# Patient Record
Sex: FEMALE | Race: OTHER | Employment: UNEMPLOYED | URBAN - NONMETROPOLITAN AREA
[De-identification: names, ages, dates, MRNs, and addresses within clinical notes are randomized per-mention and may not be internally consistent; named-entity substitution may affect disease eponyms.]

---

## 2017-07-19 LAB
ALBUMIN SERPL-MCNC: 3.7 G/DL
ALP BLD-CCNC: 50 U/L
ALT SERPL-CCNC: 18 U/L
ANION GAP SERPL CALCULATED.3IONS-SCNC: NORMAL MMOL/L
AST SERPL-CCNC: 17 U/L
BASOPHILS ABSOLUTE: 0 /ΜL
BASOPHILS RELATIVE PERCENT: 0.7 %
BILIRUB SERPL-MCNC: 0.3 MG/DL (ref 0.1–1.4)
BUN BLDV-MCNC: 11 MG/DL
CALCIUM SERPL-MCNC: 8.9 MG/DL
CHLORIDE BLD-SCNC: 109 MMOL/L
CO2: 23 MMOL/L
CREAT SERPL-MCNC: 0.6 MG/DL
EOSINOPHILS ABSOLUTE: 0.1 /ΜL
EOSINOPHILS RELATIVE PERCENT: 3 %
GFR CALCULATED: 110
GLUCOSE BLD-MCNC: 112 MG/DL
HCT VFR BLD CALC: 30.3 % (ref 36–46)
HEMOGLOBIN: 10.2 G/DL (ref 12–16)
LYMPHOCYTES ABSOLUTE: 0.9 /ΜL
LYMPHOCYTES RELATIVE PERCENT: 31.2 %
MCH RBC QN AUTO: 33 PG
MCHC RBC AUTO-ENTMCNC: 33.8 G/DL
MCV RBC AUTO: 97.6 FL
MONOCYTES ABSOLUTE: 0.2 /ΜL
MONOCYTES RELATIVE PERCENT: 8.4 %
NEUTROPHILS ABSOLUTE: ABNORMAL /ΜL
NEUTROPHILS RELATIVE PERCENT: 56.7 %
PLATELET # BLD: 291 K/ΜL
PMV BLD AUTO: 8.7 FL
POTASSIUM SERPL-SCNC: 4.5 MMOL/L
RBC # BLD: 3.1 10^6/ΜL
SODIUM BLD-SCNC: 143 MMOL/L
TOTAL PROTEIN: 6.1
WBC # BLD: 2.7 10^3/ML

## 2017-07-26 LAB
ALBUMIN SERPL-MCNC: 4.1 G/DL
ALP BLD-CCNC: 55 U/L
ALT SERPL-CCNC: 18 U/L
ANION GAP SERPL CALCULATED.3IONS-SCNC: NORMAL MMOL/L
AST SERPL-CCNC: 17 U/L
BASOPHILS ABSOLUTE: 0 /ΜL
BASOPHILS RELATIVE PERCENT: 0.3 %
BILIRUB SERPL-MCNC: 0.4 MG/DL (ref 0.1–1.4)
BUN BLDV-MCNC: 11 MG/DL
CALCIUM SERPL-MCNC: 9.8 MG/DL
CHLORIDE BLD-SCNC: 106 MMOL/L
CO2: 25 MMOL/L
CREAT SERPL-MCNC: 0.7 MG/DL
EOSINOPHILS ABSOLUTE: 0.1 /ΜL
EOSINOPHILS RELATIVE PERCENT: 2.2 %
GFR CALCULATED: 92
GLUCOSE BLD-MCNC: 111 MG/DL
HCT VFR BLD CALC: 33.5 % (ref 36–46)
HEMOGLOBIN: 11.3 G/DL (ref 12–16)
LYMPHOCYTES ABSOLUTE: 0.8 /ΜL
LYMPHOCYTES RELATIVE PERCENT: 30.9 %
MCH RBC QN AUTO: 32.7 PG
MCHC RBC AUTO-ENTMCNC: 33.6 G/DL
MCV RBC AUTO: 97.1 FL
MONOCYTES ABSOLUTE: 0.2 /ΜL
MONOCYTES RELATIVE PERCENT: 9.4 %
NEUTROPHILS ABSOLUTE: ABNORMAL /ΜL
NEUTROPHILS RELATIVE PERCENT: 57.2 %
PLATELET # BLD: 293 K/ΜL
PMV BLD AUTO: 9.5 FL
POTASSIUM SERPL-SCNC: 3.9 MMOL/L
RBC # BLD: 3.45 10^6/ΜL
SODIUM BLD-SCNC: 141 MMOL/L
TOTAL PROTEIN: 6.5
WBC # BLD: 2.6 10^3/ML

## 2017-08-02 LAB
ALBUMIN SERPL-MCNC: 4 G/DL
ALP BLD-CCNC: 59 U/L
ALT SERPL-CCNC: 17 U/L
ANION GAP SERPL CALCULATED.3IONS-SCNC: NORMAL MMOL/L
AST SERPL-CCNC: 17 U/L
BASOPHILS ABSOLUTE: 0 /ΜL
BASOPHILS RELATIVE PERCENT: 0.7 %
BILIRUB SERPL-MCNC: 0.4 MG/DL (ref 0.1–1.4)
BUN BLDV-MCNC: 9 MG/DL
CALCIUM SERPL-MCNC: 9.2 MG/DL
CHLORIDE BLD-SCNC: 108 MMOL/L
CO2: 27 MMOL/L
CREAT SERPL-MCNC: 0.7 MG/DL
EOSINOPHILS ABSOLUTE: 0.1 /ΜL
EOSINOPHILS RELATIVE PERCENT: 2.3 %
GFR CALCULATED: 92
GLUCOSE BLD-MCNC: 88 MG/DL
HCT VFR BLD CALC: 30.8 % (ref 36–46)
HEMOGLOBIN: 10.4 G/DL (ref 12–16)
LYMPHOCYTES ABSOLUTE: 1.1 /ΜL
LYMPHOCYTES RELATIVE PERCENT: 31 %
MCH RBC QN AUTO: 32.9 PG
MCHC RBC AUTO-ENTMCNC: 33.9 G/DL
MCV RBC AUTO: 97.2 FL
MONOCYTES ABSOLUTE: 0.4 /ΜL
MONOCYTES RELATIVE PERCENT: 10.4 %
NEUTROPHILS ABSOLUTE: ABNORMAL /ΜL
NEUTROPHILS RELATIVE PERCENT: 55.6 %
PLATELET # BLD: 308 K/ΜL
PMV BLD AUTO: 9.1 FL
POTASSIUM SERPL-SCNC: 4.1 MMOL/L
RBC # BLD: 3.17 10^6/ΜL
SODIUM BLD-SCNC: 143 MMOL/L
TOTAL PROTEIN: 6.1
WBC # BLD: 3.5 10^3/ML

## 2017-08-09 LAB
ALBUMIN SERPL-MCNC: 4.1 G/DL
ALP BLD-CCNC: 59 U/L
ALT SERPL-CCNC: 20 U/L
ANION GAP SERPL CALCULATED.3IONS-SCNC: NORMAL MMOL/L
AST SERPL-CCNC: 19 U/L
BASOPHILS ABSOLUTE: 0 /ΜL
BASOPHILS RELATIVE PERCENT: 0.6 %
BILIRUB SERPL-MCNC: 0.6 MG/DL (ref 0.1–1.4)
BUN BLDV-MCNC: 9 MG/DL
CALCIUM SERPL-MCNC: 9.5 MG/DL
CHLORIDE BLD-SCNC: 105 MMOL/L
CO2: 26 MMOL/L
CREAT SERPL-MCNC: 0.8 MG/DL
EOSINOPHILS ABSOLUTE: 0.1 /ΜL
EOSINOPHILS RELATIVE PERCENT: 1.9 %
GFR CALCULATED: 79
GLUCOSE BLD-MCNC: 135 MG/DL
HCT VFR BLD CALC: 32.6 % (ref 36–46)
HEMOGLOBIN: 10.9 G/DL (ref 12–16)
LYMPHOCYTES ABSOLUTE: 1 /ΜL
LYMPHOCYTES RELATIVE PERCENT: 32.2 %
MCH RBC QN AUTO: 32.8 PG
MCHC RBC AUTO-ENTMCNC: 33.4 G/DL
MCV RBC AUTO: 98.1 FL
MONOCYTES ABSOLUTE: 0.3 /ΜL
MONOCYTES RELATIVE PERCENT: 8.3 %
NEUTROPHILS ABSOLUTE: ABNORMAL /ΜL
NEUTROPHILS RELATIVE PERCENT: 57 %
PLATELET # BLD: 298 K/ΜL
PMV BLD AUTO: 8.7 FL
POTASSIUM SERPL-SCNC: 4.2 MMOL/L
RBC # BLD: 3.33 10^6/ΜL
SODIUM BLD-SCNC: 144 MMOL/L
TOTAL PROTEIN: 6.5
WBC # BLD: 3 10^3/ML

## 2017-08-25 LAB
BASOPHILS ABSOLUTE: 0 /ΜL
BASOPHILS RELATIVE PERCENT: 0.5 %
EOSINOPHILS ABSOLUTE: 0.1 /ΜL
EOSINOPHILS RELATIVE PERCENT: 2.2 %
HCT VFR BLD CALC: 33.4 % (ref 36–46)
HEMOGLOBIN: 11.1 G/DL (ref 12–16)
LYMPHOCYTES ABSOLUTE: 1 /ΜL
LYMPHOCYTES RELATIVE PERCENT: 27.5 %
MCH RBC QN AUTO: 32.6 PG
MCHC RBC AUTO-ENTMCNC: 33.2 G/DL
MCV RBC AUTO: 98.3 FL
MONOCYTES ABSOLUTE: 0.4 /ΜL
MONOCYTES RELATIVE PERCENT: 12.1 %
NEUTROPHILS ABSOLUTE: ABNORMAL /ΜL
NEUTROPHILS RELATIVE PERCENT: 57.7 %
PLATELET # BLD: 228 K/ΜL
PMV BLD AUTO: 10 FL
RBC # BLD: 3.39 10^6/ΜL
WBC # BLD: 3.6 10^3/ML

## 2018-11-08 ENCOUNTER — TELEPHONE (OUTPATIENT)
Dept: FAMILY MEDICINE CLINIC | Age: 58
End: 2018-11-08

## 2018-11-12 ENCOUNTER — NURSE ONLY (OUTPATIENT)
Dept: LAB | Age: 58
End: 2018-11-12

## 2018-11-12 ENCOUNTER — OFFICE VISIT (OUTPATIENT)
Dept: FAMILY MEDICINE CLINIC | Age: 58
End: 2018-11-12
Payer: MEDICAID

## 2018-11-12 VITALS
HEART RATE: 80 BPM | HEIGHT: 64 IN | SYSTOLIC BLOOD PRESSURE: 88 MMHG | DIASTOLIC BLOOD PRESSURE: 64 MMHG | TEMPERATURE: 98.1 F | BODY MASS INDEX: 32.27 KG/M2 | RESPIRATION RATE: 14 BRPM | WEIGHT: 189 LBS

## 2018-11-12 DIAGNOSIS — I50.22 CHRONIC SYSTOLIC (CONGESTIVE) HEART FAILURE (HCC): Primary | ICD-10-CM

## 2018-11-12 DIAGNOSIS — I10 ESSENTIAL HYPERTENSION: ICD-10-CM

## 2018-11-12 DIAGNOSIS — I50.22 CHRONIC SYSTOLIC (CONGESTIVE) HEART FAILURE (HCC): ICD-10-CM

## 2018-11-12 DIAGNOSIS — I95.9 HYPOTENSION, UNSPECIFIED HYPOTENSION TYPE: ICD-10-CM

## 2018-11-12 DIAGNOSIS — E03.9 HYPOTHYROIDISM, UNSPECIFIED TYPE: ICD-10-CM

## 2018-11-12 LAB
ALBUMIN SERPL-MCNC: 4.6 G/DL (ref 3.5–5.1)
ALP BLD-CCNC: 66 U/L (ref 38–126)
ALT SERPL-CCNC: 16 U/L (ref 11–66)
ANION GAP SERPL CALCULATED.3IONS-SCNC: 18 MEQ/L (ref 8–16)
AST SERPL-CCNC: 15 U/L (ref 5–40)
BASOPHILS # BLD: 0.7 %
BASOPHILS ABSOLUTE: 0 THOU/MM3 (ref 0–0.1)
BILIRUB SERPL-MCNC: 0.2 MG/DL (ref 0.3–1.2)
BUN BLDV-MCNC: 28 MG/DL (ref 7–22)
CALCIUM SERPL-MCNC: 10.1 MG/DL (ref 8.5–10.5)
CHLORIDE BLD-SCNC: 94 MEQ/L (ref 98–111)
CO2: 30 MEQ/L (ref 23–33)
CREAT SERPL-MCNC: 1.1 MG/DL (ref 0.4–1.2)
EOSINOPHIL # BLD: 4.8 %
EOSINOPHILS ABSOLUTE: 0.3 THOU/MM3 (ref 0–0.4)
ERYTHROCYTE [DISTWIDTH] IN BLOOD BY AUTOMATED COUNT: 15.9 % (ref 11.5–14.5)
ERYTHROCYTE [DISTWIDTH] IN BLOOD BY AUTOMATED COUNT: 55.7 FL (ref 35–45)
GFR SERPL CREATININE-BSD FRML MDRD: 51 ML/MIN/1.73M2
GLUCOSE BLD-MCNC: 107 MG/DL (ref 70–108)
HCT VFR BLD CALC: 42.8 % (ref 37–47)
HEMOGLOBIN: 14.1 GM/DL (ref 12–16)
IMMATURE GRANS (ABS): 0.05 THOU/MM3 (ref 0–0.07)
IMMATURE GRANULOCYTES: 0.9 %
LYMPHOCYTES # BLD: 18.1 %
LYMPHOCYTES ABSOLUTE: 1 THOU/MM3 (ref 1–4.8)
MCH RBC QN AUTO: 31.4 PG (ref 26–33)
MCHC RBC AUTO-ENTMCNC: 32.9 GM/DL (ref 32.2–35.5)
MCV RBC AUTO: 95.3 FL (ref 81–99)
MONOCYTES # BLD: 6.2 %
MONOCYTES ABSOLUTE: 0.3 THOU/MM3 (ref 0.4–1.3)
NUCLEATED RED BLOOD CELLS: 0 /100 WBC
PLATELET # BLD: 209 THOU/MM3 (ref 130–400)
PMV BLD AUTO: 12.9 FL (ref 9.4–12.4)
POTASSIUM SERPL-SCNC: 3.3 MEQ/L (ref 3.5–5.2)
RBC # BLD: 4.49 MILL/MM3 (ref 4.2–5.4)
SEG NEUTROPHILS: 69.3 %
SEGMENTED NEUTROPHILS ABSOLUTE COUNT: 3.9 THOU/MM3 (ref 1.8–7.7)
SODIUM BLD-SCNC: 142 MEQ/L (ref 135–145)
T4 FREE: 1.21 NG/DL (ref 0.93–1.76)
TOTAL PROTEIN: 7.7 G/DL (ref 6.1–8)
TSH SERPL DL<=0.05 MIU/L-ACNC: 8.01 UIU/ML (ref 0.4–4.2)
WBC # BLD: 5.6 THOU/MM3 (ref 4.8–10.8)

## 2018-11-12 PROCEDURE — 99204 OFFICE O/P NEW MOD 45 MIN: CPT | Performed by: NURSE PRACTITIONER

## 2018-11-12 PROCEDURE — 93000 ELECTROCARDIOGRAM COMPLETE: CPT | Performed by: NURSE PRACTITIONER

## 2018-11-12 RX ORDER — OMEPRAZOLE 20 MG/1
20 CAPSULE, DELAYED RELEASE ORAL DAILY PRN
COMMUNITY
End: 2018-12-17

## 2018-11-12 RX ORDER — CARVEDILOL 3.12 MG/1
3.12 TABLET ORAL 2 TIMES DAILY
COMMUNITY
End: 2018-12-03 | Stop reason: SDUPTHER

## 2018-11-12 RX ORDER — LEVOTHYROXINE SODIUM 0.1 MG/1
100 TABLET ORAL DAILY
COMMUNITY
End: 2018-12-06 | Stop reason: SDUPTHER

## 2018-11-12 RX ORDER — POTASSIUM CHLORIDE 1500 MG/1
40 TABLET, FILM COATED, EXTENDED RELEASE ORAL 2 TIMES DAILY
COMMUNITY
End: 2018-11-12 | Stop reason: SDUPTHER

## 2018-11-12 RX ORDER — SPIRONOLACTONE 25 MG/1
25 TABLET ORAL DAILY
COMMUNITY
End: 2018-12-03 | Stop reason: SDUPTHER

## 2018-11-12 RX ORDER — FUROSEMIDE 40 MG/1
40 TABLET ORAL DAILY
COMMUNITY
End: 2018-12-03 | Stop reason: SDUPTHER

## 2018-11-12 RX ORDER — METOLAZONE 2.5 MG/1
2.5 TABLET ORAL
Status: ON HOLD | COMMUNITY
End: 2018-11-30

## 2018-11-12 RX ORDER — POTASSIUM CHLORIDE 1500 MG/1
40 TABLET, FILM COATED, EXTENDED RELEASE ORAL 2 TIMES DAILY
Qty: 60 TABLET | Refills: 1 | Status: SHIPPED | OUTPATIENT
Start: 2018-11-12 | End: 2019-01-14 | Stop reason: SDUPTHER

## 2018-11-12 RX ORDER — LISINOPRIL 5 MG/1
5 TABLET ORAL DAILY
COMMUNITY
End: 2018-11-12 | Stop reason: ALTCHOICE

## 2018-11-12 ASSESSMENT — PATIENT HEALTH QUESTIONNAIRE - PHQ9
SUM OF ALL RESPONSES TO PHQ QUESTIONS 1-9: 0
1. LITTLE INTEREST OR PLEASURE IN DOING THINGS: 0
SUM OF ALL RESPONSES TO PHQ QUESTIONS 1-9: 0
SUM OF ALL RESPONSES TO PHQ9 QUESTIONS 1 & 2: 0
2. FEELING DOWN, DEPRESSED OR HOPELESS: 0

## 2018-11-12 NOTE — PROGRESS NOTES
benefit, and side effects of prescribed medications. Barriers to medication compliance addressed. All patient questions answered. Pt voiced understanding.        Electronically signed by ANGELIA Pierce CNP on 11/12/2018 at 4:17 PM

## 2018-11-13 ENCOUNTER — TELEPHONE (OUTPATIENT)
Dept: FAMILY MEDICINE CLINIC | Age: 58
End: 2018-11-13

## 2018-11-15 ENCOUNTER — NURSE ONLY (OUTPATIENT)
Dept: FAMILY MEDICINE CLINIC | Age: 58
End: 2018-11-15

## 2018-11-15 ENCOUNTER — TELEPHONE (OUTPATIENT)
Dept: FAMILY MEDICINE CLINIC | Age: 58
End: 2018-11-15

## 2018-11-15 VITALS — DIASTOLIC BLOOD PRESSURE: 64 MMHG | SYSTOLIC BLOOD PRESSURE: 87 MMHG

## 2018-11-15 DIAGNOSIS — I10 ESSENTIAL HYPERTENSION: Primary | ICD-10-CM

## 2018-11-15 DIAGNOSIS — I50.22 CHRONIC SYSTOLIC (CONGESTIVE) HEART FAILURE (HCC): ICD-10-CM

## 2018-11-19 ENCOUNTER — NURSE ONLY (OUTPATIENT)
Dept: LAB | Age: 58
End: 2018-11-19

## 2018-11-19 ENCOUNTER — TELEPHONE (OUTPATIENT)
Dept: FAMILY MEDICINE CLINIC | Age: 58
End: 2018-11-19

## 2018-11-19 DIAGNOSIS — I10 ESSENTIAL HYPERTENSION: ICD-10-CM

## 2018-11-19 DIAGNOSIS — I50.22 CHRONIC SYSTOLIC (CONGESTIVE) HEART FAILURE (HCC): ICD-10-CM

## 2018-11-19 LAB
ANION GAP SERPL CALCULATED.3IONS-SCNC: 12 MEQ/L (ref 8–16)
BUN BLDV-MCNC: 17 MG/DL (ref 7–22)
CALCIUM SERPL-MCNC: 9.2 MG/DL (ref 8.5–10.5)
CHLORIDE BLD-SCNC: 102 MEQ/L (ref 98–111)
CO2: 29 MEQ/L (ref 23–33)
CREAT SERPL-MCNC: 0.7 MG/DL (ref 0.4–1.2)
GFR SERPL CREATININE-BSD FRML MDRD: 86 ML/MIN/1.73M2
GLUCOSE BLD-MCNC: 94 MG/DL (ref 70–108)
POTASSIUM SERPL-SCNC: 4.2 MEQ/L (ref 3.5–5.2)
SODIUM BLD-SCNC: 143 MEQ/L (ref 135–145)

## 2018-11-28 ENCOUNTER — OFFICE VISIT (OUTPATIENT)
Dept: CARDIOLOGY CLINIC | Age: 58
End: 2018-11-28
Payer: MEDICAID

## 2018-11-28 ENCOUNTER — HOSPITAL ENCOUNTER (OUTPATIENT)
Age: 58
Discharge: HOME | End: 2018-11-28
Payer: MEDICAID

## 2018-11-28 VITALS
DIASTOLIC BLOOD PRESSURE: 70 MMHG | HEART RATE: 88 BPM | SYSTOLIC BLOOD PRESSURE: 110 MMHG | WEIGHT: 195 LBS | BODY MASS INDEX: 35.88 KG/M2 | HEIGHT: 62 IN

## 2018-11-28 DIAGNOSIS — I42.9 CARDIOMYOPATHY, UNSPECIFIED TYPE (HCC): ICD-10-CM

## 2018-11-28 DIAGNOSIS — I42.9 CARDIOMYOPATHY, UNSPECIFIED TYPE (HCC): Primary | ICD-10-CM

## 2018-11-28 LAB
ANION GAP SERPL CALCULATED.3IONS-SCNC: 12 MEQ/L (ref 8–16)
BUN BLDV-MCNC: 17 MG/DL (ref 7–22)
CALCIUM SERPL-MCNC: 9.4 MG/DL (ref 8.5–10.5)
CHLORIDE BLD-SCNC: 105 MEQ/L (ref 98–111)
CO2: 25 MEQ/L (ref 23–33)
CREAT SERPL-MCNC: 0.8 MG/DL (ref 0.4–1.2)
GFR SERPL CREATININE-BSD FRML MDRD: 74 ML/MIN/1.73M2
GLUCOSE BLD-MCNC: 86 MG/DL (ref 70–108)
POTASSIUM SERPL-SCNC: 3.9 MEQ/L (ref 3.5–5.2)
SODIUM BLD-SCNC: 142 MEQ/L (ref 135–145)

## 2018-11-28 PROCEDURE — 99204 OFFICE O/P NEW MOD 45 MIN: CPT | Performed by: INTERNAL MEDICINE

## 2018-11-28 PROCEDURE — 80048 BASIC METABOLIC PNL TOTAL CA: CPT

## 2018-11-28 PROCEDURE — 36415 COLL VENOUS BLD VENIPUNCTURE: CPT

## 2018-11-28 RX ORDER — LOSARTAN POTASSIUM 25 MG/1
25 TABLET ORAL DAILY
Qty: 90 TABLET | Refills: 1 | Status: ON HOLD | OUTPATIENT
Start: 2018-11-28 | End: 2018-11-30 | Stop reason: HOSPADM

## 2018-11-28 NOTE — PROGRESS NOTES
[address] [facility] CARDIOLOGY  [address]  [address] [address]  Dept: [phone]  Dept Fax: [phone]  Loc: [phone]    Visit Date: 11/28/2018    Ms. HASSAN is a 63 y.o. female  who presented for:  Chief Complaint   Patient presents with    New Patient    Chest Pain     pt states the chest pain is stabbing    Shortness of Breath    Dizziness    Palpitations    Swelling       HPI:   HPI   64 yo F with cardioymopathy, EF 20-25%, moderate MR.  ECG with SR.  Cr 0.7  She just finished radiation for her left breast.  Finished chemotherapy 8/9/17. She is taking all of her medications and is wearing a LifeVest.  No firings. She has intermittent chest discomfort but no exertional issues. No chest pain, angina, BORDEN, orthopnea, PND, sob at rest, palpitations, LE edema, or syncope. She has had a cath in the past and was told it was within normal limits. She is tearful of her diagnosis. Current Outpatient Prescriptions:     levothyroxine (SYNTHROID) 100 MCG tablet, Take 100 mcg by mouth daily, Disp: , Rfl:     carvedilol (COREG) 3.125 MG tablet, Take 3.125 mg by mouth 2 times daily, Disp: , Rfl:     furosemide (LASIX) 40 MG tablet, Take 40 mg by mouth daily , Disp: , Rfl:     metolazone (ZAROXOLYN) 2.5 MG tablet, Take 2.5 mg by mouth every 3 days, Disp: , Rfl:     spironolactone (ALDACTONE) 25 MG tablet, Take 25 mg by mouth daily, Disp: , Rfl:     omeprazole (PRILOSEC) 20 MG delayed release capsule, Take 20 mg by mouth daily as needed, Disp: , Rfl:     potassium chloride (KLOR-CON M) 20 MEQ TBCR extended release tablet, Take 2 tablets by mouth 2 times daily, Disp: 60 tablet, Rfl: 1    Past Medical History  Eliel eLvy  has a past medical history of Cancer ([facility] [address] Congenital heart disease; Hypothyroidism; Kidney stones; and Liver disease. Social History  Eliel Levy  reports that she has been smoking. She started smoking about 37 years ago.  She has

## 2018-11-29 ENCOUNTER — APPOINTMENT (OUTPATIENT)
Dept: GENERAL RADIOLOGY | Age: 58
End: 2018-11-29
Payer: MEDICAID

## 2018-11-29 ENCOUNTER — HOSPITAL ENCOUNTER (OUTPATIENT)
Age: 58
Setting detail: OBSERVATION
Discharge: HOME | End: 2018-11-30
Attending: FAMILY MEDICINE | Admitting: INTERNAL MEDICINE
Payer: MEDICAID

## 2018-11-29 ENCOUNTER — APPOINTMENT (OUTPATIENT)
Dept: CT IMAGING | Age: 58
End: 2018-11-29
Payer: MEDICAID

## 2018-11-29 DIAGNOSIS — R07.9 CHEST PAIN WITH HIGH RISK FOR CARDIAC ETIOLOGY: Primary | ICD-10-CM

## 2018-11-29 LAB
ALBUMIN SERPL-MCNC: 4.1 G/DL (ref 3.5–5.1)
ALP BLD-CCNC: 63 U/L (ref 38–126)
ALT SERPL-CCNC: 12 U/L (ref 11–66)
ANION GAP SERPL CALCULATED.3IONS-SCNC: 16 MEQ/L (ref 8–16)
AST SERPL-CCNC: 14 U/L (ref 5–40)
BACTERIA: ABNORMAL /HPF
BASOPHILS # BLD: 0.7 %
BASOPHILS ABSOLUTE: 0 THOU/MM3 (ref 0–0.1)
BILIRUB SERPL-MCNC: 0.3 MG/DL (ref 0.3–1.2)
BILIRUBIN DIRECT: < 0.2 MG/DL (ref 0–0.3)
BILIRUBIN URINE: NEGATIVE
BLOOD, URINE: NEGATIVE
BUN BLDV-MCNC: 18 MG/DL (ref 7–22)
CALCIUM SERPL-MCNC: 9.2 MG/DL (ref 8.5–10.5)
CASTS 2: ABNORMAL /LPF
CASTS UA: ABNORMAL /LPF
CHARACTER, URINE: CLEAR
CHLORIDE BLD-SCNC: 104 MEQ/L (ref 98–111)
CO2: 20 MEQ/L (ref 23–33)
COLOR: YELLOW
CREAT SERPL-MCNC: 0.8 MG/DL (ref 0.4–1.2)
CRYSTALS, UA: ABNORMAL
EKG ATRIAL RATE: 88 BPM
EKG P AXIS: 45 DEGREES
EKG P-R INTERVAL: 172 MS
EKG Q-T INTERVAL: 400 MS
EKG QRS DURATION: 82 MS
EKG QTC CALCULATION (BAZETT): 484 MS
EKG R AXIS: -16 DEGREES
EKG T AXIS: 55 DEGREES
EKG VENTRICULAR RATE: 88 BPM
EOSINOPHIL # BLD: 3.5 %
EOSINOPHILS ABSOLUTE: 0.2 THOU/MM3 (ref 0–0.4)
EPITHELIAL CELLS, UA: ABNORMAL /HPF
ERYTHROCYTE [DISTWIDTH] IN BLOOD BY AUTOMATED COUNT: 16.5 % (ref 11.5–14.5)
ERYTHROCYTE [DISTWIDTH] IN BLOOD BY AUTOMATED COUNT: 56.1 FL (ref 35–45)
GFR SERPL CREATININE-BSD FRML MDRD: 74 ML/MIN/1.73M2
GLUCOSE BLD-MCNC: 92 MG/DL (ref 70–108)
GLUCOSE URINE: NEGATIVE MG/DL
HCT VFR BLD CALC: 35.5 % (ref 37–47)
HEMOGLOBIN: 12.1 GM/DL (ref 12–16)
IMMATURE GRANS (ABS): 0.03 THOU/MM3 (ref 0–0.07)
IMMATURE GRANULOCYTES: 0.5 %
KETONES, URINE: NEGATIVE
LEUKOCYTE ESTERASE, URINE: ABNORMAL
LIPASE: 36.1 U/L (ref 5.6–51.3)
LYMPHOCYTES # BLD: 24.9 %
LYMPHOCYTES ABSOLUTE: 1.4 THOU/MM3 (ref 1–4.8)
MAGNESIUM: 2.2 MG/DL (ref 1.6–2.4)
MCH RBC QN AUTO: 32 PG (ref 26–33)
MCHC RBC AUTO-ENTMCNC: 34.1 GM/DL (ref 32.2–35.5)
MCV RBC AUTO: 93.9 FL (ref 81–99)
MISCELLANEOUS 2: ABNORMAL
MONOCYTES # BLD: 7.6 %
MONOCYTES ABSOLUTE: 0.4 THOU/MM3 (ref 0.4–1.3)
NITRITE, URINE: NEGATIVE
NUCLEATED RED BLOOD CELLS: 0 /100 WBC
OSMOLALITY CALCULATION: 280.9 MOSMOL/KG (ref 275–300)
PH UA: 5.5
PLATELET # BLD: 210 THOU/MM3 (ref 130–400)
PMV BLD AUTO: 12 FL (ref 9.4–12.4)
POTASSIUM SERPL-SCNC: 3.7 MEQ/L (ref 3.5–5.2)
PRO-BNP: 1324 PG/ML (ref 0–900)
PROTEIN UA: NEGATIVE
RBC # BLD: 3.78 MILL/MM3 (ref 4.2–5.4)
RBC URINE: ABNORMAL /HPF
RENAL EPITHELIAL, UA: ABNORMAL
SEG NEUTROPHILS: 62.8 %
SEGMENTED NEUTROPHILS ABSOLUTE COUNT: 3.6 THOU/MM3 (ref 1.8–7.7)
SODIUM BLD-SCNC: 140 MEQ/L (ref 135–145)
SPECIFIC GRAVITY, URINE: 1.02 (ref 1–1.03)
TOTAL PROTEIN: 6.6 G/DL (ref 6.1–8)
TROPONIN T: < 0.01 NG/ML
TSH SERPL DL<=0.05 MIU/L-ACNC: 4.03 UIU/ML (ref 0.4–4.2)
UROBILINOGEN, URINE: 0.2 EU/DL
WBC # BLD: 5.7 THOU/MM3 (ref 4.8–10.8)
WBC UA: ABNORMAL /HPF
YEAST: ABNORMAL

## 2018-11-29 PROCEDURE — 71275 CT ANGIOGRAPHY CHEST: CPT

## 2018-11-29 PROCEDURE — 36415 COLL VENOUS BLD VENIPUNCTURE: CPT

## 2018-11-29 PROCEDURE — 93005 ELECTROCARDIOGRAM TRACING: CPT | Performed by: NURSE PRACTITIONER

## 2018-11-29 PROCEDURE — 84443 ASSAY THYROID STIM HORMONE: CPT

## 2018-11-29 PROCEDURE — 84484 ASSAY OF TROPONIN QUANT: CPT

## 2018-11-29 PROCEDURE — 83690 ASSAY OF LIPASE: CPT

## 2018-11-29 PROCEDURE — 71045 X-RAY EXAM CHEST 1 VIEW: CPT

## 2018-11-29 PROCEDURE — 83735 ASSAY OF MAGNESIUM: CPT

## 2018-11-29 PROCEDURE — 85025 COMPLETE CBC W/AUTO DIFF WBC: CPT

## 2018-11-29 PROCEDURE — 99285 EMERGENCY DEPT VISIT HI MDM: CPT

## 2018-11-29 PROCEDURE — 6360000004 HC RX CONTRAST MEDICATION: Performed by: FAMILY MEDICINE

## 2018-11-29 PROCEDURE — 83880 ASSAY OF NATRIURETIC PEPTIDE: CPT

## 2018-11-29 PROCEDURE — 6370000000 HC RX 637 (ALT 250 FOR IP): Performed by: NURSE PRACTITIONER

## 2018-11-29 PROCEDURE — 87086 URINE CULTURE/COLONY COUNT: CPT

## 2018-11-29 PROCEDURE — 81001 URINALYSIS AUTO W/SCOPE: CPT

## 2018-11-29 PROCEDURE — 80053 COMPREHEN METABOLIC PANEL: CPT

## 2018-11-29 PROCEDURE — 82248 BILIRUBIN DIRECT: CPT

## 2018-11-29 RX ORDER — ASPIRIN 81 MG/1
324 TABLET, CHEWABLE ORAL ONCE
Status: COMPLETED | OUTPATIENT
Start: 2018-11-29 | End: 2018-11-29

## 2018-11-29 RX ADMIN — IOPAMIDOL 80 ML: 755 INJECTION, SOLUTION INTRAVENOUS at 21:56

## 2018-11-29 RX ADMIN — ASPIRIN 81 MG 324 MG: 81 TABLET ORAL at 19:49

## 2018-11-29 ASSESSMENT — PAIN SCALES - GENERAL: PAINLEVEL_OUTOF10: 4

## 2018-11-29 ASSESSMENT — ENCOUNTER SYMPTOMS
CHEST TIGHTNESS: 1
ABDOMINAL PAIN: 1
NAUSEA: 0
SHORTNESS OF BREATH: 1
VOMITING: 0
DIARRHEA: 0
CONSTIPATION: 0

## 2018-11-29 ASSESSMENT — PAIN DESCRIPTION - DESCRIPTORS: DESCRIPTORS: STABBING

## 2018-11-29 ASSESSMENT — PAIN DESCRIPTION - ORIENTATION: ORIENTATION: MID

## 2018-11-29 ASSESSMENT — HEART SCORE: ECG: 0

## 2018-11-29 ASSESSMENT — PAIN DESCRIPTION - LOCATION: LOCATION: CHEST

## 2018-11-29 ASSESSMENT — PAIN DESCRIPTION - FREQUENCY: FREQUENCY: CONTINUOUS

## 2018-11-29 ASSESSMENT — PAIN DESCRIPTION - PAIN TYPE: TYPE: ACUTE PAIN

## 2018-11-30 ENCOUNTER — TELEPHONE (OUTPATIENT)
Dept: FAMILY MEDICINE CLINIC | Age: 58
End: 2018-11-30

## 2018-11-30 VITALS
BODY MASS INDEX: 35.53 KG/M2 | SYSTOLIC BLOOD PRESSURE: 93 MMHG | DIASTOLIC BLOOD PRESSURE: 56 MMHG | HEART RATE: 76 BPM | RESPIRATION RATE: 16 BRPM | TEMPERATURE: 98.6 F | HEIGHT: 62 IN | OXYGEN SATURATION: 95 % | WEIGHT: 193.1 LBS

## 2018-11-30 LAB
TROPONIN T: < 0.01 NG/ML

## 2018-11-30 PROCEDURE — 90686 IIV4 VACC NO PRSV 0.5 ML IM: CPT | Performed by: INTERNAL MEDICINE

## 2018-11-30 PROCEDURE — 99235 HOSP IP/OBS SAME DATE MOD 70: CPT | Performed by: INTERNAL MEDICINE

## 2018-11-30 PROCEDURE — G0378 HOSPITAL OBSERVATION PER HR: HCPCS

## 2018-11-30 PROCEDURE — 6360000002 HC RX W HCPCS: Performed by: INTERNAL MEDICINE

## 2018-11-30 PROCEDURE — G0008 ADMIN INFLUENZA VIRUS VAC: HCPCS | Performed by: INTERNAL MEDICINE

## 2018-11-30 PROCEDURE — 99220 PR INITIAL OBSERVATION CARE/DAY 70 MINUTES: CPT | Performed by: INTERNAL MEDICINE

## 2018-11-30 PROCEDURE — 84484 ASSAY OF TROPONIN QUANT: CPT

## 2018-11-30 PROCEDURE — 93010 ELECTROCARDIOGRAM REPORT: CPT | Performed by: INTERNAL MEDICINE

## 2018-11-30 PROCEDURE — 36415 COLL VENOUS BLD VENIPUNCTURE: CPT

## 2018-11-30 RX ORDER — POTASSIUM CHLORIDE 20 MEQ/1
40 TABLET, EXTENDED RELEASE ORAL 2 TIMES DAILY
Status: DISCONTINUED | OUTPATIENT
Start: 2018-11-30 | End: 2018-11-30 | Stop reason: HOSPADM

## 2018-11-30 RX ORDER — SPIRONOLACTONE 25 MG/1
25 TABLET ORAL DAILY
Status: DISCONTINUED | OUTPATIENT
Start: 2018-11-30 | End: 2018-11-30 | Stop reason: HOSPADM

## 2018-11-30 RX ORDER — LEVOTHYROXINE SODIUM 0.1 MG/1
100 TABLET ORAL DAILY
Status: DISCONTINUED | OUTPATIENT
Start: 2018-11-30 | End: 2018-11-30 | Stop reason: HOSPADM

## 2018-11-30 RX ORDER — FUROSEMIDE 40 MG/1
40 TABLET ORAL DAILY
Status: DISCONTINUED | OUTPATIENT
Start: 2018-11-30 | End: 2018-11-30 | Stop reason: HOSPADM

## 2018-11-30 RX ORDER — METOLAZONE 2.5 MG/1
2.5 TABLET ORAL
Status: DISCONTINUED | OUTPATIENT
Start: 2018-12-01 | End: 2018-11-30 | Stop reason: HOSPADM

## 2018-11-30 RX ORDER — PANTOPRAZOLE SODIUM 40 MG/1
40 TABLET, DELAYED RELEASE ORAL
Status: DISCONTINUED | OUTPATIENT
Start: 2018-11-30 | End: 2018-11-30 | Stop reason: HOSPADM

## 2018-11-30 RX ADMIN — INFLUENZA A VIRUS A/MICHIGAN/45/2015 X-275 (H1N1) ANTIGEN (FORMALDEHYDE INACTIVATED), INFLUENZA A VIRUS A/SINGAPORE/INFIMH-16-0019/2016 IVR-186 (H3N2) ANTIGEN (FORMALDEHYDE INACTIVATED), INFLUENZA B VIRUS B/PHUKET/3073/2013 ANTIGEN (FORMALDEHYDE INACTIVATED), AND INFLUENZA B VIRUS B/MARYLAND/15/2016 BX-69A ANTIGEN (FORMALDEHYDE INACTIVATED) 0.5 ML: 15; 15; 15; 15 INJECTION, SUSPENSION INTRAMUSCULAR at 17:49

## 2018-11-30 ASSESSMENT — PAIN SCALES - GENERAL
PAINLEVEL_OUTOF10: 0

## 2018-11-30 NOTE — H&P
Historical Provider, MD   potassium chloride (KLOR-CON M) 20 MEQ TBCR extended release tablet Take 2 tablets by mouth 2 times daily 11/12/18   ANGELIA Hughes CNP   lisinopril (PRINIVIL;ZESTRIL) 5 MG tablet Take 5 mg by mouth daily  11/12/18  Historical Provider, MD       Allergies:  Patient has no known allergies. Social History:      TOBACCO:   reports that she has been smoking. She started smoking about 37 years ago. She has a 9.25 pack-year smoking history. She has never used smokeless tobacco.  ETOH:   reports that she does not drink alcohol. Family History:      Reviewed in detail and negative for DM, CAD, Cancer, CVA. Positive as follows:    History reviewed. No pertinent family history. Diet:       REVIEW OF SYSTEMS:   Pertinent positives as noted in the HPI. All other systems reviewed and negative. PHYSICAL EXAM:    /74   Pulse 78   Temp 97.9 °F (36.6 °C) (Oral)   Resp 14   Ht 5' 3\" (1.6 m)   Wt 195 lb (88.5 kg)   SpO2 98%   BMI 34.54 kg/m²     General appearance:  No apparent distress, appears stated age and cooperative. HEENT:  Normal cephalic, atraumatic without obvious deformity. Pupils equal, round, and reactive to light. Extra ocular muscles intact. Conjunctivae/corneas clear. Neck: Supple, with full range of motion. No jugular venous distention. Trachea midline. Respiratory:  Normal respiratory effort. Clear to auscultation, bilaterally without Rales/Wheezes/Rhonchi. Cardiovascular:  Regular rate and rhythm with normal S1/S2 without murmurs, rubs or gallops. Abdomen: Soft, non-tender, non-distended with normal bowel sounds. Musculoskeletal:  No clubbing, cyanosis or edema bilaterally. Full range of motion without deformity. Skin: Skin color, texture, turgor normal.  No rashes or lesions. Neurologic:  Neurovascularly intact without any focal sensory/motor deficits.  Cranial nerves: II-XII intact, grossly non-focal.  Psychiatric:  Alert and oriented,

## 2018-11-30 NOTE — CONSULTS
thyroid nodules, no cervical lymphadenopathy  Pulmonary/Chest: clear to auscultation bilaterally- no wheezes, rales or rhonchi, normal air movement, no respiratory distress  Cardiovascular: normal rate, regular rhythm, normal S1 and S2, no murmurs, rubs, clicks, or gallops, distal pulses intact, no carotid bruits, Negative JVD  Radial Pulses: intact 2+  Abdomen: soft, non-tender, non-distended, normal bowel sounds, no masses or organomegaly  Extremities: no cyanosis, clubbing . Edema  Musculoskeletal: normal range of motion, no joint swelling, deformity or tenderness    LABS:  Recent Labs      11/29/18   1944  11/30/18   0312  11/30/18   0836   TROPONINT  < 0.010  < 0.010  < 0.010     CBC: Lab Results   Component Value Date    WBC 5.7 11/29/2018    RBC 3.78 11/29/2018    HGB 12.1 11/29/2018    HCT 35.5 11/29/2018    MCV 93.9 11/29/2018    MCH 32.0 11/29/2018    MCHC 34.1 11/29/2018     11/29/2018    MPV 12.0 11/29/2018     BMP:  Lab Results   Component Value Date     11/29/2018    K 3.7 11/29/2018     11/29/2018    CO2 20 11/29/2018    BUN 18 11/29/2018    LABALBU 4.1 11/29/2018    CREATININE 0.8 11/29/2018    CALCIUM 9.2 11/29/2018    LABGLOM 74 11/29/2018    GLUCOSE 92 11/29/2018     Hepatic Function Panel:  Lab Results   Component Value Date    ALKPHOS 63 11/29/2018    ALT 12 11/29/2018    AST 14 11/29/2018    PROT 6.6 11/29/2018    BILITOT 0.3 11/29/2018    BILIDIR <0.2 11/29/2018    LABALBU 4.1 11/29/2018     Magnesium:    Lab Results   Component Value Date    MG 2.2 11/29/2018     Warfarin PT/INR:  No components found for: PTPATWAR, PTINRWAR  HgBA1c:  No results found for: LABA1C  FLP:  No results found for: TRIG, HDL, LDLCALC, LDLDIRECT, LABVLDL  TSH:    Lab Results   Component Value Date    TSH 4.030 11/29/2018     BNP: No results found for: BNP      ASSESSMENT/PLAN:  Weight down/unchanged. No evidence for myocardial infarction.   NO MI RESTRICTION required  Allow free activity and follow up as per plan outlined by Dr. Kirstin Bateman MD FACSHARI,FASREI,FSRD,FSCAI,FASNC,LULY,FACP.  9:26 AM  11/30/2018

## 2018-11-30 NOTE — ED PROVIDER NOTES
[address] [facility]  Pt Name: Marianela Berman  MRN: [id number]  [place] 1960  Date of evaluation: 11/29/2018  Provider: ANGELIA Babb CNP    CHIEF COMPLAINT       Chief Complaint   Patient presents with    Chest Pain       Nurses Notes reviewed and I agree except as noted in the HPI. HISTORY OF PRESENT ILLNESS    Marianela Berman is a 63 y.o. female who presents to the emergency department from home with chest pain, shortness of breath. Sudden onset while cooking with her daughters. Sees DR. Ruby Ivory, last appt yesterday. She states that she has an EF of 20% and likely will need a heart transplant 2/2 cardiomyopathy from chemo and radiation. Has a life vest on. States that one of her BP meds were changed 2/2 lack of insurance. Triage notes and Nursing notes were reviewed by myself. Any discrepancies are addressed above. REVIEW OF SYSTEMS     Review of Systems   Constitutional: Negative for fever. Respiratory: Positive for chest tightness and shortness of breath. Cardiovascular: Positive for chest pain and leg swelling. Gastrointestinal: Positive for abdominal pain. Negative for constipation, diarrhea, nausea and vomiting. All pertinent systems were reviewed and are negative unless indicated in the HPI. PAST MEDICAL HISTORY    has a past medical history of Cancer ([facility] [address] Congenital heart disease; Hypothyroidism; Kidney stones; and Liver disease. SURGICAL HISTORY      has a past surgical history that includes Mastectomy, bilateral; Tonsillectomy; Percutaneous Transluminal Coronary Angio; and Appendectomy.     CURRENT MEDICATIONS       Discharge Medication List as of 11/30/2018  5:41 PM      CONTINUE these medications which have NOT CHANGED    Details   omeprazole (PRILOSEC) 20 MG delayed release capsule Take 20 mg by mouth daily as neededHistorical Med      potassium chloride (KLOR-CON M) 20 MEQ TBCR extended release tablet Take 2 tablets by mouth 2 times noted. She is not diaphoretic. No erythema. No pallor. Psychiatric: She has a normal mood and affect. Her behavior is normal.   Nursing note and vitals reviewed. DIFFERENTIAL DIAGNOSIS:   Including but not limited to chf exacerbation, acs, pna    DIAGNOSTIC RESULTS     EKG: AllEKG's are interpreted by the Emergency Department Physician who either signs or Co-signs this chart in the absence of a cardiologist.  Collection Time Result Time Ventricular Rate Atrial Rate P-R Interval QRS Duration Q-T Interval   11/29/18 18:59:20 11/29/18 18:59:20 88 88 172 82 400       Collection Time Result Time QTc Calculation (Bazett) P Axis R Axis T Axis   11/29/18 18:59:20 11/29/18 18:59:20 484 45 -16 55       Edited Result - FINAL                Narrative:    Normal sinus rhythm  Possible Left atrial enlargement  Nonspecific T wave abnormality  Prolonged QT interval or tu fusion, consider myocardial disease, electrolyte imbalance, or drug effects  Abnormal ECG  No previous ECGs available  Confirmed by JESSIE AGUERO ([id number]) on 11/30/2018 12:12:30 PM                      RADIOLOGY: non-plain film images(s) such as CT, Ultrasound and MRI are read by the radiologist.  Plain radiographic images are visualized and preliminarily interpreted by the emergencyphysician unless otherwise stated below. CTA CHEST W WO CONTRAST   Final Result      No acute pulmonary embolism. No acute pneumonia. 6 x 5 mm right lower lobe nodule. For lung nodules <6 mm in size, if the patient is low risk for malignancy, no routine follow-up is necessary. If high risk for malignancy, optional chest CT may be obtained at 12 months. Right lower lobe scarring versus atelectasis. Cardiomegaly. Precarinal adenopathy. **This report has been created using voice recognition software. It may contain minor errors which are inherent in voice recognition technology. **      Final report electronically signed by Dr. Cali Curiel on 11/29/2018 11:06 PM XR CHEST PORTABLE   Final Result   No acute findings               **This report has been created using voice recognition software. It may contain minor errors which are inherent in voice recognition technology. **      Final report electronically signed by Dr. Tiffany Piña on 11/29/2018 8:25 PM            LABS:   Labs Reviewed   URINE CULTURE, REFLEXED - Abnormal; Notable for the following:        Result Value    Urine Culture Reflex   (*)     Value: Mixed growth. The mixture of organisms present represents  both organisms that may cause urinary tract infections and  organisms that are not a common cause of urinary tract  infections and are possibly skin [place] or distal urethral  [place].       Organism Mixed Growth (*)     All other components within normal limits    Narrative:     Source: urine, clean catch       Site:           Current Antibiotics: none   BASIC METABOLIC PANEL - Abnormal; Notable for the following:     CO2 20 (*)     All other components within normal limits   CBC WITH AUTO DIFFERENTIAL - Abnormal; Notable for the following:     RBC 3.78 (*)     Hematocrit 35.5 (*)     RDW-CV 16.5 (*)     RDW-SD 56.1 (*)     All other components within normal limits   URINE WITH REFLEXED MICRO - Abnormal; Notable for the following:     Leukocyte Esterase, Urine TRACE (*)     All other components within normal limits   GLOMERULAR FILTRATION RATE, ESTIMATED - Abnormal; Notable for the following:     Est, Glom Filt Rate 74 (*)     All other components within normal limits   BRAIN NATRIURETIC PEPTIDE - Abnormal; Notable for the following:     Pro-BNP 1324.0 (*)     All other components within normal limits   HEPATIC FUNCTION PANEL   LIPASE   MAGNESIUM   TROPONIN   TSH WITH REFLEX   ANION GAP   OSMOLALITY   TROPONIN   TROPONIN   TROPONIN         EMERGENCYDEPARTMENT COURSE AND MEDICAL DECISION MAKING:   Vitals:    Vitals:    11/30/18 0410 11/30/18 0815 11/30/18 1200 11/30/18 1622   BP: (!) 91/58 96/63 (!) 77/44 (!)

## 2018-11-30 NOTE — ED TRIAGE NOTES
Pt was cooking and had sudden intermittent mid chest pains, SOB, and dizzy. Pt has defib vest on, states it has never shocked her.

## 2018-11-30 NOTE — ED NOTES
Pt insists on walking to bathroom, was very tired. W/c back to room.      Wilfredo Stinson RN  11/29/18 2002

## 2018-11-30 NOTE — ED NOTES
[place] and juice provided, pt denies further needs at this time. No pain at this time.       Cole Souza RN  11/29/18 [id number]

## 2018-12-02 LAB
ORGANISM: ABNORMAL
URINE CULTURE REFLEX: ABNORMAL

## 2018-12-03 ENCOUNTER — OFFICE VISIT (OUTPATIENT)
Dept: CARDIOLOGY CLINIC | Age: 58
End: 2018-12-03
Payer: MEDICAID

## 2018-12-03 VITALS
BODY MASS INDEX: 35.33 KG/M2 | DIASTOLIC BLOOD PRESSURE: 74 MMHG | OXYGEN SATURATION: 96 % | WEIGHT: 192 LBS | SYSTOLIC BLOOD PRESSURE: 120 MMHG | HEART RATE: 80 BPM | HEIGHT: 62 IN

## 2018-12-03 DIAGNOSIS — I10 ESSENTIAL HYPERTENSION: ICD-10-CM

## 2018-12-03 DIAGNOSIS — I50.22 CHF (CONGESTIVE HEART FAILURE), NYHA CLASS III, CHRONIC, SYSTOLIC (HCC): Primary | ICD-10-CM

## 2018-12-03 DIAGNOSIS — I50.22 CHRONIC SYSTOLIC (CONGESTIVE) HEART FAILURE (HCC): ICD-10-CM

## 2018-12-03 PROCEDURE — 99213 OFFICE O/P EST LOW 20 MIN: CPT | Performed by: NURSE PRACTITIONER

## 2018-12-03 RX ORDER — METOLAZONE 2.5 MG/1
2.5 TABLET ORAL DAILY PRN
Status: ON HOLD | COMMUNITY
End: 2019-02-11 | Stop reason: ALTCHOICE

## 2018-12-03 RX ORDER — FUROSEMIDE 40 MG/1
40 TABLET ORAL DAILY
Qty: 30 TABLET | Refills: 3 | Status: ON HOLD | OUTPATIENT
Start: 2018-12-03 | End: 2019-02-27

## 2018-12-03 RX ORDER — CARVEDILOL 3.12 MG/1
3.12 TABLET ORAL 2 TIMES DAILY
Qty: 60 TABLET | Refills: 3 | Status: SHIPPED | OUTPATIENT
Start: 2018-12-03 | End: 2019-05-10 | Stop reason: SDUPTHER

## 2018-12-03 RX ORDER — LOSARTAN POTASSIUM 25 MG/1
12.5 TABLET ORAL DAILY
Qty: 30 TABLET | Refills: 5 | Status: SHIPPED | OUTPATIENT
Start: 2018-12-03 | End: 2019-01-14 | Stop reason: ALTCHOICE

## 2018-12-03 RX ORDER — SPIRONOLACTONE 25 MG/1
25 TABLET ORAL DAILY
Qty: 30 TABLET | Refills: 5 | Status: SHIPPED | OUTPATIENT
Start: 2018-12-03 | End: 2019-02-18 | Stop reason: DRUGHIGH

## 2018-12-03 ASSESSMENT — ENCOUNTER SYMPTOMS
WHEEZING: 0
NAUSEA: 0
COUGH: 0
APNEA: 0
CHEST TIGHTNESS: 0
COLOR CHANGE: 0
ABDOMINAL DISTENTION: 0
ABDOMINAL PAIN: 0
SHORTNESS OF BREATH: 0

## 2018-12-03 NOTE — PROGRESS NOTES
APPENDECTOMY      MASTECTOMY, BILATERAL      PTCA      TONSILLECTOMY       No family history on file. Social History   Substance Use Topics    Smoking status: Current Every Day Smoker     Packs/day: 0.25     Years: 37.00     Start date: 11/12/1981    Smokeless tobacco: Never Used    Alcohol use No     Current Outpatient Prescriptions   Medication Sig Dispense Refill    losartan (COZAAR) 25 MG tablet Take 0.5 tablets by mouth daily 30 tablet 5    spironolactone (ALDACTONE) 25 MG tablet Take 1 tablet by mouth daily 30 tablet 5    metolazone (ZAROXOLYN) 2.5 MG tablet Take 2.5 mg by mouth daily as needed      levothyroxine (SYNTHROID) 100 MCG tablet Take 100 mcg by mouth daily      carvedilol (COREG) 3.125 MG tablet Take 3.125 mg by mouth 2 times daily      furosemide (LASIX) 40 MG tablet Take 40 mg by mouth daily       potassium chloride (KLOR-CON M) 20 MEQ TBCR extended release tablet Take 2 tablets by mouth 2 times daily 60 tablet 1    omeprazole (PRILOSEC) 20 MG delayed release capsule Take 20 mg by mouth daily as needed       No current facility-administered medications for this visit. No Known Allergies    SUBJECTIVE:   Review of Systems   Constitutional: Positive for fatigue. Negative for activity change, appetite change and fever. HENT: Negative for congestion. Respiratory: Negative for apnea, cough, chest tightness, shortness of breath and wheezing. Cardiovascular: Positive for leg swelling. Negative for chest pain and palpitations. Gastrointestinal: Negative for abdominal distention, abdominal pain and nausea. Genitourinary: Negative for difficulty urinating and dysuria. Musculoskeletal: Negative for arthralgias and gait problem. Skin: Negative for color change. Neurological: Negative for dizziness, numbness and headaches. Psychiatric/Behavioral: Negative for agitation, confusion and sleep disturbance. The patient is not nervous/anxious.          Tearful at times

## 2018-12-04 NOTE — TELEPHONE ENCOUNTER
Jun[facility] Transitions Initial Follow Up Call    Outreach made within 2 business days of discharge: Yes    Patient: Onelia Spence Patient : 1960   MRN: [id number]  Reason for Admission: There are no discharge diagnoses documented for the most recent discharge. Discharge Date: 18       Spoke with: Katy Langford (dauhter-in-law) on HIPAA    Discharge department/facility: [facility]    TCM Interactive Patient Contact:  Was patient able to fill all prescriptions: Yes  Was patient instructed to bring all medications to the follow-up visit: Yes  Is patient taking all medications as directed in the discharge summary?  Yes  Does patient understand their discharge instructions: Yes  Does patient have questions or concerns that need addressed prior to 7-14 day follow up office visit: no    Scheduled appointment with PCP within 7-14 days    Follow Up  Future Appointments  Date Time Provider Braden Rothman   2018 8:40 AM Corey Cooley APRN - [id number] [address] - [place]   12/10/2018 2:40 PM ANGELIA Fuentes - [address] [address] - [place]   2018 9:30 AM ANGELIA Romeo CNP Heart P - [place]   2019 1:30 PM [address] [place], [place] [address]ANGELIA - CNP [facility]   2019 3:00 PM MD SHEREEN Bashir Heart [facility] - [address] [facility] ([address] [place] [address]

## 2018-12-06 ENCOUNTER — OFFICE VISIT (OUTPATIENT)
Dept: FAMILY MEDICINE CLINIC | Age: 58
End: 2018-12-06
Payer: MEDICAID

## 2018-12-06 VITALS
RESPIRATION RATE: 16 BRPM | DIASTOLIC BLOOD PRESSURE: 72 MMHG | TEMPERATURE: 98.5 F | HEIGHT: 62 IN | WEIGHT: 196 LBS | BODY MASS INDEX: 36.07 KG/M2 | SYSTOLIC BLOOD PRESSURE: 108 MMHG | HEART RATE: 72 BPM

## 2018-12-06 DIAGNOSIS — E03.9 HYPOTHYROIDISM, UNSPECIFIED TYPE: ICD-10-CM

## 2018-12-06 DIAGNOSIS — Z90.13 S/P MASTECTOMY, BILATERAL: ICD-10-CM

## 2018-12-06 DIAGNOSIS — Z85.3 HISTORY OF BREAST CANCER IN FEMALE: ICD-10-CM

## 2018-12-06 DIAGNOSIS — I50.22 CHRONIC SYSTOLIC (CONGESTIVE) HEART FAILURE (HCC): ICD-10-CM

## 2018-12-06 PROCEDURE — 1111F DSCHRG MED/CURRENT MED MERGE: CPT | Performed by: NURSE PRACTITIONER

## 2018-12-06 PROCEDURE — 99496 TRANSJ CARE MGMT HIGH F2F 7D: CPT | Performed by: NURSE PRACTITIONER

## 2018-12-06 RX ORDER — LEVOTHYROXINE SODIUM 0.1 MG/1
100 TABLET ORAL DAILY
Qty: 30 TABLET | Refills: 11 | Status: SHIPPED | OUTPATIENT
Start: 2018-12-06 | End: 2019-10-07 | Stop reason: SDUPTHER

## 2018-12-06 NOTE — PROGRESS NOTES
without deformity, nasal mucosa and turbinates normal without polyps, oropharynx normal, dentition is normal for age, no lip or gum lesions noted  Neck: supple and non-tender without mass, no thyromegaly or thyroid nodules, no cervical lymphadenopathy  Pulmonary/Chest: clear to auscultation bilaterally- no wheezes, rales or rhonchi, normal air movement, no respiratory distress or retractions  Cardiovascular: normal rate, regular rhythm, normal S1 and S2, no murmurs, rubs, clicks, or gallops, distal pulses intact  Abdomen: soft, non-tender, non-distended, no rebound or guarding, no masses or hernias noted, no hepatospleenomegaly  Extremities: no cyanosis, clubbing or edema of the lower extremities  Psych:  Normal affect without evidence of depression or anxiety, insight and judgement are appropriate, memory appears intact  Skin: warm and dry, no rash or erythema      Diagnostic test results reviewed: inpatient labs and CT-chest (CTA)    Patient risk of morbidity and mortality: high      ASSESSMENT & PLAN  Radha Minor was seen today for follow-up from hospital.    Diagnoses and all orders for this visit:    Pulmonary nodule less than 6 cm determined by computed tomography of lung  -     CT CHEST W CONTRAST; Future    History of breast cancer in female  -     [address] [facility] Josselin Montiel MD    S/P mastectomy, bilateral  -     [facility]elin Montiel MD    Chronic systolic (congestive) heart failure [facility])    Hypothyroidism, unspecified type  -     levothyroxine (SYNTHROID) 100 MCG tablet;  Take 1 tablet by mouth daily    Other orders  -     Cancel: [employer] [facility] Therese Cowan MD      - con't Losartan 12.5 mg daily, Coreg, Spironolactone, Lasix and Metolazone  - call CHF clinic if gaining weight > 1-2 pounds in 1 day, or symptoms of shortness of breath, fluid retention  - con't Levothyroxine  - f/u with Oncology regarding

## 2018-12-17 ENCOUNTER — HOSPITAL ENCOUNTER (OUTPATIENT)
Dept: INFUSION THERAPY | Age: 58
Discharge: HOME | End: 2018-12-17
Payer: MEDICAID

## 2018-12-17 ENCOUNTER — OFFICE VISIT (OUTPATIENT)
Dept: ONCOLOGY | Age: 58
End: 2018-12-17
Payer: MEDICAID

## 2018-12-17 VITALS
BODY MASS INDEX: 35.63 KG/M2 | OXYGEN SATURATION: 95 % | RESPIRATION RATE: 16 BRPM | TEMPERATURE: 97.6 F | HEART RATE: 94 BPM | HEIGHT: 62 IN | DIASTOLIC BLOOD PRESSURE: 76 MMHG | WEIGHT: 193.6 LBS | SYSTOLIC BLOOD PRESSURE: 113 MMHG

## 2018-12-17 DIAGNOSIS — I42.2 HYPERTROPHIC NONOBSTRUCTIVE CARDIOMYOPATHY (HCC): ICD-10-CM

## 2018-12-17 DIAGNOSIS — Z90.13 H/O BILATERAL MASTECTOMY: ICD-10-CM

## 2018-12-17 DIAGNOSIS — Z92.21 HISTORY OF CHEMOTHERAPY: ICD-10-CM

## 2018-12-17 DIAGNOSIS — Z92.3 HISTORY OF RADIATION THERAPY: ICD-10-CM

## 2018-12-17 DIAGNOSIS — Z85.3 HISTORY OF RIGHT BREAST CANCER: Primary | ICD-10-CM

## 2018-12-17 PROCEDURE — 99211 OFF/OP EST MAY X REQ PHY/QHP: CPT

## 2018-12-17 PROCEDURE — 99204 OFFICE O/P NEW MOD 45 MIN: CPT | Performed by: INTERNAL MEDICINE

## 2018-12-27 ENCOUNTER — OFFICE VISIT (OUTPATIENT)
Dept: CARDIOLOGY CLINIC | Age: 58
End: 2018-12-27
Payer: MEDICAID

## 2018-12-27 ENCOUNTER — TELEPHONE (OUTPATIENT)
Dept: CARDIOLOGY CLINIC | Age: 58
End: 2018-12-27

## 2018-12-27 VITALS
BODY MASS INDEX: 35.51 KG/M2 | DIASTOLIC BLOOD PRESSURE: 67 MMHG | HEIGHT: 62 IN | WEIGHT: 193 LBS | HEART RATE: 90 BPM | SYSTOLIC BLOOD PRESSURE: 90 MMHG

## 2018-12-27 DIAGNOSIS — E78.1 HYPERTRIGLYCERIDEMIA: ICD-10-CM

## 2018-12-27 DIAGNOSIS — I10 ESSENTIAL HYPERTENSION: ICD-10-CM

## 2018-12-27 DIAGNOSIS — I50.22 CHRONIC SYSTOLIC (CONGESTIVE) HEART FAILURE (HCC): ICD-10-CM

## 2018-12-27 DIAGNOSIS — I42.0 CARDIOMYOPATHY, DILATED (HCC): Primary | ICD-10-CM

## 2018-12-27 PROCEDURE — 99213 OFFICE O/P EST LOW 20 MIN: CPT | Performed by: NURSE PRACTITIONER

## 2019-01-02 ENCOUNTER — TELEPHONE (OUTPATIENT)
Dept: CARDIOLOGY CLINIC | Age: 59
End: 2019-01-02

## 2019-01-02 DIAGNOSIS — I50.22 CHF (CONGESTIVE HEART FAILURE), NYHA CLASS II, CHRONIC, SYSTOLIC (HCC): Primary | ICD-10-CM

## 2019-01-08 ENCOUNTER — HOSPITAL ENCOUNTER (OUTPATIENT)
Dept: NON INVASIVE DIAGNOSTICS | Age: 59
Discharge: HOME | End: 2019-01-08
Payer: MEDICAID

## 2019-01-08 DIAGNOSIS — I50.22 CHF (CONGESTIVE HEART FAILURE), NYHA CLASS II, CHRONIC, SYSTOLIC (HCC): ICD-10-CM

## 2019-01-08 PROCEDURE — 93307 TTE W/O DOPPLER COMPLETE: CPT

## 2019-01-09 ENCOUNTER — TELEPHONE (OUTPATIENT)
Dept: CARDIOLOGY CLINIC | Age: 59
End: 2019-01-09

## 2019-01-09 DIAGNOSIS — I50.22 CHF (CONGESTIVE HEART FAILURE), NYHA CLASS II, CHRONIC, SYSTOLIC (HCC): Primary | ICD-10-CM

## 2019-01-11 ENCOUNTER — OFFICE VISIT (OUTPATIENT)
Dept: ONCOLOGY | Age: 59
End: 2019-01-11
Payer: MEDICAID

## 2019-01-11 ENCOUNTER — HOSPITAL ENCOUNTER (OUTPATIENT)
Dept: INFUSION THERAPY | Age: 59
Discharge: HOME | End: 2019-01-11
Payer: MEDICAID

## 2019-01-11 VITALS
SYSTOLIC BLOOD PRESSURE: 121 MMHG | HEART RATE: 80 BPM | TEMPERATURE: 98.2 F | RESPIRATION RATE: 16 BRPM | WEIGHT: 196.4 LBS | OXYGEN SATURATION: 96 % | DIASTOLIC BLOOD PRESSURE: 79 MMHG | HEIGHT: 62 IN | BODY MASS INDEX: 36.14 KG/M2

## 2019-01-11 DIAGNOSIS — Z90.13 H/O BILATERAL MASTECTOMY: ICD-10-CM

## 2019-01-11 DIAGNOSIS — Z17.0 MALIGNANT NEOPLASM OF LEFT BREAST IN FEMALE, ESTROGEN RECEPTOR POSITIVE, UNSPECIFIED SITE OF BREAST (HCC): ICD-10-CM

## 2019-01-11 DIAGNOSIS — I50.22 CHRONIC SYSTOLIC (CONGESTIVE) HEART FAILURE (HCC): Primary | ICD-10-CM

## 2019-01-11 DIAGNOSIS — Z92.21 HISTORY OF CHEMOTHERAPY: ICD-10-CM

## 2019-01-11 DIAGNOSIS — Z79.811 USE OF LETROZOLE (FEMARA): ICD-10-CM

## 2019-01-11 DIAGNOSIS — Z92.3 HISTORY OF RADIATION THERAPY: ICD-10-CM

## 2019-01-11 DIAGNOSIS — C50.912 MALIGNANT NEOPLASM OF LEFT BREAST IN FEMALE, ESTROGEN RECEPTOR POSITIVE, UNSPECIFIED SITE OF BREAST (HCC): ICD-10-CM

## 2019-01-11 PROCEDURE — 99211 OFF/OP EST MAY X REQ PHY/QHP: CPT

## 2019-01-11 PROCEDURE — 99215 OFFICE O/P EST HI 40 MIN: CPT | Performed by: INTERNAL MEDICINE

## 2019-01-11 RX ORDER — LETROZOLE 2.5 MG/1
2.5 TABLET, FILM COATED ORAL DAILY
Qty: 30 TABLET | Refills: 3 | Status: SHIPPED | OUTPATIENT
Start: 2019-01-11 | End: 2019-04-11 | Stop reason: SDUPTHER

## 2019-01-11 RX ORDER — LISINOPRIL 5 MG/1
5 TABLET ORAL DAILY
COMMUNITY
End: 2019-01-14 | Stop reason: ALTCHOICE

## 2019-01-13 ASSESSMENT — ENCOUNTER SYMPTOMS
VOMITING: 0
TROUBLE SWALLOWING: 0
SHORTNESS OF BREATH: 1
ABDOMINAL PAIN: 0
SHORTNESS OF BREATH: 0
BACK PAIN: 0
SORE THROAT: 0
CHEST TIGHTNESS: 1
BLOOD IN STOOL: 0
RECTAL PAIN: 0
NAUSEA: 0
ABDOMINAL DISTENTION: 0
EYE DISCHARGE: 0
RECTAL PAIN: 0
WHEEZING: 0
WHEEZING: 0
COUGH: 0
COUGH: 0
DIARRHEA: 0
CONSTIPATION: 0
FACIAL SWELLING: 0
EYE DISCHARGE: 0
BLOOD IN STOOL: 0
CONSTIPATION: 1
DIARRHEA: 0
COLOR CHANGE: 0
SORE THROAT: 0
COLOR CHANGE: 0
BACK PAIN: 0
VOMITING: 0
FACIAL SWELLING: 0
ABDOMINAL DISTENTION: 0
ABDOMINAL PAIN: 0
TROUBLE SWALLOWING: 0
CHEST TIGHTNESS: 0
NAUSEA: 0

## 2019-01-14 ENCOUNTER — OFFICE VISIT (OUTPATIENT)
Dept: CARDIOLOGY CLINIC | Age: 59
End: 2019-01-14
Payer: MEDICAID

## 2019-01-14 VITALS
DIASTOLIC BLOOD PRESSURE: 58 MMHG | BODY MASS INDEX: 35.7 KG/M2 | OXYGEN SATURATION: 94 % | SYSTOLIC BLOOD PRESSURE: 110 MMHG | HEIGHT: 62 IN | HEART RATE: 92 BPM | WEIGHT: 194 LBS

## 2019-01-14 DIAGNOSIS — R06.83 SNORING: ICD-10-CM

## 2019-01-14 DIAGNOSIS — I50.22 CHF (CONGESTIVE HEART FAILURE), NYHA CLASS II, CHRONIC, SYSTOLIC (HCC): Primary | ICD-10-CM

## 2019-01-14 PROCEDURE — 99213 OFFICE O/P EST LOW 20 MIN: CPT | Performed by: NURSE PRACTITIONER

## 2019-01-14 RX ORDER — POTASSIUM CHLORIDE 1500 MG/1
20 TABLET, FILM COATED, EXTENDED RELEASE ORAL DAILY
Qty: 60 TABLET | Refills: 1
Start: 2019-01-14 | End: 2019-02-27 | Stop reason: SDUPTHER

## 2019-01-14 ASSESSMENT — ENCOUNTER SYMPTOMS
SHORTNESS OF BREATH: 1
APNEA: 0
COLOR CHANGE: 0
NAUSEA: 0
ABDOMINAL PAIN: 0
CHEST TIGHTNESS: 0
ABDOMINAL DISTENTION: 0
COUGH: 0
WHEEZING: 0

## 2019-01-16 ENCOUNTER — HOSPITAL ENCOUNTER (OUTPATIENT)
Dept: PET IMAGING | Age: 59
Discharge: HOME | End: 2019-01-16
Payer: MEDICAID

## 2019-01-16 DIAGNOSIS — I50.22 CHRONIC SYSTOLIC (CONGESTIVE) HEART FAILURE (HCC): ICD-10-CM

## 2019-01-16 DIAGNOSIS — Z17.0 MALIGNANT NEOPLASM OF LEFT BREAST IN FEMALE, ESTROGEN RECEPTOR POSITIVE, UNSPECIFIED SITE OF BREAST (HCC): ICD-10-CM

## 2019-01-16 DIAGNOSIS — C50.912 MALIGNANT NEOPLASM OF LEFT BREAST IN FEMALE, ESTROGEN RECEPTOR POSITIVE, UNSPECIFIED SITE OF BREAST (HCC): ICD-10-CM

## 2019-01-16 PROCEDURE — 78815 PET IMAGE W/CT SKULL-THIGH: CPT

## 2019-01-16 PROCEDURE — 3430000000 HC RX DIAGNOSTIC RADIOPHARMACEUTICAL: Performed by: INTERNAL MEDICINE

## 2019-01-16 PROCEDURE — A9552 F18 FDG: HCPCS | Performed by: INTERNAL MEDICINE

## 2019-01-16 RX ORDER — FLUDEOXYGLUCOSE F 18 200 MCI/ML
10 INJECTION, SOLUTION INTRAVENOUS
Status: COMPLETED | OUTPATIENT
Start: 2019-01-16 | End: 2019-01-16

## 2019-01-16 RX ADMIN — FLUDEOXYGLUCOSE F 18 11.43 MILLICURIE: 200 INJECTION, SOLUTION INTRAVENOUS at 10:37

## 2019-01-17 ENCOUNTER — TELEPHONE (OUTPATIENT)
Dept: CARDIOLOGY CLINIC | Age: 59
End: 2019-01-17

## 2019-01-29 ENCOUNTER — OFFICE VISIT (OUTPATIENT)
Dept: CARDIOLOGY CLINIC | Age: 59
End: 2019-01-29
Payer: MEDICAID

## 2019-01-29 ENCOUNTER — HOSPITAL ENCOUNTER (OUTPATIENT)
Age: 59
Discharge: HOME | End: 2019-01-29
Payer: MEDICAID

## 2019-01-29 VITALS
HEART RATE: 84 BPM | DIASTOLIC BLOOD PRESSURE: 64 MMHG | SYSTOLIC BLOOD PRESSURE: 100 MMHG | BODY MASS INDEX: 35.7 KG/M2 | HEIGHT: 62 IN | WEIGHT: 194 LBS

## 2019-01-29 DIAGNOSIS — I42.9 CARDIOMYOPATHY, UNSPECIFIED TYPE (HCC): ICD-10-CM

## 2019-01-29 DIAGNOSIS — I50.22 CHF (CONGESTIVE HEART FAILURE), NYHA CLASS II, CHRONIC, SYSTOLIC (HCC): ICD-10-CM

## 2019-01-29 LAB
ANION GAP SERPL CALCULATED.3IONS-SCNC: 15 MEQ/L (ref 8–16)
BUN BLDV-MCNC: 24 MG/DL (ref 7–22)
CALCIUM SERPL-MCNC: 9 MG/DL (ref 8.5–10.5)
CHLORIDE BLD-SCNC: 101 MEQ/L (ref 98–111)
CO2: 27 MEQ/L (ref 23–33)
CREAT SERPL-MCNC: 0.8 MG/DL (ref 0.4–1.2)
GFR SERPL CREATININE-BSD FRML MDRD: 74 ML/MIN/1.73M2
GLUCOSE BLD-MCNC: 122 MG/DL (ref 70–108)
POTASSIUM SERPL-SCNC: 4.2 MEQ/L (ref 3.5–5.2)
SODIUM BLD-SCNC: 143 MEQ/L (ref 135–145)

## 2019-01-29 PROCEDURE — 80048 BASIC METABOLIC PNL TOTAL CA: CPT

## 2019-01-29 PROCEDURE — 36415 COLL VENOUS BLD VENIPUNCTURE: CPT

## 2019-01-29 PROCEDURE — 99205 OFFICE O/P NEW HI 60 MIN: CPT | Performed by: INTERNAL MEDICINE

## 2019-01-29 PROCEDURE — 93000 ELECTROCARDIOGRAM COMPLETE: CPT | Performed by: INTERNAL MEDICINE

## 2019-01-29 ASSESSMENT — ENCOUNTER SYMPTOMS
SORE THROAT: 0
DIARRHEA: 0
LEFT EYE: 0
ABDOMINAL PAIN: 0
SHORTNESS OF BREATH: 0
RIGHT EYE: 0
BLURRED VISION: 0
CONSTIPATION: 0
WHEEZING: 0
DOUBLE VISION: 0
COUGH: 0
NAUSEA: 0
VOMITING: 0
BACK PAIN: 0

## 2019-01-30 DIAGNOSIS — I42.9 CARDIOMYOPATHY, UNSPECIFIED TYPE (HCC): Primary | ICD-10-CM

## 2019-01-31 ENCOUNTER — HOSPITAL ENCOUNTER (OUTPATIENT)
Dept: CARDIAC REHAB | Age: 59
Setting detail: THERAPIES SERIES
Discharge: HOME | End: 2019-01-31
Payer: MEDICAID

## 2019-01-31 PROCEDURE — 93798 PHYS/QHP OP CAR RHAB W/ECG: CPT

## 2019-01-31 PROCEDURE — G0423 INTENS CARDIAC REHAB NO EXER: HCPCS

## 2019-01-31 PROCEDURE — G0422 INTENS CARDIAC REHAB W/EXERC: HCPCS

## 2019-02-01 ENCOUNTER — HOSPITAL ENCOUNTER (OUTPATIENT)
Dept: CARDIAC REHAB | Age: 59
Setting detail: THERAPIES SERIES
End: 2019-02-01
Payer: MEDICAID

## 2019-02-05 ENCOUNTER — HOSPITAL ENCOUNTER (OUTPATIENT)
Dept: CARDIAC REHAB | Age: 59
Setting detail: THERAPIES SERIES
End: 2019-02-05
Payer: MEDICAID

## 2019-02-07 ENCOUNTER — HOSPITAL ENCOUNTER (OUTPATIENT)
Age: 59
Discharge: HOME | End: 2019-02-07
Payer: MEDICAID

## 2019-02-07 ENCOUNTER — HOSPITAL ENCOUNTER (OUTPATIENT)
Dept: CARDIAC REHAB | Age: 59
Setting detail: THERAPIES SERIES
End: 2019-02-07
Payer: MEDICAID

## 2019-02-07 ENCOUNTER — APPOINTMENT (OUTPATIENT)
Dept: CARDIAC REHAB | Age: 59
End: 2019-02-07
Payer: MEDICAID

## 2019-02-07 DIAGNOSIS — I42.9 CARDIOMYOPATHY, UNSPECIFIED TYPE (HCC): ICD-10-CM

## 2019-02-07 LAB
ANION GAP SERPL CALCULATED.3IONS-SCNC: 15 MEQ/L (ref 8–16)
BASOPHILS # BLD: 0.5 %
BASOPHILS ABSOLUTE: 0 THOU/MM3 (ref 0–0.1)
BUN BLDV-MCNC: 18 MG/DL (ref 7–22)
CALCIUM SERPL-MCNC: 9.6 MG/DL (ref 8.5–10.5)
CHLORIDE BLD-SCNC: 101 MEQ/L (ref 98–111)
CO2: 26 MEQ/L (ref 23–33)
CREAT SERPL-MCNC: 0.7 MG/DL (ref 0.4–1.2)
EOSINOPHIL # BLD: 3.9 %
EOSINOPHILS ABSOLUTE: 0.2 THOU/MM3 (ref 0–0.4)
ERYTHROCYTE [DISTWIDTH] IN BLOOD BY AUTOMATED COUNT: 12.3 % (ref 11.5–14.5)
ERYTHROCYTE [DISTWIDTH] IN BLOOD BY AUTOMATED COUNT: 44.6 FL (ref 35–45)
GFR SERPL CREATININE-BSD FRML MDRD: 86 ML/MIN/1.73M2
GLUCOSE BLD-MCNC: 78 MG/DL (ref 70–108)
HCT VFR BLD CALC: 41 % (ref 37–47)
HEMOGLOBIN: 13.3 GM/DL (ref 12–16)
IMMATURE GRANS (ABS): 0.02 THOU/MM3 (ref 0–0.07)
IMMATURE GRANULOCYTES: 0.5 %
LYMPHOCYTES # BLD: 27.3 %
LYMPHOCYTES ABSOLUTE: 1.1 THOU/MM3 (ref 1–4.8)
MCH RBC QN AUTO: 32.2 PG (ref 26–33)
MCHC RBC AUTO-ENTMCNC: 32.4 GM/DL (ref 32.2–35.5)
MCV RBC AUTO: 99.3 FL (ref 81–99)
MONOCYTES # BLD: 8.5 %
MONOCYTES ABSOLUTE: 0.3 THOU/MM3 (ref 0.4–1.3)
NUCLEATED RED BLOOD CELLS: 0 /100 WBC
PLATELET # BLD: 193 THOU/MM3 (ref 130–400)
PMV BLD AUTO: 12.2 FL (ref 9.4–12.4)
POTASSIUM SERPL-SCNC: 4.2 MEQ/L (ref 3.5–5.2)
RBC # BLD: 4.13 MILL/MM3 (ref 4.2–5.4)
SEG NEUTROPHILS: 59.3 %
SEGMENTED NEUTROPHILS ABSOLUTE COUNT: 2.4 THOU/MM3 (ref 1.8–7.7)
SODIUM BLD-SCNC: 142 MEQ/L (ref 135–145)
WBC # BLD: 4.1 THOU/MM3 (ref 4.8–10.8)

## 2019-02-07 PROCEDURE — 85025 COMPLETE CBC W/AUTO DIFF WBC: CPT

## 2019-02-07 PROCEDURE — 36415 COLL VENOUS BLD VENIPUNCTURE: CPT

## 2019-02-07 PROCEDURE — 80048 BASIC METABOLIC PNL TOTAL CA: CPT

## 2019-02-08 ENCOUNTER — HOSPITAL ENCOUNTER (OUTPATIENT)
Dept: CARDIAC REHAB | Age: 59
Setting detail: THERAPIES SERIES
End: 2019-02-08
Payer: MEDICAID

## 2019-02-11 ENCOUNTER — HOSPITAL ENCOUNTER (OUTPATIENT)
Dept: INPATIENT UNIT | Age: 59
Discharge: HOME | End: 2019-02-12
Attending: INTERNAL MEDICINE | Admitting: INTERNAL MEDICINE
Payer: MEDICAID

## 2019-02-11 ENCOUNTER — APPOINTMENT (OUTPATIENT)
Dept: GENERAL RADIOLOGY | Age: 59
End: 2019-02-11
Attending: INTERNAL MEDICINE
Payer: MEDICAID

## 2019-02-11 ENCOUNTER — ANESTHESIA (OUTPATIENT)
Dept: CARDIAC CATH/INVASIVE PROCEDURES | Age: 59
End: 2019-02-11
Payer: MEDICAID

## 2019-02-11 ENCOUNTER — APPOINTMENT (OUTPATIENT)
Dept: CARDIAC CATH/INVASIVE PROCEDURES | Age: 59
End: 2019-02-11
Payer: MEDICAID

## 2019-02-11 ENCOUNTER — ANESTHESIA EVENT (OUTPATIENT)
Dept: CARDIAC CATH/INVASIVE PROCEDURES | Age: 59
End: 2019-02-11
Payer: MEDICAID

## 2019-02-11 VITALS — DIASTOLIC BLOOD PRESSURE: 81 MMHG | OXYGEN SATURATION: 100 % | SYSTOLIC BLOOD PRESSURE: 121 MMHG

## 2019-02-11 DIAGNOSIS — I42.9 CARDIOMYOPATHY, UNSPECIFIED TYPE (HCC): ICD-10-CM

## 2019-02-11 LAB
ABO CHECK: NORMAL
APTT: 33.4 SECONDS (ref 22–38)
BASOPHILS # BLD: 0.5 %
BASOPHILS ABSOLUTE: 0 THOU/MM3 (ref 0–0.1)
EOSINOPHIL # BLD: 3.7 %
EOSINOPHILS ABSOLUTE: 0.1 THOU/MM3 (ref 0–0.4)
ERYTHROCYTE [DISTWIDTH] IN BLOOD BY AUTOMATED COUNT: 12.2 % (ref 11.5–14.5)
ERYTHROCYTE [DISTWIDTH] IN BLOOD BY AUTOMATED COUNT: 44.5 FL (ref 35–45)
GEL INDIRECT COOMBS: NORMAL
HCT VFR BLD CALC: 40.6 % (ref 37–47)
HEMOGLOBIN: 13.5 GM/DL (ref 12–16)
IMMATURE GRANS (ABS): 0.01 THOU/MM3 (ref 0–0.07)
IMMATURE GRANULOCYTES: 0.3 %
INR BLD: 0.97 (ref 0.85–1.13)
LYMPHOCYTES # BLD: 31 %
LYMPHOCYTES ABSOLUTE: 1.1 THOU/MM3 (ref 1–4.8)
MCH RBC QN AUTO: 32.7 PG (ref 26–33)
MCHC RBC AUTO-ENTMCNC: 33.3 GM/DL (ref 32.2–35.5)
MCV RBC AUTO: 98.3 FL (ref 81–99)
MONOCYTES # BLD: 6.1 %
MONOCYTES ABSOLUTE: 0.2 THOU/MM3 (ref 0.4–1.3)
NUCLEATED RED BLOOD CELLS: 0 /100 WBC
PLATELET # BLD: 200 THOU/MM3 (ref 130–400)
PMV BLD AUTO: 11.5 FL (ref 9.4–12.4)
RBC # BLD: 4.13 MILL/MM3 (ref 4.2–5.4)
RH FACTOR: NORMAL
SEG NEUTROPHILS: 58.4 %
SEGMENTED NEUTROPHILS ABSOLUTE COUNT: 2.2 THOU/MM3 (ref 1.8–7.7)
WBC # BLD: 3.7 THOU/MM3 (ref 4.8–10.8)

## 2019-02-11 PROCEDURE — 6360000002 HC RX W HCPCS: Performed by: INTERNAL MEDICINE

## 2019-02-11 PROCEDURE — 7100000000 HC PACU RECOVERY - FIRST 15 MIN

## 2019-02-11 PROCEDURE — 93641 EP EVL 1/2CHMB PAC CVDFB TST: CPT | Performed by: INTERNAL MEDICINE

## 2019-02-11 PROCEDURE — 3700000000 HC ANESTHESIA ATTENDED CARE

## 2019-02-11 PROCEDURE — 36415 COLL VENOUS BLD VENIPUNCTURE: CPT

## 2019-02-11 PROCEDURE — 33249 INSJ/RPLCMT DEFIB W/LEAD(S): CPT

## 2019-02-11 PROCEDURE — 2709999900 HC NON-CHARGEABLE SUPPLY

## 2019-02-11 PROCEDURE — 86901 BLOOD TYPING SEROLOGIC RH(D): CPT

## 2019-02-11 PROCEDURE — 85610 PROTHROMBIN TIME: CPT

## 2019-02-11 PROCEDURE — 6360000002 HC RX W HCPCS: Performed by: NURSE PRACTITIONER

## 2019-02-11 PROCEDURE — 86885 COOMBS TEST INDIRECT QUAL: CPT

## 2019-02-11 PROCEDURE — 7100000001 HC PACU RECOVERY - ADDTL 15 MIN

## 2019-02-11 PROCEDURE — 3700000001 HC ADD 15 MINUTES (ANESTHESIA)

## 2019-02-11 PROCEDURE — C1722 AICD, SINGLE CHAMBER: HCPCS

## 2019-02-11 PROCEDURE — 2580000003 HC RX 258: Performed by: INTERNAL MEDICINE

## 2019-02-11 PROCEDURE — 2500000003 HC RX 250 WO HCPCS: Performed by: NURSE ANESTHETIST, CERTIFIED REGISTERED

## 2019-02-11 PROCEDURE — 6360000002 HC RX W HCPCS: Performed by: NURSE ANESTHETIST, CERTIFIED REGISTERED

## 2019-02-11 PROCEDURE — 6360000002 HC RX W HCPCS

## 2019-02-11 PROCEDURE — 2500000003 HC RX 250 WO HCPCS: Performed by: INTERNAL MEDICINE

## 2019-02-11 PROCEDURE — 6360000004 HC RX CONTRAST MEDICATION: Performed by: INTERNAL MEDICINE

## 2019-02-11 PROCEDURE — 85025 COMPLETE CBC W/AUTO DIFF WBC: CPT

## 2019-02-11 PROCEDURE — 33249 INSJ/RPLCMT DEFIB W/LEAD(S): CPT | Performed by: INTERNAL MEDICINE

## 2019-02-11 PROCEDURE — 2580000003 HC RX 258: Performed by: NURSE PRACTITIONER

## 2019-02-11 PROCEDURE — 6370000000 HC RX 637 (ALT 250 FOR IP): Performed by: INTERNAL MEDICINE

## 2019-02-11 PROCEDURE — C1895 LEAD, AICD, ENDO DUAL COIL: HCPCS

## 2019-02-11 PROCEDURE — 71045 X-RAY EXAM CHEST 1 VIEW: CPT

## 2019-02-11 PROCEDURE — 85730 THROMBOPLASTIN TIME PARTIAL: CPT

## 2019-02-11 PROCEDURE — 86900 BLOOD TYPING SEROLOGIC ABO: CPT

## 2019-02-11 PROCEDURE — C1894 INTRO/SHEATH, NON-LASER: HCPCS

## 2019-02-11 PROCEDURE — 6360000002 HC RX W HCPCS: Performed by: ANESTHESIOLOGY

## 2019-02-11 RX ORDER — FENTANYL CITRATE 50 UG/ML
INJECTION, SOLUTION INTRAMUSCULAR; INTRAVENOUS
Status: COMPLETED
Start: 2019-02-11 | End: 2019-02-11

## 2019-02-11 RX ORDER — SODIUM CHLORIDE 9 MG/ML
INJECTION, SOLUTION INTRAVENOUS CONTINUOUS
Status: DISCONTINUED | OUTPATIENT
Start: 2019-02-11 | End: 2019-02-11

## 2019-02-11 RX ORDER — HYDROCODONE BITARTRATE AND ACETAMINOPHEN 5; 325 MG/1; MG/1
2 TABLET ORAL EVERY 4 HOURS PRN
Status: DISCONTINUED | OUTPATIENT
Start: 2019-02-11 | End: 2019-02-12 | Stop reason: HOSPADM

## 2019-02-11 RX ORDER — LETROZOLE 2.5 MG/1
2.5 TABLET, FILM COATED ORAL DAILY
Status: DISCONTINUED | OUTPATIENT
Start: 2019-02-12 | End: 2019-02-12 | Stop reason: HOSPADM

## 2019-02-11 RX ORDER — FENTANYL CITRATE 50 UG/ML
INJECTION, SOLUTION INTRAMUSCULAR; INTRAVENOUS PRN
Status: DISCONTINUED | OUTPATIENT
Start: 2019-02-11 | End: 2019-02-11 | Stop reason: SDUPTHER

## 2019-02-11 RX ORDER — ACETAMINOPHEN 325 MG/1
650 TABLET ORAL EVERY 4 HOURS PRN
Status: DISCONTINUED | OUTPATIENT
Start: 2019-02-11 | End: 2019-02-12 | Stop reason: HOSPADM

## 2019-02-11 RX ORDER — SODIUM CHLORIDE 0.9 % (FLUSH) 0.9 %
10 SYRINGE (ML) INJECTION PRN
Status: DISCONTINUED | OUTPATIENT
Start: 2019-02-11 | End: 2019-02-11

## 2019-02-11 RX ORDER — POTASSIUM CHLORIDE 750 MG/1
20 TABLET, FILM COATED, EXTENDED RELEASE ORAL DAILY
Status: DISCONTINUED | OUTPATIENT
Start: 2019-02-12 | End: 2019-02-12 | Stop reason: HOSPADM

## 2019-02-11 RX ORDER — PROPOFOL 10 MG/ML
INJECTION, EMULSION INTRAVENOUS PRN
Status: DISCONTINUED | OUTPATIENT
Start: 2019-02-11 | End: 2019-02-11 | Stop reason: SDUPTHER

## 2019-02-11 RX ORDER — FUROSEMIDE 40 MG/1
40 TABLET ORAL DAILY
Status: DISCONTINUED | OUTPATIENT
Start: 2019-02-12 | End: 2019-02-12 | Stop reason: HOSPADM

## 2019-02-11 RX ORDER — CARVEDILOL 3.12 MG/1
3.12 TABLET ORAL 2 TIMES DAILY
Status: DISCONTINUED | OUTPATIENT
Start: 2019-02-11 | End: 2019-02-12 | Stop reason: HOSPADM

## 2019-02-11 RX ORDER — HYDROCODONE BITARTRATE AND ACETAMINOPHEN 5; 325 MG/1; MG/1
1 TABLET ORAL EVERY 4 HOURS PRN
Status: DISCONTINUED | OUTPATIENT
Start: 2019-02-11 | End: 2019-02-12 | Stop reason: HOSPADM

## 2019-02-11 RX ORDER — ONDANSETRON 2 MG/ML
INJECTION INTRAMUSCULAR; INTRAVENOUS PRN
Status: DISCONTINUED | OUTPATIENT
Start: 2019-02-11 | End: 2019-02-11 | Stop reason: SDUPTHER

## 2019-02-11 RX ORDER — MORPHINE SULFATE 2 MG/ML
2 INJECTION, SOLUTION INTRAMUSCULAR; INTRAVENOUS EVERY 5 MIN PRN
Status: DISCONTINUED | OUTPATIENT
Start: 2019-02-11 | End: 2019-02-12 | Stop reason: HOSPADM

## 2019-02-11 RX ORDER — MIDAZOLAM HYDROCHLORIDE 1 MG/ML
INJECTION INTRAMUSCULAR; INTRAVENOUS PRN
Status: DISCONTINUED | OUTPATIENT
Start: 2019-02-11 | End: 2019-02-11 | Stop reason: SDUPTHER

## 2019-02-11 RX ORDER — EPHEDRINE SULFATE 50 MG/ML
INJECTION INTRAVENOUS PRN
Status: DISCONTINUED | OUTPATIENT
Start: 2019-02-11 | End: 2019-02-11 | Stop reason: SDUPTHER

## 2019-02-11 RX ORDER — DEXAMETHASONE SODIUM PHOSPHATE 4 MG/ML
INJECTION, SOLUTION INTRA-ARTICULAR; INTRALESIONAL; INTRAMUSCULAR; INTRAVENOUS; SOFT TISSUE PRN
Status: DISCONTINUED | OUTPATIENT
Start: 2019-02-11 | End: 2019-02-11 | Stop reason: SDUPTHER

## 2019-02-11 RX ORDER — LIDOCAINE HYDROCHLORIDE 20 MG/ML
INJECTION, SOLUTION EPIDURAL; INFILTRATION; INTRACAUDAL; PERINEURAL PRN
Status: DISCONTINUED | OUTPATIENT
Start: 2019-02-11 | End: 2019-02-11 | Stop reason: SDUPTHER

## 2019-02-11 RX ORDER — ONDANSETRON 2 MG/ML
4 INJECTION INTRAMUSCULAR; INTRAVENOUS EVERY 6 HOURS PRN
Status: DISCONTINUED | OUTPATIENT
Start: 2019-02-11 | End: 2019-02-12 | Stop reason: HOSPADM

## 2019-02-11 RX ORDER — LABETALOL HYDROCHLORIDE 5 MG/ML
10 INJECTION, SOLUTION INTRAVENOUS EVERY 10 MIN PRN
Status: DISCONTINUED | OUTPATIENT
Start: 2019-02-11 | End: 2019-02-12 | Stop reason: HOSPADM

## 2019-02-11 RX ORDER — LEVOTHYROXINE SODIUM 0.1 MG/1
100 TABLET ORAL DAILY
Status: DISCONTINUED | OUTPATIENT
Start: 2019-02-12 | End: 2019-02-12 | Stop reason: HOSPADM

## 2019-02-11 RX ORDER — SPIRONOLACTONE 25 MG/1
25 TABLET ORAL DAILY
Status: DISCONTINUED | OUTPATIENT
Start: 2019-02-12 | End: 2019-02-12 | Stop reason: HOSPADM

## 2019-02-11 RX ORDER — FENTANYL CITRATE 50 UG/ML
50 INJECTION, SOLUTION INTRAMUSCULAR; INTRAVENOUS EVERY 5 MIN PRN
Status: DISCONTINUED | OUTPATIENT
Start: 2019-02-11 | End: 2019-02-12 | Stop reason: HOSPADM

## 2019-02-11 RX ADMIN — ONDANSETRON 4 MG: 2 INJECTION INTRAMUSCULAR; INTRAVENOUS at 13:34

## 2019-02-11 RX ADMIN — MIDAZOLAM HYDROCHLORIDE 2 MG: 1 INJECTION, SOLUTION INTRAMUSCULAR; INTRAVENOUS at 13:10

## 2019-02-11 RX ADMIN — DEXTROSE MONOHYDRATE 2 G: 50 INJECTION, SOLUTION INTRAVENOUS at 20:48

## 2019-02-11 RX ADMIN — EPHEDRINE SULFATE 10 MG: 50 INJECTION, SOLUTION INTRAVENOUS at 13:31

## 2019-02-11 RX ADMIN — FENTANYL CITRATE 50 MCG: 50 INJECTION, SOLUTION INTRAMUSCULAR; INTRAVENOUS at 14:47

## 2019-02-11 RX ADMIN — HYDROCODONE BITARTRATE AND ACETAMINOPHEN 2 TABLET: 5; 325 TABLET ORAL at 16:15

## 2019-02-11 RX ADMIN — PHENYLEPHRINE HYDROCHLORIDE 200 MCG: 10 INJECTION INTRAVENOUS at 14:15

## 2019-02-11 RX ADMIN — IOPAMIDOL 10 ML: 755 INJECTION, SOLUTION INTRAVENOUS at 14:03

## 2019-02-11 RX ADMIN — PHENYLEPHRINE HYDROCHLORIDE 200 MCG: 10 INJECTION INTRAVENOUS at 13:29

## 2019-02-11 RX ADMIN — LIDOCAINE HYDROCHLORIDE 100 MG: 20 INJECTION, SOLUTION EPIDURAL; INFILTRATION; INTRACAUDAL; PERINEURAL at 13:15

## 2019-02-11 RX ADMIN — CARVEDILOL 3.12 MG: 3.12 TABLET, FILM COATED ORAL at 19:51

## 2019-02-11 RX ADMIN — PHENYLEPHRINE HYDROCHLORIDE 200 MCG: 10 INJECTION INTRAVENOUS at 13:26

## 2019-02-11 RX ADMIN — FENTANYL CITRATE 100 MCG: 50 INJECTION INTRAMUSCULAR; INTRAVENOUS at 13:15

## 2019-02-11 RX ADMIN — Medication 2 G: at 13:07

## 2019-02-11 RX ADMIN — EPHEDRINE SULFATE 10 MG: 50 INJECTION, SOLUTION INTRAVENOUS at 13:36

## 2019-02-11 RX ADMIN — PHENYLEPHRINE HYDROCHLORIDE 200 MCG: 10 INJECTION INTRAVENOUS at 14:03

## 2019-02-11 RX ADMIN — PHENYLEPHRINE HYDROCHLORIDE 200 MCG: 10 INJECTION INTRAVENOUS at 13:18

## 2019-02-11 RX ADMIN — SACUBITRIL AND VALSARTAN 1 TABLET: 24; 26 TABLET, FILM COATED ORAL at 19:52

## 2019-02-11 RX ADMIN — FENTANYL CITRATE 50 MCG: 50 INJECTION, SOLUTION INTRAMUSCULAR; INTRAVENOUS at 14:58

## 2019-02-11 RX ADMIN — SODIUM CHLORIDE: 9 INJECTION, SOLUTION INTRAVENOUS at 12:03

## 2019-02-11 RX ADMIN — SODIUM CHLORIDE 50000 UNITS: 900 IRRIGANT IRRIGATION at 13:06

## 2019-02-11 RX ADMIN — PHENYLEPHRINE HYDROCHLORIDE 200 MCG: 10 INJECTION INTRAVENOUS at 13:53

## 2019-02-11 RX ADMIN — DEXAMETHASONE SODIUM PHOSPHATE 8 MG: 4 INJECTION, SOLUTION INTRAMUSCULAR; INTRAVENOUS at 13:34

## 2019-02-11 RX ADMIN — EPHEDRINE SULFATE 10 MG: 50 INJECTION, SOLUTION INTRAVENOUS at 13:47

## 2019-02-11 RX ADMIN — PHENYLEPHRINE HYDROCHLORIDE 200 MCG: 10 INJECTION INTRAVENOUS at 13:24

## 2019-02-11 RX ADMIN — PROPOFOL 100 MG: 10 INJECTION, EMULSION INTRAVENOUS at 13:15

## 2019-02-11 RX ADMIN — EPHEDRINE SULFATE 10 MG: 50 INJECTION, SOLUTION INTRAVENOUS at 13:33

## 2019-02-11 RX ADMIN — Medication 2 G: at 13:20

## 2019-02-11 RX ADMIN — FENTANYL CITRATE 50 MCG: 50 INJECTION, SOLUTION INTRAMUSCULAR; INTRAVENOUS at 15:08

## 2019-02-11 RX ADMIN — EPHEDRINE SULFATE 10 MG: 50 INJECTION, SOLUTION INTRAVENOUS at 13:49

## 2019-02-11 ASSESSMENT — PULMONARY FUNCTION TESTS
PIF_VALUE: 3
PIF_VALUE: 2
PIF_VALUE: 1
PIF_VALUE: 3
PIF_VALUE: 3
PIF_VALUE: 1
PIF_VALUE: 3
PIF_VALUE: 2
PIF_VALUE: 3
PIF_VALUE: 0
PIF_VALUE: 3
PIF_VALUE: 10
PIF_VALUE: 2
PIF_VALUE: 2
PIF_VALUE: 1
PIF_VALUE: 3
PIF_VALUE: 1
PIF_VALUE: 3
PIF_VALUE: 11
PIF_VALUE: 3
PIF_VALUE: 2
PIF_VALUE: 3
PIF_VALUE: 2
PIF_VALUE: 20
PIF_VALUE: 2
PIF_VALUE: 2
PIF_VALUE: 3
PIF_VALUE: 3
PIF_VALUE: 14
PIF_VALUE: 3
PIF_VALUE: 2
PIF_VALUE: 3
PIF_VALUE: 2
PIF_VALUE: 3
PIF_VALUE: 3
PIF_VALUE: 2
PIF_VALUE: 16
PIF_VALUE: 3
PIF_VALUE: 16
PIF_VALUE: 2
PIF_VALUE: 3
PIF_VALUE: 12
PIF_VALUE: 3
PIF_VALUE: 0
PIF_VALUE: 3
PIF_VALUE: 2
PIF_VALUE: 19
PIF_VALUE: 3
PIF_VALUE: 3
PIF_VALUE: 2
PIF_VALUE: 3
PIF_VALUE: 3
PIF_VALUE: 13
PIF_VALUE: 3
PIF_VALUE: 2
PIF_VALUE: 1
PIF_VALUE: 3

## 2019-02-11 ASSESSMENT — PAIN SCALES - GENERAL
PAINLEVEL_OUTOF10: 7
PAINLEVEL_OUTOF10: 0
PAINLEVEL_OUTOF10: 4
PAINLEVEL_OUTOF10: 5
PAINLEVEL_OUTOF10: 8
PAINLEVEL_OUTOF10: 7
PAINLEVEL_OUTOF10: 7

## 2019-02-11 ASSESSMENT — PAIN DESCRIPTION - DESCRIPTORS
DESCRIPTORS: SHARP
DESCRIPTORS: SHARP
DESCRIPTORS: ACHING;DISCOMFORT

## 2019-02-11 ASSESSMENT — PAIN - FUNCTIONAL ASSESSMENT
PAIN_FUNCTIONAL_ASSESSMENT: ACTIVITIES ARE NOT PREVENTED
PAIN_FUNCTIONAL_ASSESSMENT: ACTIVITIES ARE NOT PREVENTED

## 2019-02-11 ASSESSMENT — PAIN DESCRIPTION - ONSET
ONSET: ON-GOING
ONSET: ON-GOING

## 2019-02-11 ASSESSMENT — PAIN DESCRIPTION - ORIENTATION
ORIENTATION: LEFT

## 2019-02-11 ASSESSMENT — PAIN DESCRIPTION - LOCATION
LOCATION: SHOULDER

## 2019-02-11 ASSESSMENT — PAIN DESCRIPTION - PAIN TYPE
TYPE: ACUTE PAIN

## 2019-02-11 ASSESSMENT — PAIN DESCRIPTION - PROGRESSION
CLINICAL_PROGRESSION: NOT CHANGED
CLINICAL_PROGRESSION: NOT CHANGED

## 2019-02-11 ASSESSMENT — PAIN DESCRIPTION - FREQUENCY
FREQUENCY: CONTINUOUS
FREQUENCY: CONTINUOUS

## 2019-02-12 ENCOUNTER — APPOINTMENT (OUTPATIENT)
Dept: GENERAL RADIOLOGY | Age: 59
End: 2019-02-12
Attending: INTERNAL MEDICINE
Payer: MEDICAID

## 2019-02-12 ENCOUNTER — HOSPITAL ENCOUNTER (OUTPATIENT)
Dept: CARDIAC REHAB | Age: 59
Setting detail: THERAPIES SERIES
End: 2019-02-12
Payer: MEDICAID

## 2019-02-12 VITALS
HEIGHT: 62 IN | RESPIRATION RATE: 20 BRPM | TEMPERATURE: 98 F | WEIGHT: 193 LBS | DIASTOLIC BLOOD PRESSURE: 63 MMHG | OXYGEN SATURATION: 94 % | HEART RATE: 70 BPM | SYSTOLIC BLOOD PRESSURE: 103 MMHG | BODY MASS INDEX: 35.51 KG/M2

## 2019-02-12 PROCEDURE — 71046 X-RAY EXAM CHEST 2 VIEWS: CPT

## 2019-02-12 PROCEDURE — 6370000000 HC RX 637 (ALT 250 FOR IP): Performed by: INTERNAL MEDICINE

## 2019-02-12 RX ADMIN — POTASSIUM CHLORIDE 20 MEQ: 750 TABLET, FILM COATED, EXTENDED RELEASE ORAL at 09:15

## 2019-02-12 RX ADMIN — LEVOTHYROXINE SODIUM 100 MCG: 100 TABLET ORAL at 09:16

## 2019-02-12 RX ADMIN — LETROZOLE 2.5 MG: 2.5 TABLET ORAL at 09:17

## 2019-02-12 RX ADMIN — SACUBITRIL AND VALSARTAN 1 TABLET: 24; 26 TABLET, FILM COATED ORAL at 09:15

## 2019-02-12 RX ADMIN — HYDROCODONE BITARTRATE AND ACETAMINOPHEN 1 TABLET: 5; 325 TABLET ORAL at 04:04

## 2019-02-12 RX ADMIN — SPIRONOLACTONE 25 MG: 25 TABLET ORAL at 09:18

## 2019-02-12 ASSESSMENT — PAIN DESCRIPTION - PAIN TYPE: TYPE: ACUTE PAIN

## 2019-02-12 ASSESSMENT — PAIN SCALES - GENERAL
PAINLEVEL_OUTOF10: 6
PAINLEVEL_OUTOF10: 0
PAINLEVEL_OUTOF10: 0

## 2019-02-12 ASSESSMENT — PAIN DESCRIPTION - PROGRESSION: CLINICAL_PROGRESSION: NOT CHANGED

## 2019-02-12 ASSESSMENT — PAIN DESCRIPTION - ORIENTATION: ORIENTATION: LEFT

## 2019-02-12 ASSESSMENT — PAIN DESCRIPTION - DESCRIPTORS: DESCRIPTORS: SHARP

## 2019-02-12 ASSESSMENT — PAIN DESCRIPTION - LOCATION: LOCATION: SHOULDER

## 2019-02-12 ASSESSMENT — PAIN DESCRIPTION - ONSET: ONSET: ON-GOING

## 2019-02-12 ASSESSMENT — PAIN - FUNCTIONAL ASSESSMENT: PAIN_FUNCTIONAL_ASSESSMENT: ACTIVITIES ARE NOT PREVENTED

## 2019-02-12 ASSESSMENT — PAIN DESCRIPTION - FREQUENCY: FREQUENCY: CONTINUOUS

## 2019-02-13 ENCOUNTER — TELEPHONE (OUTPATIENT)
Dept: FAMILY MEDICINE CLINIC | Age: 59
End: 2019-02-13

## 2019-02-14 ENCOUNTER — APPOINTMENT (OUTPATIENT)
Dept: CARDIAC REHAB | Age: 59
End: 2019-02-14
Payer: MEDICAID

## 2019-02-15 ENCOUNTER — TELEPHONE (OUTPATIENT)
Dept: CARDIOLOGY CLINIC | Age: 59
End: 2019-02-15

## 2019-02-15 ENCOUNTER — APPOINTMENT (OUTPATIENT)
Dept: CARDIAC REHAB | Age: 59
End: 2019-02-15
Payer: MEDICAID

## 2019-02-18 ENCOUNTER — OFFICE VISIT (OUTPATIENT)
Dept: CARDIOLOGY CLINIC | Age: 59
End: 2019-02-18
Payer: MEDICAID

## 2019-02-18 ENCOUNTER — NURSE ONLY (OUTPATIENT)
Dept: CARDIOLOGY CLINIC | Age: 59
End: 2019-02-18
Payer: MEDICAID

## 2019-02-18 VITALS
HEART RATE: 74 BPM | DIASTOLIC BLOOD PRESSURE: 83 MMHG | WEIGHT: 197.2 LBS | BODY MASS INDEX: 36.29 KG/M2 | SYSTOLIC BLOOD PRESSURE: 118 MMHG | HEIGHT: 62 IN

## 2019-02-18 DIAGNOSIS — I42.8 NONISCHEMIC CARDIOMYOPATHY (HCC): Primary | ICD-10-CM

## 2019-02-18 DIAGNOSIS — Z95.810 ICD (IMPLANTABLE CARDIOVERTER-DEFIBRILLATOR) IN PLACE: ICD-10-CM

## 2019-02-18 DIAGNOSIS — I10 ESSENTIAL HYPERTENSION: ICD-10-CM

## 2019-02-18 PROCEDURE — 99213 OFFICE O/P EST LOW 20 MIN: CPT | Performed by: PHYSICIAN ASSISTANT

## 2019-02-18 PROCEDURE — 93282 PRGRMG EVAL IMPLANTABLE DFB: CPT | Performed by: INTERNAL MEDICINE

## 2019-02-18 RX ORDER — SPIRONOLACTONE 25 MG/1
12.5 TABLET ORAL DAILY
Qty: 30 TABLET | Refills: 5 | Status: SHIPPED | OUTPATIENT
Start: 2019-02-18 | End: 2019-04-24 | Stop reason: DRUGHIGH

## 2019-02-19 ENCOUNTER — APPOINTMENT (OUTPATIENT)
Dept: CARDIAC REHAB | Age: 59
End: 2019-02-19
Payer: MEDICAID

## 2019-02-19 ENCOUNTER — INITIAL CONSULT (OUTPATIENT)
Dept: PULMONOLOGY | Age: 59
End: 2019-02-19
Payer: MEDICAID

## 2019-02-19 ENCOUNTER — TELEPHONE (OUTPATIENT)
Dept: FAMILY MEDICINE CLINIC | Age: 59
End: 2019-02-19

## 2019-02-19 VITALS
HEIGHT: 62 IN | SYSTOLIC BLOOD PRESSURE: 108 MMHG | DIASTOLIC BLOOD PRESSURE: 66 MMHG | WEIGHT: 198 LBS | OXYGEN SATURATION: 98 % | BODY MASS INDEX: 36.44 KG/M2 | HEART RATE: 99 BPM

## 2019-02-19 DIAGNOSIS — R06.83 SNORING: ICD-10-CM

## 2019-02-19 DIAGNOSIS — E66.9 OBESITY (BMI 30-39.9): ICD-10-CM

## 2019-02-19 DIAGNOSIS — I42.8 NON-ISCHEMIC CARDIOMYOPATHY (HCC): ICD-10-CM

## 2019-02-19 DIAGNOSIS — I50.22 CHF (CONGESTIVE HEART FAILURE), NYHA CLASS II, CHRONIC, SYSTOLIC (HCC): ICD-10-CM

## 2019-02-19 DIAGNOSIS — I10 ESSENTIAL HYPERTENSION: ICD-10-CM

## 2019-02-19 DIAGNOSIS — G47.10 HYPERSOMNIA: Primary | ICD-10-CM

## 2019-02-19 DIAGNOSIS — G47.00 DIFFICULTY FALLING ASLEEP AT NIGHT UNTIL EARLY MORNING HOURS: ICD-10-CM

## 2019-02-19 PROCEDURE — 99203 OFFICE O/P NEW LOW 30 MIN: CPT | Performed by: INTERNAL MEDICINE

## 2019-02-19 RX ORDER — ACETAMINOPHEN 500 MG
500 TABLET ORAL EVERY 6 HOURS PRN
COMMUNITY

## 2019-02-21 ENCOUNTER — HOSPITAL ENCOUNTER (OUTPATIENT)
Dept: CARDIAC REHAB | Age: 59
Setting detail: THERAPIES SERIES
Discharge: HOME | End: 2019-02-21
Payer: MEDICAID

## 2019-02-21 ENCOUNTER — APPOINTMENT (OUTPATIENT)
Dept: CARDIAC REHAB | Age: 59
End: 2019-02-21
Payer: MEDICAID

## 2019-02-21 PROCEDURE — G0423 INTENS CARDIAC REHAB NO EXER: HCPCS

## 2019-02-21 PROCEDURE — G0422 INTENS CARDIAC REHAB W/EXERC: HCPCS

## 2019-02-22 ENCOUNTER — APPOINTMENT (OUTPATIENT)
Dept: CARDIAC REHAB | Age: 59
End: 2019-02-22
Payer: MEDICAID

## 2019-02-26 ENCOUNTER — APPOINTMENT (OUTPATIENT)
Dept: CARDIAC REHAB | Age: 59
End: 2019-02-26
Payer: MEDICAID

## 2019-02-26 ENCOUNTER — HOSPITAL ENCOUNTER (OUTPATIENT)
Dept: CARDIAC REHAB | Age: 59
Setting detail: THERAPIES SERIES
Discharge: HOME | End: 2019-02-26
Payer: MEDICAID

## 2019-02-26 ENCOUNTER — HOSPITAL ENCOUNTER (OUTPATIENT)
Age: 59
Setting detail: OBSERVATION
Discharge: HOME | End: 2019-02-27
Attending: EMERGENCY MEDICINE | Admitting: NURSE PRACTITIONER
Payer: MEDICAID

## 2019-02-26 ENCOUNTER — APPOINTMENT (OUTPATIENT)
Dept: CT IMAGING | Age: 59
End: 2019-02-26
Payer: MEDICAID

## 2019-02-26 DIAGNOSIS — R55 SYNCOPE AND COLLAPSE: Primary | ICD-10-CM

## 2019-02-26 DIAGNOSIS — I42.9 CARDIOMYOPATHY SECONDARY TO NON-DRUG EXTERNAL AGENT (HCC): ICD-10-CM

## 2019-02-26 DIAGNOSIS — I50.22 CHRONIC SYSTOLIC (CONGESTIVE) HEART FAILURE (HCC): ICD-10-CM

## 2019-02-26 DIAGNOSIS — Z95.810 ICD (IMPLANTABLE CARDIOVERTER-DEFIBRILLATOR) IN PLACE: ICD-10-CM

## 2019-02-26 LAB
ALBUMIN SERPL-MCNC: 4.6 G/DL (ref 3.5–5.1)
ALP BLD-CCNC: 79 U/L (ref 38–126)
ALT SERPL-CCNC: 11 U/L (ref 11–66)
AMPHETAMINE+METHAMPHETAMINE URINE SCREEN: NEGATIVE
ANION GAP SERPL CALCULATED.3IONS-SCNC: 13 MEQ/L (ref 8–16)
APTT: 32.9 SECONDS (ref 22–38)
AST SERPL-CCNC: 14 U/L (ref 5–40)
BARBITURATE QUANTITATIVE URINE: NEGATIVE
BASOPHILS # BLD: 0.2 %
BASOPHILS ABSOLUTE: 0 THOU/MM3 (ref 0–0.1)
BENZODIAZEPINE QUANTITATIVE URINE: NEGATIVE
BILIRUB SERPL-MCNC: 0.4 MG/DL (ref 0.3–1.2)
BILIRUBIN DIRECT: < 0.2 MG/DL (ref 0–0.3)
BILIRUBIN URINE: NEGATIVE
BLOOD, URINE: NEGATIVE
BUN BLDV-MCNC: 22 MG/DL (ref 7–22)
CALCIUM SERPL-MCNC: 9.3 MG/DL (ref 8.5–10.5)
CANNABINOID QUANTITATIVE URINE: NEGATIVE
CHARACTER, URINE: CLEAR
CHLORIDE BLD-SCNC: 103 MEQ/L (ref 98–111)
CO2: 25 MEQ/L (ref 23–33)
COCAINE METABOLITE QUANTITATIVE URINE: NEGATIVE
COLOR: YELLOW
CREAT SERPL-MCNC: 0.7 MG/DL (ref 0.4–1.2)
EKG ATRIAL RATE: 80 BPM
EKG P AXIS: 36 DEGREES
EKG P-R INTERVAL: 176 MS
EKG Q-T INTERVAL: 412 MS
EKG QRS DURATION: 94 MS
EKG QTC CALCULATION (BAZETT): 475 MS
EKG R AXIS: -20 DEGREES
EKG T AXIS: -9 DEGREES
EKG VENTRICULAR RATE: 80 BPM
EOSINOPHIL # BLD: 3.6 %
EOSINOPHILS ABSOLUTE: 0.2 THOU/MM3 (ref 0–0.4)
ERYTHROCYTE [DISTWIDTH] IN BLOOD BY AUTOMATED COUNT: 12.2 % (ref 11.5–14.5)
ERYTHROCYTE [DISTWIDTH] IN BLOOD BY AUTOMATED COUNT: 44 FL (ref 35–45)
ETHYL ALCOHOL, SERUM: < 0.01 %
FLU A ANTIGEN: NEGATIVE
FLU B ANTIGEN: NEGATIVE
GFR SERPL CREATININE-BSD FRML MDRD: 86 ML/MIN/1.73M2
GLUCOSE BLD-MCNC: 110 MG/DL (ref 70–108)
GLUCOSE BLD-MCNC: 97 MG/DL (ref 70–108)
GLUCOSE URINE: NEGATIVE MG/DL
HCT VFR BLD CALC: 40 % (ref 37–47)
HEMOGLOBIN: 13.4 GM/DL (ref 12–16)
IMMATURE GRANS (ABS): 0.02 THOU/MM3 (ref 0–0.07)
IMMATURE GRANULOCYTES: 0.5 %
INR BLD: 0.98 (ref 0.85–1.13)
KETONES, URINE: NEGATIVE
LEUKOCYTE ESTERASE, URINE: ABNORMAL
LIPASE: 31.8 U/L (ref 5.6–51.3)
LYMPHOCYTES # BLD: 26.4 %
LYMPHOCYTES ABSOLUTE: 1.1 THOU/MM3 (ref 1–4.8)
MAGNESIUM: 2.1 MG/DL (ref 1.6–2.4)
MCH RBC QN AUTO: 32.6 PG (ref 26–33)
MCHC RBC AUTO-ENTMCNC: 33.5 GM/DL (ref 32.2–35.5)
MCV RBC AUTO: 97.3 FL (ref 81–99)
MONOCYTES # BLD: 6.5 %
MONOCYTES ABSOLUTE: 0.3 THOU/MM3 (ref 0.4–1.3)
NITRITE, URINE: NEGATIVE
NUCLEATED RED BLOOD CELLS: 0 /100 WBC
OPIATES, URINE: NEGATIVE
OSMOLALITY CALCULATION: 284.5 MOSMOL/KG (ref 275–300)
OXYCODONE: NEGATIVE
PH UA: 5
PHENCYCLIDINE QUANTITATIVE URINE: NEGATIVE
PLATELET # BLD: 191 THOU/MM3 (ref 130–400)
PMV BLD AUTO: 11.5 FL (ref 9.4–12.4)
POTASSIUM SERPL-SCNC: 4 MEQ/L (ref 3.5–5.2)
PRO-BNP: 708.2 PG/ML (ref 0–900)
PROTEIN UA: NEGATIVE
RBC # BLD: 4.11 MILL/MM3 (ref 4.2–5.4)
SEG NEUTROPHILS: 62.8 %
SEGMENTED NEUTROPHILS ABSOLUTE COUNT: 2.6 THOU/MM3 (ref 1.8–7.7)
SODIUM BLD-SCNC: 141 MEQ/L (ref 135–145)
SPECIFIC GRAVITY, URINE: > 1.03 (ref 1–1.03)
TOTAL PROTEIN: 7.2 G/DL (ref 6.1–8)
TROPONIN T: < 0.01 NG/ML
TSH SERPL DL<=0.05 MIU/L-ACNC: 0.55 UIU/ML (ref 0.4–4.2)
UROBILINOGEN, URINE: 0.2 EU/DL
WBC # BLD: 4.2 THOU/MM3 (ref 4.8–10.8)

## 2019-02-26 PROCEDURE — G0378 HOSPITAL OBSERVATION PER HR: HCPCS

## 2019-02-26 PROCEDURE — 70498 CT ANGIOGRAPHY NECK: CPT

## 2019-02-26 PROCEDURE — 6370000000 HC RX 637 (ALT 250 FOR IP): Performed by: NURSE PRACTITIONER

## 2019-02-26 PROCEDURE — 6360000004 HC RX CONTRAST MEDICATION: Performed by: EMERGENCY MEDICINE

## 2019-02-26 PROCEDURE — 2709999900 HC NON-CHARGEABLE SUPPLY

## 2019-02-26 PROCEDURE — 84443 ASSAY THYROID STIM HORMONE: CPT

## 2019-02-26 PROCEDURE — 99285 EMERGENCY DEPT VISIT HI MDM: CPT

## 2019-02-26 PROCEDURE — 96360 HYDRATION IV INFUSION INIT: CPT

## 2019-02-26 PROCEDURE — 36415 COLL VENOUS BLD VENIPUNCTURE: CPT

## 2019-02-26 PROCEDURE — 85610 PROTHROMBIN TIME: CPT

## 2019-02-26 PROCEDURE — 80307 DRUG TEST PRSMV CHEM ANLYZR: CPT

## 2019-02-26 PROCEDURE — 82948 REAGENT STRIP/BLOOD GLUCOSE: CPT

## 2019-02-26 PROCEDURE — 96372 THER/PROPH/DIAG INJ SC/IM: CPT

## 2019-02-26 PROCEDURE — 85025 COMPLETE CBC W/AUTO DIFF WBC: CPT

## 2019-02-26 PROCEDURE — 70496 CT ANGIOGRAPHY HEAD: CPT

## 2019-02-26 PROCEDURE — 6360000002 HC RX W HCPCS: Performed by: NURSE PRACTITIONER

## 2019-02-26 PROCEDURE — 87804 INFLUENZA ASSAY W/OPTIC: CPT

## 2019-02-26 PROCEDURE — 80076 HEPATIC FUNCTION PANEL: CPT

## 2019-02-26 PROCEDURE — 70450 CT HEAD/BRAIN W/O DYE: CPT

## 2019-02-26 PROCEDURE — 83690 ASSAY OF LIPASE: CPT

## 2019-02-26 PROCEDURE — 83735 ASSAY OF MAGNESIUM: CPT

## 2019-02-26 PROCEDURE — 2580000003 HC RX 258: Performed by: NURSE PRACTITIONER

## 2019-02-26 PROCEDURE — 84484 ASSAY OF TROPONIN QUANT: CPT

## 2019-02-26 PROCEDURE — 80048 BASIC METABOLIC PNL TOTAL CA: CPT

## 2019-02-26 PROCEDURE — 85730 THROMBOPLASTIN TIME PARTIAL: CPT

## 2019-02-26 PROCEDURE — 99222 1ST HOSP IP/OBS MODERATE 55: CPT | Performed by: NURSE PRACTITIONER

## 2019-02-26 PROCEDURE — 93005 ELECTROCARDIOGRAM TRACING: CPT | Performed by: EMERGENCY MEDICINE

## 2019-02-26 PROCEDURE — 81003 URINALYSIS AUTO W/O SCOPE: CPT

## 2019-02-26 PROCEDURE — G0422 INTENS CARDIAC REHAB W/EXERC: HCPCS

## 2019-02-26 PROCEDURE — G0480 DRUG TEST DEF 1-7 CLASSES: HCPCS

## 2019-02-26 PROCEDURE — 93010 ELECTROCARDIOGRAM REPORT: CPT | Performed by: NUCLEAR MEDICINE

## 2019-02-26 PROCEDURE — 83880 ASSAY OF NATRIURETIC PEPTIDE: CPT

## 2019-02-26 PROCEDURE — 2580000003 HC RX 258: Performed by: EMERGENCY MEDICINE

## 2019-02-26 PROCEDURE — 99449 NTRPROF PH1/NTRNET/EHR 31/>: CPT | Performed by: PSYCHIATRY & NEUROLOGY

## 2019-02-26 RX ORDER — FUROSEMIDE 40 MG/1
40 TABLET ORAL DAILY
Status: DISCONTINUED | OUTPATIENT
Start: 2019-02-26 | End: 2019-02-27 | Stop reason: HOSPADM

## 2019-02-26 RX ORDER — ACETAMINOPHEN 325 MG/1
650 TABLET ORAL EVERY 4 HOURS PRN
Status: DISCONTINUED | OUTPATIENT
Start: 2019-02-26 | End: 2019-02-27 | Stop reason: HOSPADM

## 2019-02-26 RX ORDER — CARVEDILOL 3.12 MG/1
3.12 TABLET ORAL 2 TIMES DAILY
Status: DISCONTINUED | OUTPATIENT
Start: 2019-02-26 | End: 2019-02-27 | Stop reason: HOSPADM

## 2019-02-26 RX ORDER — SPIRONOLACTONE 25 MG/1
12.5 TABLET ORAL DAILY
Status: DISCONTINUED | OUTPATIENT
Start: 2019-02-26 | End: 2019-02-27 | Stop reason: HOSPADM

## 2019-02-26 RX ORDER — SODIUM CHLORIDE 0.9 % (FLUSH) 0.9 %
10 SYRINGE (ML) INJECTION PRN
Status: DISCONTINUED | OUTPATIENT
Start: 2019-02-26 | End: 2019-02-27 | Stop reason: HOSPADM

## 2019-02-26 RX ORDER — ONDANSETRON 2 MG/ML
4 INJECTION INTRAMUSCULAR; INTRAVENOUS EVERY 6 HOURS PRN
Status: DISCONTINUED | OUTPATIENT
Start: 2019-02-26 | End: 2019-02-27 | Stop reason: HOSPADM

## 2019-02-26 RX ORDER — LEVOTHYROXINE SODIUM 0.1 MG/1
100 TABLET ORAL DAILY
Status: DISCONTINUED | OUTPATIENT
Start: 2019-02-26 | End: 2019-02-27 | Stop reason: HOSPADM

## 2019-02-26 RX ORDER — LETROZOLE 2.5 MG/1
2.5 TABLET, FILM COATED ORAL DAILY
Status: DISCONTINUED | OUTPATIENT
Start: 2019-02-26 | End: 2019-02-27 | Stop reason: HOSPADM

## 2019-02-26 RX ORDER — 0.9 % SODIUM CHLORIDE 0.9 %
1000 INTRAVENOUS SOLUTION INTRAVENOUS ONCE
Status: COMPLETED | OUTPATIENT
Start: 2019-02-26 | End: 2019-02-26

## 2019-02-26 RX ORDER — SODIUM CHLORIDE 0.9 % (FLUSH) 0.9 %
10 SYRINGE (ML) INJECTION EVERY 12 HOURS SCHEDULED
Status: DISCONTINUED | OUTPATIENT
Start: 2019-02-26 | End: 2019-02-27 | Stop reason: HOSPADM

## 2019-02-26 RX ADMIN — FUROSEMIDE 40 MG: 40 TABLET ORAL at 18:06

## 2019-02-26 RX ADMIN — Medication 10 ML: at 22:05

## 2019-02-26 RX ADMIN — IOPAMIDOL 80 ML: 755 INJECTION, SOLUTION INTRAVENOUS at 12:11

## 2019-02-26 RX ADMIN — SODIUM CHLORIDE 1000 ML: 9 INJECTION, SOLUTION INTRAVENOUS at 10:58

## 2019-02-26 RX ADMIN — SPIRONOLACTONE 12.5 MG: 25 TABLET ORAL at 18:06

## 2019-02-26 RX ADMIN — ENOXAPARIN SODIUM 40 MG: 40 INJECTION SUBCUTANEOUS at 18:08

## 2019-02-26 ASSESSMENT — PAIN SCALES - GENERAL
PAINLEVEL_OUTOF10: 3
PAINLEVEL_OUTOF10: 4
PAINLEVEL_OUTOF10: 3

## 2019-02-26 ASSESSMENT — ENCOUNTER SYMPTOMS
CHEST TIGHTNESS: 0
WHEEZING: 0
SORE THROAT: 0
SHORTNESS OF BREATH: 0
EYE REDNESS: 0
ABDOMINAL DISTENTION: 0
BACK PAIN: 0
CHOKING: 0
EYE ITCHING: 0
VOMITING: 0
ABDOMINAL PAIN: 0
DIARRHEA: 0
BLOOD IN STOOL: 0
VOICE CHANGE: 0
CONSTIPATION: 0
EYE PAIN: 0
COUGH: 0
NAUSEA: 0
RHINORRHEA: 0
PHOTOPHOBIA: 0
TROUBLE SWALLOWING: 0
SINUS PRESSURE: 0
EYE DISCHARGE: 0

## 2019-02-26 ASSESSMENT — PAIN DESCRIPTION - ORIENTATION
ORIENTATION: LEFT
ORIENTATION: ANTERIOR

## 2019-02-26 ASSESSMENT — PAIN DESCRIPTION - DESCRIPTORS: DESCRIPTORS: HEADACHE

## 2019-02-26 ASSESSMENT — PAIN DESCRIPTION - LOCATION
LOCATION: HEAD
LOCATION: NECK

## 2019-02-26 ASSESSMENT — PAIN DESCRIPTION - PAIN TYPE
TYPE: ACUTE PAIN
TYPE: ACUTE PAIN

## 2019-02-26 ASSESSMENT — PAIN DESCRIPTION - FREQUENCY: FREQUENCY: CONTINUOUS

## 2019-02-26 ASSESSMENT — PAIN DESCRIPTION - ONSET: ONSET: ON-GOING

## 2019-02-27 ENCOUNTER — TELEPHONE (OUTPATIENT)
Dept: FAMILY MEDICINE CLINIC | Age: 59
End: 2019-02-27

## 2019-02-27 VITALS
BODY MASS INDEX: 36.44 KG/M2 | SYSTOLIC BLOOD PRESSURE: 97 MMHG | WEIGHT: 198 LBS | DIASTOLIC BLOOD PRESSURE: 60 MMHG | RESPIRATION RATE: 18 BRPM | TEMPERATURE: 97.9 F | HEART RATE: 84 BPM | OXYGEN SATURATION: 94 % | HEIGHT: 62 IN

## 2019-02-27 DIAGNOSIS — I10 ESSENTIAL HYPERTENSION: ICD-10-CM

## 2019-02-27 DIAGNOSIS — I50.22 CHRONIC SYSTOLIC (CONGESTIVE) HEART FAILURE (HCC): ICD-10-CM

## 2019-02-27 LAB
ANION GAP SERPL CALCULATED.3IONS-SCNC: 15 MEQ/L (ref 8–16)
BASOPHILS # BLD: 0.7 %
BASOPHILS ABSOLUTE: 0 THOU/MM3 (ref 0–0.1)
BUN BLDV-MCNC: 19 MG/DL (ref 7–22)
CALCIUM SERPL-MCNC: 8.8 MG/DL (ref 8.5–10.5)
CHLORIDE BLD-SCNC: 101 MEQ/L (ref 98–111)
CO2: 22 MEQ/L (ref 23–33)
CREAT SERPL-MCNC: 0.6 MG/DL (ref 0.4–1.2)
EKG ATRIAL RATE: 73 BPM
EKG P AXIS: 36 DEGREES
EKG P-R INTERVAL: 180 MS
EKG Q-T INTERVAL: 444 MS
EKG QRS DURATION: 94 MS
EKG QTC CALCULATION (BAZETT): 489 MS
EKG R AXIS: -19 DEGREES
EKG T AXIS: -6 DEGREES
EKG VENTRICULAR RATE: 73 BPM
EOSINOPHIL # BLD: 4.4 %
EOSINOPHILS ABSOLUTE: 0.2 THOU/MM3 (ref 0–0.4)
ERYTHROCYTE [DISTWIDTH] IN BLOOD BY AUTOMATED COUNT: 12.4 % (ref 11.5–14.5)
ERYTHROCYTE [DISTWIDTH] IN BLOOD BY AUTOMATED COUNT: 44.9 FL (ref 35–45)
GFR SERPL CREATININE-BSD FRML MDRD: > 90 ML/MIN/1.73M2
GLUCOSE BLD-MCNC: 100 MG/DL (ref 70–108)
HCT VFR BLD CALC: 37.3 % (ref 37–47)
HEMOGLOBIN: 12.3 GM/DL (ref 12–16)
IMMATURE GRANS (ABS): 0.01 THOU/MM3 (ref 0–0.07)
IMMATURE GRANULOCYTES: 0.2 %
LYMPHOCYTES # BLD: 32.8 %
LYMPHOCYTES ABSOLUTE: 1.3 THOU/MM3 (ref 1–4.8)
MCH RBC QN AUTO: 32.7 PG (ref 26–33)
MCHC RBC AUTO-ENTMCNC: 33 GM/DL (ref 32.2–35.5)
MCV RBC AUTO: 99.2 FL (ref 81–99)
MONOCYTES # BLD: 7.8 %
MONOCYTES ABSOLUTE: 0.3 THOU/MM3 (ref 0.4–1.3)
NUCLEATED RED BLOOD CELLS: 0 /100 WBC
PLATELET # BLD: 169 THOU/MM3 (ref 130–400)
PMV BLD AUTO: 11.6 FL (ref 9.4–12.4)
POTASSIUM REFLEX MAGNESIUM: 3.7 MEQ/L (ref 3.5–5.2)
RBC # BLD: 3.76 MILL/MM3 (ref 4.2–5.4)
SEG NEUTROPHILS: 54.1 %
SEGMENTED NEUTROPHILS ABSOLUTE COUNT: 2.2 THOU/MM3 (ref 1.8–7.7)
SODIUM BLD-SCNC: 138 MEQ/L (ref 135–145)
TROPONIN T: < 0.01 NG/ML
WBC # BLD: 4.1 THOU/MM3 (ref 4.8–10.8)

## 2019-02-27 PROCEDURE — G0378 HOSPITAL OBSERVATION PER HR: HCPCS

## 2019-02-27 PROCEDURE — 2580000003 HC RX 258: Performed by: NURSE PRACTITIONER

## 2019-02-27 PROCEDURE — 99239 HOSP IP/OBS DSCHRG MGMT >30: CPT | Performed by: NURSE PRACTITIONER

## 2019-02-27 PROCEDURE — 99215 OFFICE O/P EST HI 40 MIN: CPT | Performed by: INTERNAL MEDICINE

## 2019-02-27 PROCEDURE — 93005 ELECTROCARDIOGRAM TRACING: CPT | Performed by: NURSE PRACTITIONER

## 2019-02-27 PROCEDURE — 80048 BASIC METABOLIC PNL TOTAL CA: CPT

## 2019-02-27 PROCEDURE — 84484 ASSAY OF TROPONIN QUANT: CPT

## 2019-02-27 PROCEDURE — 93010 ELECTROCARDIOGRAM REPORT: CPT | Performed by: NUCLEAR MEDICINE

## 2019-02-27 PROCEDURE — 36415 COLL VENOUS BLD VENIPUNCTURE: CPT

## 2019-02-27 PROCEDURE — 85025 COMPLETE CBC W/AUTO DIFF WBC: CPT

## 2019-02-27 RX ORDER — POTASSIUM CHLORIDE 1500 MG/1
TABLET, FILM COATED, EXTENDED RELEASE ORAL
Qty: 60 TABLET | Refills: 1 | Status: SHIPPED | OUTPATIENT
Start: 2019-02-27 | End: 2019-03-19 | Stop reason: DRUGHIGH

## 2019-02-27 RX ORDER — FUROSEMIDE 40 MG/1
20 TABLET ORAL 2 TIMES DAILY
Qty: 30 TABLET | Refills: 3 | Status: SHIPPED | OUTPATIENT
Start: 2019-02-27 | End: 2019-03-13

## 2019-02-27 RX ADMIN — FUROSEMIDE 40 MG: 40 TABLET ORAL at 08:00

## 2019-02-27 RX ADMIN — LETROZOLE 2.5 MG: 2.5 TABLET, FILM COATED ORAL at 08:00

## 2019-02-27 RX ADMIN — LEVOTHYROXINE SODIUM 100 MCG: 0.1 TABLET ORAL at 07:59

## 2019-02-27 RX ADMIN — SPIRONOLACTONE 12.5 MG: 25 TABLET ORAL at 07:58

## 2019-02-27 RX ADMIN — Medication 10 ML: at 07:58

## 2019-02-27 ASSESSMENT — PAIN SCALES - GENERAL
PAINLEVEL_OUTOF10: 0

## 2019-02-28 ENCOUNTER — APPOINTMENT (OUTPATIENT)
Dept: CARDIAC REHAB | Age: 59
End: 2019-02-28
Payer: MEDICAID

## 2019-02-28 ENCOUNTER — HOSPITAL ENCOUNTER (OUTPATIENT)
Dept: CARDIAC REHAB | Age: 59
Setting detail: THERAPIES SERIES
End: 2019-02-28
Payer: MEDICAID

## 2019-03-01 ENCOUNTER — APPOINTMENT (OUTPATIENT)
Dept: CARDIAC REHAB | Age: 59
End: 2019-03-01
Payer: MEDICAID

## 2019-03-01 ENCOUNTER — HOSPITAL ENCOUNTER (OUTPATIENT)
Dept: CARDIAC REHAB | Age: 59
Setting detail: THERAPIES SERIES
Discharge: HOME | End: 2019-03-01
Payer: MEDICAID

## 2019-03-01 PROCEDURE — G0422 INTENS CARDIAC REHAB W/EXERC: HCPCS

## 2019-03-01 PROCEDURE — G0423 INTENS CARDIAC REHAB NO EXER: HCPCS

## 2019-03-05 ENCOUNTER — HOSPITAL ENCOUNTER (OUTPATIENT)
Dept: CARDIAC REHAB | Age: 59
Setting detail: THERAPIES SERIES
Discharge: HOME | End: 2019-03-05
Payer: MEDICAID

## 2019-03-05 ENCOUNTER — APPOINTMENT (OUTPATIENT)
Dept: CARDIAC REHAB | Age: 59
End: 2019-03-05
Payer: MEDICAID

## 2019-03-05 PROCEDURE — G0422 INTENS CARDIAC REHAB W/EXERC: HCPCS

## 2019-03-05 PROCEDURE — G0423 INTENS CARDIAC REHAB NO EXER: HCPCS

## 2019-03-07 ENCOUNTER — HOSPITAL ENCOUNTER (OUTPATIENT)
Dept: CARDIAC REHAB | Age: 59
Setting detail: THERAPIES SERIES
Discharge: HOME | End: 2019-03-07
Payer: MEDICAID

## 2019-03-07 ENCOUNTER — APPOINTMENT (OUTPATIENT)
Dept: CARDIAC REHAB | Age: 59
End: 2019-03-07
Payer: MEDICAID

## 2019-03-07 PROCEDURE — G0423 INTENS CARDIAC REHAB NO EXER: HCPCS

## 2019-03-07 PROCEDURE — G0422 INTENS CARDIAC REHAB W/EXERC: HCPCS

## 2019-03-08 ENCOUNTER — APPOINTMENT (OUTPATIENT)
Dept: CARDIAC REHAB | Age: 59
End: 2019-03-08
Payer: MEDICAID

## 2019-03-08 ENCOUNTER — HOSPITAL ENCOUNTER (OUTPATIENT)
Dept: CARDIAC REHAB | Age: 59
Setting detail: THERAPIES SERIES
Discharge: HOME | End: 2019-03-08
Payer: MEDICAID

## 2019-03-08 PROCEDURE — G0422 INTENS CARDIAC REHAB W/EXERC: HCPCS

## 2019-03-08 PROCEDURE — G0423 INTENS CARDIAC REHAB NO EXER: HCPCS

## 2019-03-11 ENCOUNTER — OFFICE VISIT (OUTPATIENT)
Dept: FAMILY MEDICINE CLINIC | Age: 59
End: 2019-03-11
Payer: MEDICAID

## 2019-03-11 VITALS
HEART RATE: 72 BPM | SYSTOLIC BLOOD PRESSURE: 106 MMHG | WEIGHT: 202.2 LBS | HEIGHT: 62 IN | BODY MASS INDEX: 37.21 KG/M2 | RESPIRATION RATE: 16 BRPM | TEMPERATURE: 98.3 F | DIASTOLIC BLOOD PRESSURE: 70 MMHG

## 2019-03-11 DIAGNOSIS — I50.22 CHRONIC SYSTOLIC CHF (CONGESTIVE HEART FAILURE) (HCC): Primary | ICD-10-CM

## 2019-03-11 DIAGNOSIS — I42.9 CARDIOMYOPATHY, UNSPECIFIED TYPE (HCC): ICD-10-CM

## 2019-03-11 PROCEDURE — 1111F DSCHRG MED/CURRENT MED MERGE: CPT | Performed by: NURSE PRACTITIONER

## 2019-03-11 PROCEDURE — 99215 OFFICE O/P EST HI 40 MIN: CPT | Performed by: NURSE PRACTITIONER

## 2019-03-11 RX ORDER — FUROSEMIDE 20 MG/1
20 TABLET ORAL DAILY
Qty: 3 TABLET | Refills: 0 | Status: SHIPPED | OUTPATIENT
Start: 2019-03-11 | End: 2019-03-13 | Stop reason: SDUPTHER

## 2019-03-11 ASSESSMENT — PATIENT HEALTH QUESTIONNAIRE - PHQ9
SUM OF ALL RESPONSES TO PHQ QUESTIONS 1-9: 0
1. LITTLE INTEREST OR PLEASURE IN DOING THINGS: 0
SUM OF ALL RESPONSES TO PHQ9 QUESTIONS 1 & 2: 0
SUM OF ALL RESPONSES TO PHQ QUESTIONS 1-9: 0
2. FEELING DOWN, DEPRESSED OR HOPELESS: 0

## 2019-03-12 ENCOUNTER — HOSPITAL ENCOUNTER (OUTPATIENT)
Dept: CARDIAC REHAB | Age: 59
Setting detail: THERAPIES SERIES
Discharge: HOME | End: 2019-03-12
Payer: MEDICAID

## 2019-03-12 ENCOUNTER — APPOINTMENT (OUTPATIENT)
Dept: CARDIAC REHAB | Age: 59
End: 2019-03-12
Payer: MEDICAID

## 2019-03-13 DIAGNOSIS — I50.22 CHRONIC SYSTOLIC CHF (CONGESTIVE HEART FAILURE) (HCC): ICD-10-CM

## 2019-03-13 DIAGNOSIS — I50.22 CHRONIC SYSTOLIC (CONGESTIVE) HEART FAILURE (HCC): ICD-10-CM

## 2019-03-13 DIAGNOSIS — I42.9 CARDIOMYOPATHY, UNSPECIFIED TYPE (HCC): ICD-10-CM

## 2019-03-13 RX ORDER — FUROSEMIDE 20 MG/1
20 TABLET ORAL DAILY
Qty: 2 TABLET | Refills: 0 | Status: SHIPPED | OUTPATIENT
Start: 2019-03-13 | End: 2019-03-19 | Stop reason: ALTCHOICE

## 2019-03-13 RX ORDER — FUROSEMIDE 20 MG/1
20 TABLET ORAL 2 TIMES DAILY
Qty: 180 TABLET | Refills: 1 | Status: SHIPPED | OUTPATIENT
Start: 2019-03-13 | End: 2019-03-19

## 2019-03-14 ENCOUNTER — HOSPITAL ENCOUNTER (OUTPATIENT)
Dept: CARDIAC REHAB | Age: 59
Setting detail: THERAPIES SERIES
End: 2019-03-14
Payer: MEDICAID

## 2019-03-14 ENCOUNTER — APPOINTMENT (OUTPATIENT)
Dept: CARDIAC REHAB | Age: 59
End: 2019-03-14
Payer: MEDICAID

## 2019-03-15 ENCOUNTER — APPOINTMENT (OUTPATIENT)
Dept: CARDIAC REHAB | Age: 59
End: 2019-03-15
Payer: MEDICAID

## 2019-03-18 ENCOUNTER — OFFICE VISIT (OUTPATIENT)
Dept: CARDIOLOGY CLINIC | Age: 59
End: 2019-03-18
Payer: MEDICAID

## 2019-03-18 VITALS
WEIGHT: 201.38 LBS | DIASTOLIC BLOOD PRESSURE: 64 MMHG | SYSTOLIC BLOOD PRESSURE: 92 MMHG | BODY MASS INDEX: 37.06 KG/M2 | HEART RATE: 86 BPM | HEIGHT: 62 IN

## 2019-03-18 DIAGNOSIS — I42.8 NONISCHEMIC CARDIOMYOPATHY (HCC): Primary | ICD-10-CM

## 2019-03-18 DIAGNOSIS — I10 ESSENTIAL HYPERTENSION: ICD-10-CM

## 2019-03-18 DIAGNOSIS — Z95.810 ICD (IMPLANTABLE CARDIOVERTER-DEFIBRILLATOR) IN PLACE: ICD-10-CM

## 2019-03-18 PROCEDURE — 99213 OFFICE O/P EST LOW 20 MIN: CPT | Performed by: PHYSICIAN ASSISTANT

## 2019-03-19 ENCOUNTER — APPOINTMENT (OUTPATIENT)
Dept: CARDIAC REHAB | Age: 59
End: 2019-03-19
Payer: MEDICAID

## 2019-03-19 ENCOUNTER — OFFICE VISIT (OUTPATIENT)
Dept: CARDIOLOGY CLINIC | Age: 59
End: 2019-03-19
Payer: MEDICAID

## 2019-03-19 VITALS
BODY MASS INDEX: 36.8 KG/M2 | SYSTOLIC BLOOD PRESSURE: 108 MMHG | HEART RATE: 77 BPM | DIASTOLIC BLOOD PRESSURE: 80 MMHG | HEIGHT: 62 IN | WEIGHT: 200 LBS | OXYGEN SATURATION: 97 %

## 2019-03-19 DIAGNOSIS — I42.9 CARDIOMYOPATHY, UNSPECIFIED TYPE (HCC): ICD-10-CM

## 2019-03-19 DIAGNOSIS — I50.22 CHF (CONGESTIVE HEART FAILURE), NYHA CLASS II, CHRONIC, SYSTOLIC (HCC): Primary | ICD-10-CM

## 2019-03-19 PROCEDURE — 99213 OFFICE O/P EST LOW 20 MIN: CPT | Performed by: NURSE PRACTITIONER

## 2019-03-19 RX ORDER — POTASSIUM CHLORIDE 750 MG/1
10 TABLET, EXTENDED RELEASE ORAL DAILY
Qty: 90 TABLET | Refills: 3 | Status: SHIPPED | OUTPATIENT
Start: 2019-03-19 | End: 2019-04-24

## 2019-03-19 RX ORDER — FUROSEMIDE 20 MG/1
20 TABLET ORAL DAILY
Qty: 180 TABLET | Refills: 1
Start: 2019-03-19 | End: 2019-03-26

## 2019-03-19 ASSESSMENT — ENCOUNTER SYMPTOMS
APNEA: 0
SHORTNESS OF BREATH: 0
CHEST TIGHTNESS: 0
COUGH: 0
ABDOMINAL DISTENTION: 0
NAUSEA: 0
COLOR CHANGE: 0
WHEEZING: 0
ABDOMINAL PAIN: 0

## 2019-03-21 ENCOUNTER — APPOINTMENT (OUTPATIENT)
Dept: CARDIAC REHAB | Age: 59
End: 2019-03-21
Payer: MEDICAID

## 2019-03-22 ENCOUNTER — APPOINTMENT (OUTPATIENT)
Dept: CARDIAC REHAB | Age: 59
End: 2019-03-22
Payer: MEDICAID

## 2019-03-26 ENCOUNTER — APPOINTMENT (OUTPATIENT)
Dept: CARDIAC REHAB | Age: 59
End: 2019-03-26
Payer: MEDICAID

## 2019-03-26 ENCOUNTER — TELEPHONE (OUTPATIENT)
Dept: CARDIOLOGY CLINIC | Age: 59
End: 2019-03-26

## 2019-03-26 DIAGNOSIS — I42.9 CARDIOMYOPATHY, UNSPECIFIED TYPE (HCC): ICD-10-CM

## 2019-03-26 RX ORDER — FUROSEMIDE 20 MG/1
20 TABLET ORAL 2 TIMES DAILY
Qty: 180 TABLET | Refills: 1
Start: 2019-03-26 | End: 2019-04-24 | Stop reason: DRUGHIGH

## 2019-03-26 NOTE — TELEPHONE ENCOUNTER
[id number] Aleja Ponce Please thank her for notifying us. Can you ask her if [facility] has given her an appt yet?

## 2019-03-28 ENCOUNTER — APPOINTMENT (OUTPATIENT)
Dept: CARDIAC REHAB | Age: 59
End: 2019-03-28
Payer: MEDICAID

## 2019-03-29 ENCOUNTER — APPOINTMENT (OUTPATIENT)
Dept: CARDIAC REHAB | Age: 59
End: 2019-03-29
Payer: MEDICAID

## 2019-04-11 ENCOUNTER — HOSPITAL ENCOUNTER (OUTPATIENT)
Dept: INFUSION THERAPY | Age: 59
Discharge: HOME | End: 2019-04-11
Payer: MEDICAID

## 2019-04-11 ENCOUNTER — OFFICE VISIT (OUTPATIENT)
Dept: ONCOLOGY | Age: 59
End: 2019-04-11
Payer: MEDICAID

## 2019-04-11 VITALS
TEMPERATURE: 98.6 F | HEART RATE: 74 BPM | BODY MASS INDEX: 38.42 KG/M2 | OXYGEN SATURATION: 96 % | DIASTOLIC BLOOD PRESSURE: 75 MMHG | WEIGHT: 208.8 LBS | HEIGHT: 62 IN | RESPIRATION RATE: 16 BRPM | SYSTOLIC BLOOD PRESSURE: 109 MMHG

## 2019-04-11 DIAGNOSIS — Z79.811 USE OF LETROZOLE (FEMARA): ICD-10-CM

## 2019-04-11 DIAGNOSIS — E04.1 THYROID NODULE: ICD-10-CM

## 2019-04-11 DIAGNOSIS — C50.912 MALIGNANT NEOPLASM OF LEFT BREAST IN FEMALE, ESTROGEN RECEPTOR POSITIVE, UNSPECIFIED SITE OF BREAST (HCC): Primary | ICD-10-CM

## 2019-04-11 DIAGNOSIS — Z17.0 MALIGNANT NEOPLASM OF LEFT BREAST IN FEMALE, ESTROGEN RECEPTOR POSITIVE, UNSPECIFIED SITE OF BREAST (HCC): Primary | ICD-10-CM

## 2019-04-11 PROCEDURE — 99211 OFF/OP EST MAY X REQ PHY/QHP: CPT

## 2019-04-11 PROCEDURE — 99214 OFFICE O/P EST MOD 30 MIN: CPT | Performed by: PHYSICIAN ASSISTANT

## 2019-04-11 RX ORDER — LETROZOLE 2.5 MG/1
2.5 TABLET, FILM COATED ORAL DAILY
Qty: 90 TABLET | Refills: 3 | Status: SHIPPED | OUTPATIENT
Start: 2019-04-11 | End: 2019-07-11 | Stop reason: SDUPTHER

## 2019-04-11 NOTE — PROGRESS NOTES
[facility]  [address] [address]  Dept: [phone]  Dept Fax: [phone]: [phone]      Visit Date:4/11/2019     Florie Hashimoto is a 63 y.o. female who presents today for:   Follow-up breast cancer      HPI:   Sharla Zambrano is a 63year old female followed by Dr. Anamika Anthony for breast cancer. Per Dr. Maulik King note on 1/11/19: history of left breast cancer diagnosed in December 2016 in [place]. She has relocated to [place] to be closer with her family. We were able to obtain her pathology report and progress notes from the medical oncologist. Final pathology report from left breast modified mastectomy and right simple mastectomy performed on December 29, 2016 at [facility] in [place], [place] showed a 2.2 cm infiltrating ductal carcinoma of grade 2, there was evidence of lymphovascular invasion. One out of 10 axillary lymph node was positive for metastatic breast cancer. The tumor cells were estrogen receptor positive in 100% of tumor cell staining, progesterone receptor positive in 30% of tumor cells staining. HER-2 non-amplified by FISH. Right breast mastectomy was negative for malignancy. The patient  received adjuvant chemotherapy,s 4 cycles of AC followed by weekly Taxol. Chemotherapy was followed by radiation treatment. In 2018 she was diagnosed with a dilated cardiomyopathy with EF of 20-25%. She was placed on [employer] about 1 month ago. She reports that the last time she saw the Oncologist in [facility] was in August of 2017 when she completed her chemotherapy. After she lost all of her money she wasn't able to follow up with physicians. She reports that she was supposed to be on a medication for 10 years after this, but wasn't able to afford it. After she moved to [place], [place] she establish care with a cardiologist Dr. Herbie Vee. In January 2019, the patient established care with Dr. Anamika Anthony.  She was placed on aromatase inhibitor tablet 5    sacubitril-valsartan (ENTRESTO) 24-26 MG per tablet Take 1 tablet by mouth 2 times daily 60 tablet 3    letrozole (FEMARA) 2.5 MG tablet Take 1 tablet by mouth daily 30 tablet 3    levothyroxine (SYNTHROID) 100 MCG tablet Take 1 tablet by mouth daily 30 tablet 11    carvedilol (COREG) 3.125 MG tablet Take 1 tablet by mouth 2 times daily 60 tablet 3     No current facility-administered medications for this visit. No Known Allergies   Health Maintenance   Topic Date Due    Hepatitis C screen  1960    Pneumococcal 0-64 years Vaccine (1 of 1 - PPSV23) 02/22/1967    HIV screen  02/22/1976    DTaP/Tdap/Td vaccine (1 - Tdap) 02/22/1980    Cervical cancer screen  02/22/1982    Lipid screen  02/22/2001    Shingles Vaccine (1 of 2) 02/22/2011    Colon cancer screen colonoscopy  02/22/2011    Potassium monitoring  02/27/2020    Creatinine monitoring  02/27/2020    Flu vaccine  Completed       Review of Systems:   Review of Systems   Pertinent review of systems noted in HPI, all other ROS negative. Objective:   Physical Exam   Vitals:    04/11/19 1018   BP: 109/75   Site: Right Upper Arm   Position: Sitting   Cuff Size: Large Adult   Pulse: 74   Resp: 16   Temp: 98.6 °F (37 °C)   TempSrc: Oral   SpO2: 96%   Weight: 208 lb 12.8 oz (94.7 kg)   Height: 5' 2.01\" (1.575 m)       General appearance: No apparent distress, well nourished well developed and cooperative. HEENT: Pupils equal, round, and reactive to light. Conjunctivae/corneas clear. Oral mucosa moist.   Neck: Supple, with full range of motion. Trachea midline. Dressing in place to left neck from catheterization on 4/10/19 in [place]. Respiratory:  Normal respiratory effort. Clear to auscultation, bilaterally without Rales/Wheezes/Rhonchi. Cardiovascular: Regular rate and rhythm with normal S1/S2   Chest: S/P bilateral mastectomy. No nodules palpated.    Abdomen: Soft, non-tender, non-distended with active bowel sounds. Musculoskeletal: No clubbing, cyanosis or edema bilaterally. Skin: Skin color, texture, turgor normal.  No rashes or lesions. Neurologic:  Neurovascularly intact without any focal sensory/motor deficits. Psychiatric: Alert and oriented        Imaging Studies and Labs:   CBC:   Lab Results   Component Value Date    WBC 4.1 (L) 2019    HGB 12.3 2019    HCT 37.3 2019    MCV 99.2 (H) 2019     2019     BMP:   Lab Results   Component Value Date     2019    K 3.7 2019     2019    CO2 22 2019    BUN 19 2019    CREATININE 0.6 2019    GLUCOSE 100 2019    CALCIUM 8.8 2019      LFT:   Lab Results   Component Value Date    ALT 11 2019    AST 14 2019    ALKPHOS 79 2019    BILITOT 0.4 2019      PET Scan 19  Narrative   PROCEDURE: PET CT SKULL BASE MID THIGH RESTAGE       CLINICAL INFORMATION: 60-year-old woman with left breast cancer, status post bilateral mastectomies, radiation therapy densities completed 2018) and chemotherapy (completed 2017); subsequent treatment strategy.       Radiopharmaceutical: 11.43 mCi F-18 FDG, intravenously.       TECHNIQUE: PET/CT imaging was performed following skull base to the midthigh levels using routine PET acquisition.       Injection site: Right antecubital fossa.       Time of FDG injection: 10:37 AM.       Serum glucose: 96 g/dL at 10:30 AM.       Tiny of imagin:56 AM       COMPARISON: CTA chest 2018       FINDINGS:        Neck: No FDG avid cervical lymphadenopathy. The thyroid gland is poorly visualized but likely enlarged. There are probable FDG avid hypoattenuating nodules bilaterally in the gland. The nodule on the right side is associated with a maximum SUV of 4.5    (image 62). A nodule in the left side is associated with a maximum SUV of 3.8 (image 62).     Chest: There is stable cardiac enlargement. Atherosclerotic calcifications are present in the thoracic aorta without evidence of aneurysm. There is no pleural or pericardial effusion. No FDG avid pulmonary nodules are identified. An indistinct 5 mm are    density in the right lower lobe abutting the major fissure is stable in size and does not demonstrate metabolic activity (image 94). This density is likely at a size below the resolution of PET imaging. There is no FDG avid mediastinal, hilar or axillary    lymphadenopathy. Surgical changes of prior bilateral mastectomy are noted. Diffuse activity in the esophagus is likely infectious, inflammatory or physiologic. Degenerative changes are present in the thoracic spine without evidence of hypermetabolic    osseous metastatic disease.       Abdomen/pelvis: Physiologic activity is seen in the liver, spleen, urinary collecting system and gastrointestinal tract. Minimal atherosclerotic calcifications are present in the abdominal aorta without evidence of aneurysm. There is at least one    calcified fibroid in a prominent uterus. Phleboliths are seen in the pelvis. There is no FDG avid mesenteric, retroperitoneal, pelvic or inguinal lymphadenopathy. A calcification in the subcutaneous tissues of the posterior right pelvis may be an    injection granuloma. Degenerative changes are present in the lumbar spine and pelvis without evidence of hypermetabolic osseous metastatic disease.           Impression       Poorly visualized FDG avid thyroid nodules of indeterminate etiology. Malignancy cannot be excluded and thyroid ultrasound is recommended for further evaluation.       Final report electronically signed by Dr. Fannie Santana on 1/16/2019 1:47 PM            Assessment and Plan:   1. History of Left Breast Cancer - S/P bilateral mastectomy December 2016. T2,N1,Mx Hormone Responsive Breast Cancer   She was treated with adjuvant chemotherapy: 4 cycles of AC, followed by weekly Taxol.  Afterwards she received radiation treatment. The plan was to place her on adjuvant hormonal treatment but the patient lost her insurance. In September/October 2018 she was diagnosed with a cardiomyopathy, most likely related to chemotherapy. The patient [place] to [place] with her family. She established care with Dr. Radha Doe December 2018. 2. Adjuvant Hormonal Therapy  The patient was started on Aromatase inhibitor with Femara in January 2019. Overall she tolerates AI well without significant side effects. She was instructed to continue. The patient has not had prior bone density scan.   -Will obtain DEXA scan prior to next appointment. 3. Thyroid Nodules  Noted on PET scan 1/16/19 as poorly visualized FDG avid thyroid nodules of indeterminate etiology. Thyroid ultrasound recommended.   -Will obtain thyroid ultrasound      Return to clinic in three months with Dr. Radha Doe. Labs on RTC: CBC, BMP, LFT      All patient questions answered. Pt voiced understanding. Patient agreed with treatment plan. Follow up as directed. Patient instructed to call for questions or concerns.      Electronically signed by   Lee Ann Arreola PA-C

## 2019-04-23 NOTE — TELEPHONE ENCOUNTER
Called for office note   They do not have any recent notes   She will have Macey the coordinator call office

## 2019-04-24 DIAGNOSIS — I50.22 CHF (CONGESTIVE HEART FAILURE), NYHA CLASS II, CHRONIC, SYSTOLIC (HCC): Primary | ICD-10-CM

## 2019-04-24 RX ORDER — DIGOXIN 125 MCG
125 TABLET ORAL DAILY
COMMUNITY
End: 2019-10-07 | Stop reason: SDUPTHER

## 2019-04-24 RX ORDER — FUROSEMIDE 20 MG/1
20 TABLET ORAL DAILY PRN
COMMUNITY
End: 2020-04-13

## 2019-04-24 RX ORDER — SPIRONOLACTONE 25 MG/1
25 TABLET ORAL DAILY
COMMUNITY
End: 2019-04-24 | Stop reason: SDUPTHER

## 2019-04-24 RX ORDER — SPIRONOLACTONE 25 MG/1
25 TABLET ORAL DAILY
Qty: 90 TABLET | Refills: 3 | Status: SHIPPED | OUTPATIENT
Start: 2019-04-24 | End: 2020-05-19

## 2019-04-24 NOTE — TELEPHONE ENCOUNTER
Med list updated per med changes at [address] order Adriana Machado get BMP 2 wks post increase in entresto dose  Per patient she increased it at her appointment 4/10 at [facility]  Patient will get labs done today or tomorrow

## 2019-04-25 ENCOUNTER — TELEPHONE (OUTPATIENT)
Dept: CARDIOLOGY CLINIC | Age: 59
End: 2019-04-25

## 2019-04-25 ENCOUNTER — HOSPITAL ENCOUNTER (OUTPATIENT)
Age: 59
Discharge: HOME | End: 2019-04-25
Payer: MEDICAID

## 2019-04-25 DIAGNOSIS — I50.22 CHF (CONGESTIVE HEART FAILURE), NYHA CLASS II, CHRONIC, SYSTOLIC (HCC): ICD-10-CM

## 2019-04-25 DIAGNOSIS — C50.912 MALIGNANT NEOPLASM OF LEFT BREAST IN FEMALE, ESTROGEN RECEPTOR POSITIVE, UNSPECIFIED SITE OF BREAST (HCC): ICD-10-CM

## 2019-04-25 DIAGNOSIS — Z17.0 MALIGNANT NEOPLASM OF LEFT BREAST IN FEMALE, ESTROGEN RECEPTOR POSITIVE, UNSPECIFIED SITE OF BREAST (HCC): ICD-10-CM

## 2019-04-25 LAB
ALBUMIN SERPL-MCNC: 4.2 G/DL (ref 3.5–5.1)
ALP BLD-CCNC: 77 U/L (ref 38–126)
ALT SERPL-CCNC: 14 U/L (ref 11–66)
ANION GAP SERPL CALCULATED.3IONS-SCNC: 10 MEQ/L (ref 8–16)
AST SERPL-CCNC: 14 U/L (ref 5–40)
BASOPHILS # BLD: 0.6 %
BASOPHILS ABSOLUTE: 0 THOU/MM3 (ref 0–0.1)
BILIRUB SERPL-MCNC: 0.3 MG/DL (ref 0.3–1.2)
BILIRUBIN DIRECT: < 0.2 MG/DL (ref 0–0.3)
BUN BLDV-MCNC: 17 MG/DL (ref 7–22)
CALCIUM SERPL-MCNC: 9.1 MG/DL (ref 8.5–10.5)
CHLORIDE BLD-SCNC: 107 MEQ/L (ref 98–111)
CO2: 24 MEQ/L (ref 23–33)
CREAT SERPL-MCNC: 0.6 MG/DL (ref 0.4–1.2)
EOSINOPHIL # BLD: 6.6 %
EOSINOPHILS ABSOLUTE: 0.2 THOU/MM3 (ref 0–0.4)
ERYTHROCYTE [DISTWIDTH] IN BLOOD BY AUTOMATED COUNT: 13.2 % (ref 11.5–14.5)
ERYTHROCYTE [DISTWIDTH] IN BLOOD BY AUTOMATED COUNT: 46.5 FL (ref 35–45)
GFR SERPL CREATININE-BSD FRML MDRD: > 90 ML/MIN/1.73M2
GLUCOSE BLD-MCNC: 105 MG/DL (ref 70–108)
HCT VFR BLD CALC: 38.4 % (ref 37–47)
HEMOGLOBIN: 12.6 GM/DL (ref 12–16)
IMMATURE GRANS (ABS): 0.03 THOU/MM3 (ref 0–0.07)
IMMATURE GRANULOCYTES: 0.9 %
LYMPHOCYTES # BLD: 28.8 %
LYMPHOCYTES ABSOLUTE: 0.9 THOU/MM3 (ref 1–4.8)
MCH RBC QN AUTO: 31.6 PG (ref 26–33)
MCHC RBC AUTO-ENTMCNC: 32.8 GM/DL (ref 32.2–35.5)
MCV RBC AUTO: 96.2 FL (ref 81–99)
MONOCYTES # BLD: 6.6 %
MONOCYTES ABSOLUTE: 0.2 THOU/MM3 (ref 0.4–1.3)
NUCLEATED RED BLOOD CELLS: 0 /100 WBC
PLATELET # BLD: 176 THOU/MM3 (ref 130–400)
PMV BLD AUTO: 11.6 FL (ref 9.4–12.4)
POTASSIUM SERPL-SCNC: 4.2 MEQ/L (ref 3.5–5.2)
RBC # BLD: 3.99 MILL/MM3 (ref 4.2–5.4)
SEG NEUTROPHILS: 56.5 %
SEGMENTED NEUTROPHILS ABSOLUTE COUNT: 1.8 THOU/MM3 (ref 1.8–7.7)
SODIUM BLD-SCNC: 141 MEQ/L (ref 135–145)
TOTAL PROTEIN: 7 G/DL (ref 6.1–8)
WBC # BLD: 3.2 THOU/MM3 (ref 4.8–10.8)

## 2019-04-25 PROCEDURE — 82248 BILIRUBIN DIRECT: CPT

## 2019-04-25 PROCEDURE — 36415 COLL VENOUS BLD VENIPUNCTURE: CPT

## 2019-04-25 PROCEDURE — 85025 COMPLETE CBC W/AUTO DIFF WBC: CPT

## 2019-04-25 PROCEDURE — 80053 COMPREHEN METABOLIC PANEL: CPT

## 2019-05-10 ENCOUNTER — OFFICE VISIT (OUTPATIENT)
Dept: FAMILY MEDICINE CLINIC | Age: 59
End: 2019-05-10
Payer: COMMERCIAL

## 2019-05-10 VITALS
DIASTOLIC BLOOD PRESSURE: 70 MMHG | BODY MASS INDEX: 36.54 KG/M2 | TEMPERATURE: 98 F | HEIGHT: 63 IN | RESPIRATION RATE: 14 BRPM | HEART RATE: 77 BPM | SYSTOLIC BLOOD PRESSURE: 98 MMHG | WEIGHT: 206.2 LBS

## 2019-05-10 DIAGNOSIS — I10 ESSENTIAL HYPERTENSION: ICD-10-CM

## 2019-05-10 DIAGNOSIS — I10 ESSENTIAL HYPERTENSION: Primary | ICD-10-CM

## 2019-05-10 DIAGNOSIS — M54.12 CERVICAL RADICULOPATHY: ICD-10-CM

## 2019-05-10 DIAGNOSIS — M79.601 RIGHT ARM PAIN: ICD-10-CM

## 2019-05-10 DIAGNOSIS — M54.2 NECK PAIN: ICD-10-CM

## 2019-05-10 DIAGNOSIS — H69.83 EUSTACHIAN TUBE DYSFUNCTION, BILATERAL: ICD-10-CM

## 2019-05-10 DIAGNOSIS — R42 DIZZINESS: ICD-10-CM

## 2019-05-10 DIAGNOSIS — I50.22 CHRONIC SYSTOLIC (CONGESTIVE) HEART FAILURE (HCC): ICD-10-CM

## 2019-05-10 PROCEDURE — G8427 DOCREV CUR MEDS BY ELIG CLIN: HCPCS | Performed by: NURSE PRACTITIONER

## 2019-05-10 PROCEDURE — 99214 OFFICE O/P EST MOD 30 MIN: CPT | Performed by: NURSE PRACTITIONER

## 2019-05-10 PROCEDURE — 1036F TOBACCO NON-USER: CPT | Performed by: NURSE PRACTITIONER

## 2019-05-10 PROCEDURE — 3017F COLORECTAL CA SCREEN DOC REV: CPT | Performed by: NURSE PRACTITIONER

## 2019-05-10 PROCEDURE — G8417 CALC BMI ABV UP PARAM F/U: HCPCS | Performed by: NURSE PRACTITIONER

## 2019-05-10 RX ORDER — MECLIZINE HYDROCHLORIDE 25 MG/1
25 TABLET ORAL 3 TIMES DAILY PRN
Qty: 30 TABLET | Refills: 0 | Status: SHIPPED | OUTPATIENT
Start: 2019-05-10 | End: 2019-05-20

## 2019-05-10 RX ORDER — CARVEDILOL 3.12 MG/1
TABLET ORAL
Qty: 180 TABLET | Refills: 3 | Status: SHIPPED | OUTPATIENT
Start: 2019-05-10 | End: 2020-07-13

## 2019-05-10 RX ORDER — PREDNISONE 20 MG/1
40 TABLET ORAL DAILY
Qty: 10 TABLET | Refills: 0 | Status: SHIPPED | OUTPATIENT
Start: 2019-05-10 | End: 2019-05-15

## 2019-05-10 RX ORDER — FLUTICASONE PROPIONATE 50 MCG
2 SPRAY, SUSPENSION (ML) NASAL DAILY
Qty: 1 BOTTLE | Refills: 1 | Status: SHIPPED | OUTPATIENT
Start: 2019-05-10 | End: 2019-07-11 | Stop reason: ALTCHOICE

## 2019-05-10 NOTE — PROGRESS NOTES
Chief Complaint   Patient presents with    Ear Fullness     started 3 days ago    Dizziness     started 3 days ago    Pain     neck right shoulder, right hand, numbness over 1 month       History obtained from chart review and the patient. SUBJECTIVE:  Earl Duffy is a 63 y.o. female that presents today for dizziness, right arm numbness and pain and ear fullness    Back Pain    HPI:  Symptoms have been present for 4 month(s). she describes the pain as sharp  in the cervical region. She also has some pain in her right shoulder and right hand    Inciting injury or history of trauma? No  Pain is relieved by - nothing  Pain is aggravated by - movement  Radiation of the pain? Yes - down the right arm  Paresthesias of the extremities? Yes - burning and numbness in her right hand  Saddle anesthesia? No  Bowel or bladder incontinence? No  Treatments tried - nothing      Ear Complaint    HPI:  Symptoms have been present for 4 week(s). Inciting incident or history of trauma? no  Decreased hearing? No  Ear tenderness? No  Ear drainage? No  Feeling of fullness? Yes  Radiation of the pain? No  Dizziness or pre-syncope? Yes - some dizziness  Affected by position or head movment? Yes  Sore throat, rhinorrhea or sinus congestion? No  Hx of Bruxism?  No  Treatment tried and response - none    Age/Gender Health Maintenance    Lipid -No results found for: CHOL  No results found for: TRIG  No results found for: HDL  No results found for: LDLCHOLESTEROL, LDLCALC  No results found for: LABVLDL, VLDL  No results found for: CHOLHDLRATIO    DM Screen - needs  Colon Cancer Screening - 48  Lung Cancer Screening (Age 54 to [de-identified] with 30 pack year hx, current smoker or quit within past 15 years) - n/a    Tetanus - needs  Influenza Vaccine - 10/18  Pneumonia Vaccine - 65  Zostavax - 50     Breast Cancer Screening - 50  Cervical Cancer Screening - needs  Osteoporosis Screening - 65  Chlamydia Screen - n/a    PSA testing discussion - n/a  AAA Screening - n/a    Falls screening - n/a    Current Outpatient Medications   Medication Sig Dispense Refill    predniSONE (DELTASONE) 20 MG tablet Take 2 tablets by mouth daily for 5 days 10 tablet 0    fluticasone (FLONASE) 50 MCG/ACT nasal spray 2 sprays by Each Nare route daily 1 Bottle 1    meclizine (ANTIVERT) 25 MG tablet Take 1 tablet by mouth 3 times daily as needed for Dizziness 30 tablet 0    digoxin (LANOXIN) 125 MCG tablet Take 125 mcg by mouth daily      furosemide (LASIX) 20 MG tablet Take 20 mg by mouth daily as needed      sacubitril-valsartan (ENTRESTO) 49-51 MG per tablet Take 1 tablet by mouth 2 times daily 180 tablet 3    spironolactone (ALDACTONE) 25 MG tablet Take 1 tablet by mouth daily 90 tablet 3    letrozole (FEMARA) 2.5 MG tablet Take 1 tablet by mouth daily 90 tablet 3    Prosthesis MISC by Does not apply route Bra - patient is s/p bilateral mastectomy 1 each 0    acetaminophen (TYLENOL) 500 MG tablet Take 500 mg by mouth every 6 hours as needed for Pain      levothyroxine (SYNTHROID) 100 MCG tablet Take 1 tablet by mouth daily 30 tablet 11    carvedilol (COREG) 3.125 MG tablet Take 1 tablet by mouth 2 times daily 60 tablet 3     No current facility-administered medications for this visit. Orders Placed This Encounter   Medications    predniSONE (DELTASONE) 20 MG tablet     Sig: Take 2 tablets by mouth daily for 5 days     Dispense:  10 tablet     Refill:  0    fluticasone (FLONASE) 50 MCG/ACT nasal spray     Si sprays by Each Nare route daily     Dispense:  1 Bottle     Refill:  1    meclizine (ANTIVERT) 25 MG tablet     Sig: Take 1 tablet by mouth 3 times daily as needed for Dizziness     Dispense:  30 tablet     Refill:  0         All medications reviewed and reconciled, including OTC and herbal medications. Updated list given to patient.        Patient Active Problem List   Diagnosis    Chronic systolic [facility]congestive) heart failure ([facility]    Essential hypertension    Hypertriglyceridemia    Pulmonary nodule less than 6 cm determined by computed tomography of lung    Cardiomyopathy [facility]    Cardiomyopathy secondary to non-drug external agent ([facility]    ICD (implantable cardioverter-defibrillator) in place    Snoring    Difficulty falling asleep at night until early morning hours    Obesity (BMI 30-39. 9)    Near syncope    Syncope and collapse    Dizziness       Past Medical History:   Diagnosis Date    Abnormal heart rhythm     Anxiety     Cancer ([facility]     breast  2016     CHF (congestive heart failure) (HCC)     Congenital heart disease     Enlarged lymph nodes     Hypertension     Hypothyroidism     ICD (implantable cardioverter-defibrillator) in place 2019    Dr. Louann Luther Kidney stones     Thyroid disease        Past Surgical History:   Procedure Laterality Date    APPENDECTOMY      MASTECTOMY, BILATERAL      PTCA      TONSILLECTOMY         No Known Allergies    Social History     Socioeconomic History    Marital status:      Spouse name: Not on file    Number of children: Not on file    Years of education: Not on file    Highest education level: Not on file   Occupational History    Not on file   Social Needs    Financial resource strain: Not on file    Food insecurity:     Worry: Not on file     Inability: Not on file    Transportation needs:     Medical: Not on file     Non-medical: Not on file   Tobacco Use    Smoking status: Former Smoker     Packs/day: 0.25     Years: 37.00     Pack years: 9.25     Types: Cigarettes     Start date: 1981     Last attempt to quit: 3/10/2019     Years since quittin.1    Smokeless tobacco: Never Used    Tobacco comment: 2 cigs dialy 19   Substance and Sexual Activity    Alcohol use: No    Drug use: No    Sexual activity: Yes   Lifestyle    Physical activity:     Days per week: Not on file     Minutes per session: Not on file    Stress: Not on file Relationships    Social connections:     Talks on phone: Not on file     Gets together: Not on file     Attends [employer]ical service: Not on file     Active member of club or organization: Not on file     Attends meetings of clubs or organizations: Not on file     Relationship status: Not on file    Intimate partner violence:     Fear of current or ex partner: Not on file     Emotionally abused: Not on file     Physically abused: Not on file     Forced sexual activity: Not on file   Other Topics Concern    Not on file   Social History Narrative    Not on file       Family History   Problem Relation Age of Onset    High Blood Pressure Mother     Dementia Mother     Cancer Father     Mental Retardation Brother          I have reviewed the patient's past medical history, past surgical history, allergies, medications, social and family history and I have made updates where appropriate.       Review of Systems  Positive responses are highlighted in bold    Constitutional:  Fever, Chills, Night Sweats, Fatigue, Unexpected changes in weight  Eyes:  Eye discharge, Eye pain, Eye redness, Visual disturbances   HENT:  Ear pain, Tinnitus, Nosebleeds, Trouble swallowing, Hearing loss, Sore throat  Cardiovascular:  Chest Pain, Palpitations, Orthopnea, Paroxysmal Nocturnal Dyspnea  Respiratory:  Cough, Wheezing, Shortness of breath, Chest tightness, Apnea  Gastrointestinal:  Nausea, Vomiting, Diarrhea, Constipation, Heartburn, Blood in stool  Genitourinary:  Difficulty or painful urination, Flank pain, Change in frequency, Urgency  Skin:  Color change, Rash, Itching, Wound  Psychiatric:  Hallucinations, Anxiety, Depression, Suicidal ideation  Hematological:  Enlarged glands, Easy bleeding, Easily bruising  Musculoskeletal:  Joint pain, Back pain, Gait problems, Joint swelling, Myalgias  Neurological:  Dizziness, Headaches, Presyncope, Numbness, Seizures, Tremors  Allergy:  Environmental allergies, Food allergies  Endocrine: Heat Intolerance, Cold Intolerance, Polydipsia, Polyphagia, Polyuria    Lab Results   Component Value Date     04/25/2019    K 4.2 04/25/2019     04/25/2019    CO2 24 04/25/2019    BUN 17 04/25/2019    CREATININE 0.6 04/25/2019    GLUCOSE 105 04/25/2019    CALCIUM 9.1 04/25/2019    PROT 7.0 04/25/2019    LABALBU 4.2 04/25/2019    BILITOT 0.3 04/25/2019    ALKPHOS 77 04/25/2019    AST 14 04/25/2019    ALT 14 04/25/2019    LABGLOM >90 04/25/2019     Lab Results   Component Value Date    TSH 0.553 02/26/2019     Lab Results   Component Value Date    WBC 3.2 (L) 04/25/2019    HGB 12.6 04/25/2019    HCT 38.4 04/25/2019    MCV 96.2 04/25/2019     04/25/2019       PHYSICAL EXAM:  Vitals:    05/10/19 1041   BP: 98/70   Pulse: 77   Resp: 14   Temp: 98 °F (36.7 °C)   TempSrc: Oral   Weight: 206 lb 3.2 oz (93.5 kg)   Height: 5' 3\" (1.6 m)     Body mass index is 36.53 kg/m². VS Reviewed  General Appearance: A&O x 3, No acute distress,well developed and well- nourished  Head: normocephalic and atraumatic  Eyes: pupils equal, round, and reactive to light, extraocular eye movements intact, conjunctivae and eye lids without erythema  ENT: external ear and ear canal clear bilaterally, TMs intact and regular, dull bilaterally, nose without deformity, nasal mucosa and turbinates normal without polyps, oropharynx normal, dentition is normal for age  Neck: supple and non-tender without mass, no thyromegaly or thyroid nodules, no cervical lymphadenopathy  Pulmonary/Chest: clear to auscultation bilaterally- no wheezes, rales or rhonchi, normal air movement, no respiratory distress or retractions  Cardiovascular: S1 and S2 auscultated w/ RRR. No murmurs, rubs, clicks, or gallops, distal pulses intact. Abdomen: soft, non-tender, non-distended, bowl sounds physiologic,  no rebound or guarding, no masses or hernias noted. Liver and spleen without enlargement.    Extremities: no cyanosis, clubbing or edema of the lower extremities  Musculoskeletal: No joint swelling or gross deformity   Right arm: negative Tinel's sign, + phalen's right arm  Neuro:  Alert, 5/5 strength globally and symmetrically, positive [place] hallpike to the right  Psych: Affect appropriate. Mood normal. Thought process is normal without evidence of depression or psychosis. Good insight and appropriate interaction. Cognition and memory appear to be intact. Skin: warm and dry, no rash or erythema  Lymph:  No cervical, auricular or supraclavicular lymph nodes palpated    ASSESSMENT & PLAN  Janak Acharya was seen today for ear fullness, dizziness and pain. Diagnoses and all orders for this visit:    Essential hypertension  -     Lipid, Fasting; Future    Cervical radiculopathy  -     [facility] - [address] [place] [address] Neurology (EM[facility]) - Harrietta Baumgarten, MD  -     XR CERVICAL SPINE (4-5 VIEWS); Future    Dizziness  -     meclizine (ANTIVERT) 25 MG tablet; Take 1 tablet by mouth 3 times daily as needed for Dizziness    Eustachian tube dysfunction, bilateral  -     predniSONE (DELTASONE) 20 MG tablet; Take 2 tablets by mouth daily for 5 days  -     fluticasone (FLONASE) 50 MCG/ACT nasal spray; 2 sprays by Each Nare route daily    Right arm pain  -     [facility] ([facility] - Harrietta Baumgarten, MD    Neck pain  -     [facility]  -     XR CERVICAL SPINE (4-5 VIEWS); Future      - obtain xray and EMG  - start physical therapy  - symptoms consistent with cervical radiculopathy, but may also have component of ulnar/radial neuropathy as well  - start prednisone, Flonase for ETD  - antivert for dizziness, benefits, risks and SE discussed    DISPOSITION    Return in about 1 month (around 6/7/2019) for neck pain, dizziness. Janak Acharya released without restrictions.       PATIENT COUNSELING    Counseling was provided today regarding the following topics: Healthy eating habits, Regular exercise, substance abuse and healthy sleep habits. Speedy Avila received counseling on the following healthy behaviors: medication adherence    Patient given educational materials on: See Attached    I have instructed Speedy Avila to complete a self tracking handout on Blood Pressures  and instructed them to bring it with them to her next appointment. Barriers to learning and self management: none    Discussed use, benefit, and side effects of prescribed medications. Barriers to medication compliance addressed. All patient questions answered. Pt voiced understanding.        Electronically signed by ANGELIA Chambers CNP on 5/10/2019 at 11:39 AM

## 2019-05-13 ENCOUNTER — OFFICE VISIT (OUTPATIENT)
Dept: CARDIOLOGY CLINIC | Age: 59
End: 2019-05-13
Payer: COMMERCIAL

## 2019-05-13 VITALS
DIASTOLIC BLOOD PRESSURE: 72 MMHG | BODY MASS INDEX: 38.05 KG/M2 | HEIGHT: 62 IN | SYSTOLIC BLOOD PRESSURE: 112 MMHG | HEART RATE: 76 BPM | WEIGHT: 206.8 LBS

## 2019-05-13 DIAGNOSIS — Z95.810 ICD (IMPLANTABLE CARDIOVERTER-DEFIBRILLATOR) IN PLACE: ICD-10-CM

## 2019-05-13 DIAGNOSIS — I42.8 NONISCHEMIC CARDIOMYOPATHY (HCC): ICD-10-CM

## 2019-05-13 DIAGNOSIS — I50.22 CHRONIC SYSTOLIC (CONGESTIVE) HEART FAILURE (HCC): Primary | ICD-10-CM

## 2019-05-13 PROCEDURE — 99213 OFFICE O/P EST LOW 20 MIN: CPT | Performed by: INTERNAL MEDICINE

## 2019-05-13 PROCEDURE — 3017F COLORECTAL CA SCREEN DOC REV: CPT | Performed by: INTERNAL MEDICINE

## 2019-05-13 PROCEDURE — G8417 CALC BMI ABV UP PARAM F/U: HCPCS | Performed by: INTERNAL MEDICINE

## 2019-05-13 PROCEDURE — G8427 DOCREV CUR MEDS BY ELIG CLIN: HCPCS | Performed by: INTERNAL MEDICINE

## 2019-05-13 PROCEDURE — 1036F TOBACCO NON-USER: CPT | Performed by: INTERNAL MEDICINE

## 2019-05-13 NOTE — PROGRESS NOTES
[address] [facility] CARDIOLOGY  [place] [address] [address] [address]  Dept: [phone]  Dept Fax: [phone]  Loc: [phone]    Visit Date: 5/13/2019    Ms. Marquis Portillo is a 63 y.o. female  who presented for:  Chief Complaint   Patient presents with    3 Month Follow-Up    Cardiomyopathy       HPI:   HPI   62 yo F hx if NICM, moderate MR, left breast cancer on Letrozole who prevents for follow-up. No shocks. Following with [facility] and is on a good CHF regimen and is tolerating it currently. Her breathing is improved, but she still has BORDEN. She is on prn furosemide. Watching her weights but has been gaining since on Prednisone. 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 She watches what she eats.           Current Outpatient Medications:     carvedilol (COREG) 3.125 MG tablet, TAKE 1 TABLET BY MOUTH TWICE DAILY, Disp: 180 tablet, Rfl: 3    predniSONE (DELTASONE) 20 MG tablet, Take 2 tablets by mouth daily for 5 days, Disp: 10 tablet, Rfl: 0    fluticasone (FLONASE) 50 MCG/ACT nasal spray, 2 sprays by Each Nare route daily, Disp: 1 Bottle, Rfl: 1    meclizine (ANTIVERT) 25 MG tablet, Take 1 tablet by mouth 3 times daily as needed for Dizziness, Disp: 30 tablet, Rfl: 0    digoxin (LANOXIN) 125 MCG tablet, Take 125 mcg by mouth daily, Disp: , Rfl:     furosemide (LASIX) 20 MG tablet, Take 20 mg by mouth daily as needed, Disp: , Rfl:     sacubitril-valsartan (ENTRESTO) 49-51 MG per tablet, Take 1 tablet by mouth 2 times daily, Disp: 180 tablet, Rfl: 3    spironolactone (ALDACTONE) 25 MG tablet, Take 1 tablet by mouth daily, Disp: 90 tablet, Rfl: 3    letrozole (FEMARA) 2.5 MG tablet, Take 1 tablet by mouth daily, Disp: 90 tablet, Rfl: 3    Prosthesis MISC, by Does not apply route Bra - patient is s/p bilateral mastectomy, Disp: 1 each, Rfl: 0    acetaminophen (TYLENOL) 500 MG tablet, Take 500 mg by mouth every 6 hours as needed for Pain, Disp: , Rfl:     levothyroxine (SYNTHROID) 100 MCG tablet, Take 1 tablet by mouth daily, Disp: 30 tablet, Rfl: 11    Past Medical History  Sierra Galarza  has a past medical history of Abnormal heart rhythm, Anxiety, Cancer ([facility] CHF (congestive heart failure) ([facility] Congenital heart disease, Enlarged lymph nodes, Hypertension, Hypothyroidism, ICD (implantable cardioverter-defibrillator) in place, Kidney stones, and Thyroid disease. Social History  Sierra Galarza  reports that she quit smoking about 2 months ago. Her smoking use included cigarettes. She started smoking about 37 years ago. She has a 9.25 pack-year smoking history. She has never used smokeless tobacco. She reports that she does not drink alcohol or use drugs. Family History  Sierra Galarza family history includes Cancer in her father; Dementia in her mother; High Blood Pressure in her mother; Mental Retardation in her brother. There is no family history of bicuspid aortic valve, aneurysms, heart transplant, pacemakers, defibrillators, or sudden cardiac death. Past Surgical History   Past Surgical History:   Procedure Laterality Date    APPENDECTOMY      MASTECTOMY, BILATERAL      PTCA      TONSILLECTOMY         Review of Systems   Constitutional: Negative for chills and fever  HENT: Negative for congestion, sinus pressure, sneezing and sore throat. Eyes: Negative for pain, discharge, redness and itching. Respiratory: Negative for apnea, cough  Gastrointestinal: Negative for blood in stool, constipation, diarrhea   Endocrine: Negative for cold intolerance, heat intolerance, polydipsia. Genitourinary: Negative for dysuria, enuresis, flank pain and hematuria. Musculoskeletal: Negative for arthralgias, joint swelling and neck pain. Neurological: Negative for numbness and headaches. Psychiatric/Behavioral: Negative for agitation, confusion, decreased concentration and dysphoric mood.      Objective:     /72   Pulse 76   Ht 5' 2\" (1.575 m) Wt 206 lb 12.8 oz (93.8 kg)   BMI 37.82 kg/m²     Wt Readings from Last 3 Encounters:   05/13/19 206 lb 12.8 oz (93.8 kg)   05/10/19 206 lb 3.2 oz (93.5 kg)   04/11/19 208 lb 12.8 oz (94.7 kg)     BP Readings from Last 3 Encounters:   05/13/19 112/72   05/10/19 98/70   04/11/19 109/75       Nursing note and vitals reviewed. Physical Exam   Constitutional: Oriented to person, place, and time. Appears well-developed and well-nourished. HENT:   Head: Normocephalic and atraumatic. Eyes: EOM are normal. Pupils are equal, round, and reactive to light. Neck: Normal range of motion. Neck supple. No JVD present. Cardiovascular: Normal rate, regular rhythm, normal heart sounds and intact distal pulses. No murmur heard. Pulmonary/Chest: Effort normal and breath sounds normal. No respiratory distress. No wheezes. No rales. Abdominal: Soft. Bowel sounds are normal. No distension. There is no tenderness. Musculoskeletal: Normal range of motion. No edema. Neurological: Alert and oriented to person, place, and time. No cranial nerve deficit. Coordination normal.   Skin: Skin is warm and dry. Psychiatric: Normal mood and affect.        No results found for: CKTOTAL, CKMB, CKMBINDEX    Lab Results   Component Value Date    WBC 3.2 04/25/2019    RBC 3.99 04/25/2019    HGB 12.6 04/25/2019    HCT 38.4 04/25/2019    MCV 96.2 04/25/2019    MCH 31.6 04/25/2019    MCHC 32.8 04/25/2019     04/25/2019    MPV 11.6 04/25/2019       Lab Results   Component Value Date     04/25/2019    K 4.2 04/25/2019    K 3.7 02/27/2019     04/25/2019    CO2 24 04/25/2019    BUN 17 04/25/2019    LABALBU 4.2 04/25/2019    CREATININE 0.6 04/25/2019    CALCIUM 9.1 04/25/2019    LABGLOM >90 04/25/2019    GLUCOSE 105 04/25/2019       Lab Results   Component Value Date    ALKPHOS 77 04/25/2019    ALT 14 04/25/2019    AST 14 04/25/2019    PROT 7.0 04/25/2019    BILITOT 0.3 04/25/2019    BILIDIR <0.2 04/25/2019    LABALBU 4.2 04/25/2019       Lab Results   Component Value Date    MG 2.1 02/26/2019       Lab Results   Component Value Date    INR 0.98 02/26/2019    INR 0.97 02/11/2019         No results found for: LABA1C    No results found for: TRIG, HDL, LDLCALC, LDLDIRECT, LABVLDL    Lab Results   Component Value Date    TSH 0.553 02/26/2019         Testing Reviewed:      I have individually reviewed the cardiac test below:    ECHO: No results found for this or any previous visit. Assessment/Plan   Cardiomyopathy, non-ischemic, NYHA II s/p ICD (2/2019)  Moderate MR  Follows with [facility]. Doing well on OMT. Euvolemic. No shocks. Continue current care. No side effects. Discussed diet/exercise/BP/weight loss/health lifestyle choices/lipids; the patient understands the goals and will try to comply.     Disposition:  6 months         Electronically signed by Bjorn White MD   5/13/2019 at 8:17 AM

## 2019-05-20 ENCOUNTER — TELEPHONE (OUTPATIENT)
Dept: CARDIOLOGY CLINIC | Age: 59
End: 2019-05-20

## 2019-05-20 ENCOUNTER — NURSE ONLY (OUTPATIENT)
Dept: CARDIOLOGY CLINIC | Age: 59
End: 2019-05-20
Payer: COMMERCIAL

## 2019-05-20 ENCOUNTER — HOSPITAL ENCOUNTER (OUTPATIENT)
Age: 59
Discharge: HOME | End: 2019-05-20
Payer: COMMERCIAL

## 2019-05-20 ENCOUNTER — HOSPITAL ENCOUNTER (OUTPATIENT)
Dept: GENERAL RADIOLOGY | Age: 59
Discharge: HOME | End: 2019-05-20
Payer: COMMERCIAL

## 2019-05-20 ENCOUNTER — TELEPHONE (OUTPATIENT)
Dept: FAMILY MEDICINE CLINIC | Age: 59
End: 2019-05-20

## 2019-05-20 DIAGNOSIS — M54.12 CERVICAL RADICULOPATHY: ICD-10-CM

## 2019-05-20 DIAGNOSIS — Z95.810 ICD (IMPLANTABLE CARDIOVERTER-DEFIBRILLATOR) IN PLACE: Primary | ICD-10-CM

## 2019-05-20 DIAGNOSIS — M54.2 NECK PAIN: ICD-10-CM

## 2019-05-20 DIAGNOSIS — I10 ESSENTIAL HYPERTENSION: ICD-10-CM

## 2019-05-20 DIAGNOSIS — E78.2 MIXED HYPERLIPIDEMIA: Primary | ICD-10-CM

## 2019-05-20 LAB
CHOLESTEROL, FASTING: 226 MG/DL (ref 100–199)
HDLC SERPL-MCNC: 64 MG/DL
LDL CHOLESTEROL CALCULATED: 120 MG/DL
TRIGLYCERIDE, FASTING: 210 MG/DL (ref 0–199)

## 2019-05-20 PROCEDURE — 93282 PRGRMG EVAL IMPLANTABLE DFB: CPT | Performed by: INTERNAL MEDICINE

## 2019-05-20 PROCEDURE — 80061 LIPID PANEL: CPT

## 2019-05-20 PROCEDURE — 72050 X-RAY EXAM NECK SPINE 4/5VWS: CPT

## 2019-05-20 PROCEDURE — 36415 COLL VENOUS BLD VENIPUNCTURE: CPT

## 2019-05-20 NOTE — TELEPHONE ENCOUNTER
----- Message from ANGELIA Howard CNP sent at 5/20/2019 10:47 AM EDT -----  Let Nigel Menon know her neck xray shows she does have degenerative disc disease (degenerative arthritic type changes) in the cervical spine. This is probably why her neck is hurting her. She is supposed to also get an EMG (nerve study test) we'll see what this shows and go from there.

## 2019-05-21 RX ORDER — ATORVASTATIN CALCIUM 20 MG/1
20 TABLET, FILM COATED ORAL NIGHTLY
Qty: 90 TABLET | Refills: 0 | Status: SHIPPED | OUTPATIENT
Start: 2019-05-21 | End: 2019-10-07

## 2019-05-21 NOTE — TELEPHONE ENCOUNTER
Rx sent for 20 mg Lipitor. Needs to repeat LFT and lipid panel in 2-3 months, also if she starts having new body aches, muscle aches, joint aches (new onset, typically systemic) she needs to let me know.

## 2019-05-28 ENCOUNTER — OFFICE VISIT (OUTPATIENT)
Dept: CARDIOLOGY CLINIC | Age: 59
End: 2019-05-28
Payer: COMMERCIAL

## 2019-05-28 VITALS
BODY MASS INDEX: 38.42 KG/M2 | OXYGEN SATURATION: 96 % | DIASTOLIC BLOOD PRESSURE: 76 MMHG | HEART RATE: 95 BPM | SYSTOLIC BLOOD PRESSURE: 110 MMHG | WEIGHT: 208.8 LBS | HEIGHT: 62 IN

## 2019-05-28 DIAGNOSIS — Z79.899 MEDICATION MANAGEMENT: ICD-10-CM

## 2019-05-28 DIAGNOSIS — I50.22 CHF (CONGESTIVE HEART FAILURE), NYHA CLASS II, CHRONIC, SYSTOLIC (HCC): Primary | ICD-10-CM

## 2019-05-28 PROCEDURE — 3017F COLORECTAL CA SCREEN DOC REV: CPT | Performed by: NURSE PRACTITIONER

## 2019-05-28 PROCEDURE — G8427 DOCREV CUR MEDS BY ELIG CLIN: HCPCS | Performed by: NURSE PRACTITIONER

## 2019-05-28 PROCEDURE — 99213 OFFICE O/P EST LOW 20 MIN: CPT | Performed by: NURSE PRACTITIONER

## 2019-05-28 PROCEDURE — G8417 CALC BMI ABV UP PARAM F/U: HCPCS | Performed by: NURSE PRACTITIONER

## 2019-05-28 PROCEDURE — 1036F TOBACCO NON-USER: CPT | Performed by: NURSE PRACTITIONER

## 2019-05-28 RX ORDER — PHENOL 1.4 %
10 AEROSOL, SPRAY (ML) MUCOUS MEMBRANE NIGHTLY
COMMUNITY
End: 2020-01-30

## 2019-05-28 ASSESSMENT — ENCOUNTER SYMPTOMS
ABDOMINAL PAIN: 0
APNEA: 0
ABDOMINAL DISTENTION: 0
COUGH: 0
WHEEZING: 0
NAUSEA: 0
COLOR CHANGE: 0
SHORTNESS OF BREATH: 0
CHEST TIGHTNESS: 0

## 2019-05-28 NOTE — PROGRESS NOTES
[facility] Heart Failure Clinic       Visit Date: 5/28/2019  Cardiologist:  Dr. Abby Dias  Primary Care Physician: Dr. Samantha Medina, APRN - CNP    Farida Kohler is a 63 y.o. female who presents today for:  Chief Complaint   Patient presents with    Congestive Heart Failure       HPI:   Farida Kohler is a 63 y.o. female who presents to the office for a follow up visit in the heart failure clinic. Relocated from [place] and [place]. Hx breast cancer chemo and radiation (competed Aug 2018), mastectomy, HTN, hypothyroidism, ICD 2/11/19, NICM EF 25-30% on Entresto. She is also following with [facility]. First appt was last month. She cleans houses, she does complain of fatigue. She denies SOB, no orthopnea. Her  is scheduled to have another hip surgery next month. She had been having some trouble sleeping so she is taking Melatonin 10 mg. She can perform ADLs without SOB or fatigue. She tries to monitor what she eats. She enjoys making crafts.     Patient has:  Last hospital admission related to Heart Failure:  none  Chest Pain: no  Worsening SOB/orthopnea/PND: no  Edema: no  Any extra diuretic use: no - takes Lasix prn has only taken once in the last 3 months  Weight gain: no  Compliant checking home weight: yes  Fatigue: yes unchanged  Abdominal bloating: no  Appetite: good  Difficulty sleeping: REINALDO? - still awaiting appt  Cough: no  Compliant checking blood pressure: no  Any refills on CHF medications needed at this time: no    Past Medical History:   Diagnosis Date    Abnormal heart rhythm     Anxiety     Cancer [facility])     breast  2016     CHF (congestive heart failure) (HCC)     Congenital heart disease     Enlarged lymph nodes     Hypertension     Hypothyroidism     ICD (implantable cardioverter-defibrillator) in place 02/11/2019    Dr. Norma Cunningham Kidney stones     Thyroid disease      Past Surgical History:   Procedure Laterality Date    APPENDECTOMY      MASTECTOMY, BILATERAL      PTCA      TONSILLECTOMY       Family History   Problem Relation Age of Onset    High Blood Pressure Mother     Dementia Mother     Cancer Father     Mental Retardation Brother      Social History     Tobacco Use    Smoking status: Former Smoker     Packs/day: 0.25     Years: 37.00     Pack years: 9.25     Types: Cigarettes     Start date: 1981     Last attempt to quit: 3/10/2019     Years since quittin.2    Smokeless tobacco: Never Used    Tobacco comment: 2 cigs dialy 19   Substance Use Topics    Alcohol use: No     Current Outpatient Medications   Medication Sig Dispense Refill    Melatonin 10 MG TABS Take 10 mg by mouth nightly      atorvastatin (LIPITOR) 20 MG tablet Take 1 tablet by mouth nightly 90 tablet 0    carvedilol (COREG) 3.125 MG tablet TAKE 1 TABLET BY MOUTH TWICE DAILY 180 tablet 3    fluticasone (FLONASE) 50 MCG/ACT nasal spray 2 sprays by Each Nare route daily 1 Bottle 1    digoxin (LANOXIN) 125 MCG tablet Take 125 mcg by mouth daily      furosemide (LASIX) 20 MG tablet Take 20 mg by mouth daily as needed      sacubitril-valsartan (ENTRESTO) 49-51 MG per tablet Take 1 tablet by mouth 2 times daily 180 tablet 3    spironolactone (ALDACTONE) 25 MG tablet Take 1 tablet by mouth daily 90 tablet 3    letrozole (FEMARA) 2.5 MG tablet Take 1 tablet by mouth daily 90 tablet 3    Prosthesis MISC by Does not apply route Bra - patient is s/p bilateral mastectomy 1 each 0    acetaminophen (TYLENOL) 500 MG tablet Take 500 mg by mouth every 6 hours as needed for Pain      levothyroxine (SYNTHROID) 100 MCG tablet Take 1 tablet by mouth daily 30 tablet 11     No current facility-administered medications for this visit. No Known Allergies    SUBJECTIVE:   Review of Systems   Constitutional: Positive for fatigue. Negative for activity change, appetite change and fever. HENT: Negative for congestion.     Respiratory: Negative for apnea, cough, chest tightness, shortness of breath and mildly reduced systolic   function and wall thickness. Zadie Epley is normal sized with normal respirophasic variation.      Signature      ----------------------------------------------------------------   Electronically signed by Denis Evans MD (Interpreting  Milton Ko) on 01/09/2019 at 07:05 AM    Results reviewed:  BNP:   Lab Results   Component Value Date    PROBNP 708.2 02/26/2019     CBC:   Lab Results   Component Value Date    WBC 3.2 04/25/2019    RBC 3.99 04/25/2019    HGB 12.6 04/25/2019    HCT 38.4 04/25/2019     04/25/2019     CMP:    Lab Results   Component Value Date     04/25/2019    K 4.2 04/25/2019    K 3.7 02/27/2019     04/25/2019    CO2 24 04/25/2019    BUN 17 04/25/2019    CREATININE 0.6 04/25/2019    LABGLOM >90 04/25/2019    GLUCOSE 105 04/25/2019    CALCIUM 9.1 04/25/2019     Hepatic Function Panel:    Lab Results   Component Value Date    ALKPHOS 77 04/25/2019    ALT 14 04/25/2019    AST 14 04/25/2019    PROT 7.0 04/25/2019    BILITOT 0.3 04/25/2019    BILIDIR <0.2 04/25/2019    LABALBU 4.2 04/25/2019     Magnesium:    Lab Results   Component Value Date    MG 2.1 02/26/2019     PT/INR:    Lab Results   Component Value Date    INR 0.98 02/26/2019     Lipids:    Lab Results   Component Value Date    HDL 64 05/20/2019    LDLCALC 120 05/20/2019       ASSESSMENT AND PLAN:   The patient's condition/symptoms are Stable: No clinical evidence of fluid overload today. Continue current medical regimen without changes at present time.      Diagnosis Orders   1. CHF (congestive heart failure), NYHA class II, chronic, systolic (HCC)  Basic Metabolic Panel   2. Medication management  Digoxin Level       Plan:  · Check BMP and Digoxin level. Was supposed to have after initiating but she has not yet. Continue Entresto 49/51 mg bid, Coreg 3.125 mg bid, Lanoxin 125 mcg daily, Aldactone 25 mg daily, Lasix 20 mg daily for weight gain of 3 pounds with increased swelling or SOB.  HF Zones

## 2019-05-28 NOTE — PATIENT INSTRUCTIONS
Continue:  · Continue current medications  · Daily weights and record  · Fluid restriction of 2 Liters per day  · Limit sodium in diet to around 8978-9201 mg/day  · Monitor BP  · Activity as tolerated     Call the Heart Failure Clinic for any of the following symptoms: [phone]  Weight gain of 3 pounds in 1 day or 5 pounds in 1 week  Increased shortness of breath  Shortness of breath while laying down  Cough  Chest pain  Swelling in feet, ankles or legs  Tenderness or bloating in the abdomen  Fatigue   Decreased appetite or nausea   Confusion

## 2019-05-30 ENCOUNTER — HOSPITAL ENCOUNTER (OUTPATIENT)
Age: 59
Discharge: HOME | End: 2019-05-30
Payer: COMMERCIAL

## 2019-05-30 DIAGNOSIS — Z79.899 MEDICATION MANAGEMENT: ICD-10-CM

## 2019-05-30 DIAGNOSIS — I50.22 CHF (CONGESTIVE HEART FAILURE), NYHA CLASS II, CHRONIC, SYSTOLIC (HCC): ICD-10-CM

## 2019-05-30 LAB
ANION GAP SERPL CALCULATED.3IONS-SCNC: 17 MEQ/L (ref 8–16)
BUN BLDV-MCNC: 13 MG/DL (ref 7–22)
CALCIUM SERPL-MCNC: 9.2 MG/DL (ref 8.5–10.5)
CHLORIDE BLD-SCNC: 102 MEQ/L (ref 98–111)
CO2: 20 MEQ/L (ref 23–33)
CREAT SERPL-MCNC: 0.7 MG/DL (ref 0.4–1.2)
DIGOXIN LEVEL: < 0.3 NG/ML (ref 0.5–2)
GFR SERPL CREATININE-BSD FRML MDRD: 86 ML/MIN/1.73M2
GLUCOSE BLD-MCNC: 170 MG/DL (ref 70–108)
POTASSIUM SERPL-SCNC: 3.7 MEQ/L (ref 3.5–5.2)
SODIUM BLD-SCNC: 139 MEQ/L (ref 135–145)

## 2019-05-30 PROCEDURE — 36415 COLL VENOUS BLD VENIPUNCTURE: CPT

## 2019-05-30 PROCEDURE — 80162 ASSAY OF DIGOXIN TOTAL: CPT

## 2019-05-30 PROCEDURE — 80048 BASIC METABOLIC PNL TOTAL CA: CPT

## 2019-06-03 ENCOUNTER — APPOINTMENT (OUTPATIENT)
Dept: PHYSICAL THERAPY | Age: 59
End: 2019-06-03
Payer: COMMERCIAL

## 2019-06-04 ENCOUNTER — TELEPHONE (OUTPATIENT)
Dept: FAMILY MEDICINE CLINIC | Age: 59
End: 2019-06-04

## 2019-06-04 NOTE — TELEPHONE ENCOUNTER
Pt scheduled for CT chest w contrast at [facility] on 6/18/19 at 1:20, pt to arrive at 12:50 at [facility]. Pt to be NPO 4 hrs prior. Left detailed msg on mach with appt details.   (ok per signed HIPAA)

## 2019-06-12 ENCOUNTER — HOSPITAL ENCOUNTER (OUTPATIENT)
Dept: PHYSICAL THERAPY | Age: 59
Setting detail: THERAPIES SERIES
Discharge: HOME | End: 2019-06-12
Payer: COMMERCIAL

## 2019-06-12 ENCOUNTER — OFFICE VISIT (OUTPATIENT)
Dept: FAMILY MEDICINE CLINIC | Age: 59
End: 2019-06-12
Payer: COMMERCIAL

## 2019-06-12 VITALS
SYSTOLIC BLOOD PRESSURE: 100 MMHG | HEART RATE: 76 BPM | BODY MASS INDEX: 36.87 KG/M2 | DIASTOLIC BLOOD PRESSURE: 78 MMHG | RESPIRATION RATE: 16 BRPM | HEIGHT: 63 IN | WEIGHT: 208.1 LBS | TEMPERATURE: 98.6 F

## 2019-06-12 DIAGNOSIS — M54.12 CERVICAL RADICULOPATHY: ICD-10-CM

## 2019-06-12 DIAGNOSIS — R63.5 WEIGHT GAIN: ICD-10-CM

## 2019-06-12 DIAGNOSIS — E78.2 MIXED HYPERLIPIDEMIA: Primary | ICD-10-CM

## 2019-06-12 DIAGNOSIS — M54.2 NECK PAIN: ICD-10-CM

## 2019-06-12 PROCEDURE — 99214 OFFICE O/P EST MOD 30 MIN: CPT | Performed by: NURSE PRACTITIONER

## 2019-06-12 PROCEDURE — 97140 MANUAL THERAPY 1/> REGIONS: CPT

## 2019-06-12 PROCEDURE — 3017F COLORECTAL CA SCREEN DOC REV: CPT | Performed by: NURSE PRACTITIONER

## 2019-06-12 PROCEDURE — G8427 DOCREV CUR MEDS BY ELIG CLIN: HCPCS | Performed by: NURSE PRACTITIONER

## 2019-06-12 PROCEDURE — 97161 PT EVAL LOW COMPLEX 20 MIN: CPT

## 2019-06-12 PROCEDURE — 1036F TOBACCO NON-USER: CPT | Performed by: NURSE PRACTITIONER

## 2019-06-12 PROCEDURE — G8417 CALC BMI ABV UP PARAM F/U: HCPCS | Performed by: NURSE PRACTITIONER

## 2019-06-12 ASSESSMENT — PAIN SCALES - GENERAL: PAINLEVEL_OUTOF10: 5

## 2019-06-12 ASSESSMENT — PAIN DESCRIPTION - LOCATION: LOCATION: NECK;ARM;HAND

## 2019-06-12 ASSESSMENT — PAIN DESCRIPTION - ORIENTATION: ORIENTATION: RIGHT;LEFT

## 2019-06-12 NOTE — PROGRESS NOTES
Visit Information    Have you changed or started any medications since your last visit including any over-the-counter medicines, vitamins, or herbal medicines? no   Are you having any side effects from any of your medications? -  no  Have you stopped taking any of your medications? Is so, why? -  no    Have you seen any other physician or provider since your last visit? Yes - Records Obtained [place] [facility]  Have you had any other diagnostic tests since your last visit? No  Have you been seen in the emergency room and/or had an admission to a hospital since we last saw you? No  Have you had your routine dental cleaning in the past 6 months? no    Have you activated your [employer] account? If not, what are your barriers?  No     Patient Care Team:  ANGELIA Collins CNP as PCP - General (Family Medicine)  ANGELIA Collins CNP as PCP - [facility] Provider    Medical History Review  Deferred to PCP    Health Maintenance   Topic Date Due    Hepatitis C screen  1960    Pneumococcal 0-64 years Vaccine (1 of 1 - PPSV23) 02/22/1967    HIV screen  02/22/1976    DTaP/Tdap/Td vaccine (1 - Tdap) 02/22/1980    Cervical cancer screen  02/22/1982    Diabetes screen  02/22/2001    Shingles Vaccine (1 of 2) 02/22/2011    Colon cancer screen colonoscopy  02/22/2011    Potassium monitoring  05/30/2020    Creatinine monitoring  05/30/2020    Lipid screen  05/20/2024    Flu vaccine  Completed

## 2019-06-12 NOTE — PROGRESS NOTES
Chief Complaint   Patient presents with    Follow-up     neck pain, started therapy today, dizziness improved       History obtained from chart review and the patient. SUBJECTIVE:  Florie Hashimoto is a 63 y.o. female that presents today for dizziness and neck pain    Back Pain    HPI:  Symptoms have been present for 4 month(s). she describes the pain as sharp  in the cervical region. She also has some pain in her right shoulder and right hand    Inciting injury or history of trauma? No  Pain is relieved by - nothing  Pain is aggravated by - movement  Radiation of the pain? Yes - down the right arm  Paresthesias of the extremities? Yes - burning and numbness in her right hand  Saddle anesthesia? No  Bowel or bladder incontinence? No  Treatments tried - nothing    Nigel Menon has started physical therapy today. She does have an appt to get an EMG done with Dr. Nakul Mayo on 7/18. Dyslipidemia    Current Medication regimen - Lipitor 20 mg  Tolerating medications well? Yes - denies any SE  Personal History of CAD? No   Hx of CAD in first degree relatives? Yes - father  Current smoker? No  History of HTN? Yes  History of DM? No  Goal LDL - < 70    Shortness of breath or chest pain? No  Neurologic changes? No  Extremity edema? No    Lab Results   Component Value Date    LDLCALC 120 05/20/2019     BP Readings from Last 3 Encounters:   06/12/19 100/78   05/28/19 110/76   05/13/19 112/72     Wt Readings from Last 3 Encounters:   06/12/19 208 lb 1.6 oz (94.4 kg)   05/28/19 208 lb 12.8 oz (94.7 kg)   05/13/19 206 lb 12.8 oz (93.8 kg)     Complains of weight gain. She feels like it is due to hormones.     Age/Gender Health Maintenance    Lipid -No results found for: CHOL  No results found for: TRIG  Lab Results   Component Value Date    HDL 64 05/20/2019     Lab Results   Component Value Date    LDLCALC 120 05/20/2019     No results found for: LABVLDL, VLDL  No results found for: CHOLHDLRATIO    DM Screen - determined by computed tomography of lung    [facility]    Cardiomyopathy secondary to non-drug external agent ([facility]    ICD (implantable cardioverter-defibrillator) in place    Snoring    Difficulty falling asleep at night until early morning hours    Obesity (BMI 30-39. 9)    Near syncope    Syncope and collapse    Dizziness       Past Medical History:   Diagnosis Date    Abnormal heart rhythm     Anxiety     Cancer ([facility]     breast  2016     CHF (congestive heart failure) (HCC)     Congenital heart disease     Enlarged lymph nodes     Hypertension     Hypothyroidism     ICD (implantable cardioverter-defibrillator) in place 2019    Dr. Opal Garcia Kidney stones     Thyroid disease        Past Surgical History:   Procedure Laterality Date    APPENDECTOMY      MASTECTOMY, BILATERAL      PTCA      TONSILLECTOMY         No Known Allergies    Social History     Socioeconomic History    Marital status:      Spouse name: Not on file    Number of children: Not on file    Years of education: Not on file    Highest education level: Not on file   Occupational History    Not on file   Social Needs    Financial resource strain: Not on file    Food insecurity:     Worry: Not on file     Inability: Not on file    Transportation needs:     Medical: Not on file     Non-medical: Not on file   Tobacco Use    Smoking status: Former Smoker     Packs/day: 0.25     Years: 37.00     Pack years: 9.25     Types: Cigarettes     Start date: 1981     Last attempt to quit: 3/10/2019     Years since quittin.2    Smokeless tobacco: Never Used    Tobacco comment: 2 cigs dialy 19   Substance and Sexual Activity    Alcohol use: No    Drug use: No    Sexual activity: Yes   Lifestyle    Physical activity:     Days per week: Not on file     Minutes per session: Not on file    Stress: Not on file   Relationships    Social connections:     Talks on phone: Not on file     Gets together: Not on file     Attends Taoist service: Not on file     Active member of club or organization: Not on file     Attends meetings of clubs or organizations: Not on file     Relationship status: Not on file    Intimate partner violence:     Fear of current or ex partner: Not on file     Emotionally abused: Not on file     Physically abused: Not on file     Forced sexual activity: Not on file   Other Topics Concern    Not on file   Social History Narrative    Not on file       Family History   Problem Relation Age of Onset    High Blood Pressure Mother     Dementia Mother     Cancer Father     Mental Retardation Brother          I have reviewed the patient's past medical history, past surgical history, allergies, medications, social and family history and I have made updates where appropriate.       Review of Systems  Positive responses are highlighted in bold    Constitutional:  Fever, Chills, Night Sweats, Fatigue, Unexpected changes in weight  Eyes:  Eye discharge, Eye pain, Eye redness, Visual disturbances   HENT:  Ear pain, Tinnitus, Nosebleeds, Trouble swallowing, Hearing loss, Sore throat  Cardiovascular:  Chest Pain, Palpitations, Orthopnea, Paroxysmal Nocturnal Dyspnea  Respiratory:  Cough, Wheezing, Shortness of breath, Chest tightness, Apnea  Gastrointestinal:  Nausea, Vomiting, Diarrhea, Constipation, Heartburn, Blood in stool  Genitourinary:  Difficulty or painful urination, Flank pain, Change in frequency, Urgency  Skin:  Color change, Rash, Itching, Wound  Psychiatric:  Hallucinations, Anxiety, Depression, Suicidal ideation  Hematological:  Enlarged glands, Easy bleeding, Easily bruising  Musculoskeletal:  Joint pain, Back pain, Gait problems, Joint swelling, Myalgias  Neurological:  Dizziness, Headaches, Presyncope, Numbness, Seizures, Tremors  Allergy:  Environmental allergies, Food allergies  Endocrine:  Heat Intolerance, Cold Intolerance, Polydipsia, Polyphagia, Polyuria    Lab distal pulses intact. Abdomen: soft, non-tender, non-distended, bowl sounds physiologic,  no rebound or guarding, no masses or hernias noted. Liver and spleen without enlargement. Extremities: no cyanosis, clubbing or edema of the lower extremities  Musculoskeletal: No joint swelling or gross deformity   Neuro:  Alert, 5/5 strength globally and symmetrically  Psych: Affect appropriate. Mood normal. Thought process is normal without evidence of depression or psychosis. Good insight and appropriate interaction. Cognition and memory appear to be intact. Skin: warm and dry, no rash or erythema  Lymph:  No cervical, auricular or supraclavicular lymph nodes palpated    ASSESSMENT & PLAN  Concepción Ryan was seen today for follow-up. Diagnoses and all orders for this visit:    Mixed hyperlipidemia    Cervical radiculopathy    Neck pain    Weight gain  -     Hemoglobin A1C; Future      - con't Lipitor 20 mg  - recheck fasting lipid and LFT in 2 months  - obtain A1C  - con't PT for neck and proceed with EMG    DISPOSITION    Return in about 2 months (around 8/12/2019) for neck pain. Concepción Ryan released without restrictions. PATIENT COUNSELING    Counseling was provided today regarding the following topics: Healthy eating habits, Regular exercise, substance abuse and healthy sleep habits. Concepción Ryan received counseling on the following healthy behaviors: medication adherence    Patient given educational materials on: See Attached    I have instructed Concepción Ryan to complete a self tracking handout on Blood Pressures  and instructed them to bring it with them to her next appointment. Barriers to learning and self management: none    Discussed use, benefit, and side effects of prescribed medications. Barriers to medication compliance addressed. All patient questions answered. Pt voiced understanding.        Electronically signed by ANGELIA Rodriguez CNP on 6/12/2019 at 11:45 AM

## 2019-06-12 NOTE — PROGRESS NOTES
** PLEASE SIGN, DATE AND TIME CERTIFICATION BELOW AND RETURN TO [facility] OUTPATIENT REHABILITATION (FAX #: [phone]). ATTEST/CO-SIGN IF ACCESSING VIA [employer]. THANK YOU.**    I certify that I have examined the patient below and determined that Physical Medicine and Rehabilitation service is necessary and that I approve the established plan of care for up to 90 days or as specifically noted. Attestation, signature or co-signature of physician indicates approval of certification requirements.    ________________________ ____________ __________  Physician Signature   Date   Time        Re     Time In: 0805  Time Out: 0900  Minutes: 55  Timed Code Treatment Minutes: 12 Minutes           Eval. Cervical range: Flex 25 deg, Ext 25 deg, SB L 26 deg, SB R 25 deg, Rot L 55 deg and Rot R 56 deg    Date: 2019  Patient Name: Misty Ba,  Gender:  female        CSN: [id number]   : 1960  (63 y.o.)        Referring Practitioner: Carrie aPrker CNP      Diagnosis: Cervical radiculopatthy  Treatment Diagnosis: cervicalgia with bilat radiculopathy, cephalgia, upper body weakness  Additional Pertinent Hx: No restrictions from doctor. Hx: Has a defibrillator due to CHF. Fractured right shoulder in 2 places several years ago, Vertigo, Obesity, Arthritis, CHF, Memory issues. Breast cancer in 2016 with double mastectomy and chemo/radiation. General:  PT Visit Information  Onset Date: 19  PT Insurance Information: [employer] - PT allowed 30 visits per calendar year. Aquatics and modalities except Ionto and HP/CP covered,  Total # of Visits Approved: 30  Total # of Visits to Date: 1  Plan of Care/Certification Expiration Date: 19  Progress Note Counter: 1/10         See Medical History Questionnaire for information related to allergies and medications.     Subjective:  Chart Reviewed: Yes  Patient wakes up in the morning. Cervical: Flex 25 deg, Ext 25 deg, SB L 26 deg, SB R 25 deg, Rot L 55 deg and Rot R 56 deg    ROM RUE: Right arm WFL throughout except IR to waist of pants  ROM LUE: Left arm WFL throughout except IR to waist of pants              Other: Moderate scapular stabilizer strength    Comment: Left arm 5/5 but shoulder 4-/5 with pain in neck    Comment: Right arm 5/5 but shoulder 4-/5 with pain in neck                       Overall Sensation Status: Impaired  Additional Comments: Numbness and tingling into hands, mainly in the AM             Exercises  Exercise 1: Manual therapy: manual traction 3x60 seconds - pt rep felt good. occipital release x3-4 minutes - patient tighter to left side. Myofoscaila work bilateral sides neck. Exercise 2: NO modalities due to defibrillator and recent cancer. Activity Tolerance:  Activity Tolerance: Patient Tolerated treatment well    Assessment: Body structures, Functions, Activity limitations: Decreased functional mobility , Decreased ADL status, Increased Pain, Decreased ROM, Decreased strength  Assessment: Patient with several areas mentioned above that can be addressed by therapy over 8 weeks. Patient with increased tightness in neck that could be causing pulling in her neck along with the DDD. She also has a past right arm injury and possible tightness from chemo and radiation that could be affecting her neck. Prognosis: Good  Discharge Recommendations: Continue to assess pending progress    Patient Education:  Patient Education: POC. See how feels after manual therapy today. Plan:  Times per week: 2x/week  Plan weeks: 8 weeks  Specific instructions for Next Treatment: NO modalities. Manual therapy, PROM. stretching for neck and shoulder. upper body strengthening. Posture education and sleeping position education.   Current Treatment Recommendations: Strengthening, ROM, Functional Mobility Training, Manual Therapy - Soft Tissue Mobilization, Pain Management, Home Exercise Program  Plan Comment: Initiated POC    History: Personal factors or comorbidities that impact plan of care - High Complexity: 3 or more personal factors or comorbidities. See history section above for details. Examination: Body structures and functions, activity limitations, participation restrictions; using standardized tests and measures - High Complexity: 4 or more body structures and functional, activity limitations and/or participation restrictions. See restrictions and objective section above for details. Clinical Presentation: Moderate - Evolving with Changing Characteristics: Patient with neck pain that keeps increasing and s not having left arm symptoms in addition to right arm symptoms. Decision Making: Low Complexity due to Patient should respond to therapy for manual therapy and posture education with strengthening. .  Decision making was based on patient assessment and decision making process in determining plan of care and establishing reasonable expectations for measurable functional outcomes. Evaluation Complexity: Based on the findings of patient history, examination, clinical presentation, and decision making during this evaluation, the evaluation of Florie Hashimoto  is of low complexity. Goals:  Patient goals : To have less pain in neck and down arm.     Short term goals  Time Frame for Short term goals: 4 weeks  Short term goal 1: Patient to report 25-50% decrease in neck pain and tightness for ease with sleeping  Short term goal 2: Patient to report able to consistently decrease right arm symptoms for ease with cleaning house  Short term goal 3: Patient to demonstrate 5-10 degree increase in cervical range for ease with looking to cross the street  Short term goal 4: Patient to start strength program without increase in pain for ease wtih caring for her     Long term goals  Time Frame for Long term goals : 8

## 2019-06-18 ENCOUNTER — HOSPITAL ENCOUNTER (OUTPATIENT)
Dept: CT IMAGING | Age: 59
Discharge: HOME | End: 2019-06-18
Payer: COMMERCIAL

## 2019-06-18 ENCOUNTER — TELEPHONE (OUTPATIENT)
Dept: FAMILY MEDICINE CLINIC | Age: 59
End: 2019-06-18

## 2019-06-18 PROCEDURE — 6360000004 HC RX CONTRAST MEDICATION: Performed by: NURSE PRACTITIONER

## 2019-06-18 PROCEDURE — 71260 CT THORAX DX C+: CPT

## 2019-06-18 RX ADMIN — IOPAMIDOL 85 ML: 755 INJECTION, SOLUTION INTRAVENOUS at 09:57

## 2019-06-18 NOTE — TELEPHONE ENCOUNTER
----- Message from Sindy Sin, APRN - CNP sent at 6/18/2019  1:04 PM EDT -----  Let Corrine Franco know her CT of her chest is stable, no major changes in the lung nodule. Repeat CT should be done in 2 years. Will order closer to the time.

## 2019-06-19 ENCOUNTER — HOSPITAL ENCOUNTER (OUTPATIENT)
Dept: PHYSICAL THERAPY | Age: 59
Setting detail: THERAPIES SERIES
Discharge: HOME | End: 2019-06-19
Payer: COMMERCIAL

## 2019-06-19 PROCEDURE — 97110 THERAPEUTIC EXERCISES: CPT

## 2019-06-19 NOTE — PROGRESS NOTES
[facility] [place]     Time In: 7270  Time Out: 1427  Minutes: 32  Timed Code Treatment Minutes: 32 Minutes                Date: 2019  Patient Name: Robert Fox,  Gender:  female        CSN: [id number]   : 1960  (63 y.o.)       Referring Practitioner: Guerda Piper CNP      Diagnosis: Cervical radiculopatthy  Treatment Diagnosis: cervicalgia with bilat radiculopathy, cephalgia, upper body weakness   Additional Pertinent Hx: No restrictions from doctor. Hx: Has a defibrillator due to CHF. Fractured right shoulder in 2 places several years ago, Vertigo, Obesity, Arthritis, CHF, Memory issues. Breast cancer in 2016 with double mastectomy and chemo/radiation. General:  PT Visit Information  Onset Date: 19  PT Insurance Information: [employer] - PT allowed 30 visits per calendar year. Aquatics and modalities except Ionto and HP/CP covered,  Total # of Visits Approved: 30  Total # of Visits to Date: 2  Plan of Care/Certification Expiration Date: 19  Progress Note Counter: 2/10               Subjective:  Chart Reviewed: Yes  Patient assessed for rehabilitation services?: Yes  Family / Caregiver Present: No  Comments: Follow up with CNP today. Subjective: Pt late to session. : I slept all morning due to my heart condition. I woke up with the pain this morning. I have no pain now. The pain goes away when I sit down or walk.'      Pain:  Patient Currently in Pain: No     Denies radic symptoms    Objective         Exercises  Exercise 1: Manual therapy: manual traction1 1-2 trials x 1 min each  - pt rep felt good. occipital release x3-4 minutes at end. \"I can feel it more on the R.\" o pain after   Exercise 2: NO modalities due to defibrillator and recent cancer. Exercise 3: AROM x 5 B cerv rot and SB  \" I feel dizzy when  I do that\"- pt referring to SB.  Symptoms abolished quickly after stopping activity. Exercise 4: UT stretches B x 3 hold 10 sec., post capsule stretches x 3 hold 10 sec. B, upper back stretch x 3 hold 10 sec. Exercise 5: Posture exs x 10 sh shrugs, retro rolls, scap retractions   Exercise 6: Cerv isometrics x 5  4 dir hold 3-5 sec pain free ROM          Activity Tolerance:  Activity Tolerance: Patient Tolerated treatment well  Activity Tolerance: 0 pain after session. Discussed posture awareness, cerv neutral, sleeping recommendations    Assessment: Body structures, Functions, Activity limitations: Decreased functional mobility , Decreased ADL status, Increased Pain, Decreased ROM, Decreased strength  Assessment: Updated HEP and discussed posture and cerv neutral. Pt slightly tearful 1 x during session- seemed emotional, but not due to therapy. Re directed back to exs. Pt denied pain after session. Monitor response to today's session. Pt late to today's session. Prognosis: Good  REQUIRES PT FOLLOW UP: Yes  Discharge Recommendations: Continue to assess pending progress    Patient Education:  Patient Education: HEP, unload with medium size pillow, posture exs, cerv isometrics, upper back stretch, post capsule stretch, UT stretch, AROM cerv rot/SB                      Plan:  Times per week: 2x/week  Plan weeks: 8 weeks  Specific instructions for Next Treatment: NO modalities. Manual therapy, PROM. stretching for neck and shoulder. upper body strengthening. Posture education and sleeping position education. Current Treatment Recommendations: Strengthening, ROM, Functional Mobility Training, Manual Therapy - Soft Tissue Mobilization, Pain Management, Home Exercise Program  Plan Comment: Initiated POC    Goals:  Patient goals : To have less pain in neck and down arm.     Short term goals  Time Frame for Short term goals: 4 weeks  Short term goal 1: Patient to report 25-50% decrease in neck pain and tightness for ease with sleeping  Short term goal 2: Patient to report able to consistently decrease right arm symptoms for ease with cleaning house  Short term goal 3: Patient to demonstrate 5-10 degree increase in cervical range for ease with looking to cross the street  Short term goal 4: Patient to start strength program without increase in pain for ease wtih caring for her     Long term goals  Time Frame for Long term goals : 8 weeks  Long term goal 1: Patient to be independent with home program to be able to sleep and wake up without pain or symptoms    Le Mcdermott  [id number]

## 2019-06-21 ENCOUNTER — HOSPITAL ENCOUNTER (OUTPATIENT)
Dept: PHYSICAL THERAPY | Age: 59
Setting detail: THERAPIES SERIES
Discharge: HOME | End: 2019-06-21
Payer: COMMERCIAL

## 2019-06-21 PROCEDURE — 97110 THERAPEUTIC EXERCISES: CPT

## 2019-06-21 NOTE — PROGRESS NOTES
[facility]     Time In: 6529  Time Out: 5666  Minutes: 30  Timed Code Treatment Minutes: 30 Minutes                Date: 2019  Patient Name: Jose Rafael Lezama,  Gender:  female        CSN: [id number]   : 1960  (63 y.o.)       Referring Practitioner: Jared Ornelas CNP      Diagnosis: Cervical radiculopatthy  Treatment Diagnosis: cervicalgia with bilat radiculopathy, cephalgia, upper body weakness   Additional Pertinent Hx: No restrictions from doctor. Hx: Has a defibrillator due to CHF. Fractured right shoulder in 2 places several years ago, Vertigo, Obesity, Arthritis, CHF, Memory issues. Breast cancer in 2016 with double mastectomy and chemo/radiation. General:  PT Visit Information  Onset Date: 19  PT Insurance Information: [facility] - PT allowed 30 visits per calendar year. Aquatics and modalities except Ionto and HP/CP covered,  Total # of Visits Approved: 30  Total # of Visits to Date: 3  Plan of Care/Certification Expiration Date: 19  Progress Note Counter: 3/10               Subjective:  Chart Reviewed: Yes  Patient assessed for rehabilitation services?: Yes  Response To Previous Treatment: Patient with no complaints from previous session. Family / Caregiver Present: No  Comments: Follow up with CNP today. Subjective: Pt reports \"I'm awake today and no pain. Doing HEP. I forgot ot use heat. \"     Pain:  Patient Currently in Pain: No         Objective           Exercises  Exercise 2: NO modalities due to defibrillator and recent cancer. Exercise 4: UT stretches B x 3 hold 10 sec., post capsule stretches x 3 hold 10 sec.  B, upper back stretch x 3 hold 10 sec., added SCM stretch x 3 hold 10 sec   Exercise 5: Posture exs x 10 sh shrugs, retro rolls, scap retractions   Exercise 6: Cerv isometrics x 5  4 dir hold 3-5 sec pain free ROM   Exercise 7: hydro chest L-3x10 pillow at head no pain   Exercise 8: Cerv stabs ball at head x 10 3 way sh 0 wt  \"tired \" after performing, but no pain   Exercise 9: hydro stick x 10 2 ways  No nack pain   Exercise 10: peach band x 10 rows/sh ext  no pain  cerv neutral emphasized         Activity Tolerance:  Activity Tolerance: Patient Tolerated treatment well  Activity Tolerance: 0 pain after session. Progress gentle psoture and strengtheing exs. SCM stretch added   Pt fatigued, sighing occ. 1 seated rest break requested. Limited session due to fatigue. Assessment: Body structures, Functions, Activity limitations: Decreased functional mobility , Decreased ADL status, Increased Pain, Decreased ROM, Decreased strength  Assessment: SCM stretch added. Troy aPrker before /after session. Progressed gentle strengtheing /posture exs. Emphasized cerv neutral and tolerated well. Monitor response to session. Prognosis: Good  REQUIRES PT FOLLOW UP: Yes  Discharge Recommendations: Continue to assess pending progress    Patient Education:  Patient Education: HEP, unload with medium size pillow and heat  posture exs, SCM stretch                      Plan:  Times per week: 2x/week  Plan weeks: 8 weeks  Specific instructions for Next Treatment: NO modalities. Manual therapy, PROM. stretching for neck and shoulder. upper body strengthening. Posture education and sleeping position education. Current Treatment Recommendations: Strengthening, ROM, Functional Mobility Training, Manual Therapy - Soft Tissue Mobilization, Pain Management, Home Exercise Program  Plan Comment: Initiated POC    Goals:  Patient goals : To have less pain in neck and down arm.     Short term goals  Time Frame for Short term goals: 4 weeks  Short term goal 1: Patient to report 25-50% decrease in neck pain and tightness for ease with sleeping  Short term goal 2: Patient to report able to consistently decrease right arm symptoms for ease with cleaning house  Short term goal 3: Patient to demonstrate 5-10 degree increase in cervical range for ease with looking to cross the street  Short term goal 4: Patient to start strength program without increase in pain for ease wtih caring for her     Long term goals  Time Frame for Long term goals : 8 weeks  Long term goal 1: Patient to be independent with home program to be able to sleep and wake up without pain or symptoms    Vicente Jordan  [id number]

## 2019-06-24 ENCOUNTER — HOSPITAL ENCOUNTER (OUTPATIENT)
Dept: PHYSICAL THERAPY | Age: 59
Setting detail: THERAPIES SERIES
Discharge: HOME | End: 2019-06-24
Payer: COMMERCIAL

## 2019-06-24 PROCEDURE — 97110 THERAPEUTIC EXERCISES: CPT

## 2019-06-24 ASSESSMENT — PAIN DESCRIPTION - LOCATION: LOCATION: NECK

## 2019-06-24 ASSESSMENT — PAIN SCALES - GENERAL: PAINLEVEL_OUTOF10: 4

## 2019-06-24 NOTE — PROGRESS NOTES
stretch x 3 hold 10 sec   Exercise 5: Posture exs x 10 sh shrugs, retro rolls, scap retractions   Exercise 6: Cerv isometrics x 5  4 dir hold 3-5 sec pain free ROM   Exercise 7: hydro chest L-3x10 pillow at head no pain   Exercise 8: Cerv stabs ball at head x 10 3 way sh 0 wt  \"tired \" after 7/10 fatigue \"My shoulders are sore. \"  Exercise 9: hydro stick x 10 2 ways  No nack pain   Exercise 10: peach band x 10 rows/sh ext  no pain  cerv neutral emphasized         Activity Tolerance:  Activity Tolerance: Patient Tolerated treatment well, Patient limited by pain  Activity Tolerance: 5/10 B shoulder pain after session. Emphasized pain free ROM and to pace self with activites at home. Decreased posture awareness noted today. Assessment: Body structures, Functions, Activity limitations: Decreased functional mobility , Decreased ADL status, Increased Pain, Decreased ROM, Decreased strength  Assessment: Pt very talkative begining of session. Politely listened to pt's home issues. Emphasized cerv neutral and pain free ROM with all activities Discussed unloading and to take something OTC for \"joint pain. \" Pt takes \" ALeve, but hasn;t today. \" Pt seems to have emotional issues fom homelife. Pleasant. Prognosis: Good  REQUIRES PT FOLLOW UP: Yes  Discharge Recommendations: Continue to assess pending progress    Patient Education:  Patient Education: HEP, unload with medium size pillow and heat  posture exs,                      Plan:  Times per week: 2x/week  Plan weeks: 8 weeks  Specific instructions for Next Treatment: NO modalities. Manual therapy, PROM. stretching for neck and shoulder. upper body strengthening. Posture education and sleeping position education. Current Treatment Recommendations: Strengthening, ROM, Functional Mobility Training, Manual Therapy - Soft Tissue Mobilization, Pain Management, Home Exercise Program  Plan Comment: Initiated POC    Goals:  Patient goals :  To have less pain in neck and down arm.    Short term goals  Time Frame for Short term goals: 4 weeks  Short term goal 1: Patient to report 25-50% decrease in neck pain and tightness for ease with sleeping  Short term goal 2: Patient to report able to consistently decrease right arm symptoms for ease with cleaning house  Short term goal 3: Patient to demonstrate 5-10 degree increase in cervical range for ease with looking to cross the street  Short term goal 4: Patient to start strength program without increase in pain for ease wtih caring for her     Long term goals  Time Frame for Long term goals : 8 weeks  Long term goal 1: Patient to be independent with home program to be able to sleep and wake up without pain or symptoms    Coleridge Bernheim  [id number]

## 2019-06-26 ENCOUNTER — HOSPITAL ENCOUNTER (OUTPATIENT)
Dept: PHYSICAL THERAPY | Age: 59
Setting detail: THERAPIES SERIES
Discharge: HOME | End: 2019-06-26
Payer: COMMERCIAL

## 2019-06-26 NOTE — PROGRESS NOTES
[facility]  PHYSICAL THERAPY MISSED TREATMENT NOTE  OUTPATIENT REHABILITATION    Date: 2019  Patient Name: Jacki Forrest        MRN: [id number]   : 1960  (63 y.o.)  Gender: female           PT Visit Information  No Show: 1    REASON FOR MISSED TREATMENT:  No Show/No Call. Patient was called with next scheduled appointment. Left message on vm. Informed of attendance policy.      Jorje Edwards [id number]

## 2019-07-01 ENCOUNTER — APPOINTMENT (OUTPATIENT)
Dept: PHYSICAL THERAPY | Age: 59
End: 2019-07-01
Payer: COMMERCIAL

## 2019-07-02 ENCOUNTER — PROCEDURE VISIT (OUTPATIENT)
Dept: CARDIOLOGY CLINIC | Age: 59
End: 2019-07-02
Payer: COMMERCIAL

## 2019-07-02 DIAGNOSIS — I50.22 CHF (CONGESTIVE HEART FAILURE), NYHA CLASS II, CHRONIC, SYSTOLIC (HCC): Primary | ICD-10-CM

## 2019-07-02 PROCEDURE — 93297 REM INTERROG DEV EVAL ICPMS: CPT | Performed by: INTERNAL MEDICINE

## 2019-07-02 PROCEDURE — 93299 PR REM INTERROG ICPMS/SCRMS <30 D TECH REVIEW: CPT | Performed by: INTERNAL MEDICINE

## 2019-07-03 ENCOUNTER — HOSPITAL ENCOUNTER (OUTPATIENT)
Dept: PHYSICAL THERAPY | Age: 59
Setting detail: THERAPIES SERIES
Discharge: HOME | End: 2019-07-03
Payer: COMMERCIAL

## 2019-07-03 PROCEDURE — 97110 THERAPEUTIC EXERCISES: CPT

## 2019-07-03 NOTE — PROGRESS NOTES
[facility] [place]     Time In: 6621  Time Out: 1330  Minutes: 27  Timed Code Treatment Minutes: 27 Minutes                Date: 7/3/2019  Patient Name: Beryle Kyle,  Gender:  female        CSN: [id number]   : 1960  (63 y.o.)       Referring Practitioner: Lissa Schilling CNP      Diagnosis: Cervical radiculopatthy  Treatment Diagnosis: cervicalgia with bilat radiculopathy, cephalgia, upper body weakness   Additional Pertinent Hx: No restrictions from doctor. Hx: Has a defibrillator due to CHF. Fractured right shoulder in 2 places several years ago, Vertigo, Obesity, Arthritis, CHF, Memory issues. Breast cancer in 2016 with double mastectomy and chemo/radiation. General:  PT Visit Information  Onset Date: 19  PT Insurance Information: [employer] - PT allowed 30 visits per calendar year. Aquatics and modalities except Ionto and HP/CP covered,  Total # of Visits Approved: 30  Total # of Visits to Date: 5  Plan of Care/Certification Expiration Date: 19  Progress Note Counter: 5/10               Subjective:  Chart Reviewed: Yes  Patient assessed for rehabilitation services?: Yes  Response To Previous Treatment: Patient with no complaints from previous session. Family / Caregiver Present: No  Comments: Follow up with CNP today. Subjective: Pt denies pain. \"I had to walk a lot to take a bath. \"      Pain:0             Objective             Exercises  Exercise 2: NO modalities due to defibrillator and recent cancer. Exercise 4: UT stretches B x 3 hold 10 sec., post capsule stretches x 3 hold 10 sec.  B, upper back stretch x 3 hold 10 sec., SCM stretch x 3 hold 10 sec multiple cues for technique  poor motor skills noted   Exercise 5: Posture exs x 15 sh shrugs, retro rolls, scap retractions  ball at head  Exercise 7: hydro chest L-3x x15 pillow at head no pain   Exercise 8: Cerv stabs ball at head x 15 3 way sh 0 wt  pt complained of sh pain,' but also had instructed to perform pain free before starting exs. Exercise 9: hydro stick x 15 4 ways  No nack pain   Exercise 10: peach band x 15 rows/sh ext , horiz sh abd x10 oeach  tactile cues for postrue/technique  no pain  cerv neutral emphasized  Exercise 11: UBE retro x 2 min 120 rpm  Exercise 12: counter push ups x10 cerv neutral \"hurts a little here. \" Pt pointing to L UT         Activity Tolerance:  Activity Tolerance: Patient Tolerated treatment well, Patient limited by pain  Activity Tolerance: 0 pain after and before session. Pt making faces and sighing frequently, but denies pain. Monitor response to today's session. ? emotional issues vs physical-pt talking about  \"cleaning all of the yessy and liked my walk to get away from the house. \"   Pt reports fatigue at about 30 min or less of therapy. Sighing. Assessment: Body structures, Functions, Activity limitations: Decreased functional mobility , Decreased ADL status, Increased Pain, Decreased ROM, Decreased strength  Assessment: 0 pain after sesion. Pt sighing and making faces freq. Pt demonstartes poor motor skills and freq tactile cues for technique. Cont HEP-emphasized cerv neutral. Monitor response to session. Prognosis: Good  REQUIRES PT FOLLOW UP: Yes  Discharge Recommendations: Continue to assess pending progress    Patient Education:  Patient Education: HEP, unload with medium size pillow and heat  posture exs, cerv neutral   Counter push ups                     Plan:  Times per week: 2x/week  Plan weeks: 8 weeks  Specific instructions for Next Treatment: NO modalities. Manual therapy, PROM. stretching for neck and shoulder. upper body strengthening. Posture education and sleeping position education.   Current Treatment Recommendations: Strengthening, ROM, Functional Mobility Training, Manual Therapy - Soft Tissue Mobilization, Pain Management, Home Exercise Program  Plan Comment: Initiated POC    Goals:  Patient goals : To have less pain in neck and down arm.     Short term goals  Time Frame for Short term goals: 4 weeks  Short term goal 1: Patient to report 25-50% decrease in neck pain and tightness for ease with sleeping  Short term goal 2: Patient to report able to consistently decrease right arm symptoms for ease with cleaning house  Short term goal 3: Patient to demonstrate 5-10 degree increase in cervical range for ease with looking to cross the street  Short term goal 4: Patient to start strength program without increase in pain for ease wtih caring for her     Long term goals  Time Frame for Long term goals : 8 weeks  Long term goal 1: Patient to be independent with home program to be able to sleep and wake up without pain or symptoms    Devan Alu  [id number]

## 2019-07-05 ENCOUNTER — HOSPITAL ENCOUNTER (OUTPATIENT)
Dept: WOMENS IMAGING | Age: 59
Discharge: HOME | End: 2019-07-05
Payer: COMMERCIAL

## 2019-07-05 ENCOUNTER — HOSPITAL ENCOUNTER (OUTPATIENT)
Dept: ULTRASOUND IMAGING | Age: 59
Discharge: HOME | End: 2019-07-05
Payer: COMMERCIAL

## 2019-07-05 DIAGNOSIS — E04.1 THYROID NODULE: ICD-10-CM

## 2019-07-05 DIAGNOSIS — C50.912 MALIGNANT NEOPLASM OF LEFT BREAST IN FEMALE, ESTROGEN RECEPTOR POSITIVE, UNSPECIFIED SITE OF BREAST (HCC): ICD-10-CM

## 2019-07-05 DIAGNOSIS — Z17.0 MALIGNANT NEOPLASM OF LEFT BREAST IN FEMALE, ESTROGEN RECEPTOR POSITIVE, UNSPECIFIED SITE OF BREAST (HCC): ICD-10-CM

## 2019-07-05 DIAGNOSIS — Z79.811 USE OF LETROZOLE (FEMARA): ICD-10-CM

## 2019-07-05 PROCEDURE — 77080 DXA BONE DENSITY AXIAL: CPT

## 2019-07-05 PROCEDURE — 76536 US EXAM OF HEAD AND NECK: CPT

## 2019-07-08 ENCOUNTER — HOSPITAL ENCOUNTER (OUTPATIENT)
Dept: PHYSICAL THERAPY | Age: 59
Setting detail: THERAPIES SERIES
Discharge: HOME | End: 2019-07-08
Payer: COMMERCIAL

## 2019-07-08 PROCEDURE — 97110 THERAPEUTIC EXERCISES: CPT

## 2019-07-11 ENCOUNTER — OFFICE VISIT (OUTPATIENT)
Dept: ONCOLOGY | Age: 59
End: 2019-07-11
Payer: COMMERCIAL

## 2019-07-11 ENCOUNTER — HOSPITAL ENCOUNTER (OUTPATIENT)
Dept: INFUSION THERAPY | Age: 59
Discharge: HOME | End: 2019-07-11
Payer: COMMERCIAL

## 2019-07-11 VITALS
HEIGHT: 63 IN | BODY MASS INDEX: 36.39 KG/M2 | OXYGEN SATURATION: 96 % | HEART RATE: 77 BPM | TEMPERATURE: 97.9 F | RESPIRATION RATE: 16 BRPM | DIASTOLIC BLOOD PRESSURE: 61 MMHG | SYSTOLIC BLOOD PRESSURE: 106 MMHG | WEIGHT: 205.4 LBS

## 2019-07-11 DIAGNOSIS — Z79.811 USE OF LETROZOLE (FEMARA): ICD-10-CM

## 2019-07-11 DIAGNOSIS — Z90.13 H/O BILATERAL MASTECTOMY: Primary | ICD-10-CM

## 2019-07-11 DIAGNOSIS — Z08 ENCOUNTER FOR FOLLOW-UP SURVEILLANCE OF BREAST CANCER: ICD-10-CM

## 2019-07-11 DIAGNOSIS — Z85.3 ENCOUNTER FOR FOLLOW-UP SURVEILLANCE OF BREAST CANCER: ICD-10-CM

## 2019-07-11 DIAGNOSIS — E04.1 THYROID NODULE: ICD-10-CM

## 2019-07-11 DIAGNOSIS — C50.912 MALIGNANT NEOPLASM OF LEFT BREAST IN FEMALE, ESTROGEN RECEPTOR POSITIVE, UNSPECIFIED SITE OF BREAST (HCC): ICD-10-CM

## 2019-07-11 DIAGNOSIS — I50.22 CHRONIC SYSTOLIC (CONGESTIVE) HEART FAILURE (HCC): ICD-10-CM

## 2019-07-11 DIAGNOSIS — Z17.0 MALIGNANT NEOPLASM OF LEFT BREAST IN FEMALE, ESTROGEN RECEPTOR POSITIVE, UNSPECIFIED SITE OF BREAST (HCC): ICD-10-CM

## 2019-07-11 LAB
ALBUMIN SERPL-MCNC: 4.4 G/DL (ref 3.5–5.1)
ALP BLD-CCNC: 89 U/L (ref 38–126)
ALT SERPL-CCNC: 26 U/L (ref 11–66)
AST SERPL-CCNC: 22 U/L (ref 5–40)
BASINOPHIL, AUTOMATED: 0 % (ref 0–3)
BILIRUB SERPL-MCNC: 0.4 MG/DL (ref 0.3–1.2)
BILIRUBIN DIRECT: < 0.2 MG/DL (ref 0–0.3)
BUN, WHOLE BLOOD: 9 MG/DL (ref 8–26)
CHLORIDE, WHOLE BLOOD: 104 MEQ/L (ref 98–109)
CREATININE, WHOLE BLOOD: 0.7 MG/DL (ref 0.5–1.2)
EOSINOPHILS RELATIVE PERCENT: 4 % (ref 0–4)
GFR, ESTIMATED: > 90 ML/MIN/1.73M2
GLUCOSE, WHOLE BLOOD: 104 MG/DL (ref 70–108)
HCT VFR BLD CALC: 37.6 % (ref 37–47)
HEMOGLOBIN: 13 GM/DL (ref 12–16)
IONIZED CALCIUM, WHOLE BLOOD: 1.19 MMOL/L (ref 1.12–1.32)
LYMPHOCYTES # BLD: 26 % (ref 15–47)
MCH RBC QN AUTO: 32.2 PG (ref 27–31)
MCHC RBC AUTO-ENTMCNC: 34.7 GM/DL (ref 33–37)
MCV RBC AUTO: 93 FL (ref 81–99)
MONOCYTES: 8 % (ref 0–12)
PDW BLD-RTO: 11 % (ref 11.5–14.5)
PLATELET # BLD: 199 THOU/MM3 (ref 130–400)
PMV BLD AUTO: 9.1 FL (ref 7.4–10.4)
POTASSIUM, WHOLE BLOOD: 4.2 MEQ/L (ref 3.5–4.9)
RBC # BLD: 4.05 MILL/MM3 (ref 4.2–5.4)
SEG NEUTROPHILS: 62 % (ref 43–75)
SODIUM, WHOLE BLOOD: 141 MEQ/L (ref 138–146)
TOTAL CO2, WHOLE BLOOD: 26 MEQ/L (ref 23–33)
TOTAL PROTEIN: 7.2 G/DL (ref 6.1–8)
WBC # BLD: 5 THOU/MM3 (ref 4.8–10.8)

## 2019-07-11 PROCEDURE — G8417 CALC BMI ABV UP PARAM F/U: HCPCS | Performed by: INTERNAL MEDICINE

## 2019-07-11 PROCEDURE — 85025 COMPLETE CBC W/AUTO DIFF WBC: CPT

## 2019-07-11 PROCEDURE — 1036F TOBACCO NON-USER: CPT | Performed by: INTERNAL MEDICINE

## 2019-07-11 PROCEDURE — 36415 COLL VENOUS BLD VENIPUNCTURE: CPT

## 2019-07-11 PROCEDURE — 3017F COLORECTAL CA SCREEN DOC REV: CPT | Performed by: INTERNAL MEDICINE

## 2019-07-11 PROCEDURE — G8427 DOCREV CUR MEDS BY ELIG CLIN: HCPCS | Performed by: INTERNAL MEDICINE

## 2019-07-11 PROCEDURE — 80047 BASIC METABLC PNL IONIZED CA: CPT

## 2019-07-11 PROCEDURE — 99214 OFFICE O/P EST MOD 30 MIN: CPT | Performed by: INTERNAL MEDICINE

## 2019-07-11 PROCEDURE — 80076 HEPATIC FUNCTION PANEL: CPT

## 2019-07-11 PROCEDURE — 99211 OFF/OP EST MAY X REQ PHY/QHP: CPT

## 2019-07-11 RX ORDER — LETROZOLE 2.5 MG/1
2.5 TABLET, FILM COATED ORAL DAILY
Qty: 90 TABLET | Refills: 3 | Status: SHIPPED | OUTPATIENT
Start: 2019-07-11 | End: 2020-07-21 | Stop reason: SDUPTHER

## 2019-07-11 NOTE — PROGRESS NOTES
palpated. Abdomen: Soft, non-tender, non-distended with active bowel sounds. Musculoskeletal: No clubbing, cyanosis or edema bilaterally. Skin: Skin color, texture, turgor normal.  No rashes or lesions. Neurologic:  Neurovascularly intact without any focal sensory/motor deficits. Psychiatric: Alert and oriented        Imaging Studies and Labs:   CBC:   Lab Results   Component Value Date    WBC 5.0 2019    HGB 13.0 2019    HCT 37.6 2019    MCV 93 2019     2019     BMP:   Lab Results   Component Value Date     2019    K 3.7 2019    K 3.7 2019     2019    CO2 20 2019    BUN 13 2019    CREATININE 0.7 2019    GLUCOSE 170 2019    CALCIUM 9.2 2019      LFT:   Lab Results   Component Value Date    ALT 14 2019    AST 14 2019    ALKPHOS 77 2019    BILITOT 0.3 2019      PET Scan 19  Narrative   PROCEDURE: PET CT SKULL BASE MID THIGH RESTAGE       CLINICAL INFORMATION: 50-year-old woman with left breast cancer, status post bilateral mastectomies, radiation therapy densities completed 2018) and chemotherapy (completed 2017); subsequent treatment strategy.       Radiopharmaceutical: 11.43 mCi F-18 FDG, intravenously.       TECHNIQUE: PET/CT imaging was performed following skull base to the midthigh levels using routine PET acquisition.       Injection site: Right antecubital fossa.       Time of FDG injection: 10:37 AM.       Serum glucose: 96 g/dL at 10:30 AM.       Tiny of imagin:56 AM       COMPARISON: CTA chest 2018       FINDINGS:        Neck: No FDG avid cervical lymphadenopathy. The thyroid gland is poorly visualized but likely enlarged. There are probable FDG avid hypoattenuating nodules bilaterally in the gland. The nodule on the right side is associated with a maximum SUV of 4.5    (image 62).  A nodule in the left side is associated with a maximum SUV of 3.8 (image 62).     Chest: There is stable cardiac enlargement. Atherosclerotic calcifications are present in the thoracic aorta without evidence of aneurysm. There is no pleural or pericardial effusion. No FDG avid pulmonary nodules are identified. An indistinct 5 mm are    density in the right lower lobe abutting the major fissure is stable in size and does not demonstrate metabolic activity (image 94). This density is likely at a size below the resolution of PET imaging. There is no FDG avid mediastinal, hilar or axillary    lymphadenopathy. Surgical changes of prior bilateral mastectomy are noted. Diffuse activity in the esophagus is likely infectious, inflammatory or physiologic. Degenerative changes are present in the thoracic spine without evidence of hypermetabolic    osseous metastatic disease.       Abdomen/pelvis: Physiologic activity is seen in the liver, spleen, urinary collecting system and gastrointestinal tract. Minimal atherosclerotic calcifications are present in the abdominal aorta without evidence of aneurysm. There is at least one    calcified fibroid in a prominent uterus. Phleboliths are seen in the pelvis. There is no FDG avid mesenteric, retroperitoneal, pelvic or inguinal lymphadenopathy. A calcification in the subcutaneous tissues of the posterior right pelvis may be an    injection granuloma. Degenerative changes are present in the lumbar spine and pelvis without evidence of hypermetabolic osseous metastatic disease.           Impression       Poorly visualized FDG avid thyroid nodules of indeterminate etiology.  Malignancy cannot be excluded and thyroid ultrasound is recommended for further evaluation.       Final report electronically signed by Dr. Leslie Bonner on 1/16/2019 1:47 PM     DEXA in Jly 2019 showed:  OSTEOPENIA   Patient is at medium risk for fracture.  Patient consult    w/primary care provider is recommended     DEXA study on July 5, 2019 showed:  OSTEOPENIA   Patient

## 2019-07-12 ENCOUNTER — HOSPITAL ENCOUNTER (OUTPATIENT)
Dept: PHYSICAL THERAPY | Age: 59
Setting detail: THERAPIES SERIES
Discharge: HOME | End: 2019-07-12
Payer: COMMERCIAL

## 2019-07-12 PROCEDURE — 97110 THERAPEUTIC EXERCISES: CPT

## 2019-07-12 NOTE — DISCHARGE SUMMARY
tricep push downs and horiz sh abd each    Exercise 11: UBE retro x 2 min 120 rpm  Exercise 12: counter push ups x12 cerv neutral , denied pain   Exercise 13: Discussed HEP and educated on what to continue with. Patient reports has all needs for HEP. Activity Tolerance:  Activity Tolerance: Patient Tolerated treatment well    Assessment:  Assessment: Patient has made great progress with therapy. She is meeting almost all of her goals and has made positive progress towards all of them. She has less pain, less symptoms and increased strength and mobility. She is able to be independent with HEP and no more therapy needed. Patient Education:  Patient Education: Importance of continuing with HEP. Plan:  Plan Comment: Discharge to HEP    Goals:  Patient goals : To have less pain in neck and down arm. Short term goals  Time Frame for Short term goals: 4 weeks  Short term goal 1: Patient to report 25-50% decrease in neck pain and tightness for ease with sleeping - MET Patient reports is 90% better  Short term goal 2: Patient to report able to consistently decrease right arm symptoms for ease with cleaning house - MET Patient reports still gets symptoms into arm every once in awhile but is better. Short term goal 3: Patient to demonstrate 5-10 degree increase in cervical range for ease with looking to cross the street - NOT MET Patient has met goals for all ranges except no change with rotation bilat.   Short term goal 4: Patient to start strength program without increase in pain for ease wtih caring for her  - MET    Long term goals  Time Frame for Long term goals : 8 weeks  Long term goal 1: Patient to be independent with home program to be able to sleep and wake up without pain or symptoms - MET    Ni Gomez, [address] [place] [address] Drive

## 2019-07-18 ENCOUNTER — PROCEDURE VISIT (OUTPATIENT)
Dept: NEUROLOGY | Age: 59
End: 2019-07-18
Payer: COMMERCIAL

## 2019-07-18 DIAGNOSIS — R20.0 NUMBNESS OF RIGHT HAND: ICD-10-CM

## 2019-07-18 DIAGNOSIS — G56.01 RIGHT CARPAL TUNNEL SYNDROME: Primary | ICD-10-CM

## 2019-07-18 DIAGNOSIS — M79.601 RIGHT ARM PAIN: ICD-10-CM

## 2019-07-18 PROCEDURE — 95909 NRV CNDJ TST 5-6 STUDIES: CPT | Performed by: PSYCHIATRY & NEUROLOGY

## 2019-07-18 PROCEDURE — 95886 MUSC TEST DONE W/N TEST COMP: CPT | Performed by: PSYCHIATRY & NEUROLOGY

## 2019-07-29 ENCOUNTER — TELEPHONE (OUTPATIENT)
Dept: FAMILY MEDICINE CLINIC | Age: 59
End: 2019-07-29

## 2019-07-29 NOTE — TELEPHONE ENCOUNTER
So the numbness/tingling in her arm is from carpal tunnel per the EMG. I can print out specific home therapy exercises she can do to help with it if she wishes.

## 2019-08-01 ENCOUNTER — TELEPHONE (OUTPATIENT)
Dept: CARDIOLOGY CLINIC | Age: 59
End: 2019-08-01

## 2019-08-06 ENCOUNTER — PROCEDURE VISIT (OUTPATIENT)
Dept: CARDIOLOGY CLINIC | Age: 59
End: 2019-08-06

## 2019-08-06 DIAGNOSIS — Z95.810 ICD (IMPLANTABLE CARDIOVERTER-DEFIBRILLATOR) IN PLACE: ICD-10-CM

## 2019-08-06 DIAGNOSIS — I50.22 CHRONIC SYSTOLIC (CONGESTIVE) HEART FAILURE (HCC): Primary | ICD-10-CM

## 2019-08-27 ENCOUNTER — TELEPHONE (OUTPATIENT)
Dept: CARDIOLOGY CLINIC | Age: 59
End: 2019-08-27

## 2019-09-10 ENCOUNTER — PROCEDURE VISIT (OUTPATIENT)
Dept: CARDIOLOGY CLINIC | Age: 59
End: 2019-09-10
Payer: COMMERCIAL

## 2019-09-10 DIAGNOSIS — Z95.810 ICD (IMPLANTABLE CARDIOVERTER-DEFIBRILLATOR) IN PLACE: Primary | ICD-10-CM

## 2019-09-10 PROCEDURE — 93295 DEV INTERROG REMOTE 1/2/MLT: CPT | Performed by: INTERNAL MEDICINE

## 2019-09-10 PROCEDURE — 93296 REM INTERROG EVL PM/IDS: CPT | Performed by: INTERNAL MEDICINE

## 2019-10-04 ENCOUNTER — NURSE ONLY (OUTPATIENT)
Dept: LAB | Age: 59
End: 2019-10-04

## 2019-10-04 DIAGNOSIS — E78.2 MIXED HYPERLIPIDEMIA: ICD-10-CM

## 2019-10-04 DIAGNOSIS — R63.5 WEIGHT GAIN: ICD-10-CM

## 2019-10-04 LAB
ALBUMIN SERPL-MCNC: 4.1 G/DL (ref 3.5–5.1)
ALP BLD-CCNC: 81 U/L (ref 38–126)
ALT SERPL-CCNC: 13 U/L (ref 11–66)
AST SERPL-CCNC: 16 U/L (ref 5–40)
AVERAGE GLUCOSE: 117 MG/DL (ref 70–126)
BILIRUB SERPL-MCNC: 0.3 MG/DL (ref 0.3–1.2)
BILIRUBIN DIRECT: < 0.2 MG/DL (ref 0–0.3)
CHOLESTEROL, FASTING: 224 MG/DL (ref 100–199)
HBA1C MFR BLD: 5.9 % (ref 4.4–6.4)
HDLC SERPL-MCNC: 46 MG/DL
LDL CHOLESTEROL CALCULATED: 152 MG/DL
TOTAL PROTEIN: 6.7 G/DL (ref 6.1–8)
TRIGLYCERIDE, FASTING: 132 MG/DL (ref 0–199)

## 2019-10-07 ENCOUNTER — OFFICE VISIT (OUTPATIENT)
Dept: CARDIOLOGY CLINIC | Age: 59
End: 2019-10-07
Payer: COMMERCIAL

## 2019-10-07 ENCOUNTER — HOSPITAL ENCOUNTER (OUTPATIENT)
Age: 59
Discharge: HOME | End: 2019-10-07
Payer: COMMERCIAL

## 2019-10-07 ENCOUNTER — OFFICE VISIT (OUTPATIENT)
Dept: FAMILY MEDICINE CLINIC | Age: 59
End: 2019-10-07
Payer: COMMERCIAL

## 2019-10-07 ENCOUNTER — NURSE ONLY (OUTPATIENT)
Dept: CARDIOLOGY CLINIC | Age: 59
End: 2019-10-07

## 2019-10-07 VITALS
HEART RATE: 77 BPM | OXYGEN SATURATION: 94 % | HEIGHT: 63 IN | DIASTOLIC BLOOD PRESSURE: 60 MMHG | BODY MASS INDEX: 35.61 KG/M2 | WEIGHT: 201 LBS | SYSTOLIC BLOOD PRESSURE: 102 MMHG

## 2019-10-07 VITALS
DIASTOLIC BLOOD PRESSURE: 72 MMHG | BODY MASS INDEX: 35.68 KG/M2 | HEIGHT: 63 IN | RESPIRATION RATE: 16 BRPM | TEMPERATURE: 97.6 F | SYSTOLIC BLOOD PRESSURE: 110 MMHG | HEART RATE: 69 BPM | WEIGHT: 201.4 LBS

## 2019-10-07 DIAGNOSIS — Z95.810 ICD (IMPLANTABLE CARDIOVERTER-DEFIBRILLATOR) IN PLACE: Primary | ICD-10-CM

## 2019-10-07 DIAGNOSIS — Z95.810 ICD (IMPLANTABLE CARDIOVERTER-DEFIBRILLATOR) IN PLACE: ICD-10-CM

## 2019-10-07 DIAGNOSIS — I42.8 NONISCHEMIC CARDIOMYOPATHY (HCC): ICD-10-CM

## 2019-10-07 DIAGNOSIS — Z23 NEEDS FLU SHOT: ICD-10-CM

## 2019-10-07 DIAGNOSIS — E78.2 MIXED HYPERLIPIDEMIA: ICD-10-CM

## 2019-10-07 DIAGNOSIS — Z80.0 FAMILY HISTORY OF COLON CANCER IN FATHER: ICD-10-CM

## 2019-10-07 DIAGNOSIS — I50.22 CHF (CONGESTIVE HEART FAILURE), NYHA CLASS II, CHRONIC, SYSTOLIC (HCC): Primary | ICD-10-CM

## 2019-10-07 DIAGNOSIS — R73.03 PREDIABETES: Primary | ICD-10-CM

## 2019-10-07 DIAGNOSIS — Z23 NEED FOR PNEUMOCOCCAL VACCINATION: ICD-10-CM

## 2019-10-07 DIAGNOSIS — E03.9 HYPOTHYROIDISM, UNSPECIFIED TYPE: ICD-10-CM

## 2019-10-07 DIAGNOSIS — I50.22 CHF (CONGESTIVE HEART FAILURE), NYHA CLASS II, CHRONIC, SYSTOLIC (HCC): ICD-10-CM

## 2019-10-07 DIAGNOSIS — Z12.9 SCREENING FOR CANCER: ICD-10-CM

## 2019-10-07 LAB
ANION GAP SERPL CALCULATED.3IONS-SCNC: 15 MEQ/L (ref 8–16)
BUN BLDV-MCNC: 13 MG/DL (ref 7–22)
CALCIUM SERPL-MCNC: 9.8 MG/DL (ref 8.5–10.5)
CHLORIDE BLD-SCNC: 104 MEQ/L (ref 98–111)
CO2: 25 MEQ/L (ref 23–33)
CREAT SERPL-MCNC: 0.7 MG/DL (ref 0.4–1.2)
GFR SERPL CREATININE-BSD FRML MDRD: 86 ML/MIN/1.73M2
GLUCOSE BLD-MCNC: 115 MG/DL (ref 70–108)
POTASSIUM SERPL-SCNC: 4.4 MEQ/L (ref 3.5–5.2)
SODIUM BLD-SCNC: 144 MEQ/L (ref 135–145)

## 2019-10-07 PROCEDURE — 99213 OFFICE O/P EST LOW 20 MIN: CPT | Performed by: NURSE PRACTITIONER

## 2019-10-07 PROCEDURE — G8482 FLU IMMUNIZE ORDER/ADMIN: HCPCS | Performed by: NURSE PRACTITIONER

## 2019-10-07 PROCEDURE — 80048 BASIC METABOLIC PNL TOTAL CA: CPT

## 2019-10-07 PROCEDURE — 36415 COLL VENOUS BLD VENIPUNCTURE: CPT

## 2019-10-07 PROCEDURE — 90471 IMMUNIZATION ADMIN: CPT | Performed by: NURSE PRACTITIONER

## 2019-10-07 PROCEDURE — G8427 DOCREV CUR MEDS BY ELIG CLIN: HCPCS | Performed by: NURSE PRACTITIONER

## 2019-10-07 PROCEDURE — G8428 CUR MEDS NOT DOCUMENT: HCPCS | Performed by: NURSE PRACTITIONER

## 2019-10-07 PROCEDURE — 3017F COLORECTAL CA SCREEN DOC REV: CPT | Performed by: NURSE PRACTITIONER

## 2019-10-07 PROCEDURE — 90472 IMMUNIZATION ADMIN EACH ADD: CPT | Performed by: NURSE PRACTITIONER

## 2019-10-07 PROCEDURE — 1036F TOBACCO NON-USER: CPT | Performed by: NURSE PRACTITIONER

## 2019-10-07 PROCEDURE — G8417 CALC BMI ABV UP PARAM F/U: HCPCS | Performed by: NURSE PRACTITIONER

## 2019-10-07 PROCEDURE — 90732 PPSV23 VACC 2 YRS+ SUBQ/IM: CPT | Performed by: NURSE PRACTITIONER

## 2019-10-07 PROCEDURE — 90686 IIV4 VACC NO PRSV 0.5 ML IM: CPT | Performed by: NURSE PRACTITIONER

## 2019-10-07 PROCEDURE — 99214 OFFICE O/P EST MOD 30 MIN: CPT | Performed by: NURSE PRACTITIONER

## 2019-10-07 RX ORDER — DIGOXIN 125 MCG
125 TABLET ORAL DAILY
Qty: 90 TABLET | Refills: 3 | Status: SHIPPED | OUTPATIENT
Start: 2019-10-07 | End: 2020-01-30 | Stop reason: SDUPTHER

## 2019-10-07 RX ORDER — ATORVASTATIN CALCIUM 40 MG/1
40 TABLET, FILM COATED ORAL NIGHTLY
Qty: 90 TABLET | Refills: 3 | Status: SHIPPED | OUTPATIENT
Start: 2019-10-07 | End: 2020-12-11

## 2019-10-07 RX ORDER — LEVOTHYROXINE SODIUM 0.1 MG/1
100 TABLET ORAL DAILY
Qty: 90 TABLET | Refills: 3 | Status: SHIPPED | OUTPATIENT
Start: 2019-10-07 | End: 2020-10-26

## 2019-10-07 ASSESSMENT — ENCOUNTER SYMPTOMS
COLOR CHANGE: 0
ABDOMINAL PAIN: 0
WHEEZING: 0
CHEST TIGHTNESS: 0
APNEA: 0
NAUSEA: 0
ABDOMINAL DISTENTION: 0
COUGH: 0
SHORTNESS OF BREATH: 0

## 2019-10-09 ENCOUNTER — OFFICE VISIT (OUTPATIENT)
Dept: FAMILY MEDICINE CLINIC | Age: 59
End: 2019-10-09
Payer: COMMERCIAL

## 2019-10-09 ENCOUNTER — TELEPHONE (OUTPATIENT)
Dept: CARDIOLOGY CLINIC | Age: 59
End: 2019-10-09

## 2019-10-09 VITALS
SYSTOLIC BLOOD PRESSURE: 102 MMHG | HEIGHT: 65 IN | RESPIRATION RATE: 15 BRPM | HEART RATE: 76 BPM | TEMPERATURE: 98.1 F | DIASTOLIC BLOOD PRESSURE: 70 MMHG | BODY MASS INDEX: 33.82 KG/M2 | WEIGHT: 203 LBS

## 2019-10-09 DIAGNOSIS — Z17.0 MALIGNANT NEOPLASM OF LEFT BREAST IN FEMALE, ESTROGEN RECEPTOR POSITIVE, UNSPECIFIED SITE OF BREAST (HCC): Primary | ICD-10-CM

## 2019-10-09 DIAGNOSIS — N95.1 MENOPAUSAL VAGINAL DRYNESS: ICD-10-CM

## 2019-10-09 DIAGNOSIS — N89.8 VAGINAL DISCHARGE: ICD-10-CM

## 2019-10-09 DIAGNOSIS — N89.8 VAGINAL ODOR: ICD-10-CM

## 2019-10-09 DIAGNOSIS — C50.912 MALIGNANT NEOPLASM OF LEFT BREAST IN FEMALE, ESTROGEN RECEPTOR POSITIVE, UNSPECIFIED SITE OF BREAST (HCC): Primary | ICD-10-CM

## 2019-10-09 DIAGNOSIS — Z12.4 PAPANICOLAOU SMEAR: ICD-10-CM

## 2019-10-09 DIAGNOSIS — Z01.419 WELL FEMALE EXAM WITH ROUTINE GYNECOLOGICAL EXAM: Primary | ICD-10-CM

## 2019-10-09 PROCEDURE — G8482 FLU IMMUNIZE ORDER/ADMIN: HCPCS | Performed by: NURSE PRACTITIONER

## 2019-10-09 PROCEDURE — 99396 PREV VISIT EST AGE 40-64: CPT | Performed by: NURSE PRACTITIONER

## 2019-10-09 RX ORDER — METRONIDAZOLE 500 MG/1
500 TABLET ORAL EVERY 12 HOURS
Qty: 14 TABLET | Refills: 0 | Status: SHIPPED | OUTPATIENT
Start: 2019-10-09 | End: 2019-10-16

## 2019-10-10 ENCOUNTER — OFFICE VISIT (OUTPATIENT)
Dept: ONCOLOGY | Age: 59
End: 2019-10-10
Payer: COMMERCIAL

## 2019-10-10 ENCOUNTER — HOSPITAL ENCOUNTER (OUTPATIENT)
Dept: INFUSION THERAPY | Age: 59
Discharge: HOME | End: 2019-10-10
Payer: COMMERCIAL

## 2019-10-10 VITALS
DIASTOLIC BLOOD PRESSURE: 60 MMHG | HEIGHT: 65 IN | SYSTOLIC BLOOD PRESSURE: 103 MMHG | WEIGHT: 200.2 LBS | RESPIRATION RATE: 18 BRPM | BODY MASS INDEX: 33.36 KG/M2 | OXYGEN SATURATION: 97 % | HEART RATE: 77 BPM | TEMPERATURE: 98.4 F

## 2019-10-10 DIAGNOSIS — C50.912 MALIGNANT NEOPLASM OF LEFT BREAST IN FEMALE, ESTROGEN RECEPTOR POSITIVE, UNSPECIFIED SITE OF BREAST (HCC): ICD-10-CM

## 2019-10-10 DIAGNOSIS — Z90.13 H/O BILATERAL MASTECTOMY: ICD-10-CM

## 2019-10-10 DIAGNOSIS — Z08 ENCOUNTER FOR FOLLOW-UP SURVEILLANCE OF BREAST CANCER: ICD-10-CM

## 2019-10-10 DIAGNOSIS — Z17.0 MALIGNANT NEOPLASM OF LEFT BREAST IN FEMALE, ESTROGEN RECEPTOR POSITIVE, UNSPECIFIED SITE OF BREAST (HCC): ICD-10-CM

## 2019-10-10 DIAGNOSIS — C50.912 MALIGNANT NEOPLASM OF LEFT BREAST IN FEMALE, ESTROGEN RECEPTOR POSITIVE, UNSPECIFIED SITE OF BREAST (HCC): Primary | ICD-10-CM

## 2019-10-10 DIAGNOSIS — Z17.0 MALIGNANT NEOPLASM OF LEFT BREAST IN FEMALE, ESTROGEN RECEPTOR POSITIVE, UNSPECIFIED SITE OF BREAST (HCC): Primary | ICD-10-CM

## 2019-10-10 DIAGNOSIS — Z01.419 WELL FEMALE EXAM WITH ROUTINE GYNECOLOGICAL EXAM: ICD-10-CM

## 2019-10-10 DIAGNOSIS — Z12.4 PAPANICOLAOU SMEAR: ICD-10-CM

## 2019-10-10 DIAGNOSIS — Z85.3 ENCOUNTER FOR FOLLOW-UP SURVEILLANCE OF BREAST CANCER: ICD-10-CM

## 2019-10-10 DIAGNOSIS — Z79.811 USE OF LETROZOLE (FEMARA): ICD-10-CM

## 2019-10-10 LAB
ALBUMIN SERPL-MCNC: 4.2 G/DL (ref 3.5–5.1)
ALP BLD-CCNC: 84 U/L (ref 38–126)
ALT SERPL-CCNC: 12 U/L (ref 11–66)
AST SERPL-CCNC: 14 U/L (ref 5–40)
BASOPHILS # BLD: 0.6 %
BASOPHILS ABSOLUTE: 0 THOU/MM3 (ref 0–0.1)
BILIRUB SERPL-MCNC: 0.4 MG/DL (ref 0.3–1.2)
BILIRUBIN DIRECT: < 0.2 MG/DL (ref 0–0.3)
BUN, WHOLE BLOOD: 14 MG/DL (ref 8–26)
CHLORIDE, WHOLE BLOOD: 105 MEQ/L (ref 98–109)
CREATININE, WHOLE BLOOD: 0.6 MG/DL (ref 0.5–1.2)
EOSINOPHIL # BLD: 4.5 %
EOSINOPHILS ABSOLUTE: 0.2 THOU/MM3 (ref 0–0.4)
GFR, ESTIMATED: > 90 ML/MIN/1.73M2
GLUCOSE, WHOLE BLOOD: 124 MG/DL (ref 70–108)
HCT VFR BLD CALC: 39.2 % (ref 37–47)
HEMOGLOBIN: 13.4 GM/DL (ref 12–16)
IMMATURE GRANS (ABS): 0.05 THOU/MM3 (ref 0–0.07)
IMMATURE GRANULOCYTES: 1 %
IONIZED CALCIUM, WHOLE BLOOD: 1.17 MMOL/L (ref 1.12–1.32)
LYMPHOCYTES # BLD: 22.2 %
LYMPHOCYTES ABSOLUTE: 1 THOU/MM3 (ref 1–4.8)
MCH RBC QN AUTO: 31.5 PG (ref 27–31)
MCHC RBC AUTO-ENTMCNC: 34.1 GM/DL (ref 33–37)
MCV RBC AUTO: 92 FL (ref 81–99)
MONOCYTES # BLD: 7 %
MONOCYTES ABSOLUTE: 0.3 THOU/MM3 (ref 0.4–1.3)
NUCLEATED RED BLOOD CELLS: 0 /100 WBC
PDW BLD-RTO: 11.7 % (ref 11.5–14.5)
PLATELET # BLD: 173 THOU/MM3 (ref 130–400)
PMV BLD AUTO: 9.9 FL (ref 7.4–10.4)
POTASSIUM, WHOLE BLOOD: 4 MEQ/L (ref 3.5–4.9)
RBC # BLD: 4.24 MILL/MM3 (ref 4.2–5.4)
SEG NEUTROPHILS: 64.7 %
SEGMENTED NEUTROPHILS ABSOLUTE COUNT: 3 THOU/MM3 (ref 1.8–7.7)
SODIUM, WHOLE BLOOD: 138 MEQ/L (ref 138–146)
TOTAL CO2, WHOLE BLOOD: 24 MEQ/L (ref 23–33)
TOTAL PROTEIN: 7.3 G/DL (ref 6.1–8)
WBC # BLD: 4.7 THOU/MM3 (ref 4.8–10.8)

## 2019-10-10 PROCEDURE — 85025 COMPLETE CBC W/AUTO DIFF WBC: CPT

## 2019-10-10 PROCEDURE — G8427 DOCREV CUR MEDS BY ELIG CLIN: HCPCS | Performed by: INTERNAL MEDICINE

## 2019-10-10 PROCEDURE — 80047 BASIC METABLC PNL IONIZED CA: CPT

## 2019-10-10 PROCEDURE — G8482 FLU IMMUNIZE ORDER/ADMIN: HCPCS | Performed by: INTERNAL MEDICINE

## 2019-10-10 PROCEDURE — 80076 HEPATIC FUNCTION PANEL: CPT

## 2019-10-10 PROCEDURE — 36415 COLL VENOUS BLD VENIPUNCTURE: CPT

## 2019-10-10 PROCEDURE — 1036F TOBACCO NON-USER: CPT | Performed by: INTERNAL MEDICINE

## 2019-10-10 PROCEDURE — G8417 CALC BMI ABV UP PARAM F/U: HCPCS | Performed by: INTERNAL MEDICINE

## 2019-10-10 PROCEDURE — 3017F COLORECTAL CA SCREEN DOC REV: CPT | Performed by: INTERNAL MEDICINE

## 2019-10-10 PROCEDURE — 99211 OFF/OP EST MAY X REQ PHY/QHP: CPT

## 2019-10-10 PROCEDURE — 99214 OFFICE O/P EST MOD 30 MIN: CPT | Performed by: INTERNAL MEDICINE

## 2019-10-10 RX ORDER — VENLAFAXINE HYDROCHLORIDE 37.5 MG/1
37.5 CAPSULE, EXTENDED RELEASE ORAL DAILY
Qty: 30 CAPSULE | Refills: 3 | Status: SHIPPED | OUTPATIENT
Start: 2019-10-10 | End: 2020-04-07 | Stop reason: SDUPTHER

## 2019-10-11 ENCOUNTER — TELEPHONE (OUTPATIENT)
Dept: FAMILY MEDICINE CLINIC | Age: 59
End: 2019-10-11

## 2019-10-12 LAB
GENITAL CULTURE, ROUTINE: NORMAL
GRAM STAIN RESULT: NORMAL

## 2019-10-14 ENCOUNTER — TELEPHONE (OUTPATIENT)
Dept: FAMILY MEDICINE CLINIC | Age: 59
End: 2019-10-14

## 2019-10-14 ENCOUNTER — TELEPHONE (OUTPATIENT)
Dept: CARDIOLOGY CLINIC | Age: 59
End: 2019-10-14

## 2019-10-14 DIAGNOSIS — I50.22 CHF (CONGESTIVE HEART FAILURE), NYHA CLASS II, CHRONIC, SYSTOLIC (HCC): Primary | ICD-10-CM

## 2019-10-14 LAB — CYTOLOGY THIN PREP PAP: NORMAL

## 2019-10-15 ENCOUNTER — PROCEDURE VISIT (OUTPATIENT)
Dept: CARDIOLOGY CLINIC | Age: 59
End: 2019-10-15
Payer: COMMERCIAL

## 2019-10-15 DIAGNOSIS — I50.22 CHRONIC SYSTOLIC (CONGESTIVE) HEART FAILURE (HCC): Primary | ICD-10-CM

## 2019-10-15 DIAGNOSIS — Z95.810 ICD (IMPLANTABLE CARDIOVERTER-DEFIBRILLATOR) IN PLACE: ICD-10-CM

## 2019-10-15 PROCEDURE — 93297 REM INTERROG DEV EVAL ICPMS: CPT | Performed by: INTERNAL MEDICINE

## 2019-10-15 PROCEDURE — 93299 PR REM INTERROG ICPMS/SCRMS <30 D TECH REVIEW: CPT | Performed by: INTERNAL MEDICINE

## 2019-10-16 ENCOUNTER — TELEPHONE (OUTPATIENT)
Dept: CARDIOLOGY CLINIC | Age: 59
End: 2019-10-16

## 2019-10-21 ENCOUNTER — TELEPHONE (OUTPATIENT)
Dept: FAMILY MEDICINE CLINIC | Age: 59
End: 2019-10-21

## 2019-10-22 ENCOUNTER — OFFICE VISIT (OUTPATIENT)
Dept: FAMILY MEDICINE CLINIC | Age: 59
End: 2019-10-22
Payer: COMMERCIAL

## 2019-10-22 VITALS
SYSTOLIC BLOOD PRESSURE: 104 MMHG | DIASTOLIC BLOOD PRESSURE: 62 MMHG | HEIGHT: 63 IN | BODY MASS INDEX: 35.44 KG/M2 | RESPIRATION RATE: 10 BRPM | WEIGHT: 200 LBS | TEMPERATURE: 98.1 F | HEART RATE: 88 BPM

## 2019-10-22 DIAGNOSIS — M65.4 DE QUERVAIN'S DISEASE (TENOSYNOVITIS): Primary | ICD-10-CM

## 2019-10-22 DIAGNOSIS — B35.1 ONYCHOMYCOSIS: ICD-10-CM

## 2019-10-22 DIAGNOSIS — G56.01 CARPAL TUNNEL SYNDROME, RIGHT: ICD-10-CM

## 2019-10-22 PROCEDURE — 99213 OFFICE O/P EST LOW 20 MIN: CPT | Performed by: NURSE PRACTITIONER

## 2019-10-22 PROCEDURE — G8417 CALC BMI ABV UP PARAM F/U: HCPCS | Performed by: NURSE PRACTITIONER

## 2019-10-22 PROCEDURE — G8427 DOCREV CUR MEDS BY ELIG CLIN: HCPCS | Performed by: NURSE PRACTITIONER

## 2019-10-22 PROCEDURE — 3017F COLORECTAL CA SCREEN DOC REV: CPT | Performed by: NURSE PRACTITIONER

## 2019-10-22 PROCEDURE — 4004F PT TOBACCO SCREEN RCVD TLK: CPT | Performed by: NURSE PRACTITIONER

## 2019-10-22 PROCEDURE — G8482 FLU IMMUNIZE ORDER/ADMIN: HCPCS | Performed by: NURSE PRACTITIONER

## 2019-10-22 RX ORDER — TERBINAFINE HYDROCHLORIDE 250 MG/1
250 TABLET ORAL DAILY
Qty: 42 TABLET | Refills: 0 | Status: SHIPPED | OUTPATIENT
Start: 2019-10-22 | End: 2019-12-03

## 2019-11-04 ENCOUNTER — NURSE ONLY (OUTPATIENT)
Dept: LAB | Age: 59
End: 2019-11-04

## 2019-11-05 ENCOUNTER — TELEPHONE (OUTPATIENT)
Dept: CARDIOLOGY CLINIC | Age: 59
End: 2019-11-05

## 2019-11-06 ENCOUNTER — TELEPHONE (OUTPATIENT)
Dept: CARDIOLOGY CLINIC | Age: 59
End: 2019-11-06

## 2019-11-06 ENCOUNTER — HOSPITAL ENCOUNTER (OUTPATIENT)
Dept: NON INVASIVE DIAGNOSTICS | Age: 59
Discharge: HOME | End: 2019-11-06
Payer: COMMERCIAL

## 2019-11-06 DIAGNOSIS — I50.22 CHF (CONGESTIVE HEART FAILURE), NYHA CLASS II, CHRONIC, SYSTOLIC (HCC): ICD-10-CM

## 2019-11-06 LAB
LV EF: 38 %
LVEF MODALITY: NORMAL

## 2019-11-06 PROCEDURE — 93306 TTE W/DOPPLER COMPLETE: CPT

## 2019-11-07 ENCOUNTER — TELEPHONE (OUTPATIENT)
Dept: CARDIOLOGY CLINIC | Age: 59
End: 2019-11-07

## 2019-11-11 ENCOUNTER — TELEPHONE (OUTPATIENT)
Dept: FAMILY MEDICINE CLINIC | Age: 59
End: 2019-11-11

## 2019-11-11 ENCOUNTER — TELEPHONE (OUTPATIENT)
Dept: CARDIOLOGY CLINIC | Age: 59
End: 2019-11-11

## 2019-11-11 DIAGNOSIS — M65.4 DE QUERVAIN'S DISEASE (TENOSYNOVITIS): Primary | ICD-10-CM

## 2019-11-11 DIAGNOSIS — G56.01 CARPAL TUNNEL SYNDROME, RIGHT: ICD-10-CM

## 2019-11-15 ENCOUNTER — NURSE ONLY (OUTPATIENT)
Dept: LAB | Age: 59
End: 2019-11-15

## 2019-11-16 ENCOUNTER — HOSPITAL ENCOUNTER (EMERGENCY)
Age: 59
Discharge: HOME | End: 2019-11-16
Payer: COMMERCIAL

## 2019-11-16 VITALS
OXYGEN SATURATION: 96 % | TEMPERATURE: 98.8 F | HEART RATE: 72 BPM | WEIGHT: 190 LBS | DIASTOLIC BLOOD PRESSURE: 73 MMHG | HEIGHT: 62 IN | RESPIRATION RATE: 16 BRPM | BODY MASS INDEX: 34.96 KG/M2 | SYSTOLIC BLOOD PRESSURE: 118 MMHG

## 2019-11-16 DIAGNOSIS — R11.2 NAUSEA AND VOMITING, INTRACTABILITY OF VOMITING NOT SPECIFIED, UNSPECIFIED VOMITING TYPE: Primary | ICD-10-CM

## 2019-11-16 PROCEDURE — 99212 OFFICE O/P EST SF 10 MIN: CPT

## 2019-11-16 PROCEDURE — 99213 OFFICE O/P EST LOW 20 MIN: CPT | Performed by: NURSE PRACTITIONER

## 2019-11-16 RX ORDER — ONDANSETRON 4 MG/1
4 TABLET, ORALLY DISINTEGRATING ORAL EVERY 8 HOURS PRN
Qty: 12 TABLET | Refills: 0 | Status: SHIPPED | OUTPATIENT
Start: 2019-11-16 | End: 2019-11-20

## 2019-11-18 ASSESSMENT — ENCOUNTER SYMPTOMS
ABDOMINAL PAIN: 0
CONSTIPATION: 0
BLOOD IN STOOL: 0
VOMITING: 1
NAUSEA: 1
DIARRHEA: 0

## 2019-11-19 ENCOUNTER — PROCEDURE VISIT (OUTPATIENT)
Dept: CARDIOLOGY CLINIC | Age: 59
End: 2019-11-19
Payer: COMMERCIAL

## 2019-11-19 DIAGNOSIS — I50.22 CHRONIC SYSTOLIC (CONGESTIVE) HEART FAILURE (HCC): Primary | ICD-10-CM

## 2019-11-19 PROCEDURE — 93299 PR REM INTERROG ICPMS/SCRMS <30 D TECH REVIEW: CPT | Performed by: INTERNAL MEDICINE

## 2019-11-19 PROCEDURE — 93297 REM INTERROG DEV EVAL ICPMS: CPT | Performed by: INTERNAL MEDICINE

## 2019-11-20 ENCOUNTER — OFFICE VISIT (OUTPATIENT)
Dept: CARDIOLOGY CLINIC | Age: 59
End: 2019-11-20
Payer: COMMERCIAL

## 2019-11-20 VITALS
HEART RATE: 95 BPM | WEIGHT: 196 LBS | HEIGHT: 62 IN | BODY MASS INDEX: 36.07 KG/M2 | SYSTOLIC BLOOD PRESSURE: 101 MMHG | DIASTOLIC BLOOD PRESSURE: 64 MMHG

## 2019-11-20 DIAGNOSIS — I10 ESSENTIAL HYPERTENSION: ICD-10-CM

## 2019-11-20 DIAGNOSIS — I50.22 CHRONIC SYSTOLIC (CONGESTIVE) HEART FAILURE (HCC): ICD-10-CM

## 2019-11-20 DIAGNOSIS — I42.8 NONISCHEMIC CARDIOMYOPATHY (HCC): Primary | ICD-10-CM

## 2019-11-20 PROCEDURE — G8482 FLU IMMUNIZE ORDER/ADMIN: HCPCS | Performed by: PHYSICIAN ASSISTANT

## 2019-11-20 PROCEDURE — 4004F PT TOBACCO SCREEN RCVD TLK: CPT | Performed by: PHYSICIAN ASSISTANT

## 2019-11-20 PROCEDURE — 3017F COLORECTAL CA SCREEN DOC REV: CPT | Performed by: PHYSICIAN ASSISTANT

## 2019-11-20 PROCEDURE — G8427 DOCREV CUR MEDS BY ELIG CLIN: HCPCS | Performed by: PHYSICIAN ASSISTANT

## 2019-11-20 PROCEDURE — G8417 CALC BMI ABV UP PARAM F/U: HCPCS | Performed by: PHYSICIAN ASSISTANT

## 2019-11-20 PROCEDURE — 99213 OFFICE O/P EST LOW 20 MIN: CPT | Performed by: PHYSICIAN ASSISTANT

## 2019-11-20 RX ORDER — SENNOSIDES 8.6 MG/1
TABLET ORAL
Refills: 0 | COMMUNITY
Start: 2019-11-08 | End: 2019-12-04 | Stop reason: ALTCHOICE

## 2019-11-25 ENCOUNTER — HOSPITAL ENCOUNTER (OUTPATIENT)
Age: 59
Discharge: HOME | End: 2019-11-25
Payer: COMMERCIAL

## 2019-11-25 LAB
ANION GAP SERPL CALCULATED.3IONS-SCNC: 13 MEQ/L (ref 8–16)
BASOPHILS # BLD: 0.6 %
BASOPHILS ABSOLUTE: 0 THOU/MM3 (ref 0–0.1)
BUN BLDV-MCNC: 14 MG/DL (ref 7–22)
CALCIUM SERPL-MCNC: 9.4 MG/DL (ref 8.5–10.5)
CHLORIDE BLD-SCNC: 102 MEQ/L (ref 98–111)
CO2: 25 MEQ/L (ref 23–33)
CREAT SERPL-MCNC: 0.7 MG/DL (ref 0.4–1.2)
EKG ATRIAL RATE: 64 BPM
EKG P AXIS: 39 DEGREES
EKG P-R INTERVAL: 174 MS
EKG Q-T INTERVAL: 398 MS
EKG QRS DURATION: 90 MS
EKG QTC CALCULATION (BAZETT): 410 MS
EKG R AXIS: -14 DEGREES
EKG T AXIS: 88 DEGREES
EKG VENTRICULAR RATE: 64 BPM
EOSINOPHIL # BLD: 3.9 %
EOSINOPHILS ABSOLUTE: 0.2 THOU/MM3 (ref 0–0.4)
ERYTHROCYTE [DISTWIDTH] IN BLOOD BY AUTOMATED COUNT: 13.7 % (ref 11.5–14.5)
ERYTHROCYTE [DISTWIDTH] IN BLOOD BY AUTOMATED COUNT: 48.6 FL (ref 35–45)
GFR SERPL CREATININE-BSD FRML MDRD: 86 ML/MIN/1.73M2
GLUCOSE BLD-MCNC: 106 MG/DL (ref 70–108)
HCT VFR BLD CALC: 40.3 % (ref 37–47)
HEMOGLOBIN: 13.1 GM/DL (ref 12–16)
IMMATURE GRANS (ABS): 0.06 THOU/MM3 (ref 0–0.07)
IMMATURE GRANULOCYTES: 1.1 %
LYMPHOCYTES # BLD: 24.1 %
LYMPHOCYTES ABSOLUTE: 1.3 THOU/MM3 (ref 1–4.8)
MCH RBC QN AUTO: 31.3 PG (ref 26–33)
MCHC RBC AUTO-ENTMCNC: 32.5 GM/DL (ref 32.2–35.5)
MCV RBC AUTO: 96.4 FL (ref 81–99)
MONOCYTES # BLD: 7.4 %
MONOCYTES ABSOLUTE: 0.4 THOU/MM3 (ref 0.4–1.3)
NUCLEATED RED BLOOD CELLS: 0 /100 WBC
PLATELET # BLD: 192 THOU/MM3 (ref 130–400)
PMV BLD AUTO: 12.2 FL (ref 9.4–12.4)
POTASSIUM SERPL-SCNC: 4.2 MEQ/L (ref 3.5–5.2)
RBC # BLD: 4.18 MILL/MM3 (ref 4.2–5.4)
SEG NEUTROPHILS: 62.9 %
SEGMENTED NEUTROPHILS ABSOLUTE COUNT: 3.4 THOU/MM3 (ref 1.8–7.7)
SODIUM BLD-SCNC: 140 MEQ/L (ref 135–145)
WBC # BLD: 5.4 THOU/MM3 (ref 4.8–10.8)

## 2019-11-25 PROCEDURE — 36415 COLL VENOUS BLD VENIPUNCTURE: CPT

## 2019-11-25 PROCEDURE — 93005 ELECTROCARDIOGRAM TRACING: CPT | Performed by: ORTHOPAEDIC SURGERY

## 2019-11-25 PROCEDURE — 80048 BASIC METABOLIC PNL TOTAL CA: CPT

## 2019-11-25 PROCEDURE — 85025 COMPLETE CBC W/AUTO DIFF WBC: CPT

## 2019-12-24 ENCOUNTER — PROCEDURE VISIT (OUTPATIENT)
Dept: CARDIOLOGY CLINIC | Age: 59
End: 2019-12-24
Payer: COMMERCIAL

## 2019-12-24 DIAGNOSIS — Z95.810 ICD (IMPLANTABLE CARDIOVERTER-DEFIBRILLATOR) IN PLACE: Primary | ICD-10-CM

## 2019-12-24 PROCEDURE — 93295 DEV INTERROG REMOTE 1/2/MLT: CPT | Performed by: INTERNAL MEDICINE

## 2019-12-24 PROCEDURE — 93296 REM INTERROG EVL PM/IDS: CPT | Performed by: INTERNAL MEDICINE

## 2020-01-28 ENCOUNTER — PROCEDURE VISIT (OUTPATIENT)
Dept: CARDIOLOGY CLINIC | Age: 60
End: 2020-01-28
Payer: COMMERCIAL

## 2020-01-28 PROCEDURE — 93297 REM INTERROG DEV EVAL ICPMS: CPT | Performed by: INTERNAL MEDICINE

## 2020-01-30 ENCOUNTER — OFFICE VISIT (OUTPATIENT)
Dept: CARDIOLOGY CLINIC | Age: 60
End: 2020-01-30
Payer: COMMERCIAL

## 2020-01-30 VITALS
WEIGHT: 195 LBS | BODY MASS INDEX: 35.88 KG/M2 | HEART RATE: 83 BPM | HEIGHT: 62 IN | DIASTOLIC BLOOD PRESSURE: 60 MMHG | OXYGEN SATURATION: 94 % | SYSTOLIC BLOOD PRESSURE: 110 MMHG

## 2020-01-30 PROCEDURE — G8417 CALC BMI ABV UP PARAM F/U: HCPCS | Performed by: NURSE PRACTITIONER

## 2020-01-30 PROCEDURE — 99213 OFFICE O/P EST LOW 20 MIN: CPT | Performed by: NURSE PRACTITIONER

## 2020-01-30 PROCEDURE — 3017F COLORECTAL CA SCREEN DOC REV: CPT | Performed by: NURSE PRACTITIONER

## 2020-01-30 PROCEDURE — G8482 FLU IMMUNIZE ORDER/ADMIN: HCPCS | Performed by: NURSE PRACTITIONER

## 2020-01-30 PROCEDURE — 4004F PT TOBACCO SCREEN RCVD TLK: CPT | Performed by: NURSE PRACTITIONER

## 2020-01-30 PROCEDURE — G8427 DOCREV CUR MEDS BY ELIG CLIN: HCPCS | Performed by: NURSE PRACTITIONER

## 2020-01-30 RX ORDER — DIGOXIN 125 MCG
125 TABLET ORAL DAILY
Qty: 90 TABLET | Refills: 3 | Status: CANCELLED | OUTPATIENT
Start: 2020-01-30

## 2020-01-30 RX ORDER — DIGOXIN 125 MCG
125 TABLET ORAL DAILY
Qty: 90 TABLET | Refills: 3 | Status: ON HOLD | OUTPATIENT
Start: 2020-01-30 | End: 2020-08-18 | Stop reason: HOSPADM

## 2020-01-30 ASSESSMENT — ENCOUNTER SYMPTOMS
COLOR CHANGE: 0
APNEA: 0
ABDOMINAL PAIN: 0
CHEST TIGHTNESS: 0
ABDOMINAL DISTENTION: 0
WHEEZING: 0
SHORTNESS OF BREATH: 0
NAUSEA: 0
COUGH: 0

## 2020-01-30 NOTE — PROGRESS NOTES
 acetaminophen (TYLENOL) 500 MG tablet Take 500 mg by mouth every 6 hours as needed for Pain       No current facility-administered medications for this visit. No Known Allergies    SUBJECTIVE:   Review of Systems   Constitutional: Negative for activity change, appetite change, fatigue and fever. HENT: Negative for congestion. Respiratory: Negative for apnea, cough, chest tightness, shortness of breath and wheezing. Cardiovascular: Negative for chest pain, palpitations and leg swelling. Gastrointestinal: Negative for abdominal distention, abdominal pain and nausea. Genitourinary: Negative for difficulty urinating and dysuria. Musculoskeletal: Negative for arthralgias and gait problem. Skin: Negative for color change. Neurological: Negative for dizziness, numbness and headaches. Psychiatric/Behavioral: Negative for agitation, confusion and sleep disturbance. The patient is not nervous/anxious. OBJECTIVE:   Today's Vitals:  /60   Pulse 83   Ht 5' 2\" (1.575 m)   Wt 195 lb (88.5 kg)   SpO2 94%   BMI 35.67 kg/m²     Physical Exam  Vitals signs reviewed. Constitutional:       Appearance: She is well-developed. HENT:      Head: Normocephalic and atraumatic. Eyes:      Pupils: Pupils are equal, round, and reactive to light. Neck:      Musculoskeletal: Normal range of motion. Vascular: No JVD. Cardiovascular:      Rate and Rhythm: Normal rate and regular rhythm. Heart sounds: Normal heart sounds. No murmur. Comments: +ICD  Pulmonary:      Effort: Pulmonary effort is normal. No respiratory distress. Breath sounds: No rales. Abdominal:      General: There is no distension. Palpations: Abdomen is soft. Tenderness: There is no abdominal tenderness. Musculoskeletal:         General: No tenderness. Right lower leg: No edema. Left lower leg: No edema. Skin:     General: Skin is warm and dry.       Capillary Refill: Capillary refill takes less than 2 seconds. Neurological:      Mental Status: She is alert and oriented to person, place, and time. Psychiatric:         Mood and Affect: Mood normal.         Behavior: Behavior normal.         Wt Readings from Last 3 Encounters:   01/30/20 195 lb (88.5 kg)   12/04/19 196 lb (88.9 kg)   11/20/19 196 lb (88.9 kg)     BP Readings from Last 3 Encounters:   01/30/20 110/60   12/04/19 103/66   11/20/19 101/64     Pulse Readings from Last 3 Encounters:   01/30/20 83   12/04/19 70   11/20/19 95     Body mass index is 35.67 kg/m². ECHO:   11/6/19   Summary   Ejection fraction was estimated at 35-40%. Device lead/catheter seen in the right heart. There was trace aortic regurgitation. There was mild-moderate mitral regurgitation. There was mild tricuspid regurgitation. Assuming RAP = 5 mmHg, the estimated RVSP = 26 mmHg. Ascending aorta = 3.2 cm. IVC size is within normal limits with normal respiratory phasic changes. Signature      ----------------------------------------------------------------   Electronically signed by Nicolasa Fulton MD (Interpreting   physician) on 11/08/2019 at 03:44 PM   ----------------------------------------------------------------      Findings      Mitral Valve   The mitral valve structure was normal with normal leaflet separation. DOPPLER: The transmitral velocity was within the normal range with no   evidence for mitral stenosis. There was mild-moderate mitral   regurgitation. Aortic Valve   The aortic valve was trileaflet with normal thickness and cuspal   separation. DOPPLER: Transaortic velocity was within the normal range with   no evidence of aortic stenosis. There was trace aortic regurgitation. Tricuspid Valve   The tricuspid valve structure was normal with normal leaflet separation. DOPPLER: There was no evidence of tricuspid stenosis. There was mild   tricuspid regurgitation. Assuming RAP = 5 mmHg, the estimated RVSP = 26   mmHg. Pulmonic Valve   The pulmonic valve leaflets were not well seen. DOPPLER: The transpulmonic   velocity was within the normal range with trace regurgitation. Left Atrium   Left atrial size was moderate-severely dilated. Left Ventricle   Normal left ventricular size and severely reduced systolic function. There was severe global hypokinesis. Wall thickness was within normal limits. Ejection fraction was estimated at 35-40%. E/A flow reversal noted. Suggestive of diastolic dysfunction. Right Atrium   Right atrial size was severely dilated. Right Ventricle   The right ventricular size was normal with normal systolic function and   wall thickness. Device lead/catheter seen in the right heart. Pericardial Effusion   The pericardium was normal in appearance with no evidence of a pericardial   effusion. Pleural Effusion   No evidence of pleural effusion. Aorta / Great Vessels   -Ascending aorta = 3.2 cm. -IVC size is within normal limits with normal respiratory phasic changes.        Results reviewed:  BNP:   Lab Results   Component Value Date    PROBNP 708.2 02/26/2019     CBC:   Lab Results   Component Value Date    WBC 5.4 11/25/2019    RBC 4.18 11/25/2019    HGB 13.1 11/25/2019    HCT 40.3 11/25/2019     11/25/2019     CMP:    Lab Results   Component Value Date     11/25/2019    K 4.2 11/25/2019    K 3.7 02/27/2019     11/25/2019    CO2 25 11/25/2019    BUN 14 11/25/2019    CREATININE 0.7 11/25/2019    LABGLOM 86 11/25/2019    GLUCOSE 106 11/25/2019    CALCIUM 9.4 11/25/2019     Hepatic Function Panel:    Lab Results   Component Value Date    ALKPHOS 84 10/10/2019    ALT 12 10/10/2019    AST 14 10/10/2019    PROT 7.3 10/10/2019    BILITOT 0.4 10/10/2019    BILIDIR <0.2 10/10/2019    LABALBU 4.2 10/10/2019     Magnesium:    Lab Results   Component Value Date    MG 2.1 02/26/2019     PT/INR:    Lab Results   Component Value Date    INR 0.98 02/26/2019 Electronicallysigned by ANGELIA Asher CNP on 1/30/2020 at 9:40 AM

## 2020-01-30 NOTE — PATIENT INSTRUCTIONS
You may receive a survey regarding the care you received during your visit. Your input is valuable to us. We encourage you to complete and return your survey. We hope you will choose us in the future for your healthcare needs.     Continue:  · Take medications as prescribed   · Daily weights and record  · Fluid restriction of 2 Liters per day  · Limit sodium in diet to around 7617-2011 mg/day  · Monitor BP  · Activity as tolerated     Call the [facility] for any of the following symptoms: [phone]   Weight gain of 3 pounds in 1 day or 5 pounds in 1 week   Increased shortness of breath   Shortness of breath while laying down   Cough   Chest pain   Swelling in feet, ankles or legs   Tenderness or bloating in the abdomen   Fatigue    Decreased appetite or nausea    Confusion     PLEASE bring all medications in original bottles with you to your next appointment

## 2020-02-04 ENCOUNTER — OFFICE VISIT (OUTPATIENT)
Dept: FAMILY MEDICINE CLINIC | Age: 60
End: 2020-02-04
Payer: COMMERCIAL

## 2020-02-04 VITALS
WEIGHT: 190 LBS | OXYGEN SATURATION: 99 % | BODY MASS INDEX: 31.65 KG/M2 | RESPIRATION RATE: 14 BRPM | SYSTOLIC BLOOD PRESSURE: 105 MMHG | HEIGHT: 65 IN | TEMPERATURE: 97.8 F | HEART RATE: 80 BPM | DIASTOLIC BLOOD PRESSURE: 63 MMHG

## 2020-02-04 PROCEDURE — 4004F PT TOBACCO SCREEN RCVD TLK: CPT | Performed by: NURSE PRACTITIONER

## 2020-02-04 PROCEDURE — 99214 OFFICE O/P EST MOD 30 MIN: CPT | Performed by: NURSE PRACTITIONER

## 2020-02-04 PROCEDURE — G8482 FLU IMMUNIZE ORDER/ADMIN: HCPCS | Performed by: NURSE PRACTITIONER

## 2020-02-04 PROCEDURE — G8417 CALC BMI ABV UP PARAM F/U: HCPCS | Performed by: NURSE PRACTITIONER

## 2020-02-04 PROCEDURE — G8427 DOCREV CUR MEDS BY ELIG CLIN: HCPCS | Performed by: NURSE PRACTITIONER

## 2020-02-04 PROCEDURE — 3017F COLORECTAL CA SCREEN DOC REV: CPT | Performed by: NURSE PRACTITIONER

## 2020-02-04 RX ORDER — TERBINAFINE HYDROCHLORIDE 250 MG/1
250 TABLET ORAL DAILY
Qty: 42 TABLET | Refills: 0 | Status: SHIPPED | OUTPATIENT
Start: 2020-02-04 | End: 2020-03-17

## 2020-02-04 ASSESSMENT — PATIENT HEALTH QUESTIONNAIRE - PHQ9
2. FEELING DOWN, DEPRESSED OR HOPELESS: 0
SUM OF ALL RESPONSES TO PHQ9 QUESTIONS 1 & 2: 0
SUM OF ALL RESPONSES TO PHQ QUESTIONS 1-9: 0
1. LITTLE INTEREST OR PLEASURE IN DOING THINGS: 0
SUM OF ALL RESPONSES TO PHQ QUESTIONS 1-9: 0

## 2020-02-04 NOTE — PROGRESS NOTES
Colon Cancer Screening - referral Mandy Koo 10/7/19  Lung Cancer Screening (Age 54 to [de-identified] with 30 pack year hx, current smoker or quit within past 15 years) - n/a    Tetanus - needs  Influenza Vaccine - 10/18  Pneumonia Vaccine - 66  Zostavax - 51     Breast Cancer Screening - 51  Cervical Cancer Screening - needs  Osteoporosis Screening - 66  Chlamydia Screen - n/a    PSA testing discussion - n/a  AAA Screening - n/a    Falls screening - n/a    Current Outpatient Medications   Medication Sig Dispense Refill    terbinafine (LAMISIL) 250 MG tablet Take 1 tablet by mouth daily 42 tablet 0    ciclopirox (PENLAC) 8 % solution Apply topically nightly. 1 Bottle 3    digoxin (LANOXIN) 125 MCG tablet Take 1 tablet by mouth daily 90 tablet 3    omeprazole (PRILOSEC) 20 MG delayed release capsule Take 1 capsule by mouth every morning (before breakfast) 30 capsule 6    venlafaxine (EFFEXOR XR) 37.5 MG extended release capsule Take 1 capsule by mouth daily 30 capsule 3    atorvastatin (LIPITOR) 40 MG tablet Take 1 tablet by mouth nightly 90 tablet 3    levothyroxine (SYNTHROID) 100 MCG tablet Take 1 tablet by mouth daily 90 tablet 3    letrozole (FEMARA) 2.5 MG tablet Take 1 tablet by mouth daily 90 tablet 3    carvedilol (COREG) 3.125 MG tablet TAKE 1 TABLET BY MOUTH TWICE DAILY 180 tablet 3    furosemide (LASIX) 20 MG tablet Take 20 mg by mouth daily as needed      sacubitril-valsartan (ENTRESTO) 49-51 MG per tablet Take 1 tablet by mouth 2 times daily 180 tablet 3    spironolactone (ALDACTONE) 25 MG tablet Take 1 tablet by mouth daily 90 tablet 3    Prosthesis MISC by Does not apply route Bra - patient is s/p bilateral mastectomy 1 each 0    acetaminophen (TYLENOL) 500 MG tablet Take 500 mg by mouth every 6 hours as needed for Pain       No current facility-administered medications for this visit.       Orders Placed This Encounter   Medications    terbinafine (LAMISIL) 250 MG tablet     Sig: Take 1 tablet by Nocturnal Dyspnea  Respiratory:  Cough, Wheezing, Shortness of breath, Chest tightness, Apnea  Gastrointestinal:  Nausea, Vomiting, Diarrhea, Constipation, Heartburn, Blood in stool  Genitourinary:  Difficulty or painful urination, Flank pain, Change in frequency, Urgency  Skin:  Color change, Rash, Itching, Wound  Psychiatric:  Hallucinations, Anxiety, Depression, Suicidal ideation  Hematological:  Enlarged glands, Easy bleeding, Easily bruising  Musculoskeletal:  Joint pain, Back pain, Gait problems, Joint swelling, Myalgias  Neurological:  Dizziness, Headaches, Presyncope, Numbness, Seizures, Tremors  Allergy:  Environmental allergies, Food allergies  Endocrine:  Heat Intolerance, Cold Intolerance, Polydipsia, Polyphagia, Polyuria    Lab Results   Component Value Date     11/25/2019    K 4.2 11/25/2019     11/25/2019    CO2 25 11/25/2019    BUN 14 11/25/2019    CREATININE 0.7 11/25/2019    GLUCOSE 106 11/25/2019    CALCIUM 9.4 11/25/2019    PROT 7.3 10/10/2019    LABALBU 4.2 10/10/2019    BILITOT 0.4 10/10/2019    ALKPHOS 84 10/10/2019    AST 14 10/10/2019    ALT 12 10/10/2019    LABGLOM 86 (A) 11/25/2019     Lab Results   Component Value Date    TSH 0.553 02/26/2019     Lab Results   Component Value Date    WBC 5.4 11/25/2019    HGB 13.1 11/25/2019    HCT 40.3 11/25/2019    MCV 96.4 11/25/2019     11/25/2019       PHYSICAL EXAM:  Vitals:    02/04/20 1050   BP: 105/63   Pulse: 80   Resp: 14   Temp: 97.8 °F (36.6 °C)   TempSrc: Oral   SpO2: 99%   Weight: 190 lb (86.2 kg)   Height: 5' 4.5\" (1.638 m)     Body mass index is 32.11 kg/m².          VS Reviewed  General Appearance: A&O x 3, No acute distress,well developed and well- nourished  Head: normocephalic and atraumatic  Eyes: pupils equal, round, and reactive to light, extraocular eye movements intact, conjunctivae and eye lids without erythema  Neck: supple and non-tender without mass, no thyromegaly or thyroid nodules, no cervical lymphadenopathy  Pulmonary/Chest: clear to auscultation bilaterally- no wheezes, rales or rhonchi, normal air movement, no respiratory distress or retractions  Cardiovascular: S1 and S2 auscultated w/ RRR. No murmurs, rubs, clicks, or gallops, distal pulses intact. Abdomen: soft, non-tender, non-distended, bowl sounds physiologic,  no rebound or guarding, no masses or hernias noted. Liver and spleen without enlargement. Extremities: no cyanosis, clubbing or edema of the lower extremities  Musculoskeletal: No joint swelling or gross deformity   Neuro:  Alert, 5/5 strength globally and symmetrically  Psych: Affect appropriate. Mood normal. Thought process is normal without evidence of depression or psychosis. Good insight and appropriate interaction. Cognition and memory appear to be intact. Skin: warm and dry, no rash or erythema, right fifth fingernail mildly yellowed and brittle  Lymph:  No cervical, auricular or supraclavicular lymph nodes palpated    ASSESSMENT & PLAN  Dawson Horta was seen today for labs only and other. Diagnoses and all orders for this visit:    Onychomycosis  -     terbinafine (LAMISIL) 250 MG tablet; Take 1 tablet by mouth daily  -     ciclopirox (PENLAC) 8 % solution; Apply topically nightly. Weight loss  -     Comprehensive Metabolic Panel; Future  -     CBC With Auto Differential; Future  -     TSH With Reflex Ft4; Future  -     HIV-1 and HIV-2 Antibodies; Future  -     Hepatitis C Antibody; Future  -     Sedimentation Rate; Future  -     C-Reactive Protein; Future    Essential hypertension    Hypertriglyceridemia  -     Lipid, Fasting; Future    Prediabetes  -     Hemoglobin A1C; Future    Malignant neoplasm of left breast in female, estrogen receptor positive, unspecified site of breast ([address]    Morbidly obese ([address]    - resume Lamisil, add Ciclopirox  - check labs  - had only about 5-6 pound weight loss in 2 months which is not overtly concerning.  Will check labs, recheck weight

## 2020-02-05 ENCOUNTER — TELEPHONE (OUTPATIENT)
Dept: FAMILY MEDICINE CLINIC | Age: 60
End: 2020-02-05

## 2020-02-05 ENCOUNTER — NURSE ONLY (OUTPATIENT)
Dept: LAB | Age: 60
End: 2020-02-05

## 2020-02-05 LAB
ALBUMIN SERPL-MCNC: 4.5 G/DL (ref 3.5–5.1)
ALP BLD-CCNC: 78 U/L (ref 38–126)
ALT SERPL-CCNC: 11 U/L (ref 11–66)
ANION GAP SERPL CALCULATED.3IONS-SCNC: 16 MEQ/L (ref 8–16)
AST SERPL-CCNC: 13 U/L (ref 5–40)
AVERAGE GLUCOSE: 117 MG/DL (ref 70–126)
BASOPHILS # BLD: 0.5 %
BASOPHILS ABSOLUTE: 0 THOU/MM3 (ref 0–0.1)
BILIRUB SERPL-MCNC: 0.5 MG/DL (ref 0.3–1.2)
BUN BLDV-MCNC: 15 MG/DL (ref 7–22)
C-REACTIVE PROTEIN: 0.96 MG/DL (ref 0–1)
CALCIUM SERPL-MCNC: 9.6 MG/DL (ref 8.5–10.5)
CHLORIDE BLD-SCNC: 104 MEQ/L (ref 98–111)
CHOLESTEROL, FASTING: 199 MG/DL (ref 100–199)
CO2: 23 MEQ/L (ref 23–33)
CREAT SERPL-MCNC: 0.6 MG/DL (ref 0.4–1.2)
EOSINOPHIL # BLD: 2.9 %
EOSINOPHILS ABSOLUTE: 0.2 THOU/MM3 (ref 0–0.4)
ERYTHROCYTE [DISTWIDTH] IN BLOOD BY AUTOMATED COUNT: 14.3 % (ref 11.5–14.5)
ERYTHROCYTE [DISTWIDTH] IN BLOOD BY AUTOMATED COUNT: 50.4 FL (ref 35–45)
GFR SERPL CREATININE-BSD FRML MDRD: > 90 ML/MIN/1.73M2
GLUCOSE BLD-MCNC: 115 MG/DL (ref 70–108)
HBA1C MFR BLD: 5.9 % (ref 4.4–6.4)
HCT VFR BLD CALC: 41.5 % (ref 37–47)
HDLC SERPL-MCNC: 57 MG/DL
HEMOGLOBIN: 13.5 GM/DL (ref 12–16)
HEPATITIS C ANTIBODY: NEGATIVE
IMMATURE GRANS (ABS): 0.03 THOU/MM3 (ref 0–0.07)
IMMATURE GRANULOCYTES: 0.5 %
LDL CHOLESTEROL CALCULATED: 114 MG/DL
LYMPHOCYTES # BLD: 25.4 %
LYMPHOCYTES ABSOLUTE: 1.4 THOU/MM3 (ref 1–4.8)
MCH RBC QN AUTO: 31.4 PG (ref 26–33)
MCHC RBC AUTO-ENTMCNC: 32.5 GM/DL (ref 32.2–35.5)
MCV RBC AUTO: 96.5 FL (ref 81–99)
MONOCYTES # BLD: 4.5 %
MONOCYTES ABSOLUTE: 0.3 THOU/MM3 (ref 0.4–1.3)
NUCLEATED RED BLOOD CELLS: 0 /100 WBC
PLATELET # BLD: 188 THOU/MM3 (ref 130–400)
PMV BLD AUTO: 12.5 FL (ref 9.4–12.4)
POTASSIUM SERPL-SCNC: 3.9 MEQ/L (ref 3.5–5.2)
RBC # BLD: 4.3 MILL/MM3 (ref 4.2–5.4)
SEDIMENTATION RATE, ERYTHROCYTE: 26 MM/HR (ref 0–20)
SEG NEUTROPHILS: 66.2 %
SEGMENTED NEUTROPHILS ABSOLUTE COUNT: 3.7 THOU/MM3 (ref 1.8–7.7)
SODIUM BLD-SCNC: 143 MEQ/L (ref 135–145)
T4 FREE: 0.99 NG/DL (ref 0.93–1.76)
TOTAL PROTEIN: 7.2 G/DL (ref 6.1–8)
TRIGLYCERIDE, FASTING: 142 MG/DL (ref 0–199)
TSH SERPL DL<=0.05 MIU/L-ACNC: 22.4 UIU/ML (ref 0.4–4.2)
WBC # BLD: 5.6 THOU/MM3 (ref 4.8–10.8)

## 2020-02-05 NOTE — TELEPHONE ENCOUNTER
----- Message from ANGELIA Fuentes CNP sent at 2/5/2020 12:37 PM EST -----  Let Sandra Radha know her A1C (DM test) is stable at 5.9. cholesterol is also stable and within appropriate. Kidney/liver function and CBC are also within normal ranges. Her thyroid lab test is actually underactive. Is she still taking the Synthroid 100 mcg? Has she missed any doses recently? I know she was recently in [facility] and had some difficulty getting her medications.  Please advise

## 2020-02-06 LAB — HIV-2 AB: NEGATIVE

## 2020-02-07 ENCOUNTER — TELEPHONE (OUTPATIENT)
Dept: FAMILY MEDICINE CLINIC | Age: 60
End: 2020-02-07

## 2020-02-28 ENCOUNTER — APPOINTMENT (OUTPATIENT)
Dept: GENERAL RADIOLOGY | Age: 60
End: 2020-02-28
Payer: COMMERCIAL

## 2020-02-28 ENCOUNTER — OFFICE VISIT (OUTPATIENT)
Dept: FAMILY MEDICINE CLINIC | Age: 60
End: 2020-02-28
Payer: COMMERCIAL

## 2020-02-28 ENCOUNTER — HOSPITAL ENCOUNTER (EMERGENCY)
Age: 60
Discharge: HOME | End: 2020-02-28
Attending: FAMILY MEDICINE
Payer: COMMERCIAL

## 2020-02-28 VITALS
TEMPERATURE: 97.8 F | HEIGHT: 63 IN | BODY MASS INDEX: 34.05 KG/M2 | WEIGHT: 192.2 LBS | HEART RATE: 71 BPM | RESPIRATION RATE: 16 BRPM | DIASTOLIC BLOOD PRESSURE: 74 MMHG | SYSTOLIC BLOOD PRESSURE: 118 MMHG

## 2020-02-28 VITALS
BODY MASS INDEX: 34.96 KG/M2 | OXYGEN SATURATION: 96 % | HEART RATE: 52 BPM | WEIGHT: 190 LBS | TEMPERATURE: 97.4 F | RESPIRATION RATE: 20 BRPM | HEIGHT: 62 IN | SYSTOLIC BLOOD PRESSURE: 101 MMHG | DIASTOLIC BLOOD PRESSURE: 69 MMHG

## 2020-02-28 LAB
ALBUMIN SERPL-MCNC: 4.5 G/DL (ref 3.5–5.1)
ALP BLD-CCNC: 93 U/L (ref 38–126)
ALT SERPL-CCNC: 13 U/L (ref 11–66)
ANION GAP SERPL CALCULATED.3IONS-SCNC: 15 MEQ/L (ref 8–16)
AST SERPL-CCNC: 15 U/L (ref 5–40)
BASOPHILS # BLD: 0.6 %
BASOPHILS ABSOLUTE: 0 THOU/MM3 (ref 0–0.1)
BILIRUB SERPL-MCNC: 0.3 MG/DL (ref 0.3–1.2)
BILIRUBIN URINE: NEGATIVE
BLOOD, URINE: NEGATIVE
BUN BLDV-MCNC: 15 MG/DL (ref 7–22)
CALCIUM SERPL-MCNC: 9.7 MG/DL (ref 8.5–10.5)
CHARACTER, URINE: CLEAR
CHLORIDE BLD-SCNC: 101 MEQ/L (ref 98–111)
CO2: 23 MEQ/L (ref 23–33)
COLOR: YELLOW
CREAT SERPL-MCNC: 0.8 MG/DL (ref 0.4–1.2)
EKG ATRIAL RATE: 68 BPM
EKG P AXIS: 41 DEGREES
EKG P-R INTERVAL: 182 MS
EKG Q-T INTERVAL: 406 MS
EKG QRS DURATION: 92 MS
EKG QTC CALCULATION (BAZETT): 431 MS
EKG R AXIS: -18 DEGREES
EKG T AXIS: 4 DEGREES
EKG VENTRICULAR RATE: 68 BPM
EOSINOPHIL # BLD: 3.3 %
EOSINOPHILS ABSOLUTE: 0.2 THOU/MM3 (ref 0–0.4)
ERYTHROCYTE [DISTWIDTH] IN BLOOD BY AUTOMATED COUNT: 13.8 % (ref 11.5–14.5)
ERYTHROCYTE [DISTWIDTH] IN BLOOD BY AUTOMATED COUNT: 49.2 FL (ref 35–45)
GFR SERPL CREATININE-BSD FRML MDRD: 73 ML/MIN/1.73M2
GLUCOSE BLD-MCNC: 123 MG/DL (ref 70–108)
GLUCOSE URINE: NEGATIVE MG/DL
HCT VFR BLD CALC: 43 % (ref 37–47)
HEMOGLOBIN: 13.9 GM/DL (ref 12–16)
IMMATURE GRANS (ABS): 0.03 THOU/MM3 (ref 0–0.07)
IMMATURE GRANULOCYTES: 0.6 %
KETONES, URINE: NEGATIVE
LEUKOCYTE ESTERASE, URINE: NEGATIVE
LYMPHOCYTES # BLD: 24.1 %
LYMPHOCYTES ABSOLUTE: 1.2 THOU/MM3 (ref 1–4.8)
MCH RBC QN AUTO: 31.2 PG (ref 26–33)
MCHC RBC AUTO-ENTMCNC: 32.3 GM/DL (ref 32.2–35.5)
MCV RBC AUTO: 96.6 FL (ref 81–99)
MONOCYTES # BLD: 6.5 %
MONOCYTES ABSOLUTE: 0.3 THOU/MM3 (ref 0.4–1.3)
NITRITE, URINE: NEGATIVE
NUCLEATED RED BLOOD CELLS: 0 /100 WBC
OSMOLALITY CALCULATION: 279.7 MOSMOL/KG (ref 275–300)
PH UA: 6.5 (ref 5–9)
PLATELET # BLD: 224 THOU/MM3 (ref 130–400)
PMV BLD AUTO: 11.6 FL (ref 9.4–12.4)
POTASSIUM REFLEX MAGNESIUM: 4 MEQ/L (ref 3.5–5.2)
PRO-BNP: 74.4 PG/ML (ref 0–900)
PROTEIN UA: NEGATIVE
RBC # BLD: 4.45 MILL/MM3 (ref 4.2–5.4)
SEG NEUTROPHILS: 64.9 %
SEGMENTED NEUTROPHILS ABSOLUTE COUNT: 3.2 THOU/MM3 (ref 1.8–7.7)
SODIUM BLD-SCNC: 139 MEQ/L (ref 135–145)
SPECIFIC GRAVITY, URINE: 1.01 (ref 1–1.03)
TOTAL PROTEIN: 7.5 G/DL (ref 6.1–8)
TROPONIN T: < 0.01 NG/ML
UROBILINOGEN, URINE: 0.2 EU/DL (ref 0–1)
WBC # BLD: 4.9 THOU/MM3 (ref 4.8–10.8)

## 2020-02-28 PROCEDURE — 36415 COLL VENOUS BLD VENIPUNCTURE: CPT

## 2020-02-28 PROCEDURE — 99284 EMERGENCY DEPT VISIT MOD MDM: CPT

## 2020-02-28 PROCEDURE — 80053 COMPREHEN METABOLIC PANEL: CPT

## 2020-02-28 PROCEDURE — 93005 ELECTROCARDIOGRAM TRACING: CPT | Performed by: FAMILY MEDICINE

## 2020-02-28 PROCEDURE — 2580000003 HC RX 258: Performed by: FAMILY MEDICINE

## 2020-02-28 PROCEDURE — 93000 ELECTROCARDIOGRAM COMPLETE: CPT | Performed by: NURSE PRACTITIONER

## 2020-02-28 PROCEDURE — 3017F COLORECTAL CA SCREEN DOC REV: CPT | Performed by: NURSE PRACTITIONER

## 2020-02-28 PROCEDURE — 96360 HYDRATION IV INFUSION INIT: CPT

## 2020-02-28 PROCEDURE — 2709999900 HC NON-CHARGEABLE SUPPLY

## 2020-02-28 PROCEDURE — 83880 ASSAY OF NATRIURETIC PEPTIDE: CPT

## 2020-02-28 PROCEDURE — 93010 ELECTROCARDIOGRAM REPORT: CPT | Performed by: INTERNAL MEDICINE

## 2020-02-28 PROCEDURE — 85025 COMPLETE CBC W/AUTO DIFF WBC: CPT

## 2020-02-28 PROCEDURE — G8482 FLU IMMUNIZE ORDER/ADMIN: HCPCS | Performed by: NURSE PRACTITIONER

## 2020-02-28 PROCEDURE — 81003 URINALYSIS AUTO W/O SCOPE: CPT

## 2020-02-28 PROCEDURE — G8417 CALC BMI ABV UP PARAM F/U: HCPCS | Performed by: NURSE PRACTITIONER

## 2020-02-28 PROCEDURE — 84484 ASSAY OF TROPONIN QUANT: CPT

## 2020-02-28 PROCEDURE — 99214 OFFICE O/P EST MOD 30 MIN: CPT | Performed by: NURSE PRACTITIONER

## 2020-02-28 PROCEDURE — G8428 CUR MEDS NOT DOCUMENT: HCPCS | Performed by: NURSE PRACTITIONER

## 2020-02-28 PROCEDURE — 71045 X-RAY EXAM CHEST 1 VIEW: CPT

## 2020-02-28 PROCEDURE — 4004F PT TOBACCO SCREEN RCVD TLK: CPT | Performed by: NURSE PRACTITIONER

## 2020-02-28 RX ORDER — 0.9 % SODIUM CHLORIDE 0.9 %
500 INTRAVENOUS SOLUTION INTRAVENOUS ONCE
Status: COMPLETED | OUTPATIENT
Start: 2020-02-28 | End: 2020-02-28

## 2020-02-28 RX ADMIN — SODIUM CHLORIDE 500 ML: 9 INJECTION, SOLUTION INTRAVENOUS at 12:29

## 2020-02-28 NOTE — PROGRESS NOTES
suspicious skin lesions noted. Dix Berry Brunner:   negative    ASSESSMENT & Yoseph Amina was seen today for dizziness. Diagnoses and all orders for this visit:    Dizziness  -     EKG 12 Lead    Shortness of breath  -     EKG 12 Lead    Chest pain, unspecified type      - EKG obtained and compared with previous  - defer patient to the ER for further workup due to extensive cardiac Hx and also cardiac symptoms  - patient verbalizes agreement and understanding  - patient transferred by private vehicle  - report called to [address] at [facility]    Return if symptoms worsen or fail to improve.

## 2020-02-28 NOTE — ED NOTES
Patient presents to ED for dizziness, SOB, and fatigue x3 days. Reports yesterday she was experiencing chest pain but does not have chest pain today. Denies any pain at this time. Shows no signs of distress. Skin warm and dry. Respirations easy and unlabored.       Aric Will RN  02/28/20 1253

## 2020-02-28 NOTE — ED PROVIDER NOTES
omeprazole (PRILOSEC) 20 MG delayed release capsule Take 1 capsule by mouth every morning (before breakfast) 30 capsule 6    venlafaxine (EFFEXOR XR) 37.5 MG extended release capsule Take 1 capsule by mouth daily 30 capsule 3    atorvastatin (LIPITOR) 40 MG tablet Take 1 tablet by mouth nightly 90 tablet 3    levothyroxine (SYNTHROID) 100 MCG tablet Take 1 tablet by mouth daily 90 tablet 3    letrozole (FEMARA) 2.5 MG tablet Take 1 tablet by mouth daily 90 tablet 3    carvedilol (COREG) 3.125 MG tablet TAKE 1 TABLET BY MOUTH TWICE DAILY 180 tablet 3    furosemide (LASIX) 20 MG tablet Take 20 mg by mouth daily as needed      sacubitril-valsartan (ENTRESTO) 49-51 MG per tablet Take 1 tablet by mouth 2 times daily 180 tablet 3    spironolactone (ALDACTONE) 25 MG tablet Take 1 tablet by mouth daily 90 tablet 3    Prosthesis MISC by Does not apply route Bra - patient is s/p bilateral mastectomy 1 each 0    acetaminophen (TYLENOL) 500 MG tablet Take 500 mg by mouth every 6 hours as needed for Pain         ALLERGIES    No Known Allergies    FAMILY OR SOCIAL HISTORY    Family History   Problem Relation Age of Onset    High Blood Pressure Mother     Dementia Mother     Cancer Father     Colon Cancer Father     Mental Retardation Brother     Colon Polyps Brother     Cancer Maternal Grandfather     Esophageal Cancer Neg Hx      Social History     Socioeconomic History    Marital status:      Spouse name: Not on file    Number of children: Not on file    Years of education: Not on file    Highest education level: Not on file   Occupational History    Not on file   Social Needs    Financial resource strain: Not on file    Food insecurity:     Worry: Not on file     Inability: Not on file    Transportation needs:     Medical: Not on file     Non-medical: Not on file   Tobacco Use    Smoking status: Light Tobacco Smoker     Packs/day: 0.25     Years: 37.00     Pack years: 9.25     Types: Cigarettes     Start date: 1981     Last attempt to quit: 3/10/2019     Years since quittin.9    Smokeless tobacco: Never Used   Substance and Sexual Activity    Alcohol use: No    Drug use: No    Sexual activity: Yes   Lifestyle    Physical activity:     Days per week: Not on file     Minutes per session: Not on file    Stress: Not on file   Relationships    Social connections:     Talks on phone: Not on file     Gets together: Not on file     Attends [employer] service: Not on file     Active member of club or organization: Not on file     Attends meetings of clubs or organizations: Not on file     Relationship status: Not on file    Intimate partner violence:     Fear of current or ex partner: Not on file     Emotionally abused: Not on file     Physically abused: Not on file     Forced sexual activity: Not on file   Other Topics Concern    Not on file   Social History Narrative    Not on file       PHYSICAL EXAM    VITAL SIGNS: /69   Pulse 52   Temp 97.4 °F (36.3 °C) (Oral)   Resp 20   Ht 5' 2\" (1.575 m)   Wt 190 lb (86.2 kg)   SpO2 96%   BMI 34.75 kg/m²   Constitutional:  Well developed, well nourished, no acute distress  Eyes:  Pupils equally round and reactive to light, sclera nonicteric  HENT:  atraumatic, moist mucous membranes  NECK: Normal range of motion, no JVD  Respiratory:  No respiratory distress, normal breath sounds, no wheezing   Cardiovascular:  normal rate, normal rhythm, no murmurs   GI:  Soft, nondistended, normal bowel sounds, nontender  Musculoskeletal:  No edema, no acute deformities   Integument:  Skin is warm and dry, no obvious rash    Vascular: Radial and DP pulses 2+ equal bilaterally  Neurologic: awake, alert, oriented x3, handgrip is 5/5 bilaterally, normal finger to nose test bilaterally  Psychiatric:  normal affect, does not appear anxious    Labs Reviewed   CBC WITH AUTO DIFFERENTIAL - Abnormal; Notable for the following components:       Result

## 2020-02-28 NOTE — ED NOTES
Upon first contact pt is resting on cot. Pt denies any needs at this time.       Sheba Chaudhari RN  02/28/20 [id number]

## 2020-03-02 ENCOUNTER — TELEPHONE (OUTPATIENT)
Dept: FAMILY MEDICINE CLINIC | Age: 60
End: 2020-03-02

## 2020-03-02 NOTE — TELEPHONE ENCOUNTER
.Transition of Care visit scheduled.   3/3/2020  Patient is being discharged to home  Date of discharge 2/28  Discharge from facility [address] [place] [address]  Reason for admission dizziness, dehydrated

## 2020-03-02 NOTE — TELEPHONE ENCOUNTER
Junior 45 Transitions Initial Follow Up Call    Outreach made within 2 business days of discharge: Yes    Patient: Joselin Bobo Patient : 1960   MRN: [id number]  Reason for Admission: There are no discharge diagnoses documented for the most recent discharge. Discharge Date: 20       Spoke with: Dawson Horta    Discharge department/facility: [facility]    TCM Interactive Patient Contact:  Was patient able to fill all prescriptions: Yes  Was patient instructed to bring all medications to the follow-up visit: Yes  Is patient taking all medications as directed in the discharge summary?  Yes  Does patient understand their discharge instructions: Yes  Does patient have questions or concerns that need addressed prior to 7-14 day follow up office visit: no    Scheduled appointment with PCP within 7-14 days    Follow Up  Future Appointments   Date Time Provider Braden Rothman   3/3/2020 11:40 AM Roya Good APRN - [id number] [address] [place]   3/12/2020 10:40 AM Linda Mark MD Oncology [facility] [place]   2020  8:00 AM Roya Good APRN - [id number] [address] - [place]   2020  1:30 PM Jonnathan Valentine MD [address] [facility] [place]   2020  1:00 PM SCHEDULE, SRPS PACER NURSE SRPX PACER [facility] [place]   2020  8:00 AM ANGELIA Ruano - CNP SRPX CHF [facility] [place] [facility], CMA ([address] [place] [address])

## 2020-03-03 ENCOUNTER — NURSE ONLY (OUTPATIENT)
Dept: LAB | Age: 60
End: 2020-03-03

## 2020-03-03 ENCOUNTER — OFFICE VISIT (OUTPATIENT)
Dept: FAMILY MEDICINE CLINIC | Age: 60
End: 2020-03-03
Payer: COMMERCIAL

## 2020-03-03 ENCOUNTER — PROCEDURE VISIT (OUTPATIENT)
Dept: CARDIOLOGY CLINIC | Age: 60
End: 2020-03-03
Payer: COMMERCIAL

## 2020-03-03 VITALS
HEIGHT: 65 IN | BODY MASS INDEX: 31.99 KG/M2 | DIASTOLIC BLOOD PRESSURE: 60 MMHG | RESPIRATION RATE: 10 BRPM | WEIGHT: 192 LBS | SYSTOLIC BLOOD PRESSURE: 92 MMHG | HEART RATE: 82 BPM | TEMPERATURE: 98 F

## 2020-03-03 LAB — TSH SERPL DL<=0.05 MIU/L-ACNC: 3.21 UIU/ML (ref 0.4–4.2)

## 2020-03-03 PROCEDURE — 4004F PT TOBACCO SCREEN RCVD TLK: CPT | Performed by: NURSE PRACTITIONER

## 2020-03-03 PROCEDURE — 99213 OFFICE O/P EST LOW 20 MIN: CPT | Performed by: NURSE PRACTITIONER

## 2020-03-03 PROCEDURE — 3017F COLORECTAL CA SCREEN DOC REV: CPT | Performed by: NURSE PRACTITIONER

## 2020-03-03 PROCEDURE — G8428 CUR MEDS NOT DOCUMENT: HCPCS | Performed by: NURSE PRACTITIONER

## 2020-03-03 PROCEDURE — G8482 FLU IMMUNIZE ORDER/ADMIN: HCPCS | Performed by: NURSE PRACTITIONER

## 2020-03-03 PROCEDURE — 93297 REM INTERROG DEV EVAL ICPMS: CPT | Performed by: INTERNAL MEDICINE

## 2020-03-03 PROCEDURE — 1111F DSCHRG MED/CURRENT MED MERGE: CPT | Performed by: NURSE PRACTITIONER

## 2020-03-03 PROCEDURE — G8417 CALC BMI ABV UP PARAM F/U: HCPCS | Performed by: NURSE PRACTITIONER

## 2020-03-04 ENCOUNTER — TELEPHONE (OUTPATIENT)
Dept: FAMILY MEDICINE CLINIC | Age: 60
End: 2020-03-04

## 2020-03-12 ENCOUNTER — HOSPITAL ENCOUNTER (OUTPATIENT)
Dept: INFUSION THERAPY | Age: 60
Discharge: HOME | End: 2020-03-12
Payer: COMMERCIAL

## 2020-03-12 ENCOUNTER — OFFICE VISIT (OUTPATIENT)
Dept: ONCOLOGY | Age: 60
End: 2020-03-12
Payer: COMMERCIAL

## 2020-03-12 VITALS
OXYGEN SATURATION: 97 % | HEART RATE: 64 BPM | DIASTOLIC BLOOD PRESSURE: 70 MMHG | SYSTOLIC BLOOD PRESSURE: 109 MMHG | WEIGHT: 190.6 LBS | TEMPERATURE: 97.9 F | RESPIRATION RATE: 16 BRPM | BODY MASS INDEX: 31.75 KG/M2 | HEIGHT: 65 IN

## 2020-03-12 PROCEDURE — G8417 CALC BMI ABV UP PARAM F/U: HCPCS | Performed by: INTERNAL MEDICINE

## 2020-03-12 PROCEDURE — 4004F PT TOBACCO SCREEN RCVD TLK: CPT | Performed by: INTERNAL MEDICINE

## 2020-03-12 PROCEDURE — 99211 OFF/OP EST MAY X REQ PHY/QHP: CPT

## 2020-03-12 PROCEDURE — 3017F COLORECTAL CA SCREEN DOC REV: CPT | Performed by: INTERNAL MEDICINE

## 2020-03-12 PROCEDURE — G8427 DOCREV CUR MEDS BY ELIG CLIN: HCPCS | Performed by: INTERNAL MEDICINE

## 2020-03-12 PROCEDURE — G8482 FLU IMMUNIZE ORDER/ADMIN: HCPCS | Performed by: INTERNAL MEDICINE

## 2020-03-12 PROCEDURE — 99214 OFFICE O/P EST MOD 30 MIN: CPT | Performed by: INTERNAL MEDICINE

## 2020-03-12 NOTE — PROGRESS NOTES
[facility], [address] [facility] [address]  [address] [address]  Dept: [phone]  Dept Fax: [phone]: [phone]      Visit Date:3/12/2020     Josue Thompson is a 62 y.o. female who presents today for:   Follow-up breast cancer      HPI:   Thaddeus Rhoades is a 63year old female with history of left breast cancer diagnosed in December 2016 in [place]. She has relocated to [place] to be closer with her family. We were able to obtain her pathology report and progress notes from the medical oncologist. Final pathology report from left breast modified mastectomy and right simple mastectomy performed on December 29, 2016 at [facility] in [place], [place] showed a 2.2 cm infiltrating ductal carcinoma of grade 2, there was evidence of lymphovascular invasion. One out of 10 axillary lymph node was positive for metastatic breast cancer. The tumor cells were estrogen receptor positive in 100% of tumor cell staining, progesterone receptor positive in 30% of tumor cells staining. HER-2 non-amplified by FISH. Right breast mastectomy was negative for malignancy. The patient  received adjuvant chemotherapy,s 4 cycles of AC followed by weekly Taxol. Chemotherapy was followed by radiation treatment. In 2018 she was diagnosed with a dilated cardiomyopathy with EF of 20-25%. She underwent ICD insertion on 2/11/19. She reports that the last time she saw the Oncologist in [facility] was in August of 2017 when she completed her chemotherapy. After she lost all of her money she wasn't able to follow up with physicians. She reports that she was supposed to be on a medication for 10 years after this, but wasn't able to afford it. After she moved to [place], [place] she established care with a cardiologist Dr. Blanca Jean. In January 2019, the patient established care with [address] [place] [address] at [facility]. She was placed on aromatase inhibitor with Femara. Interval History 3/13/2020:    The patient is here for follow-up evaluation. Overall, the patient tolerates adjuvant hormonal therapy with Femara well. She denies hot flashes, mood swings or night sweats. Denies fever, chills or recurrent infections. Her new complaitn  is sternal bone pain. Denies  shortness of breath, abdominal pain, nausea, vomiting, bowel or urinary changes. Her main complaint is insomnia and anxiety. Denies any complaints related to her chest wall    HPI   Past Medical History:   Diagnosis Date    Abnormal heart rhythm     Anxiety     Cancer [facility])     breast  2016     CHF (congestive heart failure) (HCC)     Congenital heart disease     DJD (degenerative joint disease)     Enlarged lymph nodes     Hypertension     Hypothyroidism     ICD (implantable cardioverter-defibrillator) in place 2019    Dr. Marcus Mancia Kidney stones     Prediabetes 10/7/2019    Thyroid disease       Past Surgical History:   Procedure Laterality Date    APPENDECTOMY      COLONOSCOPY      MASTECTOMY, BILATERAL      PTCA      TONSILLECTOMY        Family History   Problem Relation Age of Onset    High Blood Pressure Mother     Dementia Mother     Cancer Father     Colon Cancer Father     Mental Retardation Brother     Colon Polyps Brother     Cancer Maternal Grandfather     Esophageal Cancer Neg Hx       Social History     Tobacco Use    Smoking status: Light Tobacco Smoker     Packs/day: 0.25     Years: 37.00     Pack years: 9.25     Types: Cigarettes     Start date: 1981     Last attempt to quit: 3/10/2019     Years since quittin.0    Smokeless tobacco: Never Used   Substance Use Topics    Alcohol use: No      Current Outpatient Medications   Medication Sig Dispense Refill    terbinafine (LAMISIL) 250 MG tablet Take 1 tablet by mouth daily 42 tablet 0    ciclopirox (PENLAC) 8 % solution Apply topically nightly.  1 Bottle 3    digoxin (LANOXIN) 125 MCG tablet Take 1 tablet by mouth daily 90 tablet 3    lymphadenopathy. A calcification in the subcutaneous tissues of the posterior right pelvis may be an    injection granuloma. Degenerative changes are present in the lumbar spine and pelvis without evidence of hypermetabolic osseous metastatic disease.           Impression       Poorly visualized FDG avid thyroid nodules of indeterminate etiology. Malignancy cannot be excluded and thyroid ultrasound is recommended for further evaluation.       Final report electronically signed by Dr. Violetta Mccarty on 2019 1:47 PM         DEXA study on 2019 showed:  OSTEOPENIA   Patient is at medium risk for fracture          Assessment and Plan:   1. History of Left Breast Cancer - S/P bilateral mastectomy 2016. T2,N1,Mx Hormone Responsive Breast Cancer   She was treated with adjuvant chemotherapy: 4 cycles of AC, followed by weekly Taxol. Afterwards she received radiation treatment. The plan was to place her on adjuvant hormonal treatment but the patient lost her insurance. In 2018 she was diagnosed with a cardiomyopathy, most likely related to chemotherapy. The patient moved to [place] to be with her family. She established care with our clinic in 2018. 2. Adjuvant Hormonal Therapy  The patient was started on Aromatase inhibitor with Femara in 2019. Overall she tolerates AI well without significant side effects. She was instructed to continue. The patient had DEXA scan which showed osteopenia. No evidence of disease recurrence on today's physical examination the patient was instructed to continue Femara. Due to new complaint of sternal bone pain, she will have bone scan. For anxiety and insomnia she was placed on Effexor. 3. Thyroid Nodules  Noted on PET scan 19 as poorly visualized FDG avid thyroid nodules of indeterminate etiology.  Thyroid ultrasound recommended.   -Will obtain thyroid ultrasound       Electronically signed by   Delman Apgar, MD

## 2020-03-30 ENCOUNTER — HOSPITAL ENCOUNTER (OUTPATIENT)
Dept: NUCLEAR MEDICINE | Age: 60
Discharge: HOME | End: 2020-03-30
Payer: COMMERCIAL

## 2020-03-30 PROCEDURE — 3430000000 HC RX DIAGNOSTIC RADIOPHARMACEUTICAL: Performed by: INTERNAL MEDICINE

## 2020-03-30 PROCEDURE — 78306 BONE IMAGING WHOLE BODY: CPT

## 2020-03-30 PROCEDURE — A9503 TC99M MEDRONATE: HCPCS | Performed by: INTERNAL MEDICINE

## 2020-03-30 RX ORDER — TC 99M MEDRONATE 20 MG/10ML
24.8 INJECTION, POWDER, LYOPHILIZED, FOR SOLUTION INTRAVENOUS
Status: COMPLETED | OUTPATIENT
Start: 2020-03-30 | End: 2020-03-30

## 2020-03-30 RX ADMIN — TC 99M MEDRONATE 24.8 MILLICURIE: 20 INJECTION, POWDER, LYOPHILIZED, FOR SOLUTION INTRAVENOUS at 08:20

## 2020-04-07 ENCOUNTER — TELEMEDICINE (OUTPATIENT)
Dept: FAMILY MEDICINE CLINIC | Age: 60
End: 2020-04-07
Payer: COMMERCIAL

## 2020-04-07 ENCOUNTER — PROCEDURE VISIT (OUTPATIENT)
Dept: CARDIOLOGY CLINIC | Age: 60
End: 2020-04-07
Payer: COMMERCIAL

## 2020-04-07 ENCOUNTER — TELEPHONE (OUTPATIENT)
Dept: FAMILY MEDICINE CLINIC | Age: 60
End: 2020-04-07

## 2020-04-07 VITALS — RESPIRATION RATE: 16 BRPM

## 2020-04-07 PROCEDURE — 93296 REM INTERROG EVL PM/IDS: CPT | Performed by: INTERNAL MEDICINE

## 2020-04-07 PROCEDURE — 99214 OFFICE O/P EST MOD 30 MIN: CPT | Performed by: FAMILY MEDICINE

## 2020-04-07 PROCEDURE — 93295 DEV INTERROG REMOTE 1/2/MLT: CPT | Performed by: INTERNAL MEDICINE

## 2020-04-07 PROCEDURE — G8427 DOCREV CUR MEDS BY ELIG CLIN: HCPCS | Performed by: FAMILY MEDICINE

## 2020-04-07 PROCEDURE — 3017F COLORECTAL CA SCREEN DOC REV: CPT | Performed by: FAMILY MEDICINE

## 2020-04-07 RX ORDER — VENLAFAXINE HYDROCHLORIDE 37.5 MG/1
37.5 CAPSULE, EXTENDED RELEASE ORAL DAILY
Qty: 90 CAPSULE | Refills: 3 | Status: SHIPPED | OUTPATIENT
Start: 2020-04-07 | End: 2020-07-16

## 2020-04-07 NOTE — PROGRESS NOTES
DR AGUERO PT  MED[employer] SINGLE ICD REMOTE TRANSMISSION   BATTERY 10.7 YRS REMAINING  RV IMPEDENCE 475  RV 42  SVC 54    VENT THRESHOLD 0.750 @ 0.4  VENT AMPLITUDE PROGRAMMED AT 2 @ 0.4  RV WAVES 14.1  VVI 40  V PACED <0.1%  OPTIVOL WNL

## 2020-04-07 NOTE — PROGRESS NOTES
03/03/2020       -Depression/anxiety:  On effexor  Working well  Needs RF  Denies SI/HI  Moods good, anxiety better.  Sleeping well      -HLD:    HPI:  Labs reviewed with pt    Taking meds as prescribed ?: yes  Tolerating well ?: yes  Side Effects ?: denies  Muscle Pain?: denies  Working on TLCS ?: yes      -PreDM:  Working on life style changes  a1c 5.9      -Thyroid nodule:  Noted on US July 2019  Rads recommended 6 month f/u US  Needs ordered/shceduled  No throat pain      -Cardiomyopathy:  Chemo induced  See's cardio  Denies CP, SOB, orthopnea or PND      -GERD:    HPI:    Taking meds as prescribed ?: yes  Tolerating well ?: yes  Side Effects ?: denies  Dysphagia ?: denies  Odynophagia ?: denies  Melena ?: denies  Working on TLCS ?: yes        Current Outpatient Medications   Medication Sig Dispense Refill    venlafaxine (EFFEXOR XR) 37.5 MG extended release capsule Take 1 capsule by mouth daily 90 capsule 3    digoxin (LANOXIN) 125 MCG tablet Take 1 tablet by mouth daily 90 tablet 3    omeprazole (PRILOSEC) 20 MG delayed release capsule Take 1 capsule by mouth every morning (before breakfast) 30 capsule 6    atorvastatin (LIPITOR) 40 MG tablet Take 1 tablet by mouth nightly 90 tablet 3    levothyroxine (SYNTHROID) 100 MCG tablet Take 1 tablet by mouth daily 90 tablet 3    letrozole (FEMARA) 2.5 MG tablet Take 1 tablet by mouth daily 90 tablet 3    carvedilol (COREG) 3.125 MG tablet TAKE 1 TABLET BY MOUTH TWICE DAILY 180 tablet 3    furosemide (LASIX) 20 MG tablet Take 20 mg by mouth daily as needed      sacubitril-valsartan (ENTRESTO) 49-51 MG per tablet Take 1 tablet by mouth 2 times daily 180 tablet 3    spironolactone (ALDACTONE) 25 MG tablet Take 1 tablet by mouth daily 90 tablet 3    Prosthesis MISC by Does not apply route Bra - patient is s/p bilateral mastectomy 1 each 0    acetaminophen (TYLENOL) 500 MG tablet Take 500 mg by mouth every 6 hours as needed for Pain       No current Years: 37.00     Pack years: 9.25     Types: Cigarettes     Start date: 1981     Last attempt to quit: 3/10/2019     Years since quittin.0    Smokeless tobacco: Never Used   Substance Use Topics    Alcohol use: No         Family History   Problem Relation Age of Onset    High Blood Pressure Mother     Dementia Mother     Cancer Father     Colon Cancer Father     Mental Retardation Brother     Colon Polyps Brother     Cancer Maternal Grandfather     Esophageal Cancer Neg Hx          I have reviewed the patient's past medical history, past surgical history, allergies, medications, social and family history and I have made updates where appropriate.       Review of Systems  Positive responses are highlighted in bold    Constitutional:  Fever, Chills, Night Sweats, Fatigue, Unexpected changes in weight  HENT:  Ear pain, Tinnitus, Nosebleeds, Trouble swallowing, Hearing loss, Sore throat  Cardiovascular:  Chest Pain, Palpitations, Orthopnea, Paroxysmal Nocturnal Dyspnea  Respiratory:  Cough, Wheezing, Shortness of breath, Chest tightness, Apnea  Gastrointestinal:  Nausea, Vomiting, Diarrhea, Constipation, Heartburn, Blood in stool  Genitourinary:  Difficulty or painful urination, Flank pain, Change in frequency, Urgency  Skin:  Color change, Rash, Itching, Wound  Musculoskeletal:  Joint pain, Back pain, Gait problems, Joint swelling, Myalgias  Neurological:  Dizziness, Headaches, Presyncope, Numbness, Seizures, Tremors  Endocrine:  Heat Intolerance, Cold Intolerance, Polydipsia, Polyphagia, Polyuria      PHYSICAL EXAM:  Not all vitals able to be obtained, video visit  Vitals:    20 0930   Resp: 16     Pain Score:   0 - No pain    General Appearance: A&O x 3, No acute distress,well developed and well- nourished  Head: normocephalic and atraumatic  Eyes: pupils equal, round, and reactive to light, extraocular eye movements intact, conjunctivae and eye lids without erythema  ENT: External ears w/o

## 2020-04-07 NOTE — TELEPHONE ENCOUNTER
I ordered thyroid US today for pt  Can be scheduled routinely once pandemic over  Sejal told me to place telephone message to the cue could be worked  Let me know if questions, thanks!

## 2020-04-13 RX ORDER — FUROSEMIDE 20 MG/1
20 TABLET ORAL DAILY PRN
Qty: 90 TABLET | Refills: 0 | Status: SHIPPED | OUTPATIENT
Start: 2020-04-13 | End: 2021-06-01

## 2020-05-19 ENCOUNTER — NURSE ONLY (OUTPATIENT)
Dept: LAB | Age: 60
End: 2020-05-19

## 2020-05-19 ENCOUNTER — TELEPHONE (OUTPATIENT)
Dept: CARDIOLOGY CLINIC | Age: 60
End: 2020-05-19

## 2020-05-19 LAB
ANION GAP SERPL CALCULATED.3IONS-SCNC: 12 MEQ/L (ref 8–16)
BUN BLDV-MCNC: 13 MG/DL (ref 7–22)
CALCIUM SERPL-MCNC: 9.4 MG/DL (ref 8.5–10.5)
CHLORIDE BLD-SCNC: 102 MEQ/L (ref 98–111)
CO2: 25 MEQ/L (ref 23–33)
CREAT SERPL-MCNC: 0.6 MG/DL (ref 0.4–1.2)
GFR SERPL CREATININE-BSD FRML MDRD: > 90 ML/MIN/1.73M2
GLUCOSE BLD-MCNC: 139 MG/DL (ref 70–108)
POTASSIUM SERPL-SCNC: 3.9 MEQ/L (ref 3.5–5.2)
SODIUM BLD-SCNC: 139 MEQ/L (ref 135–145)

## 2020-05-19 RX ORDER — SPIRONOLACTONE 25 MG/1
TABLET ORAL
Qty: 90 TABLET | Refills: 0 | Status: ON HOLD | OUTPATIENT
Start: 2020-05-19 | End: 2020-10-15

## 2020-05-21 ENCOUNTER — OFFICE VISIT (OUTPATIENT)
Dept: CARDIOLOGY CLINIC | Age: 60
End: 2020-05-21
Payer: COMMERCIAL

## 2020-05-21 VITALS
BODY MASS INDEX: 36.25 KG/M2 | SYSTOLIC BLOOD PRESSURE: 120 MMHG | DIASTOLIC BLOOD PRESSURE: 72 MMHG | WEIGHT: 197 LBS | HEART RATE: 78 BPM | HEIGHT: 62 IN

## 2020-05-21 PROCEDURE — 99213 OFFICE O/P EST LOW 20 MIN: CPT | Performed by: INTERNAL MEDICINE

## 2020-05-21 PROCEDURE — 1036F TOBACCO NON-USER: CPT | Performed by: INTERNAL MEDICINE

## 2020-05-21 PROCEDURE — 3017F COLORECTAL CA SCREEN DOC REV: CPT | Performed by: INTERNAL MEDICINE

## 2020-05-21 PROCEDURE — G8427 DOCREV CUR MEDS BY ELIG CLIN: HCPCS | Performed by: INTERNAL MEDICINE

## 2020-05-21 PROCEDURE — G8417 CALC BMI ABV UP PARAM F/U: HCPCS | Performed by: INTERNAL MEDICINE

## 2020-05-21 NOTE — PROGRESS NOTES
[address] [place] [address],[place] [address] [address]  [place] [address]  Dept: [phone]  Dept Fax: [phone]  Loc: [phone]    Visit Date: 5/21/2020    Ms. Raymund Lesches is a 62 y.o. female  who presented for:  Chief Complaint   Patient presents with    6 Month Follow-Up       HPI:   HPI   64 yo F hx if NICM, moderate MR, left breast cancer on Letrozole who prevents for follow-up. She was going to [facility] but not any further. BP and HR well controlled. She is tolerating all OMT. ICD without shocks. No chest pain, angina, BORDEN, orthopnea, PND, sob at rest, palpitations, LE edema, or syncope.           Current Outpatient Medications:     spironolactone (ALDACTONE) 25 MG tablet, Take 1 tablet by mouth once daily, Disp: 90 tablet, Rfl: 0    furosemide (LASIX) 20 MG tablet, Take 1 tablet by mouth daily as needed (leg swelling, weight gain), Disp: 90 tablet, Rfl: 0    venlafaxine (EFFEXOR XR) 37.5 MG extended release capsule, Take 1 capsule by mouth daily, Disp: 90 capsule, Rfl: 3    digoxin (LANOXIN) 125 MCG tablet, Take 1 tablet by mouth daily, Disp: 90 tablet, Rfl: 3    omeprazole (PRILOSEC) 20 MG delayed release capsule, Take 1 capsule by mouth every morning (before breakfast), Disp: 30 capsule, Rfl: 6    atorvastatin (LIPITOR) 40 MG tablet, Take 1 tablet by mouth nightly, Disp: 90 tablet, Rfl: 3    levothyroxine (SYNTHROID) 100 MCG tablet, Take 1 tablet by mouth daily, Disp: 90 tablet, Rfl: 3    letrozole (FEMARA) 2.5 MG tablet, Take 1 tablet by mouth daily, Disp: 90 tablet, Rfl: 3    carvedilol (COREG) 3.125 MG tablet, TAKE 1 TABLET BY MOUTH TWICE DAILY, Disp: 180 tablet, Rfl: 3    sacubitril-valsartan (ENTRESTO) 49-51 MG per tablet, Take 1 tablet by mouth 2 times daily, Disp: 180 tablet, Rfl: 3    Prosthesis MISC, by Does not apply route Bra - patient is s/p bilateral mastectomy, Disp: 1 each, Rfl: 0    acetaminophen (TYLENOL) 500 MG tablet, Take 500 mg cm/s   E/E' septal: 9.83   E/E' lateral: 9.63   MR Velocity: 502 cm/s   MR VTI: 190 cm   PISA Radius: 0.6 cm      PISA area: 2.26 cm^2     [id number]  [id number]        Assessment/Plan   Cardiomyopathy, non-ischemic, NYHA I-II s/p ICD (2/2019) - EF 35-40%, RVSP 26 mmHg, 11/2019  Moderate MR  Breast cancer on Letrozol  Euvolemic, tolerating meds, device is functioning per last interrogation, BP and HR well controlled. BMP wnl. CHF clinic following. Able to ADLs. Discussed diet/exercise/BP/weight loss/health lifestyle choices/lipids; the patient understands the goals and will try to comply.     Disposition:  1 year         Electronically signed by Farhad Conn MD   5/21/2020 at 1:38 PM EDT

## 2020-06-10 ENCOUNTER — NURSE ONLY (OUTPATIENT)
Dept: CARDIOLOGY CLINIC | Age: 60
End: 2020-06-10
Payer: COMMERCIAL

## 2020-06-10 PROCEDURE — 93282 PRGRMG EVAL IMPLANTABLE DFB: CPT | Performed by: INTERNAL MEDICINE

## 2020-06-10 NOTE — PROGRESS NOTES
Medtronic single icd  Battery 10.6yrs  V paced <0.1%  r wave >20mV  Ventricle threshold 1.00V @ 0.4ms  Ventricle impedance 418 ohms   Shock 42 ohms   svc 54 ohms   1 episode of NS VT  3 beats  optivol wnl

## 2020-06-17 ENCOUNTER — OFFICE VISIT (OUTPATIENT)
Dept: CARDIOLOGY CLINIC | Age: 60
End: 2020-06-17
Payer: COMMERCIAL

## 2020-06-17 VITALS
SYSTOLIC BLOOD PRESSURE: 110 MMHG | DIASTOLIC BLOOD PRESSURE: 70 MMHG | HEART RATE: 72 BPM | HEIGHT: 62 IN | WEIGHT: 196 LBS | OXYGEN SATURATION: 96 % | BODY MASS INDEX: 36.07 KG/M2

## 2020-06-17 PROCEDURE — G8427 DOCREV CUR MEDS BY ELIG CLIN: HCPCS | Performed by: NURSE PRACTITIONER

## 2020-06-17 PROCEDURE — 3017F COLORECTAL CA SCREEN DOC REV: CPT | Performed by: NURSE PRACTITIONER

## 2020-06-17 PROCEDURE — 99213 OFFICE O/P EST LOW 20 MIN: CPT | Performed by: NURSE PRACTITIONER

## 2020-06-17 PROCEDURE — G8417 CALC BMI ABV UP PARAM F/U: HCPCS | Performed by: NURSE PRACTITIONER

## 2020-06-17 PROCEDURE — 1036F TOBACCO NON-USER: CPT | Performed by: NURSE PRACTITIONER

## 2020-06-17 ASSESSMENT — ENCOUNTER SYMPTOMS
ABDOMINAL DISTENTION: 0
COLOR CHANGE: 0
ABDOMINAL PAIN: 0
SHORTNESS OF BREATH: 0
WHEEZING: 0
CHEST TIGHTNESS: 0
COUGH: 0
NAUSEA: 0
APNEA: 0

## 2020-06-17 NOTE — PROGRESS NOTES
197 lb (89.4 kg)   05/20/20 195 lb (88.5 kg)     BP Readings from Last 3 Encounters:   06/17/20 110/70   05/21/20 120/72   05/20/20 118/79     Pulse Readings from Last 3 Encounters:   06/17/20 72   05/21/20 78   05/20/20 84     Body mass index is 35.85 kg/m². ECHO:   11/6/19   Summary   Ejection fraction was estimated at 35-40%. Device lead/catheter seen in the right heart. There was trace aortic regurgitation. There was mild-moderate mitral regurgitation. There was mild tricuspid regurgitation. Assuming RAP = 5 mmHg, the estimated RVSP = 26 mmHg. Ascending aorta = 3.2 cm. IVC size is within normal limits with normal respiratory phasic changes. Signature      ----------------------------------------------------------------   Electronically signed by Westley Willoughby MD (Interpreting   physician) on 11/08/2019 at 03:44 PM   ----------------------------------------------------------------      Findings      Mitral Valve   The mitral valve structure was normal with normal leaflet separation. DOPPLER: The transmitral velocity was within the normal range with no   evidence for mitral stenosis. There was mild-moderate mitral   regurgitation. Aortic Valve   The aortic valve was trileaflet with normal thickness and cuspal   separation. DOPPLER: Transaortic velocity was within the normal range with   no evidence of aortic stenosis. There was trace aortic regurgitation. Tricuspid Valve   The tricuspid valve structure was normal with normal leaflet separation. DOPPLER: There was no evidence of tricuspid stenosis. There was mild   tricuspid regurgitation. Assuming RAP = 5 mmHg, the estimated RVSP = 26   mmHg. Pulmonic Valve   The pulmonic valve leaflets were not well seen. DOPPLER: The transpulmonic   velocity was within the normal range with trace regurgitation. Left Atrium   Left atrial size was moderate-severely dilated.       Left Ventricle   Normal left ventricular size and cardiomyopathy ([address]     3. ICD (implantable cardioverter-defibrillator) in place         Plan:  · GDMT   · ACE/ARB/ARNi: Entresto 49/51 mg bid  · Beta Blocker: Coreg 3.125 mg bid  · Aldosterone antagonist: Aldactone 25 mg daily  · Diuretic/Potassium: Lasix 20 mg daily prn  · Hydralazine/Nitrate: no  · Other: Digoxin, Lipitor   · No signs of fluid overload. BMP 5/19/20 reviewed and WNL. Continue current meds. BMP in Sept/Oct.   · HF Zones reviewed. · Daily weights  · Fluid restriction of 2 Liters per day (64 oz)   · Limit sodium in diet to around 6433-9822 mg/day  · Monitor BP  · Activity as tolerated     Patient was instructed to call the [facility] for changes in the following symptoms:    Weight gain of 3 pounds in 1 day or 5 pounds in 1 week   Increased shortness of breath   Shortness of breath while laying down   Cough   Chest pain   Swelling in feet, ankles or legs   Tenderness or bloating in the abdomen   Fatigue    Decreased appetite or feeling \"full\"   Nausea    Confusion      Return in about 6 months (around 12/17/2020). or sooner if needed     Patient given educational materials - see patient instructions. We discussed the importance of weighing oneself and recording daily. We also discussed the importance of a low sodium diet, higher sodium foods to avoid and appropriate low sodium food choices. Discussed use, benefit, and side effects of prescribed medications. All patient questions answered. Patient verbalizes understanding of plan of care using teach back method, and is agreeable to the treatment plan.        Electronicallysigned by ANGELIA Kimball CNP on 6/17/2020 at 8:05 AM

## 2020-06-19 ENCOUNTER — HOSPITAL ENCOUNTER (OUTPATIENT)
Age: 60
Discharge: HOME | End: 2020-06-19
Payer: COMMERCIAL

## 2020-06-19 LAB
ANION GAP SERPL CALCULATED.3IONS-SCNC: 8 MEQ/L (ref 8–16)
BASOPHILS # BLD: 0.4 %
BASOPHILS ABSOLUTE: 0 THOU/MM3 (ref 0–0.1)
BUN BLDV-MCNC: 13 MG/DL (ref 7–22)
CALCIUM SERPL-MCNC: 9.3 MG/DL (ref 8.5–10.5)
CHLORIDE BLD-SCNC: 107 MEQ/L (ref 98–111)
CO2: 27 MEQ/L (ref 23–33)
CREAT SERPL-MCNC: 0.6 MG/DL (ref 0.4–1.2)
EKG ATRIAL RATE: 63 BPM
EKG P AXIS: 37 DEGREES
EKG P-R INTERVAL: 180 MS
EKG Q-T INTERVAL: 428 MS
EKG QRS DURATION: 92 MS
EKG QTC CALCULATION (BAZETT): 437 MS
EKG R AXIS: -15 DEGREES
EKG T AXIS: 22 DEGREES
EKG VENTRICULAR RATE: 63 BPM
EOSINOPHIL # BLD: 4 %
EOSINOPHILS ABSOLUTE: 0.2 THOU/MM3 (ref 0–0.4)
ERYTHROCYTE [DISTWIDTH] IN BLOOD BY AUTOMATED COUNT: 13.7 % (ref 11.5–14.5)
ERYTHROCYTE [DISTWIDTH] IN BLOOD BY AUTOMATED COUNT: 48.5 FL (ref 35–45)
GFR SERPL CREATININE-BSD FRML MDRD: > 90 ML/MIN/1.73M2
GLUCOSE BLD-MCNC: 113 MG/DL (ref 70–108)
HCT VFR BLD CALC: 41.2 % (ref 37–47)
HEMOGLOBIN: 13.1 GM/DL (ref 12–16)
IMMATURE GRANS (ABS): 0.03 THOU/MM3 (ref 0–0.07)
IMMATURE GRANULOCYTES: 0.6 %
LYMPHOCYTES # BLD: 27.9 %
LYMPHOCYTES ABSOLUTE: 1.4 THOU/MM3 (ref 1–4.8)
MCH RBC QN AUTO: 30.6 PG (ref 26–33)
MCHC RBC AUTO-ENTMCNC: 31.8 GM/DL (ref 32.2–35.5)
MCV RBC AUTO: 96.3 FL (ref 81–99)
MONOCYTES # BLD: 7.5 %
MONOCYTES ABSOLUTE: 0.4 THOU/MM3 (ref 0.4–1.3)
NUCLEATED RED BLOOD CELLS: 0 /100 WBC
PLATELET # BLD: 233 THOU/MM3 (ref 130–400)
PMV BLD AUTO: 12.1 FL (ref 9.4–12.4)
POTASSIUM SERPL-SCNC: 4.6 MEQ/L (ref 3.5–5.2)
RBC # BLD: 4.28 MILL/MM3 (ref 4.2–5.4)
SEG NEUTROPHILS: 59.6 %
SEGMENTED NEUTROPHILS ABSOLUTE COUNT: 3 THOU/MM3 (ref 1.8–7.7)
SODIUM BLD-SCNC: 142 MEQ/L (ref 135–145)
WBC # BLD: 5.1 THOU/MM3 (ref 4.8–10.8)

## 2020-06-19 PROCEDURE — U0002 COVID-19 LAB TEST NON-CDC: HCPCS

## 2020-06-19 PROCEDURE — 36415 COLL VENOUS BLD VENIPUNCTURE: CPT

## 2020-06-19 PROCEDURE — 80048 BASIC METABOLIC PNL TOTAL CA: CPT

## 2020-06-19 PROCEDURE — 93005 ELECTROCARDIOGRAM TRACING: CPT | Performed by: ORTHOPAEDIC SURGERY

## 2020-06-19 PROCEDURE — 85025 COMPLETE CBC W/AUTO DIFF WBC: CPT

## 2020-06-21 LAB
PERFORMING LAB: NORMAL
REPORT: NORMAL
SARS-COV-2: NOT DETECTED

## 2020-06-22 ENCOUNTER — TELEPHONE (OUTPATIENT)
Dept: ONCOLOGY | Age: 60
End: 2020-06-22

## 2020-06-25 ENCOUNTER — ANESTHESIA EVENT (OUTPATIENT)
Dept: OPERATING ROOM | Age: 60
End: 2020-06-25
Payer: COMMERCIAL

## 2020-06-25 ENCOUNTER — HOSPITAL ENCOUNTER (OUTPATIENT)
Age: 60
Setting detail: OUTPATIENT SURGERY
Discharge: HOME | End: 2020-06-25
Attending: ORTHOPAEDIC SURGERY | Admitting: ORTHOPAEDIC SURGERY
Payer: COMMERCIAL

## 2020-06-25 ENCOUNTER — ANESTHESIA (OUTPATIENT)
Dept: OPERATING ROOM | Age: 60
End: 2020-06-25
Payer: COMMERCIAL

## 2020-06-25 VITALS
HEIGHT: 62 IN | WEIGHT: 199 LBS | HEART RATE: 69 BPM | RESPIRATION RATE: 16 BRPM | OXYGEN SATURATION: 96 % | TEMPERATURE: 97.2 F | SYSTOLIC BLOOD PRESSURE: 115 MMHG | BODY MASS INDEX: 36.62 KG/M2 | DIASTOLIC BLOOD PRESSURE: 62 MMHG

## 2020-06-25 VITALS — OXYGEN SATURATION: 94 % | DIASTOLIC BLOOD PRESSURE: 45 MMHG | SYSTOLIC BLOOD PRESSURE: 83 MMHG

## 2020-06-25 PROCEDURE — 3700000001 HC ADD 15 MINUTES (ANESTHESIA): Performed by: ORTHOPAEDIC SURGERY

## 2020-06-25 PROCEDURE — 2500000003 HC RX 250 WO HCPCS: Performed by: ORTHOPAEDIC SURGERY

## 2020-06-25 PROCEDURE — 7100000011 HC PHASE II RECOVERY - ADDTL 15 MIN: Performed by: ORTHOPAEDIC SURGERY

## 2020-06-25 PROCEDURE — 2709999900 HC NON-CHARGEABLE SUPPLY: Performed by: ORTHOPAEDIC SURGERY

## 2020-06-25 PROCEDURE — 6360000002 HC RX W HCPCS: Performed by: ANESTHESIOLOGY

## 2020-06-25 PROCEDURE — 2580000003 HC RX 258: Performed by: ORTHOPAEDIC SURGERY

## 2020-06-25 PROCEDURE — 3600000012 HC SURGERY LEVEL 2 ADDTL 15MIN: Performed by: ORTHOPAEDIC SURGERY

## 2020-06-25 PROCEDURE — 7100000010 HC PHASE II RECOVERY - FIRST 15 MIN: Performed by: ORTHOPAEDIC SURGERY

## 2020-06-25 PROCEDURE — 2580000003 HC RX 258: Performed by: ANESTHESIOLOGY

## 2020-06-25 PROCEDURE — 3600000002 HC SURGERY LEVEL 2 BASE: Performed by: ORTHOPAEDIC SURGERY

## 2020-06-25 PROCEDURE — 6370000000 HC RX 637 (ALT 250 FOR IP): Performed by: ANESTHESIOLOGY

## 2020-06-25 PROCEDURE — 3700000000 HC ANESTHESIA ATTENDED CARE: Performed by: ORTHOPAEDIC SURGERY

## 2020-06-25 RX ORDER — PROPOFOL 10 MG/ML
INJECTION, EMULSION INTRAVENOUS PRN
Status: DISCONTINUED | OUTPATIENT
Start: 2020-06-25 | End: 2020-06-25 | Stop reason: SDUPTHER

## 2020-06-25 RX ORDER — SODIUM CHLORIDE 9 MG/ML
INJECTION, SOLUTION INTRAVENOUS CONTINUOUS
Status: DISCONTINUED | OUTPATIENT
Start: 2020-06-25 | End: 2020-06-25 | Stop reason: HOSPADM

## 2020-06-25 RX ORDER — HYDROCODONE BITARTRATE AND ACETAMINOPHEN 5; 325 MG/1; MG/1
1 TABLET ORAL EVERY 4 HOURS PRN
Status: DISCONTINUED | OUTPATIENT
Start: 2020-06-25 | End: 2020-06-25 | Stop reason: HOSPADM

## 2020-06-25 RX ORDER — ONDANSETRON 2 MG/ML
4 INJECTION INTRAMUSCULAR; INTRAVENOUS EVERY 6 HOURS PRN
Status: DISCONTINUED | OUTPATIENT
Start: 2020-06-25 | End: 2020-06-25 | Stop reason: HOSPADM

## 2020-06-25 RX ORDER — PHENYLEPHRINE HYDROCHLORIDE 10 MG/ML
INJECTION INTRAVENOUS PRN
Status: DISCONTINUED | OUTPATIENT
Start: 2020-06-25 | End: 2020-06-25 | Stop reason: SDUPTHER

## 2020-06-25 RX ORDER — HYDROCODONE BITARTRATE AND ACETAMINOPHEN 5; 325 MG/1; MG/1
1-2 TABLET ORAL
Qty: 20 TABLET | Refills: 0 | Status: SHIPPED | OUTPATIENT
Start: 2020-06-25 | End: 2020-07-02

## 2020-06-25 RX ORDER — SODIUM CHLORIDE 9 MG/ML
INJECTION, SOLUTION INTRAVENOUS CONTINUOUS PRN
Status: DISCONTINUED | OUTPATIENT
Start: 2020-06-25 | End: 2020-06-25 | Stop reason: SDUPTHER

## 2020-06-25 RX ORDER — FENTANYL CITRATE 50 UG/ML
INJECTION, SOLUTION INTRAMUSCULAR; INTRAVENOUS PRN
Status: DISCONTINUED | OUTPATIENT
Start: 2020-06-25 | End: 2020-06-25 | Stop reason: SDUPTHER

## 2020-06-25 RX ORDER — SCOLOPAMINE TRANSDERMAL SYSTEM 1 MG/1
PATCH, EXTENDED RELEASE TRANSDERMAL PRN
Status: DISCONTINUED | OUTPATIENT
Start: 2020-06-25 | End: 2020-06-25 | Stop reason: SDUPTHER

## 2020-06-25 RX ORDER — ONDANSETRON 2 MG/ML
INJECTION INTRAMUSCULAR; INTRAVENOUS PRN
Status: DISCONTINUED | OUTPATIENT
Start: 2020-06-25 | End: 2020-06-25 | Stop reason: SDUPTHER

## 2020-06-25 RX ADMIN — SODIUM CHLORIDE: 9 INJECTION, SOLUTION INTRAVENOUS at 11:28

## 2020-06-25 RX ADMIN — PHENYLEPHRINE HYDROCHLORIDE 50 MCG: 10 INJECTION INTRAVENOUS at 11:42

## 2020-06-25 RX ADMIN — PROPOFOL 50 MG: 10 INJECTION, EMULSION INTRAVENOUS at 11:46

## 2020-06-25 RX ADMIN — SCOPALAMINE 1 PATCH: 1 PATCH, EXTENDED RELEASE TRANSDERMAL at 11:41

## 2020-06-25 RX ADMIN — FENTANYL CITRATE 25 MCG: 50 INJECTION, SOLUTION INTRAMUSCULAR; INTRAVENOUS at 11:40

## 2020-06-25 RX ADMIN — PROPOFOL 15 MG: 10 INJECTION, EMULSION INTRAVENOUS at 11:50

## 2020-06-25 RX ADMIN — FENTANYL CITRATE 25 MCG: 50 INJECTION, SOLUTION INTRAMUSCULAR; INTRAVENOUS at 11:34

## 2020-06-25 RX ADMIN — ONDANSETRON HYDROCHLORIDE 4 MG: 4 INJECTION, SOLUTION INTRAMUSCULAR; INTRAVENOUS at 11:51

## 2020-06-25 RX ADMIN — PROPOFOL 50 MG: 10 INJECTION, EMULSION INTRAVENOUS at 11:36

## 2020-06-25 RX ADMIN — SODIUM CHLORIDE: 9 INJECTION, SOLUTION INTRAVENOUS at 09:08

## 2020-06-25 ASSESSMENT — PULMONARY FUNCTION TESTS
PIF_VALUE: 1
PIF_VALUE: 0
PIF_VALUE: 1
PIF_VALUE: 0
PIF_VALUE: 1
PIF_VALUE: 0

## 2020-06-25 ASSESSMENT — PAIN DESCRIPTION - DESCRIPTORS: DESCRIPTORS: ACHING

## 2020-06-25 ASSESSMENT — PAIN SCALES - GENERAL
PAINLEVEL_OUTOF10: 3
PAINLEVEL_OUTOF10: 5

## 2020-06-25 ASSESSMENT — PAIN - FUNCTIONAL ASSESSMENT: PAIN_FUNCTIONAL_ASSESSMENT: 0-10

## 2020-06-25 NOTE — BRIEF OP NOTE
Brief Postoperative Note      Patient: Wayne Cummins  YOB: 1960  MRN: [id number]    Date of Procedure: 6/25/2020    Pre-Op Diagnosis: [place], TRIGGER FINGER RIGHT RING    Post-Op Diagnosis: Same       Procedure(s):  DEQUERVAINS RELEASE AND RIGHT RING FINGER TRIGGER RELEASE    Surgeon(s):  Ok Navarro DO    Assistant:  Physician Assistant: ALFRED Vuong    Anesthesia: Monitor Anesthesia Care    Estimated Blood Loss (mL): Minimal    Complications: None    Specimens:   * No specimens in log *    Implants:  * No implants in log *      Drains: * No LDAs found *    Findings: as expected    Electronically signed by ALFRED Vuong on 6/25/2020 at 12:11 PM

## 2020-06-25 NOTE — PROGRESS NOTES

## 2020-06-25 NOTE — ANESTHESIA PRE PROCEDURE
Department of Anesthesiology  Preprocedure Note       Name:  Nila Geller   Age:  62 y.o.  :  1960                                          MRN:  [id number]         Date:  2020      Surgeon: Cherie Sauceda):  Ban Pickard DO    Procedure: Procedure(s):  DEQUERVAINS RELEASE AND RIGHT RING FINGER TRIGGER RELEASE    Medications prior to admission:   Prior to Admission medications    Medication Sig Start Date End Date Taking?  Authorizing Provider   ENTRESTO 49-51 MG per tablet Take 1 tablet by mouth twice daily 20  Yes ANGELIA Ruano CNP   spironolactone (ALDACTONE) 25 MG tablet Take 1 tablet by mouth once daily 20  Yes ANGELIA Ruano CNP   furosemide (LASIX) 20 MG tablet Take 1 tablet by mouth daily as needed (leg swelling, weight gain) 20  Yes ANGELIA Quiros CNP   venlafaxine (EFFEXOR XR) 37.5 MG extended release capsule Take 1 capsule by mouth daily 20  Yes Christen Allen DO   digoxin (LANOXIN) 125 MCG tablet Take 1 tablet by mouth daily 20  Yes ANGELIA Ruano CNP   omeprazole (PRILOSEC) 20 MG delayed release capsule Take 1 capsule by mouth every morning (before breakfast) 19  Yes ANGELIA Tolbert CNP   atorvastatin (LIPITOR) 40 MG tablet Take 1 tablet by mouth nightly 10/7/19  Yes ANGELIA Quiros CNP   levothyroxine (SYNTHROID) 100 MCG tablet Take 1 tablet by mouth daily 10/7/19  Yes ANGELIA Quiros CNP   letrozole [facility]) 2.5 MG tablet Take 1 tablet by mouth daily 19  Yes Barbara Eastman MD   carvedilol (COREG) 3.125 MG tablet TAKE 1 TABLET BY MOUTH TWICE DAILY 5/10/19  Yes Savanah Gates PA-C   acetaminophen (TYLENOL) 500 MG tablet Take 500 mg by mouth every 6 hours as needed for Pain   Yes Historical Provider, MD       Current medications:    Current Facility-Administered Medications   Medication Dose Route Frequency Provider Last Rate Last Dose    0.9 % sodium chloride infusion   Intravenous 02/05/2020       COVID-19 Screening (If Applicable):   Lab Results   Component Value Date    COVID19 NOT DETECTED 06/19/2020         Anesthesia Evaluation  Patient summary reviewed  Airway: Mallampati: III  TM distance: >3 FB   Neck ROM: full  Mouth opening: > = 3 FB Dental:    (+) partials      Pulmonary:                              Cardiovascular:    (+) hypertension:, pacemaker: AICD and pacemaker, CHF:,                ROS comment: CARDIOMYOPATHY     Neuro/Psych:               GI/Hepatic/Renal:             Endo/Other:    (+) hypothyroidism::., .                 Abdominal:           Vascular:                                        Anesthesia Plan      MAC     ASA 3       Induction: intravenous. Anesthetic plan and risks discussed with patient. Plan discussed with CRNA. Renae Franco.  DO Nikki   6/25/2020

## 2020-06-25 NOTE — H&P
Update History & Physical    The patient's History and Physical of June 19, 2020 was reviewed with the patient and I examined the patient. There was no change. The surgical site was confirmed by the patient and me. Plan: The risks, benefits, expected outcome, and alternative to the recommended procedure have been discussed with the patient. Patient understands and wants to proceed with the procedure.      Electronically signed by Jena Cook DO on 6/25/2020 at 9:53 AM

## 2020-06-26 NOTE — OP NOTE
[address] [id number]                                OPERATIVE REPORT    PATIENT NAME: Francisco Whipple                     :        1960  MED REC NO:   [id number]                           ROOM:  ACCOUNT NO:   [de-identified]                           ADMIT DATE: 2020  PROVIDER:     Gildardo Graf D.O.    DATE OF PROCEDURE:  2020    PREOPERATIVE DIAGNOSES:  Right wrist de Quervain's tenosynovitis and  ring finger trigger finger. POSTOPERATIVE DIAGNOSES:  Right wrist de Quervain's tenosynovitis and  ring finger trigger finger. OPERATIONS PERFORMED:  Right first dorsal compartment release and ring  finger A1 pulley release. SURGEON:  Gildardo Graf DO    ASSISTANT:  Andrez Jacinto PA-C    TYPE OF ANESTHESIA:  MAC, local.    BLOOD LOSS:  Minimal.    TOURNIQUET TIME:  9 minutes. INDICATION FOR OPERATION:  The patient is a 78-year-old female who has  failed conservative treatment for a ring finger trigger finger as well  as de Quervain's tenosynovitis. I recommended surgical release. The  risks, benefits, and alternatives were reviewed. The patient elected to  proceed. OPERATIVE SUMMARY:  The patient was met in the preoperative holding area  and the correct extremity was identified and marked. Informed consent  was obtained. The patient was escorted to the operative suite. She was  transferred to the operating table, left in supine position with the  operative extremity on a hand table. A well-padded tourniquet was  placed in the upper arm. Sedation was administered. I then injected a  local field block in the area of the de Quervain's release as well as  the ring finger A1 pulley. The patient was then prepped and draped in a  standard surgical fashion. A timeout was taken and the correct patient,  procedure, and operative site were agreed upon by all who were present.    The arm was

## 2020-06-30 ENCOUNTER — PROCEDURE VISIT (OUTPATIENT)
Dept: CARDIOLOGY CLINIC | Age: 60
End: 2020-06-30
Payer: COMMERCIAL

## 2020-06-30 PROCEDURE — G2066 INTER DEVC REMOTE 30D: HCPCS | Performed by: INTERNAL MEDICINE

## 2020-06-30 PROCEDURE — 93297 REM INTERROG DEV EVAL ICPMS: CPT | Performed by: INTERNAL MEDICINE

## 2020-06-30 NOTE — PROGRESS NOTES
Single mdt icd  Battery 10.6yrs  V paced <0.1%  r wave 15. 6mV  Ventricle threshold 0.875V @ 0.40ms  Ventricle impedance 513 ohms  shokc 45 ohms  svc 58 ohms  optivol wnl

## 2020-07-06 ENCOUNTER — OFFICE VISIT (OUTPATIENT)
Dept: FAMILY MEDICINE CLINIC | Age: 60
End: 2020-07-06
Payer: COMMERCIAL

## 2020-07-06 VITALS
SYSTOLIC BLOOD PRESSURE: 118 MMHG | WEIGHT: 202 LBS | HEIGHT: 63 IN | OXYGEN SATURATION: 95 % | DIASTOLIC BLOOD PRESSURE: 80 MMHG | RESPIRATION RATE: 10 BRPM | HEART RATE: 82 BPM | BODY MASS INDEX: 35.79 KG/M2 | TEMPERATURE: 98.1 F

## 2020-07-06 PROCEDURE — 3017F COLORECTAL CA SCREEN DOC REV: CPT | Performed by: NURSE PRACTITIONER

## 2020-07-06 PROCEDURE — G8427 DOCREV CUR MEDS BY ELIG CLIN: HCPCS | Performed by: NURSE PRACTITIONER

## 2020-07-06 PROCEDURE — 69209 REMOVE IMPACTED EAR WAX UNI: CPT | Performed by: NURSE PRACTITIONER

## 2020-07-06 PROCEDURE — 99213 OFFICE O/P EST LOW 20 MIN: CPT | Performed by: NURSE PRACTITIONER

## 2020-07-06 PROCEDURE — 4004F PT TOBACCO SCREEN RCVD TLK: CPT | Performed by: NURSE PRACTITIONER

## 2020-07-06 PROCEDURE — G8417 CALC BMI ABV UP PARAM F/U: HCPCS | Performed by: NURSE PRACTITIONER

## 2020-07-06 NOTE — PROGRESS NOTES
SUBJECTIVE:  Hasmukh Raygoza is a 62 y.o. y/o female that presents with Ear Problem (right ear feels like balloon in ear x 5 -6 days symptoms better but still present)  . HPI:  Symptoms have been present for 1 week(s). Inciting incident or history of trauma? no  Decreased hearing? Yes  Ear tenderness? No  Ear drainage? No  Feeling of fullness? Yes  Radiation of the pain? No  Dizziness or pre-syncope? No  Affected by position or head movment? No  Sore throat, rhinorrhea or sinus congestion? No  Hx of Bruxism? No  Treatment tried and response - has not tried anything yet      OBJECTIVE:  /80   Pulse 82   Temp 98.1 °F (36.7 °C) (Oral)   Resp 10   Ht 5' 3.39\" (1.61 m)   Wt 202 lb (91.6 kg)   SpO2 95%   BMI 35.35 kg/m²   General appearance: alert, well appearing, and in no distress. HEENT:  right TM could not see and cerumen impaction ntoed, left TM normal landmarks and mobility, right canal normal and left canal normal, Nasal mucosa wnl, oropharynx normal without any lesions  After irrigation impaction resolved, right TM clear, no  Pain iwht movement of pinna or tragus. Neck:  Supple without masses, no cervical adenopathy  CVS exam: normal rate, regular rhythm, normal S1, S2, no murmurs, rubs, clicks or gallops. Chest: clear to auscultation, no wheezes, rales or rhonchi, symmetric air entry. Skin exam - normal coloration and turgor, no rashes, no suspicious skin lesions noted. Psych:  No evidence of anxiety or depression      ASSESSMENT & PLAN  Hernán Dela Cruz was seen today for ear problem. Diagnoses and all orders for this visit:    Conductive hearing loss of right ear with unrestricted hearing of left ear  Impaction resolved, hearing returned    Impacted cerumen of right ear  -     NM REMOVAL IMPACTED CERUMEN IRRIGATION/LVG UNILAT  Use debrox gtts 3 days of every month  Pt in agreement with plan. No follow-ups on file.       Hernán Dela Cruz received counseling on the following healthy behaviors: nutrition  Reviewed prior labs and health maintenance  Continue current medications, diet and exercise. Discussed use, benefit, and side effects of prescribed medications. Barriers to medication compliance addressed. Patient given educational materials - see patient instructions  Was a self-tracking handout given in paper form or via [email or web address]? No:     Requested Prescriptions      No prescriptions requested or ordered in this encounter       All patient questions answered. Patient voiced understanding. Quality Measures    Body mass index is 35.35 kg/m². Elevated. Weight control planned discussed Healthy diet and regular exercise. BP: 118/80 Blood pressure is normal. Treatment plan consists of Increased Physical Activity and No treatment change needed.     Lab Results   Component Value Date    [email or web address] 114 02/05/2020    (goal LDL reduction with dx if diabetes is 50% LDL reduction)      PHQ Scores 2/4/2020 3/11/2019 11/12/2018   PHQ2 Score 0 0 0   PHQ9 Score 0 0 0     Interpretation of Total Score Depression Severity: 1-4 = Minimal depression, 5-9 = Mild depression, 10-14 = Moderate depression, 15-19 = Moderately severe depression, 20-27 = Severe depression

## 2020-07-08 ENCOUNTER — HOSPITAL ENCOUNTER (INPATIENT)
Age: 60
LOS: 1 days | Discharge: HOME | DRG: 058 | End: 2020-07-10
Attending: FAMILY MEDICINE | Admitting: FAMILY MEDICINE
Payer: COMMERCIAL

## 2020-07-08 PROCEDURE — 99284 EMERGENCY DEPT VISIT MOD MDM: CPT

## 2020-07-09 ENCOUNTER — APPOINTMENT (OUTPATIENT)
Dept: GENERAL RADIOLOGY | Age: 60
DRG: 058 | End: 2020-07-09
Payer: COMMERCIAL

## 2020-07-09 ENCOUNTER — APPOINTMENT (OUTPATIENT)
Dept: CT IMAGING | Age: 60
DRG: 058 | End: 2020-07-09
Payer: COMMERCIAL

## 2020-07-09 LAB
ALBUMIN SERPL-MCNC: 3.6 G/DL (ref 3.5–5.1)
ALP BLD-CCNC: 66 U/L (ref 38–126)
ALT SERPL-CCNC: 11 U/L (ref 11–66)
ANION GAP SERPL CALCULATED.3IONS-SCNC: 13 MEQ/L (ref 8–16)
ANION GAP SERPL CALCULATED.3IONS-SCNC: 13 MEQ/L (ref 8–16)
APTT: 31.4 SECONDS (ref 22–38)
AST SERPL-CCNC: 12 U/L (ref 5–40)
AVERAGE GLUCOSE: 120 MG/DL (ref 70–126)
BASOPHILS # BLD: 0.4 %
BASOPHILS ABSOLUTE: 0 THOU/MM3 (ref 0–0.1)
BILIRUB SERPL-MCNC: < 0.2 MG/DL (ref 0.3–1.2)
BILIRUBIN DIRECT: < 0.2 MG/DL (ref 0–0.3)
BILIRUBIN URINE: NEGATIVE
BLOOD, URINE: NEGATIVE
BUN BLDV-MCNC: 14 MG/DL (ref 7–22)
BUN BLDV-MCNC: 15 MG/DL (ref 7–22)
CALCIUM SERPL-MCNC: 8.6 MG/DL (ref 8.5–10.5)
CALCIUM SERPL-MCNC: 9.2 MG/DL (ref 8.5–10.5)
CHARACTER, URINE: CLEAR
CHLORIDE BLD-SCNC: 101 MEQ/L (ref 98–111)
CHLORIDE BLD-SCNC: 104 MEQ/L (ref 98–111)
CHOLESTEROL, TOTAL: 154 MG/DL (ref 100–199)
CO2: 23 MEQ/L (ref 23–33)
CO2: 23 MEQ/L (ref 23–33)
COLOR: YELLOW
CREAT SERPL-MCNC: 0.7 MG/DL (ref 0.4–1.2)
CREAT SERPL-MCNC: 0.7 MG/DL (ref 0.4–1.2)
DIGOXIN LEVEL: 0.4 NG/ML (ref 0.5–2)
EKG ATRIAL RATE: 62 BPM
EKG P AXIS: 44 DEGREES
EKG P-R INTERVAL: 190 MS
EKG Q-T INTERVAL: 452 MS
EKG QRS DURATION: 94 MS
EKG QTC CALCULATION (BAZETT): 458 MS
EKG R AXIS: -6 DEGREES
EKG T AXIS: 20 DEGREES
EKG VENTRICULAR RATE: 62 BPM
EOSINOPHIL # BLD: 3.5 %
EOSINOPHILS ABSOLUTE: 0.2 THOU/MM3 (ref 0–0.4)
ERYTHROCYTE [DISTWIDTH] IN BLOOD BY AUTOMATED COUNT: 13.6 % (ref 11.5–14.5)
ERYTHROCYTE [DISTWIDTH] IN BLOOD BY AUTOMATED COUNT: 47.7 FL (ref 35–45)
GFR SERPL CREATININE-BSD FRML MDRD: 86 ML/MIN/1.73M2
GFR SERPL CREATININE-BSD FRML MDRD: 86 ML/MIN/1.73M2
GLUCOSE BLD-MCNC: 119 MG/DL (ref 70–108)
GLUCOSE BLD-MCNC: 125 MG/DL (ref 70–108)
GLUCOSE BLD-MCNC: 131 MG/DL (ref 70–108)
GLUCOSE URINE: NEGATIVE MG/DL
HBA1C MFR BLD: 6 % (ref 4.4–6.4)
HCT VFR BLD CALC: 35 % (ref 37–47)
HDLC SERPL-MCNC: 49 MG/DL
HEMOGLOBIN: 11.4 GM/DL (ref 12–16)
HOMOCYSTEINE: 11 UMOL/L
IMMATURE GRANS (ABS): 0.05 THOU/MM3 (ref 0–0.07)
IMMATURE GRANULOCYTES: 0.9 %
INR BLD: 1.02 (ref 0.85–1.13)
KETONES, URINE: NEGATIVE
LDL CHOLESTEROL CALCULATED: 84 MG/DL
LEUKOCYTE ESTERASE, URINE: NEGATIVE
LIPASE: 26.9 U/L (ref 5.6–51.3)
LYMPHOCYTES # BLD: 31.8 %
LYMPHOCYTES ABSOLUTE: 1.7 THOU/MM3 (ref 1–4.8)
MAGNESIUM: 1.9 MG/DL (ref 1.6–2.4)
MCH RBC QN AUTO: 31 PG (ref 26–33)
MCHC RBC AUTO-ENTMCNC: 32.6 GM/DL (ref 32.2–35.5)
MCV RBC AUTO: 95.1 FL (ref 81–99)
MONOCYTES # BLD: 7.8 %
MONOCYTES ABSOLUTE: 0.4 THOU/MM3 (ref 0.4–1.3)
NITRITE, URINE: NEGATIVE
NUCLEATED RED BLOOD CELLS: 0 /100 WBC
OSMOLALITY CALCULATION: 275.8 MOSMOL/KG (ref 275–300)
PH UA: 5.5 (ref 5–9)
PLATELET # BLD: 191 THOU/MM3 (ref 130–400)
PMV BLD AUTO: 11.2 FL (ref 9.4–12.4)
POTASSIUM REFLEX MAGNESIUM: 3.8 MEQ/L (ref 3.5–5.2)
POTASSIUM SERPL-SCNC: 3.5 MEQ/L (ref 3.5–5.2)
PROTEIN UA: NEGATIVE
RBC # BLD: 3.68 MILL/MM3 (ref 4.2–5.4)
SEG NEUTROPHILS: 55.6 %
SEGMENTED NEUTROPHILS ABSOLUTE COUNT: 3 THOU/MM3 (ref 1.8–7.7)
SODIUM BLD-SCNC: 137 MEQ/L (ref 135–145)
SODIUM BLD-SCNC: 140 MEQ/L (ref 135–145)
SPECIFIC GRAVITY, URINE: > 1.03 (ref 1–1.03)
TOTAL PROTEIN: 5.7 G/DL (ref 6.1–8)
TRIGL SERPL-MCNC: 106 MG/DL (ref 0–199)
TROPONIN T: < 0.01 NG/ML
TSH SERPL DL<=0.05 MIU/L-ACNC: 2.89 UIU/ML (ref 0.4–4.2)
UROBILINOGEN, URINE: 0.2 EU/DL (ref 0–1)
WBC # BLD: 5.4 THOU/MM3 (ref 4.8–10.8)

## 2020-07-09 PROCEDURE — 92610 EVALUATE SWALLOWING FUNCTION: CPT

## 2020-07-09 PROCEDURE — 6370000000 HC RX 637 (ALT 250 FOR IP): Performed by: FAMILY MEDICINE

## 2020-07-09 PROCEDURE — G0378 HOSPITAL OBSERVATION PER HR: HCPCS

## 2020-07-09 PROCEDURE — 82948 REAGENT STRIP/BLOOD GLUCOSE: CPT

## 2020-07-09 PROCEDURE — 71045 X-RAY EXAM CHEST 1 VIEW: CPT

## 2020-07-09 PROCEDURE — 84443 ASSAY THYROID STIM HORMONE: CPT

## 2020-07-09 PROCEDURE — 84484 ASSAY OF TROPONIN QUANT: CPT

## 2020-07-09 PROCEDURE — 2580000003 HC RX 258: Performed by: FAMILY MEDICINE

## 2020-07-09 PROCEDURE — 80162 ASSAY OF DIGOXIN TOTAL: CPT

## 2020-07-09 PROCEDURE — 36415 COLL VENOUS BLD VENIPUNCTURE: CPT

## 2020-07-09 PROCEDURE — 96372 THER/PROPH/DIAG INJ SC/IM: CPT

## 2020-07-09 PROCEDURE — 85610 PROTHROMBIN TIME: CPT

## 2020-07-09 PROCEDURE — 70450 CT HEAD/BRAIN W/O DYE: CPT

## 2020-07-09 PROCEDURE — 83690 ASSAY OF LIPASE: CPT

## 2020-07-09 PROCEDURE — 80053 COMPREHEN METABOLIC PANEL: CPT

## 2020-07-09 PROCEDURE — 6370000000 HC RX 637 (ALT 250 FOR IP): Performed by: NURSE PRACTITIONER

## 2020-07-09 PROCEDURE — G0425 INPT/ED TELECONSULT30: HCPCS | Performed by: PSYCHIATRY & NEUROLOGY

## 2020-07-09 PROCEDURE — 85025 COMPLETE CBC W/AUTO DIFF WBC: CPT

## 2020-07-09 PROCEDURE — 99253 IP/OBS CNSLTJ NEW/EST LOW 45: CPT | Performed by: PSYCHIATRY & NEUROLOGY

## 2020-07-09 PROCEDURE — 2060000000 HC ICU INTERMEDIATE R&B

## 2020-07-09 PROCEDURE — 83735 ASSAY OF MAGNESIUM: CPT

## 2020-07-09 PROCEDURE — 83090 ASSAY OF HOMOCYSTEINE: CPT

## 2020-07-09 PROCEDURE — 83036 HEMOGLOBIN GLYCOSYLATED A1C: CPT

## 2020-07-09 PROCEDURE — 93005 ELECTROCARDIOGRAM TRACING: CPT | Performed by: NURSE PRACTITIONER

## 2020-07-09 PROCEDURE — 85730 THROMBOPLASTIN TIME PARTIAL: CPT

## 2020-07-09 PROCEDURE — 70498 CT ANGIOGRAPHY NECK: CPT

## 2020-07-09 PROCEDURE — 94760 N-INVAS EAR/PLS OXIMETRY 1: CPT

## 2020-07-09 PROCEDURE — 81003 URINALYSIS AUTO W/O SCOPE: CPT

## 2020-07-09 PROCEDURE — 6370000000 HC RX 637 (ALT 250 FOR IP): Performed by: PHYSICIAN ASSISTANT

## 2020-07-09 PROCEDURE — 6360000004 HC RX CONTRAST MEDICATION: Performed by: NURSE PRACTITIONER

## 2020-07-09 PROCEDURE — 80061 LIPID PANEL: CPT

## 2020-07-09 PROCEDURE — 70496 CT ANGIOGRAPHY HEAD: CPT

## 2020-07-09 PROCEDURE — 99223 1ST HOSP IP/OBS HIGH 75: CPT | Performed by: FAMILY MEDICINE

## 2020-07-09 PROCEDURE — 6360000002 HC RX W HCPCS: Performed by: FAMILY MEDICINE

## 2020-07-09 PROCEDURE — 82248 BILIRUBIN DIRECT: CPT

## 2020-07-09 RX ORDER — ASPIRIN 81 MG/1
81 TABLET, CHEWABLE ORAL DAILY
Status: DISCONTINUED | OUTPATIENT
Start: 2020-07-10 | End: 2020-07-10 | Stop reason: HOSPADM

## 2020-07-09 RX ORDER — LEVOTHYROXINE SODIUM 0.1 MG/1
100 TABLET ORAL DAILY
Status: DISCONTINUED | OUTPATIENT
Start: 2020-07-09 | End: 2020-07-10 | Stop reason: HOSPADM

## 2020-07-09 RX ORDER — PROMETHAZINE HYDROCHLORIDE 25 MG/1
12.5 TABLET ORAL EVERY 6 HOURS PRN
Status: DISCONTINUED | OUTPATIENT
Start: 2020-07-09 | End: 2020-07-10 | Stop reason: HOSPADM

## 2020-07-09 RX ORDER — PREDNISONE 20 MG/1
60 TABLET ORAL DAILY
Status: DISCONTINUED | OUTPATIENT
Start: 2020-07-09 | End: 2020-07-09

## 2020-07-09 RX ORDER — SODIUM CHLORIDE 0.9 % (FLUSH) 0.9 %
10 SYRINGE (ML) INJECTION EVERY 12 HOURS SCHEDULED
Status: DISCONTINUED | OUTPATIENT
Start: 2020-07-09 | End: 2020-07-10 | Stop reason: HOSPADM

## 2020-07-09 RX ORDER — VENLAFAXINE HYDROCHLORIDE 37.5 MG/1
37.5 CAPSULE, EXTENDED RELEASE ORAL DAILY
Status: DISCONTINUED | OUTPATIENT
Start: 2020-07-09 | End: 2020-07-10 | Stop reason: HOSPADM

## 2020-07-09 RX ORDER — ONDANSETRON 2 MG/ML
4 INJECTION INTRAMUSCULAR; INTRAVENOUS EVERY 6 HOURS PRN
Status: DISCONTINUED | OUTPATIENT
Start: 2020-07-09 | End: 2020-07-10 | Stop reason: HOSPADM

## 2020-07-09 RX ORDER — ATORVASTATIN CALCIUM 40 MG/1
40 TABLET, FILM COATED ORAL NIGHTLY
Status: DISCONTINUED | OUTPATIENT
Start: 2020-07-09 | End: 2020-07-10 | Stop reason: HOSPADM

## 2020-07-09 RX ORDER — ACETAMINOPHEN 650 MG/1
650 SUPPOSITORY RECTAL EVERY 6 HOURS PRN
Status: DISCONTINUED | OUTPATIENT
Start: 2020-07-09 | End: 2020-07-10 | Stop reason: HOSPADM

## 2020-07-09 RX ORDER — CLOPIDOGREL 300 MG/1
300 TABLET, FILM COATED ORAL ONCE
Status: COMPLETED | OUTPATIENT
Start: 2020-07-09 | End: 2020-07-09

## 2020-07-09 RX ORDER — DIGOXIN 125 MCG
125 TABLET ORAL DAILY
Status: DISCONTINUED | OUTPATIENT
Start: 2020-07-09 | End: 2020-07-10 | Stop reason: HOSPADM

## 2020-07-09 RX ORDER — PREDNISONE 20 MG/1
60 TABLET ORAL ONCE
Status: COMPLETED | OUTPATIENT
Start: 2020-07-09 | End: 2020-07-09

## 2020-07-09 RX ORDER — SODIUM CHLORIDE 0.9 % (FLUSH) 0.9 %
10 SYRINGE (ML) INJECTION PRN
Status: DISCONTINUED | OUTPATIENT
Start: 2020-07-09 | End: 2020-07-10 | Stop reason: HOSPADM

## 2020-07-09 RX ORDER — ASPIRIN 81 MG/1
324 TABLET, CHEWABLE ORAL ONCE
Status: COMPLETED | OUTPATIENT
Start: 2020-07-09 | End: 2020-07-09

## 2020-07-09 RX ORDER — ACETAMINOPHEN 325 MG/1
650 TABLET ORAL EVERY 6 HOURS PRN
Status: DISCONTINUED | OUTPATIENT
Start: 2020-07-09 | End: 2020-07-10 | Stop reason: HOSPADM

## 2020-07-09 RX ADMIN — CLOPIDOGREL BISULFATE 300 MG: 300 TABLET, FILM COATED ORAL at 02:08

## 2020-07-09 RX ADMIN — PREDNISONE 60 MG: 20 TABLET ORAL at 02:08

## 2020-07-09 RX ADMIN — DIGOXIN 125 MCG: 125 TABLET ORAL at 08:39

## 2020-07-09 RX ADMIN — VENLAFAXINE HYDROCHLORIDE 37.5 MG: 37.5 CAPSULE, EXTENDED RELEASE ORAL at 08:39

## 2020-07-09 RX ADMIN — LEVOTHYROXINE SODIUM 100 MCG: 100 TABLET ORAL at 06:01

## 2020-07-09 RX ADMIN — ASPIRIN 81 MG 324 MG: 81 TABLET ORAL at 02:09

## 2020-07-09 RX ADMIN — Medication 5 DROP: at 23:52

## 2020-07-09 RX ADMIN — Medication 10 ML: at 08:40

## 2020-07-09 RX ADMIN — ATORVASTATIN CALCIUM 40 MG: 40 TABLET, FILM COATED ORAL at 20:32

## 2020-07-09 RX ADMIN — IOPAMIDOL 80 ML: 755 INJECTION, SOLUTION INTRAVENOUS at 00:51

## 2020-07-09 RX ADMIN — ENOXAPARIN SODIUM 40 MG: 40 INJECTION SUBCUTANEOUS at 08:40

## 2020-07-09 RX ADMIN — Medication 10 ML: at 20:32

## 2020-07-09 RX ADMIN — Medication 1 DROP: at 08:39

## 2020-07-09 ASSESSMENT — PAIN SCALES - GENERAL
PAINLEVEL_OUTOF10: 0
PAINLEVEL_OUTOF10: 0

## 2020-07-09 ASSESSMENT — ENCOUNTER SYMPTOMS
NAUSEA: 0
CHEST TIGHTNESS: 0
FACIAL SWELLING: 0
SHORTNESS OF BREATH: 0
BACK PAIN: 0
COUGH: 0
RHINORRHEA: 0
VOMITING: 0
ABDOMINAL PAIN: 0

## 2020-07-09 NOTE — PROGRESS NOTES
Kidney stones     PONV (postoperative nausea and vomiting)     Prediabetes 10/7/2019    Thyroid disease        SUBJECTIVE:  *[facility] approved BSE although does report, \"since admission all symptoms have resolved. \"   Patient seen at bedside; alert and very pleasant. No family present. Patient did confirm, \"I feel fine now. No swelling, tingling or pain. \"     OBJECTIVE:    Pain:  No pain reported. Current Diet: General diet with thin liquids entered within epic documentation system     Respiratory Status:  Independent    Behavioral Observation:  Alert and Oriented    Oral Mechanism Evaluation:      Facial / Labial WFL    Lingual WFL    Dentition WFL    Velum WFL    Vocal Quality WFL    Sensation WFL    Cough Not Tested      Patient Evaluated Using: Thin liquids via cup and straw, puree, hard solids     Oral Phase:  WFL    Pharyngeal Phase: WFL:  Pharyngeal phase appears WFL but cannot rule out pharyngeal phase deficits from a bedside swallowing evaluation alone. Signs and Symptoms of Laryngeal Penetration/Aspiration: No signs/symptoms of aspiration evident in this evaluation, but cannot rule out silent aspiration. Impresssions: Patient presents with swallowing function that is essentially [facility]. All lingual/labial/pharyngeal structures present to be intact and functioning approprietly. Patient completed PO trials of thin, puree, hard solids without s/s of aspiration. Patient demonstrated with timely, effective mastication, bolus formation, timely swallow trigger, adequate laryngeal elevation, effective oral clearance, no s/s of aspiration with all consistencies, vocal quality dry and clear. Patient confirming, \"all symptoms have resolved at this time and I do not have any hx of swallowing difficulties. \"  [facility] reports, \"plans for patient to complete MRI later this date. \" ST recommending regular diet with thin liquids at this time + ongoing speech therapy service within inpatient setting to complete skilled dietary analysis 1-2x while monitoring MRI results. Will modify POC as clinically appropriate pending MRI results. RECOMMENDATIONS/ASSESSMENT:   Modified Barium Swallow:  MBS is not indicated at this time. Will recommend as appropriate  Diet Recommendations:  Regular diet with thin liquids   Strategies:  Full Upright Position, Small Bite/Sip and Pulmonary Monitoring   Rehabilitation Potential: good    EDUCATION:  Learner: Patient  Education:  Reviewed results and recommendations of this evaluation, Reviewed diet and strategies, Reviewed signs, symptoms and risks of aspiration, Reviewed ST goals and Plan of Care, Reviewed recommendations for follow-up, Education Related to Potential Risks and Complications Due to Impairment/Illness/Injury and Education Related to Prevention of Recurrence of Impairment/Illness/Injury  Evaluation of Education: Verbalizes understanding, Demonstrates with assistance and Family not present    PLAN:  Skilled SLP intervention on acute care 3-5 x per week or until goals met and/or pt plateaus in function. Specific interventions for next session may include: dietary analysis 1-2x, complete furthur evaluates as clinically appropriate pending MRI results. PATIENT GOAL:    Did not state. Will further assess during treatment.     SHORT TERM GOALS:  Short-term Goals  Timeframe for Short-term Goals: 1 week  Goal 1: Patient will consume regular diet with thin liquids without s/s of aspiration in order to safely maintain adequate hydration and nutrition  Goal 2: Complete furthur evaulations pending MRI results only as clinically appropriate     LONG TERM GOALS:  No LTGs due to short [address] [facility] [address] [address]

## 2020-07-09 NOTE — ED NOTES
Pt ambulates to restroom and back without difficulty.        Chloe, [address] [facility]  07/09/20 3455

## 2020-07-09 NOTE — PROGRESS NOTES
Patient admitted to [facility] Room 09 in a wheelchair and from ED  Complaint upon arrival to the room none  IV site free of s/s of infection or infiltration. Vital signs obtained. Assessment and data collection initiated. Oriented to room. Policies and procedures for 4a explained All questions answered with no further questions at this time. Fall prevention and safety brochure discussed with patient. 2 person skin check completed with this RN and Bradford Check RN. Patient declines PCP notification  Patient declines family notification. Name & number of designated person to update during visitor restriction times:     Patient/family has been educated on their personal risk factors. They have been given hand outs on the following medications: Lipitor, Aspirin  Treatment for stroke includes: Risk factor modifications  Following the medication regime prescribed by physician. Educated on [employer]  All patient/family questions were answered and teach back method was utilized.

## 2020-07-09 NOTE — ED NOTES
Code Stroke advised at this time by Kelsea Resendez NP.        [facility]  07/09/20 [id number]

## 2020-07-09 NOTE — ED NOTES
Pt to the ED with complaints of left sided facial swelling. Pt ambulates steadily to room 34. Pt states that swelling started around 1-2 hours ago. Pt denies any history of this. Pt states that she has not started any new medications and is not allergic to anything. Pt denies any pain. Pt denies any swelling anywhere else. Pt denies any difficulty breathing or swallowing. VS obtained.       Dot Polk RN  07/08/20 [id number]

## 2020-07-09 NOTE — VIRTUAL HEALTH
Consults  Patient Location:  [facility] [place] Stroke and Vascular Neurology Consult for  [facility] Stroke Alert through [address] @ 00:55  7/9/2020 1:40 AM  Pt Name: Castro Bowers  MRN: [id number]  YOB: 1960  Date of evaluation: 7/9/2020  Primary Care Physician: ANGELIA Vora CNP  Reason for Evaluation: Stroke Evaluation with Discussion with Ed or primary team with Telemedicine and stroke evaluation with Review of imaging and labs    Castro Bowers is a 62 y.o. female with history of CHF, hypertension and conductive hearing loss who presents with left facial weakness. The patient states that her symptoms began at approximately 2200 this evening. She said that the left side of her face felt \"tingly\" and then she developed speech difficulty because the left side of her face had become weak. She subsequently went to the ED for evaluation. There a code stroke was called. CT head was performed which showed no acute intracranial process and CTA head and neck was performed which showed no LVO or significant stenosis. Of note, the patient states that she has a long history of hearing loss, tinnitus and dizziness. Allergies  has No Known Allergies. Medications  Prior to Admission medications    Medication Sig Start Date End Date Taking?  Authorizing Provider   carbamide peroxide (DEBROX) 6.5 % otic solution Place 5 drops in ear(s) 2 times daily 7/6/20 8/5/20  ANGELIA Briceno CNP   polyethylene glycol [facility]) 17 GM/SCOOP powder Take 17 g by mouth daily 6/26/20 7/26/20  ANGELIA Cardona CNP   ENTRESTO 49-51 MG per tablet Take 1 tablet by mouth twice daily 6/8/20   ANGELIA Ruano CNP   spironolactone (ALDACTONE) 25 MG tablet Take 1 tablet by mouth once daily 5/19/20   ANGELIA Ruano CNP   furosemide (LASIX) 20 MG tablet Take 1 tablet by mouth daily as needed (leg swelling, weight gain) 4/13/20   [facility], APRN - CNP   venlafaxine (EFFEXOR XR) 37.5 MG extended release capsule Take 1 capsule by mouth daily 4/7/20   Kacy Nicole DO   digoxin (LANOXIN) 125 MCG tablet Take 1 tablet by mouth daily 1/30/20   ANGELIA Ruano - CNP   omeprazole (PRILOSEC) 20 MG delayed release capsule Take 1 capsule by mouth every morning (before breakfast) 12/4/19   ANGELIA Gallardo CNP   atorvastatin (LIPITOR) 40 MG tablet Take 1 tablet by mouth nightly 10/7/19   [facility] - [facility]   levothyroxine (SYNTHROID) 100 MCG tablet Take 1 tablet by mouth daily 10/7/19   [facility]   letrozole [facility]) 2.5 MG tablet Take 1 tablet by mouth daily 7/11/19   Rachel Deluna MD   carvedilol (COREG) 3.125 MG tablet TAKE 1 TABLET BY MOUTH TWICE DAILY 5/10/19   Waqar Benavides PA-C   acetaminophen (TYLENOL) 500 MG tablet Take 500 mg by mouth every 6 hours as needed for Pain    Historical Provider, MD    Scheduled Meds:  Continuous Infusions:  PRN Meds:.  Past Medical History   has a past medical history of Abnormal heart rhythm, Anxiety, Cancer ([facility] [address] CHF (congestive heart failure) ([facility] [address] Congenital heart disease, DJD (degenerative joint disease), Enlarged lymph nodes, Hypertension, Hypothyroidism, ICD (implantable cardioverter-defibrillator) in place, Kidney stones, PONV (postoperative nausea and vomiting), Prediabetes, and Thyroid disease.   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 Ischemic injury is less likely, however, patient does have risk factors for stroke including CHF, hypertension and diabetes. 5. Will admit for MRI brain without contrast.  Load with aspirin 325 and Plavix 300. 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 Due to low grade, do not recommend Valacyclovir. If MRI is negative for acute ischemic event, patient will continue Prednisone for 7 days and follow up in neurology clinic. 6. Patient may have Meniere Disease with her history of tinnitus, hearing loss and vertigo. She may benefit from evaluation by ENT with subsequent vestibular rehabilitation. Discussed with ED Staff    At least 30 min of Telemedicine and time in conversation directly with ED staff and physician for the patient who is in imminent and life threatening deterioration without further treatment and evaluation. This Virtual Visit was conducted with patient's (and/or legal guardian's) consent, to provide telestroke consultation and necessary medical care. Time spent examining patient, reviewing the images personally, reviewing the chart, perform high complexity decision making and speaking with the nursing staff regarding recommendations        Yunior Boogie MD   Stroke, Neurocritical Care And/or [address] [place] [address] Stroke [address]   Electronically signed 7/9/2020 at 1:40 AM      Provider Location ([facility]):   [place]/[address]    This virtual visit was conducted via interactive/real-time audio/video.

## 2020-07-09 NOTE — PLAN OF CARE
Problem: Falls - Risk of:  Goal: Will remain free from falls  Description: Will remain free from falls  7/9/2020 1304 by Yanely Hernández RN  Outcome: Ongoing  Note: Patient remains free from falls this shift. Call light and bedside table within reach. Bed in lowest position with alarm on. Rounding hourly. Problem: Discharge Planning:  Goal: Discharged to appropriate level of care  Description: Discharged to appropriate level of care  7/9/2020 1304 by Yanely Hernández RN  Outcome: Ongoing  Note: Patient from home and possible discharge today pending MRI. Problem: Skin Integrity:  Goal: Will show no infection signs and symptoms  Description: Will show no infection signs and symptoms  7/9/2020 1304 by Yanely Hernández RN  Outcome: Ongoing  Note: No new skin issues noted this shift. Turns and repositions self. Problem: Pain:  Description: Pain management should include both nonpharmacologic and pharmacologic interventions. Goal: Pain level will decrease  Description: Pain level will decrease  7/9/2020 1304 by Yanely Hernández RN  Outcome: Ongoing  Note: Pain Assessment: 0-10  Pain Level: 0   Patient's Stated Pain Goal: No pain   Is pain goal met at this time? Yes     Non-Pharmaceutical Pain Intervention(s): Repositioned, Rest       Problem: HEMODYNAMIC STATUS  Goal: Patient has stable vital signs and fluid balance  7/9/2020 1304 by Yanely Hernández RN  Outcome: Ongoing  Note: VSS. Problem: ACTIVITY INTOLERANCE/IMPAIRED MOBILITY  Goal: Mobility/activity is maintained at optimum level for patient  7/9/2020 1304 by Yanely Hernández RN  Outcome: Ongoing  Note: Up with standby assist.      Problem: COMMUNICATION IMPAIRMENT  Goal: Ability to express needs and understand communication  7/9/2020 1304 by Yanely Hernández RN  Outcome: Ongoing  Note: Alert and oriented x4 this shift. Care plan reviewed with patient.  Patient verbalizes understanding of the plan of care and contribute to goal setting.

## 2020-07-09 NOTE — ED PROVIDER NOTES
[facility]  Pt Name: Gabrielle Calixto  MRN: [id number]  [place] 1960  Date of evaluation: 7/8/2020  Provider: ANGELIA Pérez CNP    CHIEF COMPLAINT       Chief Complaint   Patient presents with    Facial Swelling       Nurses Notes reviewed and I agree except as noted in the HPI. HISTORY OF PRESENT ILLNESS    Gabrielle Calixto is a 62 y.o. female whopresents to the emergency department from home. The Patient claimed that she had not felt well all day, around 10pm 7/8 she felt what she described as a \"crawling\" and \"strange\" sensation on the left side of her face from her ear to her lips, she also claimed numbness on her tongue on the affected side . The sensation was sudden in onset and she tried to place ice on the area, which had no affect. Nothing appears to make the sensation worsen or improve. Onset: 2-2.5 hours  Location: L-face  Duration: 2-2.5 hours  Aggravating factors: none  Radiation: none  Timing: concern over unusual feeling  Severity: mild    Experienced previously: none    HPI was provided by the patient. Triage notes and Nursing notes were reviewed by myself. Any discrepancies are addressed above. REVIEW OF SYSTEMS     Review of Systems   Constitutional: Positive for fatigue. Negative for activity change, appetite change, chills, diaphoresis and fever. HENT: Positive for tinnitus (chronic but different than what she is feeling now). Negative for congestion, ear discharge, ear pain, facial swelling, hearing loss, postnasal drip and rhinorrhea. Respiratory: Negative for cough, chest tightness and shortness of breath. Cardiovascular: Negative for chest pain and palpitations. Gastrointestinal: Negative for abdominal pain, nausea and vomiting. Genitourinary: Negative for difficulty urinating, dysuria, enuresis, flank pain and hematuria. Musculoskeletal: Negative for arthralgias, back pain, gait problem and joint swelling.    Neurological: Positive for dizziness and facial asymmetry. Negative for speech difficulty, weakness, light-headedness, numbness and headaches. Psychiatric/Behavioral: Negative for agitation, behavioral problems and confusion. All pertinent systems were reviewed and are negative unless indicated in the HPI. PAST MEDICAL HISTORY    has a past medical history of Abnormal heart rhythm, Anxiety, Cancer (HCC), CHF (congestive heart failure) ([facility] [address] Congenital heart disease, DJD (degenerative joint disease), Enlarged lymph nodes, Hypertension, Hypothyroidism, ICD (implantable cardioverter-defibrillator) in place, Kidney stones, PONV (postoperative nausea and vomiting), Prediabetes, and Thyroid disease. SURGICAL HISTORY      has a past surgical history that includes Mastectomy, bilateral; Tonsillectomy; Percutaneous Transluminal Coronary Angio; Appendectomy; Colonoscopy; Carpal tunnel release (2019); and Finger trigger release (Right, 6/25/2020).     CURRENT MEDICATIONS       Current Discharge Medication List      CONTINUE these medications which have NOT CHANGED    Details   polyethylene glycol (GLYCOLAX) 17 GM/SCOOP powder Take 17 g by mouth daily  Qty: 1530 g, Refills: 1    Associated Diagnoses: Chronic idiopathic constipation      ENTRESTO 49-51 MG per tablet Take 1 tablet by mouth twice daily  Qty: 60 tablet, Refills: 5      venlafaxine (EFFEXOR XR) 37.5 MG extended release capsule Take 1 capsule by mouth daily  Qty: 90 capsule, Refills: 3    Associated Diagnoses: Depression with anxiety      digoxin (LANOXIN) 125 MCG tablet Take 1 tablet by mouth daily  Qty: 90 tablet, Refills: 3      omeprazole (PRILOSEC) 20 MG delayed release capsule Take 1 capsule by mouth every morning (before breakfast)  Qty: 30 capsule, Refills: 6    Associated Diagnoses: Gastroesophageal reflux disease, esophagitis presence not specified; Daily nausea      atorvastatin (LIPITOR) 40 MG tablet Take 1 tablet by mouth nightly  Qty: 90 tablet, Refills: 3    Associated Diagnoses: Mixed hyperlipidemia      levothyroxine (SYNTHROID) 100 MCG tablet Take 1 tablet by mouth daily  Qty: 90 tablet, Refills: 3    Associated Diagnoses: Hypothyroidism, unspecified type      letrozole (FEMARA) 2.5 MG tablet Take 1 tablet by mouth daily  Qty: 90 tablet, Refills: 3    Associated Diagnoses: Malignant neoplasm of left breast in female, estrogen receptor positive, unspecified site of breast ([facility] Use of letrozole (Femara)      carvedilol (COREG) 3.125 MG tablet TAKE 1 TABLET BY MOUTH TWICE DAILY  Qty: 180 tablet, Refills: 3    Comments: Please consider 90 day supplies to promote better adherence  Associated Diagnoses: Chronic systolic (congestive) heart failure ([facility] [address] Essential hypertension      carbamide peroxide (DEBROX) 6.5 % otic solution Place 5 drops in ear(s) 2 times daily  Qty: 1 Bottle, Refills: 1    Associated Diagnoses: Impacted cerumen of right ear      spironolactone (ALDACTONE) 25 MG tablet Take 1 tablet by mouth once daily  Qty: 90 tablet, Refills: 0      furosemide (LASIX) 20 MG tablet Take 1 tablet by mouth daily as needed (leg swelling, weight gain)  Qty: 90 tablet, Refills: 0      acetaminophen (TYLENOL) 500 MG tablet Take 500 mg by mouth every 6 hours as needed for Pain             ALLERGIES     has No Known Allergies. FAMILY HISTORY     She indicated that her mother is alive. She indicated that her father is . She indicated that both of her brothers are alive. She indicated that the status of her maternal grandfather is unknown. She indicated that the status of her neg hx is unknown.   family history includes Cancer in her father and maternal grandfather; Esther Dross in her father; Colon Polyps in her brother; Dementia in her mother; High Blood Pressure in her mother; Mental Retardation in her brother. SOCIAL HISTORY      reports that she has been smoking cigarettes. She started smoking about 38 years ago.  She has a 9.25 pack-year smoking history. She has never used smokeless tobacco. She reports that she does not drink alcohol or use drugs. PHYSICAL EXAM     INITIAL VITALS:  height is 5' (1.524 m) and weight is 200 lb (90.7 kg). Her oral temperature is 97.9 °F (36.6 °C). Her blood pressure is 128/82 and her pulse is 61. Her respiration is 18 and oxygen saturation is 95%. Physical Exam  Vitals signs and nursing note reviewed. Constitutional:       General: She is not in acute distress. Appearance: Normal appearance. She is well-developed. She is not diaphoretic. HENT:      Head: Normocephalic and atraumatic. Nose: Nose normal.      Mouth/Throat:      Mouth: Mucous membranes are moist.      Pharynx: Oropharynx is clear. Eyes:      General:         Right eye: No discharge. Left eye: No discharge. Extraocular Movements: Extraocular movements intact. Conjunctiva/sclera: Conjunctivae normal.      Pupils: Pupils are equal, round, and reactive to light. Neck:      Musculoskeletal: Normal range of motion. Trachea: No tracheal deviation. Cardiovascular:      Rate and Rhythm: Normal rate and regular rhythm. Heart sounds: Normal heart sounds. No murmur. No gallop. Comments: Normal capillary refill  Pulmonary:      Effort: Pulmonary effort is normal. No respiratory distress. Breath sounds: Normal breath sounds. No stridor. Abdominal:      General: Bowel sounds are normal.      Palpations: Abdomen is soft. Musculoskeletal: Normal range of motion. General: No tenderness or deformity. Skin:     General: Skin is warm and dry. Capillary Refill: Capillary refill takes less than 2 seconds. Coloration: Skin is not pale. Findings: No erythema or rash. Neurological:      Mental Status: She is alert and oriented to person, place, and time. Cranial Nerves: Cranial nerve deficit present. Sensory: No sensory deficit. Motor: No weakness.       Coordination: Coordination normal.      Comments: Left sided facial droop with forehead sparing    Psychiatric:         Mood and Affect: Mood normal.         Behavior: Behavior normal.           DIFFERENTIAL DIAGNOSIS:   Including but not limited to   1. Acute cerebral infarct  2. Bell's palsy   3. HZV    DIAGNOSTIC RESULTS     EKG: AllEKG's are interpreted by the Emergency Department Physician who either signs or Co-signs this chart in the absence of a cardiologist.     EKG 12 Lead (Preliminary result)    Component (Lab Inquiry)   Collection Time Result Time Ventricular Rate Atrial Rate P-R Interval QRS Duration Q-T Interval QTc Calculation (Bazett) P Axis R Axis T Axis   07/09/20 01:43:51 07/09/20 01:44:07 62 62 190 94 452 458 44 -6 20   Previous Results   06/19/20 09:07:48 06/19/20 09:07:48 63 63 180 92 428 437 37 -15 22   02/28/20 10:54:14 02/28/20 10:54:14 68 68 182 92 406 431 41 -18 4   11/25/19 09:53:32 11/25/19 09:53:32 64 64 174 90 398 410 39 -14 88   02/27/19 06:43:28 02/27/19 07:53:02 73 73 180 94 444 489 36 -19 -6   02/26/19 10:52:39 02/26/19 10:52:39 80 80 176 94 412 475 36 -20 -9         Preliminary result                Narrative:    Normal sinus rhythm  Normal ECG  When compared with ECG of 19-JUN-2020 09:07,  Nonspecific T wave abnormality has improved in Lateral leads                  RADIOLOGY: non-plain film images(s) such as CT, Ultrasound and MRI are read by the radiologist.  Plain radiographic images are visualized and preliminarily interpreted by the emergencyphysician unless otherwise stated below. XR CHEST PORTABLE   Final Result      No acute cardiopulmonary disease. Mild cardiomegaly. **This report has been created using voice recognition software. It may contain minor errors which are inherent in voice recognition technology. **      Final report electronically signed by Dr. Yanni Fulton on 7/9/2020 1:32 AM      CT HEAD WO CONTRAST   Final Result      No acute ischemic infarct, hemorrhage, or mass effect. **This report has been created using voice recognition software. It may contain minor errors which are inherent in voice recognition technology. **      Final report electronically signed by Dr. Mari Ba on 7/9/2020 1:14 AM      CTA HEAD W WO CONTRAST         CTA NECK W WO CONTRAST         MRI BRAIN WO CONTRAST    (Results Pending)         LABS:   Labs Reviewed   BASIC METABOLIC PANEL - Abnormal; Notable for the following components:       Result Value    Glucose 119 (*)     All other components within normal limits   CBC WITH AUTO DIFFERENTIAL - Abnormal; Notable for the following components:    RBC 3.68 (*)     Hemoglobin 11.4 (*)     Hematocrit 35.0 (*)     RDW-SD 47.7 (*)     All other components within normal limits   HEPATIC FUNCTION PANEL - Abnormal; Notable for the following components:     Total Bilirubin <0.2 (*)     Total Protein 5.7 (*)     All other components within normal limits   URINE RT REFLEX TO CULTURE - Abnormal; Notable for the following components:    Specific Gravity, Urine > 1.030 (*)     All other components within normal limits   GLOMERULAR FILTRATION RATE, ESTIMATED - Abnormal; Notable for the following components:    Est, Glom Filt Rate 86 (*)     All other components within normal limits   DIGOXIN LEVEL - Abnormal; Notable for the following components:    Digoxin Lvl 0.4 (*)     All other components within normal limits   BASIC METABOLIC PANEL W/ REFLEX TO MG FOR LOW K - Abnormal; Notable for the following components:    Glucose 125 (*)     All other components within normal limits   GLOMERULAR FILTRATION RATE, ESTIMATED - Abnormal; Notable for the following components:    Est, Glom Filt Rate 86 (*)     All other components within normal limits   POCT GLUCOSE - Abnormal; Notable for the following components:    POC Glucose 131 (*)     All other components within normal limits   APTT   LIPASE   MAGNESIUM   PROTIME-INR   TROPONIN   TSH WITH REFLEX   ANION GAP OSMOLALITY   LIPID PANEL   HEMOGLOBIN A1C   ANION GAP   HOMOCYSTEINE, SERUM         EMERGENCYDEPARTMENT COURSE AND MEDICAL DECISION MAKING:   Vitals:    Vitals:    07/09/20 0130 07/09/20 0153 07/09/20 0240 07/09/20 0310   BP: 125/83 125/83 128/82    Pulse: 65 68 61    Resp: 18 18 18    Temp:   97.9 °F (36.6 °C)    TempSrc:   Oral    SpO2: 96% 97% 95%    Weight:   200 lb (90.7 kg)    Height:    5' (1.524 m)         Pertinent Labs & Imaging studies reviewed. (See chart for details)    ED Course as of Jul 09 0735   Thu Jul 09, 2020   0139 Dr. Delores Reyes Neurointensivist.  Head CT/CTA is okay. Likely is Bell's Palsy but recommends treating for both Bell's and ischemia. Prednisone 60 mg PO daily x 7 days.  and plavix 300 now. Plavix 75 and ASA daily. MRI of brain without contrast tomorrow. [KJ]      ED Course User Index  [KJ] Sheri Reveles, APRN - CNP         Controlled Substances Monitoring:     No flowsheet data found. NIH Stroke Scale  Interval: Baseline  Level of Consciousness (1a. ): Alert  LOC Questions (1b. ): Answers both correctly  LOC Commands (1c. ): Performs both tasks correctly  Best Gaze (2. ): Normal  Visual (3. ): No visual loss  Facial Palsy (4. ): partial facial palsy  Motor Arm, Left (5a. ): No drift  Motor Arm, Right (5b. ): No drift  Motor Leg, Left (6a. ): No drift  Motor Leg, Right (6b. ): No drift  Limb Ataxia (7. ): Absent  Sensory (8. ): Normal  Best Language (9. ): No aphasia  Dysarthria (10. ): Normal  Extinction and Inattention (11): No abnormality  Total: 1      The patient was seen and evaluated in atimely manner for left sided facial numbness. Appropriate labs and imaging are ordered and reviewed. On exam the patient has a left sided facial droop. It is mild. Vision is intact. It appears to be forehead sparing. DR. Delores Reyes, neurointerventionalist, is consulted. He indicated that CT and CTA are negative.   He recommends treating this as both a Bell's Palsy and an ischemic episode until we have a negative MRI. He recommends 60 mg prednisone x 7 days. 325 ASA, 300 Plavix. Then daily. I reviewed the findings with the patient. She is agreeable to admission for an MRI and neurological consult. Patient is admitted in stable condition. Strict return precautions and follow up instructions were discussed with the patient with which the patient agrees     Physical assessment findings, diagnostic testing(s) if applicable, and vital signs reviewed with patient/patient representative. Questions answered. Medications asdirected, including OTC medications for supportive care. Education provided on medications. Differential diagnosis(s) discussed with patient/patient representative. Home care/self care instructions reviewed withpatient/patient representative. Patient is to follow-up with family care provider in 2-3 days if no improvement. Patient is to go to the emergency department if symptoms worsen. Patient/patient representative isaware of care plan, questions answered, verbalizes understanding and is in agreement.      ED Medications administered this visit:   Medications   atorvastatin (LIPITOR) tablet 40 mg (has no administration in time range)   digoxin (LANOXIN) tablet 125 mcg (has no administration in time range)   levothyroxine (SYNTHROID) tablet 100 mcg (100 mcg Oral Given 7/9/20 0601)   venlafaxine (EFFEXOR XR) extended release capsule 37.5 mg (has no administration in time range)   sodium chloride flush 0.9 % injection 10 mL (has no administration in time range)   sodium chloride flush 0.9 % injection 10 mL (has no administration in time range)   acetaminophen (TYLENOL) tablet 650 mg (has no administration in time range)     Or   acetaminophen (TYLENOL) suppository 650 mg (has no administration in time range)   promethazine (PHENERGAN) tablet 12.5 mg (has no administration in time range)     Or   ondansetron (ZOFRAN) injection 4 mg (has no administration in time range)   enoxaparin (LOVENOX) injection 40 mg (has no administration in time range)   glycerin-hypromellose- 0.2-0.2-1 % opthalmic solution 1 drop (has no administration in time range)   aspirin chewable tablet 81 mg (has no administration in time range)   predniSONE (DELTASONE) tablet 60 mg (has no administration in time range)   iopamidol (ISOVUE-370) 76 % injection 80 mL (80 mLs Intravenous Given 7/9/20 0051)   predniSONE (DELTASONE) tablet 60 mg (60 mg Oral Given 7/9/20 0208)   aspirin chewable tablet 324 mg (324 mg Oral Given 7/9/20 0209)   clopidogrel (PLAVIX) tablet 300 mg (300 mg Oral Given 7/9/20 0208)           I have given the patient strict written and verbal instructions about care at home,follow-up, and signs and symptoms of worsening of condition and they did verbalize understanding. Patient was seen independently by myself. The Patient's final impression and disposition and plan was determined by myself. CRITICAL CARE:   There was a high probability of clinically significant/life threatening deterioration in this patient's condition which required my urgent intervention. Total critical care time was 20 minutes. This excludes any time for separately reportable procedures. CONSULTS:  None    PROCEDURES:  None    FINAL IMPRESSION      1.  Facial droop          DISPOSITION/PLAN   DISPOSITION Admitted 07/09/2020 02:21:36 AM        PATIENT REFERRED TO:  ANGELIA Brewer CNP  [address] [place] [address]  [phone]            DISCHARGE MEDICATIONS:  Current Discharge Medication List          (Please note that portions of this note were completed with a voice recognition program.  Efforts were made to edit thedictations but occasionally words are mis-transcribed.)    Glenys Goldberg, APRN - CNP Glenys Goldberg, APRN - CNP  07/09/20 [id number]

## 2020-07-09 NOTE — ED NOTES
Pt teleconferencing via StrokeBot at this time.        Chloe, [address] [facility]  07/09/20 2634

## 2020-07-09 NOTE — CONSULTS
injection 10 mL, 2 times per day  sodium chloride flush 0.9 % injection 10 mL, PRN  acetaminophen (TYLENOL) tablet 650 mg, Q6H PRN    Or  acetaminophen (TYLENOL) suppository 650 mg, Q6H PRN  promethazine (PHENERGAN) tablet 12.5 mg, Q6H PRN    Or  ondansetron (ZOFRAN) injection 4 mg, Q6H PRN  enoxaparin (LOVENOX) injection 40 mg, Daily  glycerin-hypromellose- 0.2-0.2-1 % opthalmic solution 1 drop, TID  [START ON 7/10/2020] aspirin chewable tablet 81 mg, Daily  predniSONE (DELTASONE) tablet 60 mg, Daily         Social History:  Social History     Tobacco Use   Smoking Status Current Some Day Smoker    Packs/day: 0.25    Years: 37.00    Pack years: 9.25    Types: Cigarettes    Start date: 11/12/1981   Smokeless Tobacco Never Used     Social History     Substance and Sexual Activity   Alcohol Use No     Social History     Substance and Sexual Activity   Drug Use No         Family History:       Problem Relation Age of Onset    High Blood Pressure Mother     Dementia Mother     Cancer Father     Colon Cancer Father     Mental Retardation Brother     Colon Polyps Brother     Cancer Maternal Grandfather     Esophageal Cancer Neg Hx        Review of Systems:  All systems reviewed and are all negative except what is mentioned in history of present illness. Physical Exam:  /65   Pulse 71   Temp 97 °F (36.1 °C) (Oral)   Resp 20   Ht 5' (1.524 m)   Wt 200 lb (90.7 kg)   SpO2 93%   BMI 39.06 kg/m²  I Body mass index is 39.06 kg/m².  I   Wt Readings from Last 1 Encounters:   07/09/20 200 lb (90.7 kg)           General appearance - alert, well appearing, and in no distress, oriented to person, place, and time and weight  Mental status -Level of Alertness: awake  Orientation: person, place, time  Memory: normal  Fund of Knowledge: normal  Attention/Concentration: normal  Language: normal. Mood is normal  Neck - supple, no neck adenopathy, carotids upstroke normal bilaterally, no carotid bruits. There is no LAP in the neck. There is no thyroid enlargement. Neurological -   Cranial [id number]:   Cranial nerve II: Normal. There is full visual fields  Cranial nerve III: Pupils: equal, round, reactive to light   Cranial nerves III, IV, VI: Extraocular Movements: intact   Cranial nerve V: Facial sensation: intact   Cranial nerve VII:Facial strength: intact   Cranial nerve VIII: Hearing: intact  Cranial nerve IX: Palate Elevation intact bilaterally  Cranial nerve XI: Shoulder shrug intact bilaterally  Cranial nerve XII: Tongue midline   neck supple without rigidity  Motor exam is 5/5 in the upper and lower extremities. Normal muscle tone. There is no muscle atrophy. There is no muscle fasciculation  Sensory is intact   Coordination: finger to nose intact  Gait and station not tested  Abnormal movement none. Long tracts are intact   Skin - no rashes or lesions, warm and dry to touch  Superficial temporal artery pulses are normal.   Musculoskeletal: Has no hand arthritis, no limitation of ROM in any of the four extremities, . no joint tenderness, deformity or swelling. There is no leg edema. The Heart was regular in rate and rhythm. No heart murmur  Chest- Clear  Abdomen: soft, intact bowel sounds. Labs:    CBC:   Recent Labs     07/09/20  0104   WBC 5.4   HGB 11.4*      MCV 95.1   MCH 31.0   MCHC 32.6     CMP:  Recent Labs     07/09/20  0104 07/09/20  0357    140   K 3.5 3.8    104   CO2 23 23   BUN 15 14   CREATININE 0.7 0.7   LABGLOM 86* 86*   GLUCOSE 119* 125*   CALCIUM 8.6 9.2     Liver:   Recent Labs     07/09/20 0104   AST 12   ALT 11   ALKPHOS 66   PROT 5.7*   LABALBU 3.6   BILITOT <0.2*     MRI BRAIN:  No results found for this or any previous visit. No results found for this or any previous visit.   Results for orders placed during the hospital encounter of 07/08/20   CTA HEAD W WO CONTRAST    Narrative PROCEDURE: CTA HEAD W WO CONTRAST, CTA NECK Sharda Chavez CONTRAST    CLINICAL INFORMATION: numbness. COMPARISON: Brain and neck CTA dated 2/26/2019    WET READ:    CTA head: There is a moderate stenosis of the proximal V4 segment of the right vertebral artery. There is no additional hemodynamically significant stenosis or occlusion of the intracranial arteries. There is no aneurysm or vascular malformation. CTA neck: There is no hemodynamically significant stenosis or occlusion of the carotid and vertebral arteries. There are 2 stable indeterminate right parotid masses, largest measuring 1 x 0.6 cm, possibly representing intraparotid lymph nodes. This examination will be formally read by one of the neuroradiologists later today. Please see that report. **This report has been created using voice recognition software. It may contain minor errors which are inherent in voice recognition technology. **    FINAL REPORT:    PROCEDURE: CTA HEAD W WO CONTRAST, CTA NECK W WO CONTRAST    CLINICAL INFORMATION: numbness. Facial droop and left-sided facial swelling. COMPARISON: CT head from the same date and CTA dated 2/26/2019. TECHNIQUE: Helical CT from the aortic arch through the head in arterial phase during fast bolus administration of 80 mL Isovue-370 injected in the right AC. A noncontrast localizer was obtained. 3-D reconstructions were created on a dedicated 3-D   workstation. These include multiplanar MPR images, multiplanar MIP images and centerline reconstructions. All CT scans at this facility use dose modulation, iterative reconstruction, and/or weight-based dosing when appropriate to reduce radiation dose to as low as reasonably achievable. FINDINGS:   CTA head:  Intracranial segments of internal carotid arteries are patent without flow-limiting stenosis or aneurysmal dilatation. The bilateral middle cerebral and anterior cerebral arteries are patent without focal abnormality identified.  The basilar artery is   patent without focal stenosis or visualized aneurysm. The bilateral posterior cerebral arteries are patent without focal abnormality identified. Superior cerebellar arteries appear patent. Dural venous sinuses are patent without focal filling defect. No   focal areas of abnormal parenchymal enhancement are identified. CTA NECK:  There is a bovine type arch. The brachiocephalic and left subclavian arteries are patent without focal stenosis. The common carotid arteries are patent without focal stenosis. The carotid bifurcations are widely patent without hemodynamically significant   stenosis by NASCET criteria. Cervical segments of internal carotid arteries are patent without focal stenosis. The left vertebral artery is dominant. The vertebral arteries are patent throughout their course without focal stenosis. Vocal cords are closely apposed limiting evaluation of this area. Given this caveat, mucosal surfaces of the aerodigestive tract are symmetric without focal nodular thickening or visualized mass. There are a couple of enhancing nodules in the right   parotid gland with a more anterior superior lesion measuring 6 mm and a more inferior lateral lesion measuring 10 mm in greatest dimension. These are stable dating back to 2/26/2019. Stable 4 mm enhancing nodule is present at the left parotid gland. Parotid glands are otherwise unremarkable. Submandibular glands are unremarkable. The thyroid gland is atrophied. No definite cervical lymphadenopathy is identified. Visualized portions of the lungs are clear. There are stable degenerative changes of the   cervical spine. Impression    1. No flow-limiting stenosis or aneurysm in the intracranial circulation. 2. Focal area of severe stenosis at the V4 segment of the nondominant right vertebral artery. Otherwise no focal abnormality identified in the anterior or posterior circulation of the neck.   3. Stable enhancing nodules at the bilateral parotid glands compared to and centerline reconstructions. All CT scans at this facility use dose modulation, iterative reconstruction, and/or weight-based dosing when appropriate to reduce radiation dose to as low as reasonably achievable. FINDINGS:   CTA head:  Intracranial segments of internal carotid arteries are patent without flow-limiting stenosis or aneurysmal dilatation. The bilateral middle cerebral and anterior cerebral arteries are patent without focal abnormality identified. The basilar artery is   patent without focal stenosis or visualized aneurysm. The bilateral posterior cerebral arteries are patent without focal abnormality identified. Superior cerebellar arteries appear patent. Dural venous sinuses are patent without focal filling defect. No   focal areas of abnormal parenchymal enhancement are identified. CTA NECK:  There is a bovine type arch. The brachiocephalic and left subclavian arteries are patent without focal stenosis. The common carotid arteries are patent without focal stenosis. The carotid bifurcations are widely patent without hemodynamically significant   stenosis by NASCET criteria. Cervical segments of internal carotid arteries are patent without focal stenosis. The left vertebral artery is dominant. The vertebral arteries are patent throughout their course without focal stenosis. Vocal cords are closely apposed limiting evaluation of this area. Given this caveat, mucosal surfaces of the aerodigestive tract are symmetric without focal nodular thickening or visualized mass. There are a couple of enhancing nodules in the right   parotid gland with a more anterior superior lesion measuring 6 mm and a more inferior lateral lesion measuring 10 mm in greatest dimension. These are stable dating back to 2/26/2019. Stable 4 mm enhancing nodule is present at the left parotid gland. Parotid glands are otherwise unremarkable. Submandibular glands are unremarkable. The thyroid gland is atrophied.  No definite cervical lymphadenopathy is identified. Visualized portions of the lungs are clear. There are stable degenerative changes of the   cervical spine. Impression    1. No flow-limiting stenosis or aneurysm in the intracranial circulation. 2. Focal area of severe stenosis at the V4 segment of the nondominant right vertebral artery. Otherwise no focal abnormality identified in the anterior or posterior circulation of the neck. 3. Stable enhancing nodules at the bilateral parotid glands compared to prior CT in February 2019. Differential considerations include intraparotid lymph nodes or Warthin's tumors. **This report has been created using voice recognition software. It may contain minor errors which are inherent in voice recognition technology. **    Final report electronically signed by Dr. Balwinder Chapa MD on 7/9/2020 8:42 AM     No results found for this or any previous visit. No results found for this or any previous visit. No results found for this or any previous visit. Results for orders placed during the hospital encounter of 07/08/20   CT HEAD WO CONTRAST    Narrative PROCEDURE: CT HEAD WO CONTRAST    CLINICAL INFORMATION: numbness. COMPARISON: Noncontrast brain CT dated 2/26/2019    TECHNIQUE: Noncontrast 5 mm axial images were obtained through the brain. Sagittal and coronal reconstructions were obtained and reviewed. All CT scans at this facility use dose modulation, iterative reconstruction, and/or weight-based dosing when appropriate to reduce radiation dose to as low as reasonably achievable. FINDINGS:    Brain: There is no acute ischemic infarct, hemorrhage, midline shift, mass, or mass effect. There is no focal abnormality of brain parenchymal attenuation. Ventricles/basal cisterns: The ventricles and cisterns are of appropriate size and configuration for the patient's age. No evidence of obstructive hydrocephalus.   Skull base/calvarium/osseous structures: Unremarkable  Soft tissues: Unremarkable  Intraorbital contents: Unremarkable  Sinuses: Unremarkable; the imaged sinuses are clear without evidence of mucosal thickening or fluid levels. Mastoids: Unremarkable; the mastoid air cells are clear. Impression No acute ischemic infarct, hemorrhage, or mass effect. **This report has been created using voice recognition software. It may contain minor errors which are inherent in voice recognition technology. **    Final report electronically signed by Dr. Ernie Landin on 7/9/2020 1:14 AM         We reviewed the patient records and available information in the EHR       Impression:    62year old woman with hx of breast CA, s/p mastectomy since 2016, now with transient left face twitching and paralysis. Recommendations:    - will d/c prednisone as her symptoms completely resolved  - MRI brain w/wo contrast to r/o mets and stroke  - con't aspirin and plavix for now until MRI rule out a stroke    I spend a total of 60 minutes with greater than 60% of time spent face to face, counseling, coordinating care, examining patient, reviewing images and labs personally, and speaking with team.                                              1. Case was discussed with primary service. 2. All questions were answered. It was my pleasure to evaluate Hasmukh Raygoza today. Please call with questions.       Electronically signed by Samara Blandon MD on 7/9/2020 at 10:28 Aspen Cavanaugh MD  Attending Neurologist/Neurointensivist

## 2020-07-09 NOTE — ED NOTES
Pt resting in bed upon return from CT. Pt states no further needs at this time. Vital signs stable at this time.        Chloe, [address] [facility]  07/09/20 [id number]

## 2020-07-09 NOTE — ED NOTES
Pt resting in bed with call light in reach at this time. Pt states no further needs, vital signs stable, will continue to monitor.        [facility]  07/09/20 [id number]

## 2020-07-09 NOTE — H&P
History & Physical       Patient: Kayla Chandler  YOB: 1960    MRN: [id number]     Acct: [de-identified]    PCP: ANGELIA Dickey CNP    Date of Admission: 7/8/2020    Date of Service: Patient seen / examined on 07/09/20 and admitted to inpatient with expected LOS greater than two midnights due to medical therapy. ASSESSMENT / PLAN:    L-sided facial droop and paresthesias, resolved  Initial suspicion for Bell's palsy per ED provider and neuro-interventionalist assessment. TIA / CVA not excluded. CT head: NAP. CTA head/neck: moderate stenosis of the proximal V4 segment of the R vertebral artery / no additional hemodynamically significant stenosis. 60 mg prednisone and DAPT load administered prior to my exam.    Admit to stepdown/stroke unit. Routine neuro checks. MRI brain WO contrast (*if ICD compatible -- see below), lipid panel, A1C and homocysteine level pended for the AM. Continue ASA 81 mg daily for now. Permissive HTN overnight pending MRI results. Neurology consulted for additional recommendations re: duration of steroid therapy, ?addition of valtrex and need for repeat TTE (recently performed 11/2019). Patient's Medtronic card: Visia AF MRI VR Surescan Defibrillator implanted / this patient has a complete MR Conditional system implanted, consisting of a SureScan device and SureScan lead(s) -- [email or web address] / [email or web address] [phone]    Chronic HTN  Controlled. Permissive HTN per above. Monitor BP. Hx L breast cancer (T2,N1,Mx hormone-responsive)  S/p bilateral mastectomy 12/2016. Follows with Dr. Yumiko Heard. On femara / will hold pending CVA rule out. Chronic systolic CHF / chemotherapy-induced cardiomyopathy (EF 35-40% per ECHO 11/2019; s/p ICD placement 2/2019) / mild-to-moderate MR / trace AR  Follows with Dr. Virgilio Helm. BB, ARNI, aldactone and lasix held (per above) / resume as indicated. Digoxin resumed. Monitor I/O, daily weights.     Hypothyroidism  TSH normal on presentation. Synthroid resumed. Prediabetes  Historical / last A1C 5.9 in 2/2020. Mild hyperglycemia noted on arrival. Repeat A1C pending. Monitor BG with daily BMP for now. Anxiety, insomnia  Effexor resumed. DJD, osteopenia  Noted / no current outpatient therapies listed. Recommend close outpatient follow up / initiation of daily calcium/vitamin D supplementation. Chronic tobacco use  Cessation counseling. Offer NRT following CVA rule out (causes vasoconstriction). Mild normocytic anemia  Etiology unclear. No active bleeding assessed. Iron studies added to repeat CBC in the AM.    Stable, indeterminate R parotid masses x 2  Incidentally noted on CT imaging. Recommend additional outpatient follow up as indicated. R wrist de Quervain's tenosynovitis and ring finger trigger finger / s/p R first dorsal compartment release and ring  finger A1 yanick release  By Dr. Abdoulaye Sauceda 6/25/20. Patient has appointment scheduled today for suture removal -- requesting we touch base with her surgeon to determine if we can/should remove sutures during this hospitalization. Chief Complaint: facial tingling / droop    History of Present Illness:  63 y/o R-handed F PMHx HTN, L breast cancer (T2,N1,Mx hormone-responsive / diagnosed 2016 / on Celina Galas / known to Dr. Glo Cook), chronic systolic CHF / chemotherapy-induced cardiomyopathy (EF 35-40% per ECHO 11/2019; s/p ICD placement 2/2019) / mild-moderate MR / trace AR, hypothyroidism, prediabetes, anxiety/insomnia, DJD/osteopenia and chronic tobacco use -- presents to [facility] with a chief complaint of facial tingling / droop. History provided by the patient.     Patient reports sudden-onset lower L-sided facial weakness + tightness/spasms & tingling which started yesterday evening around 2200 / states symptoms were present from her jawbone to the ear and included the lips / denies facial swelling / denies forehead involvement or difficulty blinking/closing her L eye / denies eye tearing/watering. She applied ice to the affected area with temporary relief of symptoms / when they returned, she decided to proceed to the ED for evaluation. Denies similar symptoms to the R side of her face. Denies associated confusion, slurred speech, vision/hearing changes, ear/jaw/dental pain, L arm/leg weakness, paresthesias or rash. Also denies recent illness/trauma, fevers/chills, headache, chest pain, palpitations, cough, shortness of breath, n/v/d/c, abdominal pain, urinary symptoms or syncope. Denies history of TIA/CVA. She has never had symptoms like this in the past.    No additional questions / concerns identified at this time. ED course: afebrile, normotensive/hemodynamically appropriate on RA. Lab workup was grossly unremarkable, except for mild normocytic anemia (Hgb 11.4) and mild hyperglycemia (). Trop neg. TSH nml. Lytes / coags nml. UA: Sgrav >1.030, but otherwise negative. CT head: NAP. CTA head / neck: moderate stenosis of the proximal V4 segment of the R vertebral artery / no additional hemodynamically significant stenosis. ED provider discussed case with neuro-interventionalist (Dr. Zoya Nicholas) -- thought presentation represented a L-sided Bell's palsy, but still recommended admission for TIA/CVA workup given the patient's PMHx and comorbidities. Received 60 mg prednisone, DAPT load prior to transport to the floor. Please see A&P for additional details.     Past Medical History:    Diagnosis Date    Abnormal heart rhythm     Anxiety     Cancer [facility])     breast  2016     CHF (congestive heart failure) (HCC)     Congenital heart disease     DJD (degenerative joint disease)     Enlarged lymph nodes     Hypertension     Hypothyroidism     ICD (implantable cardioverter-defibrillator) in place 02/11/2019    Dr. Rashard Canales Kidney stones     Prediabetes 10/7/2019   Chronic tobacco use    Past Surgical History:    Procedure Laterality Date    APPENDECTOMY      CARPAL Colon Polyps Brother     Cancer Maternal Grandfather     Esophageal Cancer Neg Hx      REVIEW OF SYSTEMS:  A 14-point ROS was obtained and negative, with the exception of pertinent positives as stated in the HPI. PHYSICAL EXAM:  Vitals:    07/08/20 2353 07/09/20 0105 07/09/20 0130 07/09/20 0153   BP: 106/89 (!) 131/102 125/83 125/83   Pulse:  64 65 68   Resp:  18 18 18   Temp:       TempSrc:       SpO2:  97% 96% 97%   Weight:       RA    General appearance: Alert / well-appearing female. Pleasant. Cooperative. NAD. HEENT: Normocephalic / atraumatic. PERRL. EOMI. Conjunctivae appear normal. Blinks appropriately / bilaterally. Neck: Supple. Full ROM. No JVD. Respiratory: Unlabored on RA. CTAB. No wheezes / rales / rhonchi. Cardiovascular: Regular rate. Irregular rhythm. S1/S2. No obvious murmurs / rubs / gallops. ICD palpated to LEFT anterior chest wall. Abdomen: Soft / non-tender / non-distended. BS present. Musculoskeletal: No cyanosis or LE edema. Skin: Warm / dry. Normal turgor. No pallor / diaphoresis. Stiches noted to palmar surface of R hand (s/p recent ring finger A1 pulley release) -- healing appropriately. Neurologic: A/O x 3. Speech is normal. Answers questions appropriately. CN II-XII intact. Previously documented L-sided facial droop and findings consistent with Bell's Palsy are no longer present -- face is symmetric in appearance / no droop / blinks bilaterally and without difficulty / facial strength/sensation are normal bilaterally. Strength 5/5 to BUE/BLE. Sensation intact to BUE/BLE. No obvious focal neurologic deficits. Psychiatric: Thought content / judgment / insight appear appropriate. Capillary refill: Brisk bilaterally. Peripheral pulses: +2 bilaterally.     Labs:   Results for orders placed or performed during the hospital encounter of 49/79/22   Basic Metabolic Panel   Result Value Ref Range    Sodium 137 135 - 145 meq/L    Potassium 3.5 3.5 - 5.2 meq/L    Chloride 101 98 - 111 meq/L    CO2 23 23 - 33 meq/L    Glucose 119 (H) 70 - 108 mg/dL    BUN 15 7 - 22 mg/dL    CREATININE 0.7 0.4 - 1.2 mg/dL    Calcium 8.6 8.5 - 10.5 mg/dL   APTT   Result Value Ref Range    aPTT 31.4 22.0 - 38.0 seconds   CBC Auto Differential   Result Value Ref Range    WBC 5.4 4.8 - 10.8 thou/mm3    RBC 3.68 (L) 4.20 - 5.40 mill/mm3    Hemoglobin 11.4 (L) 12.0 - 16.0 gm/dl    Hematocrit 35.0 (L) 37.0 - 47.0 %    MCV 95.1 81.0 - 99.0 fL    MCH 31.0 26.0 - 33.0 pg    MCHC 32.6 32.2 - 35.5 gm/dl    RDW-CV 13.6 11.5 - 14.5 %    RDW-SD 47.7 (H) 35.0 - 45.0 fL    Platelets 951 901 - 591 thou/mm3    MPV 11.2 9.4 - 12.4 fL    Seg Neutrophils 55.6 %    Lymphocytes 31.8 %    Monocytes 7.8 %    Eosinophils 3.5 %    Basophils 0.4 %    Immature Granulocytes 0.9 %    Segs Absolute 3.0 1.8 - 7.7 thou/mm3    Lymphocytes Absolute 1.7 1.0 - 4.8 thou/mm3    Monocytes Absolute 0.4 0.4 - 1.3 thou/mm3    Eosinophils Absolute 0.2 0.0 - 0.4 thou/mm3    Basophils Absolute 0.0 0.0 - 0.1 thou/mm3    Immature Grans (Abs) 0.05 0.00 - 0.07 thou/mm3    nRBC 0 /100 wbc   Hepatic Function Panel   Result Value Ref Range    Alb 3.6 3.5 - 5.1 g/dL    Total Bilirubin <0.2 (L) 0.3 - 1.2 mg/dL    Bilirubin, Direct <0.2 0.0 - 0.3 mg/dL    Alkaline Phosphatase 66 38 - 126 U/L    AST 12 5 - 40 U/L    ALT 11 11 - 66 U/L    Total Protein 5.7 (L) 6.1 - 8.0 g/dL   Lipase   Result Value Ref Range    Lipase 26.9 5.6 - 51.3 U/L   Magnesium   Result Value Ref Range    Magnesium 1.9 1.6 - 2.4 mg/dL   Protime-INR   Result Value Ref Range    INR 1.02 0.85 - 1.13   Anion Gap   Result Value Ref Range    Anion Gap 13.0 8.0 - 16.0 meq/L   Glomerular Filtration Rate, Estimated   Result Value Ref Range    Est, Glom Filt Rate 86 (A) ml/min/1.73m2   Osmolality   Result Value Ref Range    Osmolality Calc 275.8 275.0 - 300 mOsmol/kg   POCT Glucose   Result Value Ref Range    POC Glucose 131 (H) 70 - 108 mg/dl   EKG 12 Lead   Result Value Ref Range    Ventricular Rate 62 BPM Atrial Rate 62 BPM    P-R Interval 190 ms    QRS Duration 94 ms    Q-T Interval 452 ms    QTc Calculation (Bazett) 458 ms    P Axis 44 degrees    R Axis -6 degrees    T Axis 20 degrees     Narrative & Impression   Normal sinus rhythm  Normal ECG  When compared with ECG of 19-JUN-2020 09:07,  Nonspecific T wave abnormality has improved in Lateral leads     Radiology:     Ct Head Wo Contrast  Result Date: 7/9/2020  PROCEDURE: CT HEAD WO CONTRAST CLINICAL INFORMATION: numbness. COMPARISON: Noncontrast brain CT dated 2/26/2019 TECHNIQUE: Noncontrast 5 mm axial images were obtained through the brain. Sagittal and coronal reconstructions were obtained and reviewed. All CT scans at this facility use dose modulation, iterative reconstruction, and/or weight-based dosing when appropriate to reduce radiation dose to as low as reasonably achievable. FINDINGS: Brain: There is no acute ischemic infarct, hemorrhage, midline shift, mass, or mass effect. There is no focal abnormality of brain parenchymal attenuation. Ventricles/basal cisterns: The ventricles and cisterns are of appropriate size and configuration for the patient's age. No evidence of obstructive hydrocephalus. Skull base/calvarium/osseous structures: Unremarkable Soft tissues: Unremarkable Intraorbital contents: Unremarkable Sinuses: Unremarkable; the imaged sinuses are clear without evidence of mucosal thickening or fluid levels. Mastoids: Unremarkable; the mastoid air cells are clear. No acute ischemic infarct, hemorrhage, or mass effect. **This report has been created using voice recognition software. It may contain minor errors which are inherent in voice recognition technology. ** Final report electronically signed by Dr. Pratik Coto on 7/9/2020 1:14 AM    Cta Head/Neck W Wo Contrast  Result Date: 7/9/2020  PROCEDURE: CTA HEAD W WO CONTRAST, CTA NECK W WO CONTRAST CLINICAL INFORMATION: numbness.  COMPARISON: Brain and neck CTA dated 2/26/2019 WET READ: CTA head: There is a moderate stenosis of the proximal V4 segment of the right vertebral artery. There is no additional hemodynamically significant stenosis or occlusion of the intracranial arteries. There is no aneurysm or vascular malformation. CTA neck: There is no hemodynamically significant stenosis or occlusion of the carotid and vertebral arteries. There are 2 stable indeterminate right parotid masses, largest measuring 1 x 0.6 cm, possibly representing intraparotid lymph nodes. This examination will be formally read by one of the neuroradiologists later today. Please see that report. **This report has been created using voice recognition software. It may contain minor errors which are inherent in voice recognition technology. **     Xr Chest Portable  Result Date: 7/9/2020  PROCEDURE: XR CHEST PORTABLE CLINICAL INFORMATION: facial droop. COMPARISON: Chest x-ray dated 2/28/2020 TECHNIQUE: AP Portable chest xray FINDINGS: Lines/tubes/devices: There is a stable LEFT subclavian single lead AICD device, lead tip in the region of the RIGHT ventricle. Lungs/pleura:  No pneumonia, pulmonary edema, or obvious mass. No pleural effusion. No pneumothorax. Heart: There is stable mild cardiomegaly. Mediastinum/lorna: No obvious mass or adenopathy. Skeleton: No significant bone or joint abnormality. No acute cardiopulmonary disease. Mild cardiomegaly. **This report has been created using voice recognition software. It may contain minor errors which are inherent in voice recognition technology. ** Final report electronically signed by Dr. Jessi Stevenson on 7/9/2020 1:32 AM    CODE STATUS: FULL    Thank you ANGELIA Briseno CNP for the opportunity to be involved in this patient's care. Electronically signed by Daniela Nava MD on 7/9/2020.

## 2020-07-09 NOTE — FLOWSHEET NOTE
Advanced Directives Consult: Pt wanted to look at it more before making up her mind. Prayer was appreciated. 07/09/20 1049   Encounter Summary   Services provided to: Patient   Referral/Consult From: [facility] Family members   Continue Visiting   (7/9 P)   Complexity of Encounter Low   Length of Encounter 15 minutes   Routine   Type Initial   Assessment Approachable;Calm   Intervention [place];Prayer;Nurtured hope; Active listening;Empowerment;Sustaining presence/ Ministry of presence   Outcome Acceptance;Expressed gratitude;Encouraged; Hopeful;Receptive

## 2020-07-09 NOTE — ED NOTES
Pt resting with call light in reach and states no further needs at this time. Vital signs stable, will continue to monitor.       Chloe, [address] [facility]  07/09/20 0151

## 2020-07-09 NOTE — PLAN OF CARE
balance  Outcome: Ongoing  Note: /82   Pulse 61   Temp 97.9 °F (36.6 °C) (Oral)   Resp 18   Ht 5' (1.524 m)   Wt 200 lb (90.7 kg)   SpO2 95%   BMI 39.06 kg/m²     Patient's vital signs stable and being monitored every 4 hours. NIH Stroke Scale was performed and got a 0. Problem: ACTIVITY INTOLERANCE/IMPAIRED MOBILITY  Goal: Mobility/activity is maintained at optimum level for patient  Outcome: Ongoing  Note: Patient denies any pain. Patient states she has no numbness or tingling in her extremities. Patient is up with a standby assist and tolerating well. Problem: COMMUNICATION IMPAIRMENT  Goal: Ability to express needs and understand communication  Outcome: Ongoing  Note: Patient is able to express needs. No needs expressed at this time. Care plan reviewed with patient. Patient verbalizes understanding of the plan of care and contributed to goal setting.

## 2020-07-09 NOTE — ED NOTES
ED to inpatient nurses report    Chief Complaint   Patient presents with    Facial Swelling      Present to ED from home  LOC: alert and orientated to name, place, date  Vital signs   Vitals:    07/08/20 2353 07/09/20 0105 07/09/20 0130 07/09/20 0153   BP: 106/89 (!) 131/102 125/83 125/83   Pulse:  64 65 68   Resp:  18 18 18   Temp:       TempSrc:       SpO2:  97% 96% 97%   Weight:          Oxygen Baseline room air     Current needs required none Bipap/Cpap No  LDAs:    Mobility: Independent  Pending ED orders: none  Present condition: stable     Electronically signed by TU Corona on 7/9/2020 at 2:38 AM       TU Corona  07/09/20 [id number]

## 2020-07-10 ENCOUNTER — APPOINTMENT (OUTPATIENT)
Dept: MRI IMAGING | Age: 60
DRG: 058 | End: 2020-07-10
Payer: COMMERCIAL

## 2020-07-10 VITALS
SYSTOLIC BLOOD PRESSURE: 105 MMHG | RESPIRATION RATE: 16 BRPM | BODY MASS INDEX: 40.29 KG/M2 | HEIGHT: 60 IN | DIASTOLIC BLOOD PRESSURE: 64 MMHG | TEMPERATURE: 98 F | HEART RATE: 70 BPM | WEIGHT: 205.2 LBS | OXYGEN SATURATION: 94 %

## 2020-07-10 LAB
ANION GAP SERPL CALCULATED.3IONS-SCNC: 14 MEQ/L (ref 8–16)
BUN BLDV-MCNC: 12 MG/DL (ref 7–22)
CALCIUM SERPL-MCNC: 8.8 MG/DL (ref 8.5–10.5)
CHLORIDE BLD-SCNC: 105 MEQ/L (ref 98–111)
CO2: 24 MEQ/L (ref 23–33)
CREAT SERPL-MCNC: 0.6 MG/DL (ref 0.4–1.2)
ERYTHROCYTE [DISTWIDTH] IN BLOOD BY AUTOMATED COUNT: 13.4 % (ref 11.5–14.5)
ERYTHROCYTE [DISTWIDTH] IN BLOOD BY AUTOMATED COUNT: 46.8 FL (ref 35–45)
GFR SERPL CREATININE-BSD FRML MDRD: > 90 ML/MIN/1.73M2
GLUCOSE BLD-MCNC: 115 MG/DL (ref 70–108)
HCT VFR BLD CALC: 37.3 % (ref 37–47)
HEMOGLOBIN: 11.9 GM/DL (ref 12–16)
MCH RBC QN AUTO: 30.3 PG (ref 26–33)
MCHC RBC AUTO-ENTMCNC: 31.9 GM/DL (ref 32.2–35.5)
MCV RBC AUTO: 94.9 FL (ref 81–99)
PLATELET # BLD: 195 THOU/MM3 (ref 130–400)
PMV BLD AUTO: 11.2 FL (ref 9.4–12.4)
POTASSIUM REFLEX MAGNESIUM: 3.6 MEQ/L (ref 3.5–5.2)
RBC # BLD: 3.93 MILL/MM3 (ref 4.2–5.4)
SODIUM BLD-SCNC: 143 MEQ/L (ref 135–145)
WBC # BLD: 5.2 THOU/MM3 (ref 4.8–10.8)

## 2020-07-10 PROCEDURE — 94760 N-INVAS EAR/PLS OXIMETRY 1: CPT

## 2020-07-10 PROCEDURE — G0378 HOSPITAL OBSERVATION PER HR: HCPCS

## 2020-07-10 PROCEDURE — 70553 MRI BRAIN STEM W/O & W/DYE: CPT

## 2020-07-10 PROCEDURE — 6370000000 HC RX 637 (ALT 250 FOR IP): Performed by: FAMILY MEDICINE

## 2020-07-10 PROCEDURE — 99232 SBSQ HOSP IP/OBS MODERATE 35: CPT | Performed by: PSYCHIATRY & NEUROLOGY

## 2020-07-10 PROCEDURE — 36415 COLL VENOUS BLD VENIPUNCTURE: CPT

## 2020-07-10 PROCEDURE — 2580000003 HC RX 258: Performed by: FAMILY MEDICINE

## 2020-07-10 PROCEDURE — 6360000002 HC RX W HCPCS: Performed by: FAMILY MEDICINE

## 2020-07-10 PROCEDURE — 99238 HOSP IP/OBS DSCHRG MGMT 30/<: CPT | Performed by: INTERNAL MEDICINE

## 2020-07-10 PROCEDURE — 6360000004 HC RX CONTRAST MEDICATION: Performed by: INTERNAL MEDICINE

## 2020-07-10 PROCEDURE — 96372 THER/PROPH/DIAG INJ SC/IM: CPT

## 2020-07-10 PROCEDURE — 97162 PT EVAL MOD COMPLEX 30 MIN: CPT

## 2020-07-10 PROCEDURE — 85027 COMPLETE CBC AUTOMATED: CPT

## 2020-07-10 PROCEDURE — A9579 GAD-BASE MR CONTRAST NOS,1ML: HCPCS | Performed by: INTERNAL MEDICINE

## 2020-07-10 PROCEDURE — 80048 BASIC METABOLIC PNL TOTAL CA: CPT

## 2020-07-10 RX ORDER — ASPIRIN 81 MG/1
81 TABLET, CHEWABLE ORAL DAILY
Qty: 30 TABLET | Refills: 3 | Status: SHIPPED | OUTPATIENT
Start: 2020-07-11

## 2020-07-10 RX ADMIN — ACETAMINOPHEN 650 MG: 325 TABLET ORAL at 03:40

## 2020-07-10 RX ADMIN — VENLAFAXINE HYDROCHLORIDE 37.5 MG: 37.5 CAPSULE, EXTENDED RELEASE ORAL at 08:55

## 2020-07-10 RX ADMIN — GADOTERIDOL 20 ML: 279.3 INJECTION, SOLUTION INTRAVENOUS at 11:35

## 2020-07-10 RX ADMIN — LEVOTHYROXINE SODIUM 100 MCG: 100 TABLET ORAL at 06:09

## 2020-07-10 RX ADMIN — Medication 10 ML: at 08:55

## 2020-07-10 RX ADMIN — ENOXAPARIN SODIUM 40 MG: 40 INJECTION SUBCUTANEOUS at 08:55

## 2020-07-10 RX ADMIN — DIGOXIN 125 MCG: 125 TABLET ORAL at 08:54

## 2020-07-10 RX ADMIN — ASPIRIN 81 MG 81 MG: 81 TABLET ORAL at 08:55

## 2020-07-10 RX ADMIN — Medication 5 DROP: at 09:01

## 2020-07-10 ASSESSMENT — PAIN DESCRIPTION - DIRECTION: RADIATING_TOWARDS: NECK

## 2020-07-10 ASSESSMENT — PAIN SCALES - GENERAL
PAINLEVEL_OUTOF10: 0
PAINLEVEL_OUTOF10: 5
PAINLEVEL_OUTOF10: 5

## 2020-07-10 ASSESSMENT — PAIN DESCRIPTION - ONSET: ONSET: AWAKENED FROM SLEEP

## 2020-07-10 ASSESSMENT — PAIN DESCRIPTION - PAIN TYPE: TYPE: ACUTE PAIN

## 2020-07-10 ASSESSMENT — PAIN - FUNCTIONAL ASSESSMENT: PAIN_FUNCTIONAL_ASSESSMENT: ACTIVITIES ARE NOT PREVENTED

## 2020-07-10 ASSESSMENT — PAIN DESCRIPTION - LOCATION: LOCATION: HEAD

## 2020-07-10 ASSESSMENT — PAIN DESCRIPTION - PROGRESSION: CLINICAL_PROGRESSION: GRADUALLY WORSENING

## 2020-07-10 ASSESSMENT — PAIN DESCRIPTION - DESCRIPTORS: DESCRIPTORS: HEADACHE

## 2020-07-10 ASSESSMENT — PAIN DESCRIPTION - FREQUENCY: FREQUENCY: INTERMITTENT

## 2020-07-10 NOTE — CARE COORDINATION
7/10/20, 12:16 PM EDT    Patient goals/plan/ treatment preferences discussed by  and . Patient goals/plan/ treatment preferences reviewed with patient/ family. Patient/ family verbalize understanding of discharge plan and are in agreement with goal/plan/treatment preferences. Understanding was demonstrated using the teach back method. AVS provided by RN at time of discharge, which includes all necessary medical information pertaining to the patients current course of illness, treatment, post-discharge goals of care, and treatment preferences. Pt to be discharged to home. Denies needs or services.

## 2020-07-10 NOTE — PROGRESS NOTES
Discharge teaching and instructions for diagnosis/procedure of CVA work up completed with patient using teachback method. AVS reviewed. Printed prescriptions given to patient. Patient voiced understanding regarding prescriptions, follow up appointments, and care of self at home.  Discharged in a wheelchair to  home with support per family

## 2020-07-10 NOTE — DISCHARGE SUMMARY
Hospitalist Discharge Summary        Patient: Sudha Jacinto  YOB: 1960  MRN: [id number]   Acct: [de-identified]    Primary Care Physician: ANGELIA Campos - CNP    Admit date  7/8/2020    Discharge date:  7/10/2020 12:12 PM    Chief Complaint on presentation :-  Facial numbness    Discharge Assessment and Plan:-     - Left facial weakness/twitching   - MRI came negative. Etiology unclear. D/w neurology, can be discharged and follow up OP   - will d/c steroids    Essential Hypertension   - Continue home medications. History of L breast cancer s/p b/l mastectomy   - Following Dr. Tj Franco. NISCM EF of 35-40% s/p ICD   - Continue home meds    Prediabetes   - A1c 5.9. Rec weight loss and dietary modification. Consider metformin if conservative management fails    Anxiety/Insomnia   - On effexor      Initial H and P and Hospital course:-    63 y/o R-handed F PMHx HTN, L breast cancer (T2,N1,Mx hormone-responsive / diagnosed 2016 / on [facility] / known to Dr. Tj Franco), chronic systolic CHF / chemotherapy-induced cardiomyopathy (EF 35-40% per ECHO 11/2019; s/p ICD placement 2/2019) / mild-moderate MR / trace AR, hypothyroidism, prediabetes, anxiety/insomnia, DJD/osteopenia and chronic tobacco use -- presents to [facility] with a chief complaint of facial tingling / droop. History provided by the patient. Patient reports sudden-onset lower L-sided facial weakness + tightness/spasms & tingling which started yesterday evening around 2200 / states symptoms were present from her jawbone to the ear and included the lips / denies facial swelling / denies forehead involvement or difficulty blinking/closing her L eye / denies eye tearing/watering. She applied ice to the affected area with temporary relief of symptoms / when they returned, she decided to proceed to the ED for evaluation. Denies similar symptoms to the R side of her face.  Denies associated confusion, slurred speech, vision/hearing changes, ear/jaw/dental pain, L arm/leg weakness, paresthesias or rash. Also denies recent illness/trauma, fevers/chills, headache, chest pain, palpitations, cough, shortness of breath, n/v/d/c, abdominal pain, urinary symptoms or syncope. Denies history of TIA/CVA. She has never had symptoms like this in the past.   Patient was evaluated by neuro and rec MRI with and without contrast to rule out mets. She had MRI the following day that was negative. Patient didn't have any more symptoms. Discussed with neurology, patient was deemed medically stable for discharge. She was started on ASA per neuro rec. Follow up with neuro in 3 months    Physical Exam:-  Vitals:   Patient Vitals for the past 24 hrs:   BP Temp Temp src Pulse Resp SpO2 Weight   07/10/20 1207 105/64 98 °F (36.7 °C) Oral 70 16 94 % --   07/10/20 0845 111/67 98.3 °F (36.8 °C) Oral 72 16 95 % --   07/10/20 0642 -- -- -- -- 16 96 % --   07/10/20 0330 100/60 -- -- 62 16 94 % 205 lb 3.2 oz (93.1 kg)   07/09/20 2325 (!) 103/59 98.3 °F (36.8 °C) Oral 75 18 93 % --   07/09/20 2027 (!) 110/56 98.2 °F (36.8 °C) Oral 73 18 94 % --   07/09/20 1530 114/70 97.1 °F (36.2 °C) Oral 88 21 95 % --   07/09/20 1259 -- -- -- -- 20 93 % --     Weight:   Weight: 205 lb 3.2 oz (93.1 kg)   24 hour intake/output:     Intake/Output Summary (Last 24 hours) at 7/10/2020 1212  Last data filed at 7/10/2020 1210  Gross per 24 hour   Intake 701 ml   Output --   Net 701 ml       1. General appearance: No apparent distress, appears stated age and cooperative. 2. HEENT: Normal cephalic, atraumatic without obvious deformity. Pupils equal, round, and reactive to light. Extra ocular muscles intact. Conjunctivae/corneas clear. 3. Neck: Supple, with full range of motion. No jugular venous distention. Trachea midline. 4. Respiratory:  Normal respiratory effort. Clear to auscultation, bilaterally without Rales/Wheezes/Rhonchi.   5. Cardiovascular: Regular rate and rhythm with normal S1/S2 without murmurs, rubs or gallops. 6. Abdomen: Soft, non-tender, non-distended with normal bowel sounds. 7. Musculoskeletal:  No clubbing, cyanosis or edema bilaterally. 8. Skin: Skin color, texture, turgor normal.  No rashes or lesions. 9. Neurologic:  Neurovascularly intact without any focal sensory/motor deficits. Cranial nerves: II-XII intact, grossly non-focal.  10. Psychiatric: Alert and oriented, thought content appropriate, normal insight  11. Capillary Refill: Brisk,< 3 seconds   12.  Peripheral Pulses: +2 palpable, equal bilaterally       Discharge Medications:-      Medication List      CONTINUE taking these medications    atorvastatin 40 MG tablet  Commonly known as:  LIPITOR  Take 1 tablet by mouth nightly     carbamide peroxide 6.5 % otic solution  Commonly known as:  DEBROX  Place 5 drops in ear(s) 2 times daily     carvedilol 3.125 MG tablet  Commonly known as:  COREG  TAKE 1 TABLET BY MOUTH TWICE DAILY     digoxin 125 MCG tablet  Commonly known as:  LANOXIN  Take 1 tablet by mouth daily     Entresto 49-51 MG per tablet  Generic drug:  sacubitril-valsartan  Take 1 tablet by mouth twice daily     furosemide 20 MG tablet  Commonly known as:  LASIX  Take 1 tablet by mouth daily as needed (leg swelling, weight gain)     letrozole 2.5 MG tablet  Commonly known as:  Femara  Take 1 tablet by mouth daily     levothyroxine 100 MCG tablet  Commonly known as:  SYNTHROID  Take 1 tablet by mouth daily     omeprazole 20 MG delayed release capsule  Commonly known as:  PRILOSEC  Take 1 capsule by mouth every morning (before breakfast)     polyethylene glycol 17 GM/SCOOP powder  Commonly known as:  GLYCOLAX  Take 17 g by mouth daily     spironolactone 25 MG tablet  Commonly known as:  ALDACTONE  Take 1 tablet by mouth once daily     TYLENOL 500 MG tablet  Generic drug:  acetaminophen     venlafaxine 37.5 MG extended release capsule  Commonly known as:  Effexor XR  Take 1 capsule by mouth daily             Labs :-  Recent Results (from the past 72 hour(s))   Basic Metabolic Panel    Collection Time: 07/09/20  1:04 AM   Result Value Ref Range    Sodium 137 135 - 145 meq/L    Potassium 3.5 3.5 - 5.2 meq/L    Chloride 101 98 - 111 meq/L    CO2 23 23 - 33 meq/L    Glucose 119 (H) 70 - 108 mg/dL    BUN 15 7 - 22 mg/dL    CREATININE 0.7 0.4 - 1.2 mg/dL    Calcium 8.6 8.5 - 10.5 mg/dL   APTT    Collection Time: 07/09/20  1:04 AM   Result Value Ref Range    aPTT 31.4 22.0 - 38.0 seconds   CBC Auto Differential    Collection Time: 07/09/20  1:04 AM   Result Value Ref Range    WBC 5.4 4.8 - 10.8 thou/mm3    RBC 3.68 (L) 4.20 - 5.40 mill/mm3    Hemoglobin 11.4 (L) 12.0 - 16.0 gm/dl    Hematocrit 35.0 (L) 37.0 - 47.0 %    MCV 95.1 81.0 - 99.0 fL    MCH 31.0 26.0 - 33.0 pg    MCHC 32.6 32.2 - 35.5 gm/dl    RDW-CV 13.6 11.5 - 14.5 %    RDW-SD 47.7 (H) 35.0 - 45.0 fL    Platelets 011 002 - 278 thou/mm3    MPV 11.2 9.4 - 12.4 fL    Seg Neutrophils 55.6 %    Lymphocytes 31.8 %    Monocytes 7.8 %    Eosinophils 3.5 %    Basophils 0.4 %    Immature Granulocytes 0.9 %    Segs Absolute 3.0 1.8 - 7.7 thou/mm3    Lymphocytes Absolute 1.7 1.0 - 4.8 thou/mm3    Monocytes Absolute 0.4 0.4 - 1.3 thou/mm3    Eosinophils Absolute 0.2 0.0 - 0.4 thou/mm3    Basophils Absolute 0.0 0.0 - 0.1 thou/mm3    Immature Grans (Abs) 0.05 0.00 - 0.07 thou/mm3    nRBC 0 /100 wbc   Hepatic Function Panel    Collection Time: 07/09/20  1:04 AM   Result Value Ref Range    Alb 3.6 3.5 - 5.1 g/dL    Total Bilirubin <0.2 (L) 0.3 - 1.2 mg/dL    Bilirubin, Direct <0.2 0.0 - 0.3 mg/dL    Alkaline Phosphatase 66 38 - 126 U/L    AST 12 5 - 40 U/L    ALT 11 11 - 66 U/L    Total Protein 5.7 (L) 6.1 - 8.0 g/dL   Lipase    Collection Time: 07/09/20  1:04 AM   Result Value Ref Range    Lipase 26.9 5.6 - 51.3 U/L   Magnesium    Collection Time: 07/09/20  1:04 AM   Result Value Ref Range    Magnesium 1.9 1.6 - 2.4 mg/dL   Protime-INR    Collection Time: 07/09/20  1:04 AM   Result Value Ref Range Value Ref Range    Hemoglobin A1C 6.0 4.4 - 6.4 %    AVERAGE GLUCOSE 120 70 - 126 mg/dL   Homocysteine, serum    Collection Time: 07/09/20  3:57 AM   Result Value Ref Range    Homocysteine 11.0 <15.0 umol/L   Basic Metabolic Panel w/ Reflex to MG    Collection Time: 07/09/20  3:57 AM   Result Value Ref Range    Sodium 140 135 - 145 meq/L    Potassium reflex Magnesium 3.8 3.5 - 5.2 meq/L    Chloride 104 98 - 111 meq/L    CO2 23 23 - 33 meq/L    Glucose 125 (H) 70 - 108 mg/dL    BUN 14 7 - 22 mg/dL    CREATININE 0.7 0.4 - 1.2 mg/dL    Calcium 9.2 8.5 - 10.5 mg/dL   Anion Gap    Collection Time: 07/09/20  3:57 AM   Result Value Ref Range    Anion Gap 13.0 8.0 - 16.0 meq/L   Glomerular Filtration Rate, Estimated    Collection Time: 07/09/20  3:57 AM   Result Value Ref Range    Est, Glom Filt Rate 86 (A) ml/min/1.73m2   CBC    Collection Time: 07/10/20  5:03 AM   Result Value Ref Range    WBC 5.2 4.8 - 10.8 thou/mm3    RBC 3.93 (L) 4.20 - 5.40 mill/mm3    Hemoglobin 11.9 (L) 12.0 - 16.0 gm/dl    Hematocrit 37.3 37.0 - 47.0 %    MCV 94.9 81.0 - 99.0 fL    MCH 30.3 26.0 - 33.0 pg    MCHC 31.9 (L) 32.2 - 35.5 gm/dl    RDW-CV 13.4 11.5 - 14.5 %    RDW-SD 46.8 (H) 35.0 - 45.0 fL    Platelets 352 139 - 047 thou/mm3    MPV 11.2 9.4 - 12.4 fL   Basic Metabolic Panel w/ Reflex to MG    Collection Time: 07/10/20  5:03 AM   Result Value Ref Range    Sodium 143 135 - 145 meq/L    Potassium reflex Magnesium 3.6 3.5 - 5.2 meq/L    Chloride 105 98 - 111 meq/L    CO2 24 23 - 33 meq/L    Glucose 115 (H) 70 - 108 mg/dL    BUN 12 7 - 22 mg/dL    CREATININE 0.6 0.4 - 1.2 mg/dL    Calcium 8.8 8.5 - 10.5 mg/dL   Anion Gap    Collection Time: 07/10/20  5:03 AM   Result Value Ref Range    Anion Gap 14.0 8.0 - 16.0 meq/L   Glomerular Filtration Rate, Estimated    Collection Time: 07/10/20  5:03 AM   Result Value Ref Range    Est, Glom Filt Rate >90 ml/min/1.73m2        Microbiology:    Blood culture #1: No results found for: BC    Blood culture #2:No results found for: BLOODCULT2    Organism:    Lab Results   Component Value Date    LABGRAM  10/09/2019     Rare segmented neutrophils observed. Few epithelial cells observed. Many gram negative bacilli. Moderate gram positive cocci in pairs and chains. Few small gram positive bacilli. Prepubescent and postmenopausal female samples (<15and >47ears of age) are not typically suitable for bacterialvaginosis screening. MRSA culture only:No results found for: [facility]    Urine culture: No results found for: Amber Mac  Lab Results   Component Value Date    ORG Mixed Growth 11/29/2018        Respiratory culture: No results found for: CULTRESP    Aerobic and Anaerobic :  No results found for: LABAERO  No results found for: LABANAE    Urinalysis:      Lab Results   Component Value Date    NITRU NEGATIVE 07/09/2020    WBCUA 5-10 11/29/2018    BACTERIA NONE 11/29/2018    RBCUA 0-2 11/29/2018    BLOODU NEGATIVE 07/09/2020    GLUCOSEU NEGATIVE 07/09/2020       Radiology:-  Cta Head W Wo Contrast    Result Date: 7/9/2020  PROCEDURE: CTA HEAD W WO CONTRAST, CTA NECK W WO CONTRAST CLINICAL INFORMATION: numbness. COMPARISON: Brain and neck CTA dated 2/26/2019 WET READ: CTA head: There is a moderate stenosis of the proximal V4 segment of the right vertebral artery. There is no additional hemodynamically significant stenosis or occlusion of the intracranial arteries. There is no aneurysm or vascular malformation. CTA neck: There is no hemodynamically significant stenosis or occlusion of the carotid and vertebral arteries. There are 2 stable indeterminate right parotid masses, largest measuring 1 x 0.6 cm, possibly representing intraparotid lymph nodes. This examination will be formally read by one of the neuroradiologists later today. Please see that report. **This report has been created using voice recognition software. It may contain minor errors which are inherent in voice recognition technology. ** FINAL REPORT: PROCEDURE: CTA HEAD W WO CONTRAST, CTA NECK W WO CONTRAST CLINICAL INFORMATION: numbness. Facial droop and left-sided facial swelling. COMPARISON: CT head from the same date and CTA dated 2/26/2019. TECHNIQUE: Helical CT from the aortic arch through the head in arterial phase during fast bolus administration of 80 mL Isovue-370 injected in the right AC. A noncontrast localizer was obtained. 3-D reconstructions were created on a dedicated 3-D workstation. These include multiplanar MPR images, multiplanar MIP images and centerline reconstructions. All CT scans at this facility use dose modulation, iterative reconstruction, and/or weight-based dosing when appropriate to reduce radiation dose to as low as reasonably achievable. FINDINGS:  CTA head: Intracranial segments of internal carotid arteries are patent without flow-limiting stenosis or aneurysmal dilatation. The bilateral middle cerebral and anterior cerebral arteries are patent without focal abnormality identified. The basilar artery is patent without focal stenosis or visualized aneurysm. The bilateral posterior cerebral arteries are patent without focal abnormality identified. Superior cerebellar arteries appear patent. Dural venous sinuses are patent without focal filling defect. No focal areas of abnormal parenchymal enhancement are identified. CTA NECK: There is a bovine type arch. The brachiocephalic and left subclavian arteries are patent without focal stenosis. The common carotid arteries are patent without focal stenosis. The carotid bifurcations are widely patent without hemodynamically significant  stenosis by NASCET criteria. Cervical segments of internal carotid arteries are patent without focal stenosis. The left vertebral artery is dominant. The vertebral arteries are patent throughout their course without focal stenosis. Vocal cords are closely apposed limiting evaluation of this area.  Given this caveat, mucosal surfaces of the aerodigestive tract are symmetric without focal nodular thickening or visualized mass. There are a couple of enhancing nodules in the right parotid gland with a more anterior superior lesion measuring 6 mm and a more inferior lateral lesion measuring 10 mm in greatest dimension. These are stable dating back to 2/26/2019. Stable 4 mm enhancing nodule is present at the left parotid gland. Parotid glands are otherwise unremarkable. Submandibular glands are unremarkable. The thyroid gland is atrophied. No definite cervical lymphadenopathy is identified. Visualized portions of the lungs are clear. There are stable degenerative changes of the  cervical spine. 1. No flow-limiting stenosis or aneurysm in the intracranial circulation. 2. Focal area of severe stenosis at the V4 segment of the nondominant right vertebral artery. Otherwise no focal abnormality identified in the anterior or posterior circulation of the neck. 3. Stable enhancing nodules at the bilateral parotid glands compared to prior CT in February 2019. Differential considerations include intraparotid lymph nodes or Warthin's tumors. **This report has been created using voice recognition software. It may contain minor errors which are inherent in voice recognition technology. ** Final report electronically signed by Dr. Sandra Valdovinos MD on 7/9/2020 8:42 AM    Ct Head Wo Contrast    Result Date: 7/9/2020  PROCEDURE: CT HEAD WO CONTRAST CLINICAL INFORMATION: numbness. COMPARISON: Noncontrast brain CT dated 2/26/2019 TECHNIQUE: Noncontrast 5 mm axial images were obtained through the brain. Sagittal and coronal reconstructions were obtained and reviewed. All CT scans at this facility use dose modulation, iterative reconstruction, and/or weight-based dosing when appropriate to reduce radiation dose to as low as reasonably achievable. FINDINGS: Brain: There is no acute ischemic infarct, hemorrhage, midline shift, mass, or mass effect.  There is no focal abnormality of brain parenchymal attenuation. Ventricles/basal cisterns: The ventricles and cisterns are of appropriate size and configuration for the patient's age. No evidence of obstructive hydrocephalus. Skull base/calvarium/osseous structures: Unremarkable Soft tissues: Unremarkable Intraorbital contents: Unremarkable Sinuses: Unremarkable; the imaged sinuses are clear without evidence of mucosal thickening or fluid levels. Mastoids: Unremarkable; the mastoid air cells are clear. No acute ischemic infarct, hemorrhage, or mass effect. **This report has been created using voice recognition software. It may contain minor errors which are inherent in voice recognition technology. ** Final report electronically signed by Dr. Pratik Coto on 7/9/2020 1:14 AM    Cta Neck W Wo Contrast    Result Date: 7/9/2020  PROCEDURE: CTA HEAD W WO CONTRAST, CTA NECK W WO CONTRAST CLINICAL INFORMATION: numbness. COMPARISON: Brain and neck CTA dated 2/26/2019 WET READ: CTA head: There is a moderate stenosis of the proximal V4 segment of the right vertebral artery. There is no additional hemodynamically significant stenosis or occlusion of the intracranial arteries. There is no aneurysm or vascular malformation. CTA neck: There is no hemodynamically significant stenosis or occlusion of the carotid and vertebral arteries. There are 2 stable indeterminate right parotid masses, largest measuring 1 x 0.6 cm, possibly representing intraparotid lymph nodes. This examination will be formally read by one of the neuroradiologists later today. Please see that report. **This report has been created using voice recognition software. It may contain minor errors which are inherent in voice recognition technology. ** FINAL REPORT: PROCEDURE: CTA HEAD W WO CONTRAST, CTA NECK W WO CONTRAST CLINICAL INFORMATION: numbness. Facial droop and left-sided facial swelling. COMPARISON: CT head from the same date and CTA dated 2/26/2019.  TECHNIQUE: Helical CT from the aortic arch through the head in arterial phase during fast bolus administration of 80 mL Isovue-370 injected in the right AC. A noncontrast localizer was obtained. 3-D reconstructions were created on a dedicated 3-D workstation. These include multiplanar MPR images, multiplanar MIP images and centerline reconstructions. All CT scans at this facility use dose modulation, iterative reconstruction, and/or weight-based dosing when appropriate to reduce radiation dose to as low as reasonably achievable. FINDINGS:  CTA head: Intracranial segments of internal carotid arteries are patent without flow-limiting stenosis or aneurysmal dilatation. The bilateral middle cerebral and anterior cerebral arteries are patent without focal abnormality identified. The basilar artery is patent without focal stenosis or visualized aneurysm. The bilateral posterior cerebral arteries are patent without focal abnormality identified. Superior cerebellar arteries appear patent. Dural venous sinuses are patent without focal filling defect. No focal areas of abnormal parenchymal enhancement are identified. CTA NECK: There is a bovine type arch. The brachiocephalic and left subclavian arteries are patent without focal stenosis. The common carotid arteries are patent without focal stenosis. The carotid bifurcations are widely patent without hemodynamically significant  stenosis by NASCET criteria. Cervical segments of internal carotid arteries are patent without focal stenosis. The left vertebral artery is dominant. The vertebral arteries are patent throughout their course without focal stenosis. Vocal cords are closely apposed limiting evaluation of this area. Given this caveat, mucosal surfaces of the aerodigestive tract are symmetric without focal nodular thickening or visualized mass.  There are a couple of enhancing nodules in the right parotid gland with a more anterior superior lesion measuring 6 mm and a more inferior lateral lesion measuring 10 mm in greatest dimension. These are stable dating back to 2/26/2019. Stable 4 mm enhancing nodule is present at the left parotid gland. Parotid glands are otherwise unremarkable. Submandibular glands are unremarkable. The thyroid gland is atrophied. No definite cervical lymphadenopathy is identified. Visualized portions of the lungs are clear. There are stable degenerative changes of the  cervical spine. 1. No flow-limiting stenosis or aneurysm in the intracranial circulation. 2. Focal area of severe stenosis at the V4 segment of the nondominant right vertebral artery. Otherwise no focal abnormality identified in the anterior or posterior circulation of the neck. 3. Stable enhancing nodules at the bilateral parotid glands compared to prior CT in February 2019. Differential considerations include intraparotid lymph nodes or Warthin's tumors. **This report has been created using voice recognition software. It may contain minor errors which are inherent in voice recognition technology. ** Final report electronically signed by Dr. Dillon Chacon MD on 7/9/2020 8:42 AM    Xr Chest Portable    Result Date: 7/9/2020  PROCEDURE: XR CHEST PORTABLE CLINICAL INFORMATION: facial droop. COMPARISON: Chest x-ray dated 2/28/2020 TECHNIQUE: AP Portable chest xray FINDINGS: Lines/tubes/devices: There is a stable LEFT subclavian single lead AICD device, lead tip in the region of the RIGHT ventricle. Lungs/pleura:  No pneumonia, pulmonary edema, or obvious mass. No pleural effusion. No pneumothorax. Heart: There is stable mild cardiomegaly. Mediastinum/lorna: No obvious mass or adenopathy. Skeleton: No significant bone or joint abnormality. No acute cardiopulmonary disease. Mild cardiomegaly. **This report has been created using voice recognition software. It may contain minor errors which are inherent in voice recognition technology. ** Final report electronically signed by Dr. Rayshawn Jimenez on 7/9/2020 1:32 AM       Follow-up scheduled after discharge :-    in the next few days with ANGELIA oVra - CNP  in the next few weeks with Neurology    Consultations during this hospital stay:-  [] NONE [] Cardiology  [] Nephrology  [] Hemo onco   [] GI   [] ID  [] Endocrine  [] Pulm    [x] Neuro    [] Psych   [] Urology  [] ENT   [] G SURGERY   []Ortho    []CV surg    [] Palliative  [] Hospice [] Pain management   []    []TCU   [] PT/OT  OTHERS:-    Disposition: home  Condition at Discharge: Stable    Time Spent:- 15 minutes    Electronically signed by Paolo Burdick MD on 7/10/2020 at 12:12 PM  Discharging Hospitalist

## 2020-07-10 NOTE — PROGRESS NOTES
[facility]  INPATIENT PHYSICAL THERAPY  EVALUATION  [facility] 4A - [id number]    Time In: 9822  Time Out: 0840  Timed Code Treatment Minutes: 0 Minutes  Minutes: 12          Date: 7/10/2020  Patient Name: Miladys Avelar,  Gender:  female        MRN: [id number]  : 1960  (62 y.o.)      Referring Practitioner: Maude Somers MD  Diagnosis: Facial swelling  Additional Pertinent Hx: 62year old with hx of breast cancer, s/p mastectomy, still actively receive chemo treatment, suddenly c/o left face paralysis yesterday, patient described that she couldn't move her left face and felt that it is twitching, by the time she came to the floor, her symptoms completely resolved. Patient received aspirin and plavix and was also thought to have potential bell palsy given the presentation. MRI pending. Restrictions/Precautions:  Restrictions/Precautions: Fall Risk    Subjective:  Chart Reviewed: Yes  Patient assessed for rehabilitation services?: Yes  Family / Caregiver Present: No  Subjective: RN approved session, pt is supine in bed and agreeable. General:  Overall Orientation Status: Within Functional Limits  Follows Commands: Within Functional Limits    Vision: Within Functional Limits    Hearing: Within functional limits         Pain:  Denies.           Social/Functional History:    Lives With: Son(and daughter-in-law)  Type of Home: House  Home Layout: Two level, Laundry in basement(bed/bath on main level)  Home Access: Stairs to enter with rails  Entrance Stairs - Number of Steps: 3  Home Equipment: Rolling walker   Ambulation Assistance: Independent  Transfer Assistance: Independent    Active : No     Additional Comments: Pt amb without AD; daughter-in-law works from home; pt does the housework and takes care of 2 dogs    OBJECTIVE:  Bed Mobility:  Supine to Sit: Independent  Sit to Supine: Independent     Transfers:  Sit to Stand: Independent  Stand to Sit:Independent    Ambulation:  Stand By Assistance  Distance: 200 feet  Surface: Level Tile  Device:No Device  Gait Deviations:  Slow Hillary, Decreased Step Length Bilaterally and Decreased Gait Speed  **Slow pace, steady    ASSESSMENT:  Activity Tolerance:  Patient tolerance of  treatment: good. Treatment Initiated: No treatment initiated    Assessment: Body structures, Functions, Activity limitations: Decreased strength  Assessment: Pt tolerates session well, amb in hallway without AD without difficulty. Pt to DC home later today, PT will sign off. Prognosis: Excellent    REQUIRES PT FOLLOW UP: No  No Skilled PT: At baseline function    Discharge Recommendations:  Discharge Recommendations: Home with assist PRN    Patient Education:Plan of Care    Equipment Recommendations:  Equipment Needed: No    Plan:  Times per week: NA    Goals:  Patient goals : to go home today                  Following session, patient left in safe position with all fall risk precautions in place.

## 2020-07-10 NOTE — PROGRESS NOTES
[facility] THERAPY MISSED TREATMENT NOTE  [facility] 4A      Date: 7/10/2020  Patient Name: Joshua Moncada        MRN: [id number]    : 1960  (62 y.o.)    REASON FOR MISSED TREATMENT:  Per chart review MRI brain negative for acute intracranial findings. F/u conversation completed to [facility] no concerns documented nor identified as r/t cognitive functioning. High level of success noted for consumption of established diet level of regular solids with thin liquids with BSE completed on  only recommending f/u x1-2 while monitoring medical POC as r/t brain imaging. Nursing indicates currently in process for d/c with no further ST services warranted. Please re-consult should additional needs arise.        Adam Swenson M.A., [address] [place] [address]

## 2020-07-10 NOTE — PROGRESS NOTES
NEUROLOGY INPATIENT PROGRESS NOTE    Patient- Benito Ford    MRN -  [id number]   [place] [facility] # - [de-identified]      - 1960    62 y.o. Subjective: The patient is seen as follow up for transient left face paralysis. Patient is alert at this time. She feels normal, MRI came back normal    Objective:   /64   Pulse 70   Temp 98 °F (36.7 °C) (Oral)   Resp 16   Ht 5' (1.524 m)   Wt 205 lb 3.2 oz (93.1 kg)   SpO2 94%   BMI 40.08 kg/m²       Intake/Output Summary (Last 24 hours) at 7/10/2020 1237  Last data filed at 7/10/2020 1210  Gross per 24 hour   Intake 701 ml   Output --   Net 701 ml       Physical Exam:  General:  Awake, up in chair,  not  in distress. Language is intact. HEENT: pink conjunctiva, unicteric sclera, moist oral mucosa. There is no neck lymphadenopathy. Neck: There is no carotid bruits. The Neck is supple. Neuro: CN 2-12 grossly intact with no focal deficits. Power 5/5 Throughout symmetric,  Long tracts are intact. Cerebellar exam is Intact. Sensory exam is intact to light touch. Gait is normal. No abnormal movement. Osteo: There is no LROM of any of the 4 extremity joints, no joint tenderness. Leg edema none  Skin: no lesions, no rash, warm and moist to touch. Abdomen is soft intact bowel sounds. ROS:    Cardiac: no chest pain. No palpitations.   Renal : no flank pain, no hematuria  Skin: no rash    Medications:     atorvastatin  40 mg Oral Nightly    digoxin  125 mcg Oral Daily    levothyroxine  100 mcg Oral Daily    venlafaxine  37.5 mg Oral Daily    sodium chloride flush  10 mL Intravenous 2 times per day    enoxaparin  40 mg Subcutaneous Daily    aspirin  81 mg Oral Daily    carbamide peroxide  5 drop Both Ears BID       Data:   CBC:   Recent Labs     20  0104 07/10/20  0503   WBC 5.4 5.2   HGB 11.4* 11.9*    195     BMP:    Recent Labs     20  0104 20  0357 07/10/20  0503    140 143   K 3.5 3.8 3.6    104 105   CO2 23 23 24   BUN 15 14 12   CREATININE 0.7 0.7 0.6   GLUCOSE 119* 125* 115*           No results found for this or any previous visit. No results found for this or any previous visit. Results for orders placed during the hospital encounter of 07/08/20   CTA HEAD W WO CONTRAST    Narrative PROCEDURE: CTA HEAD W WO CONTRAST, CTA NECK W WO CONTRAST    CLINICAL INFORMATION: numbness. COMPARISON: Brain and neck CTA dated 2/26/2019    WET READ:    CTA head: There is a moderate stenosis of the proximal V4 segment of the right vertebral artery. There is no additional hemodynamically significant stenosis or occlusion of the intracranial arteries. There is no aneurysm or vascular malformation. CTA neck: There is no hemodynamically significant stenosis or occlusion of the carotid and vertebral arteries. There are 2 stable indeterminate right parotid masses, largest measuring 1 x 0.6 cm, possibly representing intraparotid lymph nodes. This examination will be formally read by one of the neuroradiologists later today. Please see that report. **This report has been created using voice recognition software. It may contain minor errors which are inherent in voice recognition technology. **    FINAL REPORT:    PROCEDURE: CTA HEAD W WO CONTRAST, CTA NECK W WO CONTRAST    CLINICAL INFORMATION: numbness. Facial droop and left-sided facial swelling. COMPARISON: CT head from the same date and CTA dated 2/26/2019. TECHNIQUE: Helical CT from the aortic arch through the head in arterial phase during fast bolus administration of 80 mL Isovue-370 injected in the right AC. A noncontrast localizer was obtained. 3-D reconstructions were created on a dedicated 3-D   workstation. These include multiplanar MPR images, multiplanar MIP images and centerline reconstructions.      All CT scans at this facility use dose modulation, iterative reconstruction, and/or weight-based dosing when appropriate to reduce radiation dose to as low as reasonably achievable. FINDINGS:   CTA head:  Intracranial segments of internal carotid arteries are patent without flow-limiting stenosis or aneurysmal dilatation. The bilateral middle cerebral and anterior cerebral arteries are patent without focal abnormality identified. The basilar artery is   patent without focal stenosis or visualized aneurysm. The bilateral posterior cerebral arteries are patent without focal abnormality identified. Superior cerebellar arteries appear patent. Dural venous sinuses are patent without focal filling defect. No   focal areas of abnormal parenchymal enhancement are identified. CTA NECK:  There is a bovine type arch. The brachiocephalic and left subclavian arteries are patent without focal stenosis. The common carotid arteries are patent without focal stenosis. The carotid bifurcations are widely patent without hemodynamically significant   stenosis by NASCET criteria. Cervical segments of internal carotid arteries are patent without focal stenosis. The left vertebral artery is dominant. The vertebral arteries are patent throughout their course without focal stenosis. Vocal cords are closely apposed limiting evaluation of this area. Given this caveat, mucosal surfaces of the aerodigestive tract are symmetric without focal nodular thickening or visualized mass. There are a couple of enhancing nodules in the right   parotid gland with a more anterior superior lesion measuring 6 mm and a more inferior lateral lesion measuring 10 mm in greatest dimension. These are stable dating back to 2/26/2019. Stable 4 mm enhancing nodule is present at the left parotid gland. Parotid glands are otherwise unremarkable. Submandibular glands are unremarkable. The thyroid gland is atrophied. No definite cervical lymphadenopathy is identified. Visualized portions of the lungs are clear.  There are stable degenerative changes of the   cervical spine.        Impression    1. No flow-limiting stenosis or aneurysm in the intracranial circulation. 2. Focal area of severe stenosis at the V4 segment of the nondominant right vertebral artery. Otherwise no focal abnormality identified in the anterior or posterior circulation of the neck. 3. Stable enhancing nodules at the bilateral parotid glands compared to prior CT in February 2019. Differential considerations include intraparotid lymph nodes or Warthin's tumors. **This report has been created using voice recognition software. It may contain minor errors which are inherent in voice recognition technology. **    Final report electronically signed by Dr. French Perry MD on 7/9/2020 8:42 AM     Results for orders placed during the hospital encounter of 07/08/20   CTA NECK W WO CONTRAST    Narrative PROCEDURE: CTA HEAD W [address] INFORMATION: numbness. COMPARISON: Brain and neck CTA dated 2/26/2019    WET READ:    CTA head: There is a moderate stenosis of the proximal V4 segment of the right vertebral artery. There is no additional hemodynamically significant stenosis or occlusion of the intracranial arteries. There is no aneurysm or vascular malformation. CTA neck: There is no hemodynamically significant stenosis or occlusion of the carotid and vertebral arteries. There are 2 stable indeterminate right parotid masses, largest measuring 1 x 0.6 cm, possibly representing intraparotid lymph nodes. This examination will be formally read by one of the neuroradiologists later today. Please see that report. **This report has been created using voice recognition software. It may contain minor errors which are inherent in voice recognition technology. **    FINAL REPORT:    PROCEDURE: CTA HEAD W WO CONTRAST, CTA NECK W WO CONTRAST    CLINICAL INFORMATION: numbness. Facial droop and left-sided facial swelling.     COMPARISON: CT head from the same date and CTA dated 2/26/2019. TECHNIQUE: Helical CT from the aortic arch through the head in arterial phase during fast bolus administration of 80 mL Isovue-370 injected in the right AC. A noncontrast localizer was obtained. 3-D reconstructions were created on a dedicated 3-D   workstation. These include multiplanar MPR images, multiplanar MIP images and centerline reconstructions. All CT scans at this facility use dose modulation, iterative reconstruction, and/or weight-based dosing when appropriate to reduce radiation dose to as low as reasonably achievable. FINDINGS:   CTA head:  Intracranial segments of internal carotid arteries are patent without flow-limiting stenosis or aneurysmal dilatation. The bilateral middle cerebral and anterior cerebral arteries are patent without focal abnormality identified. The basilar artery is   patent without focal stenosis or visualized aneurysm. The bilateral posterior cerebral arteries are patent without focal abnormality identified. Superior cerebellar arteries appear patent. Dural venous sinuses are patent without focal filling defect. No   focal areas of abnormal parenchymal enhancement are identified. CTA NECK:  There is a bovine type arch. The brachiocephalic and left subclavian arteries are patent without focal stenosis. The common carotid arteries are patent without focal stenosis. The carotid bifurcations are widely patent without hemodynamically significant   stenosis by NASCET criteria. Cervical segments of internal carotid arteries are patent without focal stenosis. The left vertebral artery is dominant. The vertebral arteries are patent throughout their course without focal stenosis. Vocal cords are closely apposed limiting evaluation of this area. Given this caveat, mucosal surfaces of the aerodigestive tract are symmetric without focal nodular thickening or visualized mass.  There are a couple of enhancing nodules in the right   parotid gland with a more anterior superior lesion measuring 6 mm and a more inferior lateral lesion measuring 10 mm in greatest dimension. These are stable dating back to 2/26/2019. Stable 4 mm enhancing nodule is present at the left parotid gland. Parotid glands are otherwise unremarkable. Submandibular glands are unremarkable. The thyroid gland is atrophied. No definite cervical lymphadenopathy is identified. Visualized portions of the lungs are clear. There are stable degenerative changes of the   cervical spine. Impression    1. No flow-limiting stenosis or aneurysm in the intracranial circulation. 2. Focal area of severe stenosis at the V4 segment of the nondominant right vertebral artery. Otherwise no focal abnormality identified in the anterior or posterior circulation of the neck. 3. Stable enhancing nodules at the bilateral parotid glands compared to prior CT in February 2019. Differential considerations include intraparotid lymph nodes or Warthin's tumors. **This report has been created using voice recognition software. It may contain minor errors which are inherent in voice recognition technology. **    Final report electronically signed by Dr. Anay Bishop MD on 7/9/2020 8:42 AM     No results found for this or any previous visit. Results for orders placed during the hospital encounter of 07/08/20   MRI BRAIN W WO CONTRAST    Narrative PROCEDURE: MRI BRAIN W WO CONTRAST    INDICATION:facial weakness, history of breast cancer, evaluate for mets. COMPARISON: CT head dated 7/9/2020. TECHNIQUE: Multiplanar and multiple spin echo T1 and T2-weighted images were obtained through the brain before and after the administration of intravenous contrast. 20 mL ProHance was injected in the right AC. FINDINGS:  The ventricles, cisterns and sulci are symmetric and normal in size and configuration for age.  No significant focal areas of abnormal T2/flair prolongation are identified within the parenchyma. No intra or extra-axial mass is identified. No focal areas   restricted diffusion are present. Following contrast administration, there are no focal areas of abnormal parenchymal or meningeal enhancement identified. The major vascular flow voids appear patent. Orbits are unremarkable. Visualized paranasal sinuses are clear. Mastoid air cells are clear. Impression  Normal MRI of the brain. **This report has been created using voice recognition software. It may contain minor errors which are inherent in voice recognition technology. **      Final report electronically signed by Dr. French Perry MD on 7/10/2020 11:57 AM     No results found for this or any previous visit. Results for orders placed during the hospital encounter of 07/08/20   CT HEAD WO CONTRAST    Narrative PROCEDURE: CT HEAD WO CONTRAST    CLINICAL INFORMATION: numbness. COMPARISON: Noncontrast brain CT dated 2/26/2019    TECHNIQUE: Noncontrast 5 mm axial images were obtained through the brain. Sagittal and coronal reconstructions were obtained and reviewed. All CT scans at this facility use dose modulation, iterative reconstruction, and/or weight-based dosing when appropriate to reduce radiation dose to as low as reasonably achievable. FINDINGS:    Brain: There is no acute ischemic infarct, hemorrhage, midline shift, mass, or mass effect. There is no focal abnormality of brain parenchymal attenuation. Ventricles/basal cisterns: The ventricles and cisterns are of appropriate size and configuration for the patient's age. No evidence of obstructive hydrocephalus. Skull base/calvarium/osseous structures: Unremarkable  Soft tissues: Unremarkable  Intraorbital contents: Unremarkable  Sinuses: Unremarkable; the imaged sinuses are clear without evidence of mucosal thickening or fluid levels. Mastoids: Unremarkable; the mastoid air cells are clear.         Impression No acute ischemic infarct, hemorrhage, or mass effect. **This report has been created using voice recognition software. It may contain minor errors which are inherent in voice recognition technology. **    Final report electronically signed by Dr. Shaw Perez on 7/9/2020 1:14 AM         Assessment:  Active Problems:    Facial swelling  Resolved Problems:    * No resolved hospital problems. *    62 year old woman with breast CA, p/w transient left face paralysis that resolved, MRI is normal    Plan:    1. TIA? Will con't aspirin 81mg daily, no need plavix  2. See me in 3 months time for a one time follow up  3.  Pt can be discharged today    Drake Maria MD, 7/10/2020 12:37 PM       Loi Luna MD  Attending Neurologist/Neurointensivist

## 2020-07-10 NOTE — PLAN OF CARE
Problem: Falls - Risk of:  Goal: Will remain free from falls  Description: Will remain free from falls  7/10/2020 1244 by Tamanna Garner RN  Outcome: Ongoing  Note: Patient has remained free from falls this shift. Bed in lowest position with wheels locked. Patient educated on importance of call light usage and bed alarm. Non-skid slippers used with ambulation. Hourly checks completed and documented. Problem: Falls - Risk of:  Goal: Absence of physical injury  Description: Absence of physical injury  7/10/2020 1244 by Tamanna Garner RN  Outcome: Ongoing  Note: Patient has remained free from physical injury this shift. Bed in lowest position with wheels locked. Patient educated on importance of call light usage and bed alarm. Non-skid slippers used with ambulation. Hourly checks completed and documented. Problem: Discharge Planning:  Goal: Discharged to appropriate level of care  Description: Discharged to appropriate level of care  7/10/2020 1244 by Tamanna Garner RN  Outcome: Ongoing  Note: Patient educated that discharge plan is still in progress. Patient from home. Problem: Skin Integrity:  Goal: Will show no infection signs and symptoms  Description: Will show no infection signs and symptoms  7/10/2020 1244 by Tamanna Garner RN  Outcome: Ongoing  Note: No new signs of infection evident this shift. Skin warm, dry, and intact. Incisions on hands free of redness, warmth, or discharge. Patient turns self with pillow support. Problem: Pain:  Goal: Pain level will decrease  Description: Pain level will decrease  7/10/2020 1244 by Tamanna Garner RN  Outcome: Ongoing  Note: Patient denies pain this shift.       Problem: HEMODYNAMIC STATUS  Goal: Patient has stable vital signs and fluid balance  7/10/2020 1244 by Tamanna Garner RN  Outcome: Ongoing  Note:   Vitals:    07/10/20 1207   BP: 105/64   Pulse: 70   Resp: 16   Temp: 98 °F (36.7 °C)   SpO2: 94%        Problem: ACTIVITY INTOLERANCE/IMPAIRED MOBILITY  Goal: Mobility/activity is maintained at optimum level for patient  7/10/2020 1244 by Makayla Cedillo RN  Outcome: Ongoing  Note: Patient is up with standby assist. Tolerates well. Patient denies pain, numbness, or tingling in extremities. Problem: COMMUNICATION IMPAIRMENT  Goal: Ability to express needs and understand communication  7/10/2020 1244 by Makayla Cedillo RN  Outcome: Ongoing  Note: Patient alert and oriented. Patient able to express needs. Care plan reviewed with patient. Patient verbalizes understanding of the plan of care and contributes to goal setting.

## 2020-07-10 NOTE — PLAN OF CARE
Problem: Falls - Risk of:  Goal: Will remain free from falls  Description: Will remain free from falls  7/10/2020 0253 by Tierra Anton RN  Outcome: Ongoing  Note: Call light in reach, bed in lowest position, bed alarm activated. Educated on use of call light before ambulation and when in need of assistance. Patient expressed understanding. Hourly visual checks performed and charted. Toileting offered to patient. No falls during shift, at this time. Pt up with standby assist and tolerating well at this time. Continuing to monitor       Problem: Falls - Risk of:  Goal: Absence of physical injury  Description: Absence of physical injury  Outcome: Ongoing  Note: Call light in reach, bed in lowest position, bed alarm activated. Educated on use of call light before ambulation and when in need of assistance. Patient expressed understanding. Hourly visual checks performed and charted. Toileting offered to patient. No falls during shift, at this time. Pt up with standby assist and tolerating well at this time. Continuing to monitor     Problem: Discharge Planning:  Goal: Discharged to appropriate level of care  Description: Discharged to appropriate level of care  7/10/2020 0253 by Tierra Anton RN  Outcome: Ongoing  Note: Discharge planning in process and discussed with patient/family. Patient plans to discharge home with family. Care manager aware of discharge needs. Problem: Skin Integrity:  Goal: Will show no infection signs and symptoms  Description: Will show no infection signs and symptoms  7/10/2020 0253 by Tierra Anton RN  Outcome: Ongoing  Note: No signs of skin breakdown. Skin warm, dry, and intact. Mucous membranes pink and moist.  Assistance with turns/ambulation provided PRN. Will continue to monitor. Problem: Pain:  Goal: Pain level will decrease  Description: Pain level will decrease  7/10/2020 0253 by Tierra Anton RN  Outcome: Ongoing  Note: Patient able to use 0-10 pain scale. Denies pain at this time. Agreeable to take PRN pain medications. Problem: HEMODYNAMIC STATUS  Goal: Patient has stable vital signs and fluid balance  7/10/2020 0253 by Tierra Anton RN  Outcome: Ongoing  Note: BP (!) 103/59   Pulse 75   Temp 98.3 °F (36.8 °C) (Oral)   Resp 18   Ht 5' (1.524 m)   Wt 200 lb (90.7 kg)   SpO2 93%   BMI 39.06 kg/m²     Vital signs stable. Will continue to monitor. Problem: ACTIVITY INTOLERANCE/IMPAIRED MOBILITY  Goal: Mobility/activity is maintained at optimum level for patient  7/10/2020 0253 by Tierra Anton RN  Outcome: Ongoing  Note: Patient is up with standby assist. Patient says she has no pain, numbness, or tingling in the extremities. Patient educated on use of call light for assistance ambulating. Problem: COMMUNICATION IMPAIRMENT  Goal: Ability to express needs and understand communication  7/10/2020 0253 by Tierra Anton RN  Outcome: Ongoing  Note: Patient is able to express needs as needed. No needs expressed this shift. Care plan reviewed with patient. Patient verbalizes understanding of the plan of care and contributed to goal setting.

## 2020-07-13 ENCOUNTER — TELEPHONE (OUTPATIENT)
Dept: FAMILY MEDICINE CLINIC | Age: 60
End: 2020-07-13

## 2020-07-13 RX ORDER — CARVEDILOL 3.12 MG/1
TABLET ORAL
Qty: 180 TABLET | Refills: 0 | Status: SHIPPED | OUTPATIENT
Start: 2020-07-13 | End: 2020-10-26

## 2020-07-13 NOTE — TELEPHONE ENCOUNTER
Junior [facility] Transitions Initial Follow Up Call    Outreach made within 2 business days of discharge: Yes    Patient: Joshua Moncada Patient : 1960   MRN: [id number]  Reason for Admission: There are no discharge diagnoses documented for the most recent discharge. Discharge Date: 7/10/20       Spoke with: Salgado Europe    Discharge department/facility:[facility]    TCM Interactive Patient Contact:  Was patient able to fill all prescriptions: Yes  Was patient instructed to bring all medications to the follow-up visit: Yes  Is patient taking all medications as directed in the discharge summary?  Yes  Does patient understand their discharge instructions: Yes  Does patient have questions or concerns that need addressed prior to 7-14 day follow up office visit: no    Scheduled appointment with PCP within 7-14 days    Follow Up  Future Appointments   Date Time Provider Braden oRthman   2020  8:20 AM ANGELIA Foley - [id number] [address] [place] [address]   2020  8:45 AM ANGELIA Lopez - CNP SRPX CHF [address]   2021 12:45 PM Julissa Rocha MD [address] [facility] [address] [place] [address]   6/15/2021  1:30 PM SCHEDULE, [facility] TRU - Beth Horvath LPN

## 2020-07-16 ENCOUNTER — VIRTUAL VISIT (OUTPATIENT)
Dept: FAMILY MEDICINE CLINIC | Age: 60
End: 2020-07-16
Payer: COMMERCIAL

## 2020-07-16 PROCEDURE — 99441 PR PHYS/QHP TELEPHONE EVALUATION 5-10 MIN: CPT | Performed by: NURSE PRACTITIONER

## 2020-07-16 RX ORDER — VENLAFAXINE HYDROCHLORIDE 75 MG/1
75 CAPSULE, EXTENDED RELEASE ORAL DAILY
Qty: 90 CAPSULE | Refills: 0 | Status: SHIPPED | OUTPATIENT
Start: 2020-07-16 | End: 2020-10-20

## 2020-07-16 NOTE — PROGRESS NOTES
Ciro Damon is a 62 y.o. female evaluated via telephone on 7/16/2020. Consent:  She and/or health care decision maker is aware that that she may receive a bill for this telephone service, depending on her insurance coverage, and has provided verbal consent to proceed: Yes      Documentation:  I communicated with the patient and/or health care decision maker about anxiety and depression and recent hospitalization   Details of this discussion including any medical advice provided:     Patient was recently admitted to [facility] for left facial droop. She had extensive workup done, see hospital d/c summary below:    Initial H and P and Hospital course:-     61 y/o R-handed F PMHx HTN, L breast cancer (T2,N1,Mx hormone-responsive / diagnosed 2016 / on Elta Peaches / known to Dr. René Rodriguez), chronic systolic CHF / chemotherapy-induced cardiomyopathy (EF 35-40% per ECHO 11/2019; s/p ICD placement 2/2019) / mild-moderate MR / trace AR, hypothyroidism, prediabetes, anxiety/insomnia, DJD/osteopenia and chronic tobacco use -- presents to [facility] with a chief complaint of facial tingling / droop. History provided by the patient. Patient reports sudden-onset lower L-sided facial weakness + tightness/spasms & tingling which started yesterday evening around 2200 / states symptoms were present from her jawbone to the ear and included the lips / denies facial swelling / denies forehead involvement or difficulty blinking/closing her L eye / denies eye tearing/watering. She applied ice to the affected area with temporary relief of symptoms / when they returned, she decided to proceed to the ED for evaluation. Denies similar symptoms to the R side of her face. Denies associated confusion, slurred speech, vision/hearing changes, ear/jaw/dental pain, L arm/leg weakness, paresthesias or rash.  Also denies recent illness/trauma, fevers/chills, headache, chest pain, palpitations, cough, shortness of breath, n/v/d/c, abdominal pain, urinary symptoms or syncope. Denies history of TIA/CVA. She has never had symptoms like this in the past.   Patient was evaluated by neuro and rec MRI with and without contrast to rule out mets. She had MRI the following day that was negative. Patient didn't have any more symptoms. Discussed with neurology, patient was deemed medically stable for discharge. She was started on ASA per neuro rec. Follow up with neuro in 3 months    She is doing well, the facial droop, numbness/tingling etc has completely resolved. She is feeling back to normal. It was recommended she f/u with the neurologist in 3 months, she reports that she will call and schedule the f/u appt with neurology. Anxiety has been been higher recently. Triggers for the anxiety are personal issues, family issues. Her son has COVID-19 and she isn't able to see him. She is feeling depressed pretty much every day, + fatigue, is gaining weight, having issues sleeping at night. She denies any SI/HI. She recently had some problems with her , she quit her antidepressants and she felt like life wasn't worth living. On mother's day her kids reached out to her and helped her feel like she was important and this really helped. ASSESSMENT & PLAN  Diagnoses and all orders for this visit:    Facial droop    Depression with anxiety  -     venlafaxine (EFFEXOR XR) 75 MG extended release capsule; Take 1 capsule by mouth daily      - f/u neurology for facial droop   - increase Effexor to 75 mg  - f/u 6 weeks    I affirm this is a Patient Initiated Episode with a Patient who has not had a related appointment within my department in the past 7 days or scheduled within the next 24 hours.     Patient identification was verified at the start of the visit: Yes    Total Time: minutes: 5-10 minutes    Note: not billable if this call serves to triage the patient into an appointment for the relevant concern      Deborra Johnny

## 2020-07-20 ENCOUNTER — TELEPHONE (OUTPATIENT)
Dept: FAMILY MEDICINE CLINIC | Age: 60
End: 2020-07-20

## 2020-07-20 NOTE — TELEPHONE ENCOUNTER
Pt scheduled for US thyroid at [facility] on 7/29/20 at 10:00 am, pt to go to main radiology at 9:30. No VM to leave detailed msg.

## 2020-07-21 ENCOUNTER — HOSPITAL ENCOUNTER (OUTPATIENT)
Dept: INFUSION THERAPY | Age: 60
Discharge: HOME | End: 2020-07-21
Payer: COMMERCIAL

## 2020-07-21 ENCOUNTER — OFFICE VISIT (OUTPATIENT)
Dept: ONCOLOGY | Age: 60
End: 2020-07-21
Payer: COMMERCIAL

## 2020-07-21 VITALS
HEART RATE: 71 BPM | TEMPERATURE: 98.2 F | RESPIRATION RATE: 16 BRPM | SYSTOLIC BLOOD PRESSURE: 118 MMHG | DIASTOLIC BLOOD PRESSURE: 78 MMHG | OXYGEN SATURATION: 97 % | BODY MASS INDEX: 37.06 KG/M2 | WEIGHT: 201.4 LBS | HEIGHT: 62 IN

## 2020-07-21 PROCEDURE — 4004F PT TOBACCO SCREEN RCVD TLK: CPT | Performed by: INTERNAL MEDICINE

## 2020-07-21 PROCEDURE — G8417 CALC BMI ABV UP PARAM F/U: HCPCS | Performed by: INTERNAL MEDICINE

## 2020-07-21 PROCEDURE — 3017F COLORECTAL CA SCREEN DOC REV: CPT | Performed by: INTERNAL MEDICINE

## 2020-07-21 PROCEDURE — 1111F DSCHRG MED/CURRENT MED MERGE: CPT | Performed by: INTERNAL MEDICINE

## 2020-07-21 PROCEDURE — 99211 OFF/OP EST MAY X REQ PHY/QHP: CPT

## 2020-07-21 PROCEDURE — G8427 DOCREV CUR MEDS BY ELIG CLIN: HCPCS | Performed by: INTERNAL MEDICINE

## 2020-07-21 PROCEDURE — 99214 OFFICE O/P EST MOD 30 MIN: CPT | Performed by: INTERNAL MEDICINE

## 2020-07-21 RX ORDER — LETROZOLE 2.5 MG/1
2.5 TABLET, FILM COATED ORAL DAILY
Qty: 90 TABLET | Refills: 3 | Status: SHIPPED | OUTPATIENT
Start: 2020-07-21 | End: 2021-01-20 | Stop reason: SDUPTHER

## 2020-07-21 NOTE — PROGRESS NOTES
Maternal Grandfather     Esophageal Cancer Neg Hx       Social History     Tobacco Use    Smoking status: Current Some Day Smoker     Packs/day: 0.25     Years: 37.00     Pack years: 9.25     Types: Cigarettes     Start date: 11/12/1981    Smokeless tobacco: Never Used   Substance Use Topics    Alcohol use: No      Current Outpatient Medications   Medication Sig Dispense Refill    letrozole (FEMARA) 2.5 MG tablet Take 1 tablet by mouth daily 90 tablet 3    venlafaxine (EFFEXOR XR) 75 MG extended release capsule Take 1 capsule by mouth daily 90 capsule 0    carvedilol (COREG) 3.125 MG tablet Take 1 tablet by mouth twice daily 180 tablet 0    aspirin 81 MG chewable tablet Take 1 tablet by mouth daily 30 tablet 3    carbamide peroxide (DEBROX) 6.5 % otic solution Place 5 drops in ear(s) 2 times daily 1 Bottle 1    ENTRESTO 49-51 MG per tablet Take 1 tablet by mouth twice daily 60 tablet 5    spironolactone (ALDACTONE) 25 MG tablet Take 1 tablet by mouth once daily 90 tablet 0    furosemide (LASIX) 20 MG tablet Take 1 tablet by mouth daily as needed (leg swelling, weight gain) 90 tablet 0    digoxin (LANOXIN) 125 MCG tablet Take 1 tablet by mouth daily 90 tablet 3    omeprazole (PRILOSEC) 20 MG delayed release capsule Take 1 capsule by mouth every morning (before breakfast) 30 capsule 6    atorvastatin (LIPITOR) 40 MG tablet Take 1 tablet by mouth nightly 90 tablet 3    levothyroxine (SYNTHROID) 100 MCG tablet Take 1 tablet by mouth daily 90 tablet 3    acetaminophen (TYLENOL) 500 MG tablet Take 500 mg by mouth every 6 hours as needed for Pain       No current facility-administered medications for this visit.        No Known Allergies   Health Maintenance   Topic Date Due    DTaP/Tdap/Td vaccine (1 - Tdap) 02/22/1980    Shingles Vaccine (1 of 2) 02/22/2011    Flu vaccine (1) 09/01/2020    A1C test (Diabetic or Prediabetic)  07/09/2021    Lipid screen  07/09/2021    TSH testing  07/09/2021    105 07/10/2020    CO2 24 07/10/2020    BUN 12 07/10/2020    CREATININE 0.6 07/10/2020    GLUCOSE 115 07/10/2020    CALCIUM 8.8 07/10/2020      LFT:   Lab Results   Component Value Date    ALT 11 2020    AST 12 2020    ALKPHOS 66 2020    BILITOT <0.2 (L) 2020      PET Scan 19  Narrative   PROCEDURE: PET CT SKULL BASE MID THIGH RESTAGE       CLINICAL INFORMATION: 60-year-old woman with left breast cancer, status post bilateral mastectomies, radiation therapy densities completed 2018) and chemotherapy (completed 2017); subsequent treatment strategy.       Radiopharmaceutical: 11.43 mCi F-18 FDG, intravenously.       TECHNIQUE: PET/CT imaging was performed following skull base to the midthigh levels using routine PET acquisition.       Injection site: Right antecubital fossa.       Time of FDG injection: 10:37 AM.       Serum glucose: 96 g/dL at 10:30 AM.       Tiny of imagin:56 AM       COMPARISON: CTA chest 2018       FINDINGS:        Neck: No FDG avid cervical lymphadenopathy. The thyroid gland is poorly visualized but likely enlarged. There are probable FDG avid hypoattenuating nodules bilaterally in the gland. The nodule on the right side is associated with a maximum SUV of 4.5    (image 62). A nodule in the left side is associated with a maximum SUV of 3.8 (image 62).     Chest: There is stable cardiac enlargement. Atherosclerotic calcifications are present in the thoracic aorta without evidence of aneurysm. There is no pleural or pericardial effusion. No FDG avid pulmonary nodules are identified. An indistinct 5 mm are    density in the right lower lobe abutting the major fissure is stable in size and does not demonstrate metabolic activity (image 94). This density is likely at a size below the resolution of PET imaging. There is no FDG avid mediastinal, hilar or axillary    lymphadenopathy. Surgical changes of prior bilateral mastectomy are noted. Diffuse activity in the esophagus is likely infectious, inflammatory or physiologic. Degenerative changes are present in the thoracic spine without evidence of hypermetabolic    osseous metastatic disease.       Abdomen/pelvis: Physiologic activity is seen in the liver, spleen, urinary collecting system and gastrointestinal tract. Minimal atherosclerotic calcifications are present in the abdominal aorta without evidence of aneurysm. There is at least one    calcified fibroid in a prominent uterus. Phleboliths are seen in the pelvis. There is no FDG avid mesenteric, retroperitoneal, pelvic or inguinal lymphadenopathy. A calcification in the subcutaneous tissues of the posterior right pelvis may be an    injection granuloma. Degenerative changes are present in the lumbar spine and pelvis without evidence of hypermetabolic osseous metastatic disease.           Impression       Poorly visualized FDG avid thyroid nodules of indeterminate etiology. Malignancy cannot be excluded and thyroid ultrasound is recommended for further evaluation.       Final report electronically signed by Dr. Jelly Funes on 1/16/2019 1:47 PM         DEXA study on July 5, 2019 showed:  OSTEOPENIA   Patient is at medium risk for fracture     Bone scan on March 30, 2020 showed:  1. No scintigraphic evidence of osseous metastatic disease. 2. Probable degenerative arthropathic changes as detailed above. Brain MRI on July 10, 2020 showed:   Normal MRI of the brain.                  Assessment and Plan:   1. History of Left Breast Cancer - S/P bilateral mastectomy December 2016. T2,N1,Mx Hormone Responsive Breast Cancer   She was treated with adjuvant chemotherapy: 4 cycles of AC, followed by weekly Taxol. Afterwards she received radiation treatment. The plan was to place her on adjuvant hormonal treatment but the patient lost her insurance.  In September/October 2018 she was diagnosed with a cardiomyopathy, most likely related to chemotherapy. The patient [place] be with her family. She established care with our clinic in December 2018. 2. Adjuvant Hormonal Therapy  The patient was started on Aromatase inhibitor with Femara in January 2019. Overall she tolerates AI well without significant side effects. She was instructed to continue. The patient had DEXA scan which showed osteopenia. No evidence of disease recurrence on today's physical examination the patient was instructed to continue Femara. Due to complaint of sternal bone pain, she had bone scan  March 2020 that was negative for evidence of bone metastasis. July 8, 2020 she was hospitalized for facial numbness. Brain MRI CT of the head were negative for evidence of metastatic disease. 3. Thyroid Nodules  Noted on PET scan 1/16/19 as poorly visualized FDG avid thyroid nodules of indeterminate etiology. Thyroid ultrasound showed a 1 cm isoechoic solid nodule, low suspicious category. Follow-up thyroid ultrasound was recommended in 6 months.     Electronically signed by   Maura Perez MD

## 2020-07-22 ENCOUNTER — HOSPITAL ENCOUNTER (OUTPATIENT)
Age: 60
Setting detail: OUTPATIENT SURGERY
Discharge: HOME | End: 2020-07-27
Attending: INTERNAL MEDICINE | Admitting: INTERNAL MEDICINE
Payer: COMMERCIAL

## 2020-07-24 ENCOUNTER — HOSPITAL ENCOUNTER (OUTPATIENT)
Age: 60
Discharge: HOME | End: 2020-07-24
Payer: COMMERCIAL

## 2020-07-24 PROCEDURE — U0002 COVID-19 LAB TEST NON-CDC: HCPCS

## 2020-07-26 LAB
PERFORMING LAB: NORMAL
REPORT: NORMAL
SARS-COV-2: NOT DETECTED

## 2020-07-27 ENCOUNTER — ANESTHESIA EVENT (OUTPATIENT)
Dept: ENDOSCOPY | Age: 60
End: 2020-07-27
Payer: COMMERCIAL

## 2020-07-27 ENCOUNTER — ANESTHESIA (OUTPATIENT)
Dept: ENDOSCOPY | Age: 60
End: 2020-07-27
Payer: COMMERCIAL

## 2020-07-27 VITALS
DIASTOLIC BLOOD PRESSURE: 53 MMHG | SYSTOLIC BLOOD PRESSURE: 96 MMHG | RESPIRATION RATE: 15 BRPM | OXYGEN SATURATION: 98 %

## 2020-07-27 VITALS
SYSTOLIC BLOOD PRESSURE: 97 MMHG | DIASTOLIC BLOOD PRESSURE: 60 MMHG | RESPIRATION RATE: 16 BRPM | HEART RATE: 72 BPM | TEMPERATURE: 96.4 F | OXYGEN SATURATION: 93 % | BODY MASS INDEX: 35.92 KG/M2 | WEIGHT: 195.2 LBS | HEIGHT: 62 IN

## 2020-07-27 PROCEDURE — 2500000003 HC RX 250 WO HCPCS: Performed by: NURSE ANESTHETIST, CERTIFIED REGISTERED

## 2020-07-27 PROCEDURE — 2720000010 HC SURG SUPPLY STERILE: Performed by: INTERNAL MEDICINE

## 2020-07-27 PROCEDURE — 3609010400 HC COLONOSCOPY POLYPECTOMY HOT BIOPSY: Performed by: INTERNAL MEDICINE

## 2020-07-27 PROCEDURE — 88305 TISSUE EXAM BY PATHOLOGIST: CPT

## 2020-07-27 PROCEDURE — 7100000000 HC PACU RECOVERY - FIRST 15 MIN: Performed by: INTERNAL MEDICINE

## 2020-07-27 PROCEDURE — 3700000000 HC ANESTHESIA ATTENDED CARE: Performed by: INTERNAL MEDICINE

## 2020-07-27 PROCEDURE — 2709999900 HC NON-CHARGEABLE SUPPLY: Performed by: INTERNAL MEDICINE

## 2020-07-27 PROCEDURE — 3609010600 HC COLONOSCOPY POLYPECTOMY SNARE/COLD BIOPSY: Performed by: INTERNAL MEDICINE

## 2020-07-27 PROCEDURE — 3700000001 HC ADD 15 MINUTES (ANESTHESIA): Performed by: INTERNAL MEDICINE

## 2020-07-27 PROCEDURE — 6360000002 HC RX W HCPCS: Performed by: NURSE ANESTHETIST, CERTIFIED REGISTERED

## 2020-07-27 PROCEDURE — 2580000003 HC RX 258: Performed by: INTERNAL MEDICINE

## 2020-07-27 PROCEDURE — 7100000001 HC PACU RECOVERY - ADDTL 15 MIN: Performed by: INTERNAL MEDICINE

## 2020-07-27 RX ORDER — SODIUM CHLORIDE 450 MG/100ML
INJECTION, SOLUTION INTRAVENOUS CONTINUOUS
Status: DISCONTINUED | OUTPATIENT
Start: 2020-07-27 | End: 2020-07-27 | Stop reason: HOSPADM

## 2020-07-27 RX ORDER — PROPOFOL 10 MG/ML
INJECTION, EMULSION INTRAVENOUS PRN
Status: DISCONTINUED | OUTPATIENT
Start: 2020-07-27 | End: 2020-07-27 | Stop reason: SDUPTHER

## 2020-07-27 RX ORDER — LIDOCAINE HYDROCHLORIDE 20 MG/ML
INJECTION, SOLUTION EPIDURAL; INFILTRATION; INTRACAUDAL; PERINEURAL PRN
Status: DISCONTINUED | OUTPATIENT
Start: 2020-07-27 | End: 2020-07-27 | Stop reason: SDUPTHER

## 2020-07-27 RX ORDER — SODIUM CHLORIDE 0.9 % (FLUSH) 0.9 %
10 SYRINGE (ML) INJECTION EVERY 12 HOURS SCHEDULED
Status: DISCONTINUED | OUTPATIENT
Start: 2020-07-27 | End: 2020-07-27 | Stop reason: HOSPADM

## 2020-07-27 RX ORDER — SODIUM CHLORIDE 0.9 % (FLUSH) 0.9 %
10 SYRINGE (ML) INJECTION PRN
Status: DISCONTINUED | OUTPATIENT
Start: 2020-07-27 | End: 2020-07-27 | Stop reason: HOSPADM

## 2020-07-27 RX ADMIN — PROPOFOL 30 MG: 10 INJECTION, EMULSION INTRAVENOUS at 08:08

## 2020-07-27 RX ADMIN — PROPOFOL 50 MG: 10 INJECTION, EMULSION INTRAVENOUS at 07:54

## 2020-07-27 RX ADMIN — PROPOFOL 30 MG: 10 INJECTION, EMULSION INTRAVENOUS at 08:05

## 2020-07-27 RX ADMIN — SODIUM CHLORIDE: 4.5 INJECTION, SOLUTION INTRAVENOUS at 07:41

## 2020-07-27 RX ADMIN — LIDOCAINE HYDROCHLORIDE 80 MG: 20 INJECTION, SOLUTION EPIDURAL; INFILTRATION; INTRACAUDAL; PERINEURAL at 07:54

## 2020-07-27 RX ADMIN — SODIUM CHLORIDE: 4.5 INJECTION, SOLUTION INTRAVENOUS at 07:28

## 2020-07-27 RX ADMIN — PROPOFOL 50 MG: 10 INJECTION, EMULSION INTRAVENOUS at 07:59

## 2020-07-27 RX ADMIN — PROPOFOL 50 MG: 10 INJECTION, EMULSION INTRAVENOUS at 07:55

## 2020-07-27 RX ADMIN — PROPOFOL 50 MG: 10 INJECTION, EMULSION INTRAVENOUS at 08:02

## 2020-07-27 ASSESSMENT — PAIN SCALES - GENERAL
PAINLEVEL_OUTOF10: 0
PAINLEVEL_OUTOF10: 0

## 2020-07-27 ASSESSMENT — PAIN - FUNCTIONAL ASSESSMENT: PAIN_FUNCTIONAL_ASSESSMENT: 0-10

## 2020-07-27 NOTE — H&P
[facility]  Sedation/Analgesia History & Physical    Patient: Abigail Francois: 1960  [facility] Rec#: [id number] Acc#: [id number]   Provider Performing Procedure: Kane Simeon  Primary Care Physician: ANGELIA Vora CNP    PRE-PROCEDURE   Full CODE [x]Yes  DNR-CCA/DNR-CC []Yes   Brief History/Pre-Procedure Diagnosis:history of large polyps           MEDICAL HISTORY  []CAD/Valve  []Liver Disease  []Lung Disease []Diabetes  []Hypertension []Renal Disease  []Additional information:       has a past medical history of Abnormal heart rhythm, Anxiety, Cancer ([facility] [address] CHF (congestive heart failure) ([facility] [address] Congenital heart disease, DJD (degenerative joint disease), Enlarged lymph nodes, Hypertension, Hypothyroidism, ICD (implantable cardioverter-defibrillator) in place, Kidney stones, PONV (postoperative nausea and vomiting), Prediabetes, and Thyroid disease. SURGICAL HISTORY   has a past surgical history that includes Mastectomy, bilateral; Tonsillectomy; Percutaneous Transluminal Coronary Angio; Appendectomy; Colonoscopy; Carpal tunnel release (2019); and Finger trigger release (Right, 6/25/2020). Additional information:       ALLERGIES   Allergies as of 12/04/2019    (No Known Allergies)     Additional information:       MEDICATIONS   Coumadin Use Last 7 Days [x]No []Yes  Antiplatelet drug therapy use last 7 days  [x]No []Yes  Other anticoagulant use last 7 days  [x]No []Yes    Current Facility-Administered Medications:     sodium chloride flush 0.9 % injection 10 mL, 10 mL, Intravenous, 2 times per day, Kane Simeon MD    sodium chloride flush 0.9 % injection 10 mL, 10 mL, Intravenous, PRN, Kane Simeon MD    0.45 % sodium chloride infusion, , Intravenous, Continuous, Kane Simeon MD, Last Rate: 75 mL/hr at 07/27/20 9819  Prior to Admission medications    Medication Sig Start Date End Date Taking?  Authorizing Provider   letrozole (FEMARA) 2.5 MG tablet Take 1 tablet by mouth daily 7/21/20  Yes Bhavani Zepeda MD   venlafaxine (EFFEXOR XR) 75 MG extended release capsule Take 1 capsule by mouth daily 7/16/20  Yes ANGELIA Foley CNP   carvedilol (COREG) 3.125 MG tablet Take 1 tablet by mouth twice daily 7/13/20  Yes Julissa Rocha MD   aspirin 81 MG chewable tablet Take 1 tablet by mouth daily 7/11/20  Yes Tracey Mccoy MD   carbamide peroxide [facility]) 6.5 % otic solution Place 5 drops in ear(s) 2 times daily 7/6/20 8/5/20 Yes Stan Homans, APRN - CNP   ENTRESTO 49-51 MG per tablet Take 1 tablet by mouth twice daily 6/8/20  Yes ANGELIA Ruano CNP   spironolactone (ALDACTONE) 25 MG tablet Take 1 tablet by mouth once daily 5/19/20  Yes ANGELIA Ruano CNP   furosemide (LASIX) 20 MG tablet Take 1 tablet by mouth daily as needed (leg swelling, weight gain) 4/13/20  Yes ANGELIA Foley CNP   digoxin (LANOXIN) 125 MCG tablet Take 1 tablet by mouth daily 1/30/20  Yes ANGELIA Ruano CNP   omeprazole (PRILOSEC) 20 MG delayed release capsule Take 1 capsule by mouth every morning (before breakfast) 12/4/19  Yes ANGELIA Celaya CNP   atorvastatin (LIPITOR) 40 MG tablet Take 1 tablet by mouth nightly 10/7/19  Yes ANGELIA Foley CNP   levothyroxine (SYNTHROID) 100 MCG tablet Take 1 tablet by mouth daily 10/7/19  Yes ANGELIA Foley CNP   acetaminophen (TYLENOL) 500 MG tablet Take 500 mg by mouth every 6 hours as needed for Pain    Historical Provider, MD     Additional information:       PHYSICAL:   Heart:  [x]Regular rate and rhythm  []Other:    Lungs:  [x]Clear    []Other:    Abdomen: [x]Soft    []Other:    Mental Status: [x]Alert & Oriented  []Other:      VITAL SIGNS   Patient Vitals for the past 24 hrs:   BP Temp src Pulse Resp SpO2 Height Weight   07/27/20 0714 109/68 Temporal 76 16 94 % 5' 2\" (1.575 m) 195 lb 3.2 oz (88.5 kg)       PLANNED PROCEDURE   []EGD  [x]Colonoscopy []Flex Sigmoid  []ERCP []EUS   []Cystoscopy  [] CATH [] BRONCH   Consent: I have discussed with the patient and/or the patient representative the indication, alternatives, and the possible risks and/or complications of the planned procedure and the anesthesia methods. The patient and/or patient representative appear to understand and agree to proceed. SEDATION  Planned agent:[x]Midazolam []Meperidine [x]Sublimaze []Morphine  []Diazepam  []Other:     ASA Classification: Class 3 - A patient with severe systemic disease that limits activity but is not incapacitating    Airway Assessment: Mallampati Class II - (soft palate, fauces & uvula are visible)    Monitoring and Safety: The patient will be placed on a cardiac monitor and vital signs, pulse oximetry and level of consciousness will be continuously evaluated throughout the procedure. The patient will be closely monitored until recovery from the medications is complete and the patient has returned to baseline status. Respiratory therapy will be on standby during the procedure. [x]Pre-procedure diagnostic studies complete and results available. Comment:    [x]Previous sedation/anesthesia experiences assessed. Comment:    [x]The patient is an appropriate candidate to undergo the planned procedure sedation and anesthesia. (Refer to nursing sedation/analgesia documentation record)  [x]Formulation and discussion of sedation/procedure plan, risks, and expectations with patient and/or responsible adult completed. [x]Patient examined immediately prior to the procedure.  (Refer to nursing sedation/analgesia documentation record)    Nuria Marie MD   Electronically signed 7/27/2020 at 7:45 AM

## 2020-07-27 NOTE — ANESTHESIA POSTPROCEDURE EVALUATION
Department of Anesthesiology  Postprocedure Note    Patient: Merlin Roca  MRN: [id number]  YOB: 1960  Date of evaluation: 7/27/2020  Time:  8:23 AM     Procedure Summary     Date:  07/27/20 Room / Location:  [address] [place] [address] / [address] [place] [address]    Anesthesia Start:  2432 Anesthesia Stop:  0821    Procedures:       COLONOSCOPY POLYPECTOMY SNARE/COLD BIOPSY (N/A )      COLONOSCOPY POLYPECTOMY HOT BIOPSY Diagnosis:  (ADENOMATOUS POLYP OF TRAVERSE COLON, TUBULOVILLOUS ADENOMA POLYP OF COLON, HX OF HIGH GRADE DYSPLASIA, FAMILY  HX COLON CANCER, SLOW TRANSIT CONSTIPATION)    Surgeon:  Jerrell Borja MD Responsible Provider:  Alfredo Dahl MD    Anesthesia Type:  MAC ASA Status:  3          Anesthesia Type: MAC    Vaughn Phase I: Vaughn Score: 10    Vaughn Phase II:      Last vitals: Reviewed and per EMR flowsheets.        Anesthesia Post Evaluation    Patient location during evaluation: PACU  Patient participation: complete - patient participated  Level of consciousness: awake and alert  Pain score: 0  Airway patency: patent  Nausea & Vomiting: no nausea and no vomiting  Complications: no  Cardiovascular status: blood pressure returned to baseline  Respiratory status: acceptable  Hydration status: euvolemic

## 2020-07-27 NOTE — PROGRESS NOTES
Photos taken, scope used CFQ SER#609. Polyps  Removed by hot snare & Hot biopsy forceps, specimens labeled & 1 jar sent to lab. Colonoscopy completed, Indu  tolerated well.

## 2020-07-27 NOTE — PROGRESS NOTES
0820:  Patient to recovery post procedure. Patient passing flatus. No family at bedside. 5280:  Patient states her son will be driving her home at discharge. 2887:  Patient tolerating oral fluids. 2829: Waiting for Dr. Rylie Fortune to speak with patient. Patient called her son. Discharge instructions provided to patient, verbalized understanding. 7424:  Dr. Rylie Fortune in to discuss results/findings,plan of care with patient. 5088:  Discharged via wheelchair to private vehicle to be driven home by family member.

## 2020-07-27 NOTE — BRIEF OP NOTE
Brief Postoperative Note      Patient: Jovanni Johnson  YOB: 1960  MRN: [id number]    Date of Procedure: 7/27/2020    Pre-Op Diagnosis: ADENOMATOUS POLYP OF TRAVERSE COLON, TUBULOVILLOUS ADENOMA POLYP OF COLON, HX OF HIGH GRADE DYSPLASIA, FAMILY  HX COLON CANCER, SLOW TRANSIT CONSTIPATION    Post-Op Diagnosis: polyps        Procedure(s):  COLONOSCOPY POLYPECTOMY SNARE/COLD BIOPSY  COLONOSCOPY POLYPECTOMY HOT BIOPSY    Surgeon(s):  Mj Nash MD    Assistant:  * No surgical staff found *    Anesthesia: Monitor Anesthesia Care    Estimated Blood Loss (mL): none    Complications: None    Specimens:   ID Type Source Tests Collected by Time Destination   A : SIGMOID COLON POLYPS Tissue Sigmoid Colon SURGICAL PATHOLOGY Mj Nash MD 7/27/2020 0805        Implants:  * No implants in log *      Drains: * No LDAs found *    Findings:  polyps    Electronically signed by Mj Nash MD on 7/27/2020 at 8:19 AM

## 2020-07-27 NOTE — ANESTHESIA PRE PROCEDURE
Department of Anesthesiology  Preprocedure Note       Name:  Edwar Galarza   Age:  62 y.o.  :  1960                                          MRN:  [id number]         Date:  2020      Surgeon: Richard Briscoe):  Bulmaro Pickard MD    Procedure: Procedure(s):  COLONOSCOPY DIAGNOSTIC    Medications prior to admission:   Prior to Admission medications    Medication Sig Start Date End Date Taking?  Authorizing Provider   letrozole [facility]) 2.5 MG tablet Take 1 tablet by mouth daily 20  Yes Aury Castanon MD   venlafaxine (EFFEXOR XR) 75 MG extended release capsule Take 1 capsule by mouth daily 20  Yes ANGELIA Vasquez CNP   carvedilol (COREG) 3.125 MG tablet Take 1 tablet by mouth twice daily 20  Yes Claude Parkins, MD   aspirin 81 MG chewable tablet Take 1 tablet by mouth daily 20  Yes Cortney Bahena MD   carbamide peroxide [facility]) 6.5 % otic solution Place 5 drops in ear(s) 2 times daily 20 Yes ANGELIA Rodriguez CNP   ENTRESTO 49-51 MG per tablet Take 1 tablet by mouth twice daily 20  Yes ANGELIA Ruano CNP   spironolactone (ALDACTONE) 25 MG tablet Take 1 tablet by mouth once daily 20  Yes ANGELIA Ruano CNP   furosemide (LASIX) 20 MG tablet Take 1 tablet by mouth daily as needed (leg swelling, weight gain) 20  Yes ANGELIA Vasquez CNP   digoxin (LANOXIN) 125 MCG tablet Take 1 tablet by mouth daily 20  Yes ANGELIA Ruano CNP   omeprazole (PRILOSEC) 20 MG delayed release capsule Take 1 capsule by mouth every morning (before breakfast) 19  Yes ANGELIA Osorio CNP   atorvastatin (LIPITOR) 40 MG tablet Take 1 tablet by mouth nightly 10/7/19  Yes ANGELIA Vasquez CNP   levothyroxine (SYNTHROID) 100 MCG tablet Take 1 tablet by mouth daily 10/7/19  Yes ANGELIA Vasquez CNP   acetaminophen (TYLENOL) 500 MG tablet Take 500 mg by mouth every 6 hours as needed for Pain    Historical Provider, MD TRIGGER RELEASE Right 6/25/2020    DEQUERVAINS RELEASE AND RIGHT RING FINGER TRIGGER RELEASE performed by Kathi Cano DO at [address], [place]      PTCA      TONSILLECTOMY         Social History:    Social History     Tobacco Use    Smoking status: Current Some Day Smoker     Packs/day: 0.25     Years: 37.00     Pack years: 9.25     Types: Cigarettes     Start date: 11/12/1981    Smokeless tobacco: Never Used   Substance Use Topics    Alcohol use: No                                Ready to quit: Not Answered  Counseling given: Not Answered      Vital Signs (Current):   Vitals:    07/27/20 0714   BP: 109/68   Pulse: 76   Resp: 16   TempSrc: Temporal   SpO2: 94%   Weight: 195 lb 3.2 oz (88.5 kg)   Height: 5' 2\" (1.575 m)                                              BP Readings from Last 3 Encounters:   07/27/20 109/68   07/21/20 118/78   07/10/20 105/64       NPO Status: Time of last liquid consumption: 0330                        Time of last solid consumption: 0800                        Date of last liquid consumption: 07/27/20                        Date of last solid food consumption: 07/25/20    BMI:   Wt Readings from Last 3 Encounters:   07/27/20 195 lb 3.2 oz (88.5 kg)   07/21/20 201 lb 6.4 oz (91.4 kg)   07/10/20 205 lb 3.2 oz (93.1 kg)     Body mass index is 35.7 kg/m².     CBC:   Lab Results   Component Value Date    WBC 5.2 07/10/2020    RBC 3.93 07/10/2020    HGB 11.9 07/10/2020    HCT 37.3 07/10/2020    MCV 94.9 07/10/2020    RDW 11.7 10/10/2019     07/10/2020       CMP:   Lab Results   Component Value Date     07/10/2020    K 3.6 07/10/2020     07/10/2020    CO2 24 07/10/2020    BUN 12 07/10/2020    CREATININE 0.6 07/10/2020    LABGLOM >90 07/10/2020    GLUCOSE 115 07/10/2020    PROT 5.7 07/09/2020    CALCIUM 8.8 07/10/2020    BILITOT <0.2 07/09/2020    ALKPHOS 66 07/09/2020    AST 12 07/09/2020    ALT 11 07/09/2020       POC Tests: No results for input(s):

## 2020-07-28 NOTE — OP NOTE
[address] [place] [address]                                OPERATIVE REPORT    PATIENT NAME: Viki Turk                     :        1960  MED REC NO:   [id number]                           ROOM:  ACCOUNT NO:   [de-identified]                           ADMIT DATE: 2020  PROVIDER:     Diego Dean M.D.    DATE OF PROCEDURE:  2020    INDICATION:  The patient with history of polyp in the past,  tubulovillous adenoma, high grade dysplasia, removed with snare at  tattoo site. Plan today for colonoscopy to evaluate. SURGEON:  Diego Dean MD    ASA CLASSIFICATION:  III. Estimated blood loss in none    DESCRIPTION OF PROCEDURE:  The patient was brought to the GI lab. Consent was obtained. Risks involved with the procedure were explained  to the patient. Informed consent was obtained. The patient was  monitored during the procedure with pulse oximetry, blood pressure  monitoring, and oxygen by nasal cannula. Sedation by incremental doses  of IV propofol given by the anesthesia service to achieve total IV  anesthesia. For ASA classification and medication given during the  procedure, please see Anesthesia note. PROCEDURE PERFORMED:  Colonoscopy with polypectomy using snare and  polypectomy using hot biopsy forceps. Digital examination revealed normal rectum. Standard colonoscope was  advanced under direct vision from the rectum up to the cecum. Prep was  good and the patient tolerated the procedure well. Cecum intubation was  confirmed by appendiceal orifice. Scope was withdrawn. The site of  ascending polyps seen, which was tattooed in the past by intramucosal  Marker SPOT of the polyps seen. The site of the distal sigmoid polyp  also was seen. No gross evidence of polyps over surrounding small  diminutive polyp.   During exam, fundus, distal sigmoid, rectal area  around 12 polyps removed, of all them

## 2020-07-29 ENCOUNTER — HOSPITAL ENCOUNTER (OUTPATIENT)
Dept: ULTRASOUND IMAGING | Age: 60
Discharge: HOME | End: 2020-07-29
Payer: COMMERCIAL

## 2020-07-29 ENCOUNTER — TELEPHONE (OUTPATIENT)
Dept: FAMILY MEDICINE CLINIC | Age: 60
End: 2020-07-29

## 2020-07-29 PROCEDURE — 76536 US EXAM OF HEAD AND NECK: CPT

## 2020-07-29 NOTE — TELEPHONE ENCOUNTER
----- Message from ANGELIA Hewitt - CNP sent at 7/29/2020  1:48 PM EDT -----  Let Celestine Mcgregor know her thyroid US is stable. There is a small nodule on the left which has low suspicion for malignancy, otherwise it looks good. Any questions let me know.

## 2020-08-04 ENCOUNTER — PROCEDURE VISIT (OUTPATIENT)
Dept: CARDIOLOGY CLINIC | Age: 60
End: 2020-08-04
Payer: COMMERCIAL

## 2020-08-04 PROCEDURE — 93297 REM INTERROG DEV EVAL ICPMS: CPT | Performed by: INTERNAL MEDICINE

## 2020-08-04 PROCEDURE — G2066 INTER DEVC REMOTE 30D: HCPCS | Performed by: INTERNAL MEDICINE

## 2020-08-15 ENCOUNTER — APPOINTMENT (OUTPATIENT)
Dept: CT IMAGING | Age: 60
End: 2020-08-15
Payer: COMMERCIAL

## 2020-08-15 ENCOUNTER — HOSPITAL ENCOUNTER (OUTPATIENT)
Age: 60
Setting detail: OBSERVATION
Discharge: HOME | End: 2020-08-18
Attending: INTERNAL MEDICINE | Admitting: INTERNAL MEDICINE
Payer: COMMERCIAL

## 2020-08-15 LAB
ANION GAP SERPL CALCULATED.3IONS-SCNC: 9 MEQ/L (ref 8–16)
BASOPHILS # BLD: 0.4 %
BASOPHILS ABSOLUTE: 0 THOU/MM3 (ref 0–0.1)
BUN BLDV-MCNC: 12 MG/DL (ref 7–22)
CALCIUM SERPL-MCNC: 9.2 MG/DL (ref 8.5–10.5)
CHLORIDE BLD-SCNC: 104 MEQ/L (ref 98–111)
CO2: 26 MEQ/L (ref 23–33)
CREAT SERPL-MCNC: 0.6 MG/DL (ref 0.4–1.2)
DIGOXIN LEVEL: 0.7 NG/ML (ref 0.5–2)
EKG ATRIAL RATE: 70 BPM
EKG P AXIS: 27 DEGREES
EKG P-R INTERVAL: 184 MS
EKG Q-T INTERVAL: 416 MS
EKG QRS DURATION: 94 MS
EKG QTC CALCULATION (BAZETT): 449 MS
EKG R AXIS: -22 DEGREES
EKG T AXIS: 3 DEGREES
EKG VENTRICULAR RATE: 70 BPM
EOSINOPHIL # BLD: 3.5 %
EOSINOPHILS ABSOLUTE: 0.2 THOU/MM3 (ref 0–0.4)
ERYTHROCYTE [DISTWIDTH] IN BLOOD BY AUTOMATED COUNT: 13.8 % (ref 11.5–14.5)
ERYTHROCYTE [DISTWIDTH] IN BLOOD BY AUTOMATED COUNT: 49.1 FL (ref 35–45)
GFR SERPL CREATININE-BSD FRML MDRD: > 90 ML/MIN/1.73M2
GLUCOSE BLD-MCNC: 103 MG/DL (ref 70–108)
HCT VFR BLD CALC: 39.1 % (ref 37–47)
HEMOGLOBIN: 12.4 GM/DL (ref 12–16)
IMMATURE GRANS (ABS): 0.03 THOU/MM3 (ref 0–0.07)
IMMATURE GRANULOCYTES: 0.6 %
LYMPHOCYTES # BLD: 25.6 %
LYMPHOCYTES ABSOLUTE: 1.2 THOU/MM3 (ref 1–4.8)
MAGNESIUM: 2.2 MG/DL (ref 1.6–2.4)
MCH RBC QN AUTO: 30.5 PG (ref 26–33)
MCHC RBC AUTO-ENTMCNC: 31.7 GM/DL (ref 32.2–35.5)
MCV RBC AUTO: 96.3 FL (ref 81–99)
MONOCYTES # BLD: 6.3 %
MONOCYTES ABSOLUTE: 0.3 THOU/MM3 (ref 0.4–1.3)
NUCLEATED RED BLOOD CELLS: 0 /100 WBC
OSMOLALITY CALCULATION: 277.5 MOSMOL/KG (ref 275–300)
PLATELET # BLD: 188 THOU/MM3 (ref 130–400)
PMV BLD AUTO: 11.6 FL (ref 9.4–12.4)
POTASSIUM SERPL-SCNC: 3.8 MEQ/L (ref 3.5–5.2)
RBC # BLD: 4.06 MILL/MM3 (ref 4.2–5.4)
SEG NEUTROPHILS: 63.6 %
SEGMENTED NEUTROPHILS ABSOLUTE COUNT: 3.1 THOU/MM3 (ref 1.8–7.7)
SODIUM BLD-SCNC: 139 MEQ/L (ref 135–145)
TROPONIN T: < 0.01 NG/ML
TSH SERPL DL<=0.05 MIU/L-ACNC: 1.26 UIU/ML (ref 0.4–4.2)
WBC # BLD: 4.8 THOU/MM3 (ref 4.8–10.8)

## 2020-08-15 PROCEDURE — 6360000002 HC RX W HCPCS: Performed by: PHYSICIAN ASSISTANT

## 2020-08-15 PROCEDURE — 99284 EMERGENCY DEPT VISIT MOD MDM: CPT

## 2020-08-15 PROCEDURE — 36415 COLL VENOUS BLD VENIPUNCTURE: CPT

## 2020-08-15 PROCEDURE — 70496 CT ANGIOGRAPHY HEAD: CPT

## 2020-08-15 PROCEDURE — 6370000000 HC RX 637 (ALT 250 FOR IP): Performed by: INTERNAL MEDICINE

## 2020-08-15 PROCEDURE — 70450 CT HEAD/BRAIN W/O DYE: CPT

## 2020-08-15 PROCEDURE — 80162 ASSAY OF DIGOXIN TOTAL: CPT

## 2020-08-15 PROCEDURE — 99285 EMERGENCY DEPT VISIT HI MDM: CPT

## 2020-08-15 PROCEDURE — G0378 HOSPITAL OBSERVATION PER HR: HCPCS

## 2020-08-15 PROCEDURE — 6360000004 HC RX CONTRAST MEDICATION: Performed by: PHYSICIAN ASSISTANT

## 2020-08-15 PROCEDURE — 93010 ELECTROCARDIOGRAM REPORT: CPT | Performed by: INTERNAL MEDICINE

## 2020-08-15 PROCEDURE — 2580000003 HC RX 258: Performed by: PHYSICIAN ASSISTANT

## 2020-08-15 PROCEDURE — 84443 ASSAY THYROID STIM HORMONE: CPT

## 2020-08-15 PROCEDURE — 99215 OFFICE O/P EST HI 40 MIN: CPT

## 2020-08-15 PROCEDURE — 85025 COMPLETE CBC W/AUTO DIFF WBC: CPT

## 2020-08-15 PROCEDURE — 99220 PR INITIAL OBSERVATION CARE/DAY 70 MINUTES: CPT | Performed by: INTERNAL MEDICINE

## 2020-08-15 PROCEDURE — 6370000000 HC RX 637 (ALT 250 FOR IP): Performed by: PHYSICIAN ASSISTANT

## 2020-08-15 PROCEDURE — 93005 ELECTROCARDIOGRAM TRACING: CPT | Performed by: NURSE PRACTITIONER

## 2020-08-15 PROCEDURE — 70498 CT ANGIOGRAPHY NECK: CPT

## 2020-08-15 PROCEDURE — 83735 ASSAY OF MAGNESIUM: CPT

## 2020-08-15 PROCEDURE — 2580000003 HC RX 258: Performed by: INTERNAL MEDICINE

## 2020-08-15 PROCEDURE — 96374 THER/PROPH/DIAG INJ IV PUSH: CPT

## 2020-08-15 PROCEDURE — 80048 BASIC METABOLIC PNL TOTAL CA: CPT

## 2020-08-15 PROCEDURE — 84484 ASSAY OF TROPONIN QUANT: CPT

## 2020-08-15 RX ORDER — ATORVASTATIN CALCIUM 40 MG/1
40 TABLET, FILM COATED ORAL NIGHTLY
Status: DISCONTINUED | OUTPATIENT
Start: 2020-08-15 | End: 2020-08-19 | Stop reason: HOSPADM

## 2020-08-15 RX ORDER — 0.9 % SODIUM CHLORIDE 0.9 %
1000 INTRAVENOUS SOLUTION INTRAVENOUS ONCE
Status: COMPLETED | OUTPATIENT
Start: 2020-08-15 | End: 2020-08-15

## 2020-08-15 RX ORDER — MECLIZINE HYDROCHLORIDE CHEWABLE TABLETS 25 MG/1
25 TABLET, CHEWABLE ORAL ONCE
Status: COMPLETED | OUTPATIENT
Start: 2020-08-15 | End: 2020-08-15

## 2020-08-15 RX ORDER — SODIUM CHLORIDE 0.9 % (FLUSH) 0.9 %
10 SYRINGE (ML) INJECTION PRN
Status: DISCONTINUED | OUTPATIENT
Start: 2020-08-15 | End: 2020-08-19 | Stop reason: HOSPADM

## 2020-08-15 RX ORDER — ASPIRIN 81 MG/1
81 TABLET, CHEWABLE ORAL DAILY
Status: DISCONTINUED | OUTPATIENT
Start: 2020-08-15 | End: 2020-08-19 | Stop reason: HOSPADM

## 2020-08-15 RX ORDER — SODIUM CHLORIDE 9 MG/ML
INJECTION, SOLUTION INTRAVENOUS CONTINUOUS
Status: DISCONTINUED | OUTPATIENT
Start: 2020-08-15 | End: 2020-08-15

## 2020-08-15 RX ORDER — SODIUM CHLORIDE, SODIUM LACTATE, POTASSIUM CHLORIDE, CALCIUM CHLORIDE 600; 310; 30; 20 MG/100ML; MG/100ML; MG/100ML; MG/100ML
INJECTION, SOLUTION INTRAVENOUS CONTINUOUS
Status: DISCONTINUED | OUTPATIENT
Start: 2020-08-15 | End: 2020-08-19 | Stop reason: HOSPADM

## 2020-08-15 RX ORDER — ASPIRIN 81 MG/1
324 TABLET, CHEWABLE ORAL ONCE
Status: COMPLETED | OUTPATIENT
Start: 2020-08-15 | End: 2020-08-15

## 2020-08-15 RX ORDER — ONDANSETRON 2 MG/ML
4 INJECTION INTRAMUSCULAR; INTRAVENOUS EVERY 6 HOURS PRN
Status: DISCONTINUED | OUTPATIENT
Start: 2020-08-15 | End: 2020-08-19 | Stop reason: HOSPADM

## 2020-08-15 RX ORDER — LEVOTHYROXINE SODIUM 0.1 MG/1
100 TABLET ORAL DAILY
Status: DISCONTINUED | OUTPATIENT
Start: 2020-08-16 | End: 2020-08-19 | Stop reason: HOSPADM

## 2020-08-15 RX ORDER — PROMETHAZINE HYDROCHLORIDE 25 MG/1
12.5 TABLET ORAL EVERY 6 HOURS PRN
Status: DISCONTINUED | OUTPATIENT
Start: 2020-08-15 | End: 2020-08-19 | Stop reason: HOSPADM

## 2020-08-15 RX ORDER — SODIUM CHLORIDE 0.9 % (FLUSH) 0.9 %
10 SYRINGE (ML) INJECTION EVERY 12 HOURS SCHEDULED
Status: DISCONTINUED | OUTPATIENT
Start: 2020-08-15 | End: 2020-08-19 | Stop reason: HOSPADM

## 2020-08-15 RX ORDER — ONDANSETRON 2 MG/ML
4 INJECTION INTRAMUSCULAR; INTRAVENOUS ONCE
Status: COMPLETED | OUTPATIENT
Start: 2020-08-15 | End: 2020-08-15

## 2020-08-15 RX ORDER — MECLIZINE HYDROCHLORIDE CHEWABLE TABLETS 25 MG/1
TABLET, CHEWABLE ORAL
Status: DISPENSED
Start: 2020-08-15 | End: 2020-08-16

## 2020-08-15 RX ORDER — LETROZOLE 2.5 MG/1
2.5 TABLET, FILM COATED ORAL DAILY
Status: DISCONTINUED | OUTPATIENT
Start: 2020-08-15 | End: 2020-08-19 | Stop reason: HOSPADM

## 2020-08-15 RX ORDER — VENLAFAXINE HYDROCHLORIDE 75 MG/1
75 CAPSULE, EXTENDED RELEASE ORAL DAILY
Status: DISCONTINUED | OUTPATIENT
Start: 2020-08-15 | End: 2020-08-19 | Stop reason: HOSPADM

## 2020-08-15 RX ORDER — ACETAMINOPHEN 325 MG/1
650 TABLET ORAL EVERY 6 HOURS PRN
Status: DISCONTINUED | OUTPATIENT
Start: 2020-08-15 | End: 2020-08-19 | Stop reason: HOSPADM

## 2020-08-15 RX ORDER — POLYETHYLENE GLYCOL 3350 17 G/17G
17 POWDER, FOR SOLUTION ORAL DAILY PRN
Status: DISCONTINUED | OUTPATIENT
Start: 2020-08-15 | End: 2020-08-19 | Stop reason: HOSPADM

## 2020-08-15 RX ORDER — ACETAMINOPHEN 650 MG/1
650 SUPPOSITORY RECTAL EVERY 6 HOURS PRN
Status: DISCONTINUED | OUTPATIENT
Start: 2020-08-15 | End: 2020-08-19 | Stop reason: HOSPADM

## 2020-08-15 RX ADMIN — SODIUM CHLORIDE, POTASSIUM CHLORIDE, SODIUM LACTATE AND CALCIUM CHLORIDE: 600; 310; 30; 20 INJECTION, SOLUTION INTRAVENOUS at 21:29

## 2020-08-15 RX ADMIN — ONDANSETRON 4 MG: 2 INJECTION INTRAMUSCULAR; INTRAVENOUS at 13:25

## 2020-08-15 RX ADMIN — MECLIZINE HCL 25 MG: 25 TABLET, CHEWABLE ORAL at 13:25

## 2020-08-15 RX ADMIN — SODIUM CHLORIDE: 9 INJECTION, SOLUTION INTRAVENOUS at 17:15

## 2020-08-15 RX ADMIN — IOPAMIDOL 80 ML: 755 INJECTION, SOLUTION INTRAVENOUS at 14:15

## 2020-08-15 RX ADMIN — ACETAMINOPHEN 650 MG: 325 TABLET ORAL at 23:01

## 2020-08-15 RX ADMIN — ASPIRIN 324 MG: 81 TABLET, CHEWABLE ORAL at 17:04

## 2020-08-15 RX ADMIN — SODIUM CHLORIDE 1000 ML: 9 INJECTION, SOLUTION INTRAVENOUS at 13:25

## 2020-08-15 ASSESSMENT — ENCOUNTER SYMPTOMS
RHINORRHEA: 0
ABDOMINAL PAIN: 1
NAUSEA: 1
CHEST TIGHTNESS: 0
COUGH: 0
SORE THROAT: 0
SHORTNESS OF BREATH: 0
EYE ITCHING: 0
SINUS PRESSURE: 0
WHEEZING: 0
VOMITING: 1
BLOOD IN STOOL: 0
DIARRHEA: 1
EYE REDNESS: 0

## 2020-08-15 ASSESSMENT — PAIN - FUNCTIONAL ASSESSMENT: PAIN_FUNCTIONAL_ASSESSMENT: ACTIVITIES ARE NOT PREVENTED

## 2020-08-15 ASSESSMENT — PAIN DESCRIPTION - PAIN TYPE: TYPE: ACUTE PAIN

## 2020-08-15 ASSESSMENT — PAIN SCALES - GENERAL: PAINLEVEL_OUTOF10: 3

## 2020-08-15 ASSESSMENT — PAIN DESCRIPTION - ORIENTATION: ORIENTATION: UPPER

## 2020-08-15 ASSESSMENT — PAIN DESCRIPTION - ONSET: ONSET: ON-GOING

## 2020-08-15 ASSESSMENT — PAIN DESCRIPTION - LOCATION: LOCATION: HEAD

## 2020-08-15 ASSESSMENT — PAIN DESCRIPTION - DESCRIPTORS: DESCRIPTORS: HEADACHE

## 2020-08-15 ASSESSMENT — PAIN DESCRIPTION - FREQUENCY: FREQUENCY: CONTINUOUS

## 2020-08-15 ASSESSMENT — PAIN DESCRIPTION - PROGRESSION: CLINICAL_PROGRESSION: NOT CHANGED

## 2020-08-15 NOTE — ED PROVIDER NOTES
[facility] 65 22 COMPLAINT       Chief Complaint   Patient presents with    Emesis    Diarrhea    Dizziness       Nurses Notes reviewed and I agree except as notedin the HPI. HISTORY OF PRESENT ILLNESS    Hasmukh Raygoza is a 62 y.o. female who presents with dizziness and N/V/D. She states that she began feeling dizzy as if she was spinning yesterday morning around 7:30. She states that she then developed nausea, and had 1-2 episodes of vomiting and diarrhea. She also has body aches, fatigue, and abdominal pain. She states that she felt better at 10:00 last night. Today she develop symptoms again. She went to urgent care this morning and had an EKG which was abnormal and she was sent here. She denies any cough, runny nose, headache, or vision changes. Location/Symptom: Dizziness, N/V/D  Timing/Onset: Yesterday morning  Context/Setting:   Quality:   Duration:   Modifying Factors: None  Severity:   REVIEW OF SYSTEMS     Review of Systems   HENT: Negative for rhinorrhea. Respiratory: Negative for cough. Gastrointestinal: Positive for abdominal pain, diarrhea, nausea and vomiting. Neurological: Positive for dizziness. Negative for headaches. PAST MEDICAL HISTORY    has a past medical history of Abnormal heart rhythm, Anxiety, Cancer (HCC), CHF (congestive heart failure) ([facility] [address] Congenital heart disease, DJD (degenerative joint disease), Enlarged lymph nodes, Hypertension, Hypothyroidism, ICD (implantable cardioverter-defibrillator) in place, Kidney stones, PONV (postoperative nausea and vomiting), Prediabetes, and Thyroid disease. SURGICAL HISTORY      has a past surgical history that includes Mastectomy, bilateral; Tonsillectomy; Percutaneous Transluminal Coronary Angio; Appendectomy; Colonoscopy; Carpal tunnel release (2019); Finger trigger release (Right, 6/25/2020); Colonoscopy (N/A, 7/27/2020); and Colonoscopy (7/27/2020).     CURRENT °C). Her blood pressure is 128/76 and her pulse is 70. Her respiration is 18 and oxygen saturation is 99%. Physical Exam  Vitals signs reviewed. Constitutional:       Appearance: Normal appearance. HENT:      Head: Normocephalic. Eyes:      Extraocular Movements: Extraocular movements intact. Conjunctiva/sclera: Conjunctivae normal.      Pupils: Pupils are equal, round, and reactive to light. Neck:      Musculoskeletal: Normal range of motion. Cardiovascular:      Rate and Rhythm: Normal rate and regular rhythm. Heart sounds: Normal heart sounds. No murmur. No friction rub. No gallop. Pulmonary:      Effort: Pulmonary effort is normal. No respiratory distress. Breath sounds: Normal breath sounds. No wheezing, rhonchi or rales. Abdominal:      Palpations: Abdomen is soft. Tenderness: There is no abdominal tenderness. There is no guarding or rebound. Musculoskeletal: Normal range of motion. Skin:     General: Skin is warm and dry. Neurological:      General: No focal deficit present. Mental Status: She is alert and oriented to person, place, and time. Sensory: No sensory deficit. Motor: No weakness. Psychiatric:         Mood and Affect: Mood normal.         Behavior: Behavior normal.         DIFFERENTIAL DIAGNOSIS:   Vertigo. Possible cerebellar stroke- head CT, head CTA and neck CTA ordered. Covid rule out, test ordered.     DIAGNOSTIC RESULTS     EKG: All EKG's are interpreted by the Emergency Department Physician who either signs or Co-signs this chart in the absence of a cardiologist.      RADIOLOGY: non-plain film images(s) such as CT, Ultrasound and MRI are read by the radiologist.  Head CT, head CTA and neck CTA ordered for possible cerebellar stroke      LABS:   Labs Reviewed   CBC WITH AUTO DIFFERENTIAL - Abnormal; Notable for the following components:       Result Value    RBC 4.06 (*)     MCHC 31.7 (*)     RDW-SD 49.1 (*)     Monocytes Absolute 0.3 (*)     All other components within normal limits   BASIC METABOLIC PANEL   TROPONIN   MAGNESIUM   ANION GAP   GLOMERULAR FILTRATION RATE, ESTIMATED   OSMOLALITY       EMERGENCY DEPARTMENT COURSE:   :    Vitals:    08/15/20 1217 08/15/20 1321 08/15/20 1441 08/15/20 1622   BP: 122/79 120/77 126/78 128/76   Pulse: 72 90 69 70   Resp: 18 22 16 18   Temp: 97.5 °F (36.4 °C)      TempSrc: Oral      SpO2: 98%  97% 99%   Weight:       Height:         Patient was seen history physical exam was performed. She developed dizziness as if she was spinning and N/V/D yesterday morning. She felt better last night, but developed symptoms again today. She was sent here from urgent care. Will have a head CT, head CTA, and neck CTA for possible cerebellar stroke. Labs are ordered. Covid test ordered. Patient will be given IV fluids, Zofran for nausea, Antivert for her vertigo, See disposition below. I did discuss the case with neurology who felt we needed to admit the patient because of those vertebral artery stenosis patient was given aspirin here had a NIH stroke scale of 0.    CRITICAL CARE:  None    CONSULTS:  Dr. Tone St and Dr. Carlos A Lee:  None    FINAL IMPRESSION      1.  Dizziness          DISPOSITION/PLAN   Admit      PATIENT REFERRED TO:  [facility], EMERGENCY DEPT  [address]      GO DIRECTLY TO THE EMERGENCY DEPARTMENT, for further evalation    Rajni Landis, APRN - CNP  [address] [place]  [phone]            DISCHARGE MEDICATIONS:  New Prescriptions    No medications on file       (Please note that portions of this note were completed with a voice recognitionprogram.  Efforts were made to edit the dictations but occasionally words are mis-transcribed.)    Josh Faulkner, [address] [employer], [place]  08/15/20 1810

## 2020-08-15 NOTE — ED NOTES
Patient dropped first meclizine dispensed. Another dose was dispensed and pharmacy updated on incident.       Honey Mancilla RN  08/15/20 2610

## 2020-08-15 NOTE — ACP (ADVANCE CARE PLANNING)
Advance Care Planning     Advance Care Planning Activator (Inpatient)  Conversation Note      Date of ACP Conversation: 8/15/2020    Conversation Conducted with: Patient with Decision Making Capacity    ACP Activator: Ava Borjas    *When Decision Maker makes decisions on behalf of the incapacitated patient: Decision Maker is asked to consider and make decisions based on patient values, known preferences, or best interests. Health Care Decision Maker:     Current Designated Health Care Decision Maker:   (If there is a valid [address] named in the [address] [place] [address] Makers\" box in the ACP activity, but it is not visible above, be sure to open that field and then select the health care decision maker relationship (ie \"primary\") in the blank space to the right of the name.) Validate  this information as still accurate & up-to-date; edit [address] field as needed.)    Note: Assess and validate information in current ACP documents, as indicated. If no Decision Maker listed above or available through scanned documents, then:    If no Authorized Decision Maker has previously been identified, then patient chooses [address]:  \"Who would you like to name as your primary health care decision-maker? \"               Name: Delia Carreno        Relationship: daughter in law          Phone number: [phone]  \"Can this person be reached easily? \" Yes  \"Who would you like to name as your back-up decision maker? \"   Name: Jorge Holley        Relationship: son          Phone number: [phone]  \"Can this person be reached easily? \" Yes    Note: If the relationship of these Decision-Makers to the patient does NOT follow your state's Next of Kin hierarchy, recommend that patient complete ACP document that meets state-specific requirements to allow them to act on the patient's behalf when appropriate. Care Preferences    Ventilation:   \"If you were in your present state

## 2020-08-15 NOTE — ED NOTES
Pt co increased dizziness today with frequent history of vertigo. Pt admits to 1-2 episodes each of vomiting and diarrhea without current activity. Pt co constant nausea.       Margot Moulton RN  08/15/20 2881

## 2020-08-15 NOTE — ED NOTES
ED to inpatient nurses report    Chief Complaint   Patient presents with    Emesis    Diarrhea    Dizziness      Present to ED from home  LOC: alert and orientated to name, place, date  Vital signs   Vitals:    08/15/20 1217 08/15/20 1321 08/15/20 1441 08/15/20 1622   BP: 122/79 120/77 126/78 128/76   Pulse: 72 90 69 70   Resp: 18 22 16 18   Temp: 97.5 °F (36.4 °C)      TempSrc: Oral      SpO2: 98%  97% 99%   Weight:       Height:          Oxygen Baseline Room AIr    Current needs required Room Air Bipap/Cpap No  LDAs:   Peripheral IV 08/15/20 Right Antecubital (Active)   Site Assessment Clean;Dry; Intact 08/15/20 1325   Line Status Blood return noted; Flushed; Infusing 08/15/20 1325   Dressing Status Clean;Dry; Intact 08/15/20 1325   Dressing Intervention New 08/15/20 1325     Mobility: Independent  Pending ED orders: None  Present condition: Stable    Electronically signed by Marlene Garsia RN on 8/15/2020 at 6:07 PM     Hunter Villanueva [facility]  08/15/20 4530

## 2020-08-15 NOTE — ED PROVIDER NOTES
[address] [address] EMERGENCY DEPT  [facility]       Chief Complaint   Patient presents with    Emesis    Diarrhea    Dizziness       Nurses Notes reviewed and I agree except as noted in the HPI. HISTORY OF PRESENT ILLNESS   Gabrielle Calixto is a 62 y.o. female who presents for evaluation of symptoms that began yesterday. Patient with extensive medical history including CHF class II with ICD placement. She states that she woke up yesterday and did not feel good, had dizziness,  almost causing her to fall, and fatigue that worsened throughout the day. States that she was unable to do her housework. At the present time she denies chest pain or shortness of breath. REVIEW OF SYSTEMS     Review of Systems   Constitutional: Positive for chills and fatigue (worn out). Negative for fever. HENT: Negative for sinus pressure and sore throat. Eyes: Negative for redness and itching. Respiratory: Negative for cough, chest tightness, shortness of breath and wheezing. Cardiovascular: Negative for chest pain and palpitations. Gastrointestinal: Positive for abdominal pain (yesterday with a \"fullness\" sensation), diarrhea, nausea and vomiting. Negative for blood in stool. Colonoscopy July 2020   Musculoskeletal: Negative for joint swelling and myalgias. Skin: Negative for rash. Allergic/Immunologic: Negative for environmental allergies and food allergies. Neurological: Positive for dizziness. Negative for headaches. Hematological: Negative for adenopathy.        PAST MEDICAL HISTORY         Diagnosis Date    Abnormal heart rhythm     Anxiety     Cancer [facility])     breast  2016     CHF (congestive heart failure) (HCC)     Congenital heart disease     DJD (degenerative joint disease)     Enlarged lymph nodes     Hypertension     Hypothyroidism     ICD (implantable cardioverter-defibrillator) in place 02/11/2019    Dr. Lorena Rod Kidney stones     PONV (postoperative nausea and vomiting)     Prediabetes 10/7/2019    Thyroid disease        SURGICAL HISTORY     Patient  has a past surgical history that includes Mastectomy, bilateral; Tonsillectomy; Percutaneous Transluminal Coronary Angio; Appendectomy; Colonoscopy; Carpal tunnel release (2019); Finger trigger release (Right, 6/25/2020); Colonoscopy (N/A, 7/27/2020); and Colonoscopy (7/27/2020). CURRENT MEDICATIONS       Previous Medications    ACETAMINOPHEN (TYLENOL) 500 MG TABLET    Take 500 mg by mouth every 6 hours as needed for Pain    ASPIRIN 81 MG CHEWABLE TABLET    Take 1 tablet by mouth daily    ATORVASTATIN (LIPITOR) 40 MG TABLET    Take 1 tablet by mouth nightly    CARVEDILOL (COREG) 3.125 MG TABLET    Take 1 tablet by mouth twice daily    DIGOXIN (LANOXIN) 125 MCG TABLET    Take 1 tablet by mouth daily    ENTRESTO 49-51 MG PER TABLET    Take 1 tablet by mouth twice daily    FUROSEMIDE (LASIX) 20 MG TABLET    Take 1 tablet by mouth daily as needed (leg swelling, weight gain)    LETROZOLE (FEMARA) 2.5 MG TABLET    Take 1 tablet by mouth daily    LEVOTHYROXINE (SYNTHROID) 100 MCG TABLET    Take 1 tablet by mouth daily    OMEPRAZOLE (PRILOSEC) 20 MG DELAYED RELEASE CAPSULE    Take 1 capsule by mouth every morning (before breakfast)    SPIRONOLACTONE (ALDACTONE) 25 MG TABLET    Take 1 tablet by mouth once daily    VENLAFAXINE (EFFEXOR XR) 75 MG EXTENDED RELEASE CAPSULE    Take 1 capsule by mouth daily       ALLERGIES     Patient is has No Known Allergies. FAMILY HISTORY     Patient'sfamily history includes Cancer in her father and maternal grandfather; Veleria Peter in her father; Colon Polyps in her brother; Dementia in her mother; High Blood Pressure in her mother; Mental Retardation in her brother. SOCIAL HISTORY     Patient  reports that she has been smoking cigarettes. She started smoking about 38 years ago. She has a 9.25 pack-year smoking history.  She has never used smokeless tobacco. She reports that she does not drink alcohol or use drugs. PHYSICAL EXAM     ED TRIAGE VITALS  BP: 126/79, Temp: 97.2 °F (36.2 °C), Pulse: 77, Resp: 18, SpO2: 97 %  Physical Exam  Vitals signs and nursing note reviewed. Constitutional:       General: She is not in acute distress. Appearance: Normal appearance. She is well-developed and well-groomed. She is not toxic-appearing. HENT:      Head: Normocephalic and atraumatic. Right Ear: External ear normal.      Left Ear: External ear normal.      Nose: Nose normal.      Mouth/Throat:      Lips: Pink. Mouth: Mucous membranes are moist.      Pharynx: Oropharynx is clear. Eyes:      Conjunctiva/sclera: Conjunctivae normal.      Right eye: Right conjunctiva is not injected. Left eye: Left conjunctiva is not injected. Pupils: Pupils are equal.   Neck:      Musculoskeletal: Normal range of motion. Cardiovascular:      Rate and Rhythm: Normal rate. Heart sounds: Normal heart sounds. Comments: ICD left chest  Pulmonary:      Effort: Pulmonary effort is normal. No respiratory distress. Breath sounds: Normal breath sounds. Abdominal:      General: Abdomen is flat. Bowel sounds are normal.      Palpations: Abdomen is soft. Tenderness: There is no abdominal tenderness. Skin:     General: Skin is warm and dry. Findings: No rash. Neurological:      Mental Status: She is alert and oriented to person, place, and time. Psychiatric:         Mood and Affect: Mood normal.         Speech: Speech normal.         Behavior: Behavior is cooperative. DIAGNOSTIC RESULTS   Labs:  Abnormal Labs Reviewed - No data to display     IMAGING:  No orders to display     URGENT CARE COURSE:     Vitals:    08/15/20 1021   BP: 126/79   Pulse: 77   Resp: 18   Temp: 97.2 °F (36.2 °C)   TempSrc: Temporal   SpO2: 97%   Weight: 197 lb (89.4 kg)   Height: 5' 2\" (1.575 m)       Medications - No data to display  PROCEDURES:  FINALIMPRESSION      1.  Dizziness DISPOSITION/PLAN   DISPOSITION    Transfer  Defer patient to the ER for further work-up due to extensive cardiac history and cardiac symptoms. After reviewing the patients History of Present illness, Vital Signs,Clinical Findings,Comorbities, and Clinical Data obtained I discussed with the patient or patient representative that there is a very real potential for serious injury / illness and the patient will need to be transfer to a level of higher care for further evaluation and continued care. It was explained that this would provide the best care for the patient. The patient/patient representative are agreeable to the treatment plan and are agreeable to be transferred therefore, the patient will be transferred to [facility] ED for reevaluation and further management .            Problem List Items Addressed This Visit     Dizziness - Primary          PATIENT REFERRED TO:  [facility], EMERGENCY DEPT  [address] [address]      Laine Aguirre  [facility] DIRECTLY TO [address] [place] [address], for further evalation      Codey Smith, [address] Joy[address], APRN - CNP  08/15/20 5363

## 2020-08-15 NOTE — H&P
Assessment and Plan:        Dizziness: I suspect this is orthostatic in nature. Patient endorses this occurring with position changes. Orthostatics have not been completed yet. Tristen-Hallpike is negative. CTA of the head with no new acute findings. Patient had recent MRI brain which was normal.  -Check orthostatic vitals  -Continue maintenance IV fluids judiciously given history of congestive heart failure  -Hold diuretics and antihypertensives at this time. Blood pressure is currently controlled and she does not appear volume overloaded  -Telemetry    History of ischemic cardiomyopathy with EF of 20-25%: Noted status post ICD. Holding GDM T as described above. Will phase back in as able. With her symptoms we will check a digoxin level    History of breast cancer status post chemo, radiation, bilateral mastectomy: Continue letrozole    Anxiety: Continue Effexor    Hypothyroidism: Check TSH and free T4 continue Synthroid    CC: Dizziness  HPI: (12 point review of systems completed. Pertinent positives noted. Otherwise ROS is negative) : Patient is a 81-year-old female with a past medical history of hypothyroidism, hypertension, breast cancer status post bilateral mastectomy, chemo, radiation now on hormonal therapy, cardiomyopathy with EF of 20 to 25% status post ICD placement. Patient had a hospital stay where she had left-sided facial numbness in July and extensive neuro work-up was done at that time and nothing was discovered. She does have a stenotic right vertebral artery, however, it is nondominant and has good flow in her left. She underwent a brain MRI at that time which was normal.  She was in her usual state of health when this yesterday morning she developed severe nausea and vomiting and dizziness. She states the dizziness is worse with position changes. She has not ate or drank well over the last 24 hours. She has not taken any medications as of today.   Since her symptoms were not getting Nontender. Bowel sounds positive. Extremities:  No clubbing, cyanosis, or edema x 4. Vasculature: capillary refill < 3 seconds. Palpable LE pulses bilaterally. Skin:  warm and dry. Psych:  Alert and oriented x3. Affect appropriate  Lymph:  No supraclavicular adenopathy. Neurologic:  No focal deficit. No seizures. Negative Tristen-Hallpike. Dizzy upon sitting    Data: (All radiographs, tracings, PFTs, and imaging are personally viewed and interpreted unless otherwise noted).    Recent Results (from the past 24 hour(s))   EKG 12 Lead    Collection Time: 08/15/20 10:36 AM   Result Value Ref Range    Ventricular Rate 70 BPM    Atrial Rate 70 BPM    P-R Interval 184 ms    QRS Duration 94 ms    Q-T Interval 416 ms    QTc Calculation (Bazett) 449 ms    P Axis 27 degrees    R Axis -22 degrees    T Axis 3 degrees   CBC auto differential    Collection Time: 08/15/20  1:07 PM   Result Value Ref Range    WBC 4.8 4.8 - 10.8 thou/mm3    RBC 4.06 (L) 4.20 - 5.40 mill/mm3    Hemoglobin 12.4 12.0 - 16.0 gm/dl    Hematocrit 39.1 37.0 - 47.0 %    MCV 96.3 81.0 - 99.0 fL    MCH 30.5 26.0 - 33.0 pg    MCHC 31.7 (L) 32.2 - 35.5 gm/dl    RDW-CV 13.8 11.5 - 14.5 %    RDW-SD 49.1 (H) 35.0 - 45.0 fL    Platelets 611 603 - 235 thou/mm3    MPV 11.6 9.4 - 12.4 fL    Seg Neutrophils 63.6 %    Lymphocytes 25.6 %    Monocytes 6.3 %    Eosinophils 3.5 %    Basophils 0.4 %    Immature Granulocytes 0.6 %    Segs Absolute 3.1 1.8 - 7.7 thou/mm3    Lymphocytes Absolute 1.2 1.0 - 4.8 thou/mm3    Monocytes Absolute 0.3 (L) 0.4 - 1.3 thou/mm3    Eosinophils Absolute 0.2 0.0 - 0.4 thou/mm3    Basophils Absolute 0.0 0.0 - 0.1 thou/mm3    Immature Grans (Abs) 0.03 0.00 - 0.07 thou/mm3    nRBC 0 /100 wbc   Basic Metabolic Panel    Collection Time: 08/15/20  1:07 PM   Result Value Ref Range    Sodium 139 135 - 145 meq/L    Potassium 3.8 3.5 - 5.2 meq/L    Chloride 104 98 - 111 meq/L    CO2 26 23 - 33 meq/L    Glucose 103 70 - 108 mg/dL    BUN 12 7 - 22 recognition software. It may contain minor errors which are inherent in voice recognition technology. **      Final report electronically signed by Dr. Ethel Chamberlain MD on 8/15/2020 3:02 PM             Electronically signed by Caden Soliz MD on 8/15/2020 at 6:33 PM

## 2020-08-15 NOTE — ED NOTES
Patient continues to rest on cart, eyes closed and respirations easy.       Darren Hinds RN  08/15/20 [id number]

## 2020-08-15 NOTE — ED NOTES
Pt given instructions to go directly to [facility]-ed for further evaluation. Pt verbalizes understanding.       Terence Churchill RN  08/15/20 2320

## 2020-08-15 NOTE — PROGRESS NOTES
Met with pt in ED room 37. Pt's preferred language is Occitan - but does understand English very well. Discussed ACP. Pt wishes to remain Full Code. Pt is Quaker. She listens to her [place] via her cell phone. Pt is retired from housekeeping. Had to retire due to cancer, which caused heart damage - now unable to work. Lives with her daughter-in-law.

## 2020-08-15 NOTE — ED NOTES
Patient to room 37 as transfer patient from urgent care. Patient states that she has been having dizziness, vomiting, and diarrhea since yesterday. Patient states that she vomited on the way here from urgent care.       Elisa Ku RN  08/15/20 [id number]

## 2020-08-16 LAB
ANION GAP SERPL CALCULATED.3IONS-SCNC: 10 MEQ/L (ref 8–16)
BASOPHILS # BLD: 0.5 %
BASOPHILS ABSOLUTE: 0 THOU/MM3 (ref 0–0.1)
BUN BLDV-MCNC: 14 MG/DL (ref 7–22)
CALCIUM SERPL-MCNC: 8.6 MG/DL (ref 8.5–10.5)
CHLORIDE BLD-SCNC: 106 MEQ/L (ref 98–111)
CO2: 24 MEQ/L (ref 23–33)
CREAT SERPL-MCNC: 0.6 MG/DL (ref 0.4–1.2)
EOSINOPHIL # BLD: 3.8 %
EOSINOPHILS ABSOLUTE: 0.1 THOU/MM3 (ref 0–0.4)
ERYTHROCYTE [DISTWIDTH] IN BLOOD BY AUTOMATED COUNT: 13.7 % (ref 11.5–14.5)
ERYTHROCYTE [DISTWIDTH] IN BLOOD BY AUTOMATED COUNT: 48.5 FL (ref 35–45)
GFR SERPL CREATININE-BSD FRML MDRD: > 90 ML/MIN/1.73M2
GLUCOSE BLD-MCNC: 111 MG/DL (ref 70–108)
HCT VFR BLD CALC: 36.9 % (ref 37–47)
HEMOGLOBIN: 11.7 GM/DL (ref 12–16)
IMMATURE GRANS (ABS): 0.02 THOU/MM3 (ref 0–0.07)
IMMATURE GRANULOCYTES: 0.5 %
LYMPHOCYTES # BLD: 29.5 %
LYMPHOCYTES ABSOLUTE: 1.2 THOU/MM3 (ref 1–4.8)
MCH RBC QN AUTO: 30.5 PG (ref 26–33)
MCHC RBC AUTO-ENTMCNC: 31.7 GM/DL (ref 32.2–35.5)
MCV RBC AUTO: 96.1 FL (ref 81–99)
MONOCYTES # BLD: 6.4 %
MONOCYTES ABSOLUTE: 0.2 THOU/MM3 (ref 0.4–1.3)
NUCLEATED RED BLOOD CELLS: 0 /100 WBC
PLATELET # BLD: 177 THOU/MM3 (ref 130–400)
PMV BLD AUTO: 11.7 FL (ref 9.4–12.4)
POTASSIUM REFLEX MAGNESIUM: 4.1 MEQ/L (ref 3.5–5.2)
RBC # BLD: 3.84 MILL/MM3 (ref 4.2–5.4)
SEG NEUTROPHILS: 59.3 %
SEGMENTED NEUTROPHILS ABSOLUTE COUNT: 2.3 THOU/MM3 (ref 1.8–7.7)
SODIUM BLD-SCNC: 140 MEQ/L (ref 135–145)
WBC # BLD: 3.9 THOU/MM3 (ref 4.8–10.8)

## 2020-08-16 PROCEDURE — 99225 PR SBSQ OBSERVATION CARE/DAY 25 MINUTES: CPT | Performed by: PHYSICIAN ASSISTANT

## 2020-08-16 PROCEDURE — 96375 TX/PRO/DX INJ NEW DRUG ADDON: CPT

## 2020-08-16 PROCEDURE — 80048 BASIC METABOLIC PNL TOTAL CA: CPT

## 2020-08-16 PROCEDURE — 6360000002 HC RX W HCPCS: Performed by: PHYSICIAN ASSISTANT

## 2020-08-16 PROCEDURE — 99220 PR INITIAL OBSERVATION CARE/DAY 70 MINUTES: CPT | Performed by: PSYCHIATRY & NEUROLOGY

## 2020-08-16 PROCEDURE — G0378 HOSPITAL OBSERVATION PER HR: HCPCS

## 2020-08-16 PROCEDURE — 85025 COMPLETE CBC W/AUTO DIFF WBC: CPT

## 2020-08-16 PROCEDURE — 36415 COLL VENOUS BLD VENIPUNCTURE: CPT

## 2020-08-16 PROCEDURE — 6370000000 HC RX 637 (ALT 250 FOR IP): Performed by: PHYSICIAN ASSISTANT

## 2020-08-16 PROCEDURE — 6370000000 HC RX 637 (ALT 250 FOR IP): Performed by: INTERNAL MEDICINE

## 2020-08-16 PROCEDURE — 2580000003 HC RX 258: Performed by: INTERNAL MEDICINE

## 2020-08-16 RX ORDER — BUTALBITAL, ACETAMINOPHEN AND CAFFEINE 50; 325; 40 MG/1; MG/1; MG/1
1 TABLET ORAL EVERY 4 HOURS PRN
Status: DISCONTINUED | OUTPATIENT
Start: 2020-08-16 | End: 2020-08-19 | Stop reason: HOSPADM

## 2020-08-16 RX ORDER — KETOROLAC TROMETHAMINE 30 MG/ML
30 INJECTION, SOLUTION INTRAMUSCULAR; INTRAVENOUS ONCE
Status: COMPLETED | OUTPATIENT
Start: 2020-08-16 | End: 2020-08-16

## 2020-08-16 RX ORDER — LOSARTAN POTASSIUM 25 MG/1
12.5 TABLET ORAL DAILY
Status: ON HOLD | COMMUNITY
End: 2020-08-18 | Stop reason: HOSPADM

## 2020-08-16 RX ADMIN — SODIUM CHLORIDE, POTASSIUM CHLORIDE, SODIUM LACTATE AND CALCIUM CHLORIDE: 600; 310; 30; 20 INJECTION, SOLUTION INTRAVENOUS at 06:41

## 2020-08-16 RX ADMIN — ATORVASTATIN CALCIUM 40 MG: 40 TABLET, FILM COATED ORAL at 01:06

## 2020-08-16 RX ADMIN — ACETAMINOPHEN 650 MG: 325 TABLET ORAL at 06:41

## 2020-08-16 RX ADMIN — LETROZOLE 2.5 MG: 2.5 TABLET ORAL at 09:00

## 2020-08-16 RX ADMIN — BUTALBITAL, ACETAMINOPHEN, AND CAFFEINE 1 TABLET: 50; 325; 40 TABLET ORAL at 03:47

## 2020-08-16 RX ADMIN — ASPIRIN 81 MG: 81 TABLET, CHEWABLE ORAL at 01:07

## 2020-08-16 RX ADMIN — BUTALBITAL, ACETAMINOPHEN, AND CAFFEINE 1 TABLET: 50; 325; 40 TABLET ORAL at 23:08

## 2020-08-16 RX ADMIN — KETOROLAC TROMETHAMINE 30 MG: 30 INJECTION, SOLUTION INTRAMUSCULAR at 13:47

## 2020-08-16 RX ADMIN — LETROZOLE 2.5 MG: 2.5 TABLET ORAL at 01:06

## 2020-08-16 RX ADMIN — VENLAFAXINE HYDROCHLORIDE 75 MG: 75 CAPSULE, EXTENDED RELEASE ORAL at 01:05

## 2020-08-16 RX ADMIN — ATORVASTATIN CALCIUM 40 MG: 40 TABLET, FILM COATED ORAL at 21:40

## 2020-08-16 RX ADMIN — ASPIRIN 81 MG: 81 TABLET, CHEWABLE ORAL at 09:00

## 2020-08-16 RX ADMIN — LEVOTHYROXINE SODIUM 100 MCG: 100 TABLET ORAL at 06:45

## 2020-08-16 RX ADMIN — VENLAFAXINE HYDROCHLORIDE 75 MG: 75 CAPSULE, EXTENDED RELEASE ORAL at 09:00

## 2020-08-16 ASSESSMENT — PAIN DESCRIPTION - LOCATION
LOCATION: HEAD

## 2020-08-16 ASSESSMENT — PAIN DESCRIPTION - PROGRESSION

## 2020-08-16 ASSESSMENT — PAIN - FUNCTIONAL ASSESSMENT
PAIN_FUNCTIONAL_ASSESSMENT: ACTIVITIES ARE NOT PREVENTED

## 2020-08-16 ASSESSMENT — PAIN DESCRIPTION - ORIENTATION
ORIENTATION: MID

## 2020-08-16 ASSESSMENT — PAIN SCALES - GENERAL
PAINLEVEL_OUTOF10: 0
PAINLEVEL_OUTOF10: 5
PAINLEVEL_OUTOF10: 5
PAINLEVEL_OUTOF10: 3
PAINLEVEL_OUTOF10: 4
PAINLEVEL_OUTOF10: 4
PAINLEVEL_OUTOF10: 0
PAINLEVEL_OUTOF10: 3

## 2020-08-16 ASSESSMENT — PAIN DESCRIPTION - PAIN TYPE
TYPE: ACUTE PAIN

## 2020-08-16 ASSESSMENT — PAIN DESCRIPTION - FREQUENCY
FREQUENCY: CONTINUOUS
FREQUENCY: INTERMITTENT
FREQUENCY: CONTINUOUS
FREQUENCY: CONTINUOUS

## 2020-08-16 ASSESSMENT — PAIN DESCRIPTION - DESCRIPTORS
DESCRIPTORS: HEADACHE

## 2020-08-16 ASSESSMENT — PAIN DESCRIPTION - ONSET
ONSET: ON-GOING

## 2020-08-16 NOTE — PROGRESS NOTES
Hospitalist Progress Note    Patient:  Ludivina Cooper    Unit/Bed:8B-35/035-A  YOB: 1960  MRN: [id number]   Acct: [de-identified]   PCP: ANGELIA Terry CNP  Code Status: Full Code  Date of Admission: 8/15/2020    Expected Discharge: 1-2 days  Disposition: home    Assessment/Plan:    1. Headache / dizziness: HA described as bandlike, associated with lightheadedness/dizziness, nausea, sensitivity to light/sound. Lightheadedness is worse with position changes, but this has improved. - CT Head, CTA Head/Neck unimpressive. Recent MRI Brain normal.  - Orthostatics negative but not completely ruled out given she was treated with IVF before they were checked and symptoms have improved. Sx also improved with Antivert. Continue gentle hydration monitoring fluid status closely. - Will try Toradol for headache and see if sx improve. - Pt is borderline hypotensive, home diuetics and antihypertensives have been held. Will resume as able. No evidence of volume overload. 2. Hx ICM, EF 20-25%: s/p ICD. Holding medical therapy at this time as stated above. Will phase back in as symptoms and BP allow. 3. H/o Breast CA s/p chemo, radiation, bilat mastectomy: Cont letrozole    4. Anxiety: continue Effexor. 5. Hypothyroidism: cont synthroid. TSH wnl. Chief Complaint: dizziness    HPI / Hospital Course: Patient is a 64-year-old female with a past medical history of hypothyroidism, hypertension, breast cancer status post bilateral mastectomy, chemo, radiation now on hormonal therapy, cardiomyopathy with EF of 20 to 25% status post ICD placement. Patient had a hospital stay where she had left-sided facial numbness in July and extensive neuro work-up was done at that time and nothing was discovered. She does have a stenotic right vertebral artery, however, it is nondominant and has good flow in her left.   She underwent a brain MRI at that time which was normal.  She was in her usual state of [facility] when this yesterday morning she developed severe nausea and vomiting and dizziness. She states the dizziness is worse with position changes. She has not ate or drank well over the last 24 hours. She has not taken any medications as of today. Since her symptoms were not getting any better she came to the ED. Per my history and exam patient seems to be dizzy very specifically with position changes. I attempted a [place]-Hallpike which did not worsen anything, however, upon sitting she became very dizzy. When I had her stand up she became even more dizzy. Her blood pressure is in the low 1 teens to 336 systolic. Without any of her blood pressure medications. She also received IV fluids while she was in the ED. Orthostatics have not been checked yet at this time. She denies any fevers or chills. She has not had any recent nausea or vomiting today. She denies any shortness of breath or lower extremity edema. She denies any orthopnea. She denies any chest pain     Subjective (past 24 hours): Patient reports continued headache, rated 5 out of 10. She describes it as bandlike. Associated with lightheadedness, nausea sensitivity to light and sound. Denies feeling like the room is spinning, but states that objects appeared to be moving. Denies seeing spots or other vision changes. She states dizziness has improved since yesterday. Patient denies chest pain, shortness of breath, abdominal pain, V/D, urinary symptoms. ROS: Pertinent positives as noted in HPI. All other systems reviewed and negative. Past medical history, family history, social history and allergies reviewed again and is unchanged since admission. Medications:  Reviewed.   Infusion Medications    lactated ringers 100 mL/hr at 08/16/20 0641     Scheduled Medications    aspirin  81 mg Oral Daily    atorvastatin  40 mg Oral Nightly    levothyroxine  100 mcg Oral Daily    venlafaxine  75 mg Oral Daily    letrozole  2.5 LABURIN  Micro:   Blood culture #1: No results found for: BC  Blood culture #2:No results found for: BLOODCULT2  Organism:  Lab Results   Component Value Date    ORG Mixed Growth 11/29/2018         Lab Results   Component Value Date    LABGRAM  10/09/2019     Rare segmented neutrophils observed. Few epithelial cells observed. Many gram negative bacilli. Moderate gram positive cocci in pairs and chains. Few small gram positive bacilli. Prepubescent and postmenopausal female samples (<15and >47ears of age) are not typically suitable for bacterialvaginosis screening. MRSA culture only:No results found for: [facility]  Respiratory culture: No results found for: CULTRESP  Aerobic and Anaerobic :  No results found for: LABAERO  No results found for: [facility]    Radiology Reports:  CT HEAD WO CONTRAST   Final Result   1. Unremarkable noncontrast CT head. **This report has been created using voice recognition software. It may contain minor errors which are inherent in voice recognition technology. **      Final report electronically signed by Dr. Ara Riddle on 8/15/2020 2:36 PM      CTA HEAD W WO CONTRAST   Final Result       1. No flow-limiting stenosis or aneurysm in the intracranial circulation. 2. Stable focal area of severe stenosis in the V4 segment of the nondominant vertebral artery on the right with no other focal abnormality identified in the anterior or posterior circulation of the neck. **This report has been created using voice recognition software. It may contain minor errors which are inherent in voice recognition technology. **      Final report electronically signed by Dr. Johanna Simmons MD on 8/15/2020 3:02 PM      CTA NECK W [address]   Final Result       1. No flow-limiting stenosis or aneurysm in the intracranial circulation.    2. Stable focal area of severe stenosis in the V4 segment of the nondominant vertebral artery on the right with no other focal abnormality identified in the anterior or posterior circulation of the neck. **This report has been created using voice recognition software. It may contain minor errors which are inherent in voice recognition technology. **      Final report electronically signed by Dr. Kiet Ramos MD on 8/15/2020 3:02 PM      MRI BRAIN WO CONTRAST    (Results Pending)     Cta Head W Wo Contrast    Result Date: 8/15/2020  PROCEDURE: CTA HEAD W WO CONTRAST, CTA NECK W WO CONTRAST CLINICAL INFORMATION: vertigo . COMPARISON: CT head from the same date and CTA dated 7/9/2020. TECHNIQUE: Helical CT from the aortic arch through the head in arterial phase during fast pulse administration of 80 mL Isovue-370 injected in the right [facility] with multiplanar reconstructions to include volumetric maximum intensity projection sequences. All CT scans at this facility use dose modulation, iterative reconstruction, and/or weight-based dosing when appropriate to reduce radiation dose to as low as reasonably achievable. FINDINGS: CTA HEAD: There is mild mural calcification in the cavernous segment of the right internal carotid artery. Intracranial segments of internal carotid arteries are patent without flow-limiting stenosis or visualized aneurysm. The bilateral middle cerebral and anterior cerebral arteries are patent without focal abnormality identified. The basilar artery is patent without focal stenosis or visualized aneurysm. Bilateral posterior cerebral arteries are patent without focal abnormality identified. Superior cerebellar arteries appear patent. Dural venous sinuses appear patent without focal filling defect. No focal areas of abnormal parenchymal enhancement are identified. CTA NECK: There is a typical 3 vessel arch. The brachiocephalic and left subclavian arteries are patent without focal stenosis. The common carotid arteries are patent without focal stenosis.  Carotid bifurcations are widely patent without hemodynamically significant stenosis by NASCET criteria. Cervical segments of internal carotid arteries are patent without focal stenosis. The left vertebral artery is dominant. Redemonstration of a focal area of severe stenosis in the V4 segment of the nondominant right vertebral artery. Vertebral arteries are otherwise patent throughout their course without focal stenosis. The focal cords are closely apposed limiting evaluation of this area. There is medialization of the superior horn of the thyroid cartilage on the right which causes mild mucosal asymmetry and effacement of the right piriform sinus. Given these caveats, mucosal surfaces of the aerodigestive tract are otherwise symmetric without focal nodular thickening or visualized mass. There are stable small enhancing nodules in the bilateral parotid glands likely representing intraparotid lymph nodes. Parotid glands  are otherwise unremarkable. No definite cervical lymphadenopathy is identified. The  submandibular and thyroid glands are unremarkable. Visualized portions of the lungs are clear. There are mild degenerative changes of the cervical spine. 1. No flow-limiting stenosis or aneurysm in the intracranial circulation. 2. Stable focal area of severe stenosis in the V4 segment of the nondominant vertebral artery on the right with no other focal abnormality identified in the anterior or posterior circulation of the neck. **This report has been created using voice recognition software. It may contain minor errors which are inherent in voice recognition technology. ** Final report electronically signed by Dr. Elvis Lucas MD on 8/15/2020 3:02 PM    Ct Head Wo Contrast    Result Date: 8/15/2020  PROCEDURE: CT HEAD WO CONTRAST CLINICAL INFORMATION: Vertigo. COMPARISON: 7/19/2020.  TECHNIQUE: 5 mm axial imaging through the head without IV contrast. All CT scans at this facility use dose modulation, iterative reconstruction, and/or weight based dosing when appropriate to reduce the radiation dose to as low as reasonably achievable. FINDINGS: POSTOPERATIVE CHANGES: None. BRAIN VOLUME: 1. Normal for the patient's age. VENTRICLES/CISTERNS: 1. Normal for the patient's age. INFARCT/WHITE MATTER DISEASE: Unremarkable. INTRACRANIAL HEMORRHAGE: None. MASS/MASS EFFECT/MIDLINE SHIFT: None. INTRA-AXIAL/EXTRA-AXIAL FLUID COLLECTIONS: None. INTRAORBITAL CONTENTS: Unremarkable. OTHER: None. SKULL/SCALP: Unremarkable. PARANASAL SINUSES: Unremarkable. 1. Unremarkable noncontrast CT head. **This report has been created using voice recognition software. It may contain minor errors which are inherent in voice recognition technology. ** Final report electronically signed by Dr. Jason Bowers on 8/15/2020 2:36 PM    Cta Neck W Wo Contrast    Result Date: 8/15/2020  PROCEDURE: CTA HEAD W WO CONTRAST, CTA NECK W WO CONTRAST CLINICAL INFORMATION: vertigo . COMPARISON: CT head from the same date and CTA dated 7/9/2020. TECHNIQUE: Helical CT from the aortic arch through the head in arterial phase during fast pulse administration of 80 mL Isovue-370 injected in the right [facility] with multiplanar reconstructions to include volumetric maximum intensity projection sequences. All CT scans at this facility use dose modulation, iterative reconstruction, and/or weight-based dosing when appropriate to reduce radiation dose to as low as reasonably achievable. FINDINGS: CTA HEAD: There is mild mural calcification in the cavernous segment of the right internal carotid artery. Intracranial segments of internal carotid arteries are patent without flow-limiting stenosis or visualized aneurysm. The bilateral middle cerebral and anterior cerebral arteries are patent without focal abnormality identified. The basilar artery is patent without focal stenosis or visualized aneurysm. Bilateral posterior cerebral arteries are patent without focal abnormality identified. Superior cerebellar arteries appear patent.  Dural venous sinuses appear patent without focal filling defect. No focal areas of abnormal parenchymal enhancement are identified. CTA NECK: There is a typical 3 vessel arch. The brachiocephalic and left subclavian arteries are patent without focal stenosis. The common carotid arteries are patent without focal stenosis. Carotid bifurcations are widely patent without hemodynamically significant stenosis by NASCET criteria. Cervical segments of internal carotid arteries are patent without focal stenosis. The left vertebral artery is dominant. Redemonstration of a focal area of severe stenosis in the V4 segment of the nondominant right vertebral artery. Vertebral arteries are otherwise patent throughout their course without focal stenosis. The focal cords are closely apposed limiting evaluation of this area. There is medialization of the superior horn of the thyroid cartilage on the right which causes mild mucosal asymmetry and effacement of the right piriform sinus. Given these caveats, mucosal surfaces of the aerodigestive tract are otherwise symmetric without focal nodular thickening or visualized mass. There are stable small enhancing nodules in the bilateral parotid glands likely representing intraparotid lymph nodes. Parotid glands  are otherwise unremarkable. No definite cervical lymphadenopathy is identified. The  submandibular and thyroid glands are unremarkable. Visualized portions of the lungs are clear. There are mild degenerative changes of the cervical spine. 1. No flow-limiting stenosis or aneurysm in the intracranial circulation. 2. Stable focal area of severe stenosis in the V4 segment of the nondominant vertebral artery on the right with no other focal abnormality identified in the anterior or posterior circulation of the neck. **This report has been created using voice recognition software. It may contain minor errors which are inherent in voice recognition technology. ** Final report electronically signed by Dr. Phillip Castaneda MD on 8/15/2020 3:02 PM      EKG: NSR    Tele:   [x] yes             [] no      Active Hospital Problems    Diagnosis Date Noted    Vertigo [R42] 08/15/2020       Electronically signed by Jun Williamson PA-C on 8/16/2020 at 4:53 PM

## 2020-08-16 NOTE — FLOWSHEET NOTE
[place] [id number] PROGRESS NOTE      Patient: Chandni Plascencia  Room #: 0H-37/241-G            YOB: 1960  Age: 62 y.o. Gender: female            Admit Date & Time: 8/15/2020 10:20 AM    Assessment:  Pt is a 62year old female. Patient is resting comfortably in her bed with no family presence. Patient told me she woke up in the morning feeling funny and waited until Friday the feeling did not go away and was sent to ED and admitted because test showed an abnormal function in her heart. Patient and  talked about ACP at which time patient said she will discuss the topic with her children and read through the document. Patient shared with me that she like to have CPR if she needs it but do not which to be on Ventilator for too long if it came to that. However, she is not ready to fill out the form until she talk with her adult children. Interventions:  Advance Directive Consult: Advance Directive materials were provided to patient and explained. Patient is not ready to complete at this time, gave information for Spiritual Care to be contacted if further assistance is needed. Outcomes:  Pt welcome the document to review, engaged in conversation and showed gratitude for for the 's visit. Plan:  1. SC will follow up for continue support and to see if patient is ready to fill out [facility] Will [address] [place] [address] form.      Electronically signed by Yo Ramachandran, on 8/16/2020 at 6:08 PM.  3 [facility]  [phone]

## 2020-08-16 NOTE — PLAN OF CARE
Problem: Pain:  Goal: Pain level will decrease  Description: Pain level will decrease  Outcome: Ongoing  Note: Pt denies pain on my shift. Non pharmaceutical interventions like ice and repositioning offered before pain medications. Will continue to assess. Problem: Pain:  Goal: Control of acute pain  Description: Control of acute pain  Outcome: Ongoing  Note: Pt denies pain on my shift. Non pharmaceutical interventions like ice and repositioning offered before pain medications. Will continue to assess. Problem: Pain:  Goal: Control of chronic pain  Description: Control of chronic pain  Outcome: Ongoing  Note: Pt denies pain on my shift. Non pharmaceutical interventions like ice and repositioning offered before pain medications. Will continue to assess. Problem: Falls - Risk of:  Goal: Will remain free from falls  Description: Will remain free from falls  Outcome: Ongoing  Note: Fall precaution in place. Bed alarm/chair alarm. Bed locked in lowest position. Fall band applied if applicable. Call light and overhead table within reach. Will continue to monitor. Problem: Falls - Risk of:  Goal: Absence of physical injury  Description: Absence of physical injury  Outcome: Ongoing  Note: Absence of physical injury. Problem: Discharge Planning:  Goal: Discharged to appropriate level of care  Description: Discharged to appropriate level of care  Outcome: Ongoing  Note: Pt will discharge home when appropriate. Pt verbalizes understanding of care plan. Pt contributes to goal settings.

## 2020-08-17 LAB
ANION GAP SERPL CALCULATED.3IONS-SCNC: 11 MEQ/L (ref 8–16)
BASOPHILS # BLD: 0.6 %
BASOPHILS ABSOLUTE: 0 THOU/MM3 (ref 0–0.1)
BUN BLDV-MCNC: 16 MG/DL (ref 7–22)
CALCIUM SERPL-MCNC: 8.5 MG/DL (ref 8.5–10.5)
CHLORIDE BLD-SCNC: 107 MEQ/L (ref 98–111)
CO2: 24 MEQ/L (ref 23–33)
CREAT SERPL-MCNC: 0.4 MG/DL (ref 0.4–1.2)
EKG ATRIAL RATE: 65 BPM
EKG P AXIS: 32 DEGREES
EKG P-R INTERVAL: 180 MS
EKG Q-T INTERVAL: 436 MS
EKG QRS DURATION: 96 MS
EKG QTC CALCULATION (BAZETT): 453 MS
EKG R AXIS: -11 DEGREES
EKG T AXIS: 2 DEGREES
EKG VENTRICULAR RATE: 65 BPM
EOSINOPHIL # BLD: 4.3 %
EOSINOPHILS ABSOLUTE: 0.2 THOU/MM3 (ref 0–0.4)
ERYTHROCYTE [DISTWIDTH] IN BLOOD BY AUTOMATED COUNT: 13.4 % (ref 11.5–14.5)
ERYTHROCYTE [DISTWIDTH] IN BLOOD BY AUTOMATED COUNT: 47.4 FL (ref 35–45)
GFR SERPL CREATININE-BSD FRML MDRD: > 90 ML/MIN/1.73M2
GLUCOSE BLD-MCNC: 93 MG/DL (ref 70–108)
HCT VFR BLD CALC: 36.2 % (ref 37–47)
HEMOGLOBIN: 12 GM/DL (ref 12–16)
IMMATURE GRANS (ABS): 0.03 THOU/MM3 (ref 0–0.07)
IMMATURE GRANULOCYTES: 0.6 %
LYMPHOCYTES # BLD: 29.9 %
LYMPHOCYTES ABSOLUTE: 1.4 THOU/MM3 (ref 1–4.8)
MAGNESIUM: 2 MG/DL (ref 1.6–2.4)
MCH RBC QN AUTO: 31.7 PG (ref 26–33)
MCHC RBC AUTO-ENTMCNC: 33.1 GM/DL (ref 32.2–35.5)
MCV RBC AUTO: 95.5 FL (ref 81–99)
MONOCYTES # BLD: 7.4 %
MONOCYTES ABSOLUTE: 0.3 THOU/MM3 (ref 0.4–1.3)
NUCLEATED RED BLOOD CELLS: 0 /100 WBC
PLATELET # BLD: 174 THOU/MM3 (ref 130–400)
PMV BLD AUTO: 11.5 FL (ref 9.4–12.4)
POTASSIUM SERPL-SCNC: 4.1 MEQ/L (ref 3.5–5.2)
RBC # BLD: 3.79 MILL/MM3 (ref 4.2–5.4)
SEG NEUTROPHILS: 57.2 %
SEGMENTED NEUTROPHILS ABSOLUTE COUNT: 2.6 THOU/MM3 (ref 1.8–7.7)
SODIUM BLD-SCNC: 142 MEQ/L (ref 135–145)
WBC # BLD: 4.6 THOU/MM3 (ref 4.8–10.8)

## 2020-08-17 PROCEDURE — 94760 N-INVAS EAR/PLS OXIMETRY 1: CPT

## 2020-08-17 PROCEDURE — G0378 HOSPITAL OBSERVATION PER HR: HCPCS

## 2020-08-17 PROCEDURE — 2580000003 HC RX 258: Performed by: INTERNAL MEDICINE

## 2020-08-17 PROCEDURE — 83735 ASSAY OF MAGNESIUM: CPT

## 2020-08-17 PROCEDURE — 6360000002 HC RX W HCPCS: Performed by: INTERNAL MEDICINE

## 2020-08-17 PROCEDURE — 96372 THER/PROPH/DIAG INJ SC/IM: CPT

## 2020-08-17 PROCEDURE — 99225 PR SBSQ OBSERVATION CARE/DAY 25 MINUTES: CPT | Performed by: PSYCHIATRY & NEUROLOGY

## 2020-08-17 PROCEDURE — 36415 COLL VENOUS BLD VENIPUNCTURE: CPT

## 2020-08-17 PROCEDURE — 93005 ELECTROCARDIOGRAM TRACING: CPT | Performed by: PHYSICIAN ASSISTANT

## 2020-08-17 PROCEDURE — 85025 COMPLETE CBC W/AUTO DIFF WBC: CPT

## 2020-08-17 PROCEDURE — 6370000000 HC RX 637 (ALT 250 FOR IP): Performed by: PHYSICIAN ASSISTANT

## 2020-08-17 PROCEDURE — 80048 BASIC METABOLIC PNL TOTAL CA: CPT

## 2020-08-17 PROCEDURE — 99225 PR SBSQ OBSERVATION CARE/DAY 25 MINUTES: CPT | Performed by: PHYSICIAN ASSISTANT

## 2020-08-17 PROCEDURE — 6370000000 HC RX 637 (ALT 250 FOR IP): Performed by: INTERNAL MEDICINE

## 2020-08-17 RX ORDER — CARVEDILOL 3.12 MG/1
3.12 TABLET ORAL 2 TIMES DAILY WITH MEALS
Status: DISCONTINUED | OUTPATIENT
Start: 2020-08-17 | End: 2020-08-19 | Stop reason: HOSPADM

## 2020-08-17 RX ADMIN — VENLAFAXINE HYDROCHLORIDE 75 MG: 75 CAPSULE, EXTENDED RELEASE ORAL at 08:38

## 2020-08-17 RX ADMIN — ATORVASTATIN CALCIUM 40 MG: 40 TABLET, FILM COATED ORAL at 20:54

## 2020-08-17 RX ADMIN — LETROZOLE 2.5 MG: 2.5 TABLET ORAL at 08:38

## 2020-08-17 RX ADMIN — SACUBITRIL AND VALSARTAN 1 TABLET: 49; 51 TABLET, FILM COATED ORAL at 20:55

## 2020-08-17 RX ADMIN — CARVEDILOL 3.12 MG: 3.12 TABLET, FILM COATED ORAL at 16:37

## 2020-08-17 RX ADMIN — ENOXAPARIN SODIUM 40 MG: 40 INJECTION SUBCUTANEOUS at 20:52

## 2020-08-17 RX ADMIN — ASPIRIN 81 MG: 81 TABLET, CHEWABLE ORAL at 08:38

## 2020-08-17 RX ADMIN — SODIUM CHLORIDE, POTASSIUM CHLORIDE, SODIUM LACTATE AND CALCIUM CHLORIDE: 600; 310; 30; 20 INJECTION, SOLUTION INTRAVENOUS at 11:52

## 2020-08-17 RX ADMIN — LEVOTHYROXINE SODIUM 100 MCG: 100 TABLET ORAL at 06:02

## 2020-08-17 RX ADMIN — BUTALBITAL, ACETAMINOPHEN, AND CAFFEINE 1 TABLET: 50; 325; 40 TABLET ORAL at 10:51

## 2020-08-17 RX ADMIN — SODIUM CHLORIDE, POTASSIUM CHLORIDE, SODIUM LACTATE AND CALCIUM CHLORIDE: 600; 310; 30; 20 INJECTION, SOLUTION INTRAVENOUS at 21:01

## 2020-08-17 ASSESSMENT — PAIN DESCRIPTION - ONSET: ONSET: ON-GOING

## 2020-08-17 ASSESSMENT — PAIN DESCRIPTION - PROGRESSION
CLINICAL_PROGRESSION: NOT CHANGED

## 2020-08-17 ASSESSMENT — PAIN SCALES - GENERAL
PAINLEVEL_OUTOF10: 0
PAINLEVEL_OUTOF10: 0
PAINLEVEL_OUTOF10: 3
PAINLEVEL_OUTOF10: 0

## 2020-08-17 ASSESSMENT — PAIN DESCRIPTION - ORIENTATION: ORIENTATION: MID

## 2020-08-17 ASSESSMENT — PAIN DESCRIPTION - DESCRIPTORS: DESCRIPTORS: HEADACHE

## 2020-08-17 ASSESSMENT — PAIN DESCRIPTION - PAIN TYPE: TYPE: ACUTE PAIN

## 2020-08-17 ASSESSMENT — PAIN - FUNCTIONAL ASSESSMENT: PAIN_FUNCTIONAL_ASSESSMENT: ACTIVITIES ARE NOT PREVENTED

## 2020-08-17 ASSESSMENT — PAIN DESCRIPTION - FREQUENCY: FREQUENCY: INTERMITTENT

## 2020-08-17 ASSESSMENT — PAIN DESCRIPTION - LOCATION: LOCATION: HEAD

## 2020-08-17 NOTE — PROGRESS NOTES
Hospitalist Progress Note    Patient:  Sudha Jacinto    Unit/Bed:8B-35/035-A  YOB: 1960  MRN: [id number]   Acct: [de-identified]   PCP: ANGELIA Campos CNP  Code Status: Full Code  Date of Admission: 8/15/2020    Expected Discharge: 1-2 days  Disposition: home    Assessment/Plan:    1. Headache / dizziness: HA described as bandlike, associated with lightheadedness/dizziness, nausea, sensitivity to light/sound. Lightheadedness is worse with position changes, but this has improved. - Sx began improving after IVF bolus; possibly had orthostatic hypotension which resolved before being checked. Pt also given Toradol once for HA; states her HA, nausea, dizziness resolved. - CT Head, CTA Head/Neck unimpressive. Recent MRI Brain normal.   - Neurology consulted, recommend MRI Brain WO contrast with diffusion sequence, pending.   - Pt is borderline hypotensive, home diuetics and antihypertensives have been held. Will resume as able. No evidence of volume overload. - 8/17: Sx are improving; however pt is worried about going home and reports palpitations. HR on tele is 60s. Her symptoms on arrival may have also been compounded by poly-pharmacy given her BP and HR are low normal while medications are held. Will cont to hold digoxin and resume Coreg and Entresto with parameters. Monitor. Check EKG. 2. Hx ICM, EF 20-25%: s/p ICD. Holding medical therapy at this time as stated above. Will phase back in as symptoms and BP allow. - Will resume Coreg and Entresto with parameters. 3. H/o Breast CA s/p chemo, radiation, bilat mastectomy: Cont letrozole    4. Anxiety: continue Effexor. 5. Hypothyroidism: cont synthroid. TSH wnl.        Chief Complaint: dizziness    HPI / Hospital Course: Patient is a 32-year-old female with a past medical history of hypothyroidism, hypertension, breast cancer status post bilateral mastectomy, chemo, radiation now on hormonal therapy, cardiomyopathy with EF of 08/16/20  0733 08/17/20  0515    140 142   K 3.8 4.1 4.1    106 107   CO2 26 24 24   BUN 12 14 16   CREATININE 0.6 0.6 0.4   CALCIUM 9.2 8.6 8.5     No results for input(s): AST, ALT, BILIDIR, BILITOT, ALKPHOS in the last 72 hours. No results for input(s): INR in the last 72 hours. No results for input(s): Norvel Anton in the last 72 hours. Urinalysis:   Lab Results   Component Value Date    NITRU NEGATIVE 07/09/2020    WBCUA 5-10 11/29/2018    BACTERIA NONE 11/29/2018    RBCUA 0-2 11/29/2018    BLOODU NEGATIVE 07/09/2020    GLUCOSEU NEGATIVE 07/09/2020     Urine culture: No results found for: Shade Swapna  Micro:   Blood culture #1: No results found for: BC  Blood culture #2:No results found for: BLOODCULT2  Organism:  Lab Results   Component Value Date    ORG Mixed Growth 11/29/2018         Lab Results   Component Value Date    LABGRAM  10/09/2019     Rare segmented neutrophils observed. Few epithelial cells observed. Many gram negative bacilli. Moderate gram positive cocci in pairs and chains. Few small gram positive bacilli. Prepubescent and postmenopausal female samples (<15and >47ears of age) are not typically suitable for bacterialvaginosis screening. MRSA culture only:No results found for: [facility]  Respiratory culture: No results found for: CULTRESP  Aerobic and Anaerobic :  No results found for: LABAERO  No results found for: [facility]    Radiology Reports:  CT HEAD WO CONTRAST   Final Result   1. Unremarkable noncontrast CT head. **This report has been created using voice recognition software. It may contain minor errors which are inherent in voice recognition technology. **      Final report electronically signed by Dr. Michael Gonzáles on 8/15/2020 2:36 PM      CTA HEAD W WO CONTRAST   Final Result       1. No flow-limiting stenosis or aneurysm in the intracranial circulation.    2. Stable focal area of severe stenosis in the V4 segment of the nondominant vertebral artery on the right with no other focal abnormality identified in the anterior or posterior circulation of the neck. **This report has been created using voice recognition software. It may contain minor errors which are inherent in voice recognition technology. **      Final report electronically signed by Dr. Tyron Lr MD on 8/15/2020 3:02 PM      CTA NECK W [address]   Final Result       1. No flow-limiting stenosis or aneurysm in the intracranial circulation. 2. Stable focal area of severe stenosis in the V4 segment of the nondominant vertebral artery on the right with no other focal abnormality identified in the anterior or posterior circulation of the neck. **This report has been created using voice recognition software. It may contain minor errors which are inherent in voice recognition technology. **      Final report electronically signed by Dr. Tyron Lr MD on 8/15/2020 3:02 PM      MRI BRAIN WO CONTRAST    (Results Pending)     Cta Head W Wo Contrast    Result Date: 8/15/2020  PROCEDURE: CTA HEAD W WO CONTRAST, CTA NECK W WO CONTRAST CLINICAL INFORMATION: vertigo . COMPARISON: CT head from the same date and CTA dated 7/9/2020. TECHNIQUE: Helical CT from the aortic arch through the head in arterial phase during fast pulse administration of 80 mL Isovue-370 injected in the right [facility] with multiplanar reconstructions to include volumetric maximum intensity projection sequences. All CT scans at this facility use dose modulation, iterative reconstruction, and/or weight-based dosing when appropriate to reduce radiation dose to as low as reasonably achievable. FINDINGS: CTA HEAD: There is mild mural calcification in the cavernous segment of the right internal carotid artery. Intracranial segments of internal carotid arteries are patent without flow-limiting stenosis or visualized aneurysm.  The bilateral middle cerebral and anterior cerebral arteries are patent without focal abnormality identified. The basilar artery is patent without focal stenosis or visualized aneurysm. Bilateral posterior cerebral arteries are patent without focal abnormality identified. Superior cerebellar arteries appear patent. Dural venous sinuses appear patent without focal filling defect. No focal areas of abnormal parenchymal enhancement are identified. CTA NECK: There is a typical 3 vessel arch. The brachiocephalic and left subclavian arteries are patent without focal stenosis. The common carotid arteries are patent without focal stenosis. Carotid bifurcations are widely patent without hemodynamically significant stenosis by NASCET criteria. Cervical segments of internal carotid arteries are patent without focal stenosis. The left vertebral artery is dominant. Redemonstration of a focal area of severe stenosis in the V4 segment of the nondominant right vertebral artery. Vertebral arteries are otherwise patent throughout their course without focal stenosis. The focal cords are closely apposed limiting evaluation of this area. There is medialization of the superior horn of the thyroid cartilage on the right which causes mild mucosal asymmetry and effacement of the right piriform sinus. Given these caveats, mucosal surfaces of the aerodigestive tract are otherwise symmetric without focal nodular thickening or visualized mass. There are stable small enhancing nodules in the bilateral parotid glands likely representing intraparotid lymph nodes. Parotid glands  are otherwise unremarkable. No definite cervical lymphadenopathy is identified. The  submandibular and thyroid glands are unremarkable. Visualized portions of the lungs are clear. There are mild degenerative changes of the cervical spine. 1. No flow-limiting stenosis or aneurysm in the intracranial circulation.  2. Stable focal area of severe stenosis in the V4 segment of the nondominant vertebral artery on the right with no other focal abnormality identified in the anterior or posterior circulation of the neck. **This report has been created using voice recognition software. It may contain minor errors which are inherent in voice recognition technology. ** Final report electronically signed by Dr. Radhames Arredondo MD on 8/15/2020 3:02 PM    Ct Head Wo Contrast    Result Date: 8/15/2020  PROCEDURE: CT HEAD WO CONTRAST CLINICAL INFORMATION: Vertigo. COMPARISON: 7/19/2020. TECHNIQUE: 5 mm axial imaging through the head without IV contrast. All CT scans at this facility use dose modulation, iterative reconstruction, and/or weight based dosing when appropriate to reduce the radiation dose to as low as reasonably achievable. FINDINGS: POSTOPERATIVE CHANGES: None. BRAIN VOLUME: 1. Normal for the patient's age. VENTRICLES/CISTERNS: 1. Normal for the patient's age. INFARCT/WHITE MATTER DISEASE: Unremarkable. INTRACRANIAL HEMORRHAGE: None. MASS/MASS EFFECT/MIDLINE SHIFT: None. INTRA-AXIAL/EXTRA-AXIAL FLUID COLLECTIONS: None. INTRAORBITAL CONTENTS: Unremarkable. OTHER: None. SKULL/SCALP: Unremarkable. PARANASAL SINUSES: Unremarkable. 1. Unremarkable noncontrast CT head. **This report has been created using voice recognition software. It may contain minor errors which are inherent in voice recognition technology. ** Final report electronically signed by Dr. Ian Contreras on 8/15/2020 2:36 PM    Cta Neck W Wo Contrast    Result Date: 8/15/2020  PROCEDURE: CTA HEAD W WO CONTRAST, CTA NECK W WO CONTRAST CLINICAL INFORMATION: vertigo . COMPARISON: CT head from the same date and CTA dated 7/9/2020. TECHNIQUE: Helical CT from the aortic arch through the head in arterial phase during fast pulse administration of 80 mL Isovue-370 injected in the right [facility] with multiplanar reconstructions to include volumetric maximum intensity projection sequences.   All CT scans at this facility use dose modulation, iterative reconstruction, and/or weight-based dosing when appropriate to reduce radiation dose to as low as reasonably achievable. FINDINGS: CTA HEAD: There is mild mural calcification in the cavernous segment of the right internal carotid artery. Intracranial segments of internal carotid arteries are patent without flow-limiting stenosis or visualized aneurysm. The bilateral middle cerebral and anterior cerebral arteries are patent without focal abnormality identified. The basilar artery is patent without focal stenosis or visualized aneurysm. Bilateral posterior cerebral arteries are patent without focal abnormality identified. Superior cerebellar arteries appear patent. Dural venous sinuses appear patent without focal filling defect. No focal areas of abnormal parenchymal enhancement are identified. CTA NECK: There is a typical 3 vessel arch. The brachiocephalic and left subclavian arteries are patent without focal stenosis. The common carotid arteries are patent without focal stenosis. Carotid bifurcations are widely patent without hemodynamically significant stenosis by NASCET criteria. Cervical segments of internal carotid arteries are patent without focal stenosis. The left vertebral artery is dominant. Redemonstration of a focal area of severe stenosis in the V4 segment of the nondominant right vertebral artery. Vertebral arteries are otherwise patent throughout their course without focal stenosis. The focal cords are closely apposed limiting evaluation of this area. There is medialization of the superior horn of the thyroid cartilage on the right which causes mild mucosal asymmetry and effacement of the right piriform sinus. Given these caveats, mucosal surfaces of the aerodigestive tract are otherwise symmetric without focal nodular thickening or visualized mass. There are stable small enhancing nodules in the bilateral parotid glands likely representing intraparotid lymph nodes. Parotid glands  are otherwise unremarkable. No definite cervical lymphadenopathy is identified.  The submandibular and thyroid glands are unremarkable. Visualized portions of the lungs are clear. There are mild degenerative changes of the cervical spine. 1. No flow-limiting stenosis or aneurysm in the intracranial circulation. 2. Stable focal area of severe stenosis in the V4 segment of the nondominant vertebral artery on the right with no other focal abnormality identified in the anterior or posterior circulation of the neck. **This report has been created using voice recognition software. It may contain minor errors which are inherent in voice recognition technology. ** Final report electronically signed by Dr. David Robins MD on 8/15/2020 3:02 PM      EKG: NSR    Tele:   [x] yes             [] no      Active Hospital Problems    Diagnosis Date Noted    Vertigo [R42] 08/15/2020    Depression with anxiety [F41.8] 07/16/2020    Malignant neoplasm of left breast in female, estrogen receptor positive ([facility] [address] [C50.912, Z17.0] 02/04/2020    Obesity (BMI 30-39. 9) [E66.9] 02/19/2019    ICD (implantable cardioverter-defibrillator) in place [Z95.810] 02/18/2019    Cardiomyopathy ([facility] [address] [I42.9] 01/29/2019    Hypothyroidism [E03.9] 11/12/2018    Essential hypertension [I10] 11/12/2018       Electronically signed by Naveed Okeefe PA-C on 8/17/2020 at 3:16 PM

## 2020-08-17 NOTE — CARE COORDINATION
20, 12:15 PM EDT  DISCHARGE PLANNING EVALUATION:    Artur Grant       Admitted from: ED 8/15/2020/ [facility] day: 0   Location: [address] [place] [address]A Reason for admit: Vertigo [R42] Status: Obs  Admit order signed?: yes  PMH:  has a past medical history of Abnormal heart rhythm, Anxiety, Cancer ([address] CHF (congestive heart failure) ([address] Congenital heart disease, DJD (degenerative joint disease), Enlarged lymph nodes, Hypertension, Hypothyroidism, ICD (implantable cardioverter-defibrillator) in place, Kidney stones, PONV (postoperative nausea and vomiting), Prediabetes, and Thyroid disease. Procedure: none  Pertinent abnormal Imagin/15 CTA head and neck:   1. No flow-limiting stenosis or aneurysm in the intracranial circulation. 2. Stable focal area of severe stenosis in the V4 segment of the nondominant vertebral artery on the right with no other focal abnormality identified in the anterior or posterior circulation of the neck. Medications:  Scheduled Meds:   aspirin  81 mg Oral Daily    atorvastatin  40 mg Oral Nightly    levothyroxine  100 mcg Oral Daily    venlafaxine  75 mg Oral Daily    letrozole  2.5 mg Oral Daily    sodium chloride flush  10 mL Intravenous 2 times per day    enoxaparin  40 mg Subcutaneous Q24H     Continuous Infusions:   lactated ringers 100 mL/hr at 20 1152      Pertinent Info/Orders/Treatment Plan:  Hospitalist and neuro following. MRI ordered. Presented to ED with headache/vertigo, n/v. Received Toradol yesterday, symptoms resolved. Hx ICD. IVF. Fiorcet. Room air. Diet: DIET GENERAL;   Smoking status:  reports that she has been smoking cigarettes. She started smoking about 38 years ago. She has a 9.25 pack-year smoking history.  She has never used smokeless tobacco.   PCP: ANGELIA Ramirez CNP  Readmission 30 days or less: no   %    Discharge Planning Evaluation  Current Residence:  Private Residence  Living Arrangements:  Children   Support Systems:  Children  Current Services PTA:     Potential Assistance Needed:  N/A  Potential Assistance Purchasing Medications:  No  Does patient want to participate in local refill/ meds to beds program?  No  Type of Home Care Services:  None  Patient expects to be discharged to:  home  Expected Discharge date:  08/16/20  Follow Up Appointment: Best Day/ Time: Monday AM    Patient Goals/Plan/Treatment Preferences: Spoke with patient, hopeful for discharge. She plans to return home with independently with family. She denies discharge needs. Follows with a PCP and has transportation to \[facility]". Transportation/Food Security/Housekeeping Addressed:  No issues identified.     Evaluation: no

## 2020-08-17 NOTE — PROGRESS NOTES
NEUROLOGY INPATIENT PROGRESS NOTE    Patient- Hasmukh Raygoza    MRN -  [id number]   [facility]      - 1960    62 y.o. Subjective: The patient is seen as follow up for dizziness, stroke like symptoms. Patient is alert at this time. She denies new symptoms. She had testing performed. Objective:   /69   Pulse 71   Temp 98.1 °F (36.7 °C) (Oral)   Resp 16   Ht 5' 2\" (1.575 m)   Wt 204 lb 11.2 oz (92.9 kg)   SpO2 96%   BMI 37.44 kg/m²   No intake or output data in the 24 hours ending 20 1708    Physical Exam:  General:  Awake, laying in bed,  not  in distress. Language is intact. HEENT: pink conjunctiva, unicteric sclera, moist oral mucosa. There is no neck lymphadenopathy. Neck: There is no carotid bruits. The Neck is supple. Neuro: CN 2-12 left side nystagmus, otherwise CN are grossly intact with no focal deficits. Power 5/5 Throughout symmetric, Reflexes are decreased and symmetric. Long tracts are intact. Cerebellar exam is Intact. Sensory exam is intact to light touch. Gait is not tested. No abnormal movement. Osteo: There is no LROM of any of the 4 extremity joints, no joint tenderness. Leg edema none  Skin: no lesions, no rash, warm and moist to touch. Abdomen is soft intact bowel sounds. ROS:    Cardiac: no chest pain. No palpitations.   Renal : no flank pain, no hematuria  Skin: no rash    Medications:     carvedilol  3.125 mg Oral BID     sacubitril-valsartan  1 tablet Oral BID    aspirin  81 mg Oral Daily    atorvastatin  40 mg Oral Nightly    levothyroxine  100 mcg Oral Daily    venlafaxine  75 mg Oral Daily    letrozole  2.5 mg Oral Daily    sodium chloride flush  10 mL Intravenous 2 times per day    enoxaparin  40 mg Subcutaneous Q24H       Data:   CBC:   Recent Labs     08/15/20  1307 20  0733 20  0515   WBC 4.8 3.9* 4.6*   HGB 12.4 11.7* 12.0    177 174     BMP:    Recent Labs 08/15/20  1307 08/16/20  0733 08/17/20  0515    140 142   K 3.8 4.1 4.1    106 107   CO2 26 24 24   BUN 12 14 16   CREATININE 0.6 0.6 0.4   GLUCOSE 103 111* 93     Reviewed   Results for orders placed during the hospital encounter of 08/15/20   CTA HEAD W WO CONTRAST    Narrative PROCEDURE: CTA HEAD W WO CONTRAST, CTA NECK W WO CONTRAST    CLINICAL INFORMATION: vertigo . COMPARISON: CT head from the same date and CTA dated 7/9/2020. TECHNIQUE: Helical CT from the aortic arch through the head in arterial phase during fast pulse administration of 80 mL Isovue-370 injected in the right [facility] with multiplanar reconstructions to include volumetric maximum intensity projection sequences. All CT scans at this facility use dose modulation, iterative reconstruction, and/or weight-based dosing when appropriate to reduce radiation dose to as low as reasonably achievable. FINDINGS:     CTA HEAD:  There is mild mural calcification in the cavernous segment of the right internal carotid artery. Intracranial segments of internal carotid arteries are patent without flow-limiting stenosis or visualized aneurysm. The bilateral middle cerebral and   anterior cerebral arteries are patent without focal abnormality identified. The basilar artery is patent without focal stenosis or visualized aneurysm. Bilateral posterior cerebral arteries are patent without focal abnormality identified. Superior   cerebellar arteries appear patent. Dural venous sinuses appear patent without focal filling defect. No focal areas of abnormal parenchymal enhancement are identified. CTA NECK:  There is a typical 3 vessel arch. The brachiocephalic and left subclavian arteries are patent without focal stenosis. The common carotid arteries are patent without focal stenosis. Carotid bifurcations are widely patent without hemodynamically   significant stenosis by NASCET criteria.  Cervical segments of internal carotid arteries are patent without focal stenosis. The left vertebral artery is dominant. Redemonstration of a focal area of severe stenosis in the V4 segment of the nondominant   right vertebral artery. Vertebral arteries are otherwise patent throughout their course without focal stenosis. The focal cords are closely apposed limiting evaluation of this area. There is medialization of the superior horn of the thyroid cartilage on the right which causes mild mucosal asymmetry and effacement of the right piriform sinus. Given these caveats,   mucosal surfaces of the aerodigestive tract are otherwise symmetric without focal nodular thickening or visualized mass. There are stable small enhancing nodules in the bilateral parotid glands likely representing intraparotid lymph nodes. Parotid glands   are otherwise unremarkable. No definite cervical lymphadenopathy is identified. The  submandibular and thyroid glands are unremarkable. Visualized portions of the lungs are clear. There are mild degenerative changes of the cervical spine. Impression    1. No flow-limiting stenosis or aneurysm in the intracranial circulation. 2. Stable focal area of severe stenosis in the V4 segment of the nondominant vertebral artery on the right with no other focal abnormality identified in the anterior or posterior circulation of the neck. **This report has been created using voice recognition software. It may contain minor errors which are inherent in voice recognition technology. **    Final report electronically signed by Dr. Skip Suaer MD on 8/15/2020 3:02 PM     Results for orders placed during the hospital encounter of 08/15/20   CTA NECK W WO CONTRAST    Narrative PROCEDURE: CTA HEAD W [address] INFORMATION: vertigo . COMPARISON: CT head from the same date and CTA dated 7/9/2020.     TECHNIQUE: Helical CT from the aortic arch through the head in arterial phase during fast pulse administration of 80 mL Isovue-370 injected in the right [facility] [place] with multiplanar reconstructions to include volumetric maximum intensity projection sequences. All CT scans at this facility use dose modulation, iterative reconstruction, and/or weight-based dosing when appropriate to reduce radiation dose to as low as reasonably achievable. FINDINGS:     CTA HEAD:  There is mild mural calcification in the cavernous segment of the right internal carotid artery. Intracranial segments of internal carotid arteries are patent without flow-limiting stenosis or visualized aneurysm. The bilateral middle cerebral and   anterior cerebral arteries are patent without focal abnormality identified. The basilar artery is patent without focal stenosis or visualized aneurysm. Bilateral posterior cerebral arteries are patent without focal abnormality identified. Superior   cerebellar arteries appear patent. Dural venous sinuses appear patent without focal filling defect. No focal areas of abnormal parenchymal enhancement are identified. CTA NECK:  There is a typical 3 vessel arch. The brachiocephalic and left subclavian arteries are patent without focal stenosis. The common carotid arteries are patent without focal stenosis. Carotid bifurcations are widely patent without hemodynamically   significant stenosis by NASCET criteria. Cervical segments of internal carotid arteries are patent without focal stenosis. The left vertebral artery is dominant. Redemonstration of a focal area of severe stenosis in the V4 segment of the nondominant   right vertebral artery. Vertebral arteries are otherwise patent throughout their course without focal stenosis. The focal cords are closely apposed limiting evaluation of this area. There is medialization of the superior horn of the thyroid cartilage on the right which causes mild mucosal asymmetry and effacement of the right piriform sinus.  Given these caveats,   mucosal surfaces of the aerodigestive tract are otherwise symmetric without focal nodular thickening or visualized mass. There are stable small enhancing nodules in the bilateral parotid glands likely representing intraparotid lymph nodes. Parotid glands   are otherwise unremarkable. No definite cervical lymphadenopathy is identified. The  submandibular and thyroid glands are unremarkable. Visualized portions of the lungs are clear. There are mild degenerative changes of the cervical spine. Impression    1. No flow-limiting stenosis or aneurysm in the intracranial circulation. 2. Stable focal area of severe stenosis in the V4 segment of the nondominant vertebral artery on the right with no other focal abnormality identified in the anterior or posterior circulation of the neck. **This report has been created using voice recognition software. It may contain minor errors which are inherent in voice recognition technology. **    Final report electronically signed by Dr. Elvis Lucas MD on 8/15/2020 3:02 PM     Reviewed   Results for orders placed during the hospital encounter of 07/08/20   MRI BRAIN W WO CONTRAST    Narrative PROCEDURE: MRI BRAIN W WO CONTRAST    INDICATION:facial weakness, history of breast cancer, evaluate for mets. COMPARISON: CT head dated 7/9/2020. TECHNIQUE: Multiplanar and multiple spin echo T1 and T2-weighted images were obtained through the brain before and after the administration of intravenous contrast. 20 mL ProHance was injected in the right AC. FINDINGS:  The ventricles, cisterns and sulci are symmetric and normal in size and configuration for age. No significant focal areas of abnormal T2/flair prolongation are identified within the parenchyma. No intra or extra-axial mass is identified. No focal areas   restricted diffusion are present. Following contrast administration, there are no focal areas of abnormal parenchymal or meningeal enhancement identified.     The major vascular flow voids appear patent. Orbits are unremarkable. Visualized paranasal sinuses are clear. Mastoid air cells are clear. Impression  Normal MRI of the brain. **This report has been created using voice recognition software. It may contain minor errors which are inherent in voice recognition technology. **      Final report electronically signed by Dr. Adams Abreu MD on 7/10/2020 11:57 AM     Reviewed   Results for orders placed during the hospital encounter of 08/15/20   CT HEAD WO CONTRAST    Narrative PROCEDURE: CT HEAD WO CONTRAST    CLINICAL INFORMATION: Vertigo. COMPARISON: 7/19/2020. TECHNIQUE:   5 mm axial imaging through the head without IV contrast.     All CT scans at this facility use dose modulation, iterative reconstruction, and/or weight based dosing when appropriate to reduce the radiation dose to as low as reasonably achievable. FINDINGS:   POSTOPERATIVE CHANGES: None. BRAIN VOLUME:   1. Normal for the patient's age. VENTRICLES/CISTERNS:   1. Normal for the patient's age. INFARCT/WHITE MATTER DISEASE: Unremarkable. INTRACRANIAL HEMORRHAGE: None. MASS/MASS EFFECT/MIDLINE SHIFT: None. INTRA-AXIAL/EXTRA-AXIAL FLUID COLLECTIONS: None. INTRAORBITAL CONTENTS: Unremarkable. OTHER: None. SKULL/SCALP: Unremarkable. PARANASAL SINUSES: Unremarkable. Impression 1. Unremarkable noncontrast CT head. **This report has been created using voice recognition software. It may contain minor errors which are inherent in voice recognition technology. **    Final report electronically signed by Dr. Carrie Orellana on 8/15/2020 2:36 PM         Assessment:  Active Problems:    Essential hypertension    Hypothyroidism    Cardiomyopathy ([address]    ICD (implantable cardioverter-defibrillator) in place    Obesity (BMI 30-39. 9)    Malignant neoplasm of left breast in female, estrogen receptor positive ([address]    Depression with anxiety    Vertigo  Resolved Problems:    * No resolved hospital problems. *  This is a 64-year-old female with history of hypertension hypertriglyceridemia cardiomyopathy status post ICD placement presenting with acute onset vertigo for 2 days. Due to the ICD placement, the patient will need to be arranged for her MRI to be performed. I reviewed her CTA head and neck, CT head, agree with interpretation. Case discussed with the primary service, recommendations are as    Plan:    1. Antiplatelets  2. CTA head and neck reviewed,. 3. MRI brain without contrast with diffusion sequence pending compatibility of AICD with MRI  4. Lipid profile  5. Target LDL below 70  6. Modify risk factors for stroke (blood pressure, cholesterol, diabetes, smoking cessation etc.. Control)   7. DVT prophylaxis  8. Physical therapy  9. Occupational Therapy  10. Case was discussed with primary service. 11. All questions were answered.        Radhames Junior MD, 8/17/2020 5:08 PM

## 2020-08-18 ENCOUNTER — APPOINTMENT (OUTPATIENT)
Dept: MRI IMAGING | Age: 60
End: 2020-08-18
Payer: COMMERCIAL

## 2020-08-18 VITALS
TEMPERATURE: 98 F | OXYGEN SATURATION: 94 % | SYSTOLIC BLOOD PRESSURE: 127 MMHG | HEIGHT: 62 IN | HEART RATE: 76 BPM | BODY MASS INDEX: 37.41 KG/M2 | WEIGHT: 203.3 LBS | DIASTOLIC BLOOD PRESSURE: 77 MMHG | RESPIRATION RATE: 18 BRPM

## 2020-08-18 LAB
AMORPHOUS: NORMAL
BACTERIA: NORMAL
BILIRUBIN URINE: NEGATIVE
BLOOD, URINE: NEGATIVE
CASTS: NORMAL /LPF
CASTS: NORMAL /LPF
CHARACTER, URINE: CLEAR
COLOR: YELLOW
CRYSTALS: NORMAL
EPITHELIAL CELLS, UA: NORMAL /HPF
GLUCOSE, URINE: NEGATIVE MG/DL
KETONES, URINE: NEGATIVE
LEUKOCYTE ESTERASE, URINE: NEGATIVE
MISCELLANEOUS LAB TEST RESULT: NORMAL
NITRITE, URINE: NEGATIVE
PH UA: 8 (ref 5–9)
PROTEIN UA: NEGATIVE MG/DL
RBC URINE: NORMAL /HPF
RENAL EPITHELIAL, UA: NORMAL
SPECIFIC GRAVITY UA: 1.01 (ref 1–1.03)
UROBILINOGEN, URINE: 0.2 EU/DL (ref 0–1)
WBC UA: NORMAL /HPF
YEAST: NORMAL

## 2020-08-18 PROCEDURE — 70551 MRI BRAIN STEM W/O DYE: CPT

## 2020-08-18 PROCEDURE — 81001 URINALYSIS AUTO W/SCOPE: CPT

## 2020-08-18 PROCEDURE — 2580000003 HC RX 258: Performed by: INTERNAL MEDICINE

## 2020-08-18 PROCEDURE — 87086 URINE CULTURE/COLONY COUNT: CPT

## 2020-08-18 PROCEDURE — G0378 HOSPITAL OBSERVATION PER HR: HCPCS

## 2020-08-18 PROCEDURE — 6370000000 HC RX 637 (ALT 250 FOR IP): Performed by: INTERNAL MEDICINE

## 2020-08-18 PROCEDURE — 6370000000 HC RX 637 (ALT 250 FOR IP): Performed by: PHYSICIAN ASSISTANT

## 2020-08-18 PROCEDURE — 99217 PR OBSERVATION CARE DISCHARGE MANAGEMENT: CPT | Performed by: PHYSICIAN ASSISTANT

## 2020-08-18 PROCEDURE — 94760 N-INVAS EAR/PLS OXIMETRY 1: CPT

## 2020-08-18 RX ORDER — BUTALBITAL, ACETAMINOPHEN AND CAFFEINE 50; 325; 40 MG/1; MG/1; MG/1
1 TABLET ORAL EVERY 4 HOURS PRN
Qty: 180 TABLET | Refills: 1 | Status: ON HOLD | OUTPATIENT
Start: 2020-08-18 | End: 2020-10-15

## 2020-08-18 RX ADMIN — SODIUM CHLORIDE, POTASSIUM CHLORIDE, SODIUM LACTATE AND CALCIUM CHLORIDE: 600; 310; 30; 20 INJECTION, SOLUTION INTRAVENOUS at 07:51

## 2020-08-18 RX ADMIN — ASPIRIN 81 MG: 81 TABLET, CHEWABLE ORAL at 07:47

## 2020-08-18 RX ADMIN — SACUBITRIL AND VALSARTAN 1 TABLET: 49; 51 TABLET, FILM COATED ORAL at 07:47

## 2020-08-18 RX ADMIN — VENLAFAXINE HYDROCHLORIDE 75 MG: 75 CAPSULE, EXTENDED RELEASE ORAL at 07:47

## 2020-08-18 RX ADMIN — CARVEDILOL 3.12 MG: 3.12 TABLET, FILM COATED ORAL at 07:47

## 2020-08-18 RX ADMIN — SODIUM CHLORIDE, POTASSIUM CHLORIDE, SODIUM LACTATE AND CALCIUM CHLORIDE: 600; 310; 30; 20 INJECTION, SOLUTION INTRAVENOUS at 18:47

## 2020-08-18 RX ADMIN — CARVEDILOL 3.12 MG: 3.12 TABLET, FILM COATED ORAL at 16:17

## 2020-08-18 RX ADMIN — LETROZOLE 2.5 MG: 2.5 TABLET ORAL at 07:47

## 2020-08-18 RX ADMIN — LEVOTHYROXINE SODIUM 100 MCG: 100 TABLET ORAL at 05:50

## 2020-08-18 ASSESSMENT — PAIN SCALES - GENERAL: PAINLEVEL_OUTOF10: 0

## 2020-08-18 NOTE — CARE COORDINATION
8/18/20, 2:55 PM EDT  DISCHARGE ON [address] day: 0  Location: -35/035-A Reason for admit: Vertigo [R42]   Treatment plan of care: Pt admitted with dizziness r/t position changes with associated N/V. CTA head and neck showed stenosis at right bertebral artery. CT head showed nothing acute. Pt has hx of AICD and EF 25%. Orthostatic BP ordered. MRI is scheduled for 1500 today. Barriers to Discharge: medical stability  PCP: ANGELIA Ramirez - CNP   %  Patient Goals/Plan/Treatment Preferences: Plan is for home with family. Pt previously denied discharge needs. Patient goals/plan/ treatment preferences discussed by  and . Patient goals/plan/ treatment preferences reviewed with patient/ family. Patient/ family verbalize understanding of discharge plan and are in agreement with goal/plan/treatment preferences. Understanding was demonstrated using the teach back method. AVS provided by RN at time of discharge, which includes all necessary medical information pertaining to the patients current course of illness, treatment, post-discharge goals of care, and treatment preferences.

## 2020-08-18 NOTE — PROGRESS NOTES
Hospitalist Progress Note    Patient:  Kayla Chandler    Unit/Bed:8B-35/035-A  YOB: 1960  MRN: [id number]   Acct: [de-identified]   PCP: ANGELIA Dickey CNP  Code Status: Full Code  Date of Admission: 8/15/2020    Expected Discharge: 1-2 days  Disposition: home    Assessment/Plan:    1. Headache / dizziness: HA described as bandlike, associated with lightheadedness/dizziness, nausea, sensitivity to light/sound. Lightheadedness is worse with position changes, but this has improved. - Likely multifactorial d/t HA, dehydration, polypharmacy. - Pt was borederline hypotensive; sx began improving after IVF bolus plus Toradolx1 for HA; her HA, nausea, dizziness resolved. - Coreg and Entresto were resumed with parameters, pt tolerating. Continue to hold digoxin and likely stop at dc. - CT Head, CTA Head/Neck unimpressive. Recent MRI Brain normal.   - Neurology consulted on admission, recommend MRI Brain WO contrast with diffusion sequence, pending. Will discharge today after MRI Resulted and ok with Neurology. 2. Hx ICM, EF 20-25%: s/p ICD. Holding medical therapy at this time as stated above. Will phase back in as symptoms and BP allow. - Will resume Coreg and Entresto with parameters. 3. H/o Breast CA s/p chemo, radiation, bilat mastectomy: Cont letrozole    4. Anxiety: continue Effexor. 5. Hypothyroidism: cont synthroid. TSH wnl. Chief Complaint: dizziness    HPI / Hospital Course: Patient is a 60-year-old female with a past medical history of hypothyroidism, hypertension, breast cancer status post bilateral mastectomy, chemo, radiation now on hormonal therapy, cardiomyopathy with EF of 20 to 25% status post ICD placement. Patient had a hospital stay where she had left-sided facial numbness in July and extensive neuro work-up was done at that time and nothing was discovered.   She does have a stenotic right vertebral artery, however, it is nondominant and has good flow in her left. She underwent a brain MRI at that time which was normal.  She was in her usual state of health when this yesterday morning she developed severe nausea and vomiting and dizziness. She states the dizziness is worse with position changes. She has not ate or drank well over the last 24 hours. She has not taken any medications as of today. Since her symptoms were not getting any better she came to the ED. Per my history and exam patient seems to be dizzy very specifically with position changes. I attempted a [place]-Hallpike which did not worsen anything, however, upon sitting she became very dizzy. When I had her stand up she became even more dizzy. Her blood pressure is in the low 1 teens to 393 systolic. Without any of her blood pressure medications. She also received IV fluids while she was in the ED. Orthostatics have not been checked yet at this time. She denies any fevers or chills. She has not had any recent nausea or vomiting today. She denies any shortness of breath or lower extremity edema. She denies any orthopnea. She denies any chest pain     8/16: Patient reports continued headache, rated 5 out of 10. She describes it as bandlike. Associated with lightheadedness, nausea sensitivity to light and sound. Denies feeling like the room is spinning, but states that objects appeared to be moving. Denies seeing spots or other vision changes. She states dizziness has improved since yesterday. Patient denies chest pain, shortness of breath, abdominal pain, V/D, urinary symptoms. 8/17:Pt states headache, dizziness, felling like objects are moving, and nausea have resolved. She now states she feels like she is having palpitations. Subjective (past 24 hours): Patient is complaining of increased urinary frequency. She appears very anxious and is wanting to know why she was having the symptoms and is hoping that there is something wrong on her MRI so that she has an explanation. Discussed in detail that her symptoms were likely due to her severe headache and polypharmacy as they have all improved since this was adjusted. Discussed discharge home after MRI today. ROS: Pertinent positives as noted in HPI. All other systems reviewed and negative. Past medical history, family history, social history and allergies reviewed again and is unchanged since admission. Medications:  Reviewed. Infusion Medications    lactated ringers 100 mL/hr at 08/18/20 1847     Scheduled Medications    carvedilol  3.125 mg Oral BID WC    sacubitril-valsartan  1 tablet Oral BID    aspirin  81 mg Oral Daily    atorvastatin  40 mg Oral Nightly    levothyroxine  100 mcg Oral Daily    venlafaxine  75 mg Oral Daily    letrozole  2.5 mg Oral Daily    sodium chloride flush  10 mL Intravenous 2 times per day    enoxaparin  40 mg Subcutaneous Q24H     PRN Meds: butalbital-acetaminophen-caffeine, sodium chloride flush, acetaminophen **OR** acetaminophen, polyethylene glycol, promethazine **OR** ondansetron    I/O:     Intake/Output Summary (Last 24 hours) at 8/18/2020 1903  Last data filed at 8/18/2020 1402  Gross per 24 hour   Intake 6368.45 ml   Output 725 ml   Net 5643.45 ml       Diet:  DIET GENERAL;    Exam:  /77   Pulse 76   Temp 98 °F (36.7 °C) (Oral)   Resp 18   Ht 5' 2\" (1.575 m)   Wt 203 lb 4.8 oz (92.2 kg)   SpO2 94%   BMI 37.18 kg/m²   General:   Pleasant female. NAD  HEENT:  normocephalic and atraumatic. No scleral icterus. PERR. Neck: supple. No JVD. No thyromegaly. Lungs: clear to auscultation. No retractions  Cardiac: RRR without murmur. Abdomen: soft. Nontender. Bowel sounds positive. Extremities:  No clubbing, cyanosis, or edema x 4. Vasculature: capillary refill < 3 seconds. Palpable LE pulses bilaterally. Skin:  warm and dry. Psych:  Alert and oriented x3. Affect appropriate  Lymph:  No supraclavicular adenopathy. Neurologic:  No focal deficit.   No seizures. Data: (All radiographs, tracings, PFTs, and imaging are personally viewed and interpreted unless otherwise noted)  Labs:   Recent Labs     08/16/20  0733 08/17/20  0515   WBC 3.9* 4.6*   HGB 11.7* 12.0   HCT 36.9* 36.2*    174     Recent Labs     08/16/20  0733 08/17/20  0515    142   K 4.1 4.1    107   CO2 24 24   BUN 14 16   CREATININE 0.6 0.4   CALCIUM 8.6 8.5     No results for input(s): AST, ALT, BILIDIR, BILITOT, ALKPHOS in the last 72 hours. No results for input(s): INR in the last 72 hours. No results for input(s): Norvel Anton in the last 72 hours. Urinalysis:   Lab Results   Component Value Date    NITRU NEGATIVE 08/18/2020    WBCUA 0-2 08/18/2020    BACTERIA NONE SEEN 08/18/2020    RBCUA 0-2 08/18/2020    BLOODU NEGATIVE 08/18/2020    SPECGRAV 1.015 08/18/2020    GLUCOSEU NEGATIVE 07/09/2020     Urine culture: No results found for: Shade Barcenas  Micro:   Blood culture #1: No results found for: BC  Blood culture #2:No results found for: BLOODCULT2  Organism:  Lab Results   Component Value Date    ORG Mixed Growth 11/29/2018         Lab Results   Component Value Date    LABGRAM  10/09/2019     Rare segmented neutrophils observed. Few epithelial cells observed. Many gram negative bacilli. Moderate gram positive cocci in pairs and chains. Few small gram positive bacilli. Prepubescent and postmenopausal female samples (<15and >47ears of age) are not typically suitable for bacterialvaginosis screening. MRSA culture only:No results found for: [address]   Respiratory culture: No results found for: CULTRESP  Aerobic and Anaerobic :  No results found for: LABAERO  No results found for: [facility]    Radiology Reports:  RADIOLOGY REPORT   Final Result      CT HEAD WO CONTRAST   Final Result   1. Unremarkable noncontrast CT head. **This report has been created using voice recognition software.   It may contain minor errors which are inherent in voice recognition FINDINGS: CTA HEAD: There is mild mural calcification in the cavernous segment of the right internal carotid artery. Intracranial segments of internal carotid arteries are patent without flow-limiting stenosis or visualized aneurysm. The bilateral middle cerebral and anterior cerebral arteries are patent without focal abnormality identified. The basilar artery is patent without focal stenosis or visualized aneurysm. Bilateral posterior cerebral arteries are patent without focal abnormality identified. Superior cerebellar arteries appear patent. Dural venous sinuses appear patent without focal filling defect. No focal areas of abnormal parenchymal enhancement are identified. CTA NECK: There is a typical 3 vessel arch. The brachiocephalic and left subclavian arteries are patent without focal stenosis. The common carotid arteries are patent without focal stenosis. Carotid bifurcations are widely patent without hemodynamically significant stenosis by NASCET criteria. Cervical segments of internal carotid arteries are patent without focal stenosis. The left vertebral artery is dominant. Redemonstration of a focal area of severe stenosis in the V4 segment of the nondominant right vertebral artery. Vertebral arteries are otherwise patent throughout their course without focal stenosis. The focal cords are closely apposed limiting evaluation of this area. There is medialization of the superior horn of the thyroid cartilage on the right which causes mild mucosal asymmetry and effacement of the right piriform sinus. Given these caveats, mucosal surfaces of the aerodigestive tract are otherwise symmetric without focal nodular thickening or visualized mass. There are stable small enhancing nodules in the bilateral parotid glands likely representing intraparotid lymph nodes. Parotid glands  are otherwise unremarkable. No definite cervical lymphadenopathy is identified. The  submandibular and thyroid glands are unremarkable. Visualized portions of the lungs are clear. There are mild degenerative changes of the cervical spine. 1. No flow-limiting stenosis or aneurysm in the intracranial circulation. 2. Stable focal area of severe stenosis in the V4 segment of the nondominant vertebral artery on the right with no other focal abnormality identified in the anterior or posterior circulation of the neck. **This report has been created using voice recognition software. It may contain minor errors which are inherent in voice recognition technology. ** Final report electronically signed by Dr. Cristin Carranza MD on 8/15/2020 3:02 PM    Ct Head Wo Contrast    Result Date: 8/15/2020  PROCEDURE: CT HEAD WO CONTRAST CLINICAL INFORMATION: Vertigo. COMPARISON: 7/19/2020. TECHNIQUE: 5 mm axial imaging through the head without IV contrast. All CT scans at this facility use dose modulation, iterative reconstruction, and/or weight based dosing when appropriate to reduce the radiation dose to as low as reasonably achievable. FINDINGS: POSTOPERATIVE CHANGES: None. BRAIN VOLUME: 1. Normal for the patient's age. VENTRICLES/CISTERNS: 1. Normal for the patient's age. INFARCT/WHITE MATTER DISEASE: Unremarkable. INTRACRANIAL HEMORRHAGE: None. MASS/MASS EFFECT/MIDLINE SHIFT: None. INTRA-AXIAL/EXTRA-AXIAL FLUID COLLECTIONS: None. INTRAORBITAL CONTENTS: Unremarkable. OTHER: None. SKULL/SCALP: Unremarkable. PARANASAL SINUSES: Unremarkable. 1. Unremarkable noncontrast CT head. **This report has been created using voice recognition software. It may contain minor errors which are inherent in voice recognition technology. ** Final report electronically signed by Dr. Vince Horta on 8/15/2020 2:36 PM    Cta Neck W Wo Contrast    Result Date: 8/15/2020  PROCEDURE: CTA HEAD W WO CONTRAST, CTA NECK W WO CONTRAST CLINICAL INFORMATION: vertigo . COMPARISON: CT head from the same date and CTA dated 7/9/2020.  TECHNIQUE: Helical CT from the aortic arch through the head in arterial phase during fast pulse administration of 80 mL Isovue-370 injected in the right [facility] with multiplanar reconstructions to include volumetric maximum intensity projection sequences. All CT scans at this facility use dose modulation, iterative reconstruction, and/or weight-based dosing when appropriate to reduce radiation dose to as low as reasonably achievable. FINDINGS: CTA HEAD: There is mild mural calcification in the cavernous segment of the right internal carotid artery. Intracranial segments of internal carotid arteries are patent without flow-limiting stenosis or visualized aneurysm. The bilateral middle cerebral and anterior cerebral arteries are patent without focal abnormality identified. The basilar artery is patent without focal stenosis or visualized aneurysm. Bilateral posterior cerebral arteries are patent without focal abnormality identified. Superior cerebellar arteries appear patent. Dural venous sinuses appear patent without focal filling defect. No focal areas of abnormal parenchymal enhancement are identified. CTA NECK: There is a typical 3 vessel arch. The brachiocephalic and left subclavian arteries are patent without focal stenosis. The common carotid arteries are patent without focal stenosis. Carotid bifurcations are widely patent without hemodynamically significant stenosis by NASCET criteria. Cervical segments of internal carotid arteries are patent without focal stenosis. The left vertebral artery is dominant. Redemonstration of a focal area of severe stenosis in the V4 segment of the nondominant right vertebral artery. Vertebral arteries are otherwise patent throughout their course without focal stenosis. The focal cords are closely apposed limiting evaluation of this area. There is medialization of the superior horn of the thyroid cartilage on the right which causes mild mucosal asymmetry and effacement of the right piriform sinus.  Given these caveats, mucosal surfaces of the aerodigestive tract are otherwise symmetric without focal nodular thickening or visualized mass. There are stable small enhancing nodules in the bilateral parotid glands likely representing intraparotid lymph nodes. Parotid glands  are otherwise unremarkable. No definite cervical lymphadenopathy is identified. The  submandibular and thyroid glands are unremarkable. Visualized portions of the lungs are clear. There are mild degenerative changes of the cervical spine. 1. No flow-limiting stenosis or aneurysm in the intracranial circulation. 2. Stable focal area of severe stenosis in the V4 segment of the nondominant vertebral artery on the right with no other focal abnormality identified in the anterior or posterior circulation of the neck. **This report has been created using voice recognition software. It may contain minor errors which are inherent in voice recognition technology. ** Final report electronically signed by Dr. Alyssa Ferraro MD on 8/15/2020 3:02 PM      EKG: NSR    Tele:   [x] yes             [] no      Active Hospital Problems    Diagnosis Date Noted    Vertigo [R42] 08/15/2020    Depression with anxiety [F41.8] 07/16/2020    Malignant neoplasm of left breast in female, estrogen receptor positive ([address] [C50.912, Z17.0] 02/04/2020    Obesity (BMI 30-39. 9) [E66.9] 02/19/2019    ICD (implantable cardioverter-defibrillator) in place [Z95.810] 02/18/2019    Cardiomyopathy ([address] [I42.9] 01/29/2019    Hypothyroidism [E03.9] 11/12/2018    Essential hypertension [I10] 11/12/2018       Electronically signed by Jason Hoover PA-C on 8/18/2020 at 7:03 PM

## 2020-08-18 NOTE — PLAN OF CARE
Problem: Pain:  Goal: Pain level will decrease  Description: Pain level will decrease  Outcome: Ongoing  Note: Patient denies pain this shift     Problem: Falls - Risk of:  Goal: Will remain free from falls  Description: Will remain free from falls  Outcome: Ongoing  Note: Patient free of falls this shift. Patient on falling star program. Fall band intact. RN visually checks on patient with hourly rounds. Patient tolerates ambulation each shift. Problem: Falls - Risk of:  Goal: Absence of physical injury  Description: Absence of physical injury  Outcome: Ongoing  Note: Patient free from injury this shift     Problem: Discharge Planning:  Goal: Discharged to appropriate level of care  Description: Discharged to appropriate level of care  Outcome: Ongoing  Note: Discharge needs assessed daily   Care plan reviewed with patient. Patient verbalizes understanding of the plan of care and contribute to goal setting.

## 2020-08-19 ENCOUNTER — TELEPHONE (OUTPATIENT)
Dept: FAMILY MEDICINE CLINIC | Age: 60
End: 2020-08-19

## 2020-08-19 LAB
ORGANISM: ABNORMAL
URINE CULTURE, ROUTINE: ABNORMAL

## 2020-08-19 NOTE — DISCHARGE SUMMARY
Hospitalist Discharge Summary    Patient: Kayla Chandler  YOB: 1960  MRN: [id number]   Acct: [de-identified]    Primary Care Physician: ANGELIA Dickey CNP    Admit date  8/15/2020    Discharge date:  8/18/2020  Disposition: home      Discharge Assessment and Plan:    1. Headache / dizziness: HA described as bandlike, associated with lightheadedness/dizziness, nausea, sensitivity to light/sound. Lightheadedness is worse with position changes, but this has improved. - Likely multifactorial d/t HA, dehydration, polypharmacy. Pt was borederline hypotensive; sx began improving after IVF bolus plus Toradolx1 for HA; her HA, nausea, dizziness resolved. - Coreg and Entresto were resumed with parameters, pt tolerating. Digoxin discontinued. Symptoms have improved. Stop digoxin at discharge and follow up with PCP/Cardio. - CT Head, CTA Head/Neck unimpressive. Recent MRI Brain normal.    - Neurology consulted on admission, recommend MRI Brain WO contrast with diffusion sequence, unremarkable. Follow up with Neurology as OP. 2. Hx ICM, EF 20-25%: s/p ICD. Coreg and Entresto were resumed, pt tolerating well, BP stable. Follow up with cardiology. 3. H/o Breast CA s/p chemo, radiation, bilat mastectomy: Cont letrozole    4. Anxiety: continue Effexor. 5. Hypothyroidism: cont synthroid. TSH wnl.        Chief Complaint on presentation: dizziness    Initial H&P / Hospital Course: Patient is a 26-year-old female with a past medical history of hypothyroidism, hypertension, breast cancer status post bilateral mastectomy, chemo, radiation now on hormonal therapy, cardiomyopathy with EF of 20 to 25% status post ICD placement.  Patient had a hospital stay where she had left-sided facial numbness in July and extensive neuro work-up was done at that time and nothing was discovered. Tone Loyd does have a stenotic right vertebral artery, however, it is nondominant and has good flow in her left. Tone Loyd underwent a brain MRI at that time which was normal.  She was in her usual state of health when this yesterday morning she developed severe nausea and vomiting and dizziness.  She states the dizziness is worse with position changes.  She has not ate or drank well over the last 24 hours.  She has not taken any medications as of today.  Since her symptoms were not getting any better she came to the ED.  Per my history and exam patient seems to be dizzy very specifically with position changes.  I attempted a [place] which did not worsen anything, however, upon sitting she became very dizzy.  When I had her stand up she became even more dizzy.  Her blood pressure is in the low 1 teens to 146 systolic.  Without any of her blood pressure medications.  She also received IV fluids while she was in the ED.  Orthostatics have not been checked yet at this time. Nicanor Baker denies any fevers or chills.  She has not had any recent nausea or vomiting today.  She denies any shortness of breath or lower extremity edema.  She denies any orthopnea.  She denies any chest pain      8/16: Patient reports continued headache, rated 5 out of 10. She describes it as bandlike. Associated with lightheadedness, nausea sensitivity to light and sound. Denies feeling like the room is spinning, but states that objects appeared to be moving. Denies seeing spots or other vision changes. She states dizziness has improved since yesterday. Patient denies chest pain, shortness of breath, abdominal pain, V/D, urinary symptoms. 8/17:Pt states headache, dizziness, felling like objects are moving, and nausea have resolved. She now states she feels like she is having palpitations. Subjective (day of discharge): MRI resulted, unremarkable. Pt discharged in stable condition and discharge summary is in conjunction with progress note from same day. Physical Exam:-  Vitals: No data found.   Weight: Weight: 203 lb 4.8 oz (92.2 kg)   24 hour intake/output: No intake or output data in the 24 hours ending 08/19/20 1642    General appearance: No apparent distress, appears stated age and cooperative. HEENT: Normal cephalic, atraumatic without obvious deformity. Pupils equal, round, and reactive to light. Extra ocular muscles intact. Conjunctivae/corneas clear. Neck: Supple, with full range of motion. No jugular venous distention. Trachea midline. Respiratory:  Normal respiratory effort. Clear to auscultation, bilaterally without Rales/Wheezes/Rhonchi. Cardiovascular: Regular rate and rhythm with normal S1/S2 without murmurs, rubs or gallops. Abdomen: Soft, non-tender, non-distended with normal bowel sounds. Musculoskeletal:  No clubbing, cyanosis or edema bilaterally. Skin: Skin color, texture, turgor normal.  No rashes or lesions. Neurologic:  Neurovascularly intact without any focal sensory/motor deficits.  Cranial nerves: II-XII intact, grossly non-focal.  Psychiatric: Alert and oriented, thought content appropriate, normal insight  Capillary Refill: Brisk,< 3 seconds   Peripheral Pulses: +2 palpable, equal bilaterally     Labs :  Recent Results (from the past 72 hour(s))   CBC auto differential    Collection Time: 08/17/20  5:15 AM   Result Value Ref Range    WBC 4.6 (L) 4.8 - 10.8 thou/mm3    RBC 3.79 (L) 4.20 - 5.40 mill/mm3    Hemoglobin 12.0 12.0 - 16.0 gm/dl    Hematocrit 36.2 (L) 37.0 - 47.0 %    MCV 95.5 81.0 - 99.0 fL    MCH 31.7 26.0 - 33.0 pg    MCHC 33.1 32.2 - 35.5 gm/dl    RDW-CV 13.4 11.5 - 14.5 %    RDW-SD 47.4 (H) 35.0 - 45.0 fL    Platelets 320 828 - 798 thou/mm3    MPV 11.5 9.4 - 12.4 fL    Seg Neutrophils 57.2 %    Lymphocytes 29.9 %    Monocytes 7.4 %    Eosinophils 4.3 %    Basophils 0.6 %    Immature Granulocytes 0.6 %    Segs Absolute 2.6 1.8 - 7.7 thou/mm3    Lymphocytes Absolute 1.4 1.0 - 4.8 thou/mm3    Monocytes Absolute 0.3 (L) 0.4 - 1.3 thou/mm3    Eosinophils Absolute 0.2 0.0 - 0.4 thou/mm3    Basophils Absolute 0.0 0.0 - 0.1 thou/mm3    Immature Grans (Abs) 0.03 0.00 - 0.07 thou/mm3    nRBC 0 /100 wbc   Basic Metabolic Panel    Collection Time: 08/17/20  5:15 AM   Result Value Ref Range    Sodium 142 135 - 145 meq/L    Potassium 4.1 3.5 - 5.2 meq/L    Chloride 107 98 - 111 meq/L    CO2 24 23 - 33 meq/L    Glucose 93 70 - 108 mg/dL    BUN 16 7 - 22 mg/dL    CREATININE 0.4 0.4 - 1.2 mg/dL    Calcium 8.5 8.5 - 10.5 mg/dL   Magnesium    Collection Time: 08/17/20  5:15 AM   Result Value Ref Range    Magnesium 2.0 1.6 - 2.4 mg/dL   Anion Gap    Collection Time: 08/17/20  5:15 AM   Result Value Ref Range    Anion Gap 11.0 8.0 - 16.0 meq/L   Glomerular Filtration Rate, Estimated    Collection Time: 08/17/20  5:15 AM   Result Value Ref Range    Est, Glom Filt Rate >90 ml/min/1.73m2   EKG 12 Lead    Collection Time: 08/17/20  4:02 PM   Result Value Ref Range    Ventricular Rate 65 BPM    Atrial Rate 65 BPM    P-R Interval 180 ms    QRS Duration 96 ms    Q-T Interval 436 ms    QTc Calculation (Bazett) 453 ms    P Axis 32 degrees    R Axis -11 degrees    T Axis 2 degrees   Urinalysis with microscopic    Collection Time: 08/18/20 10:05 AM   Result Value Ref Range    Glucose, Urine NEGATIVE NEGATIVE mg/dl    Bilirubin Urine NEGATIVE NEGATIVE    Ketones, Urine NEGATIVE NEGATIVE    Specific Gravity, UA 1.015 1.002 - 1.03    Blood, Urine NEGATIVE NEGATIVE    pH, UA 8.0 5.0 - 9.0    Protein, UA NEGATIVE NEGATIVE mg/dl    Urobilinogen, Urine 0.2 0.0 - 1.0 eu/dl    Nitrite, Urine NEGATIVE NEGATIVE    Leukocyte Esterase, Urine NEGATIVE NEGATIVE    Color, UA YELLOW YELLOW-STR    Character, Urine CLEAR CLR-SL.ODALIS    RBC, UA 0-2 0-2/hpf /hpf    WBC, UA 0-2 0-4/hpf /hpf    Epithelial Cells, UA 0-2 3-5/hpf /hpf    Amorphous, UA PHOSPHATES NONE SEEN    Bacteria, UA NONE SEEN FEW/NONE S    Casts NONE SEEN NONE SEEN /lpf    Crystals NONE SEEN NONE SEEN    Renal Epithelial, UA NONE SEEN NONE SEEN    Yeast, UA NONE SEEN NONE SEEN    Casts NONE SEEN /lpf Miscellaneous Lab Test Result NONE SEEN    Culture, Urine    Collection Time: 08/18/20 10:05 AM    Specimen: Urine, clean catch   Result Value Ref Range    Urine Culture, Routine (A)      Growth of Contaminants. The mixture of organisms present are not a common cause of urinary tract infections and probably represent skin austin or distal urethral austin. Organism Growth of Contaminants   (A)         Microbiology:    Blood culture #1: No results found for: BC  Blood culture #2:No results found for: BLOODCULT2  Organism:    Lab Results   Component Value Date    LABGRAM  10/09/2019     Rare segmented neutrophils observed. Few epithelial cells observed. Many gram negative bacilli. Moderate gram positive cocci in pairs and chains. Few small gram positive bacilli. Prepubescent and postmenopausal female samples (<15and >47ears of age) are not typically suitable for bacterialvaginosis screening. MRSA culture only:No results found for: [facility]  Urine culture:   Lab Results   Component Value Date    LABURIN  08/18/2020     Growth of Contaminants. The mixture of organisms present are not a common cause of urinary tract infections and probably represent skin austin or distal urethral austin. Lab Results   Component Value Date    ORG Growth of Contaminants   08/18/2020      Respiratory culture: No results found for: CULTRESP  Aerobic and Anaerobic :  No results found for: LABAERO  No results found for: LABANAE    Urinalysis:     Lab Results   Component Value Date    NITRU NEGATIVE 08/18/2020    WBCUA 0-2 08/18/2020    BACTERIA NONE SEEN 08/18/2020    RBCUA 0-2 08/18/2020    BLOODU NEGATIVE 08/18/2020    SPECGRAV 1.015 08/18/2020    GLUCOSEU NEGATIVE 07/09/2020       Radiology:  Cta Head W Wo Contrast    Result Date: 8/15/2020  PROCEDURE: CTA HEAD W WO CONTRAST, CTA NECK W WO CONTRAST CLINICAL INFORMATION: vertigo . COMPARISON: CT head from the same date and CTA dated 7/9/2020.  TECHNIQUE: Helical CT from the aortic arch through the head in arterial phase during fast pulse administration of 80 mL Isovue-370 injected in the right [facility] with multiplanar reconstructions to include volumetric maximum intensity projection sequences. All CT scans at this facility use dose modulation, iterative reconstruction, and/or weight-based dosing when appropriate to reduce radiation dose to as low as reasonably achievable. FINDINGS: CTA HEAD: There is mild mural calcification in the cavernous segment of the right internal carotid artery. Intracranial segments of internal carotid arteries are patent without flow-limiting stenosis or visualized aneurysm. The bilateral middle cerebral and anterior cerebral arteries are patent without focal abnormality identified. The basilar artery is patent without focal stenosis or visualized aneurysm. Bilateral posterior cerebral arteries are patent without focal abnormality identified. Superior cerebellar arteries appear patent. Dural venous sinuses appear patent without focal filling defect. No focal areas of abnormal parenchymal enhancement are identified. CTA NECK: There is a typical 3 vessel arch. The brachiocephalic and left subclavian arteries are patent without focal stenosis. The common carotid arteries are patent without focal stenosis. Carotid bifurcations are widely patent without hemodynamically significant stenosis by NASCET criteria. Cervical segments of internal carotid arteries are patent without focal stenosis. The left vertebral artery is dominant. Redemonstration of a focal area of severe stenosis in the V4 segment of the nondominant right vertebral artery. Vertebral arteries are otherwise patent throughout their course without focal stenosis. The focal cords are closely apposed limiting evaluation of this area. There is medialization of the superior horn of the thyroid cartilage on the right which causes mild mucosal asymmetry and effacement of the right piriform sinus.  Given these caveats, mucosal surfaces of the aerodigestive tract are otherwise symmetric without focal nodular thickening or visualized mass. There are stable small enhancing nodules in the bilateral parotid glands likely representing intraparotid lymph nodes. Parotid glands  are otherwise unremarkable. No definite cervical lymphadenopathy is identified. The  submandibular and thyroid glands are unremarkable. Visualized portions of the lungs are clear. There are mild degenerative changes of the cervical spine. 1. No flow-limiting stenosis or aneurysm in the intracranial circulation. 2. Stable focal area of severe stenosis in the V4 segment of the nondominant vertebral artery on the right with no other focal abnormality identified in the anterior or posterior circulation of the neck. **This report has been created using voice recognition software. It may contain minor errors which are inherent in voice recognition technology. ** Final report electronically signed by Dr. Lizet Palma MD on 8/15/2020 3:02 PM    Ct Head Wo Contrast    Result Date: 8/15/2020  PROCEDURE: CT HEAD WO CONTRAST CLINICAL INFORMATION: Vertigo. COMPARISON: 7/19/2020. TECHNIQUE: 5 mm axial imaging through the head without IV contrast. All CT scans at this facility use dose modulation, iterative reconstruction, and/or weight based dosing when appropriate to reduce the radiation dose to as low as reasonably achievable. FINDINGS: POSTOPERATIVE CHANGES: None. BRAIN VOLUME: 1. Normal for the patient's age. VENTRICLES/CISTERNS: 1. Normal for the patient's age. INFARCT/WHITE MATTER DISEASE: Unremarkable. INTRACRANIAL HEMORRHAGE: None. MASS/MASS EFFECT/MIDLINE SHIFT: None. INTRA-AXIAL/EXTRA-AXIAL FLUID COLLECTIONS: None. INTRAORBITAL CONTENTS: Unremarkable. OTHER: None. SKULL/SCALP: Unremarkable. PARANASAL SINUSES: Unremarkable. 1. Unremarkable noncontrast CT head. **This report has been created using voice recognition software.   It may contain minor errors which are inherent in voice recognition technology. ** Final report electronically signed by Dr. Amrik Bojorquez on 8/15/2020 2:36 PM    Cta Neck W Wo Contrast    Result Date: 8/15/2020  PROCEDURE: CTA HEAD W WO CONTRAST, CTA NECK W WO CONTRAST CLINICAL INFORMATION: vertigo . COMPARISON: CT head from the same date and CTA dated 7/9/2020. TECHNIQUE: Helical CT from the aortic arch through the head in arterial phase during fast pulse administration of 80 mL Isovue-370 injected in the right [facility] with multiplanar reconstructions to include volumetric maximum intensity projection sequences. All CT scans at this facility use dose modulation, iterative reconstruction, and/or weight-based dosing when appropriate to reduce radiation dose to as low as reasonably achievable. FINDINGS: CTA HEAD: There is mild mural calcification in the cavernous segment of the right internal carotid artery. Intracranial segments of internal carotid arteries are patent without flow-limiting stenosis or visualized aneurysm. The bilateral middle cerebral and anterior cerebral arteries are patent without focal abnormality identified. The basilar artery is patent without focal stenosis or visualized aneurysm. Bilateral posterior cerebral arteries are patent without focal abnormality identified. Superior cerebellar arteries appear patent. Dural venous sinuses appear patent without focal filling defect. No focal areas of abnormal parenchymal enhancement are identified. CTA NECK: There is a typical 3 vessel arch. The brachiocephalic and left subclavian arteries are patent without focal stenosis. The common carotid arteries are patent without focal stenosis. Carotid bifurcations are widely patent without hemodynamically significant stenosis by NASCET criteria. Cervical segments of internal carotid arteries are patent without focal stenosis. The left vertebral artery is dominant.  Redemonstration of a focal area of severe stenosis in the V4 segment of the nondominant right vertebral artery. Vertebral arteries are otherwise patent throughout their course without focal stenosis. The focal cords are closely apposed limiting evaluation of this area. There is medialization of the superior horn of the thyroid cartilage on the right which causes mild mucosal asymmetry and effacement of the right piriform sinus. Given these caveats, mucosal surfaces of the aerodigestive tract are otherwise symmetric without focal nodular thickening or visualized mass. There are stable small enhancing nodules in the bilateral parotid glands likely representing intraparotid lymph nodes. Parotid glands  are otherwise unremarkable. No definite cervical lymphadenopathy is identified. The  submandibular and thyroid glands are unremarkable. Visualized portions of the lungs are clear. There are mild degenerative changes of the cervical spine. 1. No flow-limiting stenosis or aneurysm in the intracranial circulation. 2. Stable focal area of severe stenosis in the V4 segment of the nondominant vertebral artery on the right with no other focal abnormality identified in the anterior or posterior circulation of the neck. **This report has been created using voice recognition software. It may contain minor errors which are inherent in voice recognition technology. ** Final report electronically signed by Dr. Jeannine Ocasio MD on 8/15/2020 3:02 PM       Consults:   IP CONSULT TO [facility]    Discharge Medications:      Medication List      START taking these medications    butalbital-acetaminophen-caffeine -40 MG per tablet  Commonly known as:  FIORICET, ESGIC  Take 1 tablet by mouth every 4 hours as needed for Headaches        CHANGE how you take these medications    venlafaxine 75 MG extended release capsule  Commonly known as:  Effexor XR  Take 1 capsule by mouth daily  What changed:  how much to take        CONTINUE taking these medications    aspirin 81 MG chewable tablet  Take 1 tablet by mouth daily     atorvastatin 40 MG tablet  Commonly known as:  LIPITOR  Take 1 tablet by mouth nightly     carvedilol 3.125 MG tablet  Commonly known as:  COREG  Take 1 tablet by mouth twice daily     Entresto 49-51 MG per tablet  Generic drug:  sacubitril-valsartan  Take 1 tablet by mouth twice daily     furosemide 20 MG tablet  Commonly known as:  LASIX  Take 1 tablet by mouth daily as needed (leg swelling, weight gain)     letrozole 2.5 MG tablet  Commonly known as:  Femara  Take 1 tablet by mouth daily     levothyroxine 100 MCG tablet  Commonly known as:  SYNTHROID  Take 1 tablet by mouth daily     omeprazole 20 MG delayed release capsule  Commonly known as:  PRILOSEC  Take 1 capsule by mouth every morning (before breakfast)     spironolactone 25 MG tablet  Commonly known as:  ALDACTONE  Take 1 tablet by mouth once daily     TYLENOL 500 MG tablet  Generic drug:  acetaminophen        STOP taking these medications    digoxin 125 MCG tablet  Commonly known as:  LANOXIN     losartan 25 MG tablet  Commonly known as:  COZAAR           Where to Get Your Medications      These medications [facility] -  [phone]  [address] [place] [address]    Phone:  [phone]   · butalbital-acetaminophen-caffeine -40 MG per tablet          Patient Instructions:    Discharge lab work: none  Activity: activity as tolerated  Diet: No diet orders on file      Follow-up visits:   [facility], EMERGENCY DEPT  [address] [facility]      [address] [place] [address], for further evalation    Africa Win, APRN - CNP  [address] [facility] [address] [place] [address]  [phone]    In 1 week  Please call to schedule follow up appt for 1 week    Dennis Michelle MD  [address] [facility] [address] [facility] [phone]      Please call to schedule follow up appt for 1 week         Disposition: home  Condition at Discharge: Stable    Time Spent: 65 minutes    Signed: Thank you ANGELIA Mariee CNP for the opportunity to be involved in this patient's care.     Electronically signed by Johnnie Gonzalez PA-C on 8/19/2020 at 4:42 PM  Discharging Hospitalist

## 2020-08-19 NOTE — TELEPHONE ENCOUNTER
Junior 45 Transitions Initial Follow Up Call    Outreach made within 2 business days of discharge: Yes    Patient: Ciro Damon Patient : 1960   MRN: [id number]  Reason for Admission: There are no discharge diagnoses documented for the most recent discharge. Discharge Date: 20       Spoke with: Terence Lombardi    Discharge department/facility: [facility]    TCM Interactive Patient Contact:  Was patient able to fill all prescriptions: Yes  Was patient instructed to bring all medications to the follow-up visit: Yes  Is patient taking all medications as directed in the discharge summary?  Yes  Does patient understand their discharge instructions: Yes  Does patient have questions or concerns that need addressed prior to 7-14 day follow up office visit: no    Scheduled appointment with PCP within 7-14 days    Follow Up  Future Appointments   Date Time Provider Braden Rothman   2020  8:45 AM ANGELIA Sevilla - CNP SRPX CHF [facility] [place]   2021 12:00 PM Xochilt Salgado MD [facility] [place]   2021 12:45 PM Mimi Block MD [address] [facility] Heart [facility] [place]   6/15/2021  1:30 PM SCHEDULE, BEVERLY PACER NURSE SRPX PACER P - Omi Hayes 80, CMA ([address] [place] [address])

## 2020-08-20 ENCOUNTER — TELEPHONE (OUTPATIENT)
Dept: FAMILY MEDICINE CLINIC | Age: 60
End: 2020-08-20

## 2020-08-20 NOTE — TELEPHONE ENCOUNTER
.Transition of Care visit scheduled.   8/25/2020  Patient is being discharged to home  Date of discharge 08/18/20  Discharge from facility [facility]  Reason for admission dizziness and diarrhea

## 2020-08-20 NOTE — TELEPHONE ENCOUNTER
Junior 45 Transitions Initial Follow Up Call    Outreach made within 2 business days of discharge: Yes    Patient: Lorna Estevez Patient : 1960   MRN: [id number]  Reason for Admission: There are no discharge diagnoses documented for the most recent discharge. Discharge Date: 20       Spoke with: NO answer, NO VM to leave [facility].     Discharge department/facility: [facility]         Follow Up  Future Appointments   Date Time Provider Braden Rothman   2020  9:00 AM Svetlana Sandoval, APRN - [address] [address] [place]   2020 10:00 AM Kb Stinson APRN - [facility]NP [facility] [place]   2020  8:45 AM Emilio Olszewski, APRN - CNP SRPX CHF [facility] [place]   2021 12:00 PM Presley Barber MD Oncology [facility] [place]   2021 12:45 PM Yumiko Talley MD [address] [facility] [place]   6/15/2021  1:30 PM SCHEDULE, BEVERLY PACER NURSE [place] PACER [place] - Raza Toure LPN

## 2020-08-25 ENCOUNTER — OFFICE VISIT (OUTPATIENT)
Dept: FAMILY MEDICINE CLINIC | Age: 60
End: 2020-08-25
Payer: COMMERCIAL

## 2020-08-25 VITALS
HEART RATE: 80 BPM | BODY MASS INDEX: 34.21 KG/M2 | SYSTOLIC BLOOD PRESSURE: 110 MMHG | TEMPERATURE: 98.9 F | DIASTOLIC BLOOD PRESSURE: 76 MMHG | WEIGHT: 200.4 LBS | RESPIRATION RATE: 10 BRPM | OXYGEN SATURATION: 98 % | HEIGHT: 64 IN

## 2020-08-25 PROCEDURE — 1111F DSCHRG MED/CURRENT MED MERGE: CPT | Performed by: NURSE PRACTITIONER

## 2020-08-25 PROCEDURE — 99215 OFFICE O/P EST HI 40 MIN: CPT | Performed by: NURSE PRACTITIONER

## 2020-08-25 RX ORDER — ZOSTER VACCINE RECOMBINANT, ADJUVANTED 50 MCG/0.5
0.5 KIT INTRAMUSCULAR SEE ADMIN INSTRUCTIONS
Qty: 0.5 ML | Refills: 1 | Status: SHIPPED | OUTPATIENT
Start: 2020-08-25 | End: 2020-10-20 | Stop reason: CLARIF

## 2020-08-25 RX ORDER — POLYETHYLENE GLYCOL 3350 17 G/17G
POWDER, FOR SOLUTION ORAL
COMMUNITY
Start: 2020-08-01 | End: 2021-03-11

## 2020-08-25 NOTE — PROGRESS NOTES
Transition of Care Visit/Hospital Follow Up:      Edwar Galarza is a 62 y.o. female that presents for Follow-Up from Hospital (d/c from [address] on 08.18.2020 ) and Follow-up (dizziness and diarrhea)        Date of Discharge:   8/18/20  Was patient contacted within 2 business days of discharge (see chart for documentation):  yes - 8/19/20      Patient presents for hospital follow up. Patient recently hospitalized at [facility] for treatment of dizziness.     Symptoms prior to admission:  Dizziness, N/V     Hospital Course per DC Summary:    Initial H&P / Hospital Course: Patient is a 81-year-old female with a past medical history of hypothyroidism, hypertension, breast cancer status post bilateral mastectomy, chemo, radiation now on hormonal therapy, cardiomyopathy with EF of 20 to 25% status post ICD placement.  Patient had a hospital stay where she had left-sided facial numbness in July and extensive neuro work-up was done at that time and nothing was discovered. Laura Lindo does have a stenotic right vertebral artery, however, it is nondominant and has good flow in her left.  She underwent a brain MRI at that time which was normal.  She was in her usual state of health when this yesterday morning she developed severe nausea and vomiting and dizziness.  She states the dizziness is worse with position changes.  She has not ate or drank well over the last 24 hours.  She has not taken any medications as of today.  Since her symptoms were not getting any better she came to the ED.  Per my history and exam patient seems to be dizzy very specifically with position changes.  I attempted a [place]Hall[place] which did not worsen anything, however, upon sitting she became very dizzy.  When I had her stand up she became even more dizzy.  Her blood pressure is in the low 1 teens to 856 systolic.  Without any of her blood pressure medications.  She also received IV fluids while she was in the ED.  Orthostatics have not been checked yet at this time. Vevelyn Klinefelter denies any fevers or chills.  She has not had any recent nausea or vomiting today.  She denies any shortness of breath or lower extremity edema.  She denies any orthopnea.  She denies any chest pain      8/16: Patient reports continued headache, rated 5 out of 10.  She describes it as bandlike.  Associated with lightheadedness, nausea sensitivity to light and sound.  Denies feeling like the room is spinning, but states that objects appeared to be moving.  Denies seeing spots or other vision changes.  She states dizziness has improved since yesterday.  Patient denies chest pain, shortness of breath, abdominal pain, V/D, urinary symptoms. 8/17:Pt states headache, dizziness, felling like objects are moving, and nausea have resolved. She now states she feels like she is having palpitations.       Subjective (day of discharge): MRI resulted, unremarkable. Pt discharged in stable condition and discharge summary is in conjunction with progress note from same day. Discharge Assessment and Plan:     1. Headache / dizziness: HA described as bandlike, associated with lightheadedness/dizziness, nausea, sensitivity to light/sound. Lightheadedness is worse with position changes, but this has improved. - Likely multifactorial d/t HA, dehydration, polypharmacy. Pt was borederline hypotensive; sx began improving after IVF bolus plus Toradolx1 for HA; her HA, nausea, dizziness resolved.          - Coreg and Entresto were resumed with parameters, pt tolerating. Digoxin discontinued. Symptoms have improved. Stop digoxin at discharge and follow up with PCP/Cardio. - CT Head, CTA Head/Neck unimpressive. Recent MRI Brain normal.         - Neurology consulted on admission, recommend MRI Brain WO contrast with diffusion sequence, unremarkable. Follow up with Neurology as OP.       2. Hx ICM, EF 20-25%: s/p ICD. Coreg and Entresto were resumed, pt tolerating well, BP stable.  Follow up with cardiology.       3. H/o Breast CA s/p chemo, radiation, bilat mastectomy: Cont letrozole     4. Anxiety: continue Effexor.     5. Hypothyroidism: cont synthroid. TSH wnl. Medication changes at discharge:  Med list reconciled today    Clinical course since discharge:      Dizziness  The dizziness is doing better, her stomach is also feeling better. She is feeling better overall since her recent hospitalization. She does complain of feeling bloated, gaining weight. No diet changes though. She has been taking more Lasix 20 mg every 2-3 days. She denies any SOB/CP. She has been having a little swelling in her legs. She weighed herself this AM at home and got 203 pounds. Her weights have been ranging between 198 pounds to 203 pounds. The Lasix does seem to help when she takes it. She does follow low Na diet. No leg edema. HTN    Does patient check BP regularly at home? - No  Current Medication regimen - coreg, Entresto  Tolerating medications well? - yes    Shortness of breath or chest pain? No  Headache or visual complaints? No  Neurologic changes like confusion? No  Extremity edema?  No    BP Readings from Last 3 Encounters:   08/25/20 110/76   08/18/20 127/77   08/05/20 105/72       Wt Readings from Last 3 Encounters:   08/25/20 200 lb 6.4 oz (90.9 kg)   08/18/20 203 lb 4.8 oz (92.2 kg)   08/05/20 200 lb 3.2 oz (90.8 kg)   7/6/20              202 lb    Current Outpatient Medications   Medication Sig Dispense Refill    polyethylene glycol (GLYCOLAX) 17 GM/SCOOP powder MIX 17 GRAMS OF POWDER WITH LIQUID BY MOUTH ONCE DAILY      zoster recombinant adjuvanted vaccine (SHINGRIX) 50 MCG/0.5ML SUSR injection Inject 0.5 mLs into the muscle See Admin Instructions 1 dose now and repeat in 2-6 months 0.5 mL 1    butalbital-acetaminophen-caffeine (FIORICET, ESGIC) -40 MG per tablet Take 1 tablet by mouth every 4 hours as needed for Headaches 180 tablet 1    letrozole (FEMARA) 2.5 MG tablet Take 1 tablet by mouth daily 90 tablet 3    6/25/2020    DEQUERVAINS RELEASE AND RIGHT RING FINGER TRIGGER RELEASE performed by Rupinder Erickson DO at [address], [place]ATERAL      PACEMAKER INSERTION Left 02/11/2019    [employer] [facility] HAS A MRI CONDITIONAL SYSTEM    PTCA      TONSILLECTOMY         Social History     Tobacco Use    Smoking status: Current Some Day Smoker     Packs/day: 0.25     Years: 37.00     Pack years: 9.25     Types: Cigarettes     Start date: 11/12/1981    Smokeless tobacco: Never Used   Substance Use Topics    Alcohol use: No    Drug use: No       Family History   Problem Relation Age of Onset    High Blood Pressure Mother     Dementia Mother     Cancer Father     Colon Cancer Father     Mental Retardation Brother     Colon Polyps Brother     Cancer Maternal Grandfather     Esophageal Cancer Neg Hx          I have reviewed the patient's past medical history, past surgical history, allergies, medications, social and family history and I have made updates where appropriate.         PHYSICAL EXAM:  /76   Pulse 80   Temp 98.9 °F (37.2 °C) (Oral)   Resp 10   Ht 5' 3.58\" (1.615 m)   Wt 200 lb 6.4 oz (90.9 kg)   SpO2 98%   BMI 34.85 kg/m²   General Appearance: well developed and well- nourished, in no acute distress  Head: normocephalic and atraumatic  ENT: external ear and ear canal normal bilaterally, nose without deformity, nasal mucosa and turbinates normal without polyps, oropharynx normal, dentition is normal for age, no lip or gum lesions noted  Neck: supple and non-tender without mass, no thyromegaly or thyroid nodules, no cervical lymphadenopathy  Pulmonary/Chest: clear to auscultation bilaterally- no wheezes, rales or rhonchi, normal air movement, no respiratory distress or retractions  Cardiovascular: normal rate, regular rhythm, normal S1 and S2, no murmurs, rubs, clicks, or gallops, distal pulses intact  Abdomen: soft, non-tender, non-distended, no rebound or guarding, no masses or hernias noted, no hepatospleenomegaly  Extremities: no cyanosis, clubbing or edema of the lower extremities  Psych:  Normal affect without evidence of depression or anxiety, insight and judgement are appropriate, memory appears intact  Skin: warm and dry, no rash or erythema      Diagnostic test results reviewed: inpatient labs, culture/micro-urine and CT-CTA head and neck    Patient risk of morbidity and mortality: moderate      ASSESSMENT & PLAN  Hernán Dela Cruz was seen today for follow-up from hospital and follow-up. Diagnoses and all orders for this visit:    Dizziness  -     SC DISCHARGE MEDS RECONCILED W/ CURRENT OUTPATIENT MED LIST    Essential hypertension  -     SC DISCHARGE MEDS RECONCILED W/ CURRENT OUTPATIENT MED LIST    Chronic systolic (congestive) heart failure (HCC)  -     SC DISCHARGE MEDS RECONCILED W/ CURRENT OUTPATIENT MED LIST    Need for shingles vaccine  -     zoster recombinant adjuvanted vaccine (SHINGRIX) 50 MCG/0.5ML SUSR injection; Inject 0.5 mLs into the muscle See Admin Instructions 1 dose now and repeat in 2-6 months      - BP stable, weights relatively stable  - con't current meds without any change  - ok to con't Lasix 20 mg prn  - f/u with CHF clinic and Neurology as scheduled    Return if symptoms worsen or fail to improve. Level of medical complexity:  moderate    -Overall, patient is improving  -Continue current meds  -Advised to continue to closely monitor her symptoms and if any worsening to seek treatment    Hernán Dela Cruz received counseling on the following healthy behaviors: medication adherence  Reviewed prior labs and health maintenance. Continue current medications, diet and exercise. Discussed use, benefit, and side effects of prescribed medications. Barriers to medication compliance addressed. Patient given educational materials - see patient instructions. All patient questions answered. Patient voiced understanding.

## 2020-09-08 ENCOUNTER — PROCEDURE VISIT (OUTPATIENT)
Dept: CARDIOLOGY CLINIC | Age: 60
End: 2020-09-08
Payer: COMMERCIAL

## 2020-09-08 PROCEDURE — 93295 DEV INTERROG REMOTE 1/2/MLT: CPT | Performed by: INTERNAL MEDICINE

## 2020-09-08 PROCEDURE — 93296 REM INTERROG EVL PM/IDS: CPT | Performed by: INTERNAL MEDICINE

## 2020-09-09 ENCOUNTER — VIRTUAL VISIT (OUTPATIENT)
Dept: NEUROLOGY | Age: 60
End: 2020-09-09
Payer: COMMERCIAL

## 2020-09-09 PROCEDURE — G8428 CUR MEDS NOT DOCUMENT: HCPCS | Performed by: NURSE PRACTITIONER

## 2020-09-09 PROCEDURE — 3017F COLORECTAL CA SCREEN DOC REV: CPT | Performed by: NURSE PRACTITIONER

## 2020-09-09 PROCEDURE — 99213 OFFICE O/P EST LOW 20 MIN: CPT | Performed by: NURSE PRACTITIONER

## 2020-09-09 ASSESSMENT — ENCOUNTER SYMPTOMS
EYES NEGATIVE: 1
GASTROINTESTINAL NEGATIVE: 1
RESPIRATORY NEGATIVE: 1

## 2020-09-09 NOTE — PROGRESS NOTES
2020    TELEHEALTH EVALUATION -- Audio/Visual (During GPTRD-13 public health emergency)    HPI:    Yani Tony (:  1960) has requested an audio/video evaluation for the following concern(s): Hospital follow-up for dizziness. She is still having intermittent episodes of dizziness. Her symptoms are worse when bending over and going up stairs. Her MRI brain done 2020 was normal.  CTA head and neck done 8/15/2020 showed stable focal area of severe stenosis in the V4 segment of the nondominant vertebral artery on the right. She is being evaluated via video link to discuss the plan of care going forward. Review of Systems   Eyes: Negative. Respiratory: Negative. Cardiovascular: Negative. Gastrointestinal: Negative. Prior to Visit Medications    Medication Sig Taking?  Authorizing Provider   polyethylene glycol (GLYCOLAX) 17 GM/SCOOP powder MIX 17 GRAMS OF POWDER WITH LIQUID BY MOUTH ONCE DAILY  Historical Provider, MD   zoster recombinant adjuvanted vaccine (SHINGRIX) 50 MCG/0.5ML SUSR injection Inject 0.5 mLs into the muscle See Admin Instructions 1 dose now and repeat in 2-6 months  ANGELIA Brewer CNP   butalbital-acetaminophen-caffeine (FIORICET, ESGIC) -40 MG per tablet Take 1 tablet by mouth every 4 hours as needed for Headaches  Alyse Belle PA-C   letrozole (FEMARA) 2.5 MG tablet Take 1 tablet by mouth daily  Kat Candelaria MD   venlafaxine (EFFEXOR XR) 75 MG extended release capsule Take 1 capsule by mouth daily  Patient taking differently: Take 37.5 mg by mouth daily   ANGELIA Brewer CNP   carvedilol (COREG) 3.125 MG tablet Take 1 tablet by mouth twice daily  Marianela Skelton MD   aspirin 81 MG chewable tablet Take 1 tablet by mouth daily  Nikko Gan MD   ENTRESTO 49-51 MG per tablet Take 1 tablet by mouth twice daily  ANGELIA Ruano CNP   spironolactone (ALDACTONE) 25 MG tablet Take 1 tablet by mouth once daily  Vale MCCARTHY ANGELIA Go CNP   furosemide (LASIX) 20 MG tablet Take 1 tablet by mouth daily as needed (leg swelling, weight gain)  ANGELIA Cash CNP   omeprazole (PRILOSEC) 20 MG delayed release capsule Take 1 capsule by mouth every morning (before breakfast)  ANGELIA Ferreira CNP   atorvastatin (LIPITOR) 40 MG tablet Take 1 tablet by mouth nightly  ANGELIA Cash CNP   levothyroxine (SYNTHROID) 100 MCG tablet Take 1 tablet by mouth daily  ANGELIA Cash CNP   acetaminophen (TYLENOL) 500 MG tablet Take 500 mg by mouth every 6 hours as needed for Pain  Historical Provider, MD       Social History     Tobacco Use    Smoking status: Current Some Day Smoker     Packs/day: 0.25     Years: 37.00     Pack years: 9.25     Types: Cigarettes     Start date: 11/12/1981    Smokeless tobacco: Never Used   Substance Use Topics    Alcohol use: No    Drug use: No        Past Medical History:   Diagnosis Date    Abnormal heart rhythm     Anxiety     Cancer ([facility] [address]     breast  2016     CHF (congestive heart failure) (HCC)     Congenital heart disease     DJD (degenerative joint disease)     Enlarged lymph nodes     Hypertension     Hypothyroidism     ICD (implantable cardioverter-defibrillator) in place 02/11/2019    Dr. Constance Walters Kidney stones     PONV (postoperative nausea and vomiting)     Prediabetes 10/7/2019    Thyroid disease    ,   Past Surgical History:   Procedure Laterality Date    APPENDECTOMY      CARPAL TUNNEL RELEASE  2019    COLONOSCOPY      COLONOSCOPY N/A 7/27/2020    COLONOSCOPY POLYPECTOMY SNARE/COLD BIOPSY performed by Mukesh Atkins MD at [facility]    COLONOSCOPY  7/27/2020    COLONOSCOPY POLYPECTOMY HOT BIOPSY performed by Mukesh Atkins MD at [address] Right 6/25/2020    DEQUERVAINS RELEASE AND RIGHT RING FINGER TRIGGER RELEASE performed by Ten Odom DO at [facility] Left 02/11/2019    [employer] [facility] HAS A MRI CONDITIONAL SYSTEM    PTCA      TONSILLECTOMY     ,   Social History     Tobacco Use    Smoking status: Current Some Day Smoker     Packs/day: 0.25     Years: 37.00     Pack years: 9.25     Types: Cigarettes     Start date: 11/12/1981    Smokeless tobacco: Never Used   Substance Use Topics    Alcohol use: No    Drug use: No   ,   Family History   Problem Relation Age of Onset    High Blood Pressure Mother     Dementia Mother     Cancer Father     Colon Cancer Father     Mental Retardation Brother     Colon Polyps Brother     Cancer Maternal Grandfather     Esophageal Cancer Neg Hx        PHYSICAL EXAMINATION:  [ INSTRUCTIONS:  \"[x]\" Indicates a positive item  \"[]\" Indicates a negative item  -- DELETE ALL ITEMS NOT EXAMINED]  Vital Signs: (As obtained by patient/caregiver or practitioner observation)    Blood pressure-  Heart rate-    Respiratory rate-    Temperature-  Pulse oximetry-     Constitutional: [x] Appears well-developed and well-nourished [x] No apparent distress      [] Abnormal-   Mental status  [x] Alert and awake  [x] Oriented to person/place/time [x]Able to follow commands      Eyes:  EOM    [x]  Normal  [] Abnormal-  Sclera  [x]  Normal  [] Abnormal -         Discharge [x]  None visible  [] Abnormal -    HENT:   [x] Normocephalic, atraumatic.   [] Abnormal   [x] Mouth/Throat: Mucous membranes are moist.     External Ears [x] Normal  [] Abnormal-     Neck: [x] No visualized mass     Pulmonary/Chest: [x] Respiratory effort normal.  [x] No visualized signs of difficulty breathing or respiratory distress        [] Abnormal-      Musculoskeletal:   [] Normal gait with no signs of ataxia         [x] Normal range of motion of neck        [] Abnormal-       Neurological:        [x] No Facial Asymmetry (Cranial nerve 7 motor function) (limited exam to video visit)          [x] No gaze palsy        [] Abnormal-         Skin:        [x] No significant exanthematous lesions or discoloration noted on facial skin         [] Abnormal-            Psychiatric:       [x] Normal Affect [x] No Hallucinations        [] Abnormal-     Other pertinent observable physical exam findings-     ASSESSMENT/PLAN:  1. Dizziness    She is still having intermittent episodes of dizziness. Her symptoms are worse when bending over and going up stairs. CTA showed area of focal severe stenosis at V4 segment of right vertebral artery. No falls. She is on aspirin daily. She would benefit from formal evaluation with interventional neurology to evaluate if this stenosis is symptomatic. After detailed discussion with patient we agreed on the following plan. Plan:  1. Continue with antiplatelets  2. Continue with lipid lowering medication  3. Referral to interventional neurology re;  area of severe stenosis in the V4 segment of the nondominant vertebral artery on the right, dizziness  4. Continue following with cardiology  5. Follow up in 3 months or sooner if needed. 6. Call if any questions or concerns. Merlin Roca is a 62 y.o. female being evaluated by a Virtual Visit (video visit) encounter to address concerns as mentioned above. A caregiver was present when appropriate. Due to this being a TeleHealth encounter ([facility]), evaluation of the following organ systems was limited: Vitals/Constitutional/EENT/Resp/CV/GI//MS/Neuro/Skin/Heme-Lymph-Imm. Pursuant to the emergency declaration under the [address] [place] [address], [address] [place] [address] authority and the [employer] Act, this Virtual Visit was conducted with patient's (and/or legal guardian's) consent, to reduce the patient's risk of exposure to COVID-19 and provide necessary medical care.   The patient (and/or legal guardian) has also been advised to contact this office for worsening conditions or problems, and seek emergency medical treatment and/or call 911 if deemed necessary. Patient identification was verified at the start of the visit: Yes    Total time spent on this encounter: 15 minutes    Services were provided through a video synchronous discussion virtually to substitute for in-person clinic visit. Patient and provider were located at their individual homes. --ANGELIA Mancera CNP on 9/9/2020 at 10:07 AM    An electronic signature was used to authenticate this note.

## 2020-10-13 ENCOUNTER — PROCEDURE VISIT (OUTPATIENT)
Dept: CARDIOLOGY CLINIC | Age: 60
End: 2020-10-13
Payer: COMMERCIAL

## 2020-10-13 PROCEDURE — G2066 INTER DEVC REMOTE 30D: HCPCS | Performed by: INTERNAL MEDICINE

## 2020-10-13 PROCEDURE — 93297 REM INTERROG DEV EVAL ICPMS: CPT | Performed by: INTERNAL MEDICINE

## 2020-10-14 ENCOUNTER — TELEPHONE (OUTPATIENT)
Dept: CARDIOLOGY CLINIC | Age: 60
End: 2020-10-14

## 2020-10-14 NOTE — TELEPHONE ENCOUNTER
Dr Vale Humphreys please advise:       Lowell Reyes MD at 10/13/2020  9:01 AM     Status: Signed       VT vs SVT ?   Can you review     She has cardiomyopathy      Sarah Patel RN at 10/13/2020  9:04 AM     Status: Signed       [facility] optivol [employer] icd      10.4 years on device   VT vs SVT   optivol WNL

## 2020-10-15 ENCOUNTER — HOSPITAL ENCOUNTER (OUTPATIENT)
Age: 60
Setting detail: OBSERVATION
Discharge: HOME | End: 2020-10-18
Attending: INTERNAL MEDICINE | Admitting: FAMILY MEDICINE
Payer: COMMERCIAL

## 2020-10-15 ENCOUNTER — APPOINTMENT (OUTPATIENT)
Dept: GENERAL RADIOLOGY | Age: 60
End: 2020-10-15
Payer: COMMERCIAL

## 2020-10-15 ENCOUNTER — NURSE TRIAGE (OUTPATIENT)
Dept: OTHER | Facility: CLINIC | Age: 60
End: 2020-10-15

## 2020-10-15 ENCOUNTER — APPOINTMENT (OUTPATIENT)
Dept: CT IMAGING | Age: 60
End: 2020-10-15
Payer: COMMERCIAL

## 2020-10-15 LAB
ANION GAP SERPL CALCULATED.3IONS-SCNC: 9 MEQ/L (ref 8–16)
BASOPHILS # BLD: 0.9 %
BASOPHILS ABSOLUTE: 0 THOU/MM3 (ref 0–0.1)
BUN BLDV-MCNC: 17 MG/DL (ref 7–22)
CALCIUM SERPL-MCNC: 9.1 MG/DL (ref 8.5–10.5)
CHLORIDE BLD-SCNC: 104 MEQ/L (ref 98–111)
CO2: 26 MEQ/L (ref 23–33)
CREAT SERPL-MCNC: 0.6 MG/DL (ref 0.4–1.2)
EKG ATRIAL RATE: 72 BPM
EKG P AXIS: 35 DEGREES
EKG P-R INTERVAL: 178 MS
EKG Q-T INTERVAL: 408 MS
EKG QRS DURATION: 94 MS
EKG QTC CALCULATION (BAZETT): 446 MS
EKG R AXIS: -20 DEGREES
EKG T AXIS: 36 DEGREES
EKG VENTRICULAR RATE: 72 BPM
EOSINOPHIL # BLD: 4.9 %
EOSINOPHILS ABSOLUTE: 0.2 THOU/MM3 (ref 0–0.4)
ERYTHROCYTE [DISTWIDTH] IN BLOOD BY AUTOMATED COUNT: 13.7 % (ref 11.5–14.5)
ERYTHROCYTE [DISTWIDTH] IN BLOOD BY AUTOMATED COUNT: 48.5 FL (ref 35–45)
GFR SERPL CREATININE-BSD FRML MDRD: > 90 ML/MIN/1.73M2
GLUCOSE BLD-MCNC: 107 MG/DL (ref 70–108)
HCT VFR BLD CALC: 41.8 % (ref 37–47)
HEMOGLOBIN: 13.4 GM/DL (ref 12–16)
IMMATURE GRANS (ABS): 0.02 THOU/MM3 (ref 0–0.07)
IMMATURE GRANULOCYTES: 0.4 %
LYMPHOCYTES # BLD: 33.2 %
LYMPHOCYTES ABSOLUTE: 1.6 THOU/MM3 (ref 1–4.8)
MAGNESIUM: 2.1 MG/DL (ref 1.6–2.4)
MCH RBC QN AUTO: 30.7 PG (ref 26–33)
MCHC RBC AUTO-ENTMCNC: 32.1 GM/DL (ref 32.2–35.5)
MCV RBC AUTO: 95.9 FL (ref 81–99)
MONOCYTES # BLD: 7.7 %
MONOCYTES ABSOLUTE: 0.4 THOU/MM3 (ref 0.4–1.3)
NUCLEATED RED BLOOD CELLS: 0 /100 WBC
OSMOLALITY CALCULATION: 279.6 MOSMOL/KG (ref 275–300)
PLATELET # BLD: 221 THOU/MM3 (ref 130–400)
PMV BLD AUTO: 11.8 FL (ref 9.4–12.4)
POTASSIUM SERPL-SCNC: 4.2 MEQ/L (ref 3.5–5.2)
PRO-BNP: 138.1 PG/ML (ref 0–900)
RBC # BLD: 4.36 MILL/MM3 (ref 4.2–5.4)
SARS-COV-2, NAAT: NOT DETECTED
SEG NEUTROPHILS: 52.9 %
SEGMENTED NEUTROPHILS ABSOLUTE COUNT: 2.5 THOU/MM3 (ref 1.8–7.7)
SODIUM BLD-SCNC: 139 MEQ/L (ref 135–145)
TROPONIN T: < 0.01 NG/ML
TROPONIN T: < 0.01 NG/ML
WBC # BLD: 4.7 THOU/MM3 (ref 4.8–10.8)

## 2020-10-15 PROCEDURE — 80048 BASIC METABOLIC PNL TOTAL CA: CPT

## 2020-10-15 PROCEDURE — 6360000004 HC RX CONTRAST MEDICATION: Performed by: PHYSICIAN ASSISTANT

## 2020-10-15 PROCEDURE — 6370000000 HC RX 637 (ALT 250 FOR IP): Performed by: PHYSICIAN ASSISTANT

## 2020-10-15 PROCEDURE — 99285 EMERGENCY DEPT VISIT HI MDM: CPT

## 2020-10-15 PROCEDURE — 83880 ASSAY OF NATRIURETIC PEPTIDE: CPT

## 2020-10-15 PROCEDURE — 71045 X-RAY EXAM CHEST 1 VIEW: CPT

## 2020-10-15 PROCEDURE — 99220 PR INITIAL OBSERVATION CARE/DAY 70 MINUTES: CPT | Performed by: INTERNAL MEDICINE

## 2020-10-15 PROCEDURE — 85025 COMPLETE CBC W/AUTO DIFF WBC: CPT

## 2020-10-15 PROCEDURE — 83735 ASSAY OF MAGNESIUM: CPT

## 2020-10-15 PROCEDURE — 2580000003 HC RX 258: Performed by: STUDENT IN AN ORGANIZED HEALTH CARE EDUCATION/TRAINING PROGRAM

## 2020-10-15 PROCEDURE — 6360000002 HC RX W HCPCS: Performed by: PHYSICIAN ASSISTANT

## 2020-10-15 PROCEDURE — 71275 CT ANGIOGRAPHY CHEST: CPT

## 2020-10-15 PROCEDURE — 93005 ELECTROCARDIOGRAM TRACING: CPT | Performed by: PHYSICIAN ASSISTANT

## 2020-10-15 PROCEDURE — G0378 HOSPITAL OBSERVATION PER HR: HCPCS

## 2020-10-15 PROCEDURE — 6370000000 HC RX 637 (ALT 250 FOR IP): Performed by: STUDENT IN AN ORGANIZED HEALTH CARE EDUCATION/TRAINING PROGRAM

## 2020-10-15 PROCEDURE — 96374 THER/PROPH/DIAG INJ IV PUSH: CPT

## 2020-10-15 PROCEDURE — C9113 INJ PANTOPRAZOLE SODIUM, VIA: HCPCS | Performed by: PHYSICIAN ASSISTANT

## 2020-10-15 PROCEDURE — 36415 COLL VENOUS BLD VENIPUNCTURE: CPT

## 2020-10-15 PROCEDURE — 6360000002 HC RX W HCPCS: Performed by: STUDENT IN AN ORGANIZED HEALTH CARE EDUCATION/TRAINING PROGRAM

## 2020-10-15 PROCEDURE — 84484 ASSAY OF TROPONIN QUANT: CPT

## 2020-10-15 PROCEDURE — U0002 COVID-19 LAB TEST NON-CDC: HCPCS

## 2020-10-15 RX ORDER — SODIUM CHLORIDE 9 MG/ML
500 INJECTION, SOLUTION INTRAVENOUS CONTINUOUS PRN
Status: ACTIVE | OUTPATIENT
Start: 2020-10-15 | End: 2020-10-16

## 2020-10-15 RX ORDER — POLYETHYLENE GLYCOL 3350 17 G/17G
17 POWDER, FOR SOLUTION ORAL DAILY PRN
Status: DISCONTINUED | OUTPATIENT
Start: 2020-10-15 | End: 2020-10-18 | Stop reason: HOSPADM

## 2020-10-15 RX ORDER — ACETAMINOPHEN 650 MG/1
650 SUPPOSITORY RECTAL EVERY 6 HOURS PRN
Status: DISCONTINUED | OUTPATIENT
Start: 2020-10-15 | End: 2020-10-18 | Stop reason: HOSPADM

## 2020-10-15 RX ORDER — ONDANSETRON 2 MG/ML
4 INJECTION INTRAMUSCULAR; INTRAVENOUS EVERY 6 HOURS PRN
Status: DISCONTINUED | OUTPATIENT
Start: 2020-10-15 | End: 2020-10-18 | Stop reason: HOSPADM

## 2020-10-15 RX ORDER — ACETAMINOPHEN 325 MG/1
650 TABLET ORAL EVERY 6 HOURS PRN
Status: DISCONTINUED | OUTPATIENT
Start: 2020-10-15 | End: 2020-10-18 | Stop reason: HOSPADM

## 2020-10-15 RX ORDER — METOPROLOL TARTRATE 5 MG/5ML
5 INJECTION INTRAVENOUS EVERY 5 MIN PRN
Status: ACTIVE | OUTPATIENT
Start: 2020-10-15 | End: 2020-10-16

## 2020-10-15 RX ORDER — LEVOTHYROXINE SODIUM 0.1 MG/1
100 TABLET ORAL DAILY
Status: DISCONTINUED | OUTPATIENT
Start: 2020-10-16 | End: 2020-10-18 | Stop reason: HOSPADM

## 2020-10-15 RX ORDER — SODIUM CHLORIDE 0.9 % (FLUSH) 0.9 %
10 SYRINGE (ML) INJECTION EVERY 12 HOURS SCHEDULED
Status: DISCONTINUED | OUTPATIENT
Start: 2020-10-15 | End: 2020-10-18 | Stop reason: HOSPADM

## 2020-10-15 RX ORDER — ATORVASTATIN CALCIUM 40 MG/1
40 TABLET, FILM COATED ORAL NIGHTLY
Status: DISCONTINUED | OUTPATIENT
Start: 2020-10-15 | End: 2020-10-18 | Stop reason: HOSPADM

## 2020-10-15 RX ORDER — ASPIRIN 81 MG/1
324 TABLET, CHEWABLE ORAL ONCE
Status: COMPLETED | OUTPATIENT
Start: 2020-10-15 | End: 2020-10-15

## 2020-10-15 RX ORDER — CARVEDILOL 3.12 MG/1
3.12 TABLET ORAL 2 TIMES DAILY WITH MEALS
Status: DISCONTINUED | OUTPATIENT
Start: 2020-10-16 | End: 2020-10-18 | Stop reason: HOSPADM

## 2020-10-15 RX ORDER — PANTOPRAZOLE SODIUM 40 MG/10ML
40 INJECTION, POWDER, LYOPHILIZED, FOR SOLUTION INTRAVENOUS ONCE
Status: COMPLETED | OUTPATIENT
Start: 2020-10-15 | End: 2020-10-15

## 2020-10-15 RX ORDER — PANTOPRAZOLE SODIUM 40 MG/1
40 TABLET, DELAYED RELEASE ORAL
Status: DISCONTINUED | OUTPATIENT
Start: 2020-10-16 | End: 2020-10-18 | Stop reason: HOSPADM

## 2020-10-15 RX ORDER — PROMETHAZINE HYDROCHLORIDE 25 MG/1
12.5 TABLET ORAL EVERY 6 HOURS PRN
Status: DISCONTINUED | OUTPATIENT
Start: 2020-10-15 | End: 2020-10-18 | Stop reason: HOSPADM

## 2020-10-15 RX ORDER — LETROZOLE 2.5 MG/1
2.5 TABLET, FILM COATED ORAL DAILY
Status: DISCONTINUED | OUTPATIENT
Start: 2020-10-16 | End: 2020-10-18 | Stop reason: HOSPADM

## 2020-10-15 RX ORDER — NITROGLYCERIN 0.4 MG/1
0.4 TABLET SUBLINGUAL EVERY 5 MIN PRN
Status: ACTIVE | OUTPATIENT
Start: 2020-10-15 | End: 2020-10-16

## 2020-10-15 RX ORDER — SODIUM CHLORIDE 0.9 % (FLUSH) 0.9 %
10 SYRINGE (ML) INJECTION PRN
Status: ACTIVE | OUTPATIENT
Start: 2020-10-15 | End: 2020-10-16

## 2020-10-15 RX ORDER — BUTALBITAL, ACETAMINOPHEN AND CAFFEINE 50; 325; 40 MG/1; MG/1; MG/1
1 TABLET ORAL EVERY 4 HOURS PRN
Status: DISCONTINUED | OUTPATIENT
Start: 2020-10-15 | End: 2020-10-18 | Stop reason: HOSPADM

## 2020-10-15 RX ORDER — FUROSEMIDE 20 MG/1
20 TABLET ORAL DAILY PRN
Status: DISCONTINUED | OUTPATIENT
Start: 2020-10-15 | End: 2020-10-18 | Stop reason: HOSPADM

## 2020-10-15 RX ORDER — SODIUM CHLORIDE 0.9 % (FLUSH) 0.9 %
10 SYRINGE (ML) INJECTION PRN
Status: DISCONTINUED | OUTPATIENT
Start: 2020-10-15 | End: 2020-10-18 | Stop reason: HOSPADM

## 2020-10-15 RX ORDER — AMINOPHYLLINE DIHYDRATE 25 MG/ML
50 INJECTION, SOLUTION INTRAVENOUS PRN
Status: ACTIVE | OUTPATIENT
Start: 2020-10-15 | End: 2020-10-16

## 2020-10-15 RX ORDER — VENLAFAXINE HYDROCHLORIDE 37.5 MG/1
37.5 CAPSULE, EXTENDED RELEASE ORAL DAILY
Status: DISCONTINUED | OUTPATIENT
Start: 2020-10-16 | End: 2020-10-18 | Stop reason: HOSPADM

## 2020-10-15 RX ORDER — ASPIRIN 81 MG/1
81 TABLET, CHEWABLE ORAL DAILY
Status: DISCONTINUED | OUTPATIENT
Start: 2020-10-16 | End: 2020-10-18 | Stop reason: HOSPADM

## 2020-10-15 RX ORDER — ATROPINE SULFATE 0.1 MG/ML
0.5 INJECTION INTRAVENOUS EVERY 5 MIN PRN
Status: ACTIVE | OUTPATIENT
Start: 2020-10-15 | End: 2020-10-16

## 2020-10-15 RX ADMIN — ATORVASTATIN CALCIUM 40 MG: 40 TABLET, FILM COATED ORAL at 22:20

## 2020-10-15 RX ADMIN — Medication 10 ML: at 22:20

## 2020-10-15 RX ADMIN — SACUBITRIL AND VALSARTAN 1 TABLET: 49; 51 TABLET, FILM COATED ORAL at 22:20

## 2020-10-15 RX ADMIN — ENOXAPARIN SODIUM 40 MG: 40 INJECTION SUBCUTANEOUS at 22:19

## 2020-10-15 RX ADMIN — IOPAMIDOL 80 ML: 755 INJECTION, SOLUTION INTRAVENOUS at 12:30

## 2020-10-15 RX ADMIN — PANTOPRAZOLE SODIUM 40 MG: 40 INJECTION, POWDER, FOR SOLUTION INTRAVENOUS at 12:10

## 2020-10-15 RX ADMIN — ASPIRIN 324 MG: 81 TABLET, CHEWABLE ORAL at 11:50

## 2020-10-15 ASSESSMENT — PAIN DESCRIPTION - PAIN TYPE
TYPE: ACUTE PAIN
TYPE: ACUTE PAIN

## 2020-10-15 ASSESSMENT — HEART SCORE: ECG: 0

## 2020-10-15 ASSESSMENT — PAIN DESCRIPTION - ORIENTATION
ORIENTATION: LEFT
ORIENTATION: LEFT

## 2020-10-15 ASSESSMENT — PAIN SCALES - GENERAL
PAINLEVEL_OUTOF10: 0
PAINLEVEL_OUTOF10: 6
PAINLEVEL_OUTOF10: 4

## 2020-10-15 ASSESSMENT — ENCOUNTER SYMPTOMS
SORE THROAT: 0
NAUSEA: 0
DIARRHEA: 0
EYE ITCHING: 0
SHORTNESS OF BREATH: 1
EYE DISCHARGE: 0
WHEEZING: 0
COLOR CHANGE: 0
RHINORRHEA: 0
VOMITING: 0
COUGH: 1
BACK PAIN: 0
EYE PAIN: 0
ABDOMINAL PAIN: 0

## 2020-10-15 ASSESSMENT — PAIN DESCRIPTION - LOCATION
LOCATION: CHEST
LOCATION: CHEST;ARM;RIB CAGE

## 2020-10-15 NOTE — H&P
History & Physical        Patient:  Kim Gan  YOB: 1960    MRN: [id number]     Acct: [de-identified]    PCP: ANGELIA Kerr CNP    Date of Admission: 10/15/2020    Date of Service: Pt seen/examined on 10/15/20  and Admitted to Observation with expected LOS less than two midnights due to medical therapy. Chief Complaint:  Chest Pain, Fatigue    Assessment and Plan:    1.) Acute Chest Pain Likely Secondary to GERD: Mixed presentation of Chest Pain. Non-reproducible, substernal/left chest pain suggestive of cardiac origin, however Troponin negative x2 with normal EKG. States it is worse at night and comes in waves, and present with difficulty swallowing suggestive of GERD/idigestion. Never formally diagnosed with GERD, but takes omeprazole from her PCP as a \"stomach protectant\". Has seen GI before, but only for colonoscopies. Electrolytes are normal, BNP normal.  COVID negative. WBC normal.  Afebrile   - Continue Protonix 40mg AC with meals   - Despite low suspicion for cardiac causes, Cardiology should be consulted due to complicated history and work-up for VT/SVT that may be causing Troponin (-) chest pain   - Obtain 2D Echo (last one 1 year ago)   - Lexiscan Stress Test to rule out unstable angina    - Hold Carvedilol    - Do not offer coffee before test    - NPO at least 4 hours before    - May continue other home medications   - Troponin recheck   - EKG recheck   - Avoid NSAIDs    2.) Hypertrophic Cardiomyopathy with EF 20-25%, non-ischemic, s/p ICD placement: No clinical signs of decompensated, no crackles in lungs or swelling in lower limbs. Last Echo in 1/19/20. States Cardiomyopathy was caused by Cancer treatment (unsure whether by Radiation or Chemo)  Takes Lasix, Entresto, Carvedilol, Lipitor, and ASA. Continue Home medications holding Carvedilol for stress test.   - Work-up as above    3.) ? Arrythmias, work-up pending?: Unclear if VT versus SVT, Cardiology following out-patient. May contribute to Chest pain/palpitations. - Work-up as above    4.) Essential HTN: Stable since admission. Continue home medications. 5.) Hypothyroidism: Stable, Continue Synthroid. 6.) DJD: May contribute to aching finger joints. Avoid NSAIDs as treatment, use Tylenol instead. 7.) Breast Cancer: Seen out-patient, takes Femara. No reports of Metastasis. 8.) Anxiety: Takes Effexor at home, continue taking.    9.) Obseity: BMI 34.78. History Of Present Illness:      Ms. Travis Taoism is a 62year old female with a past history of Hypertrophic Cardiomyopathy, low EF (20-25%) with ICD placement, HTN, Hypothyroidism, DJD, and Breast Cancer (currently taking oral chemotherapy). Her preferred language is [facility] [place] She came to the ER at the request of her PCP after telling her that she has been having chest pain since last Saturday (about 6 days). She states that the pain did come on suddenly, but it is not severe. She stresses that the pain is not sharp, but \"deaf\" and dull. The pain does not limit her ability to work, however it is present everyday. The pain is rated about 6/10, located in the substernal region and radiating to the throat, left neck, left shoulder, and under and around her left breast.  The pain comes in waves, and starts with substernal pain first.  She states it is normally worse in the evening. She has tried Advil with little relief. She also states concurrent fatigue, joint pain in her hands, dry cough, palpitations, and difficulty swallowing. She stated at first she thought it might have been the flu, but since the joint pain hasn't moved from her hands, she dismissed this. She denies fever or chills. She states she has been having increased dyspnea on exertion as well, but no increase in lower limb swelling. She states that she has had to support herself going up-stairs by crawling on her hands due to shortness of breath.   She has still been (before breakfast) 10/14/20   ANGLEIA Gaona CNP   polyethylene glycol (GLYCOLAX) 17 GM/SCOOP powder MIX 17 GRAMS OF POWDER WITH LIQUID BY MOUTH ONCE DAILY 8/1/20   Historical Provider, MD   zoster recombinant adjuvanted vaccine [facility]) 50 MCG/0.5ML SUSR injection Inject 0.5 mLs into the muscle See Admin Instructions 1 dose now and repeat in 2-6 months 8/25/20 2/21/21  ANGELIA Damon CNP   butalbital-acetaminophen-caffeine (FIORICET, ESGIC) -20 MG per tablet Take 1 tablet by mouth every 4 hours as needed for Headaches 8/18/20   Twylla Nissen, PA-C   letrozole [facility]) 2.5 MG tablet Take 1 tablet by mouth daily 7/21/20   Dave Ludwig MD   venlafaxine (EFFEXOR XR) 75 MG extended release capsule Take 1 capsule by mouth daily  Patient taking differently: Take 37.5 mg by mouth daily  7/16/20   ANGELIA Damon CNP   carvedilol (COREG) 3.125 MG tablet Take 1 tablet by mouth twice daily 7/13/20   Denver Hakim, MD   aspirin 81 MG chewable tablet Take 1 tablet by mouth daily 7/11/20   Rafy Ram MD   ENTRESTO 49-51 MG per tablet Take 1 tablet by mouth twice daily 6/8/20   ANGELIA Ruano CNP   spironolactone (ALDACTONE) 25 MG tablet Take 1 tablet by mouth once daily 5/19/20   ANGELIA Ruano CNP   furosemide (LASIX) 20 MG tablet Take 1 tablet by mouth daily as needed (leg swelling, weight gain) 4/13/20   ANGELIA Daomn CNP   atorvastatin (LIPITOR) 40 MG tablet Take 1 tablet by mouth nightly 10/7/19   ANGELIA Damon CNP   levothyroxine (SYNTHROID) 100 MCG tablet Take 1 tablet by mouth daily 10/7/19   ANGELIA Damon CNP   acetaminophen (TYLENOL) 500 MG tablet Take 500 mg by mouth every 6 hours as needed for Pain    Historical Provider, MD       Allergies:  Patient has no known allergies. Social History:      The patient currently lives at home. TOBACCO:   reports that she has been smoking cigarettes.  She started smoking about 38 years ago. She has a 9.25 pack-year smoking history. She has never used smokeless tobacco.  ETOH:   reports no history of alcohol use. Family History:      Reviewed in detail and negative for DM, CAD, Cancer, CVA. Positive as follows:        Problem Relation Age of Onset    High Blood Pressure Mother     Dementia Mother     Cancer Father     Colon Cancer Father     Mental Retardation Brother     Colon Polyps Brother     Cancer Maternal Grandfather     Esophageal Cancer Neg Hx        Diet:  No diet orders on file    REVIEW OF SYSTEMS:   Pertinent positives as noted in the HPI. All other systems reviewed and negative. Review of Systems - General ROS: positive for  - fatigue  negative for - chills, fever or sleep disturbance  Respiratory ROS: positive for - cough and shortness of breath  negative for - hemoptysis, sputum changes, stridor, tachypnea or wheezing  Cardiovascular ROS: positive for - chest pain, palpitations, and dyspnea on exertion  negative for - edema, irregular heartbeat, or rapid heart rate  Gastrointestinal ROS: no abdominal pain, change in bowel habits, or black or bloody stools  Genito-Urinary ROS: no dysuria, trouble voiding, or hematuria  Musculoskeletal ROS: positive for - joint pain  negative for - joint swelling, muscle pain or muscular weakness  Neurological ROS: negative for - confusion, dizziness, gait disturbance, headaches, numbness/tingling or seizures  Dermatological ROS: negative for - pruritus, rash or skin lesion changes      PHYSICAL EXAM:    /86   Pulse 72   Temp 98.3 °F (36.8 °C) (Oral)   Resp 18   Wt 200 lb (90.7 kg)   SpO2 95%   BMI 34.78 kg/m²     General appearance:  No apparent distress, appears stated age and cooperative. HEENT:  Normal cephalic, atraumatic without obvious deformity. Pupils equal, round, and reactive to light. Extra ocular muscles intact. Conjunctivae/corneas clear. Neck: Supple, with full range of motion.  No jugular venous distention. Trachea midline. Respiratory:  Normal respiratory effort. Clear to auscultation, bilaterally without Rales/Wheezes/Rhonchi. Cardiovascular:  Regular rate and rhythm with normal J4/W3, soft diastolic murmur best heard of 3rd right intercostal space, no rubs or gallops. Abdomen: Soft, non-tender, non-distended with normal bowel sounds. Musculoskeletal:  No clubbing, cyanosis or edema bilaterally. Full range of motion without deformity. Unable to reproduce pain on palpation or muscle exertion  Skin: Skin color, texture, turgor normal.  No rashes or lesions. Neurologic:  Neurovascularly intact without any focal sensory/motor deficits. Cranial nerves: II-XII intact, grossly non-focal.  Psychiatric:  Alert and oriented, thought content appropriate, normal insight  Capillary Refill: Brisk,< 3 seconds   Peripheral Pulses: +2 palpable, equal bilaterally       Labs:     Recent Labs     10/15/20  1120   WBC 4.7*   HGB 13.4   HCT 41.8        Recent Labs     10/15/20  1120      K 4.2      CO2 26   BUN 17   CREATININE 0.6   CALCIUM 9.1     No results for input(s): AST, ALT, BILIDIR, BILITOT, ALKPHOS in the last 72 hours. No results for input(s): INR in the last 72 hours. No results for input(s): Verlena Scottie in the last 72 hours. Urinalysis:      Lab Results   Component Value Date    NITRU NEGATIVE 08/18/2020    WBCUA 0-2 08/18/2020    BACTERIA NONE SEEN 08/18/2020    RBCUA 0-2 08/18/2020    BLOODU NEGATIVE 08/18/2020    SPECGRAV 1.015 08/18/2020    GLUCOSEU NEGATIVE 07/09/2020       Intake & Output:  No intake/output data recorded. No intake/output data recorded. Radiology:     CXR: I have reviewed the CXR with the following interpretation: No acute process.   CTA Chest: I have reviewed the CT with the following interpretation: No PE, no consolidations, 3mm right lower lobe pulmonary nodule similar to prior study  EKG:  I have reviewed the EKG with the following interpretation: NSR, voltage criteria for LVH    CTA CHEST W WO CONTRAST   Final Result   No acute pulmonary arterial embolism. No discrete lobar consolidation. 3 mm right lower lobe pulmonary nodule similar to prior study dated June 18, 2019. 1 year follow-up is advised to document continued stability. **This report has been created using voice recognition software. It may contain minor errors which are inherent in voice recognition technology. **      Final report electronically signed by Dr. Aiden Rodriguez on 10/15/2020 12:51 PM      XR CHEST PORTABLE   Final Result   Stable radiographic appearance of the chest. No evidence of an acute process. **This report has been created using voice recognition software. It may contain minor errors which are inherent in voice recognition technology. **      Final report electronically signed by Dr. Ailin Navarro MD on 10/15/2020 11:46 AM           DVT prophylaxis: Lovenox    Code Status: Prior      PT/OT Eval Status: [place] [address] Problems    Diagnosis Date Noted    Chest pain on exertion [R07.9] 10/15/2020     Thank you ANGELIA Watts - JENNI for the opportunity to be involved in this patient's care.     Electronically signed by Talia Wells DO on 10/15/2020 at 4:44 PM

## 2020-10-15 NOTE — ED NOTES
VS obtained. Pt ambulates steadily to restroom at this time. Denies any pain. Will monitor.      Fer Bowie RN  10/15/20 [id number]

## 2020-10-15 NOTE — ED TRIAGE NOTES
Pt presents to the ED for chest pain x6 days. Pt states she thought it was muscle pain and has been taking advil with relief. Pt states the pain was in her throat and under her left arm today. Pt states the pain is worse when talking and taking a deep breath. Pt denies SOB.

## 2020-10-15 NOTE — ED NOTES
Pt resting on cot speaking with family on phone. VS obtained. No needs voiced at this time. Will monitor.        Jhony Calhoun RN  10/15/20 [id number]

## 2020-10-15 NOTE — ED NOTES
Pt resting on cot. VS obtained. Pt provided with meal box per request. Will monitor.      Oswaldo Alvarado RN  10/15/20 [id number]

## 2020-10-15 NOTE — ED NOTES
ED to inpatient nurses report    Chief Complaint   Patient presents with    Chest Pain      Present to ED from home  LOC: alert and orientated to name, place, date  Vital signs   Vitals:    10/15/20 1124 10/15/20 1212 10/15/20 1314 10/15/20 1447   BP: 122/88 108/74 106/76 115/72   Pulse: 73 68 66 65   Resp: 18 19 16 16   Temp: 98.3 °F (36.8 °C)      TempSrc: Oral      SpO2: 97% 96% 98% 98%   Weight: 200 lb (90.7 kg)         Oxygen Baseline room air    Current needs required room air Bipap/Cpap No  LDAs:   Peripheral IV 10/15/20 Right Antecubital (Active)   Site Assessment Clean;Dry; Intact 10/15/20 1450   Line Status Normal saline locked 10/15/20 1450   Dressing Status Clean;Dry; Intact 10/15/20 1450   Dressing Intervention New 10/15/20 1127     Mobility: Independent  Pending ED orders: complete  Present condition: stable    Electronically signed by Alba Doss RN on 10/15/2020 at 3:36 PM       [place]  10/15/20 1537

## 2020-10-15 NOTE — TELEPHONE ENCOUNTER
Reason for Disposition   Chest pain lasting longer than 5 minutes and ANY of the following:* Over 39years old* Over 27years old and at least one cardiac risk factor (e.g., diabetes, high blood pressure, high cholesterol, smoker, or strong family history of heart disease)* History of heart disease (i.e., angina, heart attack, heart failure, bypass surgery, takes nitroglycerin)* Pain is crushing, pressure-like, or heavy    Answer Assessment - Initial Assessment Questions  1. LOCATION: \"Where does it hurt? \"        When she breaths or swallows will feel chest pain in center of chest to her under arm. 2. RADIATION: \"Does the pain go anywhere else? \" (e.g., into neck, jaw, arms, back)    Pain will radiate up to jaw    3. ONSET: \"When did the chest pain begin? \" (Minutes, hours or days)   Chest pain started on saturday    4. PATTERN \"Does the pain come and go, or has it been constant since it started? \"  \"Does it get worse with exertion? \"   Pain has been constant, has taken tylenol with no relief    5. DURATION: \"How long does it last\" (e.g., seconds, minutes, hours)      6. SEVERITY: \"How bad is the pain? \"  (e.g., Scale 1-10; mild, moderate, or severe)     - MILD (1-3): doesn't interfere with normal activities      - MODERATE (4-7): interferes with normal activities or awakens from sleep     - SEVERE (8-10): excruciating pain, unable to do any normal activities    Pain is 5/10    7. CARDIAC RISK FACTORS: \"Do you have any history of heart problems or risk factors for heart disease? \" (e.g., angina, prior heart attack; diabetes, high blood pressure, high cholesterol, smoker, or strong family history of heart disease)  Heart palpitations    8. PULMONARY RISK FACTORS: \"Do you have any history of lung disease? \"  (e.g., blood clots in lung, asthma, emphysema, birth control pills)         9. CAUSE: \"What do you think is causing the chest pain? \"      10. OTHER SYMPTOMS: \"Do you have any other symptoms? \" (e.g., dizziness, nausea, vomiting, sweating, fever, difficulty breathing, cough)     No dizziness today. No sob. No sweating. No nausea. Some cough    11. PREGNANCY: \"Is there any chance you are pregnant? \" \"When was your last menstrual period? \"    Protocols used: CHEST PAIN-ADULT-[place]    Received voicemail from [facility]. Pt calling with chest pain that started on Saturday. Has pain in upper left side of chest when talking, breathing or swallowing. Pain is 5/10 and will radiate to her jaw. History of heart palpitations. No other symptoms. Recommend pt call 911 and be evaluated in ED. She states she will hang up and call her son first.     Attention Provider: Thank you for allowing me to participate in the care of your patient. The  patient was connected to triage in response to information provided to the [facility]. Please do not respond through this encounter as the response is not directed to a shared pool.

## 2020-10-15 NOTE — ED NOTES
Report received from [facility]. No needs voiced at this time. Will monitor.      Chalo Partida RN  10/15/20 [id number]

## 2020-10-15 NOTE — ED PROVIDER NOTES
Patient was signed out to me by Oracio Maddox PA-C at end of shift pending repeat troponin. Patient presented for 5 days of recurrent exertional chest pain. She describes this as a pain in the left chest that radiates up into the neck, associated with fatigue, shortness of breath and exertional palpitations. Symptoms resolved with rest.  She has history of breast cancer and radiation which did result in some heart failure which she reports is significantly improved from a few years ago. She denies ever having had a heart cath in the past and reports last stress test was greater than 1 year ago. She had unremarkable EKG here, troponin within normal limits and Covid test was negative. She had initially declined admission to the previous provider but upon my evaluation of the patient, she is willing to be admitted. Discussed with the hospitalist service who will admit the patient for further management.     1. Exertional chest pain           Misael Sebastian PA-C  10/15/20 0910

## 2020-10-15 NOTE — ED PROVIDER NOTES
[facility] 22 COMPLAINT       Chief Complaint   Patient presents with    Chest Pain       Nurses Notes reviewed and I agree except as notedin the HPI. HISTORY OF PRESENT ILLNESS    Kim Gan is a 62 y.o. female who presents with chest pain for 5 days. The pain began while she was walking in her home. It is substernal with radiation to her left arm and neck. She describes the pain as a deep pressure rated a 5/10. Aggravating factors include walking, taking deep breaths, swallowing, talking. She also notes shortness of breath minimal exertion, noting SOB within 6 steps of stairs. Patient notes when at rest she is completely asymptomatic. She notes when she wakes in the morning her symptoms seem to be worse. She gets no alleviation with Advil. She does note a cough with clear production, dizziness, and palpitations she referred to has butterflies in her chest.      The patient has an extensive past medical history including breast cancer status post bilateral mastectomy and radiation. Radiation causing heart damage leading to congestive heart failure. She now has a ICD in place. The patient does have a history of dyspepsia currently on Prilosec. She states this discomfort she is experiencing is different in nature to her prior dyspepsia. Patient has no personal history of MI nor family history. The patient is a prior smoker however, denies alcohol and drug use. She denies nausea, vomiting, diaphoresis, nasal congestion, lower extremity swelling, and calf tenderness. REVIEW OF SYSTEMS     Review of Systems   Constitutional: Negative for activity change, appetite change, chills and fever. HENT: Negative for congestion, ear pain, rhinorrhea and sore throat. Eyes: Negative for pain, discharge and itching. Respiratory: Positive for cough and shortness of breath. Negative for wheezing.     Cardiovascular: Positive for chest pain and palpitations. Negative for leg swelling. Gastrointestinal: Negative for abdominal pain, diarrhea, nausea and vomiting. Genitourinary: Negative for difficulty urinating and dysuria. Musculoskeletal: Negative for arthralgias, back pain and myalgias. Skin: Negative for color change and rash. Neurological: Positive for dizziness. Negative for seizures, syncope, light-headedness and headaches. Psychiatric/Behavioral: Negative for agitation, confusion, self-injury and suicidal ideas. PAST MEDICAL HISTORY    has a past medical history of Abnormal heart rhythm, Anxiety, Cancer (HCC), CHF (congestive heart failure) ([facility] [address] Congenital heart disease, DJD (degenerative joint disease), Enlarged lymph nodes, Hypertension, Hypothyroidism, ICD (implantable cardioverter-defibrillator) in place, Kidney stones, PONV (postoperative nausea and vomiting), Prediabetes, and Thyroid disease. SURGICAL HISTORY      has a past surgical history that includes Mastectomy, bilateral; Tonsillectomy; Percutaneous Transluminal Coronary Angio; Appendectomy; Colonoscopy; Carpal tunnel release (2019); Finger trigger release (Right, 6/25/2020); Colonoscopy (N/A, 7/27/2020); Colonoscopy (7/27/2020); and Pacemaker insertion (Left, 02/11/2019).     CURRENT MEDICATIONS       Current Discharge Medication List      CONTINUE these medications which have NOT CHANGED    Details   omeprazole (PRILOSEC) 20 MG delayed release capsule Take 1 capsule by mouth every morning (before breakfast)  Qty: 90 capsule, Refills: 2    Associated Diagnoses: Gastroesophageal reflux disease, unspecified whether esophagitis present      polyethylene glycol (GLYCOLAX) 17 GM/SCOOP powder MIX 17 GRAMS OF POWDER WITH LIQUID BY MOUTH ONCE DAILY      letrozole (FEMARA) 2.5 MG tablet Take 1 tablet by mouth daily  Qty: 90 tablet, Refills: 3    Associated Diagnoses: Malignant neoplasm of left breast in female, estrogen receptor positive, unspecified site of breast ([facility] Use of letrozole (Femara)      venlafaxine (EFFEXOR XR) 75 MG extended release capsule Take 1 capsule by mouth daily  Qty: 90 capsule, Refills: 0    Associated Diagnoses: Depression with anxiety      carvedilol (COREG) 3.125 MG tablet Take 1 tablet by mouth twice daily  Qty: 180 tablet, Refills: 0    Associated Diagnoses: Chronic systolic (congestive) heart failure ([facility] Essential hypertension      aspirin 81 MG chewable tablet Take 1 tablet by mouth daily  Qty: 30 tablet, Refills: 3      ENTRESTO 49-51 MG per tablet Take 1 tablet by mouth twice daily  Qty: 60 tablet, Refills: 5      atorvastatin (LIPITOR) 40 MG tablet Take 1 tablet by mouth nightly  Qty: 90 tablet, Refills: 3    Associated Diagnoses: Mixed hyperlipidemia      levothyroxine (SYNTHROID) 100 MCG tablet Take 1 tablet by mouth daily  Qty: 90 tablet, Refills: 3    Associated Diagnoses: Hypothyroidism, unspecified type      zoster recombinant adjuvanted vaccine (SHINGRIX) 50 MCG/0.5ML SUSR injection Inject 0.5 mLs into the muscle See Admin Instructions 1 dose now and repeat in 2-6 months  Qty: 0.5 mL, Refills: 1    Associated Diagnoses: Need for shingles vaccine      furosemide (LASIX) 20 MG tablet Take 1 tablet by mouth daily as needed (leg swelling, weight gain)  Qty: 90 tablet, Refills: 0      acetaminophen (TYLENOL) 500 MG tablet Take 500 mg by mouth every 6 hours as needed for Pain             ALLERGIES     is allergic to adhesive tape. HISTORY     She indicated that her mother is alive. She indicated that her father is . She indicated that both of her brothers are alive. She indicated that the status of her maternal grandfather is unknown. She indicated that the status of her neg hx is unknown.   family history includes Cancer in her father and maternal grandfather; Merrilyn Crystal in her father; Colon Polyps in her brother; Dementia in her mother; High Blood Pressure in her mother; Mental Retardation in her brother.     SOCIALHISTORY reports that she has been smoking cigarettes. She started smoking about 38 years ago. She has a 9.25 pack-year smoking history. She has never used smokeless tobacco. She reports that she does not drink alcohol or use drugs. PHYSICAL EXAM     INITIAL VITALS:  height is 5' 2\" (1.575 m) and weight is 207 lb 1.6 oz (93.9 kg). Her oral temperature is 98.2 °F (36.8 °C). Her blood pressure is 96/55 (abnormal) and her pulse is 66. Her respiration is 18 and oxygen saturation is 95%. Physical Exam  Vitals signs and nursing note reviewed. Exam conducted with a chaperone present. Constitutional:       General: She is not in acute distress. Appearance: Normal appearance. She is well-developed. She is not ill-appearing or diaphoretic. HENT:      Head: Normocephalic and atraumatic. Nose: Nose normal.   Eyes:      General: No scleral icterus. Conjunctiva/sclera: Conjunctivae normal.      Pupils: Pupils are equal, round, and reactive to light. Neck:      Musculoskeletal: Normal range of motion and neck supple. Cardiovascular:      Rate and Rhythm: Normal rate and regular rhythm. Heart sounds: Heart sounds are distant. No murmur. Comments: Negative Noreen sign b/l. Pulmonary:      Effort: Pulmonary effort is normal. No tachypnea or respiratory distress. Breath sounds: No decreased air movement. No wheezing. Chest:      Chest wall: No tenderness or edema. Comments: Bilateral mastectomy. ICD in place. Abdominal:      General: Abdomen is protuberant. Bowel sounds are normal. There is no distension. Palpations: Abdomen is soft. Tenderness: There is no abdominal tenderness. Musculoskeletal: Normal range of motion. Right lower le+ Edema present. Left lower le+ Edema present. Skin:     General: Skin is warm and dry. Capillary Refill: Capillary refill takes less than 2 seconds. Coloration: Skin is not jaundiced.    Neurological:      General: No focal deficit present. Mental Status: She is alert and oriented to person, place, and time. Cranial Nerves: No dysarthria or facial asymmetry. Sensory: Sensation is intact. Psychiatric:         Attention and Perception: Attention normal.         Mood and Affect: Mood normal.         Speech: Speech normal.         Behavior: Behavior normal.         DIFFERENTIAL DIAGNOSIS:   Chest pain    DIAGNOSTIC RESULTS     EKG: All EKG's are interpreted by the Emergency Department Physician who either signs or Co-signs this chart in the absence of a cardiologist.     EKG Emergency (Final result)    Component (Lab Inquiry)   Collection Time  Result Time  Ventricular Rate  Atrial Rate  P-R Interval  QRS Duration  Q-T Interval    10/15/20 11:16:57  10/15/20 12:44:08  72  72  178  94  408         Collection Time  Result Time  QTc Calculation (Bazett)  P Axis  R Axis  T Axis    10/15/20 11:16:57  10/15/20 12:44:08  446  35  -20  36           Final result                 Narrative:     Normal sinus rhythm   Voltage criteria for left ventricular hypertrophy   Abnormal ECG   When compared with ECG of 17-AUG-2020 16:02,   Nonspecific T wave abnormality has replaced inverted T waves in Inferior leads   Confirmed by Beti Howe ([id number]) on 10/15/2020 12:44:06 PM                        RADIOLOGY: non-plain film images(s) such as CT, Ultrasound and MRI are read by the radiologist.     CTA [address] (Final result)   Result time 10/15/20 12:51:21   Final result by Thuy Porter MD (10/15/20 12:51:21)                 Impression:     No acute pulmonary arterial embolism. No discrete lobar consolidation. 3 mm right lower lobe pulmonary nodule similar to prior study dated June 18, 2019. 1 year follow-up is advised to document continued stability. **This report has been created using voice recognition software. It may contain minor errors which are inherent in voice recognition technology. **     Final report mis-transcribed.)    ALFRED Carlos [place], [place]  10/16/20 [id number]

## 2020-10-16 ENCOUNTER — APPOINTMENT (OUTPATIENT)
Dept: NON INVASIVE DIAGNOSTICS | Age: 60
End: 2020-10-16
Payer: COMMERCIAL

## 2020-10-16 LAB
ANION GAP SERPL CALCULATED.3IONS-SCNC: 11 MEQ/L (ref 8–16)
BUN BLDV-MCNC: 15 MG/DL (ref 7–22)
CALCIUM SERPL-MCNC: 8.8 MG/DL (ref 8.5–10.5)
CHLORIDE BLD-SCNC: 106 MEQ/L (ref 98–111)
CO2: 26 MEQ/L (ref 23–33)
CREAT SERPL-MCNC: 0.6 MG/DL (ref 0.4–1.2)
EKG ATRIAL RATE: 72 BPM
EKG P AXIS: 42 DEGREES
EKG P-R INTERVAL: 184 MS
EKG Q-T INTERVAL: 418 MS
EKG QRS DURATION: 90 MS
EKG QTC CALCULATION (BAZETT): 457 MS
EKG R AXIS: -22 DEGREES
EKG T AXIS: 10 DEGREES
EKG VENTRICULAR RATE: 72 BPM
ERYTHROCYTE [DISTWIDTH] IN BLOOD BY AUTOMATED COUNT: 13.7 % (ref 11.5–14.5)
ERYTHROCYTE [DISTWIDTH] IN BLOOD BY AUTOMATED COUNT: 49 FL (ref 35–45)
GFR SERPL CREATININE-BSD FRML MDRD: > 90 ML/MIN/1.73M2
GLUCOSE BLD-MCNC: 101 MG/DL (ref 70–108)
HCT VFR BLD CALC: 40.7 % (ref 37–47)
HEMOGLOBIN: 13.1 GM/DL (ref 12–16)
MCH RBC QN AUTO: 31.3 PG (ref 26–33)
MCHC RBC AUTO-ENTMCNC: 32.2 GM/DL (ref 32.2–35.5)
MCV RBC AUTO: 97.4 FL (ref 81–99)
PLATELET # BLD: 184 THOU/MM3 (ref 130–400)
PMV BLD AUTO: 12 FL (ref 9.4–12.4)
POTASSIUM REFLEX MAGNESIUM: 4.2 MEQ/L (ref 3.5–5.2)
RBC # BLD: 4.18 MILL/MM3 (ref 4.2–5.4)
SODIUM BLD-SCNC: 143 MEQ/L (ref 135–145)
TROPONIN T: < 0.01 NG/ML
WBC # BLD: 4.7 THOU/MM3 (ref 4.8–10.8)

## 2020-10-16 PROCEDURE — 80048 BASIC METABOLIC PNL TOTAL CA: CPT

## 2020-10-16 PROCEDURE — A9500 TC99M SESTAMIBI: HCPCS | Performed by: NURSE PRACTITIONER

## 2020-10-16 PROCEDURE — 84484 ASSAY OF TROPONIN QUANT: CPT

## 2020-10-16 PROCEDURE — 6370000000 HC RX 637 (ALT 250 FOR IP): Performed by: STUDENT IN AN ORGANIZED HEALTH CARE EDUCATION/TRAINING PROGRAM

## 2020-10-16 PROCEDURE — 85027 COMPLETE CBC AUTOMATED: CPT

## 2020-10-16 PROCEDURE — 93005 ELECTROCARDIOGRAM TRACING: CPT | Performed by: STUDENT IN AN ORGANIZED HEALTH CARE EDUCATION/TRAINING PROGRAM

## 2020-10-16 PROCEDURE — 36415 COLL VENOUS BLD VENIPUNCTURE: CPT

## 2020-10-16 PROCEDURE — 93307 TTE W/O DOPPLER COMPLETE: CPT

## 2020-10-16 PROCEDURE — 6360000002 HC RX W HCPCS: Performed by: STUDENT IN AN ORGANIZED HEALTH CARE EDUCATION/TRAINING PROGRAM

## 2020-10-16 PROCEDURE — G0378 HOSPITAL OBSERVATION PER HR: HCPCS

## 2020-10-16 PROCEDURE — 93017 CV STRESS TEST TRACING ONLY: CPT | Performed by: INTERNAL MEDICINE

## 2020-10-16 PROCEDURE — 6360000002 HC RX W HCPCS

## 2020-10-16 PROCEDURE — 2580000003 HC RX 258: Performed by: STUDENT IN AN ORGANIZED HEALTH CARE EDUCATION/TRAINING PROGRAM

## 2020-10-16 PROCEDURE — 99225 PR SBSQ OBSERVATION CARE/DAY 25 MINUTES: CPT | Performed by: NURSE PRACTITIONER

## 2020-10-16 PROCEDURE — 78452 HT MUSCLE IMAGE SPECT MULT: CPT

## 2020-10-16 PROCEDURE — 3430000000 HC RX DIAGNOSTIC RADIOPHARMACEUTICAL: Performed by: NURSE PRACTITIONER

## 2020-10-16 RX ADMIN — LEVOTHYROXINE SODIUM 100 MCG: 100 TABLET ORAL at 05:35

## 2020-10-16 RX ADMIN — SACUBITRIL AND VALSARTAN 1 TABLET: 49; 51 TABLET, FILM COATED ORAL at 22:00

## 2020-10-16 RX ADMIN — CARVEDILOL 3.12 MG: 3.12 TABLET, FILM COATED ORAL at 10:31

## 2020-10-16 RX ADMIN — CARVEDILOL 3.12 MG: 3.12 TABLET, FILM COATED ORAL at 18:17

## 2020-10-16 RX ADMIN — Medication 10 ML: at 22:01

## 2020-10-16 RX ADMIN — LETROZOLE 2.5 MG: 2.5 TABLET ORAL at 10:31

## 2020-10-16 RX ADMIN — VENLAFAXINE HYDROCHLORIDE 37.5 MG: 37.5 CAPSULE, EXTENDED RELEASE ORAL at 10:30

## 2020-10-16 RX ADMIN — PANTOPRAZOLE SODIUM 40 MG: 40 TABLET, DELAYED RELEASE ORAL at 05:35

## 2020-10-16 RX ADMIN — ACETAMINOPHEN 650 MG: 325 TABLET ORAL at 04:17

## 2020-10-16 RX ADMIN — ASPIRIN 81 MG: 81 TABLET, CHEWABLE ORAL at 10:31

## 2020-10-16 RX ADMIN — SACUBITRIL AND VALSARTAN 1 TABLET: 49; 51 TABLET, FILM COATED ORAL at 10:30

## 2020-10-16 RX ADMIN — Medication 10.9 MILLICURIE: at 08:15

## 2020-10-16 RX ADMIN — ATORVASTATIN CALCIUM 40 MG: 40 TABLET, FILM COATED ORAL at 22:00

## 2020-10-16 RX ADMIN — ENOXAPARIN SODIUM 40 MG: 40 INJECTION SUBCUTANEOUS at 22:00

## 2020-10-16 RX ADMIN — Medication 35 MILLICURIE: at 09:15

## 2020-10-16 ASSESSMENT — PAIN DESCRIPTION - DESCRIPTORS: DESCRIPTORS: HEADACHE

## 2020-10-16 ASSESSMENT — PAIN SCALES - GENERAL
PAINLEVEL_OUTOF10: 0
PAINLEVEL_OUTOF10: 0
PAINLEVEL_OUTOF10: 3
PAINLEVEL_OUTOF10: 0

## 2020-10-16 ASSESSMENT — PAIN DESCRIPTION - LOCATION: LOCATION: HEAD

## 2020-10-16 NOTE — PROGRESS NOTES
[facility]  PHYSICAL THERAPY MISSED TREATMENT NOTE  [facility]B    Date: 10/16/2020  Patient Name: Vega Hannon        MRN: [id number]   : 1960  (62 y.o.)  Gender: female                REASON FOR MISSED TREATMENT:  Missed Treat. Pt declines need for PT/OT, states she is moving well. PT will sign off.

## 2020-10-16 NOTE — CARE COORDINATION
DISCHARGE PLANNING EVALUATION: OP/OBSERVATION        10/16/20, 3:57 PM EDT    Afua Guidry       Admitted from: ED 10/15/2020/ 1112   Location: [address] [place], [place] [address] Reason for admit: Chest pain on exertion [R07.9]  Chest pain on exertion [R07.9]  Chest pain [R07.9]   Admit order signed?: yes    Procedure: echo and stress test today    Pertinent Info/Orders/Treatment Plan:  Daily weights, I&O, low Na diet, cardiology consulted. PCP: ANGELIA Edward - CNP    Patient Goals/Plan/Treatment Preferences: Pt from home with family and denies needs. Transportation/Food Security/Housekeeping Addressed:  No issues identified. 10/16/20, 4:00 PM EDT    Patient goals/plan/ treatment preferences discussed by  and . Patient goals/plan/ treatment preferences reviewed with patient/ family. Patient/ family verbalize understanding of discharge plan and are in agreement with goal/plan/treatment preferences. Understanding was demonstrated using the teach back method. AVS provided by RN at time of discharge, which includes all necessary medical information pertaining to the patients current course of illness, treatment, post-discharge goals of care, and treatment preferences.

## 2020-10-16 NOTE — PROGRESS NOTES
Hospitalist Progress Note    Patient:  Marco Muro      Unit/Bed:8B-28/028-A    YOB: 1960    MRN: [id number]       Acct: [de-identified]     PCP: ANGELIA Mccloud CNP    Date of Admission: 10/15/2020    Assessment/Plan:    1. Acute chest pain, resolved. ACS ruled out. Echo and stress pending. 2. Nonischemic cardiomyopathy s/p ICD. Chronic systolic heart failure. Ef 20-25% on last echo. No signs of volume overload. Daily weights. Intake and output. Low sodium diet. 3. Possible arrhythmia. Being worked up [address] [place] 4. Ess hypertension. Home medications resumed. 5. Hypothyroidism. Synthroid. 6. HX of left breast CA s/p b/l mastectomy. Femara. 7. Prediabetes. A1c 5.9%. 8. Anxiety. Effexor. 9. Obesity. BMI 37.88. Chief Complaint: CP    Hospital Course:     Ms. Rena Harris is a 62year old female with a past history of Hypertrophic Cardiomyopathy, low EF (20-25%) with ICD placement, HTN, Hypothyroidism, DJD, and Breast Cancer (currently taking oral chemotherapy). Her preferred language is 1635 imeem. She came to the ER at the request of her PCP after telling her that she has been having chest pain since last Saturday (about 6 days). She states that the pain did come on suddenly, but it is not severe. She stresses that the pain is not sharp, but \"deaf\" and dull. The pain does not limit her ability to work, however it is present everyday. The pain is rated about 6/10, located in the substernal region and radiating to the throat, left neck, left shoulder, and under and around her left breast.  The pain comes in waves, and starts with substernal pain first.  She states it is normally worse in the evening. She has tried Advil with little relief. She also states concurrent fatigue, joint pain in her hands, dry cough, palpitations, and difficulty swallowing.   She stated at first she thought it might have been the flu, but since the joint pain hasn't moved from her hands, she dismissed this. She denies fever or chills. She states she has been having increased dyspnea on exertion as well, but no increase in lower limb swelling. She states that she has had to support herself going up-stairs by crawling on her hands due to shortness of breath. She has still been able to complete chores, however it is difficult due to the fatigue. She states some increased anxiety due to her son's wedding at the end of this month. Her PCP was concerned that this might have something to do with her heart, and sent her to the ER. In the ER, the patient was given ASA and Protonix IV. They were discussing with her if they would like to preform a stress test or not, but none has been ordered. There is a recent question as to if she has been having runs of SVT or VT by Cardiology. No definitive diagnosis has been set.     Subjective (past 24 hours): No recurrent chest pain since admission. Slightly dizzy following stress test. No SOB or palpitations.       Medications:  Reviewed    Infusion Medications   Scheduled Medications    aspirin  81 mg Oral Daily    atorvastatin  40 mg Oral Nightly    carvedilol  3.125 mg Oral BID WC    sacubitril-valsartan  1 tablet Oral BID    letrozole  2.5 mg Oral Daily    levothyroxine  100 mcg Oral Daily    pantoprazole  40 mg Oral QAM AC    venlafaxine  37.5 mg Oral Daily    sodium chloride flush  10 mL Intravenous 2 times per day    enoxaparin  40 mg Subcutaneous Q24H    influenza virus vaccine  0.5 mL Intramuscular Once     PRN Meds: butalbital-acetaminophen-caffeine, furosemide, sodium chloride flush, acetaminophen **OR** acetaminophen, polyethylene glycol, promethazine **OR** ondansetron, regadenoson      Intake/Output Summary (Last 24 hours) at 10/16/2020 1003  Last data filed at 10/16/2020 0420  Gross per 24 hour   Intake 250 ml   Output 275 ml   Net -25 ml       Diet:  Diet NPO, After Midnight    Exam:  BP (!) 96/55   Pulse 66   Temp 98.2 °F (36.8 °C) (Oral)   Resp 18   Ht 5' 2\" (1.575 m)   Wt 207 lb 1.6 oz (93.9 kg)   SpO2 95%   BMI 37.88 kg/m²     General appearance: No apparent distress, appears stated age and cooperative. HEENT: Pupils equal, round, and reactive to light. Conjunctivae/corneas clear. Neck: Supple, with full range of motion. No jugular venous distention. Trachea midline. Respiratory:  Normal respiratory effort. Clear to auscultation, bilaterally without Rales/Wheezes/Rhonchi. Cardiovascular: Regular rate and rhythm with normal S1/S2 without murmurs, rubs or gallops. Abdomen: Soft, non-tender, non-distended with normal bowel sounds. Musculoskeletal: passive and active ROM x 4 extremities. Skin: Skin color, texture, turgor normal.    Neurologic:  Neurovascularly intact without any focal sensory/motor deficits. Cranial nerves: II-XII intact, grossly non-focal.  Psychiatric: Alert and oriented, thought content appropriate  Capillary Refill: Brisk,< 3 seconds   Peripheral Pulses: +2 palpable, equal bilaterally       Labs:   Recent Labs     10/15/20  1120 10/16/20  0354   WBC 4.7* 4.7*   HGB 13.4 13.1   HCT 41.8 40.7    184     Recent Labs     10/15/20  1120 10/16/20  0354    143   K 4.2 4.2    106   CO2 26 26   BUN 17 15   CREATININE 0.6 0.6   CALCIUM 9.1 8.8     No results for input(s): AST, ALT, BILIDIR, BILITOT, ALKPHOS in the last 72 hours. No results for input(s): INR in the last 72 hours. No results for input(s): John Beery in the last 72 hours. No results for input(s): PROCAL in the last 72 hours. Microbiology:      Urinalysis:      Lab Results   Component Value Date    NITRU NEGATIVE 08/18/2020    WBCUA 0-2 08/18/2020    BACTERIA NONE SEEN 08/18/2020    RBCUA 0-2 08/18/2020    BLOODU NEGATIVE 08/18/2020    SPECGRAV 1.015 08/18/2020    GLUCOSEU NEGATIVE 07/09/2020       Radiology:  CTA CHEST W WO CONTRAST   Final Result   No acute pulmonary arterial embolism.  No discrete lobar consolidation. 3 mm right lower lobe pulmonary nodule similar to prior study dated June 18, 2019. 1 year follow-up is advised to document continued stability. **This report has been created using voice recognition software. It may contain minor errors which are inherent in voice recognition technology. **      Final report electronically signed by Dr. Abbie Day on 10/15/2020 12:51 PM      XR CHEST PORTABLE   Final Result   Stable radiographic appearance of the chest. No evidence of an acute process. **This report has been created using voice recognition software. It may contain minor errors which are inherent in voice recognition technology. **      Final report electronically signed by Dr. Carson Hughes MD on 10/15/2020 11:46 AM             Code Status: Full Code        Active Hospital Problems    Diagnosis Date Noted    Exertional chest pain [R07.9] 10/15/2020    Chest pain [R07.9] 10/15/2020       Electronically signed by ANGELIA Mckeon CNP on 10/16/2020 at 10:03 AM

## 2020-10-17 LAB
D-DIMER QUANTITATIVE: < 215 NG/ML FEU (ref 0–500)
EKG ATRIAL RATE: 69 BPM
EKG P AXIS: 22 DEGREES
EKG P-R INTERVAL: 178 MS
EKG Q-T INTERVAL: 430 MS
EKG QRS DURATION: 94 MS
EKG QTC CALCULATION (BAZETT): 460 MS
EKG R AXIS: -26 DEGREES
EKG T AXIS: 13 DEGREES
EKG VENTRICULAR RATE: 69 BPM
TROPONIN T: < 0.01 NG/ML

## 2020-10-17 PROCEDURE — G0378 HOSPITAL OBSERVATION PER HR: HCPCS

## 2020-10-17 PROCEDURE — 84484 ASSAY OF TROPONIN QUANT: CPT

## 2020-10-17 PROCEDURE — 2500000003 HC RX 250 WO HCPCS

## 2020-10-17 PROCEDURE — 93454 CORONARY ARTERY ANGIO S&I: CPT | Performed by: INTERNAL MEDICINE

## 2020-10-17 PROCEDURE — 2580000003 HC RX 258: Performed by: FAMILY MEDICINE

## 2020-10-17 PROCEDURE — 99215 OFFICE O/P EST HI 40 MIN: CPT | Performed by: INTERNAL MEDICINE

## 2020-10-17 PROCEDURE — 94760 N-INVAS EAR/PLS OXIMETRY 1: CPT

## 2020-10-17 PROCEDURE — 6370000000 HC RX 637 (ALT 250 FOR IP): Performed by: STUDENT IN AN ORGANIZED HEALTH CARE EDUCATION/TRAINING PROGRAM

## 2020-10-17 PROCEDURE — 6360000002 HC RX W HCPCS

## 2020-10-17 PROCEDURE — C1769 GUIDE WIRE: HCPCS

## 2020-10-17 PROCEDURE — C1760 CLOSURE DEV, VASC: HCPCS

## 2020-10-17 PROCEDURE — 2580000003 HC RX 258: Performed by: INTERNAL MEDICINE

## 2020-10-17 PROCEDURE — 6370000000 HC RX 637 (ALT 250 FOR IP)

## 2020-10-17 PROCEDURE — 2709999900 HC NON-CHARGEABLE SUPPLY

## 2020-10-17 PROCEDURE — 6360000004 HC RX CONTRAST MEDICATION: Performed by: FAMILY MEDICINE

## 2020-10-17 PROCEDURE — 99226 PR SBSQ OBSERVATION CARE/DAY 35 MINUTES: CPT | Performed by: FAMILY MEDICINE

## 2020-10-17 PROCEDURE — 6360000002 HC RX W HCPCS: Performed by: STUDENT IN AN ORGANIZED HEALTH CARE EDUCATION/TRAINING PROGRAM

## 2020-10-17 PROCEDURE — 93005 ELECTROCARDIOGRAM TRACING: CPT | Performed by: FAMILY MEDICINE

## 2020-10-17 PROCEDURE — 85379 FIBRIN DEGRADATION QUANT: CPT

## 2020-10-17 PROCEDURE — C1894 INTRO/SHEATH, NON-LASER: HCPCS

## 2020-10-17 PROCEDURE — 36415 COLL VENOUS BLD VENIPUNCTURE: CPT

## 2020-10-17 RX ORDER — ACETAMINOPHEN 325 MG/1
650 TABLET ORAL EVERY 4 HOURS PRN
Status: DISCONTINUED | OUTPATIENT
Start: 2020-10-17 | End: 2020-10-17 | Stop reason: SDUPTHER

## 2020-10-17 RX ORDER — SODIUM CHLORIDE 0.9 % (FLUSH) 0.9 %
10 SYRINGE (ML) INJECTION EVERY 12 HOURS SCHEDULED
Status: DISCONTINUED | OUTPATIENT
Start: 2020-10-17 | End: 2020-10-18 | Stop reason: HOSPADM

## 2020-10-17 RX ORDER — ATROPINE SULFATE 0.4 MG/ML
0.5 AMPUL (ML) INJECTION
Status: ACTIVE | OUTPATIENT
Start: 2020-10-17 | End: 2020-10-17

## 2020-10-17 RX ORDER — 0.9 % SODIUM CHLORIDE 0.9 %
500 INTRAVENOUS SOLUTION INTRAVENOUS ONCE
Status: COMPLETED | OUTPATIENT
Start: 2020-10-17 | End: 2020-10-17

## 2020-10-17 RX ORDER — SODIUM CHLORIDE 9 MG/ML
INJECTION, SOLUTION INTRAVENOUS CONTINUOUS
Status: DISCONTINUED | OUTPATIENT
Start: 2020-10-17 | End: 2020-10-18 | Stop reason: HOSPADM

## 2020-10-17 RX ORDER — SODIUM CHLORIDE 0.9 % (FLUSH) 0.9 %
10 SYRINGE (ML) INJECTION PRN
Status: DISCONTINUED | OUTPATIENT
Start: 2020-10-17 | End: 2020-10-18 | Stop reason: HOSPADM

## 2020-10-17 RX ORDER — NITROGLYCERIN 0.4 MG/1
TABLET SUBLINGUAL
Status: COMPLETED
Start: 2020-10-17 | End: 2020-10-17

## 2020-10-17 RX ORDER — LORAZEPAM 2 MG/ML
0.25 INJECTION INTRAMUSCULAR ONCE
Status: DISCONTINUED | OUTPATIENT
Start: 2020-10-17 | End: 2020-10-18 | Stop reason: HOSPADM

## 2020-10-17 RX ADMIN — NITROGLYCERIN: 0.4 TABLET, ORALLY DISINTEGRATING SUBLINGUAL at 09:42

## 2020-10-17 RX ADMIN — ASPIRIN 81 MG: 81 TABLET, CHEWABLE ORAL at 10:09

## 2020-10-17 RX ADMIN — SODIUM CHLORIDE 500 ML: 9 INJECTION, SOLUTION INTRAVENOUS at 10:20

## 2020-10-17 RX ADMIN — ATORVASTATIN CALCIUM 40 MG: 40 TABLET, FILM COATED ORAL at 20:44

## 2020-10-17 RX ADMIN — ACETAMINOPHEN 650 MG: 325 TABLET ORAL at 17:01

## 2020-10-17 RX ADMIN — CARVEDILOL 3.12 MG: 3.12 TABLET, FILM COATED ORAL at 17:02

## 2020-10-17 RX ADMIN — ENOXAPARIN SODIUM 40 MG: 40 INJECTION SUBCUTANEOUS at 20:44

## 2020-10-17 RX ADMIN — IOPAMIDOL 60 ML: 755 INJECTION, SOLUTION INTRAVENOUS at 14:50

## 2020-10-17 RX ADMIN — SACUBITRIL AND VALSARTAN 1 TABLET: 49; 51 TABLET, FILM COATED ORAL at 20:44

## 2020-10-17 RX ADMIN — PANTOPRAZOLE SODIUM 40 MG: 40 TABLET, DELAYED RELEASE ORAL at 06:04

## 2020-10-17 RX ADMIN — LEVOTHYROXINE SODIUM 100 MCG: 100 TABLET ORAL at 06:04

## 2020-10-17 RX ADMIN — SODIUM CHLORIDE: 9 INJECTION, SOLUTION INTRAVENOUS at 18:36

## 2020-10-17 RX ADMIN — NITROGLYCERIN: 0.4 TABLET, ORALLY DISINTEGRATING SUBLINGUAL at 10:05

## 2020-10-17 RX ADMIN — SACUBITRIL AND VALSARTAN 1 TABLET: 49; 51 TABLET, FILM COATED ORAL at 09:40

## 2020-10-17 ASSESSMENT — PAIN SCALES - GENERAL
PAINLEVEL_OUTOF10: 0
PAINLEVEL_OUTOF10: 3
PAINLEVEL_OUTOF10: 0
PAINLEVEL_OUTOF10: 0

## 2020-10-17 NOTE — CONSULTS
[facility]    Patient's Name/Date of Birth: Arely Silverio / 1960 (41 y.o.)    Date: October 17, 2020     Referring Provider: Sukhi Maharaj MD    CHIEF COMPLAINT:  CP      HPI: This is a pleasant 62 y.o. female presents with chest pain with negative trops and history of cardiomyopathy s/p ICD, related to chemotherapy for breast cancer. CP is occurring with exertion, eating, and at rest.  Sometimes better with rest.  Not necessarily improved with NTG SL prn. She has not had any firings. Left sided, 4-6/10, thought it was MSK for 1 week. No f/c/ns. ECG without acute changes. Stress positive for ischemia. EF 40-45%. No chest pain, angina, BORDEN, orthopnea, PND, sob at rest, palpitations, LE edema, or syncope. Improved since admitted. Echo:   Results for orders placed during the hospital encounter of 11/06/19   Echo 2D w doppler w color complete    Narrative Transthoracic Echocardiography Report (TTE)     Demographics      Patient Name     Naina Jones Gender                Female      MR #             [id number]      Race                                                        Ethnicity      Account #        [de-identified]      Room Number      Accession Number [id number]      Date of Study         11/06/2019      Date of Birth    1960     Referring Physician   Zahira Esparza Che, CNP      Age              63 year(s)     Chelsea Olvera MD                                   Physician     Procedure    Type of Study      TTE procedure:ECHOCARDIOGRAM COMPLETE 2D W DOPPLER W COLOR. Procedure Date  Date: 11/06/2019 Start: 12:58 PM    Study Location: Echo Lab  Technical Quality: Limited visualization due to lung interface. Indications:Mitral regurgitation.     Additional Medical History:Congestive heart failure, Caridomyopathy,  Syncope, Dizziness, Hypertension    Patient Status: Routine    Height: 62 inches Weight: 200 pounds BSA: 1.91 m^2 BMI: 36.58 kg/m^2    BP: 104/62 mmHg    Allergies    - No Known Allergies. Conclusions      Summary   Ejection fraction was estimated at 35-40%. Device lead/catheter seen in the right heart. There was trace aortic regurgitation. There was mild-moderate mitral regurgitation. There was mild tricuspid regurgitation. Assuming RAP = 5 mmHg, the estimated RVSP = 26 mmHg. Ascending aorta = 3.2 cm. IVC size is within normal limits with normal respiratory phasic changes. Signature      ----------------------------------------------------------------   Electronically signed by Cammie Paris MD (Interpreting   physician) on 11/08/2019 at 03:44 PM   ----------------------------------------------------------------      Findings      Mitral Valve   The mitral valve structure was normal with normal leaflet separation. DOPPLER: The transmitral velocity was within the normal range with no   evidence for mitral stenosis. There was mild-moderate mitral   regurgitation. Aortic Valve   The aortic valve was trileaflet with normal thickness and cuspal   separation. DOPPLER: Transaortic velocity was within the normal range with   no evidence of aortic stenosis. There was trace aortic regurgitation. Tricuspid Valve   The tricuspid valve structure was normal with normal leaflet separation. DOPPLER: There was no evidence of tricuspid stenosis. There was mild   tricuspid regurgitation. Assuming RAP = 5 mmHg, the estimated RVSP = 26   mmHg. Pulmonic Valve   The pulmonic valve leaflets were not well seen. DOPPLER: The transpulmonic   velocity was within the normal range with trace regurgitation. Left Atrium   Left atrial size was moderate-severely dilated.       Left Ventricle   Normal left ventricular size and severely reduced systolic function. There was severe global hypokinesis. Wall thickness was within normal limits. Ejection fraction was estimated at 35-40%. E/A flow reversal noted. Suggestive of diastolic dysfunction. Right Atrium   Right atrial size was severely dilated. Right Ventricle   The right ventricular size was normal with normal systolic function and   wall thickness. Device lead/catheter seen in the right heart. Pericardial Effusion   The pericardium was normal in appearance with no evidence of a pericardial   effusion. Pleural Effusion   No evidence of pleural effusion. Aorta / Great Vessels   -Ascending aorta = 3.2 cm. -IVC size is within normal limits with normal respiratory phasic changes.      M-Mode/2D Measurements & Calculations      LV Diastolic    LV Systolic Dimension: 4 cm  AV Cusp Separation: 1.8 cmLA   Dimension: 5 cm LV Volume Diastolic: 238.93  Dimension: 3.9 cmAO Root   LV FS:20 %      ml                           Dimension: 3.1 cm   LV PW           LV Volume Systolic: 55.4 ml   Diastolic: 0.9  LV EDV/LV EDV Index: 105.32   cm              ml/55 m^2LV ESV/LV ESV   Septum          Index: 67.3 ml/35 m^2        RV Diastolic Dimension: 2.6   Diastolic: 0.9  EF Calculated: 36.1 %        cm   cm                   LV Length: 8.3 cm            LA/Aorta: 1.26      LV Area   Diastolic: 39   cm^2   LV Area   Systolic: 36.9   cm^2     Doppler Measurements & Calculations      MV Peak E-Wave: 47.2 cm/s AV Peak Velocity: 130 LVOT Peak Velocity: 83.3   MV Peak A-Wave: 82.9 cm/s cm/s                  cm/s   MV E/A Ratio: 0.57        AV Peak Gradient:     LVOT Peak Gradient: 3 mmHg   MV Peak Gradient: 0.89    6.76 mmHg   mmHg      MV Deceleration Time: 243                       TR Velocity:227 cm/s   msec                                            TR Gradient:20.61 mmHg                             AV P1/2t: 781 msec    PV Peak Velocity: 53 cm/s   MV Area (PISA): 3.28 cm^2                       PV Peak Gradient: 1.12   MV E' Septal Velocity:                          mmHg   4.8 cm/s   MV A' Septal Velocity:    AV DVI (Vmax):0.64   8.3 cm/s   MV E' Lateral Velocity:   4.9 cm/s   MV A' Lateral Velocity: 9   cm/s   E/E' septal: 9.83   E/E' lateral: 9.63   MR Velocity: 502 cm/s   MR VTI: 190 cm   PISA Radius: 0.6 cm      PISA area: 2.26 cm^2     [id number]  [id number]        All labs, EKG's, diagnostic testing and images as well as cardiac cath, stress testing were reviewed during this encounter    Past Medical History:   Diagnosis Date    Abnormal heart rhythm     Anxiety     Cancer [facility])     breast  2016     CHF [facility]congestive heart failure) ([facility] [address]     Congenital heart disease     DJD (degenerative joint disease)     Enlarged lymph nodes     Hypertension     Hypothyroidism     ICD (implantable cardioverter-defibrillator) in place 02/11/2019    Dr. Johana Green Kidney stones     PONV (postoperative nausea and vomiting)     Prediabetes 10/7/2019    Thyroid disease      Past Surgical History:   Procedure Laterality Date    APPENDECTOMY      CARPAL TUNNEL RELEASE  2019    COLONOSCOPY      COLONOSCOPY N/A 7/27/2020    COLONOSCOPY POLYPECTOMY SNARE/COLD BIOPSY performed by Blanca Montero MD at [address] Endoscopy    COLONOSCOPY  7/27/2020    COLONOSCOPY POLYPECTOMY HOT BIOPSY performed by Blanca Montero MD at [address] Right 6/25/2020    DEQUERVAINS RELEASE AND RIGHT RING FINGER TRIGGER RELEASE performed by Renaldo Guzman DO at [address], BILATERAL     [address] Left 02/11/2019    [employer] PT HAS A MRI CONDITIONAL SYSTEM    PTCA      TONSILLECTOMY       Current Facility-Administered Medications   Medication Dose Route Frequency Provider Last Rate Last Dose    aspirin chewable tablet 81 mg  81 mg Oral Daily Sue Norman DO   81 mg at 10/16/20 1031    atorvastatin (LIPITOR) tablet 40 mg  40 mg Oral Nightly Sue RENEE Yuhas, DO   40 mg at 10/16/20 2200    butalbital-acetaminophen-caffeine (FIORICET, ESGIC) per tablet 1 tablet  1 tablet Oral Q4H PRN Sue Spencers, DO        carvedilol (COREG) tablet 3.125 mg  3.125 mg Oral BID WC Sue Spencers, DO   3.125 mg at 10/16/20 1817    sacubitril-valsartan (ENTRESTO) 49-51 MG per tablet 1 tablet  1 tablet Oral BID Sue RENEE Yuhermans, DO   1 tablet at 10/16/20 2200    furosemide (LASIX) tablet 20 mg  20 mg Oral Daily PRN Sue Spencers, DO        letrozole [facility]) tablet 2.5 mg  2.5 mg Oral Daily Sue RENEE Spencers, DO   2.5 mg at 10/16/20 1031    levothyroxine (SYNTHROID) tablet 100 mcg  100 mcg Oral Daily Sue Spencers, DO   100 mcg at 10/17/20 0604    pantoprazole (PROTONIX) tablet 40 mg  40 mg Oral QAM AC Sue DURAN Yuhas, DO   40 mg at 10/17/20 0604    venlafaxine (EFFEXOR XR) extended release capsule 37.5 mg  37.5 mg Oral Daily Sue RENEE Yuhas, DO   37.5 mg at 10/16/20 1030    sodium chloride flush 0.9 % injection 10 mL  10 mL Intravenous 2 times per day Max Ka, DO   10 mL at 10/16/20 2201    sodium chloride flush 0.9 % injection 10 mL  10 mL Intravenous PRN Sue Gonzalezhas, DO        acetaminophen (TYLENOL) tablet 650 mg  650 mg Oral Q6H PRN Sue DURAN Yuhas, DO   650 mg at 10/16/20 0417    Or    acetaminophen (TYLENOL) suppository 650 mg  650 mg Rectal Q6H PRN Sue Spencers, DO        polyethylene glycol (GLYCOLAX) packet 17 g  17 g Oral Daily PRN Sue Spencers, DO        promethazine (PHENERGAN) tablet 12.5 mg  12.5 mg Oral Q6H PRN Sue Norman, DO        Or    ondansetron (ZOFRAN) injection 4 mg  4 mg Intravenous Q6H PRN Sue Norman DO        enoxaparin (LOVENOX) injection 40 mg  40 mg Subcutaneous Q24H Sue Norman DO   40 mg at 10/16/20 2200    regadenoson (LEXISCAN) injection 0.4 mg  0.4 mg Intravenous ONCE  [address], MD        influenza quadrivalent split vaccine (FLUZONE;FLUARIX;FLULAVAL;AFLURIA) injection 0.5 mL  0.5 mL Intramuscular Once Naty Ramires MD         Prior to Admission medications    Medication Sig Start Date End Date Taking?  Authorizing Provider   omeprazole (PRILOSEC) 20 MG delayed release capsule Take 1 capsule by mouth every morning (before breakfast) 10/14/20  Yes ANGELIA Heredia CNP   polyethylene glycol (GLYCOLAX) 17 GM/SCOOP powder MIX 17 GRAMS OF POWDER WITH LIQUID BY MOUTH ONCE DAILY 8/1/20  Yes Historical Provider, MD   letrozole [facility]) 2.5 MG tablet Take 1 tablet by mouth daily 7/21/20  Yes Dianne Muse MD   venlafaxine (EFFEXOR XR) 75 MG extended release capsule Take 1 capsule by mouth daily 7/16/20  Yes ANGELIA Bhat CNP   carvedilol (COREG) 3.125 MG tablet Take 1 tablet by mouth twice daily 7/13/20  Yes Alec Hammans, MD   aspirin 81 MG chewable tablet Take 1 tablet by mouth daily 7/11/20  Yes Johanna Skaggs MD   ENTRESTO 49-51 MG per tablet Take 1 tablet by mouth twice daily 6/8/20  Yes ANGELIA Ruano CNP   atorvastatin (LIPITOR) 40 MG tablet Take 1 tablet by mouth nightly 10/7/19  Yes ANGELIA Bhat CNP   levothyroxine (SYNTHROID) 100 MCG tablet Take 1 tablet by mouth daily 10/7/19  Yes ANGELIA Bhat CNP   zoster recombinant adjuvanted vaccine [facility]) 50 MCG/0.5ML SUSR injection Inject 0.5 mLs into the muscle See Admin Instructions 1 dose now and repeat in 2-6 months 8/25/20 2/21/21  ANGELIA Bhat CNP   furosemide (LASIX) 20 MG tablet Take 1 tablet by mouth daily as needed (leg swelling, weight gain) 4/13/20   ANGELIA Bhat CNP   acetaminophen (TYLENOL) 500 MG tablet Take 500 mg by mouth every 6 hours as needed for Pain    Historical Provider, MD   Scheduled Meds:   aspirin  81 mg Oral Daily    atorvastatin  40 mg Oral Nightly    carvedilol  3.125 mg Oral BID WC    sacubitril-valsartan  1 tablet Oral BID    letrozole  2.5 mg Oral Daily    levothyroxine  100 mcg Oral Daily    pantoprazole  40 mg Oral QAM AC    venlafaxine  37.5 mg Oral Daily    sodium chloride flush  10 mL Intravenous 2 times per day    enoxaparin  40 mg Subcutaneous Q24H    influenza virus vaccine  0.5 mL Intramuscular Once     Continuous Infusions:  PRN Meds:.butalbital-acetaminophen-caffeine, furosemide, sodium chloride flush, acetaminophen **OR** acetaminophen, polyethylene glycol, promethazine **OR** ondansetron, regadenoson    Allergies   Allergen Reactions    Adhesive Tape Itching     Family History   Problem Relation Age of Onset    High Blood Pressure Mother     Dementia Mother     Cancer Father     Colon Cancer Father     Mental Retardation Brother     Colon Polyps Brother     Cancer Maternal Grandfather     Esophageal Cancer Neg Hx      Social History     Socioeconomic History    Marital status:      Spouse name: Not on file    Number of children: Not on file    Years of education: Not on file    Highest education level: Not on file   Occupational History    Not on file   Social Needs    Financial resource strain: Not on file    Food insecurity     Worry: Not on file     Inability: Not on file    Transportation needs     Medical: Not on file     Non-medical: Not on file   Tobacco Use    Smoking status: Current Some Day Smoker     Packs/day: 0.25     Years: 37.00     Pack years: 9.25     Types: Cigarettes     Start date: 11/12/1981    Smokeless tobacco: Never Used   Substance and Sexual Activity    Alcohol use: No    Drug use: No    Sexual activity: Not on file   Lifestyle    Physical activity     Days per week: Not on file     Minutes per session: Not on file    Stress: Not on file   Relationships    Social connections     Talks on phone: Not on file     Gets together: Not on file     Attends Restorationism service: Not on file     Active member of club or organization: Not on file     Attends meetings of clubs or organizations: Not on file Relationship status: Not on file    Intimate partner violence     Fear of current or ex partner: Not on file     Emotionally abused: Not on file     Physically abused: Not on file     Forced sexual activity: Not on file   Other Topics Concern    Not on file   Social History Narrative    Not on file     ROS:   Constitutional: Denies any recent wt change. Eyes:  Denies any blurring or double vision, no glaucoma  Ears/Nose/Mouth/Throat:  Denies any chronic sinus/rhinitis, bleeding gums  Cardiovascular:  As described above. Respiratory:  Denies any frequent cough, wheezing or coughing up blood  Genitourinary:  Denies difficulty with urination and kidney stones  Gastrointestinal:  Denies any chronic problems with abdominal pain, nausea, vomiting or diarrhea  Musculoskeletal:  Denies any joint pain, back pain, or difficulty walking  Integumentary:  Denies any rash  Neurological:  No numbness or tingling  Endocrine:  Denies any polydipsia. Hematologic/Lymphatic:  Denies any hemorrhage or lymphatic drainage problems. Labs:  CBC:   Recent Labs     10/15/20  1120 10/16/20  0354   WBC 4.7* 4.7*   HGB 13.4 13.1   HCT 41.8 40.7   MCV 95.9 97.4    184     BMP:   Recent Labs     10/15/20  1120 10/16/20  0354    143   K 4.2 4.2    106   CO2 26 26   BUN 17 15   CREATININE 0.6 0.6   MG 2.1  --      Accucheck Glucoses: No results for input(s): POCGLU in the last 72 hours. Cardiac Enzymes: No results for input(s): CKTOTAL, CKMB, CKMBINDEX, TROPONINI in the last 72 hours. PT/INR: No results for input(s): PROTIME, INR in the last 72 hours. APTT: No results for input(s): APTT in the last 72 hours.   Liver Profile:  Lab Results   Component Value Date    AST 12 07/09/2020    ALT 11 07/09/2020    BILIDIR <0.2 07/09/2020    BILITOT <0.2 07/09/2020    ALKPHOS 66 07/09/2020     Lab Results   Component Value Date    CHOL 154 07/09/2020    HDL 49 07/09/2020    TRIG 106 07/09/2020     TSH:   Lab Results Component Value Date    TSH 1.260 08/15/2020     UA:   Lab Results   Component Value Date    COLORU YELLOW 08/18/2020    PHUR 8.0 08/18/2020    LABCAST NONE SEEN 08/18/2020    LABCAST NONE SEEN 08/18/2020    WBCUA 0-2 08/18/2020    RBCUA 0-2 08/18/2020    YEAST NONE SEEN 08/18/2020    BACTERIA NONE SEEN 08/18/2020    SPECGRAV 1.015 08/18/2020    LEUKOCYTESUR NEGATIVE 08/18/2020    UROBILINOGEN 0.2 08/18/2020    BILIRUBINUR NEGATIVE 08/18/2020    BLOODU NEGATIVE 08/18/2020    GLUCOSEU NEGATIVE 07/09/2020    AMORPHOUS PHOSPHATES 08/18/2020         Physical Exam:  Vitals:    10/17/20 0330   BP: (!) 99/54   Pulse: 63   Resp: 16   Temp: 97.5 °F (36.4 °C)   SpO2: 94%        Intake/Output Summary (Last 24 hours) at 10/17/2020 0806  Last data filed at 10/17/2020 0348  Gross per 24 hour   Intake 310 ml   Output 500 ml   Net -190 ml      General:  No acute distress  Neck: Supple, no JVD, no carotid bruits  Heart: Regular rhythm normal S1 and S2, no rubs, murmurs or gallops  Lungs: clear to ascultation no rales, wheezes, or rhonchi  Abdomen: positive bowel sounds, soft, non-tender, non-distended, no bruits, no masses  Extremities:no clubbing, cyanosis or edema  Neurologic: alert and oriented x 3, cranial nerves 2-12 grossly intact, motor and sensory intact, moving all extremities  Skin: No rashes    Assessment:  Abnormal stress test  Atypical chest pain, concerning for [place]  Chemo-Cardiomyopathy, EF improved 40-45%, NYHA I, s/p ICD  HTN  ? VT vs SVT - EP reviewing, no shocks   HTN    Plan:  1. NPO  2. Cardiac cath today  3. Continue ASA  4. Agree cardiomyopathy medications  5. Consider adding Imdur if chest pain/BP  6. .Further recommendations based on results and clinical course    Thank you for allowing us to participate in the care of this patient. Please do not hesitate to call us with questions.     Electronically signed by Anika De La O MD on 10/17/2020 at 8:06 AM    Interventional Cardiology - [facility]

## 2020-10-17 NOTE — BRIEF OP NOTE
[address] [address]  Sedation/Analgesia Post Sedation Record    Pt Name: Afia Choudhury  Account number: [de-identified]  MRN: [id number]  YOB: 1960  Procedure Performed By: Corey Ramirez, [address]  Primary Care Physician: Renata Esparza APRN - CNP  Date: 10/17/2020    POST-PROCEDURE    Physicians/Assistants: GLORY Bhandari MD    Procedure Performed:Cath      Sedation/Anesthesia: Versed/ Fentanyl and 2% xylocaine local anesthesia. Estimated Blood Loss: < 50 ml. Specimens Removed: None         Disposition of Specimen: N/A        Complications: No Immediate Complications.        Post-procedure Diagnosis/Findings:      No sig CAD in left dominant system              GLORY Bhandari MD  Electronically signed 10/17/2020 at 2:56 PM  Interventional Cardiology

## 2020-10-17 NOTE — PROGRESS NOTES
Hospitalist Progress Note      Patient:  Marie Foote    Unit/Bed:6K-17/017-A  YOB: 1960  MRN: [id number]   Acct: [de-identified]   PCP: ANGELIA Stauffer CNP  Date of Admission: 10/15/2020    Assessment/Plan:    1. Acute chest pain: cardio evaluated and plan Cath 11am today. Patient had a rapid response called for chest pain at 10am. Vitals stable, pain midsternal, resolved with NTG. Given a small fluid bolus 500c in rapid. Trop and d-dimer repeated. Pt was very anxious and the more she spoke about her Chest pressure, the more she got herself worked up. No Ekg changes. Placed on 2 L NC o2 for comfort, asa. Family updated by RN supervisor (family stated the patient had mentioned to them that she already had a heart attack)--> pt very anxious on exam. Trial of a small dose of ativan 0.25mg IV x 1. Cardio aware of patient. Tried to contact Dr. Coleman Fields to see if anything else he wanted prior to cath, but he is currently in Cath lab. Non-urgent, pt is stable. 2. Nonischemic cardiomyopathy s/p ICD. Chronic systolic heart failure. Ef 20-25% on last echo. No signs of volume overload. Daily weights. Intake and output. Low sodium diet. 3. Possible arrhythmia. Being worked up [address]. 4. Ess hypertension. Home medications resumed. 5. Hypothyroidism. Synthroid. 6. HX of left breast CA s/p b/l mastectomy. Femara. 7. Prediabetes. A1c 5.9%. 8. Anxiety. Effexor. 9. Obesity. BMI 37.88.      Chief Complaint: CP    Initial H and P:-      Ms. Edmund Jackson is a 62year old female with a past history of Hypertrophic Cardiomyopathy, low EF (20-25%) with ICD placement, HTN, Hypothyroidism, DJD, and Breast Cancer (currently taking oral chemotherapy).  Her preferred language is Thai.  She came to the ER at the request of her PCP after telling her that she has been having chest pain since last Saturday (about 6 days).  She states that the pain did come on unchanged since admission. ROS (12 point review of systems completed. Pertinent positives noted. Otherwise ROS is negative)     Medications:  Reviewed    Infusion Medications   Scheduled Medications    sodium chloride  500 mL Intravenous Once    LORazepam  0.25 mg Intravenous Once    aspirin  81 mg Oral Daily    atorvastatin  40 mg Oral Nightly    carvedilol  3.125 mg Oral BID WC    sacubitril-valsartan  1 tablet Oral BID    letrozole  2.5 mg Oral Daily    levothyroxine  100 mcg Oral Daily    pantoprazole  40 mg Oral QAM AC    venlafaxine  37.5 mg Oral Daily    sodium chloride flush  10 mL Intravenous 2 times per day    enoxaparin  40 mg Subcutaneous Q24H    influenza virus vaccine  0.5 mL Intramuscular Once     PRN Meds: butalbital-acetaminophen-caffeine, furosemide, sodium chloride flush, acetaminophen **OR** acetaminophen, polyethylene glycol, promethazine **OR** ondansetron, regadenoson      Intake/Output Summary (Last 24 hours) at 10/17/2020 1051  Last data filed at 10/17/2020 0348  Gross per 24 hour   Intake 310 ml   Output 500 ml   Net -190 ml       Diet:  Diet NPO, After Midnight    Exam:  BP 98/63   Pulse 74   Temp 97.9 °F (36.6 °C) (Oral)   Resp 18   Ht 5' 2\" (1.575 m)   Wt 198 lb 6.4 oz (90 kg)   SpO2 98%   BMI 36.29 kg/m²      General appearance: +ve distress, appears older than stated age  HEENT: Pupils equal, round, and reactive to light. Conjunctivae/corneas clear. Neck: Supple, with full range of motion. No jugular venous distention. Trachea midline. Respiratory:  Normal respiratory effort. Clear to auscultation,  Cardiovascular: Regular rate and rhythm with normal S1/S2   Abdomen: Soft, non-tender, non-distended with normal bowel sounds. Musculoskeletal: passive and active ROM x 4 extremities. Skin: Skin color, texture, turgor normal.  No rashes or lesions.   Neurologic: grossly non-focal.  Psychiatric: Alert and oriented, anxious, tearful a bit at times  Peripheral right lower lobe pulmonary nodule similar to prior study dated June 18, 2019. 1 year follow-up is advised to document continued stability. **This report has been created using voice recognition software. It may contain minor errors which are inherent in voice recognition technology. ** Final report electronically signed by Dr. Mo Mantilla on 10/15/2020 12:51 PM    Xr Chest Portable    Result Date: 10/15/2020  PROCEDURE: XR CHEST PORTABLE CLINICAL INFORMATION: chest pain. Chest pain and shortness of breath for 5 days COMPARISON: 7/9/2020. TECHNIQUE: AP upright view of the chest. FINDINGS: There is a stable left-sided cardiac pacer/defibrillator generator with single lead. The lungs appear grossly clear. The pulmonary vasculature and cardiomediastinal silhouette are within normal limits. Visualized osseous structures appear grossly intact. Stable radiographic appearance of the chest. No evidence of an acute process. **This report has been created using voice recognition software. It may contain minor errors which are inherent in voice recognition technology. ** Final report electronically signed by Dr. Ami Palma MD on 10/15/2020 11:46 AM      Electronically signed by Radha Somers MD on 10/17/2020 at 10:51 AM

## 2020-10-17 NOTE — PLAN OF CARE
Problem: Falls - Risk of:  Goal: Will remain free from falls  Description: Will remain free from falls  Outcome: Ongoing  Note: Call light within reach. Side rails up x2. Bed alarm on. Non skid socks available. Problem: Discharge Planning:  Goal: Discharged to appropriate level of care  Description: Discharged to appropriate level of care  Outcome: Ongoing  Note: Patient prefers to be discharged to home with  when medically stable. Problem: Cardiac Output - Decreased:  Goal: Hemodynamic stability will improve  Description: Hemodynamic stability will improve  Outcome: Ongoing  Note: Patient remains hemodynamically stable at this time. Refer to flowsheets. Will continue to monitor. Problem: Pain:  Goal: Pain level will decrease  Description: Pain level will decrease  Outcome: Ongoing  Note: Patient free from pain this shift. Pain rated on 0-10 pain rating scale. Patient states pain goal of 0. Will continue to reassess. Problem: Tissue Perfusion - Cardiopulmonary, Altered:  Goal: Absence of angina  Description: Absence of angina  Outcome: Ongoing  Note: Patient denies having any chest pain at this time. No signs of distress. Will continue to assess. Problem: Pain:  Goal: Pain level will decrease  Description: Pain level will decrease  Outcome: Ongoing  Note: Patient free from pain this shift. Pain rated on 0-10 pain rating scale. Patient states pain goal of 0. Will continue to reassess. Care plan reviewed with patient. Patient verbalize understanding of the plan of care and contribute to goal setting.

## 2020-10-17 NOTE — PROGRESS NOTES
was called to room 6K17 due to a rapid response. Upon arrival, it was determined the situation was in hand. Thus,  left the room area following less than 5 minutes.

## 2020-10-17 NOTE — PRE SEDATION
[facility]  Sedation/Analgesia History & Physical    Pt Name: Lorraine Patricia  Account number: [de-identified]  MRN: [id number]  YOB: 1960  Provider Performing Procedure: Dilma Roth MD  Referring Provider: Patricia Ma MD   Primary Care Physician: Angelita Peguero, APRN - CNP  Date: 10/17/2020    PRE-PROCEDURE    Code Status: FULL CODE  Brief History/Pre-Procedure Diagnosis:  [place]  + stress test    Consent: : I have discussed with the patient risks, benefits, and alternatives (and relevant risks, benefits, and side effects related to alternatives or not receiving care), and likelihood of the success. The patient and/or representative appear to understand and agree to proceed. The discussion encompasses risks, benefits, and side effects related to the alternatives and the risks related to not receiving the proposed care, treatment, and services. The indication, risks and benefits of the procedure and possible therapeutic consequences and alternatives were discussed with the patient. The patient was given the opportunity to ask questions and to have them answered to his/her satisfaction. Risks of the procedure include but are not limited to the following: Bleeding, hematoma including retroperitoneal hemmorhage, infection, pain and discomfort, injury to the aorta and other blood vessels, rhythm disturbance, low blood pressure, myocardial infarction, stroke, kidney damage/failure, myocardial perforation, allergic reactions to sedatives/contrast material, loss of pulse/vascular compromise requiring surgery, aneurysm/pseudoaneurysm formation, possible loss of a limb/hand/leg, needing blood transfusion, requiring emergent open heart surgery or emergent coronary intervention, the need for intubation/respiratory support, the requirement for defibrillation/cardioversion, and death. Alternatives to and omission of the suggested procedure were discussed.  The patient had no further questions and wished to proceed; the consent form was signed. MEDICAL HISTORY  [x]ASHD/ANGINA/MI/CHF   [x]Hypertension  []Diabetes  [x]Hyperlipidemia  []Smoking  []Family Hx of ASHD  [x]Additional information:       has a past medical history of Abnormal heart rhythm, Anxiety, Cancer (HCC), CHF (congestive heart failure) ([facility] [address] Congenital heart disease, DJD (degenerative joint disease), Enlarged lymph nodes, Hypertension, Hypothyroidism, ICD (implantable cardioverter-defibrillator) in place, Kidney stones, PONV (postoperative nausea and vomiting), Prediabetes, and Thyroid disease. SURGICAL HISTORY   has a past surgical history that includes Mastectomy, bilateral; Tonsillectomy; Percutaneous Transluminal Coronary Angio; Appendectomy; Colonoscopy; Carpal tunnel release (2019); Finger trigger release (Right, 6/25/2020); Colonoscopy (N/A, 7/27/2020); Colonoscopy (7/27/2020); and Pacemaker insertion (Left, 02/11/2019).   Additional information:       ALLERGIES   Allergies as of 10/15/2020 - Review Complete 10/15/2020   Allergen Reaction Noted    Adhesive tape Itching 10/15/2020     Additional information:       MEDICATIONS   Aspirin  [x] 81 mg  [] 325 mg  [] None  Antiplatelet drug therapy use last 5 days  [x]No []Yes  Coumadin Use Last 5 Days [x]No []Yes  Other anticoagulant use last 5 days  [x]No []Yes    Current Facility-Administered Medications:     aspirin chewable tablet 81 mg, 81 mg, Oral, Daily, Sue Norman DO, 81 mg at 10/16/20 1031    atorvastatin (LIPITOR) tablet 40 mg, 40 mg, Oral, Nightly, Sue Norman DO, 40 mg at 10/16/20 2200    butalbital-acetaminophen-caffeine (FIORICET, ESGIC) per tablet 1 tablet, 1 tablet, Oral, Q4H PRN, Sue Norman DO    carvedilol (COREG) tablet 3.125 mg, 3.125 mg, Oral, BID WC, Sue Norman DO, 3.125 mg at 10/16/20 1817    sacubitril-valsartan (ENTRESTO) 49-51 MG per tablet 1 tablet, 1 tablet, Oral, BID, Sue M Yuhas, DO, 1 tablet at 10/16/20 2200    furosemide (LASIX) tablet 20 mg, 20 mg, Oral, Daily PRN, Sue Norman DO    letrozole Pending sale to [facility]) tablet 2.5 mg, 2.5 mg, Oral, Daily, Sue Norman DO, 2.5 mg at 10/16/20 1031    levothyroxine (SYNTHROID) tablet 100 mcg, 100 mcg, Oral, Daily, Sue Norman DO, 100 mcg at 10/17/20 0604    pantoprazole (PROTONIX) tablet 40 mg, 40 mg, Oral, QAM AC, Sue Norman DO, 40 mg at 10/17/20 0604    venlafaxine (EFFEXOR XR) extended release capsule 37.5 mg, 37.5 mg, Oral, Daily, Sue Norman DO, 37.5 mg at 10/16/20 1030    sodium chloride flush 0.9 % injection 10 mL, 10 mL, Intravenous, 2 times per day, Sue Norman DO, 10 mL at 10/16/20 2201    sodium chloride flush 0.9 % injection 10 mL, 10 mL, Intravenous, PRN, Sue Norman DO    acetaminophen (TYLENOL) tablet 650 mg, 650 mg, Oral, Q6H PRN, 650 mg at 10/16/20 0417 **OR** acetaminophen (TYLENOL) suppository 650 mg, 650 mg, Rectal, Q6H PRN, Sue Norman DO    polyethylene glycol (GLYCOLAX) packet 17 g, 17 g, Oral, Daily PRN, Sue Norman DO    promethazine (PHENERGAN) tablet 12.5 mg, 12.5 mg, Oral, Q6H PRN **OR** ondansetron (ZOFRAN) injection 4 mg, 4 mg, Intravenous, Q6H PRN, Sue Norman DO    enoxaparin (LOVENOX) injection 40 mg, 40 mg, Subcutaneous, Q24H, Sue Norman DO, 40 mg at 10/16/20 2200    regadenoson (LEXISCAN) injection 0.4 mg, 0.4 mg, Intravenous, ONCE PRN, Naty Aguilar MD    influenza quadrivalent split vaccine (FLUZONE;FLUARIX;FLULAVAL;AFLURIA) injection 0.5 mL, 0.5 mL, Intramuscular, Once, Vince Lopez MD  Prior to Admission medications    Medication Sig Start Date End Date Taking?  Authorizing Provider   omeprazole (PRILOSEC) 20 MG delayed release capsule Take 1 capsule by mouth every morning (before breakfast) 10/14/20  Yes Miranda Julio APRN - CNP   polyethylene glycol (GLYCOLAX) 17 GM/SCOOP powder MIX 17 GRAMS OF POWDER WITH LIQUID BY MOUTH ONCE DAILY 8/1/20  Yes Historical Provider, MD   letrozole ([address] [facility]) 2.5 MG tablet Take 1 tablet by mouth daily 7/21/20  Yes Walter Gardner MD   venlafaxine (EFFEXOR XR) 75 MG extended release capsule Take 1 capsule by mouth daily 7/16/20  Yes ANGELIA Draper CNP   carvedilol (COREG) 3.125 MG tablet Take 1 tablet by mouth twice daily 7/13/20  Yes Corey Mills MD   aspirin 81 MG chewable tablet Take 1 tablet by mouth daily 7/11/20  Yes Carmelita Chaudhari MD   ENTRESTO 49-51 MG per tablet Take 1 tablet by mouth twice daily 6/8/20  Yes ANGELIA Ruano CNP   atorvastatin (LIPITOR) 40 MG tablet Take 1 tablet by mouth nightly 10/7/19  Yes ANGELIA Draper CNP   levothyroxine (SYNTHROID) 100 MCG tablet Take 1 tablet by mouth daily 10/7/19  Yes ANGELIA Draper CNP   zoster recombinant adjuvanted vaccine [facility]) 50 MCG/0.5ML SUSR injection Inject 0.5 mLs into the muscle See Admin Instructions 1 dose now and repeat in 2-6 months 8/25/20 2/21/21  ANGELIA Draper CNP   furosemide (LASIX) 20 MG tablet Take 1 tablet by mouth daily as needed (leg swelling, weight gain) 4/13/20   ANGELIA Draper CNP   acetaminophen (TYLENOL) 500 MG tablet Take 500 mg by mouth every 6 hours as needed for Pain    Historical Provider, MD     Additional information:       VITAL SIGNS   Vitals:    10/17/20 0330   BP: (!) 99/54   Pulse: 63   Resp: 16   Temp: 97.5 °F (36.4 °C)   SpO2: 94%       PHYSICAL:   General: No acute distress  HEENT:  Unremarkable for age  Neck: without increased JVD, carotid pulses 2+ bilaterally without bruits  Heart: RRR, S1 & S2 WNL, S4 gallop, without murmurs or rubs   NYHA: 1  Lungs: Clear to auscultation    Abdomen: BS present, without HSM, masses, or tenderness    Extremities: without C,C,E.  Pulses 2+ bilaterally  Mental Status: Alert & Oriented        PLANNED PROCEDURE   [x]Cath  [x]PCI                []Pacemaker/AICD  []CHAPARRO             []Cardioversion []Peripheral angiography/PTA  []Other:      SEDATION  Planned agent: [x]Midazolam []Meperidine [x]Sublimaze []Morphine  []Diazepam  []Other:     ASA Classification:  []1 []2 [x]3 []4 []5  Class 1: A normal healthy patient  Class 2: Pt with mild to moderate systemic disease  Class 3: Severe systemic disease or disturbance  Class 4: Severe systemic disorders that are already life threatening. Class 5: Moribund pt with little chances of survival, for more than 24 hours. Mallampati I Airway Classification:   []1 [x]2 []3 []4    [x]Pre-procedure diagnostic studies complete and results available. Comment:    [x]Previous sedation/anesthesia experiences assessed. Comment:    [x]The patient is an appropriate candidate to undergo the planned procedure sedation and anesthesia. (Refer to nursing sedation/analgesia documentation record)  [x]Formulation and discussion of sedation/procedure plan, risks, and expectations with patient and/or responsible adult completed. [x]Patient examined immediately prior to the procedure.  (Refer to nursing sedation/analgesia documentation record)    Apolinar Zarate MD   Electronically signed 10/17/2020 at 8:12 AM

## 2020-10-18 VITALS
HEIGHT: 62 IN | TEMPERATURE: 98 F | RESPIRATION RATE: 18 BRPM | SYSTOLIC BLOOD PRESSURE: 129 MMHG | DIASTOLIC BLOOD PRESSURE: 73 MMHG | BODY MASS INDEX: 36.78 KG/M2 | HEART RATE: 76 BPM | WEIGHT: 199.9 LBS | OXYGEN SATURATION: 98 %

## 2020-10-18 PROCEDURE — APPNB15 APP NON BILLABLE TIME 0-15 MINS: Performed by: NURSE PRACTITIONER

## 2020-10-18 PROCEDURE — 93458 L HRT ARTERY/VENTRICLE ANGIO: CPT | Performed by: INTERNAL MEDICINE

## 2020-10-18 PROCEDURE — G0378 HOSPITAL OBSERVATION PER HR: HCPCS

## 2020-10-18 PROCEDURE — 99217 PR OBSERVATION CARE DISCHARGE MANAGEMENT: CPT | Performed by: FAMILY MEDICINE

## 2020-10-18 PROCEDURE — 6370000000 HC RX 637 (ALT 250 FOR IP): Performed by: STUDENT IN AN ORGANIZED HEALTH CARE EDUCATION/TRAINING PROGRAM

## 2020-10-18 RX ORDER — HYDROXYZINE HYDROCHLORIDE 25 MG/1
25 TABLET, FILM COATED ORAL EVERY 8 HOURS PRN
Qty: 10 TABLET | Refills: 0 | Status: SHIPPED | OUTPATIENT
Start: 2020-10-18 | End: 2020-10-28

## 2020-10-18 RX ORDER — BUTALBITAL, ACETAMINOPHEN AND CAFFEINE 50; 325; 40 MG/1; MG/1; MG/1
1 TABLET ORAL EVERY 4 HOURS PRN
Qty: 180 TABLET | Refills: 1
Start: 2020-10-18 | End: 2021-07-15

## 2020-10-18 RX ADMIN — VENLAFAXINE HYDROCHLORIDE 37.5 MG: 37.5 CAPSULE, EXTENDED RELEASE ORAL at 08:19

## 2020-10-18 RX ADMIN — CARVEDILOL 3.12 MG: 3.12 TABLET, FILM COATED ORAL at 08:18

## 2020-10-18 RX ADMIN — LEVOTHYROXINE SODIUM 100 MCG: 100 TABLET ORAL at 06:02

## 2020-10-18 RX ADMIN — LETROZOLE 2.5 MG: 2.5 TABLET ORAL at 08:19

## 2020-10-18 RX ADMIN — ASPIRIN 81 MG: 81 TABLET, CHEWABLE ORAL at 08:20

## 2020-10-18 RX ADMIN — PANTOPRAZOLE SODIUM 40 MG: 40 TABLET, DELAYED RELEASE ORAL at 05:57

## 2020-10-18 ASSESSMENT — PAIN SCALES - GENERAL
PAINLEVEL_OUTOF10: 0
PAINLEVEL_OUTOF10: 0

## 2020-10-18 NOTE — DISCHARGE SUMMARY
Hospital Medicine Discharge Summary      Patient Identification:   Chinyere Correa   : 1960  MRN: [id number]   Account: [de-identified]      Patient's PCP: ANGELIA Fong CNP    Admit Date: 10/15/2020     Discharge Date:   10/18/2020    Admitting Physician: Vinicius Peter MD     Discharge Physician: Vinicius Peter MD     Discharge Diagnoses: Active Hospital Problems    Diagnosis Date Noted    Exertional chest pain [R07.9] 10/15/2020    Chest pain [R07.9] 10/15/2020       The patient was seen and examined on day of discharge and this discharge summary is in conjunction with any daily progress note from day of discharge. Hospital Course:     Chinyere Correa is a 62 y.o. female admitted to [address] [place] [address] on 10/15/2020 for chest pain. Patient with h/o cardiomyopathy/ICD placement, related to chemo for breast cancer admitted for cardiac work up. Patient also had a rapid response for chest pain while admitted. Had a positive stress test and underwent a cath with Dr. Monico Nageotte which did not show significant CAD and pt has nonischemic cardiomyopathy. During my evaluation, I found the patients mood to be labile, with her getting emotional easily. She is on effexor but has not taken it since Thursday. Her chest pain predates her last dose. I suspect her rapid was probably made worse due to the effexor not being in her system. I instructed her not to stop taking that medicine without discussion with pcp as it can cause rebound anxiety. She also has had more stress at home recently and was tearful discussing it. She herself claims that her anxiety is worse and she is having panic like sxms that are manifesting as chestpain. She was given a rx of hydroxyzine for prn use with side effects discussion especially drowsiness. She is to only take it when at home or with someone.  And to not operate machinery/vehicle etc. She wanted to try a different anxiolytic but I recommended she discuss that with her provided:      CBC:    Lab Results   Component Value Date    WBC 4.7 10/16/2020    HGB 13.1 10/16/2020    HCT 40.7 10/16/2020     10/16/2020       Renal:    Lab Results   Component Value Date     10/16/2020    K 4.2 10/16/2020     10/16/2020    CO2 26 10/16/2020    BUN 15 10/16/2020    CREATININE 0.6 10/16/2020    CALCIUM 8.8 10/16/2020         Significant Diagnostic Studies    Radiology:   CTA CHEST W WO CONTRAST   Final Result   No acute pulmonary arterial embolism. No discrete lobar consolidation. 3 mm right lower lobe pulmonary nodule similar to prior study dated June 18, 2019. 1 year follow-up is advised to document continued stability. **This report has been created using voice recognition software. It may contain minor errors which are inherent in voice recognition technology. **      Final report electronically signed by Dr. Mari Nicole on 10/15/2020 12:51 PM      XR CHEST PORTABLE   Final Result   Stable radiographic appearance of the chest. No evidence of an acute process. **This report has been created using voice recognition software. It may contain minor errors which are inherent in voice recognition technology. **      Final report electronically signed by Dr. Yuliet Sneed MD on 10/15/2020 11:46 AM             Consults:     IP CONSULT TO CARDIOLOGY    Disposition: Home  Condition at Discharge: Stable    Code Status:  Full Code     Patient Instructions:    Discharge lab work: none  Activity: activity as tolerated  Diet: DIET CARDIAC;       Follow-up visits:   ANGELIA Fong CNP  [address] [facility]  [phone]    In 3 days  Address anxiety/med changes    Brittney Lopez MD  [address]  [phone]      As needed         Discharge Medications:      [facility]   Home Medication Instructions UWN:[id number]    Printed on:10/18/20 1224   Medication Information                      acetaminophen (TYLENOL) 500 MG tablet  Take 500 mg by mouth every 6 hours as needed for Pain             aspirin 81 MG chewable tablet  Take 1 tablet by mouth daily             atorvastatin (LIPITOR) 40 MG tablet  Take 1 tablet by mouth nightly             butalbital-acetaminophen-caffeine (FIORICET, ESGIC) -40 MG per tablet  Take 1 tablet by mouth every 4 hours as needed for Headaches             carvedilol (COREG) 3.125 MG tablet  Take 1 tablet by mouth twice daily             ENTRESTO 49-51 MG per tablet  Take 1 tablet by mouth twice daily             furosemide (LASIX) 20 MG tablet  Take 1 tablet by mouth daily as needed (leg swelling, weight gain)             hydrOXYzine (ATARAX) 25 MG tablet  Take 1 tablet by mouth every 8 hours as needed for Anxiety             letrozole (FEMARA) 2.5 MG tablet  Take 1 tablet by mouth daily             levothyroxine (SYNTHROID) 100 MCG tablet  Take 1 tablet by mouth daily             omeprazole (PRILOSEC) 20 MG delayed release capsule  Take 1 capsule by mouth every morning (before breakfast)             polyethylene glycol (GLYCOLAX) 17 GM/SCOOP powder  MIX 17 GRAMS OF POWDER WITH LIQUID BY MOUTH ONCE DAILY             venlafaxine (EFFEXOR XR) 75 MG extended release capsule  Take 1 capsule by mouth daily             zoster recombinant adjuvanted vaccine (SHINGRIX) 50 MCG/0.5ML SUSR injection  Inject 0.5 mLs into the muscle See Admin Instructions 1 dose now and repeat in 2-6 months                 Time Spent on discharge is more than 45 minutes in the examination, evaluation, counseling and review of medications and discharge plan. Signed: Thank you ANGELIA Navarrete - JENNI for the opportunity to be involved in this patient's care.     Electronically signed by Ariel Rust MD on 10/18/2020 at 12:24 PM

## 2020-10-18 NOTE — PROCEDURES
[address] [place] [address]                            CARDIAC CATHETERIZATION    PATIENT NAME: Sandy Martinez                     :        1960  MED REC NO:   [id number]                           ROOM:       0017  ACCOUNT NO:   [de-identified]                           ADMIT DATE: 10/15/2020  PROVIDER:     Aminata Corado MD    DATE OF PROCEDURE:  10/17/2020    INDICATION:  Unstable angina with positive stress test.    DESCRIPTION OF PROCEDURE:  After informed consent was obtained from the  patient, she was brought to the cardiac catheterization laboratory and  prepped in sterile fashion. Right femoral artery was chosen as the  primary point of access. Preprocedure timeout was completed. After  infiltration of the right inguinal region with 2% lidocaine using  micropuncture and modified Seldinger technique under fluoroscopic  guidance and ultrasound guidance. I was able to insert a 5-Niuean sheath into the right femoral artery. We then performed diagnostic coronary angiography using a 5-Niuean JL-4,  5-Niuean JR-4 catheters. CORONARY ANGIOGRAM:  LEFT MAIN:  Patent without any significant obstruction. LAD:  Patent without any significant obstruction. Diagonal branch,  patent without any significant obstruction. LCX:  No significant obstruction. Gives rise to large OM1, OM2 branches  without any significant obstruction. Left PDA without any significant  obstruction. RCA:  Nondominant small marginal branch. IMMEDIATE COMPLICATIONS:  None. MEDICATIONS:  See EMR. ACCESS:  Manual pressure used for hemostasis. ESTIMATED BLOOD LOSS:  Less than 100 mL. SUMMARY:  No CAD; nonischemic cardiomyopathy. PLAN:  1. Bedrest.  2.  Optimal medical therapy. 3.  Routine access site care. 4.  Continue management of the patient's anxiety issues. 5.  Cardiomyopathy, therapy per guidelines.     All the above was explained to the patient and the patient's family. They are agreeable and amenable to the above plan.         Mariya James MD    D: 10/18/2020 9:25:25       T: 10/18/2020 10:08:41     [email or web address]  Job#: [id number]     Doc#: [id number]    CC:

## 2020-10-18 NOTE — FLOWSHEET NOTE
Kuldip Charles 61  OCCUPATIONAL THERAPY MISSED TREATMENT NOTE  [facility] RENAL TELEMETRY 6K  [id number]      Date: 10/18/2020  Patient Name: Chinyere Peña        CSN: [id number]   : 1960  (62 y.o.)  Gender: female                REASON FOR MISSED TREATMENT: Per PT, pt denies need for any OT services at this time.  Will defer orders, please reorder if change in status

## 2020-10-18 NOTE — PLAN OF CARE
Problem: Falls - Risk of:  Goal: Will remain free from falls  Description: Will remain free from falls  Outcome: Ongoing  Note: Falling star program in place. Bed alarm on. Non skid socks applied. Call light within reach. No falls noted this shift. Will continue to monitor. Problem: Falls - Risk of:  Goal: Absence of physical injury  Description: Absence of physical injury  Outcome: Ongoing  Note: Stay with me and falling star program in place. No signs of physical injury at this time. Problem: Discharge Planning:  Goal: Discharged to appropriate level of care  Description: Discharged to appropriate level of care  Outcome: Ongoing  Note: Patient plans to return home in AM.      Problem: Cardiac Output - Decreased:  Goal: Hemodynamic stability will improve  Description: Hemodynamic stability will improve  Outcome: Ongoing  Note:   Vitals:    10/17/20 2040   BP: (!) 97/54   Pulse: 74   Resp: 16   Temp: 98.4 °F (36.9 °C)   SpO2: 93%     Will continue to monitor. Problem: Pain:  Goal: Pain level will decrease  Description: Pain level will decrease  Outcome: Ongoing  Note: Patient denies the presence of pain so far this shift. Problem: Tissue Perfusion - Cardiopulmonary, Altered:  Goal: Absence of angina  Description: Absence of angina  Outcome: Ongoing  Note: Patient had heart cath today that was clean. No intervention required. Denies angina so far this shift. Problem: Pain:  Goal: Pain level will decrease  Description: Pain level will decrease  Outcome: Ongoing  Note: Patient denies the presence of pain so far this shift. Care plan reviewed with patient. Patient verbalize understanding of the plan of care and contribute to goal setting.

## 2020-10-18 NOTE — PROGRESS NOTES
Discharge teaching and instructions for diagnosis/procedure of chest pain completed with patient using teachback method. AVS reviewed. Printed prescriptions given to patient. Patient voiced understanding regarding prescriptions, follow up appointments, and care of self at home. Discharged in a wheelchair to  independent living per family.

## 2020-10-19 ENCOUNTER — CARE COORDINATION (OUTPATIENT)
Dept: CASE MANAGEMENT | Age: 60
End: 2020-10-19

## 2020-10-19 ENCOUNTER — TELEPHONE (OUTPATIENT)
Dept: FAMILY MEDICINE CLINIC | Age: 60
End: 2020-10-19

## 2020-10-19 NOTE — CARE COORDINATION
[facility] Transitions Initial Follow Up Call    Call within 2 business days of discharge: Yes    Patient: Georges Min Patient : 1960   MRN: [id number]  Reason for Admission: Exertional chest pain  Discharge Date: 10/18/20 RARS: Readmission Risk Score: 11      Last Discharge [address] [address]       Complaint Diagnosis Description Type Department Provider    10/15/20 Chest Pain Exertional chest pain ED to Hosp-Admission (Discharged) (ADMITTED) Raven Cadet MD; Naty Mckinney Challenges to be reviewed by the provider   Additional needs identified to be addressed with provider No  none    Discussed COVID-19 related testing which was available at this time. Test results were negative. Patient informed of results, if available? Yes         Method of communication with provider : none    Advance Care Planning:   Does patient have an Advance Directive:  reviewed and current. Was this a readmission? No  Patient stated reason for admission: chest pain  Patients top risk factors for readmission: medical condition    Care Transition Nurse (CTN) contacted the patient by telephone to perform post hospital discharge assessment. Verified name and  with patient as identifiers. Provided introduction to self, and explanation of the CTN role. CTN reviewed discharge instructions, medical action plan and red flags with patient who verbalized understanding. Patient given an opportunity to ask questions and does not have any further questions or concerns at this time. Were discharge instructions available to patient? Yes. Reviewed appropriate site of care based on symptoms and resources available to patient including: PCP. The patient agrees to contact the PCP office for questions related to their healthcare. Medication reconciliation was performed with patient, who verbalizes understanding of administration of home medications.    Covid Risk Education    Patient has following risk factors of: heart failure. Education provided regarding infection prevention, and signs and symptoms of COVID-19 and when to seek medical attention with patient who verbalized understanding. Discussed exposure protocols and quarantine From CDC: Are you at higher risk for severe illness?   and given an opportunity for questions and concerns. The patient agrees to contact the COVID-19 hotline [phone] or PCP office for questions related to COVID-19. For more information on steps you can take to protect yourself, see CDC's How to Protect Yourself     Patient/family/caregiver given information for GetWell Loop and agrees to enroll yes    Discussed follow-up appointments. Is follow up appointment scheduled within 7 days of discharge? Yes    Plan for follow-up call in 5-7 days based on severity of symptoms and risk factors. Patient states she is doing well. Denies chest pain, SOB, edema, dizziness, and cough. She has an appt with PCP tomorrow. Educated patient on diuretic medication. Avoid taking you medication at night. Take it at lease six hours before bedtime it you need to take it twice a day. Your kidneys will be making more urine. Watch for water retention which includes swollen ankles and unexplained weight gain over a short period of time. Notify your PCP if you gain 3lbs in a day or 5lb in a week. Manage you salt intake. Follow a fluid restriction if you were given one by your PCP. CTN provided contact information for future needs.           Non-face-to-face services provided:  Obtained and reviewed discharge summary and/or continuity of care documents    Care Transitions 24 Hour Call    Care Transitions Interventions         Follow Up  Future Appointments   Date Time Provider Braden Rothman   10/20/2020 10:00 AM ANGELIA Fong CNP [facility] - [place]   12/17/2020  8:45 AM ANGELIA Barber CNP [facility] CHF [place] - [place]   12/28/2020  1:20 PM ANGELIA Pa CNP [facility] [place]  [place]   1/20/2021 12:00 PM Mechelle Oneal MD Oncology [facility] [place]  [place]   5/17/2021 12:45 PM Juan Parks MD [address] [facility] [place] [facility] [place]   6/15/2021  1:30 PM SCHEDULE, SRPS PACER NURSE SRP[facility] PACER [place] Sawant RN

## 2020-10-20 ENCOUNTER — OFFICE VISIT (OUTPATIENT)
Dept: FAMILY MEDICINE CLINIC | Age: 60
End: 2020-10-20
Payer: COMMERCIAL

## 2020-10-20 ENCOUNTER — HOSPITAL ENCOUNTER (OUTPATIENT)
Age: 60
Discharge: HOME | End: 2020-10-20
Payer: COMMERCIAL

## 2020-10-20 VITALS
RESPIRATION RATE: 16 BRPM | SYSTOLIC BLOOD PRESSURE: 109 MMHG | BODY MASS INDEX: 33.92 KG/M2 | HEIGHT: 65 IN | DIASTOLIC BLOOD PRESSURE: 70 MMHG | TEMPERATURE: 97.1 F | HEART RATE: 80 BPM | OXYGEN SATURATION: 95 % | WEIGHT: 203.6 LBS

## 2020-10-20 DIAGNOSIS — Z20.822 EXPOSURE TO COVID-19 VIRUS: ICD-10-CM

## 2020-10-20 PROCEDURE — 99215 OFFICE O/P EST HI 40 MIN: CPT | Performed by: NURSE PRACTITIONER

## 2020-10-20 PROCEDURE — 99211 OFF/OP EST MAY X REQ PHY/QHP: CPT

## 2020-10-20 PROCEDURE — U0003 INFECTIOUS AGENT DETECTION BY NUCLEIC ACID (DNA OR RNA); SEVERE ACUTE RESPIRATORY SYNDROME CORONAVIRUS 2 (SARS-COV-2) (CORONAVIRUS DISEASE [COVID-19]), AMPLIFIED PROBE TECHNIQUE, MAKING USE OF HIGH THROUGHPUT TECHNOLOGIES AS DESCRIBED BY CMS-2020-01-R: HCPCS

## 2020-10-20 RX ORDER — VENLAFAXINE HYDROCHLORIDE 150 MG/1
150 CAPSULE, EXTENDED RELEASE ORAL DAILY
Qty: 90 CAPSULE | Refills: 3 | Status: SHIPPED | OUTPATIENT
Start: 2020-10-20 | End: 2021-10-11 | Stop reason: SDUPTHER

## 2020-10-20 NOTE — PROGRESS NOTES
Transition of Care Visit/Hospital Follow Up:      Ellis Sneed is a 62 y.o. female that presents for ED Follow-up [facility] chest pain- hasnt had any since ER, is on atarax prn for panic attacks) and Anxiety (effexor not really helping with anxiety, helps a little but not enough)        Date of Discharge:   10/18/20  Was patient contacted within 2 business days of discharge (see chart for documentation):  yes - 10/19/20      Patient presents for hospital follow up. Patient recently hospitalized at [facility] for treatment of chest pain. Symptoms prior to admission:  Chest pain    Hospital Course per DC Summary:    Hospital Course:      Ellis Sneed is a 62 y.o. female admitted to [address] [place] [address] on 10/15/2020 for chest pain. Patient with h/o cardiomyopathy/ICD placement, related to chemo for breast cancer admitted for cardiac work up. Patient also had a rapid response for chest pain while admitted. Had a positive stress test and underwent a cath with Dr. Sarah Mancini which did not show significant CAD and pt has nonischemic cardiomyopathy. During my evaluation, I found the patients mood to be labile, with her getting emotional easily. She is on effexor but has not taken it since Thursday. Her chest pain predates her last dose. I suspect her rapid was probably made worse due to the effexor not being in her system. I instructed her not to stop taking that medicine without discussion with pcp as it can cause rebound anxiety. She also has had more stress at home recently and was tearful discussing it. She herself claims that her anxiety is worse and she is having panic like sxms that are manifesting as chestpain. She was given a rx of hydroxyzine for prn use with side effects discussion especially drowsiness. She is to only take it when at home or with someone.  And to not operate machinery/vehicle etc. She wanted to try a different anxiolytic but I recommended she discuss that with her pcp as these meds require frequent monitoring.      1. Acute chest pain:  abnml stress--> cath negative. Suspect anxiety/panic related      2. Non-ischemic cardiomyopathy s/p ICD. Chronic systolic heart failure. Ef 20-25% on last echo. No signs of volume overload.      3. Possible arrhythmia. Being worked up [address]  4. Ess hypertension. Home medications     5. Hypothyroidism. Synthroid.      6. HX of left breast CA s/p b/l mastectomy. Femara. --> pt suspects that this medicine makes her more anxious     7. Prediabetes. A1c 5.9%.      8. Anxiety. Effexor.   - definitely worse due to family stressors. Component of panic. Hydroxyzine prn ordered. Risks discussed. Pt to see pcp to change effexor vs increase dose.      9. Obesity. BMI 37.88.     Medication changes at discharge:  Med list reconciled today    Clinical course since discharge:      Patient is home and is doing well. She was told that her CP symptoms might be due to acid reflux. She is taking Omeprazole daily. But she thinks that she had actually run out of the Prilosec for about 1 days prior to having the chest pain. She has since resumed it. Denies any chest pain. She also had a panic attack at the hospital. She was given Rx for hydroxyzine for panic attacks, but she hasn't needed to use it. Anxiety has been high since she has been home. Her mother is dying currently, not sleeping well. She tends to stress eat. Focus/concentration is poor. She does note that her mood has been more negative lately, she is usually happy and positive, but lately she is more negative. Feeling depressed daily, cries a lot. Denies any irritability. Motivation is poor, she has to force herself. Energy is variable, doesn't feel like going anywhere or doing anything. She is taking Effexor 75 mg. Denies any SI/HI. She also reports that her daughter in law came over to her house yesterday and testing positive for COVID. Her daughter in law is living with her. She is not having any symptoms.  She did test negative last week. Current Outpatient Medications   Medication Sig Dispense Refill    venlafaxine (EFFEXOR XR) 150 MG extended release capsule Take 1 capsule by mouth daily 90 capsule 3    butalbital-acetaminophen-caffeine (FIORICET, ESGIC) -40 MG per tablet Take 1 tablet by mouth every 4 hours as needed for Headaches 180 tablet 1    hydrOXYzine (ATARAX) 25 MG tablet Take 1 tablet by mouth every 8 hours as needed for Anxiety 10 tablet 0    omeprazole (PRILOSEC) 20 MG delayed release capsule Take 1 capsule by mouth every morning (before breakfast) 90 capsule 2    polyethylene glycol (GLYCOLAX) 17 GM/SCOOP powder MIX 17 GRAMS OF POWDER WITH LIQUID BY MOUTH ONCE DAILY      letrozole (FEMARA) 2.5 MG tablet Take 1 tablet by mouth daily 90 tablet 3    carvedilol (COREG) 3.125 MG tablet Take 1 tablet by mouth twice daily 180 tablet 0    aspirin 81 MG chewable tablet Take 1 tablet by mouth daily 30 tablet 3    ENTRESTO 49-51 MG per tablet Take 1 tablet by mouth twice daily 60 tablet 5    furosemide (LASIX) 20 MG tablet Take 1 tablet by mouth daily as needed (leg swelling, weight gain) 90 tablet 0    atorvastatin (LIPITOR) 40 MG tablet Take 1 tablet by mouth nightly 90 tablet 3    levothyroxine (SYNTHROID) 100 MCG tablet Take 1 tablet by mouth daily 90 tablet 3    acetaminophen (TYLENOL) 500 MG tablet Take 500 mg by mouth every 6 hours as needed for Pain       No current facility-administered medications for this visit.         Past Medical History:   Diagnosis Date    Abnormal heart rhythm     Anxiety     Cancer [facility])     breast  2016     CHF (congestive heart failure) (HCC)     Congenital heart disease     DJD (degenerative joint disease)     Enlarged lymph nodes     Hypertension     Hypothyroidism     ICD (implantable cardioverter-defibrillator) in place 02/11/2019    Dr. Darian Louis Kidney stones     PONV (postoperative nausea and vomiting)     Prediabetes 10/7/2019    Thyroid disease Past Surgical History:   Procedure Laterality Date    APPENDECTOMY      CARPAL TUNNEL RELEASE      COLONOSCOPY      COLONOSCOPY N/A 2020    COLONOSCOPY POLYPECTOMY SNARE/COLD BIOPSY performed by Giovanny Javier MD at [facility]    COLONOSCOPY  2020    COLONOSCOPY POLYPECTOMY HOT BIOPSY performed by Giovanny Javier MD at [address] Right 2020    DEQUERVAINS RELEASE AND RIGHT RING FINGER TRIGGER RELEASE performed by Anastacio Le DO at [address], [place] [address] Left 2019    [employer] [facility] PT HAS A MRI CONDITIONAL SYSTEM    PTCA      TONSILLECTOMY         Social History     Tobacco Use    Smoking status: Former Smoker     Packs/day: 0.25     Years: 37.00     Pack years: 9.25     Types: Cigarettes     Start date: 1981     Last attempt to quit: 2020     Years since quittin.4    Smokeless tobacco: Never Used   Substance Use Topics    Alcohol use: No    Drug use: No       Family History   Problem Relation Age of Onset    High Blood Pressure Mother     Dementia Mother     Cancer Father     Colon Cancer Father     Mental Retardation Brother     Colon Polyps Brother     Cancer Maternal Grandfather     Esophageal Cancer Neg Hx          I have reviewed the patient's past medical history, past surgical history, allergies, medications, social and family history and I have made updates where appropriate.         PHYSICAL EXAM:  /70   Pulse 80   Temp 97.1 °F (36.2 °C) (Temporal)   Resp 16   Ht 5' 4.5\" (1.638 m)   Wt 203 lb 9.6 oz (92.4 kg)   SpO2 95%   BMI 34.41 kg/m²   General Appearance: well developed and well- nourished, in no acute distress  Head: normocephalic and atraumatic  ENT: external ear and ear canal normal bilaterally, nose without deformity, nasal mucosa and turbinates normal without polyps, oropharynx normal, dentition is normal for age, no lip or gum lesions noted  Neck: supple and non-tender without mass, no thyromegaly or thyroid nodules, no cervical lymphadenopathy  Pulmonary/Chest: clear to auscultation bilaterally- no wheezes, rales or rhonchi, normal air movement, no respiratory distress or retractions  Cardiovascular: normal rate, regular rhythm, normal S1 and S2, no murmurs, rubs, clicks, or gallops, distal pulses intact  Abdomen: soft, non-tender, non-distended, no rebound or guarding, no masses or hernias noted, no hepatospleenomegaly  Extremities: no cyanosis, clubbing or edema of the lower extremities  Psych:  Normal affect without evidence of depression or anxiety, insight and judgement are appropriate, memory appears intact  Skin: warm and dry, no rash or erythema      Diagnostic test results reviewed: inpatient labs, CT-CTA chest and heart cath    Patient risk of morbidity and mortality: moderate      ASSESSMENT & PLAN  Samantha Badillo was seen today for ed follow-up and anxiety. Diagnoses and all orders for this visit:    Depression with anxiety  -     venlafaxine (EFFEXOR XR) 150 MG extended release capsule; Take 1 capsule by mouth daily    Chest pain, unspecified type    Gastroesophageal reflux disease, unspecified whether esophagitis present    Exposure to COVID-19 virus  -     COVID-19 Ambulatory; Future      - f/u cardiology as scheduled  - con't PPI  - increase Effexor to 150 mg  - test for COVID, recommend self quarantine    Return in about 6 weeks (around 12/1/2020) for anxiety and depression. Level of medical complexity:  moderate    -Overall, patient is improving  -Continue current meds  -Advised to continue to closely monitor her symptoms and if any worsening to seek treatment    Samantha Badillo received counseling on the following healthy behaviors: medication adherence  Reviewed prior labs and health maintenance. Continue current medications, diet and exercise. Discussed use, benefit, and side effects of prescribed medications.  Barriers to medication

## 2020-10-23 ENCOUNTER — TELEPHONE (OUTPATIENT)
Dept: FAMILY MEDICINE CLINIC | Age: 60
End: 2020-10-23

## 2020-10-23 LAB — SARS-COV-2: NOT DETECTED

## 2020-10-26 RX ORDER — CARVEDILOL 3.12 MG/1
TABLET ORAL
Qty: 180 TABLET | Refills: 2 | Status: SHIPPED | OUTPATIENT
Start: 2020-10-26 | End: 2021-05-19

## 2020-10-29 ENCOUNTER — CARE COORDINATION (OUTPATIENT)
Dept: CARE COORDINATION | Age: 60
End: 2020-10-29

## 2020-10-29 NOTE — CARE COORDINATION
[facility] Transitions Follow Up Call    10/29/2020    Patient: Geovanna Kemp  Patient : 1960   MRN: <[id number]>  Reason for Admission:   Discharge Date: 10/18/20 RARS: Readmission Risk Score: 11         Spoke with: Attempted to contact patient for follow up Care Transition call. No answer - Unable to leave message. Will Follow Up at a later time. Chancy Sacks, LPN     [facility] [address] [facility] Transitions Subsequent and Final Call    Subsequent and Final Calls  Care Transitions Interventions  Other Interventions:             Follow Up  Future Appointments   Date Time Provider Department Center   2020  8:00 AM ANGELIA Easton CNP [facility] - [place]   2020  8:45 AM ANGELIA Ross CNPX CHF [place] - [place]   2020  1:20 PM ANGELIA Bruner CNP [facility] - [place]   2021 12:00 PM Fredy Cornell MD Oncology [facility] - [place]   2021 12:45 PM Misty Mace MD SRPX Heart [facility] - [place]   6/15/2021  1:30 PM SCHEDULE, SRPS PACER NURSE [facility] PACER [place] - Manoj Maharaj LPN

## 2020-11-12 ENCOUNTER — CARE COORDINATION (OUTPATIENT)
Dept: CASE MANAGEMENT | Age: 60
End: 2020-11-12

## 2020-11-12 NOTE — CARE COORDINATION
[facility] Transitions Follow Up Call    2020    Patient: Reva Su  Patient : 1960   MRN: [id number]  Reason for Admission:   Discharge Date: 10/18/20 RARS: Readmission Risk Score: 11         Second attempt for transition.  V/m is not set up  Follow Up  Future Appointments   Date Time Provider Braden Rothman   12/10/2020  9:00 AM [address] [facility]   2020  8:45 AM ANGELIA Aleman - CNP [facility] CHF [facility]   2020  1:20 PM ANGELIA العلي - CNP [address]   2021 12:00 PM Yin Howard MD Oncology [facility]   2021 12:45 PM Trixie Ford MD [facility]   6/15/2021  1:30 PM SCHEDULE, SRPS PACER NURSE [facility] PACER [facility] - Molly Hodgkin, RN

## 2020-11-13 ENCOUNTER — CARE COORDINATION (OUTPATIENT)
Dept: CASE MANAGEMENT | Age: 60
End: 2020-11-13

## 2020-11-13 NOTE — CARE COORDINATION
[facility] Transitions Follow Up Call    2020    Patient: Ellis Sneed  Patient : 1960   MRN: [id number]  Reason for Admission:   Discharge Date: 10/18/20 RARS: Readmission Risk Score: 11         Third attempt for transition call. V/m is not set up. Unable to leave a message.     Follow Up  Future Appointments   Date Time Provider Braden Rothman   12/10/2020  9:00 AM [facility] [place] [facility] [place]   2020  8:45 AM ANGELIA Mullen CNP [facility] CHF [facility] - [place]   2020  1:20 PM [facility] [place]   2021 12:00 PM Court Phan MD [facility] [place] [facility] [place]   2021 12:45 PM Richard Duran MD [address] [facility] [place]   6/15/2021  1:30 PM SCHEDULE, SRPS PACER NURSE [facility] PACER [facility] - Talia Bragg RN

## 2020-11-17 ENCOUNTER — PROCEDURE VISIT (OUTPATIENT)
Dept: CARDIOLOGY CLINIC | Age: 60
End: 2020-11-17
Payer: COMMERCIAL

## 2020-11-17 PROCEDURE — G2066 INTER DEVC REMOTE 30D: HCPCS | Performed by: INTERNAL MEDICINE

## 2020-11-17 PROCEDURE — 93297 REM INTERROG DEV EVAL ICPMS: CPT | Performed by: INTERNAL MEDICINE

## 2020-12-10 ENCOUNTER — OFFICE VISIT (OUTPATIENT)
Dept: FAMILY MEDICINE CLINIC | Age: 60
End: 2020-12-10
Payer: COMMERCIAL

## 2020-12-10 ENCOUNTER — TELEPHONE (OUTPATIENT)
Dept: FAMILY MEDICINE CLINIC | Age: 60
End: 2020-12-10

## 2020-12-10 VITALS
HEIGHT: 64 IN | RESPIRATION RATE: 10 BRPM | OXYGEN SATURATION: 95 % | DIASTOLIC BLOOD PRESSURE: 74 MMHG | TEMPERATURE: 98.5 F | SYSTOLIC BLOOD PRESSURE: 96 MMHG | BODY MASS INDEX: 36.19 KG/M2 | WEIGHT: 212 LBS | HEART RATE: 88 BPM

## 2020-12-10 PROCEDURE — G8417 CALC BMI ABV UP PARAM F/U: HCPCS | Performed by: NURSE PRACTITIONER

## 2020-12-10 PROCEDURE — G8427 DOCREV CUR MEDS BY ELIG CLIN: HCPCS | Performed by: NURSE PRACTITIONER

## 2020-12-10 PROCEDURE — G8484 FLU IMMUNIZE NO ADMIN: HCPCS | Performed by: NURSE PRACTITIONER

## 2020-12-10 PROCEDURE — 99214 OFFICE O/P EST MOD 30 MIN: CPT | Performed by: NURSE PRACTITIONER

## 2020-12-10 PROCEDURE — 1036F TOBACCO NON-USER: CPT | Performed by: NURSE PRACTITIONER

## 2020-12-10 PROCEDURE — 3017F COLORECTAL CA SCREEN DOC REV: CPT | Performed by: NURSE PRACTITIONER

## 2020-12-10 NOTE — PROGRESS NOTES
Chief Complaint   Patient presents with    Follow-up     6 wk follow up anxiety/depression     Dizziness     fell x 2        History obtained from chart review and the patient. SUBJECTIVE:  Lorraine Patricia is a 62 y.o. female that presents today for follow up anxiety and depression    Follow up last appt 10/20 for anxiety and depression. Taking Effexor 150 mg. She is doing much better since the medication was increased. Anxiety is doing well. She denies any panic attacks. She denies any side effects. Depression symptoms also improved, feels like they are manageable. She still has some stress, but is able to manage it. She has fallen twice since last appt. She states that both times she was doing housework and she started feeling lightheaded like she was going to pass out. Her legs were weak and she fell down to the floor. She denies any CP/SOB. She didn't have any heart palpitations during the episodes, but afterward her heart was racing. She has been checking her BP, and her BP has been running around 110/70.     BP Readings from Last 3 Encounters:   12/10/20 96/74   10/20/20 109/70   10/18/20 129/73       Age/Gender Health Maintenance    Lipid -  Lab Results   Component Value Date    CHOL 154 07/09/2020     Lab Results   Component Value Date    TRIG 106 07/09/2020     Lab Results   Component Value Date    HDL 49 07/09/2020    HDL 57 02/05/2020    HDL 46 10/04/2019     Lab Results   Component Value Date    LDLCALC 84 07/09/2020    LDLCALC 114 02/05/2020    [address] 152 10/04/2019     No results found for: LABVLDL, VLDL  No results found for: CHOLHDLRATIO    DM Screen -   Lab Results   Component Value Date    LABA1C 6.0 07/09/2020     Colon Cancer Screening - referral [facility] 10/7/19  Lung Cancer Screening (Age 54 to [de-identified] with 30 pack year hx, current smoker or quit within past 15 years) - n/a    Tetanus - needs  Influenza Vaccine - 10/18  Pneumonia Vaccine - 66  Zostavax - 51     Breast Cancer Screening - 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 9)    Prediabetes    Malignant neoplasm of left breast in female, estrogen receptor positive ([facility] [address]    Morbidly obese (HCC)    Facial droop    Facial swelling    Depression with anxiety    Vertigo    Exertional chest pain    Gastroesophageal reflux disease       Past Medical History:   Diagnosis Date    Abnormal heart rhythm     Anxiety     Cancer [facility])     breast  2016     CHF (congestive heart failure) (HCC)     Congenital heart disease     DJD (degenerative joint disease)     Enlarged lymph nodes     Hypertension     Hypothyroidism     ICD (implantable cardioverter-defibrillator) in place 02/11/2019    Dr. David Zamorano Kidney stones     PONV (postoperative nausea and vomiting)     Prediabetes 10/7/2019    Thyroid disease        Past Surgical History:   Procedure Laterality Date    APPENDECTOMY      CARPAL TUNNEL RELEASE  2019    COLONOSCOPY      COLONOSCOPY N/A 7/27/2020    COLONOSCOPY POLYPECTOMY SNARE/COLD BIOPSY performed by Darrick Sy MD at [facility]    COLONOSCOPY  7/27/2020    COLONOSCOPY POLYPECTOMY HOT BIOPSY performed by Darrick Sy MD at [address] [place] [address] Ave Right 6/25/2020    DEQUERVAINS RELEASE AND RIGHT RING FINGER TRIGGER RELEASE performed by Arik Ames DO at [facility] [address] Left 02/11/2019    [employer] [facility] PT HAS A MRI CONDITIONAL SYSTEM    PTCA      TONSILLECTOMY         Allergies   Allergen Reactions    Adhesive Tape Itching       Social History     Socioeconomic History    Marital status:      Spouse name: Not on file    Number of children: Not on file    Years of education: Not on file    Highest education level: Not on file   Occupational History    Not on file   Social Needs    Financial resource strain: Not on file    Food insecurity     Worry: Not on file     Inability: Not on file    Transportation needs     Medical: Not on file     Non-medical: Not on file   Tobacco Use    Smoking status: Former Smoker     Packs/day: 0.25     Years: 37.00     Pack years: 9.25     Types: Cigarettes     Start date: 1981     Last attempt to quit: 2020     Years since quittin.5    Smokeless tobacco: Never Used   Substance and Sexual Activity    Alcohol use: No    Drug use: No    Sexual activity: Not on file   Lifestyle    Physical activity     Days per week: Not on file     Minutes per session: Not on file    Stress: Not on file   Relationships    Social connections     Talks on phone: Not on file     Gets together: Not on file     Attends Yarsani service: Not on file     Active member of club or organization: Not on file     Attends meetings of clubs or organizations: Not on file     Relationship status: Not on file    Intimate partner violence     Fear of current or ex partner: Not on file     Emotionally abused: Not on file     Physically abused: Not on file     Forced sexual activity: Not on file   Other Topics Concern    Not on file   Social History Narrative    Not on file       Family History   Problem Relation Age of Onset    High Blood Pressure Mother     Dementia Mother     Cancer Father     Colon Cancer Father     Mental Retardation Brother     Colon Polyps Brother     Cancer Maternal Grandfather     Esophageal Cancer Neg Hx          I have reviewed the patient's past medical history, past surgical history, allergies, medications, social and family history and I have made updates where appropriate.       Review of Systems  Positive responses are highlighted in bold    Constitutional:  Fever, Chills, Night Sweats, Fatigue, Unexpected changes in weight  Eyes:  Eye discharge, Eye pain, Eye redness, Visual disturbances   HENT:  Ear pain, Tinnitus, Nosebleeds, Trouble swallowing, Hearing loss, Sore throat  Cardiovascular:  Chest Pain, Palpitations, Orthopnea, Paroxysmal Nocturnal Dyspnea  Respiratory:  Cough, Wheezing, Shortness of breath, Chest tightness, Apnea  Gastrointestinal:  Nausea, Vomiting, Diarrhea, Constipation, Heartburn, Blood in stool  Genitourinary:  Difficulty or painful urination, Flank pain, Change in frequency, Urgency  Skin:  Color change, Rash, Itching, Wound  Psychiatric:  Hallucinations, Anxiety, Depression, Suicidal ideation  Hematological:  Enlarged glands, Easy bleeding, Easily bruising  Musculoskeletal:  Joint pain, Back pain, Gait problems, Joint swelling, Myalgias  Neurological:  Dizziness, Headaches, Presyncope, Numbness, Seizures, Tremors  Allergy:  Environmental allergies, Food allergies  Endocrine:  Heat Intolerance, Cold Intolerance, Polydipsia, Polyphagia, Polyuria    Lab Results   Component Value Date     10/16/2020    K 4.2 10/16/2020     10/16/2020    CO2 26 10/16/2020    BUN 15 10/16/2020    CREATININE 0.6 10/16/2020    GLUCOSE 101 10/16/2020    CALCIUM 8.8 10/16/2020    PROT 5.7 (L) 07/09/2020    LABALBU 3.6 07/09/2020    BILITOT <0.2 (L) 07/09/2020    ALKPHOS 66 07/09/2020    AST 12 07/09/2020    ALT 11 07/09/2020    LABGLOM >90 10/16/2020     Lab Results   Component Value Date    TSH 1.260 08/15/2020     Lab Results   Component Value Date    WBC 4.7 (L) 10/16/2020    HGB 13.1 10/16/2020    HCT 40.7 10/16/2020    MCV 97.4 10/16/2020     10/16/2020       PHYSICAL EXAM:  Vitals:    12/10/20 0834 12/10/20 0844   BP: 102/74 96/74   Pulse: 88    Resp: 10    Temp: 98.5 °F (36.9 °C)    TempSrc: Oral    SpO2: 95%    Weight: 212 lb (96.2 kg)    Height: 5' 4.37\" (1.635 m)      Body mass index is 35.97 kg/m².          VS Reviewed  General Appearance: A&O x 3, No acute distress,well developed and well- nourished  Head: normocephalic and atraumatic  Eyes: pupils equal, round, and reactive to light, extraocular eye movements intact, conjunctivae and eye lids without erythema  Neck: supple and non-tender without mass, no thyromegaly or thyroid nodules, no cervical lymphadenopathy  Pulmonary/Chest: clear to auscultation bilaterally- no wheezes, rales or rhonchi, normal air movement, no respiratory distress or retractions  Cardiovascular: S1 and S2 auscultated w/ RRR. No murmurs, rubs, clicks, or gallops, distal pulses intact. Abdomen: soft, non-tender, non-distended, bowl sounds physiologic,  no rebound or guarding, no masses or hernias noted. Liver and spleen without enlargement. Extremities: no cyanosis, clubbing or edema of the lower extremities  Musculoskeletal: No joint swelling or gross deformity   Neuro:  Alert, 5/5 strength globally and symmetrically  Psych: Affect appropriate. Mood normal. Thought process is normal without evidence of depression or psychosis. Good insight and appropriate interaction. Cognition and memory appear to be intact. Skin: warm and dry, no rash or erythema  Lymph:  No cervical, auricular or supraclavicular lymph nodes palpated    ASSESSMENT & PLAN  Joe De Paz was seen today for follow-up and dizziness. Diagnoses and all orders for this visit:    Depression with anxiety    Pre-syncope  -     Holter Monitor 48 Hour; Future    Heart palpitations  -     Holter Monitor 48 Hour; Future      - depression/anxiety stable and manageable, con't Effexor  - following with cardiology who is managing her BP meds, BP on lower end, and this could be a potential cause of her 2 presyncopal episodes. I would defer stopping or changing these to cardiology. - check holter monitor  - I recommend she also f/u with cardiology and discuss her presyncopal episodes with them    DISPOSITION    Return in about 6 months (around 6/10/2021) for chronic conditions. Joe De Paz released without restrictions.       PATIENT COUNSELING    Counseling was provided today regarding the following topics: Healthy eating habits, Regular exercise, substance abuse and healthy sleep habits. Karla Campuzano received counseling on the following healthy behaviors: medication adherence    Patient given educational materials on: See Attached    I have instructed Karla Campuzano to complete a self tracking handout on Blood Pressures  and instructed them to bring it with them to her next appointment. Barriers to learning and self management: none    Discussed use, benefit, and side effects of prescribed medications. Barriers to medication compliance addressed. All patient questions answered. Pt voiced understanding.        Electronically signed by ANGELIA Glover CNP on 12/10/2020 at 9:21 AM

## 2020-12-10 NOTE — TELEPHONE ENCOUNTER
scheduling 48 hour holter 12/14/20 @ [facility] @ 3:15p   Arrive @ 2:45 to 2nd floor [facility] desk   Wear mask   Bring I.D. and insurance cards

## 2020-12-14 ENCOUNTER — HOSPITAL ENCOUNTER (OUTPATIENT)
Dept: NON INVASIVE DIAGNOSTICS | Age: 60
Discharge: HOME | End: 2020-12-14
Payer: COMMERCIAL

## 2020-12-14 PROCEDURE — 93226 XTRNL ECG REC<48 HR SCAN A/R: CPT

## 2020-12-14 PROCEDURE — 93225 XTRNL ECG REC<48 HRS REC: CPT

## 2020-12-17 ENCOUNTER — OFFICE VISIT (OUTPATIENT)
Dept: CARDIOLOGY CLINIC | Age: 60
End: 2020-12-17
Payer: COMMERCIAL

## 2020-12-17 VITALS
OXYGEN SATURATION: 95 % | DIASTOLIC BLOOD PRESSURE: 68 MMHG | BODY MASS INDEX: 39.67 KG/M2 | HEART RATE: 86 BPM | SYSTOLIC BLOOD PRESSURE: 122 MMHG | WEIGHT: 215.6 LBS | HEIGHT: 62 IN

## 2020-12-17 PROCEDURE — 99214 OFFICE O/P EST MOD 30 MIN: CPT | Performed by: NURSE PRACTITIONER

## 2020-12-17 PROCEDURE — G8484 FLU IMMUNIZE NO ADMIN: HCPCS | Performed by: NURSE PRACTITIONER

## 2020-12-17 PROCEDURE — G8427 DOCREV CUR MEDS BY ELIG CLIN: HCPCS | Performed by: NURSE PRACTITIONER

## 2020-12-17 PROCEDURE — 3017F COLORECTAL CA SCREEN DOC REV: CPT | Performed by: NURSE PRACTITIONER

## 2020-12-17 PROCEDURE — G8417 CALC BMI ABV UP PARAM F/U: HCPCS | Performed by: NURSE PRACTITIONER

## 2020-12-17 PROCEDURE — 1036F TOBACCO NON-USER: CPT | Performed by: NURSE PRACTITIONER

## 2020-12-17 ASSESSMENT — ENCOUNTER SYMPTOMS
SHORTNESS OF BREATH: 0
CHEST TIGHTNESS: 0
NAUSEA: 0
COUGH: 0
COLOR CHANGE: 0
APNEA: 0
WHEEZING: 0
ABDOMINAL PAIN: 0
ABDOMINAL DISTENTION: 0

## 2020-12-18 LAB
ACQUISITION DURATION: NORMAL S
AVERAGE HEART RATE: 86 BPM
FASTEST SUPRAVENTRICULAR RATE: 129 BPM
FASTEST VENTRICULAR RATE: 132 BPM
HOOKUP DATE: NORMAL
HOOKUP TIME: NORMAL
LONGEST SUPRAVENTRICULAR RATE: 129 BPM
LONGEST VE RUN RATE: 132 BPM
MAX HEART RATE TIME/DATE: NORMAL
MAX HEART RATE: 135 BPM
MIN HEART RATE TIME/DATE: NORMAL
MIN HEART RATE: 64 BPM
NUMBER OF FASTEST SUPRAVENTRICULAR BEATS: 7
NUMBER OF FASTEST VENTRICULAR BEATS: 6
NUMBER OF LONGEST SUPRAVENTRICULAR BEATS: 7
NUMBER OF LONGEST VENTRICULAR BEATS: 6
NUMBER OF QRS COMPLEXES: NORMAL
NUMBER OF SUPRAVENTRICULAR BEATS IN RUNS: 7
NUMBER OF SUPRAVENTRICULAR COUPLETS: 0
NUMBER OF SUPRAVENTRICULAR ECTOPICS: 60
NUMBER OF SUPRAVENTRICULAR ISOLATED BEATS: 53
NUMBER OF SUPRAVENTRICULAR RUNS: 1
NUMBER OF VENTRICULAR BEATS IN RUNS: 6
NUMBER OF VENTRICULAR BIGEMINAL CYCLES: 0
NUMBER OF VENTRICULAR COUPLETS: 0
NUMBER OF VENTRICULAR ECTOPICS: 38
NUMBER OF VENTRICULAR ISOLATED BEATS: 32
NUMBER OF VENTRICULAR RUNS: 1

## 2020-12-21 ENCOUNTER — TELEPHONE (OUTPATIENT)
Dept: FAMILY MEDICINE CLINIC | Age: 60
End: 2020-12-21

## 2020-12-21 NOTE — TELEPHONE ENCOUNTER
----- Message from ANGELIA Glover CNP sent at 12/21/2020  9:18 AM EST -----  Let Kalra Campuzano know her holter monitor was normal. No abnormal concerning heart beats or heart rhythms.  I  Recommend if she is still having these episodes of dizziness and feeling like she's going to pass out, to discuss things with her heart doctor and her neurologist.

## 2020-12-22 ENCOUNTER — PROCEDURE VISIT (OUTPATIENT)
Dept: CARDIOLOGY CLINIC | Age: 60
End: 2020-12-22
Payer: COMMERCIAL

## 2020-12-22 PROCEDURE — 93296 REM INTERROG EVL PM/IDS: CPT | Performed by: INTERNAL MEDICINE

## 2020-12-22 PROCEDURE — 93295 DEV INTERROG REMOTE 1/2/MLT: CPT | Performed by: INTERNAL MEDICINE

## 2020-12-22 NOTE — PROGRESS NOTES
DR LAGUERRE PT  [employer] SINGLE ICD REMOTE   BATTERY 10.2 YRS REMAINING  VENT IMPEDENCE 418  RV 43  SVC 53  RV WAVES 14.3  RV THRESHOLD PER THE DEVICE 0.75 @ 0.4  VENT AMPLITUDE 1.5 @ 0.4  VVI 40  V PACED <0.1%   OPTIVOL WNL

## 2020-12-28 ENCOUNTER — VIRTUAL VISIT (OUTPATIENT)
Dept: NEUROLOGY | Age: 60
End: 2020-12-28
Payer: COMMERCIAL

## 2020-12-28 PROCEDURE — G8428 CUR MEDS NOT DOCUMENT: HCPCS | Performed by: NURSE PRACTITIONER

## 2020-12-28 PROCEDURE — 3017F COLORECTAL CA SCREEN DOC REV: CPT | Performed by: NURSE PRACTITIONER

## 2020-12-28 PROCEDURE — 99213 OFFICE O/P EST LOW 20 MIN: CPT | Performed by: NURSE PRACTITIONER

## 2020-12-28 RX ORDER — SACUBITRIL AND VALSARTAN 49; 51 MG/1; MG/1
TABLET, FILM COATED ORAL
Qty: 60 TABLET | Refills: 0 | Status: SHIPPED | OUTPATIENT
Start: 2020-12-28 | End: 2021-01-25

## 2020-12-28 ASSESSMENT — ENCOUNTER SYMPTOMS
RESPIRATORY NEGATIVE: 1
EYES NEGATIVE: 1
GASTROINTESTINAL NEGATIVE: 1

## 2020-12-28 NOTE — PATIENT INSTRUCTIONS
1. Continue with antiplatelets  2. Continue with lipid lowering medication  3. 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 Tilt table test  5. Continue following with cardiology  6. Follow up in 3 months or sooner if needed. 7. Call if any questions or concerns.

## 2020-12-28 NOTE — PROGRESS NOTES
2020    TELEHEALTH EVALUATION -- Audio/Visual (During QGVSK-41 public health emergency)    HPI:    Chinyere Correa (:  1960) has requested an audio/video evaluation for the following concern(s): dizziness. She is still having some intermittent episodes of dizziness. Symptoms are worse when bending over. CTA showed area of focal severe stenosis at V4 segment of right vertebral artery. No falls. She is on aspirin daily. She would benefit from formal evaluation with interventional neurology to evaluate if this stenosis is symptomatic. She was referred, however, the office was unable to reach her to set up the appointment. She also reports 2 episodes of passing out. She felt dizzy and passed out. She is following with cardiology. She is being evaluated via video link to discuss the plan of care going forward         Review of Systems   Eyes: Negative. Respiratory: Negative. Cardiovascular: Negative. Gastrointestinal: Negative. Prior to Visit Medications    Medication Sig Taking?  Authorizing Provider   ENTRESTO 49-51 MG per tablet Take 1 tablet by mouth twice daily  Leo Vergara MD   atorvastatin (LIPITOR) 40 MG tablet Take 1 tablet by mouth nightly  ANGELIA Fong CNP   EUTHYROX 100 MCG tablet Take 1 tablet by mouth once daily  ANGELIA Fong CNP   carvedilol (COREG) 3.125 MG tablet Take 1 tablet by mouth twice daily  Leo Vergara MD   venlafaxine (EFFEXOR XR) 150 MG extended release capsule Take 1 capsule by mouth daily  ANGELIA Fong CNP   butalbital-acetaminophen-caffeine (FIORICET, ESGIC) -40 MG per tablet Take 1 tablet by mouth every 4 hours as needed for Headaches  Vinicius Peter MD   omeprazole (PRILOSEC) 20 MG delayed release capsule Take 1 capsule by mouth every morning (before breakfast)  ANGELIA Gutierrez CNP polyethylene glycol (GLYCOLAX) 17 GM/SCOOP powder MIX 17 GRAMS OF POWDER WITH LIQUID BY MOUTH ONCE DAILY  Historical Provider, MD   letrozole (FEMARA) 2.5 MG tablet Take 1 tablet by mouth daily  Juan Eden MD   aspirin 81 MG chewable tablet Take 1 tablet by mouth daily  Trey Pérez MD   furosemide (LASIX) 20 MG tablet Take 1 tablet by mouth daily as needed (leg swelling, weight gain)  ANGELIA Ellis CNP   acetaminophen (TYLENOL) 500 MG tablet Take 500 mg by mouth every 6 hours as needed for Pain  Historical Provider, MD       Social History     Tobacco Use    Smoking status: Former Smoker     Packs/day: 0.25     Years: 37.00     Pack years: 9.25     Types: Cigarettes     Start date: 1981     Quit date: 2020     Years since quittin.6    Smokeless tobacco: Never Used   Substance Use Topics    Alcohol use: No    Drug use: No        Past Medical History:   Diagnosis Date    Abnormal heart rhythm     Anxiety     Cancer ([address]     breast  2016     CHF (congestive heart failure) (HCC)     Congenital heart disease     DJD (degenerative joint disease)     Enlarged lymph nodes     Hypertension     Hypothyroidism     ICD (implantable cardioverter-defibrillator) in place 2019    Dr. Molina Madrid Kidney stones     PONV (postoperative nausea and vomiting)     Prediabetes 10/7/2019    Thyroid disease    ,   Past Surgical History:   Procedure Laterality Date    APPENDECTOMY      CARPAL TUNNEL RELEASE      COLONOSCOPY      COLONOSCOPY N/A 2020    COLONOSCOPY POLYPECTOMY SNARE/COLD BIOPSY performed by Genia Ortega MD at [facility]    COLONOSCOPY  2020    COLONOSCOPY POLYPECTOMY HOT BIOPSY performed by Genia Ortega MD at [address] Right 2020    DEQUERVAINS RELEASE AND RIGHT RING FINGER TRIGGER RELEASE performed by Michelle Posadas DO at [address] [place] Left 2019 [employer] PT HAS A MRI CONDITIONAL SYSTEM    PTCA      TONSILLECTOMY     ,   Social History     Tobacco Use    Smoking status: Former Smoker     Packs/day: 0.25     Years: 37.00     Pack years: 9.25     Types: Cigarettes     Start date: 1981     Quit date: 2020     Years since quittin.6    Smokeless tobacco: Never Used   Substance Use Topics    Alcohol use: No    Drug use: No   ,   Family History   Problem Relation Age of Onset    High Blood Pressure Mother     Dementia Mother     Cancer Father     Colon Cancer Father     Mental Retardation Brother     Colon Polyps Brother     Cancer Maternal Grandfather     Esophageal Cancer Neg Hx        PHYSICAL EXAMINATION:  [ INSTRUCTIONS:  \"[x]\" Indicates a positive item  \"[]\" Indicates a negative item  -- DELETE ALL ITEMS NOT EXAMINED]  Vital Signs: (As obtained by patient/caregiver or practitioner observation)    Blood pressure-  Heart rate-    Respiratory rate-    Temperature-  Pulse oximetry-     Constitutional: [x] Appears well-developed and well-nourished [x] No apparent distress      [] Abnormal-   Mental status  [x] Alert and awake  [x] Oriented to person/place/time [x]Able to follow commands      Eyes:  EOM    [x]  Normal  [] Abnormal-  Sclera  [x]  Normal  [] Abnormal -         Discharge [x]  None visible  [] Abnormal -    HENT:   [x] Normocephalic, atraumatic.   [] Abnormal   [x] Mouth/Throat: Mucous membranes are moist.     External Ears [x] Normal  [] Abnormal-     Neck: [x] No visualized mass     Pulmonary/Chest: [x] Respiratory effort normal.  [x] No visualized signs of difficulty breathing or respiratory distress        [] Abnormal-      Musculoskeletal:   [] Normal gait with no signs of ataxia         [x] Normal range of motion of neck        [] Abnormal-       Neurological:        [x] No Facial Asymmetry (Cranial nerve 7 motor function) (limited exam to video visit)          [x] No gaze palsy        [] Abnormal- Skin:        [x] No significant exanthematous lesions or discoloration noted on facial skin         [] Abnormal-            Psychiatric:       [x] Normal Affect [x] No Hallucinations        [] Abnormal-     Other pertinent observable physical exam findings-     ASSESSMENT/PLAN:  1. Dizziness    She is still having some intermittent episodes of dizziness. Symptoms are worse when bending over. CTA showed area of focal severe stenosis at V4 segment of right vertebral artery. No falls. She is on aspirin daily. She would benefit from formal evaluation with interventional neurology to evaluate if this stenosis is symptomatic. She was referred, however, the office was unable to reach her to set up the appointment. She also reports 2 episodes of passing out. She felt dizzy and passed out. She has been told she has low blood pressure. She is following with cardiology. After detailed discussion with patient we agreed on the following plan. Plan:  1. Continue with antiplatelets  2. Continue with lipid lowering medication  3. 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 Tilt table test  5. Continue following with cardiology  6. Follow up in 3 months or sooner if needed. 7. Call if any questions or concerns. Georges Min is a 62 y.o. female being evaluated by a Virtual Visit (video visit) encounter to address concerns as mentioned above. A caregiver was present when appropriate. Due to this being a TeleHealth encounter (During [facility] public [facility] emergency), evaluation of the following organ systems was limited: Vitals/Constitutional/EENT/Resp/CV/GI//MS/Neuro/Skin/Heme-Lymph-Imm. Pursuant to the emergency declaration under the [address] [place] [address] and the [employer] Act, this Virtual Visit was conducted with patient's (and/or legal guardian's) consent, to reduce the patient's risk of exposure to COVID-19 and provide necessary medical care. The patient (and/or legal guardian) has also been advised to contact this office for worsening conditions or problems, and seek emergency medical treatment and/or call 911 if deemed necessary. Patient identification was verified at the start of the visit: Yes    Total time spent on this encounter: 15 minutes    Services were provided through a video synchronous discussion virtually to substitute for in-person clinic visit. Patient and provider were located at their individual homes. --ANGELIA Mccann CNP on 12/28/2020 at 1:21 PM    An electronic signature was used to authenticate this note.

## 2021-01-20 ENCOUNTER — OFFICE VISIT (OUTPATIENT)
Dept: ONCOLOGY | Age: 61
End: 2021-01-20
Payer: COMMERCIAL

## 2021-01-20 ENCOUNTER — HOSPITAL ENCOUNTER (OUTPATIENT)
Dept: INFUSION THERAPY | Age: 61
Discharge: HOME | End: 2021-01-20
Payer: COMMERCIAL

## 2021-01-20 VITALS
BODY MASS INDEX: 39.93 KG/M2 | HEIGHT: 62 IN | TEMPERATURE: 97.6 F | DIASTOLIC BLOOD PRESSURE: 74 MMHG | OXYGEN SATURATION: 94 % | SYSTOLIC BLOOD PRESSURE: 122 MMHG | HEART RATE: 86 BPM | RESPIRATION RATE: 18 BRPM | WEIGHT: 217 LBS

## 2021-01-20 DIAGNOSIS — Z79.811 USE OF LETROZOLE (FEMARA): ICD-10-CM

## 2021-01-20 DIAGNOSIS — Z85.3 ENCOUNTER FOR FOLLOW-UP SURVEILLANCE OF BREAST CANCER: ICD-10-CM

## 2021-01-20 DIAGNOSIS — C50.912 MALIGNANT NEOPLASM OF LEFT BREAST IN FEMALE, ESTROGEN RECEPTOR POSITIVE, UNSPECIFIED SITE OF BREAST (HCC): Primary | ICD-10-CM

## 2021-01-20 DIAGNOSIS — Z90.13 H/O BILATERAL MASTECTOMY: ICD-10-CM

## 2021-01-20 DIAGNOSIS — Z08 ENCOUNTER FOR FOLLOW-UP SURVEILLANCE OF BREAST CANCER: ICD-10-CM

## 2021-01-20 DIAGNOSIS — Z17.0 MALIGNANT NEOPLASM OF LEFT BREAST IN FEMALE, ESTROGEN RECEPTOR POSITIVE, UNSPECIFIED SITE OF BREAST (HCC): Primary | ICD-10-CM

## 2021-01-20 PROCEDURE — G8417 CALC BMI ABV UP PARAM F/U: HCPCS | Performed by: INTERNAL MEDICINE

## 2021-01-20 PROCEDURE — 1036F TOBACCO NON-USER: CPT | Performed by: INTERNAL MEDICINE

## 2021-01-20 PROCEDURE — 99214 OFFICE O/P EST MOD 30 MIN: CPT | Performed by: INTERNAL MEDICINE

## 2021-01-20 PROCEDURE — G8427 DOCREV CUR MEDS BY ELIG CLIN: HCPCS | Performed by: INTERNAL MEDICINE

## 2021-01-20 PROCEDURE — G8482 FLU IMMUNIZE ORDER/ADMIN: HCPCS | Performed by: INTERNAL MEDICINE

## 2021-01-20 PROCEDURE — 3017F COLORECTAL CA SCREEN DOC REV: CPT | Performed by: INTERNAL MEDICINE

## 2021-01-20 PROCEDURE — 99211 OFF/OP EST MAY X REQ PHY/QHP: CPT

## 2021-01-20 RX ORDER — LETROZOLE 2.5 MG/1
2.5 TABLET, FILM COATED ORAL DAILY
Qty: 90 TABLET | Refills: 3 | Status: SHIPPED | OUTPATIENT
Start: 2021-01-20 | End: 2021-07-20 | Stop reason: SDUPTHER

## 2021-01-25 RX ORDER — SACUBITRIL AND VALSARTAN 49; 51 MG/1; MG/1
TABLET, FILM COATED ORAL
Qty: 60 TABLET | Refills: 12 | Status: SHIPPED | OUTPATIENT
Start: 2021-01-25 | End: 2022-05-12 | Stop reason: SDUPTHER

## 2021-01-29 NOTE — PROGRESS NOTES
[facility] [address] [facility] [address]  [place] [address]  Dept: [phone]  Dept Fax: [phone]: [phone]      Visit Date:1/20/2021     Jasiel Milan is a 62 y.o. female who presents today for:   Follow-up breast cancer      HPI:   Andrews Rose is a 62year old female with history of left breast cancer diagnosed in December 2016 in [place]. She has relocated to [place] to be closer with her family. We were able to obtain her pathology report and progress notes from the medical oncologist. Final pathology report from left breast modified mastectomy and right simple mastectomy performed on December 29, 2016 at [facility] in [place], [place] showed a 2.2 cm infiltrating ductal carcinoma of grade 2, there was evidence of lymphovascular invasion. One out of 10 axillary lymph node was positive for metastatic breast cancer. The tumor cells were estrogen receptor positive in 100% of tumor cell staining, progesterone receptor positive in 30% of tumor cells staining. HER-2 non-amplified by FISH. Right breast mastectomy was negative for malignancy. The patient  received adjuvant chemotherapy,s 4 cycles of AC followed by weekly Taxol. Chemotherapy was followed by radiation treatment. In 2018 she was diagnosed with a dilated cardiomyopathy with EF of 20-25%. She underwent ICD insertion on 2/11/19. She reports that the last time she saw the Oncologist in [facility] was in August of 2017 when she completed her chemotherapy. After she lost all of her money she wasn't able to follow up with physicians. She reports that she was supposed to be on a medication for 10 years after this, but wasn't able to afford it. After she moved to [facility] she established care with a cardiologist Dr. Carol Ann Trujillo. In January 2019, the patient established care with [address] [place] [address] at [facility]. She was placed on aromatase inhibitor with Femara.    The patient was hospitalized in on July 8, 2020 with facial numbness. She had brain MRI that was negative for evidence of brain metastasis. Her facial numbness has resolved    Interval History 1/20/2021:   The patient is here for 6 months follow-up evaluation. Overall, the patient tolerates adjuvant hormonal therapy with Femara well. She denies hot flashes, mood swings or night sweats. Main concern and complaint is weight gain. She denies fever, chills or recurrent infections. or urinary changes. Denies any complaints related to her chest wall. She was hospitalized in October 2020 with chest pain. Cardiac catheterization was negative. Suspected anxiety/panic related. The patient admits to multiple family stressors.     HPI   Past Medical History:   Diagnosis Date    Abnormal heart rhythm     Anxiety     Cancer [facility])     breast  2016     CHF (congestive heart failure) (HCC)     Congenital heart disease     DJD (degenerative joint disease)     Enlarged lymph nodes     Hypertension     Hypothyroidism     ICD (implantable cardioverter-defibrillator) in place 02/11/2019    Dr. [place] Fl[place] Kidney stones     PONV (postoperative nausea and vomiting)     Prediabetes 10/7/2019    Thyroid disease       Past Surgical History:   Procedure Laterality Date    APPENDECTOMY      CARPAL TUNNEL RELEASE  2019    COLONOSCOPY      COLONOSCOPY N/A 7/27/2020    COLONOSCOPY POLYPECTOMY SNARE/COLD BIOPSY performed by Cisco Lopez MD at [address] Endoscopy    COLONOSCOPY  7/27/2020    COLONOSCOPY POLYPECTOMY HOT BIOPSY performed by Cisco Lopez MD at [address] Right 6/25/2020    DEQUERVAINS RELEASE AND RIGHT RING FINGER TRIGGER RELEASE performed by Alinda Romberg, DO at [address] [facility], BILATERAL      PACEMAKER INSERTION Left 02/11/2019    [employer] [facility] PT HAS A MRI CONDITIONAL SYSTEM    PTCA      TONSILLECTOMY        Family History   Problem Relation Age of Onset    High Blood Pressure Mother     Dementia Mother     Cancer Father     Colon Cancer Father     Mental Retardation Brother     Colon Polyps Brother     Cancer Maternal Grandfather     Esophageal Cancer Neg Hx       Social History     Tobacco Use    Smoking status: Former Smoker     Packs/day: 0.25     Years: 37.00     Pack years: 9.25     Types: Cigarettes     Start date: 1981     Quit date: 2020     Years since quittin.6    Smokeless tobacco: Never Used   Substance Use Topics    Alcohol use: No      Current Outpatient Medications   Medication Sig Dispense Refill    letrozole (FEMARA) 2.5 MG tablet Take 1 tablet by mouth daily 90 tablet 3    atorvastatin (LIPITOR) 40 MG tablet Take 1 tablet by mouth nightly 90 tablet 3    EUTHYROX 100 MCG tablet Take 1 tablet by mouth once daily 90 tablet 1    carvedilol (COREG) 3.125 MG tablet Take 1 tablet by mouth twice daily 180 tablet 2    venlafaxine (EFFEXOR XR) 150 MG extended release capsule Take 1 capsule by mouth daily 90 capsule 3    omeprazole (PRILOSEC) 20 MG delayed release capsule Take 1 capsule by mouth every morning (before breakfast) 90 capsule 2    polyethylene glycol (GLYCOLAX) 17 GM/SCOOP powder MIX 17 GRAMS OF POWDER WITH LIQUID BY MOUTH ONCE DAILY      aspirin 81 MG chewable tablet Take 1 tablet by mouth daily 30 tablet 3    furosemide (LASIX) 20 MG tablet Take 1 tablet by mouth daily as needed (leg swelling, weight gain) 90 tablet 0    acetaminophen (TYLENOL) 500 MG tablet Take 500 mg by mouth every 6 hours as needed for Pain      ENTRESTO 49-51 MG per tablet Take 1 tablet by mouth twice daily 60 tablet 12    butalbital-acetaminophen-caffeine (FIORICET, ESGIC) -40 MG per tablet Take 1 tablet by mouth every 4 hours as needed for Headaches (Patient not taking: Reported on 2021) 180 tablet 1     No current facility-administered medications for this visit.        Allergies   Allergen Reactions    Adhesive Tape Itching      Health Maintenance   Topic Date Due  A1C test (Diabetic or Prediabetic)  07/09/2021    Lipid screen  07/09/2021    TSH testing  08/15/2021    Potassium monitoring  10/16/2021    Creatinine monitoring  10/16/2021    Cervical cancer screen  10/09/2022    Colon cancer screen colonoscopy  07/27/2030    DTaP/Tdap/Td vaccine (2 - Td) 08/26/2030    Flu vaccine  Completed    Shingles Vaccine  Completed    Pneumococcal 0-64 years Vaccine  Completed    Hepatitis C screen  Completed    HIV screen  Completed    Hepatitis A vaccine  Aged Out    Hepatitis B vaccine  Aged Out    Hib vaccine  Aged Out    Meningococcal (ACWY) vaccine  Aged Out       Review of Systems:   Review of Systems   Pertinent review of systems noted in HPI, all other ROS negative. Objective:   Physical Exam   Vitals:    01/20/21 1310   BP: 122/74   Site: Right Upper Arm   Position: Sitting   Cuff Size: Medium Adult   Pulse: 86   Resp: 18   Temp: 97.6 °F (36.4 °C)   TempSrc: Infrared   SpO2: 94%   Weight: 217 lb (98.4 kg)   Height: 5' 2\" (1.575 m)       General appearance: No apparent distress, well nourished well developed and cooperative. HEENT: Pupils equal, round, and reactive to light. Conjunctivae/corneas clear. Oral mucosa moist.   Neck: Supple, with full range of motion. Trachea midline. Dressing in place to left neck from catheterization on 4/10/19 in [place]. Respiratory:  Normal respiratory effort. Clear to auscultation, bilaterally without Rales/Wheezes/Rhonchi. Cardiovascular: Regular rate and rhythm with normal S1/S2   Chest: S/P bilateral mastectomy. No nodules palpated. Abdomen: Soft, non-tender, non-distended with active bowel sounds. Musculoskeletal: No clubbing, cyanosis or edema bilaterally. Skin: Skin color, texture, turgor normal.  No rashes or lesions. Neurologic:  Neurovascularly intact without any focal sensory/motor deficits.    Psychiatric: Alert and oriented        Imaging Studies and Labs:   CBC:   Lab Results   Component Value Date WBC 4.7 (L) 10/16/2020    HGB 13.1 10/16/2020    HCT 40.7 10/16/2020    MCV 97.4 10/16/2020     10/16/2020     BMP:   Lab Results   Component Value Date     10/16/2020    K 4.2 10/16/2020     10/16/2020    CO2 26 10/16/2020    BUN 15 10/16/2020    CREATININE 0.6 10/16/2020    GLUCOSE 101 10/16/2020    CALCIUM 8.8 10/16/2020      LFT:   Lab Results   Component Value Date    ALT 11 2020    AST 12 2020    ALKPHOS 66 2020    BILITOT <0.2 (L) 2020      PET Scan 19  Narrative   PROCEDURE: PET CT SKULL BASE MID THIGH RESTAGE       CLINICAL INFORMATION: 52-year-old woman with left breast cancer, status post bilateral mastectomies, radiation therapy densities completed 2018) and chemotherapy (completed 2017); subsequent treatment strategy.       Radiopharmaceutical: 11.43 mCi F-18 FDG, intravenously.       TECHNIQUE: PET/CT imaging was performed following skull base to the midthigh levels using routine PET acquisition.       Injection site: Right antecubital fossa.       Time of FDG injection: 10:37 AM.       Serum glucose: 96 g/dL at 10:30 AM.       Tiny of imagin:56 AM       COMPARISON: CTA chest 2018       FINDINGS:        Neck: No FDG avid cervical lymphadenopathy. The thyroid gland is poorly visualized but likely enlarged. There are probable FDG avid hypoattenuating nodules bilaterally in the gland. The nodule on the right side is associated with a maximum SUV of 4.5    (image 62). A nodule in the left side is associated with a maximum SUV of 3.8 (image 62).     Chest: There is stable cardiac enlargement. Atherosclerotic calcifications are present in the thoracic aorta without evidence of aneurysm. There is no pleural or pericardial effusion. No FDG avid pulmonary nodules are identified.  An indistinct 5 mm are    density in the right lower lobe abutting the major fissure is stable in size and does not demonstrate metabolic activity (image Assessment and Plan:   1. History of Left Breast Cancer - S/P bilateral mastectomy December 2016. T2,N1,Mx Hormone Responsive Breast Cancer   She was treated with adjuvant chemotherapy: 4 cycles of AC, followed by weekly Taxol. Afterwards she received radiation treatment. The plan was to place her on adjuvant hormonal treatment but the patient lost her insurance. In September/October 2018 she was diagnosed with a cardiomyopathy, most likely related to chemotherapy. The patient moved to [place] to be with her family. She established care with our clinic in December 2018. 2. Adjuvant Hormonal Therapy  The patient was started on Aromatase inhibitor with Femara in January 2019. Overall she tolerates AI well without significant side effects. She reports weight gain, she attributes this to AI. No evidence of disease recurrence on today's physical examination the patient was instructed to continue Femara. Had a lengthy discussion with the patient about importance of physical exercise and monitoring her diet  3. Thyroid Nodules  Noted on PET scan 1/16/19 as poorly visualized FDG avid thyroid nodules of indeterminate etiology. Thyroid ultrasound showed a 1 cm isoechoic solid nodule, low suspicious category. Follow-up thyroid ultrasound was recommended in 6 months. 4.  Small pulmonary nodule. Seen on CTA of the chest from October 2020.  1 year follow-up was recommended. 5. Cancer surveillance. Management of breast cancer survivors who have completed active treatment and have no evidence of disease are cancer surveillance, lifestyle modifications including pursuing healthy regular exercise program, minimizing alcohol intake, and refraining from smoking. Survivor follow-up will include updated history, regular physical examination. Radiologic imaging to screen for distant recurrence will be not performed unless the patient will develop new symptoms.  Symptoms suspicious for recurrent /metastic disease include:  ?Constitutional symptoms - Anorexia, weight loss, malaise, fatigue, insomnia. ? Bone pain  ? Pulmonary symptoms - persistent cough or dyspnea (at rest or with exertion). ?Neurologic symptoms - Headache, nausea, vomiting, confusion, weakness, numbness or tingling. ?Gastrointestinal symptoms - Right upper quadrant pain, change in bowel habits, presence of bloody or tarry stools.      Electronically signed by   Cammie Michaels MD

## 2021-02-15 ENCOUNTER — HOSPITAL ENCOUNTER (OUTPATIENT)
Dept: NON INVASIVE DIAGNOSTICS | Age: 61
Discharge: HOME | End: 2021-02-15
Payer: COMMERCIAL

## 2021-02-15 DIAGNOSIS — R42 DIZZINESS: ICD-10-CM

## 2021-02-15 PROCEDURE — 93660 TILT TABLE EVALUATION: CPT | Performed by: INTERNAL MEDICINE

## 2021-02-16 NOTE — PROCEDURES
[address] [place] [address]                                TILT TABLE TEST    PATIENT NAME: Shayy Wilkinson                     :        1960  MED REC NO:   [id number]                           ROOM:  ACCOUNT NO:   [de-identified]                           ADMIT DATE: 02/15/2021  PROVIDER:     Bernice Barros MD    DATE OF STUDY:  02/15/2021    PROCEDURE:  Tilt table test.    INDICATION:  Dizziness. DESCRIPTION OF PROCEDURE:  After informed consent was obtained from the  patient, she was brought to the stress laboratory in a fasting,  non-sedated state. Continuous ECG, blood pressure, telemetry, heart  rate and pulse ox monitoring were established. The patient was  immediately laid supine. Blood pressure was 130/85 with the pulse of  97. The patient was then tilted to 70 degrees. At that point, the  blood pressure was 114/73 with the pulse of 96. Ten minutes into the  tilt, the patient's blood pressure was 120/76 with the pulse of 92. Twenty minutes into the tilt, the patient's blood pressure was 121/74  with the pulse of 96. Twenty five minutes into the tilt, the patient's  blood pressure was 126/75 with the pulse of 101 and thirty minutes into  the tilt, the patient's blood pressure was 135/58 with the heart rate of  93. The patient was then made supine. Blood pressure was 119/57 with  the pulse of 77. Five minutes into recovery, the patient's blood  pressure was 111/58 with the pulse of 81. ECG:  Baseline ECG was normal sinus rhythm without any ST-T wave  changes. Throughout the entire tilt table examination, no findings of  high-grade AV block or trough P-waves. No nonsustained VT, AT, VF,  Afib, SVT or flutter noted, no pauses greater than three seconds were  noted. SYMPTOMS:  The patient did feel lightheaded one minute into the tilt  table evaluation. SUMMARY:  Orthostatic hypotension.     PLAN: 1.  Consider fluids and increased salt intake. 2.  Consider reduction in diuretics if necessary. 3.  Consider midodrine and Florinef. 4.  Compression stockings.         Mckenna Martinez MD    D: 02/15/2021 14:29:06       T: 02/15/2021 16:21:37     [email or web address]  Job#: [id number]     Doc#: [id number]    CC:

## 2021-02-18 ENCOUNTER — TELEPHONE (OUTPATIENT)
Dept: NEUROLOGY | Age: 61
End: 2021-02-18

## 2021-02-19 ENCOUNTER — VIRTUAL VISIT (OUTPATIENT)
Dept: NEUROLOGY | Age: 61
End: 2021-02-19
Payer: COMMERCIAL

## 2021-02-19 DIAGNOSIS — R42 DIZZINESS: Primary | ICD-10-CM

## 2021-02-19 DIAGNOSIS — I65.01 STENOSIS OF RIGHT VERTEBRAL ARTERY: ICD-10-CM

## 2021-02-19 PROCEDURE — 3017F COLORECTAL CA SCREEN DOC REV: CPT | Performed by: PSYCHIATRY & NEUROLOGY

## 2021-02-19 PROCEDURE — G8428 CUR MEDS NOT DOCUMENT: HCPCS | Performed by: PSYCHIATRY & NEUROLOGY

## 2021-02-19 PROCEDURE — 99213 OFFICE O/P EST LOW 20 MIN: CPT | Performed by: PSYCHIATRY & NEUROLOGY

## 2021-02-19 ASSESSMENT — ENCOUNTER SYMPTOMS
GASTROINTESTINAL NEGATIVE: 1
RESPIRATORY NEGATIVE: 1
EYES NEGATIVE: 1

## 2021-02-19 NOTE — PATIENT INSTRUCTIONS
1. Continue with antiplatelets  2. Continue with lipid lowering medication  3. 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 Tilt table test results were discussed the patient was counseled about taking plenty of fluids, with salt, wear support stocking, avoid standing for prolonged periods of time. 5. Continue following with cardiology, Dr Elizabeth Marshall. 6. Follow up in 4 months or sooner if needed. 7. Call if any questions or concerns.

## 2021-02-19 NOTE — PROGRESS NOTES
2021    TELEHEALTH EVALUATION -- Audio/Visual (During UTTBI-66 public health emergency)    HPI:    Dorian Torres (:  1960) has requested an audio/video evaluation for the following concern(s):  Follow up for dizziness. She is still having some intermittent episodes of dizziness. Symptoms are worse when bending over. CTA showed area of focal severe stenosis at V4 segment of right vertebral artery. No falls. She is on aspirin daily. She would benefit from formal evaluation with interventional neurology to evaluate if this stenosis is symptomatic. She was referred, however, the office was unable to reach her to set up the appointment. She also reports 2 episodes of passing out. She felt dizzy and passed out. She is following with cardiology. She is being evaluated via video link to discuss the plan of care going forward         Review of Systems   Eyes: Negative. Respiratory: Negative. Cardiovascular: Negative. Gastrointestinal: Negative. Prior to Visit Medications    Medication Sig Taking?  Authorizing Provider   ENTRESTO 49-51 MG per tablet Take 1 tablet by mouth twice daily  ANGELIA Ruano CNP   letrozole (FEMARA) 2.5 MG tablet Take 1 tablet by mouth daily  Michael Kan MD   atorvastatin (LIPITOR) 40 MG tablet Take 1 tablet by mouth nightly  ANGELIA Saez CNP   EUTHYROX 100 MCG tablet Take 1 tablet by mouth once daily  ANGELIA Saez CNP   carvedilol (COREG) 3.125 MG tablet Take 1 tablet by mouth twice daily  Andrez Snider MD   venlafaxine (EFFEXOR XR) 150 MG extended release capsule Take 1 capsule by mouth daily  ANGELIA Saez CNP   butalbital-acetaminophen-caffeine (FIORICET, ESGIC) -40 MG per tablet Take 1 tablet by mouth every 4 hours as needed for Headaches  Patient not taking: Reported on 2021  Reagan Mantilla MD  MASTECTOMY, BILATERAL      PACEMAKER INSERTION Left 2019    [employer] [facility] HAS A MRI CONDITIONAL SYSTEM    PTCA      TONSILLECTOMY     ,   Social History     Tobacco Use    Smoking status: Former Smoker     Packs/day: 0.25     Years: 37.00     Pack years: 9.25     Types: Cigarettes     Start date: 1981     Quit date: 2020     Years since quittin.7    Smokeless tobacco: Never Used   Substance Use Topics    Alcohol use: No    Drug use: No   ,   Family History   Problem Relation Age of Onset    High Blood Pressure Mother     Dementia Mother     Cancer Father     Colon Cancer Father     Mental Retardation Brother     Colon Polyps Brother     Cancer Maternal Grandfather     Esophageal Cancer Neg Hx        PHYSICAL EXAMINATION:  [ INSTRUCTIONS:  \"[x]\" Indicates a positive item  \"[]\" Indicates a negative item  -- DELETE ALL ITEMS NOT EXAMINED]  Vital Signs: (As obtained by patient/caregiver or practitioner observation)    Blood pressure-  Heart rate-    Respiratory rate-    Temperature-  Pulse oximetry-     Constitutional: [x] Appears well-developed and well-nourished [x] No apparent distress      [] Abnormal-   Mental status  [x] Alert and awake  [x] Oriented to person/place/time [x]Able to follow commands      Eyes:  EOM    [x]  Normal  [] Abnormal-  Sclera  [x]  Normal  [] Abnormal -         Discharge [x]  None visible  [] Abnormal -    HENT:   [x] Normocephalic, atraumatic.   [] Abnormal   [x] Mouth/Throat: Mucous membranes are moist.     External Ears [x] Normal  [] Abnormal-     Neck: [x] No visualized mass     Pulmonary/Chest: [x] Respiratory effort normal.  [x] No visualized signs of difficulty breathing or respiratory distress        [] Abnormal-      Musculoskeletal:   [] Normal gait with no signs of ataxia         [x] Normal range of motion of neck        [] Abnormal- Neurological:        [x] No Facial Asymmetry (Cranial nerve 7 motor function) (limited exam to video visit)          [x] No gaze palsy        [] Abnormal-         Skin:        [x] No significant exanthematous lesions or discoloration noted on facial skin         [] Abnormal-            Psychiatric:       [x] Normal Affect [x] No Hallucinations        [] Abnormal-     Other pertinent observable physical exam findings-   Tilt table : 2/2021:  DATE OF STUDY:  02/15/2021     PROCEDURE:  Tilt table test.     INDICATION:  Dizziness.     DESCRIPTION OF PROCEDURE:  After informed consent was obtained from the  patient, she was brought to the stress laboratory in a fasting,  non-sedated state. Continuous ECG, blood pressure, telemetry, heart  rate and pulse ox monitoring were established. The patient was  immediately laid supine. Blood pressure was 130/85 with the pulse of  97. The patient was then tilted to 70 degrees. At that point, the  blood pressure was 114/73 with the pulse of 96. Ten minutes into the  tilt, the patient's blood pressure was 120/76 with the pulse of 92. Twenty minutes into the tilt, the patient's blood pressure was 121/74  with the pulse of 96. Twenty five minutes into the tilt, the patient's  blood pressure was 126/75 with the pulse of 101 and thirty minutes into  the tilt, the patient's blood pressure was 135/58 with the heart rate of  93. The patient was then made supine. Blood pressure was 119/57 with  the pulse of 77. Five minutes into recovery, the patient's blood  pressure was 111/58 with the pulse of 81.     ECG:  Baseline ECG was normal sinus rhythm without any ST-T wave  changes. Throughout the entire tilt table examination, no findings of  high-grade AV block or trough P-waves.   No nonsustained VT, AT, VF,  Afib, SVT or flutter noted, no pauses greater than three seconds were  noted.     SYMPTOMS:  The patient did feel lightheaded one minute into the tilt  table evaluation.    SUMMARY:  Orthostatic hypotension.     PLAN:  1. Consider fluids and increased salt intake. 2.  Consider reduction in diuretics if necessary. 3.  Consider midodrine and Florinef. 4.  Compression stockings.     CTA of the neck performed 8/15/2020     Impression       1. No flow-limiting stenosis or aneurysm in the intracranial circulation. 2. Stable focal area of severe stenosis in the V4 segment of the nondominant vertebral artery on the right with no other focal abnormality identified in the anterior or posterior circulation of the neck.                   **This report has been created using voice recognition software. It may contain minor errors which are inherent in voice recognition technology. **       Final report electronically signed by Dr. Wendy Palafox MD on 8/15/2020 3:02 PM          ASSESSMENT/PLAN:   Diagnosis Orders   1. Dizziness     2.  Stenosis of right vertebral artery She is reporting less dizziness. She feels better with the dizziness after following recommendations of drinking plenty of fluids and support stockings. she did not follow up with interventional neurology re the right V4. She was unable to get hold of them it seems. She had an abnormal tilt table test. She was asekd still having some intermittent episodes of dizziness. Symptoms are worse when bending over. CTA showed area of focal severe stenosis at V4 segment of right vertebral artery. I reviewed the patients imaging studies, and tilt table test performed. She denies falls. She is on aspirin daily. She would benefit from formal evaluation with interventional neurology to evaluate if this stenosis is symptomatic. She was referred, however, the office was unable to reach her to set up the appointment. She also reports 2 episodes of passing out. She felt dizzy and passed out. She has been told she has low blood pressure. She is following with cardiology. After detailed discussion with patient we agreed on the following plan. Plan:  1. Continue with antiplatelets  2. Continue with lipid lowering medication  3. 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 Tilt table test results were discussed the patient was counseled about taking plenty of fluids, with salt, wear support stocking, avoid standing for prolonged periods of time. 5. Continue following with cardiology, Dr Mattie Sellers. 6. Follow up in 4 months or sooner if needed. 7. Call if any questions or concerns. Nilson Ramirez is a 62 y.o. female being evaluated by a Virtual Visit (video visit) encounter to address concerns as mentioned above. A caregiver was present when appropriate. Due to this being a TeleHealth encounter (During FKKQ-23 public health emergency), evaluation of the following organ systems was limited: Vitals/Constitutional/EENT/Resp/CV/GI//MS/Neuro/Skin/Heme-Lymph-Imm. Pursuant to the emergency declaration under the [address] [place] [address] and the [employer] Act, this Virtual Visit was conducted with patient's (and/or legal guardian's) consent, to reduce the patient's risk of exposure to COVID-19 and provide necessary medical care. The patient (and/or legal guardian) has also been advised to contact this office for worsening conditions or problems, and seek emergency medical treatment and/or call 911 if deemed necessary. Patient identification was verified at the start of the visit: Yes  Total time spent on this encounter: 23 minutes    Services were provided through a video synchronous discussion virtually to substitute for in-person clinic visit. Patient and provider were located at their individual homes. --Bonita Tena MD on 2/19/2021 at 9:57 AM    An electronic signature was used to authenticate this note.

## 2021-02-24 NOTE — TELEPHONE ENCOUNTER
Spoke with patient. She was calling in to see if she could start her Digoxin back up. It was stopped June of 2020 from a hospitalization. She said since the medication has been stopped she has felt more tired and takes longer for her to do her chores. She thinks she felt better when she was taking Digoxin. Does not know what HR have been. Reviewed list of medications with patient. She also said she has been taking Lasix more frequently, at least once a week or sometimes twice a week, as she has been feeling more bloated recently.

## 2021-03-01 RX ORDER — DIGOXIN 125 MCG
125 TABLET ORAL DAILY
Qty: 90 TABLET | Refills: 3 | Status: SHIPPED | OUTPATIENT
Start: 2021-03-01 | End: 2022-05-12 | Stop reason: SDUPTHER

## 2021-03-10 ENCOUNTER — TELEPHONE (OUTPATIENT)
Dept: CARDIOLOGY CLINIC | Age: 61
End: 2021-03-10

## 2021-03-11 ENCOUNTER — OFFICE VISIT (OUTPATIENT)
Dept: FAMILY MEDICINE CLINIC | Age: 61
End: 2021-03-11
Payer: COMMERCIAL

## 2021-03-11 VITALS
HEIGHT: 63 IN | WEIGHT: 220 LBS | DIASTOLIC BLOOD PRESSURE: 80 MMHG | HEART RATE: 98 BPM | RESPIRATION RATE: 10 BRPM | BODY MASS INDEX: 38.98 KG/M2 | OXYGEN SATURATION: 95 % | TEMPERATURE: 98.7 F | SYSTOLIC BLOOD PRESSURE: 110 MMHG

## 2021-03-11 DIAGNOSIS — T20.011S BURN OF RIGHT EAR, SEQUELA: ICD-10-CM

## 2021-03-11 DIAGNOSIS — G47.8 UNREFRESHED BY SLEEP: ICD-10-CM

## 2021-03-11 DIAGNOSIS — Z00.00 WELL ADULT HEALTH CHECK: Primary | ICD-10-CM

## 2021-03-11 DIAGNOSIS — I42.8 NONISCHEMIC CARDIOMYOPATHY (HCC): ICD-10-CM

## 2021-03-11 DIAGNOSIS — R63.5 WEIGHT GAIN: ICD-10-CM

## 2021-03-11 DIAGNOSIS — E03.9 HYPOTHYROIDISM, UNSPECIFIED TYPE: ICD-10-CM

## 2021-03-11 DIAGNOSIS — R73.03 PREDIABETES: ICD-10-CM

## 2021-03-11 DIAGNOSIS — R53.83 FATIGUE, UNSPECIFIED TYPE: ICD-10-CM

## 2021-03-11 DIAGNOSIS — R06.83 SNORES: ICD-10-CM

## 2021-03-11 DIAGNOSIS — R40.0 DAYTIME SOMNOLENCE: ICD-10-CM

## 2021-03-11 PROCEDURE — G8482 FLU IMMUNIZE ORDER/ADMIN: HCPCS | Performed by: NURSE PRACTITIONER

## 2021-03-11 PROCEDURE — 99396 PREV VISIT EST AGE 40-64: CPT | Performed by: NURSE PRACTITIONER

## 2021-03-11 NOTE — LETTER
[address] [place] [address]  [address] [facility]. [place] [address]  Phone: [phone]  Fax: [address] [facility], ANGELIA - CNP        March 11, 2021     Patient: Ava Walls   YOB: 1960   Date of Visit: 3/11/2021       To Whom It May Concern: It is my medical opinion that Nora Aleman has serious health conditions including chronic congestive heart failure, nonischemic cardiomyopathy, orthostatic hypotension and issues with syncope. She would benefit from having family assistance for her chronic health conditions. If you have any questions or concerns, please don't hesitate to call.     Sincerely,        ANGELIA Cantu CNP

## 2021-03-11 NOTE — PROGRESS NOTES
Chief Complaint   Patient presents with    Annual Exam     PHYSICAL    Other     LETTER OF ILLNESS TO SEND TO HER BROTHER OUT OF THE COUNTRY         SUBJECTIVE:  Latoya Clement is a 62 y.o. female for physical exam.    Her brother is due to renew his passport. He lives in [place], and the gov't won't allow him to renew his passport unless it is more of an emergency. She needs a letter stating that she has multiple health conditions and that she needs assistance from family members. She is following with cardiology for CHF and cardiomyopathy. She is gaining weight  And requiring more diuretics. She contacted cardiology about this and they have resumed digoxin. She also is following with neurology for orthostatic BP issues. Had recent tilt table test.    C/o a lot of fatigue and weight gain. Wants to sleep all the time. She does snore. She does not wake up feeling refreshed in the mornings. Denies any depression, is happy. Wt Readings from Last 3 Encounters:   03/11/21 220 lb (99.8 kg)   01/20/21 217 lb (98.4 kg)   12/17/20 215 lb 9.6 oz (97.8 kg)     Diet - regular, eating healthier  Exercise - no  Sleep - sleeping fair, some days sleeps a lot    Health Maintenance reviewed with patient today.      Past Medical History:   Diagnosis Date    Abnormal heart rhythm     Anxiety     Cancer [facility])     breast  2016     CHF (congestive heart failure) (HCC)     Congenital heart disease     DJD (degenerative joint disease)     Enlarged lymph nodes     Hypertension     Hypothyroidism     ICD (implantable cardioverter-defibrillator) in place 02/11/2019    Dr. Rosita Soares Kidney stones     PONV (postoperative nausea and vomiting)     Prediabetes 10/7/2019    Thyroid disease        Past Surgical History:   Procedure Laterality Date    APPENDECTOMY      CARPAL TUNNEL RELEASE  2019    COLONOSCOPY      COLONOSCOPY N/A 7/27/2020    COLONOSCOPY POLYPECTOMY SNARE/COLD BIOPSY performed by Tyson Mccarthy MD at [facility]    COLONOSCOPY  2020    COLONOSCOPY POLYPECTOMY HOT BIOPSY performed by Lauryn Robin MD at [address] Right 2020    DEQUERVAINS RELEASE AND RIGHT RING FINGER TRIGGER RELEASE performed by Precious Richardson DO at [address], [place] [address] Left 2019    [employer] [facility]IA PT HAS A MRI CONDITIONAL SYSTEM    PTCA      TONSILLECTOMY         Social History     Socioeconomic History    Marital status:      Spouse name: Not on file    Number of children: Not on file    Years of education: Not on file    Highest education level: Not on file   Occupational History    Not on file   Social Needs    Financial resource strain: Not on file    Food insecurity     Worry: Not on file     Inability: Not on file   [employer] needs     Medical: Not on file     Non-medical: Not on file   Tobacco Use    Smoking status: Former Smoker     Packs/day: 0.25     Years: 37.00     Pack years: 9.25     Types: Cigarettes     Start date: 1981     Quit date: 2020     Years since quittin.8    Smokeless tobacco: Never Used   Substance and Sexual Activity    Alcohol use: No    Drug use: No    Sexual activity: Not on file   Lifestyle    Physical activity     Days per week: Not on file     Minutes per session: Not on file    Stress: Not on file   Relationships    Social connections     Talks on phone: Not on file     Gets together: Not on file     Attends Rastafarian service: Not on file     Active member of club or organization: Not on file     Attends meetings of clubs or organizations: Not on file     Relationship status: Not on file    Intimate partner violence     Fear of current or ex partner: Not on file     Emotionally abused: Not on file     Physically abused: Not on file     Forced sexual activity: Not on file   Other Topics Concern    Not on file   Social History Narrative    Not on file       Family History Problem Relation Age of Onset    High Blood Pressure Mother     Dementia Mother     Cancer Father     Colon Cancer Father     Mental Retardation Brother     Colon Polyps Brother     Cancer Maternal Grandfather     Esophageal Cancer Neg Hx            I have reviewed the patient's past medical history, past surgical history, allergies, medications, social and family history and I have made updates where appropriate.     Review of Systems  Positive responses are highlighted in bold    Constitutional:  Fever, Chills, Fatigue, Unexpected changes in weight  Eyes:  Eye discharge, Eye pain, Eye redness, Visual disturbances   HENT:  Ear pain, Tinnitus, Nosebleeds, Trouble swallowing  Cardiovascular:  Chest Pain, Palpitations  Respiratory:  Cough, Wheezing, Shortness of breath, Chest tightness, Apnea  Gastrointestinal:  Nausea, Vomiting, Diarrhea, Constipation, Heartburn, Blood in stool  Genitourinary:  Difficulty or painful urination, Flank pain, Change in frequency, Urgency  Skin:  Color change, Rash, Itching, Wound  Psychiatric:  Hallucinations, Anxiety, Depression, Suicidal ideation  Hematological:  Enlarged glands, Easy bleeding, Easily bruising  Musculoskeletal:  Joint pain, Back pain, Gait problems, Joint swelling, Myalgias  Neurological:  Dizziness, Headaches, Presyncope, Numbness, Seizures, Tremors  Allergy:  Environmental allergies, Food allergies  Endocrine:  Heat Intolerance, Cold Intolerance, Polydipsia, Polyphagia, Polyuria      PHYSICAL EXAM:  Vitals:    03/11/21 0805   BP: 110/80   Pulse: 98   Resp: 10   Temp: 98.7 °F (37.1 °C)   SpO2: 95%     General Appearance: well developed and well- nourished, in no acute distress  Head: normocephalic and atraumatic  Eyes: pupils equal, round, and reactive to light, extraocular eye movements intact, conjunctivae and eye lids without erythema  ENT: external ear and ear canal normal bilaterally, TMs intact and regular, nose without deformity, nasal mucosa and turbinates normal without polyps, oropharynx normal, dentition is normal for age  Neck: supple and non-tender without mass, no thyromegaly or thyroid nodules, no cervical lymphadenopathy  Pulmonary/Chest: clear to auscultation bilaterally- no wheezes, rales or rhonchi, normal air movement, no respiratory distress or retractions  Cardiovascular: normal rate, regular rhythm, normal S1 and S2, no murmurs, rubs, clicks, or gallops, distal pulses intact, no carotid bruits  Abdomen: soft, non-tender, non-distended, normal bowel sounds,  no rebound or guarding, no masses or hernias noted  Extremities: no cyanosis, clubbing or edema of the lower extremities  Musculoskeletal: No joint swelling or gross deformity   Neuro:  Alert, 5/5 strength globally and symmetrically  Skin: warm and dry, no rash or erythema  Psych:  Affect appropriate. Thought process is normal without evidence of depression or psychosis. Good insight and appropriae interaction. Cognition and memory appear to be intact. ASSESSMENT & PLAN  Sandra Plaza was seen today for annual exam and other. Diagnoses and all orders for this visit:    Well adult health check    Nonischemic cardiomyopathy ([facility] [address]    Weight gain  -     TSH With Reflex Ft4; Future  -     Comprehensive Metabolic Panel; Future  -     CBC With Auto Differential; Future  -     Hemoglobin A1C; Future    Fatigue, unspecified type  -     TSH With Reflex Ft4; Future  -     Comprehensive Metabolic Panel;  Future  -     CBC With Auto Differential; Future    Hypothyroidism, unspecified type  -     TSH With Reflex Ft4; Future    Prediabetes  -     Hemoglobin A1C; Future    Snores  -     Ambulatory referral to Sleep Medicine    Unrefreshed by sleep  -     Ambulatory referral to Sleep Medicine    Daytime somnolence  -     Ambulatory referral to Sleep Medicine    Burn of right ear, sequela      - follows with cardiology for cardiomyopathy  - CHF, not currently in any signs of fluid overload  - obtain labs  - burn of right ear resolved  - refer to sleep center for evaluation of ? REINALDO    Return if symptoms worsen or fail to improve. Counseling was provided today regarding the following topics: Healthy eating habits, Regular exercise, substance abuse and healthy sleep habits.

## 2021-03-29 ENCOUNTER — TELEPHONE (OUTPATIENT)
Dept: FAMILY MEDICINE CLINIC | Age: 61
End: 2021-03-29

## 2021-03-29 ENCOUNTER — HOSPITAL ENCOUNTER (OUTPATIENT)
Age: 61
Discharge: HOME | End: 2021-03-29
Payer: COMMERCIAL

## 2021-03-29 DIAGNOSIS — R63.5 WEIGHT GAIN: ICD-10-CM

## 2021-03-29 DIAGNOSIS — E03.9 HYPOTHYROIDISM, UNSPECIFIED TYPE: ICD-10-CM

## 2021-03-29 DIAGNOSIS — R53.83 FATIGUE, UNSPECIFIED TYPE: ICD-10-CM

## 2021-03-29 DIAGNOSIS — R73.03 PREDIABETES: Primary | ICD-10-CM

## 2021-03-29 DIAGNOSIS — R73.03 PREDIABETES: ICD-10-CM

## 2021-03-29 LAB
ALBUMIN SERPL-MCNC: 4.2 G/DL (ref 3.5–5.1)
ALP BLD-CCNC: 91 U/L (ref 38–126)
ALT SERPL-CCNC: 14 U/L (ref 11–66)
ANION GAP SERPL CALCULATED.3IONS-SCNC: 11 MEQ/L (ref 8–16)
AST SERPL-CCNC: 15 U/L (ref 5–40)
AVERAGE GLUCOSE: 123 MG/DL (ref 70–126)
BASOPHILS # BLD: 0.6 %
BASOPHILS ABSOLUTE: 0 THOU/MM3 (ref 0–0.1)
BILIRUB SERPL-MCNC: 0.3 MG/DL (ref 0.3–1.2)
BUN BLDV-MCNC: 13 MG/DL (ref 7–22)
CALCIUM SERPL-MCNC: 9.7 MG/DL (ref 8.5–10.5)
CHLORIDE BLD-SCNC: 105 MEQ/L (ref 98–111)
CO2: 27 MEQ/L (ref 23–33)
CREAT SERPL-MCNC: 0.7 MG/DL (ref 0.4–1.2)
EOSINOPHIL # BLD: 4.3 %
EOSINOPHILS ABSOLUTE: 0.2 THOU/MM3 (ref 0–0.4)
ERYTHROCYTE [DISTWIDTH] IN BLOOD BY AUTOMATED COUNT: 13.5 % (ref 11.5–14.5)
ERYTHROCYTE [DISTWIDTH] IN BLOOD BY AUTOMATED COUNT: 47.4 FL (ref 35–45)
GFR SERPL CREATININE-BSD FRML MDRD: 85 ML/MIN/1.73M2
GLUCOSE BLD-MCNC: 124 MG/DL (ref 70–108)
HBA1C MFR BLD: 6.1 % (ref 4.4–6.4)
HCT VFR BLD CALC: 43.8 % (ref 37–47)
HEMOGLOBIN: 13.8 GM/DL (ref 12–16)
IMMATURE GRANS (ABS): 0.03 THOU/MM3 (ref 0–0.07)
IMMATURE GRANULOCYTES: 0.6 %
LYMPHOCYTES # BLD: 28.5 %
LYMPHOCYTES ABSOLUTE: 1.4 THOU/MM3 (ref 1–4.8)
MCH RBC QN AUTO: 30 PG (ref 26–33)
MCHC RBC AUTO-ENTMCNC: 31.5 GM/DL (ref 32.2–35.5)
MCV RBC AUTO: 95.2 FL (ref 81–99)
MONOCYTES # BLD: 5.7 %
MONOCYTES ABSOLUTE: 0.3 THOU/MM3 (ref 0.4–1.3)
NUCLEATED RED BLOOD CELLS: 0 /100 WBC
PLATELET # BLD: 214 THOU/MM3 (ref 130–400)
PMV BLD AUTO: 11.6 FL (ref 9.4–12.4)
POTASSIUM SERPL-SCNC: 4.7 MEQ/L (ref 3.5–5.2)
RBC # BLD: 4.6 MILL/MM3 (ref 4.2–5.4)
SEG NEUTROPHILS: 60.3 %
SEGMENTED NEUTROPHILS ABSOLUTE COUNT: 3 THOU/MM3 (ref 1.8–7.7)
SODIUM BLD-SCNC: 143 MEQ/L (ref 135–145)
TOTAL PROTEIN: 7.1 G/DL (ref 6.1–8)
TSH SERPL DL<=0.05 MIU/L-ACNC: 0.96 UIU/ML (ref 0.4–4.2)
WBC # BLD: 4.9 THOU/MM3 (ref 4.8–10.8)

## 2021-03-29 PROCEDURE — 84443 ASSAY THYROID STIM HORMONE: CPT

## 2021-03-29 PROCEDURE — 83036 HEMOGLOBIN GLYCOSYLATED A1C: CPT

## 2021-03-29 PROCEDURE — 85025 COMPLETE CBC W/AUTO DIFF WBC: CPT

## 2021-03-29 PROCEDURE — 80053 COMPREHEN METABOLIC PANEL: CPT

## 2021-03-29 PROCEDURE — 36415 COLL VENOUS BLD VENIPUNCTURE: CPT

## 2021-03-29 RX ORDER — METFORMIN HYDROCHLORIDE 500 MG/1
500 TABLET, EXTENDED RELEASE ORAL
Qty: 90 TABLET | Refills: 1 | Status: SHIPPED | OUTPATIENT
Start: 2021-03-29 | End: 2021-09-16

## 2021-03-29 NOTE — TELEPHONE ENCOUNTER
----- Message from ANGELIA Galvez - CNP sent at 3/29/2021 11:27 AM EDT -----  Let Binta Gómez know her labs are stable and within acceptable ranges. Her A1C (DM test) was 6.1 which is up a little , but stable. i'd like to start her on metformin for the prediabetes as this can also help with some weight loss. Rx sent to pharmacy, f/u as scheduled.

## 2021-04-05 ENCOUNTER — OFFICE VISIT (OUTPATIENT)
Dept: PULMONOLOGY | Age: 61
End: 2021-04-05
Payer: COMMERCIAL

## 2021-04-05 VITALS
OXYGEN SATURATION: 98 % | BODY MASS INDEX: 40.85 KG/M2 | TEMPERATURE: 97.9 F | WEIGHT: 222 LBS | HEART RATE: 86 BPM | DIASTOLIC BLOOD PRESSURE: 84 MMHG | SYSTOLIC BLOOD PRESSURE: 118 MMHG | HEIGHT: 62 IN

## 2021-04-05 DIAGNOSIS — R94.30 EJECTION FRACTION < 50%: ICD-10-CM

## 2021-04-05 DIAGNOSIS — I10 ESSENTIAL HYPERTENSION: ICD-10-CM

## 2021-04-05 DIAGNOSIS — G47.10 HYPERSOMNIA: Primary | ICD-10-CM

## 2021-04-05 DIAGNOSIS — R06.83 LOUD SNORING: ICD-10-CM

## 2021-04-05 DIAGNOSIS — I42.8 NICM (NONISCHEMIC CARDIOMYOPATHY) (HCC): ICD-10-CM

## 2021-04-05 DIAGNOSIS — G25.81 RLS (RESTLESS LEGS SYNDROME): ICD-10-CM

## 2021-04-05 DIAGNOSIS — E66.01 OBESITY, MORBID, BMI 40.0-49.9 (HCC): ICD-10-CM

## 2021-04-05 PROCEDURE — 3017F COLORECTAL CA SCREEN DOC REV: CPT | Performed by: INTERNAL MEDICINE

## 2021-04-05 PROCEDURE — G8417 CALC BMI ABV UP PARAM F/U: HCPCS | Performed by: INTERNAL MEDICINE

## 2021-04-05 PROCEDURE — 1036F TOBACCO NON-USER: CPT | Performed by: INTERNAL MEDICINE

## 2021-04-05 PROCEDURE — 99214 OFFICE O/P EST MOD 30 MIN: CPT | Performed by: INTERNAL MEDICINE

## 2021-04-05 PROCEDURE — G8427 DOCREV CUR MEDS BY ELIG CLIN: HCPCS | Performed by: INTERNAL MEDICINE

## 2021-04-05 NOTE — PATIENT INSTRUCTIONS
Patient Education        Sleep Apnea: Care Instructions  Overview     Sleep apnea means that you frequently stop breathing for 10 seconds or longer during sleep. It can be mild to severe, based on the number of times an hour that you stop breathing or have slowed breathing. Blocked or narrowed airways in your nose, mouth, or throat can cause sleep apnea. Your airway can become blocked when your throat muscles and tongue relax during sleep. You can help treat sleep apnea at home by making lifestyle changes. You also can use a CPAP breathing machine that keeps tissues in the throat from blocking your airway. Or your doctor may suggest that you use a breathing device while you sleep. It helps keep your airway open. This could be a device that you put in your mouth. In some cases, surgery may be needed to remove enlarged tissues in the throat. Follow-up care is a key part of your treatment and safety. Be sure to make and go to all appointments, and call your doctor if you are having problems. It's also a good idea to know your test results and keep a list of the medicines you take. How can you care for yourself at home? · Lose weight, if needed. · Sleep on your side. It may help mild apnea. · Avoid alcohol and medicines such as sleeping pills, opioids, or sedatives before bed. · Don't smoke. If you need help quitting, talk to your doctor. · Prop up the head of your bed. · Treat breathing problems, such as a stuffy nose, that are caused by a cold or allergies. · Try a continuous positive airway pressure (CPAP) breathing machine if your doctor recommends it. · If CPAP doesn't work for you, ask your doctor if you can try other masks, settings, or breathing machines. · Try oral breathing devices or other nasal devices. · Talk to your doctor if your nose feels dry or bleeds, or if it gets runny or stuffy when you use a breathing machine.   · Tell your doctor if you're sleepy during the day and it affects your daily life. Don't drive or operate machinery when you're drowsy. When should you call for help? Watch closely for changes in your health, and be sure to contact your doctor if:    · You still have sleep apnea even though you have made lifestyle changes.     · You are thinking of trying a device such as CPAP.     · You are having problems using a CPAP or similar machine.     · You are still sleepy during the day, and it affects your daily life. Where can you learn more? Go to https://Fetch Technologies.Black House. org and sign in to your Wazoku account. Enter A681 in the Cinemad.tv box to learn more about \"Sleep Apnea: Care Instructions. \"     If you do not have an account, please click on the \"Sign Up Now\" link. Current as of: October 26, 2020               Content Version: 12.8  © 0209-1677 [employer]. Care instructions adapted under license by [facility] ([facility]). If you have questions about a medical condition or this instruction, always ask your healthcare professional. [place] [address] any warranty or liability for your use of this information. Patient Education        Heart Failure and Sleep Apnea: Care Instructions  Overview     Sleep apnea is fairly common in people with heart failure. Sleep apnea means you stop breathing for 10 seconds or longer during sleep. When you stop breathing, the amount of oxygen in your blood drops, so your heart has to work harder to get enough oxygen to your body's tissues. This stress on your heart can lead to serious problems, like high blood pressure or heart disease. It may also cause you to snore loudly and not sleep well, so you wake up feeling tired. Getting treatment for sleep apnea can help you sleep and feel better. It may also help keep your heart failure from getting worse. Follow-up care is a key part of your treatment and safety.  Be sure to make and go to all appointments, and call your doctor if you are having problems. It's also a good idea to know your test results and keep a list of the medicines you take. How can you care for yourself at home? · Lose weight, if needed. · Sleep on your side. It may help mild apnea. · Avoid alcohol and medicines such as sleeping pills, opioids, or sedatives before bed. · Don't smoke. If you need help quitting, talk to your doctor. · Prop up the head of your bed. · Treat breathing problems, such as a stuffy nose, that are caused by a cold or allergies. · Try a continuous positive airway pressure (CPAP) breathing machine if your doctor recommends it. · If CPAP doesn't work for you, ask your doctor if you can try other masks, settings, or breathing machines. · Try oral breathing devices or other nasal devices. · Talk to your doctor if your nose feels dry or bleeds, or if it gets runny or stuffy when you use a breathing machine. · Tell your doctor if you're sleepy during the day and it affects your daily life. Don't drive or operate machinery when you're drowsy. When should you call for help? Watch closely for changes in your health, and be sure to contact your doctor if you have any problems. Where can you learn more? Go to https://Riboxx.Scout Analytics. org and sign in to your LemonCrate account. Enter A820 in the MePlease box to learn more about \"Heart Failure and Sleep Apnea: Care Instructions. \"     If you do not have an account, please click on the \"Sign Up Now\" link. Current as of: August 31, 2020               Content Version: 12.8  © 8927-8346 Healthwise[employer]. Care instructions adapted under license by [facility] ([facility]). If you have questions about a medical condition or this instruction, always ask your healthcare professional. [place] [address] any warranty or liability for your use of this information.

## 2021-04-05 NOTE — PROGRESS NOTES
dreams? No  Hallucinations? No  Sleep Paralysis? No  Symptoms of Cataplexy? No    Driving History:  Do you have a CDL or drive long distances for work? No  Any driving accidents in the past year? No  Any sleepiness while driving? Yes    Weight:  Any change in weight over the past year? Yes   How about past 5 years? Yes  How much? 30    Other Compliants :Indu complains of tossing and turning, kicking, decreased concentration, decreased sexual drive as well. Past Medical History:   Diagnosis Date    Abnormal heart rhythm     Anxiety     Cancer [facility])     breast  2016     CHF (congestive heart failure) (HCC)     Congenital heart disease     DJD (degenerative joint disease)     Enlarged lymph nodes     Hypertension     Hypothyroidism     ICD (implantable cardioverter-defibrillator) in place 2019    Dr. Tamara Viveros Kidney stones     PONV (postoperative nausea and vomiting)     Prediabetes 10/7/2019    Thyroid disease        Past Surgical History:   Procedure Laterality Date    APPENDECTOMY      CARPAL TUNNEL RELEASE      COLONOSCOPY      COLONOSCOPY N/A 2020    COLONOSCOPY POLYPECTOMY SNARE/COLD BIOPSY performed by Casi Shipman MD at [address] Endoscopy    COLONOSCOPY  2020    COLONOSCOPY POLYPECTOMY HOT BIOPSY performed by Casi Shipman MD at [address] Right 2020    DEQUERVAINS RELEASE AND RIGHT RING FINGER TRIGGER RELEASE performed by Caterina Malagon DO at [address], [facility] Left 2019    [facility] VISIA PT HAS A MRI CONDITIONAL SYSTEM    PTCA      TONSILLECTOMY         Social History     Tobacco Use    Smoking status: Former Smoker     Packs/day: 0.25     Years: 37.00     Pack years: 9.25     Types: Cigarettes     Start date: 1981     Quit date: 2020     Years since quittin.8    Smokeless tobacco: Never Used   Substance Use Topics    Alcohol use: No    Drug use:  No Allergies   Allergen Reactions    Adhesive Tape Itching       Current Outpatient Medications   Medication Sig Dispense Refill    metFORMIN (GLUCOPHAGE-XR) 500 MG extended release tablet Take 1 tablet by mouth daily (with breakfast) 90 tablet 1    omeprazole (PRILOSEC) 20 MG delayed release capsule Take 1 capsule by mouth every morning (before breakfast) 90 capsule 2    polyethylene glycol (GLYCOLAX) 17 GM/SCOOP powder Take 17 g by mouth daily 510 g 5    digoxin (LANOXIN) 125 MCG tablet Take 1 tablet by mouth daily 90 tablet 3    ENTRESTO 49-51 MG per tablet Take 1 tablet by mouth twice daily 60 tablet 12    letrozole (FEMARA) 2.5 MG tablet Take 1 tablet by mouth daily 90 tablet 3    atorvastatin (LIPITOR) 40 MG tablet Take 1 tablet by mouth nightly 90 tablet 3    EUTHYROX 100 MCG tablet Take 1 tablet by mouth once daily 90 tablet 1    carvedilol (COREG) 3.125 MG tablet Take 1 tablet by mouth twice daily 180 tablet 2    venlafaxine (EFFEXOR XR) 150 MG extended release capsule Take 1 capsule by mouth daily 90 capsule 3    butalbital-acetaminophen-caffeine (FIORICET, ESGIC) -40 MG per tablet Take 1 tablet by mouth every 4 hours as needed for Headaches 180 tablet 1    aspirin 81 MG chewable tablet Take 1 tablet by mouth daily 30 tablet 3    furosemide (LASIX) 20 MG tablet Take 1 tablet by mouth daily as needed (leg swelling, weight gain) 90 tablet 0    acetaminophen (TYLENOL) 500 MG tablet Take 500 mg by mouth every 6 hours as needed for Pain       No current facility-administered medications for this visit. Family History   Problem Relation Age of Onset    High Blood Pressure Mother     Dementia Mother     Cancer Father     Colon Cancer Father     Mental Retardation Brother     Colon Polyps Brother     Cancer Maternal Grandfather     Esophageal Cancer Neg Hx         Any family history of any sleep problems or any one in your family on CPAP? No    Social History     Tobacco Use Diagnostic Sleep Study   2. Obesity, morbid, BMI 40.0-49.9 ([facility] [address]  Baseline Diagnostic Sleep Study   3. Loud snoring  Baseline Diagnostic Sleep Study   4. NIC[facility] (nonischemic cardiomyopathy) [facility]     5. Ejection fraction < 50%     6. Essential hypertension           Plan   Orders Placed This Encounter   Procedures    Baseline Diagnostic Sleep Study     Standing Status:   Future     Standing Expiration Date:   4/5/2022     Order Specific Question:   Adult or Pediatric     Answer:   Adult Study (>7 Years)     Order Specific Question:   Location For Sleep Study     Answer:   [facility]     Order Specific Question:   Select Sleep Lab Location     Answer:   [address]          Mask Desensitization and Pre study teaching? No  Weight Loss Information Given? Yes  Sleep Hygiene Discussed? Yes  Kenroy Avendaño informed to not to drive any motor vehicles or operate heavy equipment until Her sleep symptoms are under good control.   -She was advised to call [facility] regarding supplies if needed.  -She call my office for earlier appointment if needed for worsening of sleep symptoms.  -Adria Ledesma educated about my impression and plan. Patient verbalizes understanding.

## 2021-04-30 ENCOUNTER — HOSPITAL ENCOUNTER (EMERGENCY)
Age: 61
Discharge: HOME | End: 2021-04-30
Attending: EMERGENCY MEDICINE
Payer: COMMERCIAL

## 2021-04-30 ENCOUNTER — APPOINTMENT (OUTPATIENT)
Dept: GENERAL RADIOLOGY | Age: 61
End: 2021-04-30
Payer: COMMERCIAL

## 2021-04-30 VITALS
HEART RATE: 74 BPM | OXYGEN SATURATION: 97 % | RESPIRATION RATE: 16 BRPM | SYSTOLIC BLOOD PRESSURE: 118 MMHG | DIASTOLIC BLOOD PRESSURE: 63 MMHG | TEMPERATURE: 98.8 F

## 2021-04-30 DIAGNOSIS — R11.0 NAUSEA: ICD-10-CM

## 2021-04-30 DIAGNOSIS — R19.7 DIARRHEA, UNSPECIFIED TYPE: ICD-10-CM

## 2021-04-30 DIAGNOSIS — E86.0 DEHYDRATION: Primary | ICD-10-CM

## 2021-04-30 LAB
ALBUMIN SERPL-MCNC: 4.5 G/DL (ref 3.5–5.1)
ALP BLD-CCNC: 90 U/L (ref 38–126)
ALT SERPL-CCNC: 16 U/L (ref 11–66)
ANION GAP SERPL CALCULATED.3IONS-SCNC: 13 MEQ/L (ref 8–16)
AST SERPL-CCNC: 20 U/L (ref 5–40)
BASOPHILS # BLD: 0.7 %
BASOPHILS ABSOLUTE: 0 THOU/MM3 (ref 0–0.1)
BILIRUB SERPL-MCNC: 0.3 MG/DL (ref 0.3–1.2)
BILIRUBIN DIRECT: < 0.2 MG/DL (ref 0–0.3)
BUN BLDV-MCNC: 12 MG/DL (ref 7–22)
CALCIUM SERPL-MCNC: 9.7 MG/DL (ref 8.5–10.5)
CHLORIDE BLD-SCNC: 102 MEQ/L (ref 98–111)
CO2: 24 MEQ/L (ref 23–33)
CREAT SERPL-MCNC: 0.5 MG/DL (ref 0.4–1.2)
EKG ATRIAL RATE: 85 BPM
EKG P AXIS: 37 DEGREES
EKG P-R INTERVAL: 162 MS
EKG Q-T INTERVAL: 390 MS
EKG QRS DURATION: 90 MS
EKG QTC CALCULATION (BAZETT): 464 MS
EKG R AXIS: -27 DEGREES
EKG T AXIS: 63 DEGREES
EKG VENTRICULAR RATE: 85 BPM
EOSINOPHIL # BLD: 3.7 %
EOSINOPHILS ABSOLUTE: 0.2 THOU/MM3 (ref 0–0.4)
ERYTHROCYTE [DISTWIDTH] IN BLOOD BY AUTOMATED COUNT: 14.1 % (ref 11.5–14.5)
ERYTHROCYTE [DISTWIDTH] IN BLOOD BY AUTOMATED COUNT: 49 FL (ref 35–45)
GFR SERPL CREATININE-BSD FRML MDRD: > 90 ML/MIN/1.73M2
GLUCOSE BLD-MCNC: 130 MG/DL (ref 70–108)
HCT VFR BLD CALC: 45.1 % (ref 37–47)
HEMOGLOBIN: 14.4 GM/DL (ref 12–16)
IMMATURE GRANS (ABS): 0.04 THOU/MM3 (ref 0–0.07)
IMMATURE GRANULOCYTES: 0.7 %
LIPASE: 25.7 U/L (ref 5.6–51.3)
LYMPHOCYTES # BLD: 24.2 %
LYMPHOCYTES ABSOLUTE: 1.4 THOU/MM3 (ref 1–4.8)
MCH RBC QN AUTO: 30.2 PG (ref 26–33)
MCHC RBC AUTO-ENTMCNC: 31.9 GM/DL (ref 32.2–35.5)
MCV RBC AUTO: 94.5 FL (ref 81–99)
MONOCYTES # BLD: 6.4 %
MONOCYTES ABSOLUTE: 0.4 THOU/MM3 (ref 0.4–1.3)
NUCLEATED RED BLOOD CELLS: 0 /100 WBC
OSMOLALITY CALCULATION: 279 MOSMOL/KG (ref 275–300)
PLATELET # BLD: 217 THOU/MM3 (ref 130–400)
PMV BLD AUTO: 11.4 FL (ref 9.4–12.4)
POTASSIUM REFLEX MAGNESIUM: 4 MEQ/L (ref 3.5–5.2)
RBC # BLD: 4.77 MILL/MM3 (ref 4.2–5.4)
SEG NEUTROPHILS: 64.3 %
SEGMENTED NEUTROPHILS ABSOLUTE COUNT: 3.6 THOU/MM3 (ref 1.8–7.7)
SODIUM BLD-SCNC: 139 MEQ/L (ref 135–145)
TOTAL PROTEIN: 7 G/DL (ref 6.1–8)
WBC # BLD: 5.6 THOU/MM3 (ref 4.8–10.8)

## 2021-04-30 PROCEDURE — 74022 RADEX COMPL AQT ABD SERIES: CPT

## 2021-04-30 PROCEDURE — 96374 THER/PROPH/DIAG INJ IV PUSH: CPT

## 2021-04-30 PROCEDURE — 6360000002 HC RX W HCPCS: Performed by: EMERGENCY MEDICINE

## 2021-04-30 PROCEDURE — 85025 COMPLETE CBC W/AUTO DIFF WBC: CPT

## 2021-04-30 PROCEDURE — 96361 HYDRATE IV INFUSION ADD-ON: CPT

## 2021-04-30 PROCEDURE — 96375 TX/PRO/DX INJ NEW DRUG ADDON: CPT

## 2021-04-30 PROCEDURE — 99283 EMERGENCY DEPT VISIT LOW MDM: CPT

## 2021-04-30 PROCEDURE — 80048 BASIC METABOLIC PNL TOTAL CA: CPT

## 2021-04-30 PROCEDURE — 36415 COLL VENOUS BLD VENIPUNCTURE: CPT

## 2021-04-30 PROCEDURE — 93005 ELECTROCARDIOGRAM TRACING: CPT | Performed by: EMERGENCY MEDICINE

## 2021-04-30 PROCEDURE — 83690 ASSAY OF LIPASE: CPT

## 2021-04-30 PROCEDURE — 2580000003 HC RX 258: Performed by: EMERGENCY MEDICINE

## 2021-04-30 PROCEDURE — 80076 HEPATIC FUNCTION PANEL: CPT

## 2021-04-30 RX ORDER — DEXAMETHASONE SODIUM PHOSPHATE 4 MG/ML
10 INJECTION, SOLUTION INTRA-ARTICULAR; INTRALESIONAL; INTRAMUSCULAR; INTRAVENOUS; SOFT TISSUE ONCE
Status: COMPLETED | OUTPATIENT
Start: 2021-04-30 | End: 2021-04-30

## 2021-04-30 RX ORDER — SODIUM CHLORIDE, SODIUM LACTATE, POTASSIUM CHLORIDE, CALCIUM CHLORIDE 600; 310; 30; 20 MG/100ML; MG/100ML; MG/100ML; MG/100ML
1000 INJECTION, SOLUTION INTRAVENOUS ONCE
Status: COMPLETED | OUTPATIENT
Start: 2021-04-30 | End: 2021-04-30

## 2021-04-30 RX ORDER — DIPHENHYDRAMINE HYDROCHLORIDE 50 MG/ML
25 INJECTION INTRAMUSCULAR; INTRAVENOUS ONCE
Status: COMPLETED | OUTPATIENT
Start: 2021-04-30 | End: 2021-04-30

## 2021-04-30 RX ORDER — METOCLOPRAMIDE HYDROCHLORIDE 5 MG/ML
10 INJECTION INTRAMUSCULAR; INTRAVENOUS ONCE
Status: COMPLETED | OUTPATIENT
Start: 2021-04-30 | End: 2021-04-30

## 2021-04-30 RX ORDER — ONDANSETRON 4 MG/1
4 TABLET, ORALLY DISINTEGRATING ORAL EVERY 8 HOURS PRN
Qty: 12 TABLET | Refills: 0 | Status: SHIPPED | OUTPATIENT
Start: 2021-04-30 | End: 2021-08-30

## 2021-04-30 RX ADMIN — METOCLOPRAMIDE 10 MG: 5 INJECTION, SOLUTION INTRAMUSCULAR; INTRAVENOUS at 13:16

## 2021-04-30 RX ADMIN — DEXAMETHASONE SODIUM PHOSPHATE 10 MG: 4 INJECTION, SOLUTION INTRA-ARTICULAR; INTRALESIONAL; INTRAMUSCULAR; INTRAVENOUS; SOFT TISSUE at 13:17

## 2021-04-30 RX ADMIN — SODIUM CHLORIDE, POTASSIUM CHLORIDE, SODIUM LACTATE AND CALCIUM CHLORIDE 1000 ML: 600; 310; 30; 20 INJECTION, SOLUTION INTRAVENOUS at 13:18

## 2021-04-30 RX ADMIN — DIPHENHYDRAMINE HYDROCHLORIDE 25 MG: 50 INJECTION, SOLUTION INTRAMUSCULAR; INTRAVENOUS at 13:14

## 2021-04-30 ASSESSMENT — ENCOUNTER SYMPTOMS
VOMITING: 0
ABDOMINAL PAIN: 0
DIARRHEA: 1
NAUSEA: 1
SHORTNESS OF BREATH: 0
EYE REDNESS: 0
WHEEZING: 0
CONSTIPATION: 0
CHEST TIGHTNESS: 0
RHINORRHEA: 0
COUGH: 0
BACK PAIN: 0
SORE THROAT: 0

## 2021-04-30 NOTE — ED TRIAGE NOTES
Patient to ER due to emesis and diarrhea since yesterday. Patient also states she's been really tiered and dizzy.

## 2021-04-30 NOTE — ED PROVIDER NOTES
[place] ENCOUNTER          Pt Name: Donna Gonzalez  MRN: [id number]  [place] 1960  Date of evaluation: 4/30/2021  Emergency Physician: Dipika Rivera, [facility]       Chief Complaint   Patient presents with    Emesis    Diarrhea    Fatigue    Dizziness     History obtained from the patient. HISTORY OF PRESENT ILLNESS    HPI  Donna Gonzalez is a 62 y.o. female who presents to the emergency department for evaluation of nausea diarrhea and lightheadedness. Patient states she had cataract surgery on Monday. She states she did fine postoperatively and went home. She states on Tuesday she began to have diarrhea. She reports watery stool frequencies of 6-10 times per day. She states this has continued and she is progressively now felt lightheadedness with standing. She reports associated nausea. No fever no chills no headaches. No abdominal pain. No vision changes cough or congestion. The patient has no other acute complaints at this time. REVIEW OF SYSTEMS   Review of Systems   Constitutional: Negative for chills and fever. HENT: Negative for congestion, rhinorrhea and sore throat. Eyes: Negative for redness and visual disturbance. Respiratory: Negative for cough, chest tightness, shortness of breath and wheezing. Cardiovascular: Negative for chest pain and leg swelling. Gastrointestinal: Positive for diarrhea and nausea. Negative for abdominal pain, constipation and vomiting. Endocrine: Negative for polydipsia and polyuria. Genitourinary: Negative for decreased urine volume, dysuria and urgency. Musculoskeletal: Negative for back pain and myalgias. Skin: Negative for rash and wound. Neurological: Positive for light-headedness. Negative for headaches. Hematological: Does not bruise/bleed easily. Psychiatric/Behavioral: Negative for decreased concentration and dysphoric mood.          PAST MEDICAL AND SURGICAL HISTORY     Past Medical History:   Diagnosis Date    Abnormal heart rhythm     Anxiety     Cancer [facility])     breast  2016     CHF (congestive heart failure) (HCC)     Congenital heart disease     DJD (degenerative joint disease)     Enlarged lymph nodes     Hypertension     Hypothyroidism     ICD (implantable cardioverter-defibrillator) in place 02/11/2019    Dr. Kat Wick Kidney stones     PONV (postoperative nausea and vomiting)     Prediabetes 10/7/2019    Thyroid disease      Past Surgical History:   Procedure Laterality Date    APPENDECTOMY      CARPAL TUNNEL RELEASE  2019    COLONOSCOPY      COLONOSCOPY N/A 7/27/2020    COLONOSCOPY POLYPECTOMY SNARE/COLD BIOPSY performed by Mena Habermann, MD at [facility]    COLONOSCOPY  7/27/2020    COLONOSCOPY POLYPECTOMY HOT BIOPSY performed by Mena Habermann, MD at [address] Right 6/25/2020    DEQUERVAINS RELEASE AND RIGHT RING FINGER TRIGGER RELEASE performed by Key Lennon DO at [address] [place] Left 02/11/2019    [facility] VISIA PT HAS A MRI CONDITIONAL SYSTEM    PTCA      TONSILLECTOMY           MEDICATIONS     Current Facility-Administered Medications:     lactated ringers infusion 1,000 mL, 1,000 mL, Intravenous, Once, Sondra Callaway DO, Last Rate: 1,000 mL/hr at 04/30/21 1318, 1,000 mL at 04/30/21 1318    Current Outpatient Medications:     EUTHYROX 100 MCG tablet, Take 1 tablet by mouth once daily, Disp: 90 tablet, Rfl: 1    metFORMIN (GLUCOPHAGE-XR) 500 MG extended release tablet, Take 1 tablet by mouth daily (with breakfast), Disp: 90 tablet, Rfl: 1    omeprazole (PRILOSEC) 20 MG delayed release capsule, Take 1 capsule by mouth every morning (before breakfast), Disp: 90 capsule, Rfl: 2    digoxin (LANOXIN) 125 MCG tablet, Take 1 tablet by mouth daily, Disp: 90 tablet, Rfl: 3    ENTRESTO 49-51 MG per tablet, Take 1 tablet by mouth twice daily, and physical examination. the patient likely has dehydration related to diarrhea. This is likely viral.  But given her recent surgery this could be related to operative antibiotics for anesthesia. Therefore Reglan and Decadron were given. Considered bowel obstruction, C. difficile, elevated intraocular pressure, diverticulitis, enteritis although some of these diagnoses are unlikely they were considered in my medical decision making. Plan: CBC, BMP, ECG, hepatic function, x-ray abdomen, lipase symptomatic treatment with IV hydration, Decadron, Reglan, diphenhydramine and reassess         ED RESULTS   Laboratory results:  Labs Reviewed   CBC WITH AUTO DIFFERENTIAL - Abnormal; Notable for the following components:       Result Value    MCHC 31.9 (*)     RDW-SD 49.0 (*)     All other components within normal limits   BASIC METABOLIC PANEL W/ REFLEX TO MG FOR LOW K - Abnormal; Notable for the following components:    Glucose 130 (*)     All other components within normal limits   HEPATIC FUNCTION PANEL   LIPASE   ANION GAP   OSMOLALITY   GLOMERULAR FILTRATION RATE, ESTIMATED   URINE RT REFLEX TO CULTURE       Radiologic studies results:  XR ACUTE ABD SERIES CHEST 1 VW   Final Result   No acute articular disease. No acute of known pathology. No free air. **This report has been created using voice recognition software. It may contain minor errors which are inherent in voice recognition technology. **      Final report electronically signed by Dr. Pili Connell on 4/30/2021 2:02 PM          ED Medications administered this visit:   Medications   lactated ringers infusion 1,000 mL (0 mLs Intravenous Stopped 4/30/21 1534)   metoclopramide (REGLAN) injection 10 mg (10 mg Intravenous Given 4/30/21 1316)   diphenhydrAMINE (BENADRYL) injection 25 mg (25 mg Intravenous Given 4/30/21 1314)   dexamethasone (DECADRON) injection 10 mg (10 mg Intravenous Given 4/30/21 1317)         ED COURSE    Patient feeling better

## 2021-05-03 ENCOUNTER — CARE COORDINATION (OUTPATIENT)
Dept: CARE COORDINATION | Age: 61
End: 2021-05-03

## 2021-05-03 DIAGNOSIS — F41.9 ANXIETY: Primary | ICD-10-CM

## 2021-05-03 SDOH — SOCIAL STABILITY: SOCIAL NETWORK: HOW OFTEN DO YOU ATTEND CHURCH OR RELIGIOUS SERVICES?: NEVER

## 2021-05-03 SDOH — ECONOMIC STABILITY: FOOD INSECURITY: WITHIN THE PAST 12 MONTHS, THE FOOD YOU BOUGHT JUST DIDN'T LAST AND YOU DIDN'T HAVE MONEY TO GET MORE.: NEVER TRUE

## 2021-05-03 SDOH — HEALTH STABILITY: PHYSICAL HEALTH: ON AVERAGE, HOW MANY MINUTES DO YOU ENGAGE IN EXERCISE AT THIS LEVEL?: 0 MIN

## 2021-05-03 SDOH — ECONOMIC STABILITY: TRANSPORTATION INSECURITY
IN THE PAST 12 MONTHS, HAS THE LACK OF TRANSPORTATION KEPT YOU FROM MEDICAL APPOINTMENTS OR FROM GETTING MEDICATIONS?: NO

## 2021-05-03 SDOH — ECONOMIC STABILITY: TRANSPORTATION INSECURITY
IN THE PAST 12 MONTHS, HAS LACK OF TRANSPORTATION KEPT YOU FROM MEETINGS, WORK, OR FROM GETTING THINGS NEEDED FOR DAILY LIVING?: NO

## 2021-05-03 SDOH — SOCIAL STABILITY: SOCIAL NETWORK
IN A TYPICAL WEEK, HOW MANY TIMES DO YOU TALK ON THE PHONE WITH FAMILY, FRIENDS, OR NEIGHBORS?: MORE THAN THREE TIMES A WEEK

## 2021-05-03 NOTE — CARE COORDINATION
Ambulatory Care Coordination Note  5/3/2021  CM Risk Score: 5  Charlson 10 Year Mortality Risk Score: 98%     ACC: Serina Perez RN    Summary Note: New enrollment from [facility] report/ ED follow up. Introduced self/role. Dashawn Miller was in ED for dizziness and diarrhea. States all sx's resolved. Discussed new metformin medication and possible side effects. She doesn't feel it is from medication. Discussed chronic health conditions and barriers. She does have anxiety and feels it would be beneficial to talk to someone. I discussed Tyler Ferrari and she agreed to referral.  She is active with CHF clinic, cardio, pulmonology and has hx of breast ca. She denies the need for community support, no barriers with medication affordability, no barriers with transportation. Will mail out zone tools and continue to followup to help manage chronic conditions and reduce utilization.   Plan  -provide education to help manage chronic conditions  -referral to Tyler Ferrari   -declined spiritual support  -provide fall education and prevention to reduce risk for falls  Congestive Heart Failure Assessment    Are you currently restricting fluids?: No Restriction  Do you understand a low sodium diet?: Yes  Do you understand how to read food labels?: Yes  How many restaurant meals do you eat per week?: 1-2  Do you salt your food before tasting it?: No         Symptoms:  CHF associated angina: Neg, CHF associated dyspnea on exertion: Neg, CHF associated fatigue: Neg, CHF associated leg swelling: Neg, CHF associated orthostatic hypotension: Neg, CHF associated PND: Neg, CHF associated shortness of breath: Neg, CHF associated weakness: Neg      Symptom course: stable  Salt intake watch compared to last visit: stable      and   General Assessment    Do you have any symptoms that are causing concern?: Yes  Progression since Onset: Unchanged  Reported Symptoms: Other (Comment: anxiety)           Ambulatory Care Coordination Assessment    Care Coordination Protocol  Program Enrollment: Complex Care  Referral from Primary Care Provider: No  Week 1 - Initial Assessment     Do you have all of your prescriptions and are they filled?: Yes  Barriers to medication adherence: None  Are you able to afford your medications?: Yes  How often do you have trouble taking your medications the way you have been told to take them?: I always take them as prescribed. Do you have Home O2 Therapy?: No      Ability to seek help/take action for Emergent Urgent situations i.e. fire, crime, inclement weather or health crisis. : Independent  Ability to ambulate to restroom: Independent  Ability handle personal hygeine needs (bathing/dressing/grooming): Independent  Ability to manage Medications: Independent  Ability to prepare Food Preparation: Independent  Ability to maintain home (clean home, laundry): Independent  Ability to drive and/or has transportation: Independent  Ability to do shopping: Independent  Ability to manage finances: Independent  Is patient able to live independently?: Yes     Current Housing: Private Residence        Per the Fall Risk Screening, did the patient have 2 or more falls or 1 fall with injury in the past year?: Yes  How often do you think you are about to fall and you do NOT fall?  For example, you grab something to stabilize yourself or hold onto a wall/furniture?: Occasionally  Use of a Mobility Aid: No  Difficulty walking/impaired gait: No  Issues with feet or shoes like numbness, edema, shoes not fitting: No  Changes in vision, poor vision or poor lighting in environment: No  Dizziness: Yes  Other Fall Risk: No     Frequent urination at night?: No  Do you use rails/bars?: No  Do you have a non-slip tub mat?: No     Are you experiencing loss of meaning?: No  Are you experiencing loss of hope and peace?: No     Thinking about your patient's physical health needs, are there any symptoms or problems (risk indicators) you are this patient well-coordinated? (Include coordination with other services you are now recommendation): All required care/services in place and well-coordinated   Suggested Interventions and [employer] Health: In Process (Comment: Irvin Shah referral 5-3-21)    Fall Risk Prevention: In Process Disease Specific Clinic: Completed (Comment: CHF clinic)   Home Health Services: Declined   Medication Assistance Program: Declined   Senior Services: Declined   Zone Management Tools: In Process                  Prior to Admission medications    Medication Sig Start Date End Date Taking?  Authorizing Provider   ondansetron (ZOFRAN ODT) 4 MG disintegrating tablet Take 1 tablet by mouth every 8 hours as needed for Nausea 4/30/21  Yes Lester Escamilla DO   EUTHYROX 100 MCG tablet Take 1 tablet by mouth once daily 4/23/21  Yes Domnick Carrel, APRN - CNP   metFORMIN (GLUCOPHAGE-XR) 500 MG extended release tablet Take 1 tablet by mouth daily (with breakfast) 3/29/21  Yes Domnick Carrel, APRN - CNP   omeprazole (PRILOSEC) 20 MG delayed release capsule Take 1 capsule by mouth every morning (before breakfast) 3/11/21  Yes ANGELIA Iqbal CNP   digoxin (LANOXIN) 125 MCG tablet Take 1 tablet by mouth daily 3/1/21  Yes Melecio Dixon MD   ENTRESTO 49-51 MG per tablet Take 1 tablet by mouth twice daily 1/25/21  Yes ANGELIA Ruano CNP   letrozole (FEMARA) 2.5 MG tablet Take 1 tablet by mouth daily 1/20/21  Yes Verito Rojas MD   atorvastatin (LIPITOR) 40 MG tablet Take 1 tablet by mouth nightly 12/11/20  Yes Domnick Carrel, APRN - CNP   carvedilol (COREG) 3.125 MG tablet Take 1 tablet by mouth twice daily 10/26/20  Yes Erlinda Torres MD   venlafaxine (EFFEXOR XR) 150 MG extended release capsule Take 1 capsule by mouth daily 10/20/20  Yes Domnick Carrel, APRN - CNP   butalbital-acetaminophen-caffeine (FIORICET, ESGIC) -40 MG per tablet Take 1 tablet by mouth every 4 hours as needed for Headaches 10/18/20  Yes Sae Ramon MD   aspirin 81 MG chewable tablet Take 1 tablet by mouth daily 7/11/20  Yes Jim Vo MD   furosemide (LASIX) 20 MG tablet Take 1 tablet by mouth daily as needed (leg swelling, weight gain) 4/13/20  Yes Vic Romberg, APRN - CNP   acetaminophen (TYLENOL) 500 MG tablet Take 500 mg by mouth every 6 hours as needed for Pain   Yes Historical Provider, MD       Future Appointments   Date Time Provider Braden Rothman   5/17/2021 12:45 PM Vahe Ibrahim MD N [facility] Heart [facility]   5/25/2021  8:15 PM SCHEDULE, STR SLEEP TECH 02 [facility] 1 [facility]   6/7/2021  9:15 AM Dora Brunner, [address]   6/10/2021  8:00 AM Vic Romberg, APRN - CNP [facility]   6/15/2021  1:00 PM ANGELIA Ruano CNP N [facility] CHF [facility]   6/15/2021  1:30 PM SCHEDULE, SRPS PACER NURSE N SRPX PACER [facility]   6/18/2021 10:15 AM Mitul Garcia MD N [facility]   7/20/2021 11:20 AM Lyly Dubose MD N Oncology [facility]

## 2021-05-03 NOTE — TELEPHONE ENCOUNTER
Diagnosis Orders   1.  [address] [place] [address], [address], [facility], [facility] TIFFANY HAILE II.VIERTEL     I agree with the Care Coordinator's Plan of Care

## 2021-05-03 NOTE — CARE COORDINATION
Kira Mai have enrolled Indu into Care Coordination program following ED follow up call. She is having some anxiety and would like to talk to a counselor. She was agreeable to see Timo Montana. If you agree, could you please place a referral to Timo Montana for support to help manage anxiety. Thank you!

## 2021-05-10 ENCOUNTER — TELEPHONE (OUTPATIENT)
Dept: CARDIOLOGY CLINIC | Age: 61
End: 2021-05-10

## 2021-05-10 ENCOUNTER — CARE COORDINATION (OUTPATIENT)
Dept: CARE COORDINATION | Age: 61
End: 2021-05-10

## 2021-05-10 ENCOUNTER — PROCEDURE VISIT (OUTPATIENT)
Dept: CARDIOLOGY CLINIC | Age: 61
End: 2021-05-10
Payer: COMMERCIAL

## 2021-05-10 DIAGNOSIS — Z95.810 ICD (IMPLANTABLE CARDIOVERTER-DEFIBRILLATOR) IN PLACE: Primary | ICD-10-CM

## 2021-05-10 NOTE — CARE COORDINATION
Ambulatory Care Coordination Note  5/10/2021  CM Risk Score: 5  Charlson 10 Year Mortality Risk Score: 98%     ACC: Guy Hurst, RN    Summary Note: Spoke with Noble Moncada. Was not able to talk long as she is currently in [place] on vacation. States she will be returning home this week Wed. States she is feeling much better from ED visit. Discussed referral placed with Rona Amato and she states she has not heard from her. I have placed an email to Ysabel Barillasceliacesar to confirm that she received the referral.  Will continue to follow up  Plan  -reinforce education completed  -encourage  Daily weight and use of zone tools  -ensure has been outreached by behavioral health support  -encourage use of PCP office/Same Day appts and [facility] walk in clinic as needed  Congestive Heart Failure Assessment    Are you currently restricting fluids?: No Restriction  Do you understand a low sodium diet?: Yes  Do you understand how to read food labels?: Yes  How many restaurant meals do you eat per week?: 1-2  Do you salt your food before tasting it?: No         Symptoms:  CHF associated angina: Neg, CHF associated dyspnea on exertion: Neg, CHF associated fatigue: Neg, CHF associated leg swelling: Neg, CHF associated orthostatic hypotension: Neg, CHF associated PND: Neg, CHF associated shortness of breath: Neg, CHF associated weakness: Neg      Symptom course: stable      and   General Assessment    Do you have any symptoms that are causing concern?: Yes  Progression since Onset: Resolved  Reported Symptoms: Nausea, Vomiting               Care Coordination Interventions    Program Enrollment: Complex Care  Referral from Primary Care Provider: No  Suggested Interventions and [address] [address]: In Process (Comment: Rona Amato referral 5-3-21)  Fall Risk Prevention:  In Process  Disease Specific Clinic: Completed (Comment: CHF clinic)  Home Health Services: Declined  Medication Assistance Program: Declined  Senior Services: Declined  Zone Management Tools: In Process         Goals Addressed                 This Visit's Progress     Conditions and Symptoms (pt-stated)   On track     I will schedule office visits, as directed by my provider. I will keep my appointment or reschedule if I have to cancel. I will notify my provider of any barriers to my plan of care. I will follow my Zone Management tool to seek urgent or emergent care. I will notify my provider of any symptoms that indicate a worsening of my condition. CHF  Barriers: overwhelmed by complexity of regimen  Plan for overcoming my barriers: family, ACM, CHF clinic support  Confidence: 8/10  Anticipated Goal Completion Date: 8/3/21              Prior to Admission medications    Medication Sig Start Date End Date Taking?  Authorizing Provider   ondansetron (ZOFRAN ODT) 4 MG disintegrating tablet Take 1 tablet by mouth every 8 hours as needed for Nausea 4/30/21  Yes Dipika Rivera DO   EUTHYROX 100 MCG tablet Take 1 tablet by mouth once daily 4/23/21  Yes ANGELIA Brewster CNP   metFORMIN (GLUCOPHAGE-XR) 500 MG extended release tablet Take 1 tablet by mouth daily (with breakfast) 3/29/21  Yes ANGELIA Brewster CNP   omeprazole (PRILOSEC) 20 MG delayed release capsule Take 1 capsule by mouth every morning (before breakfast) 3/11/21  Yes ANGELIA Dumont CNP   digoxin (LANOXIN) 125 MCG tablet Take 1 tablet by mouth daily 3/1/21  Yes Shilpi Allen MD   ENTRESTO 49-51 MG per tablet Take 1 tablet by mouth twice daily 1/25/21  Yes ANGELIA Ruano CNP   letrozole [facility]) 2.5 MG tablet Take 1 tablet by mouth daily 1/20/21  Yes Ezekiel Quinn MD   atorvastatin (LIPITOR) 40 MG tablet Take 1 tablet by mouth nightly 12/11/20  Yes ANGELIA Brewster CNP   carvedilol (COREG) 3.125 MG tablet Take 1 tablet by mouth twice daily 10/26/20  Yes [address], MD   venlafaxine (EFFEXOR XR) 150 MG extended release capsule Take 1 capsule by mouth daily 10/20/20  Yes ANGELIA Rivera CNP   butalbital-acetaminophen-caffeine (FIORICET, ESGIC) -98 MG per tablet Take 1 tablet by mouth every 4 hours as needed for Headaches 10/18/20  Yes Jennifer Gomez MD   aspirin 81 MG chewable tablet Take 1 tablet by mouth daily 7/11/20  Yes Yary Heard MD   furosemide (LASIX) 20 MG tablet Take 1 tablet by mouth daily as needed (leg swelling, weight gain) 4/13/20  Yes ANGELIA Rivera CNP   acetaminophen (TYLENOL) 500 MG tablet Take 500 mg by mouth every 6 hours as needed for Pain   Yes Historical Provider, MD       Future Appointments   Date Time Provider Braden Rothman   5/17/2021 12:45 PM Lucrecia Cruz MD N SRPX Heart [facility] Chastity Goss   5/25/2021  8:15 PM SCHEDULE, STR SLEEP TECH 02 [facility] 1 HOD   6/7/2021  9:15 AM Sheridan Boast, [address]   6/10/2021  8:00 AM ANGELIA Rivera CNP [facility] Chastity Goss   6/15/2021  1:30 PM SCHEDULE, SRPS PACER NURSE N SRPX PACER [facility] Wai Goss   6/18/2021 10:15 AM MD MICHAELLE Beauchamp [facility] Wai Goss   7/20/2021 11:20 AM Lakhwinder Townsend MD N Oncology [facility] Chastity Goss   12/1/2021  1:00 PM ANGELIA Ruano CNP N SRPX CHF [facility] Chastity Goss

## 2021-05-10 NOTE — TELEPHONE ENCOUNTER
Carelink Medtronic Single lead ICD -- Carelink every month- optivols  Pt of [place]     Battery 10 years     Presenting rhythm VS     RV impedance 456     RV shock 46  SVC shock 60     RV wave 16.3     V threshold 0.750 @ 0.40  V amplitude 1.50 @ 0.40     Programmed mode VVI     V paced <0.1%     AFib burden 0%     Optivol WNL     4 Episodes-- VT vs SVT lasting 1 second                      [address], MD at 5/10/2021  1:44 PM    Status: Signed      hakim to review

## 2021-05-11 ENCOUNTER — SOCIAL WORK (OUTPATIENT)
Dept: FAMILY MEDICINE CLINIC | Age: 61
End: 2021-05-11

## 2021-05-11 DIAGNOSIS — F43.20 ADJUSTMENT DISORDER, UNSPECIFIED TYPE: Primary | ICD-10-CM

## 2021-05-11 PROCEDURE — 93295 DEV INTERROG REMOTE 1/2/MLT: CPT | Performed by: INTERNAL MEDICINE

## 2021-05-11 PROCEDURE — 93296 REM INTERROG EVL PM/IDS: CPT | Performed by: INTERNAL MEDICINE

## 2021-05-11 NOTE — PROGRESS NOTES
This note will not be viewable in [employer] for the following reason(s). This is a Psychotherapy Note. A message was left on patient's voicemail requesting a call back at [phone] to discuss referral made for [facility] services.

## 2021-05-12 ENCOUNTER — TELEPHONE (OUTPATIENT)
Dept: FAMILY MEDICINE CLINIC | Age: 61
End: 2021-05-12

## 2021-05-12 NOTE — TELEPHONE ENCOUNTER
REFERRAL TO BEHAVIORAL HEALTH- Left voice message for patient to call [phone] for scheduling purposes since scheduling access is now possible from the referring provider's office     Please inform one of the front office girls when calling back.  Keke Chan)

## 2021-05-13 ENCOUNTER — TELEPHONE (OUTPATIENT)
Dept: FAMILY MEDICINE CLINIC | Age: 61
End: 2021-05-13

## 2021-05-13 NOTE — TELEPHONE ENCOUNTER
The patient is calling to schedule a NP appointment with Natalie Basurto. The patient has been referred by PCP Jack Mac for Counseling. Please call the patient to schedule a NP appointment. Phone number has been verified. Ok to leave a message.

## 2021-05-17 ENCOUNTER — CARE COORDINATION (OUTPATIENT)
Dept: CARE COORDINATION | Age: 61
End: 2021-05-17

## 2021-05-17 NOTE — TELEPHONE ENCOUNTER
A voice message was left  to schedule an initial [facility] appointment on Monday, 5/24@ 11:30am for an office visit. Pt was asked to called [phone] if this date/time doesn't work.

## 2021-05-19 ENCOUNTER — OFFICE VISIT (OUTPATIENT)
Dept: CARDIOLOGY CLINIC | Age: 61
End: 2021-05-19
Payer: COMMERCIAL

## 2021-05-19 VITALS
WEIGHT: 219 LBS | BODY MASS INDEX: 40.3 KG/M2 | HEIGHT: 62 IN | HEART RATE: 92 BPM | DIASTOLIC BLOOD PRESSURE: 75 MMHG | SYSTOLIC BLOOD PRESSURE: 105 MMHG

## 2021-05-19 DIAGNOSIS — I42.8 NONISCHEMIC CARDIOMYOPATHY (HCC): ICD-10-CM

## 2021-05-19 DIAGNOSIS — Z95.810 ICD (IMPLANTABLE CARDIOVERTER-DEFIBRILLATOR) IN PLACE: ICD-10-CM

## 2021-05-19 DIAGNOSIS — I50.22 CHRONIC SYSTOLIC CONGESTIVE HEART FAILURE (HCC): Primary | ICD-10-CM

## 2021-05-19 PROCEDURE — 99214 OFFICE O/P EST MOD 30 MIN: CPT | Performed by: INTERNAL MEDICINE

## 2021-05-19 PROCEDURE — G8417 CALC BMI ABV UP PARAM F/U: HCPCS | Performed by: INTERNAL MEDICINE

## 2021-05-19 PROCEDURE — G8427 DOCREV CUR MEDS BY ELIG CLIN: HCPCS | Performed by: INTERNAL MEDICINE

## 2021-05-19 PROCEDURE — 1036F TOBACCO NON-USER: CPT | Performed by: INTERNAL MEDICINE

## 2021-05-19 PROCEDURE — 3017F COLORECTAL CA SCREEN DOC REV: CPT | Performed by: INTERNAL MEDICINE

## 2021-05-19 RX ORDER — METOPROLOL SUCCINATE 25 MG/1
12.5 TABLET, EXTENDED RELEASE ORAL DAILY
Qty: 30 TABLET | Refills: 3 | Status: ON HOLD | OUTPATIENT
Start: 2021-05-19 | End: 2022-05-25 | Stop reason: SDUPTHER

## 2021-05-19 NOTE — PROGRESS NOTES
tablet by mouth every 4 hours as needed for Headaches, Disp: 180 tablet, Rfl: 1    aspirin 81 MG chewable tablet, Take 1 tablet by mouth daily, Disp: 30 tablet, Rfl: 3    furosemide (LASIX) 20 MG tablet, Take 1 tablet by mouth daily as needed (leg swelling, weight gain), Disp: 90 tablet, Rfl: 0    acetaminophen (TYLENOL) 500 MG tablet, Take 500 mg by mouth every 6 hours as needed for Pain, Disp: , Rfl:     Past Medical History  Magalys Hood  has a past medical history of Abnormal heart rhythm, Anxiety, Cancer ([facility] [address] CHF (congestive heart failure) ([facility] [address] Congenital heart disease, DJD (degenerative joint disease), Enlarged lymph nodes, Hypertension, Hypothyroidism, ICD (implantable cardioverter-defibrillator) in place, Kidney stones, PONV (postoperative nausea and vomiting), Prediabetes, and Thyroid disease. Social History  Magalys Hood  reports that she quit smoking about a year ago. Her smoking use included cigarettes. She started smoking about 39 years ago. She has a 9.25 pack-year smoking history. She has never used smokeless tobacco. She reports that she does not drink alcohol and does not use drugs. Family History  Magalys Hood family history includes Cancer in her father and maternal grandfather; Erman Helga in her father; Colon Polyps in her brother; Dementia in her mother; High Blood Pressure in her mother; Mental Retardation in her brother. There is no family history of bicuspid aortic valve, aneurysms, heart transplant, pacemakers, defibrillators, or sudden cardiac death.       Past Surgical History   Past Surgical History:   Procedure Laterality Date    APPENDECTOMY      CARPAL TUNNEL RELEASE  2019    COLONOSCOPY      COLONOSCOPY N/A 7/27/2020    COLONOSCOPY POLYPECTOMY SNARE/COLD BIOPSY performed by Dashawn Sinclair MD at [facility]    COLONOSCOPY  7/27/2020    COLONOSCOPY POLYPECTOMY HOT BIOPSY performed by Dashawn Sinclair MD at [address] [place], [place] [address]e Right 6/25/2020    Coreen Aquino RELEASE AND RIGHT RING FINGER TRIGGER RELEASE performed by Caterina Malagon DO at [address], BILATERAL      PACEMAKER INSERTION Left 02/11/2019    [employer] KRISS PT HAS A MRI CONDITIONAL SYSTEM    PTCA      TONSILLECTOMY         Review of Systems   Constitutional: Negative for chills and fever  HENT: Negative for congestion, sinus pressure, sneezing and sore throat. Eyes: Negative for pain, discharge, redness and itching. Respiratory: Negative for apnea, cough  Gastrointestinal: Negative for blood in stool, constipation, diarrhea   Endocrine: Negative for cold intolerance, heat intolerance, polydipsia. Genitourinary: Negative for dysuria, enuresis, flank pain and hematuria. Musculoskeletal: Negative for arthralgias, joint swelling and neck pain. Neurological: Negative for numbness and headaches. Psychiatric/Behavioral: Negative for agitation, confusion, decreased concentration and dysphoric mood. Objective:     /75   Pulse 92   Ht 5' 2\" (1.575 m)   Wt 219 lb (99.3 kg)   BMI 40.06 kg/m²     Wt Readings from Last 3 Encounters:   05/19/21 219 lb (99.3 kg)   04/05/21 222 lb (100.7 kg)   03/11/21 220 lb (99.8 kg)     BP Readings from Last 3 Encounters:   05/19/21 105/75   04/30/21 118/63   04/05/21 118/84       Nursing note and vitals reviewed. Physical Exam   Constitutional: Oriented to person, place, and time. Appears well-developed and well-nourished. HENT:   Head: Normocephalic and atraumatic. Eyes: EOM are normal. Pupils are equal, round, and reactive to light. Neck: Normal range of motion. Neck supple. No JVD present. Cardiovascular: Normal rate, regular rhythm, normal heart sounds and intact distal pulses. No murmur heard. Pulmonary/Chest: Effort normal and breath sounds normal. No respiratory distress. No wheezes. No rales. Abdominal: Soft. Bowel sounds are normal. No distension. There is no tenderness. Musculoskeletal: Normal range of motion.  No edema. Neurological: Alert and oriented to person, place, and time. No cranial nerve deficit. Coordination normal.   Skin: Skin is warm and dry. Psychiatric: Normal mood and affect.        No results found for: CKTOTAL, CKMB, CKMBINDEX    Lab Results   Component Value Date    WBC 5.6 04/30/2021    RBC 4.77 04/30/2021    HGB 14.4 04/30/2021    HCT 45.1 04/30/2021    MCV 94.5 04/30/2021    MCH 30.2 04/30/2021    MCHC 31.9 04/30/2021    RDW 11.7 10/10/2019     04/30/2021    MPV 11.4 04/30/2021       Lab Results   Component Value Date     04/30/2021    K 4.0 04/30/2021     04/30/2021    CO2 24 04/30/2021    BUN 12 04/30/2021    LABALBU 4.5 04/30/2021    CREATININE 0.5 04/30/2021    CALCIUM 9.7 04/30/2021    LABGLOM >90 04/30/2021    GLUCOSE 130 04/30/2021       Lab Results   Component Value Date    ALKPHOS 90 04/30/2021    ALT 16 04/30/2021    AST 20 04/30/2021    PROT 7.0 04/30/2021    BILITOT 0.3 04/30/2021    BILIDIR <0.2 04/30/2021    LABALBU 4.5 04/30/2021       Lab Results   Component Value Date    MG 2.1 10/15/2020       Lab Results   Component Value Date    INR 1.02 07/09/2020    INR 0.98 02/26/2019    INR 0.97 02/11/2019         Lab Results   Component Value Date    LABA1C 6.1 03/29/2021       Lab Results   Component Value Date    TRIG 106 07/09/2020    HDL 49 07/09/2020    LDLCALC 84 07/09/2020       Lab Results   Component Value Date    TSH 0.957 03/29/2021         Testing Reviewed:      I have individually reviewed the cardiac test below:    ECHO: Results for orders placed during the hospital encounter of 11/06/19    Echo 2D w doppler w color complete    Narrative  Transthoracic Echocardiography Report (TTE)    Demographics    Patient Name     Avis Pappas Gender                Female    MR #             [id number]      Race                      Ethnicity    Account #        [de-identified]      Room Number    Accession Number [id number]      Date of Study         11/06/2019    Date of Birth    1960     Referring Physician   Justice Galeano CNP  Donavan Dickey CNP    Age              63 year(s)     Audi Lewis MD  Physician    Procedure    Type of Study    TTE procedure:ECHOCARDIOGRAM COMPLETE 2D W DOPPLER W COLOR. Procedure Date  Date: 11/06/2019 Start: 12:58 PM    Study Location: Echo Lab  Technical Quality: Limited visualization due to lung interface. Indications:Mitral regurgitation. Additional Medical History:Congestive heart failure, Caridomyopathy,  Syncope, Dizziness, Hypertension    Patient Status: Routine    Height: 62 inches Weight: 200 pounds BSA: 1.91 m^2 BMI: 36.58 kg/m^2    BP: 104/62 mmHg    Allergies  - No Known Allergies. Conclusions    Summary  Ejection fraction was estimated at 35-40%. Device lead/catheter seen in the right heart. There was trace aortic regurgitation. There was mild-moderate mitral regurgitation. There was mild tricuspid regurgitation. Assuming RAP = 5 mmHg, the estimated RVSP = 26 mmHg. Ascending aorta = 3.2 cm. IVC size is within normal limits with normal respiratory phasic changes. Signature    ----------------------------------------------------------------  Electronically signed by David Salcedo MD (Interpreting  physician) on 11/08/2019 at 03:44 PM  ----------------------------------------------------------------    Findings    Mitral Valve  The mitral valve structure was normal with normal leaflet separation. DOPPLER: The transmitral velocity was within the normal range with no  evidence for mitral stenosis. There was mild-moderate mitral  regurgitation. Aortic Valve  The aortic valve was trileaflet with normal thickness and cuspal  separation. DOPPLER: Transaortic velocity was within the normal range with  no evidence of aortic stenosis. There was trace aortic regurgitation.     Tricuspid Valve  The tricuspid valve structure was normal with normal leaflet separation. DOPPLER: There was no evidence of tricuspid stenosis. There was mild  tricuspid regurgitation. Assuming RAP = 5 mmHg, the estimated RVSP = 26  mmHg. Pulmonic Valve  The pulmonic valve leaflets were not well seen. DOPPLER: The transpulmonic  velocity was within the normal range with trace regurgitation. Left Atrium  Left atrial size was moderate-severely dilated. Left Ventricle  Normal left ventricular size and severely reduced systolic function. There was severe global hypokinesis. Wall thickness was within normal limits. Ejection fraction was estimated at 35-40%. E/A flow reversal noted. Suggestive of diastolic dysfunction. Right Atrium  Right atrial size was severely dilated. Right Ventricle  The right ventricular size was normal with normal systolic function and  wall thickness. Device lead/catheter seen in the right heart. Pericardial Effusion  The pericardium was normal in appearance with no evidence of a pericardial  effusion. Pleural Effusion  No evidence of pleural effusion. Aorta / Great Vessels  -Ascending aorta = 3.2 cm. -IVC size is within normal limits with normal respiratory phasic changes.     M-Mode/2D Measurements & Calculations    LV Diastolic    LV Systolic Dimension: 4 cm  AV Cusp Separation: 1.8 cmLA  Dimension: 5 cm LV Volume Diastolic: 697.62  Dimension: 3.9 cmAO Root  LV FS:20 %      ml                           Dimension: 3.1 cm  LV PW           LV Volume Systolic: 94.1 ml  Diastolic: 0.9  LV EDV/LV EDV Index: 105.32  cm              ml/55 m^2LV ESV/LV ESV  Septum          Index: 67.3 ml/35 m^2        RV Diastolic Dimension: 2.6  Diastolic: 0.9  EF Calculated: 36.1 %        cm  cm  LV Length: 8.3 cm            LA/Aorta: 1.26    LV Area  Diastolic: 39  cm^2  LV Area  Systolic: 61.7  cm^2    Doppler Measurements & Calculations    MV Peak E-Wave: 47.2 cm/s AV Peak Velocity: 130 LVOT Peak Velocity: 83.3  MV Peak A-Wave: 82.9 cm/s cm/s cm/s  MV E/A Ratio: 0.57        AV Peak Gradient:     LVOT Peak Gradient: 3 mmHg  MV Peak Gradient: 0.89    6.76 mmHg  mmHg    MV Deceleration Time: 243                       TR Velocity:227 cm/s  msec                                            TR Gradient:20.61 mmHg  AV P1/2t: 781 msec    PV Peak Velocity: 53 cm/s  MV Area (PISA): 3.28 cm^2                       PV Peak Gradient: 1.12  MV E' Septal Velocity:                          mmHg  4.8 cm/s  MV A' Septal Velocity:    AV DVI (Vmax):0.64  8.3 cm/s  MV E' Lateral Velocity:  4.9 cm/s  MV A' Lateral Velocity: 9  cm/s  E/E' septal: 9.83  E/E' lateral: 9.63  MR Velocity: 502 cm/s  MR VTI: 190 cm  PISA Radius: 0.6 cm    PISA area: 2.26 cm^2    [id number]  [id number]       Assessment/Plan   Chemo-Cardiomyopathy, EF improved 40-45%, NYHA I, s/p ICD  HTN  No CAD, 2020  SVT vs VT - lasted 1 second  Orthostasis on Tilt  Switch to metoprolol 12.5 mg XL, d/c Coreg, Lasix Prn, cut Entresto in half and BID. Monitor BP. Mild increase in fluid to help combat, compression stockings as well. No shocks. Continue all other meds. CHF clinic following. Discussed diet/exercise/BP/weight loss/health lifestyle choices/lipids; the patient understands the goals and will try to comply.       Disposition:  1 year         Electronically signed by Kolton Jones MD   5/19/2021 at 2:57 PM EDT

## 2021-05-20 ENCOUNTER — CARE COORDINATION (OUTPATIENT)
Dept: CARE COORDINATION | Age: 61
End: 2021-05-20

## 2021-05-20 NOTE — CARE COORDINATION
Saad Cruz called with a question regarding appt with Irene Chong. She would like to know where the appt will be. I have a message into [facility] to ensure that Saad Kangshaun is given the correct facility where the appt will be held. I confirmed address and informed Saad Cruz of address for her appt with [facility].

## 2021-05-24 ENCOUNTER — OFFICE VISIT (OUTPATIENT)
Dept: BEHAVIORAL/MENTAL HEALTH CLINIC | Age: 61
End: 2021-05-24
Payer: COMMERCIAL

## 2021-05-24 DIAGNOSIS — F43.22 ADJUSTMENT DISORDER WITH ANXIOUS MOOD: ICD-10-CM

## 2021-05-24 DIAGNOSIS — F32.1 CURRENT MODERATE EPISODE OF MAJOR DEPRESSIVE DISORDER, UNSPECIFIED WHETHER RECURRENT (HCC): Primary | ICD-10-CM

## 2021-05-24 PROCEDURE — 90791 PSYCH DIAGNOSTIC EVALUATION: CPT | Performed by: SOCIAL WORKER

## 2021-05-24 PROCEDURE — 1036F TOBACCO NON-USER: CPT | Performed by: SOCIAL WORKER

## 2021-05-24 ASSESSMENT — PATIENT HEALTH QUESTIONNAIRE - PHQ9
5. POOR APPETITE OR OVEREATING: 1
SUM OF ALL RESPONSES TO PHQ QUESTIONS 1-9: 19
1. LITTLE INTEREST OR PLEASURE IN DOING THINGS: 3
6. FEELING BAD ABOUT YOURSELF - OR THAT YOU ARE A FAILURE OR HAVE LET YOURSELF OR YOUR FAMILY DOWN: 2
2. FEELING DOWN, DEPRESSED OR HOPELESS: 1
7. TROUBLE CONCENTRATING ON THINGS, SUCH AS READING THE NEWSPAPER OR WATCHING TELEVISION: 3
SUM OF ALL RESPONSES TO PHQ9 QUESTIONS 1 & 2: 4

## 2021-05-24 ASSESSMENT — COLUMBIA-SUICIDE SEVERITY RATING SCALE - C-SSRS: 2. HAVE YOU ACTUALLY HAD ANY THOUGHTS OF KILLING YOURSELF?: NO

## 2021-05-24 NOTE — PSYCHOTHERAPY
Behavioral Health Consultation  KLYE Oconnor  5/24/2021  11:19 AM EDT  Time spent with Patient: 40 minutes  This is patient's first  [facility] appointment. Reason for Consult:    Chief Complaint   Patient presents with    Depression    Anxiety     Referring Provider: RAHUL Aranda  Pt provided informed consent for the behavioral health program. Discussed with patient model of service to include the limits of confidentiality (i.e. abuse reporting, suicide intervention, etc.) and short-term intervention focused approach. Pt indicated understanding. Feedback given to PCP. S:  Pt identified the presenting problem as symptoms of anxiety and depression and indicated this as her primary needs to address through behavioral health services. The history of the problem was explored with pt in establishing her  is currently residing [place], which has strained their relationship. She is managing a number of medical issues and feeling stressed in trying to offer support to her brother in another country. Pt acknowledged that her 's lack of comfort and effort in making contact with her often triggers anxious tendencies and depressed mood. The current situation was processed with pt in examining thoughts, feelings, and impairment on daily functioning. Pt indicated symptoms of poor sleep, depressed mood, sadness, moments of being on the brink of tears, feeling anxious/edgy, and ruminating on negative/worrisome thoughts. Pt was guided in exploring areas of behavioral change, development of effective coping strategies, and assessing the management of environmental factors influencing the problem. . The PHQ-9 was administered and pt scored 19, indicating a Moderate major depression. Pt denied suicidal thoughts, but acknowledged fleeting thoughts that she would be better off dead, with no plan, desire, or intent and high protective factors.  The C-SSRS was completed and pt presented at low risk of suicide. Crisis supports were preventively discussed. Pt was advised of indications of depressive symptoms and instructed to monitor if symptoms worsen or interfere with daily functioning, to consider following-up with either your primary care team or a behavioral health provider for possible counseling or medication management. If suicidal thoughts are experienced, call 911. An additional 24/7 resource is the [address] at [phone]). Pt will attend a follow up appointment in four weeks once returning from an extended visit to [place]. O:  MSE:    Appearance    cooperative  Appetite normal  Sleep disturbance Yes  Fatigue Yes  Loss of pleasure Yes  Impulsive behavior No  Speech    normal rate  Mood    euthymic   Affect    normal affect  Thought Content    intact  Thought Process    coherent  Associations    logical connections  Insight    Fair  Judgment    Intact  Orientation    oriented to person, place, time, and general circumstances  Memory    recent and remote memory intact  Attention/Concentration    intact  Morbid ideation No  Suicide Assessment    no suicidal ideation; fleeting thoughts of being better off dead, no plan, intent, desire. Crisis supports preventively reviewed. History:    TOBACCO:   reports that she quit smoking about a year ago. Her smoking use included cigarettes. She started smoking about 39 years ago. She has a 9.25 pack-year smoking history. She has never used smokeless tobacco.  ETOH:   reports no history of alcohol use. Family History:   Family History   Problem Relation Age of Onset    High Blood Pressure Mother     Dementia Mother     Cancer Father     Colon Cancer Father     Mental Retardation Brother     Colon Polyps Brother     Cancer Maternal Grandfather     Esophageal Cancer Neg Hx        A:       Diagnosis:    1. Current moderate episode of major depressive disorder, unspecified whether recurrent ([facility] [address]    2.  Adjustment disorder with anxious mood          Diagnosis Date    Abnormal heart rhythm     Anxiety     Cancer [facility])     breast  2016     CHF (congestive heart failure) (HCC)     Congenital heart disease     DJD (degenerative joint disease)     Enlarged lymph nodes     Hypertension     Hypothyroidism     ICD (implantable cardioverter-defibrillator) in place 02/11/2019    Dr. Dee Josue Kidney stones     PONV (postoperative nausea and vomiting)     Prediabetes 10/7/2019    Thyroid disease        Plan: Pt will attend a follow up appointment  four weeks to assess symptoms and evaluate effectiveness of coping skills. Pt interventions:  Provided handout on  depression, anxiety and stress, Trained in relaxation strategies and Discussed self-care (sleep, nutrition, rewarding activities, social support, exercise)    Pt Behavioral Change Plan:   1. Pt will read handouts regarding depression, anxiety,  relaxation techniques, and self care. 2. Pt will practice self calming skills 2-3x's daily for 3-5 minutes. 3. Pt will promote effective self care habits daily/weekly-rest, relaxation, stress management, supportive relationships, increased physical outlets, engagement in enjoyable activities. 4. Pt will follow up with KYLE Ramos  Pt was advised of indications of depressive symptoms and instructed to monitor if symptoms worsen or interfere with daily functioning, to consider following-up with either your primary care team or a behavioral health provider for possible counseling or medication management. If suicidal thoughts are experienced, call 911. An additional 24/7 resource is the [address] at [phone] ([phone]).

## 2021-05-24 NOTE — PATIENT INSTRUCTIONS
1. Depression: Facts, Myths & Tips for Feeling Better    Facts    Depression is very common  Nearly 20% of the [place] population experiences a significant depression during their lifetime. Depression is treatable  Because depression affects so many, and can have such a powerful and negative impact on life, there has been an enormous amount of research conducted on how to reduce symptoms and improve functioning. As a result, we now know there are behaviors YOU can engage in to make yourself feel better. Depression is not a weakness  People suffer from depression for a variety of reasons, biological, environmental and behavioral.  Research indicates that [employer] is NOT one of the reasons people become depressed. Depression is not something you are powerless against  Evidence suggests that you can directly impact the intensity and duration of depression by what you do and by altering the way you think about certain things. The Downward Spiral    Depression often begins as a drop in mood due to an environmental or biological trigger that makes people feel less like being active. Being less active, in turn, often causes people to experience an even lower mood and feel even less like being active, and so the cycle begins. How can I start feeling better? The first and best way to reverse the downward cycle is to get active! Your body produces its own anti-depressants every time you exercise or do something pleasurable. Regular exercise is one of the very best ways to improve your mood. In fact some studies have shown that a solid exercise program is as effective as psychotherapy or anti-depressant medication for some people. *See physical activity section of handout    Force yourself to do something you found pleasurable before depression. This may be different for everyone and it doesnt matter if its gardening, playing bridge, walking, reading a novel, or simply talking to a close friend. living your life well again. Recommended readings on managing depression    - Feeling Good by Dr. Quentin Hunter. Puri     - Mind over [place]emia[place]Jer[place] by Hakeem Johns and [facility] by Dr. Mian Mirandas by Dr. Alden Quintana. Sapolsky      Disputation:  Challenging Upsetting Thinking    Examine your thoughts for key words:  1. must, need, got to, have to, should (unrealistic standards)  2. never, always, completely, totally, all everything, everyone (predictions /  labeling)  3. awful, terrible, horrible, unbearable, disaster, worst ever (labeling / predictions)  4. jerk, slob, creep, hypocrite, bully, stupid (labels)  Dispute or question the accuracy of the questionable thoughts. 1. Am I upsetting myself unnecessarily? How can I see this another way? 2. Is my thinking working for or against me? How could I view this in a less upsetting way? 3. What am I demanding must happen? What do I want or prefer, rather than need? 4. Am I making something too terrible? Is it really that awful? What would be so terrible about that? 5. Am I labeling a person? What is the action that I dont like? 6. Whats untrue about my thoughts? How can I stick to the facts? Whats the proof for what I am thinking or believing about this? 7. Am I using extreme, black-and-white language? What less extreme words might be more accurate? 8. Am I fortune telling or mind reading in a way that gets me upset or unhappy? What are the odds (percent chance -- e.g., there is a 5% chance. ..) that it will really turn out the way Im thinking or imagining? 9. What are my options in this situation? How would I like to respond? 10. Create more moderate, helpful, or realistic statements to replace the upsetting ones. 11. Have I had any experiences that show that this thought might not be totally true? 12. If my best friend or someone I loved had this thought, what would I tell them?   13. If my best friend or someone I loved knew I was thinking this thought, what would they say to me? What evidence would they point out to me that would suggest that my thought is not completely true? 14. Are there strengths in me or positives in the situation that I am ignoring? Am I underestimating my ability to cope with unfortunate circumstances? 15. When I am not feeling this way, do I think about this situation any differently? How?  16. Have I been in this type of situation before? What happened? What have I learned from prior experiences that could help me now? 17. Five years from now, if I look back on this situation, will I look at it any differently? Will I focus on any different part of my experience? 25. Am I blaming myself for something over which I do not have complete control? 2. Pt will read handouts regarding depression, anxiety, grief reaction, relaxation techniques, and self care. 3.Pt will practice self calming skills 2-3x's daily for 3-5 minutes. 4.Pt will promote effective self care habits daily/weekly-rest, relaxation, stress management, supportive relationships, increased physical outlets, engagement in enjoyable activities. 5. What is deep breathing? Deep breathing involves using your diaphragm muscle to help bring about a state of physiological relaxation. The diaphragm is a large muscle that rests across the bottom of your rib cage. When you inhale, the diaphragm muscle drops, opening up space so air can come in. When watching someone do this it looks like your stomach is filling with air. This type of breathing helps activate the part of your nervous system that controls relaxation. It can lead to decreased heart rate, blood pressure, muscle tension and overall feelings of relaxation.       Why be concerned with how I'm breathing?    -To increase your awareness of the role that breathing plays in increased physical tension and contributing to your body's stress response.  -To lower your level of stress-related arousal and tension.  -To give you a method of taking calm, relaxing breaths immediately in a stressful situation to break the cycle of increasing arousal.    What is the best way to use deep breathing exercises?    -Use deep breathing frequently. -Take deep breaths at the first signs of stress, anxiety, physical tension, or other symptoms.  -Schedule time for relaxation. My scheduled time for deep breathing will be _AM, PM, NOON________.   5. Pt will follow up with KYLE Cotto

## 2021-05-25 ENCOUNTER — HOSPITAL ENCOUNTER (OUTPATIENT)
Dept: SLEEP CENTER | Age: 61
Discharge: HOME | End: 2021-05-27
Payer: COMMERCIAL

## 2021-05-25 DIAGNOSIS — R06.83 LOUD SNORING: ICD-10-CM

## 2021-05-25 DIAGNOSIS — G47.10 HYPERSOMNIA: ICD-10-CM

## 2021-05-25 DIAGNOSIS — E66.01 OBESITY, MORBID, BMI 40.0-49.9 (HCC): ICD-10-CM

## 2021-05-25 PROCEDURE — 95810 POLYSOM 6/> YRS 4/> PARAM: CPT

## 2021-05-26 LAB — STATUS: NORMAL

## 2021-05-27 ENCOUNTER — OFFICE VISIT (OUTPATIENT)
Dept: PULMONOLOGY | Age: 61
End: 2021-05-27
Payer: COMMERCIAL

## 2021-05-27 VITALS
BODY MASS INDEX: 39.9 KG/M2 | TEMPERATURE: 97.3 F | SYSTOLIC BLOOD PRESSURE: 128 MMHG | DIASTOLIC BLOOD PRESSURE: 62 MMHG | HEART RATE: 97 BPM | WEIGHT: 216.8 LBS | HEIGHT: 62 IN | OXYGEN SATURATION: 96 %

## 2021-05-27 DIAGNOSIS — G25.81 RLS (RESTLESS LEGS SYNDROME): ICD-10-CM

## 2021-05-27 DIAGNOSIS — G47.33 OBSTRUCTIVE SLEEP APNEA: Primary | ICD-10-CM

## 2021-05-27 DIAGNOSIS — I42.8 NICM (NONISCHEMIC CARDIOMYOPATHY) (HCC): ICD-10-CM

## 2021-05-27 DIAGNOSIS — G47.10 HYPERSOMNIA: ICD-10-CM

## 2021-05-27 DIAGNOSIS — R06.83 LOUD SNORING: ICD-10-CM

## 2021-05-27 DIAGNOSIS — E66.9 OBESITY (BMI 30-39.9): ICD-10-CM

## 2021-05-27 PROCEDURE — 3017F COLORECTAL CA SCREEN DOC REV: CPT | Performed by: PHYSICIAN ASSISTANT

## 2021-05-27 PROCEDURE — 1036F TOBACCO NON-USER: CPT | Performed by: PHYSICIAN ASSISTANT

## 2021-05-27 PROCEDURE — G8417 CALC BMI ABV UP PARAM F/U: HCPCS | Performed by: PHYSICIAN ASSISTANT

## 2021-05-27 PROCEDURE — 99214 OFFICE O/P EST MOD 30 MIN: CPT | Performed by: PHYSICIAN ASSISTANT

## 2021-05-27 PROCEDURE — G8427 DOCREV CUR MEDS BY ELIG CLIN: HCPCS | Performed by: PHYSICIAN ASSISTANT

## 2021-05-27 ASSESSMENT — ENCOUNTER SYMPTOMS
DIARRHEA: 0
BACK PAIN: 0
WHEEZING: 0
ALLERGIC/IMMUNOLOGIC NEGATIVE: 1
SHORTNESS OF BREATH: 0
CHEST TIGHTNESS: 0
EYES NEGATIVE: 1
STRIDOR: 0
NAUSEA: 0
COUGH: 0

## 2021-05-27 NOTE — PROGRESS NOTES
Headaches 180 tablet 1    aspirin 81 MG chewable tablet Take 1 tablet by mouth daily 30 tablet 3    furosemide (LASIX) 20 MG tablet Take 1 tablet by mouth daily as needed (leg swelling, weight gain) 90 tablet 0    acetaminophen (TYLENOL) 500 MG tablet Take 500 mg by mouth every 6 hours as needed for Pain       No current facility-administered medications for this visit. ROS  Review of Systems   Constitutional: Negative for activity change, appetite change, chills and fever. HENT: Positive for tinnitus. Negative for congestion and postnasal drip. Eyes: Negative. Respiratory: Negative for cough, chest tightness, shortness of breath, wheezing and stridor. Cardiovascular: Negative for chest pain and leg swelling. Gastrointestinal: Negative for diarrhea and nausea. Endocrine: Negative. Genitourinary: Negative. Musculoskeletal: Negative. Negative for arthralgias and back pain. Skin: Negative. Allergic/Immunologic: Negative. Neurological: Negative. Negative for dizziness and light-headedness. Psychiatric/Behavioral: Negative. All other systems reviewed and are negative. Exam  Vitals -  /62 (Site: Right Upper Arm, Position: Sitting, Cuff Size: Large Adult)   Pulse 97   Temp 97.3 °F (36.3 °C) (Temporal)   Ht 5' 2\" (1.575 m)   Wt 216 lb 12.8 oz (98.3 kg)   SpO2 96% Comment: Room air at rest  BMI 39.65 kg/m²    Body mass index is 39.65 kg/m². Oxygen level - Room Air  Physical Exam  Constitutional:       Appearance: She is well-developed. HENT:      Head: Normocephalic and atraumatic. Right Ear: External ear normal.      Left Ear: External ear normal.   Eyes:      Conjunctiva/sclera: Conjunctivae normal.      Pupils: Pupils are equal, round, and reactive to light. Cardiovascular:      Rate and Rhythm: Normal rate and regular rhythm. Heart sounds: Normal heart sounds.    Pulmonary:      Effort: Pulmonary effort is normal.      Breath sounds: Normal

## 2021-06-01 ENCOUNTER — TELEPHONE (OUTPATIENT)
Dept: CARDIOLOGY CLINIC | Age: 61
End: 2021-06-01

## 2021-06-01 RX ORDER — FUROSEMIDE 20 MG/1
TABLET ORAL
Qty: 90 TABLET | Refills: 0 | Status: SHIPPED | OUTPATIENT
Start: 2021-06-01 | End: 2021-09-16

## 2021-06-01 NOTE — TELEPHONE ENCOUNTER
Recent Visits  Date Type Provider Dept   03/11/21 Office Visit Adam Andino, APRN - CNP [facility]   12/10/20 Office Visit Adam Andino, APRN - CNP [facility]   10/20/20 Office Visit Adam Andino, APRN - CNP [facility]   08/25/20 Office Visit Adam Andino, APRN - CNP [facility]   07/06/20 Office Visit Julian Obrien, APRN - CNP [facility]   03/03/20 Office Visit Adam Andino, APRN - CNP [facility]   02/28/20 Office Visit Adam Andino, APRN - CNP [facility]   02/04/20 Office Visit Adam Andino, APRN - CNP [facility]   Showing recent visits within past 540 days with a meds authorizing provider and meeting all other requirements  Future Appointments  Date Type Provider Dept   06/10/21 Appointment Adam Andino APRN - CNP [facility]   Showing future appointments within next 150 days with a meds authorizing provider and meeting all other requirements      Future Appointments   Date Time Provider Braden Rothman   6/10/2021  8:00 AM Adam Andino, APRN - [id number] [address]   6/15/2021  1:30 PM SCHEDULE, [facility] PACER NURSE N [facility] PACER [address]   6/18/2021 10:15 AM Cathryn Mayer MD N [facility] [address]   6/28/2021  1:00 PM Narinder Morales [address]   7/13/2021  8:15 PM SCHEDULE, STR SLEEP TECH [address]   7/20/2021 11:20 AM Frank Castano MD N Oncology [facility]   9/9/2021  8:30 AM Ismael Calhoun PA-C N Pulm Med [facility]   12/1/2021  1:00 PM Vale Go APRN - CNP N [facility] CHF [facility] [address] [place] [address]   5/26/2022  2:00 PM Juan Martinez MD N [facility] Heart [address] [facility]

## 2021-06-04 NOTE — PROGRESS NOTES
[address] [place] [address]                               SLEEP STUDY REPORT    PATIENT NAME: Nancy Muro                     :        1960  MED REC NO:   [id number]                           ROOM:  ACCOUNT NO:   [de-identified]                           ADMIT DATE: 2021  PROVIDER:     BRENDA Cavanaughs:  2021    DIAGNOSTIC POLYSOMNOGRAM REPORT    REFERRING PROVIDER:  Domnick Carrel, CNP    HISTORY OF PRESENT ILLNESS:  The patient is a 73-year-old female with  complaints of daytime somnolence, nonrestorative sleep. She is morbidly  obese. Weight 222 pounds, height 62 inches, BMI 40.6    METHODS:  The patient underwent digital polysomnography in compliance  with the standards and specifications from the AASM Manual including the  simultaneous recording of 3 EEG channels (F4-M1, C4-M1, and O2-M1 with  back up electrodes F3-M2, C3-M2, and O1-M2), 2 EOG channels (E1-M2, and  E2-M1,), EMG (chin, left & right leg), EKG, Nonin pulse oximetry with   less than 2 second averaging time, body position, airflow recorded by  oral-nasal thermal sensor and nasal air pressure transducer, plus  respiratory effort recorded by calibrated respiratory inductance  plethysmography (RIP), flow volume loop, sound and video. Sleep staging  and scoring followed the standard put forth by the American Academy of  Sleep Medicine and utilized the 4A obstructive hypopnea event  desaturation of 4 percent or greater. DETAILS OF THE STUDY:  Lights out 09:04 p.m., lights on 05:34 a.m. Total recording time 530 minutes, sleep period time 387 minutes, total  sleep time 361 minutes, sleep efficiency 70.3%. Latency to sleep 126  minutes, wake after sleep onset 26.5 minutes. Latency to   minutes.     SLEEP STAGIN.6 minutes or 7.1% in N1 sleep, 235 minutes or 65% in  N2 sleep, 82 minutes or 22.7% in N3 sleep, 18.5 minutes or 5.1% in REM  sleep. RESPIRATORY SUMMARY:  1 obstructive apnea, 46 obstructive hypopneas for  an AHI of 7.8; however, the obstructive events worsened during REM sleep  with a REM AHI of 38.9. BODY POSITION SUMMARY:  155 minutes supine sleep, 68.9 minutes prone, 10  minutes on the left side and 126 minutes in the right side. SLEEP DISTURBANCE SUMMARY:  There were 135 arousals for an arousal index  of 22.4, 17 of these arousals were caused by respiratory obstructions  for a respiratory arousal index of 2.8. PERIODIC LIMB MOVEMENT SUMMARY:  There were 282 episodes for a PLM index  of 46.9, 58 of these leg movements caused arousal for a PLM arousal  index of 9.6. PULSE OXIMETRY SUMMARY:  Mean oxygen saturation during wakefulness  92.9%, during sleep 91%. Lowest oxygen saturation 78%, there were 10.3  minutes of oxygen saturation below 88%. ECG SUMMARY:  Normal sinus rhythm. ASSESSMENT:  REM-related obstructive sleep apnea with an overall AHI of  7.8 and a REM AHI of 38.9. Increased number of periodic limb movements  with sleep disruption. RECOMMENDATIONS:  Due to the severity of the sleep disruption, I will  recommend PAP therapies.   The patient will be contacted, the findings  and recommendations will be reviewed with her, and if she is agreeable,  we will bring her back to the sleep lab for a PAP titration  polysomnogram.        Bonny Mcdowell M.D.    D: 06/03/2021 22:56:58       T: 06/04/2021 7:30:23     [email or web address]  Job#: [id number]     Doc#: [id number]    CC:

## 2021-06-07 ENCOUNTER — CARE COORDINATION (OUTPATIENT)
Dept: CARE COORDINATION | Age: 61
End: 2021-06-07

## 2021-06-14 ENCOUNTER — CARE COORDINATION (OUTPATIENT)
Dept: CARE COORDINATION | Age: 61
End: 2021-06-14

## 2021-06-14 NOTE — CARE COORDINATION
Ambulatory Care Coordination Note  6/14/2021  CM Risk Score: 5  Charlson 10 Year Mortality Risk Score: 98%     ACC: Shweta Miranda RN    Summary Note: Spoke with Budiban Leach. States she is back in [place] right now and doing well. States the only sx she is experiencing right now is being tired all the time. She believes this will improve once her cpap machine is started. She has an appt for mask set up scheduled. We discussed behavioral health needs and she is continuing to follow up with Adol[facility] Davis. There is an appt scheduled for when she returns back to [place]. Denies changes in her breathing or edema. Discussed the importance of early symptom management to prevent exacerbation of his chronic conditions. Plan  -reinforce education completed  -collaborate with [facility] as needed   -encourage daily weight tracking and reporting  -ensure cpap supplies have been received with no barriers  Congestive Heart Failure Assessment    Are you currently restricting fluids?: No Restriction  Do you understand a low sodium diet?: Yes  Do you understand how to read food labels?: Yes  How many restaurant meals do you eat per week?: 1-2  Do you salt your food before tasting it?: No         Symptoms:  CHF associated angina: Neg, CHF associated dyspnea on exertion: Neg, CHF associated fatigue: Pos, CHF associated leg swelling: Neg, CHF associated orthostatic hypotension: Neg, CHF associated PND: Neg, CHF associated shortness of breath: Neg, CHF associated weakness: Neg      Symptom course: stable  Salt intake watch compared to last visit: stable               Care Coordination Interventions    Program Enrollment: Complex Care  Referral from Primary Care Provider: No  Suggested Interventions and [address] [facility] [address]: In Process (Comment: [facility] referral 5-3-21)  Fall Risk Prevention:  In Process  Disease Specific Clinic: Completed (Comment: CHF clinic)  Home Health Services: Declined  Medication Assistance Program: Declined  Senior Services: Declined  Zone Management Tools: In Process         Goals Addressed                    This Visit's Progress      Conditions and Symptoms (pt-stated)   On track      I will schedule office visits, as directed by my provider. I will keep my appointment or reschedule if I have to cancel. I will notify my provider of any barriers to my plan of care. I will follow my Zone Management tool to seek urgent or emergent care. I will notify my provider of any symptoms that indicate a worsening of my condition. CHF  Barriers: overwhelmed by complexity of regimen  Plan for overcoming my barriers: family, ACM, CHF clinic support  Confidence: 8/10  Anticipated Goal Completion Date: 8/3/21              Prior to Admission medications    Medication Sig Start Date End Date Taking?  Authorizing Provider   furosemide (LASIX) 20 MG tablet TAKE 1 TABLET BY MOUTH ONCE DAILY AS NEEDED FOR LEG SWELLING, WEIGHT GAIN 6/1/21  Yes ANGELIA Vale CNP   metoprolol succinate (TOPROL XL) 25 MG extended release tablet Take 0.5 tablets by mouth daily 5/19/21  Yes Fam Scott MD   ondansetron (ZOFRAN ODT) 4 MG disintegrating tablet Take 1 tablet by mouth every 8 hours as needed for Nausea 4/30/21  Yes Aarti Gutierrez DO   EUTHYROX 100 MCG tablet Take 1 tablet by mouth once daily 4/23/21  Yes ANGELIA Vale CNP   metFORMIN (GLUCOPHAGE-XR) 500 MG extended release tablet Take 1 tablet by mouth daily (with breakfast) 3/29/21  Yes ANGELIA Vale CNP   omeprazole (PRILOSEC) 20 MG delayed release capsule Take 1 capsule by mouth every morning (before breakfast) 3/11/21  Yes ANGELIA Aguilar CNP   digoxin (LANOXIN) 125 MCG tablet Take 1 tablet by mouth daily 3/1/21  Yes Fam Scott MD   ENTRESTO 49-51 MG per tablet Take 1 tablet by mouth twice daily 1/25/21  Yes ANGELIA Ruano CNP   letrozole (FEMARA) 2.5 MG tablet Take 1 tablet by mouth daily 1/20/21  Yes Lauren Mendez MD   atorvastatin (LIPITOR) 40 MG tablet Take 1 tablet by mouth nightly 12/11/20  Yes ANGELIA Lawson CNP   venlafaxine (EFFEXOR XR) 150 MG extended release capsule Take 1 capsule by mouth daily 10/20/20  Yes ANGELIA Lawson CNP   butalbital-acetaminophen-caffeine (FIORICET, ESGIC) -19 MG per tablet Take 1 tablet by mouth every 4 hours as needed for Headaches 10/18/20  Yes Mando Lea MD   aspirin 81 MG chewable tablet Take 1 tablet by mouth daily 7/11/20  Yes Kika Mann MD   acetaminophen (TYLENOL) 500 MG tablet Take 500 mg by mouth every 6 hours as needed for Pain   Yes Historical Provider, MD       Future Appointments   Date Time Provider Braden Rothman   6/15/2021  1:30 PM SCHEDULE, [facility] PACER NURSE N [facility] PACER [address]   6/28/2021  1:00 PM China Gonsalves [address]   7/13/2021  8:15 PM SCHEDULE, [facility] SLEEP TECH [address]   7/20/2021 11:20 AM Lauren Mendez MD N Oncology [address]   9/9/2021  8:30 [address]   12/1/2021  1:00 PM ANGELIA Ruano CNP N [facility] CHF [address]   5/26/2022  2:00 PM Juan Rizvi MD N [facility] Heart [facility]

## 2021-06-18 ENCOUNTER — TELEPHONE (OUTPATIENT)
Dept: FAMILY MEDICINE CLINIC | Age: 61
End: 2021-06-18

## 2021-06-18 DIAGNOSIS — R91.1 LUNG NODULE: Primary | ICD-10-CM

## 2021-06-28 ENCOUNTER — CARE COORDINATION (OUTPATIENT)
Dept: CARE COORDINATION | Age: 61
End: 2021-06-28

## 2021-06-28 ENCOUNTER — OFFICE VISIT (OUTPATIENT)
Dept: BEHAVIORAL/MENTAL HEALTH CLINIC | Age: 61
End: 2021-06-28
Payer: COMMERCIAL

## 2021-06-28 DIAGNOSIS — F43.22 ADJUSTMENT DISORDER WITH ANXIOUS MOOD: ICD-10-CM

## 2021-06-28 DIAGNOSIS — F32.1 CURRENT MODERATE EPISODE OF MAJOR DEPRESSIVE DISORDER, UNSPECIFIED WHETHER RECURRENT (HCC): Primary | ICD-10-CM

## 2021-06-28 PROCEDURE — 1036F TOBACCO NON-USER: CPT | Performed by: SOCIAL WORKER

## 2021-06-28 PROCEDURE — 90834 PSYTX W PT 45 MINUTES: CPT | Performed by: SOCIAL WORKER

## 2021-06-28 NOTE — PSYCHOTHERAPY
Behavioral Health Consultation  KYLE Gonsalves  6/28/2021  1:00 PM EDT  Time spent with Patient: 38 minutes  This is patient's second  [facility] appointment. Reason for Consult:    Chief Complaint   Patient presents with    Depression    Anxiety     Referring Provider: RAHUL Lawson  Pt provided informed consent for the behavioral health program. Discussed with patient model of service to include the limits of confidentiality (i.e. abuse reporting, suicide intervention, etc.) and short-term intervention focused approach. Pt indicated understanding. Feedback given to PCP. S:  Pt assessed that symptoms of anxiety and depression have slightly improved. She verbalized that having recently vacationed in [place] was helpful in feeling connected with others and more relaxed. Pt acknowledged that a disconnect with her  continues to cause distress and feelings of hurt. She processed that despite a lack of her 's support throughout her medical challenges, her  her sons, daughter in laws, and grandchildren have been intentional about checking on her and making her feel loved. Pt was open to discuss her immediate needs to manage anxiety related to a follow up appointment on Thursday and coping skills to manage depressive symptoms. Pt was advised of indications of depressive symptoms and instructed to monitor if symptoms worsen or interfere with daily functioning, to consider following-up with either your primary care team or a behavioral health provider for possible counseling or medication management. If suicidal thoughts are experienced, call 911. An additional 24/7 resource is the [employer] at [phone] ([phone]). Pt will attend a follow up appointment in two weeks.     O:  MSE:    Appearance    cooperative  Appetite normal  Sleep disturbance Yes  Fatigue Yes  Loss of pleasure Yes  Impulsive behavior No  Speech    normal rate  Mood    euthymic   Affect normal affect  Thought Content    intact  Thought Process    coherent  Associations    logical connections  Insight    Fair  Judgment    Intact  Orientation    oriented to person, place, time, and general circumstances  Memory    recent and remote memory intact  Attention/Concentration    intact  Morbid ideation No  Suicide Assessment    no suicidal ideation; fleeting thoughts of being better off dead, no plan, intent, desire. Crisis supports preventively reviewed. History:    TOBACCO:   reports that she quit smoking about 13 months ago. Her smoking use included cigarettes. She started smoking about 39 years ago. She has a 9.25 pack-year smoking history. She has never used smokeless tobacco.  ETOH:   reports no history of alcohol use. Family History:   Family History   Problem Relation Age of Onset    High Blood Pressure Mother     Dementia Mother     Cancer Father     Colon Cancer Father     Mental Retardation Brother     Colon Polyps Brother     Cancer Maternal Grandfather     Esophageal Cancer Neg Hx        A:       Diagnosis:    1. Current moderate episode of major depressive disorder, unspecified whether recurrent ([facility] [address]    2. Adjustment disorder with anxious mood          Diagnosis Date    Abnormal heart rhythm     Anxiety     Cancer [facility])     breast  2016     CHF (congestive heart failure) (HCC)     Congenital heart disease     DJD (degenerative joint disease)     Enlarged lymph nodes     Hypertension     Hypothyroidism     ICD (implantable cardioverter-defibrillator) in place 02/11/2019    Dr. Adeline Carson Kidney stones     PONV (postoperative nausea and vomiting)     Prediabetes 10/7/2019    Thyroid disease        Plan: Pt will attend a follow up appointment  Two weeks to assess symptoms and evaluate effectiveness of coping skills.   Pt interventions:  Provided handout on  depression, anxiety and stress, Trained in relaxation strategies and Discussed self-care (sleep, nutrition, rewarding activities, social support, exercise)    Pt Behavioral Change Plan:   1. Pt will read handouts regarding depression, anxiety,  relaxation techniques, and self care. 2. Pt will practice self calming skills 2-3x's daily for 3-5 minutes. 3. Pt will promote effective self care habits daily/weekly-rest, relaxation, stress management, supportive relationships, increased physical outlets, engagement in enjoyable activities. 4. Pt will follow up with KYLE Aguayo  Pt was advised of indications of depressive symptoms and instructed to monitor if symptoms worsen or interfere with daily functioning, to consider following-up with either your primary care team or a behavioral health provider for possible counseling or medication management. If suicidal thoughts are experienced, call 911. An additional 24/7 resource is the [facility] at [phone]).

## 2021-06-28 NOTE — PATIENT INSTRUCTIONS
1. Depression: Facts, Myths & Tips for Feeling Better    Facts    Depression is very common  Nearly 20% of the [place] population experiences a significant depression during their lifetime. Depression is treatable  Because depression affects so many, and can have such a powerful and negative impact on life, there has been an enormous amount of research conducted on how to reduce symptoms and improve functioning. As a result, we now know there are behaviors YOU can engage in to make yourself feel better. Depression is not a weakness  People suffer from depression for a variety of reasons, biological, environmental and behavioral.  Research indicates that [employer] is NOT one of the reasons people become depressed. Depression is not something you are powerless against  Evidence suggests that you can directly impact the intensity and duration of depression by what you do and by altering the way you think about certain things. The Downward Spiral    Depression often begins as a drop in mood due to an environmental or biological trigger that makes people feel less like being active. Being less active, in turn, often causes people to experience an even lower mood and feel even less like being active, and so the cycle begins. How can I start feeling better? The first and best way to reverse the downward cycle is to get active! Your body produces its own anti-depressants every time you exercise or do something pleasurable. Regular exercise is one of the very best ways to improve your mood. In fact some studies have shown that a solid exercise program is as effective as psychotherapy or anti-depressant medication for some people. *See physical activity section of handout    Force yourself to do something you found pleasurable before depression. This may be different for everyone and it doesnt matter if its gardening, playing bridge, walking, reading a novel, or simply talking to a close friend. What matters is that YOU find the activity pleasurable! Even if you dont feel like doing something pleasurable for yourself, DO IT ANYWAY. We call this the fake it until you make it principle. Educate yourself! Often people feel powerless against medical conditions because they do not understand what is happening in their body. Just by reading this handout you know more than most people about depression. Knowledge is power when you can apply it, and make yourself feel better. *See recommended reading list at the bottom of this handout\"    Begin to notice unhealthy and unhelpful thoughts! In addition to how we behave, how we think influences our mood directly. Notice recurrent or alarming thoughts that have an impact on your mood. Ask yourself is this type of thinking helping me or hurting me?  if your answer is it's hurting me here are some things you can do: *see disputation of negative thoughts section of handout  - Challenge the negative thought. Is it truly accurate? Wheres the proof? Become your own  and collect the facts.  - Reframe the negative thought. How can I think differently about this problem, situation, or view of myself? Allow yourself to view a situation from more than one angle, how might my spouse, friend, or someone I admire view this same problem?  - Use the best friend scenario. How would you help your best friend if he or she was having these same thoughts? Would you criticize him or her as harshly as you criticize yourself? Remember, YOU know YOU better than anyone else. You likely know what kinds of activities, thoughts and reinforcement you respond to. Doing whats easiest and most doable is the key. Pick 1 or 2 things that are easy and get started feeling better TODAY!     * Use the following handout sections to guide you through behavioral and thinking exercises to help you manage your depressive symptoms, improve your functioning and to begin living your life well again. Recommended readings on managing depression    - Feeling Good by Dr. Chidi Suárez. Puri     - Mind over [place] by Carla Christopher and [facility] [place] by Dr. Juanis Sanchezer by Dr. Nisha Hernandez. [place]      Disputation:  Challenging Upsetting Thinking    Examine your thoughts for key words:  1. must, need, got to, have to, should (unrealistic standards)  2. never, always, completely, totally, all everything, everyone (predictions /  labeling)  3. awful, terrible, horrible, unbearable, disaster, worst ever (labeling / predictions)  4. jerk, slob, creep, hypocrite, bully, stupid (labels)  Dispute or question the accuracy of the questionable thoughts. 1. Am I upsetting myself unnecessarily? How can I see this another way? 2. Is my thinking working for or against me? How could I view this in a less upsetting way? 3. What am I demanding must happen? What do I want or prefer, rather than need? 4. Am I making something too terrible? Is it really that awful? What would be so terrible about that? 5. Am I labeling a person? What is the action that I dont like? 6. Whats untrue about my thoughts? How can I stick to the facts? Whats the proof for what I am thinking or believing about this? 7. Am I using extreme, black-and-white language? What less extreme words might be more accurate? 8. Am I fortune telling or mind reading in a way that gets me upset or unhappy? What are the odds (percent chance -- e.g., there is a 5% chance. ..) that it will really turn out the way Im thinking or imagining? 9. What are my options in this situation? How would I like to respond? 10. Create more moderate, helpful, or realistic statements to replace the upsetting ones. 11. Have I had any experiences that show that this thought might not be totally true? 12. If my best friend or someone I loved had this thought, what would I tell them?   13. If my lower your level of stress-related arousal and tension.  -To give you a method of taking calm, relaxing breaths immediately in a stressful situation to break the cycle of increasing arousal.    What is the best way to use deep breathing exercises?    -Use deep breathing frequently. -Take deep breaths at the first signs of stress, anxiety, physical tension, or other symptoms.  -Schedule time for relaxation. My scheduled time for deep breathing will be _AM, PM, NOON________.   5. Pt will follow up with KYLE Aguirre

## 2021-06-28 NOTE — CARE COORDINATION
Ambulatory Care Coordination Note  6/28/2021  CM Risk Score: 5  [place]son 10 Year Mortality Risk Score: 98%     ACC: Aileen Ryan RN    Summary Note: Spoke with Melania Jake. Had a great long discussion with her today on her health and health management. She is seeing Davidnguy[facility]n Clevelands and feels it is benefiting her  Greatly. She states she feels less anxious and is thankful for that support. She is scheduled to have CT scan completed for annual follow up. States her weight is 213# and that is down for her as she was gaining some weight. She states when she was in [place] she was not able to get her Lasix refill as the pharmacy states her Medicaid coverage would not be covered in [place]. She states she cut her dose in half until she came back home and got it filled. She is losing the weight she gained and is stable. Has sleep study scheduled for possible Cpap. States she had her COVID vaccine with no side effects.   Plan  -reinforce education completed  -encourage daily weight tracking and reporting  -collaborate with behavioral health as needed  -reinforce the importance of early symptom recognition and reporting to prevent hospitalization.  -ANSLEY Robertson SS to make next outreach call  Congestive Heart Failure Assessment    Are you currently restricting fluids?: No Restriction  Do you understand a low sodium diet?: Yes  Do you understand how to read food labels?: Yes  How many restaurant meals do you eat per week?: 1-2  Do you salt your food before tasting it?: No         Symptoms:  CHF associated angina: Neg, CHF associated dyspnea on exertion: Pos, CHF associated fatigue: Neg, CHF associated leg swelling: Neg, CHF associated orthostatic hypotension: Neg, CHF associated PND: Neg, CHF associated shortness of breath: Neg, CHF associated weakness: Neg      Symptom course: stable  Patient-reported weight (lb): 213  Salt intake watch compared to last visit: stable               Care Coordination Interventions Program Enrollment: Complex Care  Referral from Primary Care Provider: No  Suggested Interventions and Community Resources  Behavorial Health: In Process (Comment: Leigh Azevedo referral 5-3-21)  Fall Risk Prevention: In Process  Disease Specific Clinic: Completed (Comment: CHF clinic)  Home Health Services: Declined  Medication Assistance Program: Declined  Senior Services: Declined  Zone Management Tools: In Process         Goals Addressed                    This Visit's Progress      Conditions and Symptoms (pt-stated)   On track      I will schedule office visits, as directed by my provider. I will keep my appointment or reschedule if I have to cancel. I will notify my provider of any barriers to my plan of care. I will follow my Zone Management tool to seek urgent or emergent care. I will notify my provider of any symptoms that indicate a worsening of my condition. CHF  Barriers: overwhelmed by complexity of regimen  Plan for overcoming my barriers: family, ACM, CHF clinic support  Confidence: 8/10  Anticipated Goal Completion Date: 8/3/21              Prior to Admission medications    Medication Sig Start Date End Date Taking?  Authorizing Provider   furosemide (LASIX) 20 MG tablet TAKE 1 TABLET BY MOUTH ONCE DAILY AS NEEDED FOR LEG SWELLING, WEIGHT GAIN 6/1/21  Yes ANGELIA Escamilla CNP   metoprolol succinate (TOPROL XL) 25 MG extended release tablet Take 0.5 tablets by mouth daily 5/19/21  Yes Bruce Rodriguez MD   ondansetron (ZOFRAN ODT) 4 MG disintegrating tablet Take 1 tablet by mouth every 8 hours as needed for Nausea 4/30/21  Yes Deneen Cash DO   EUTHYROX 100 MCG tablet Take 1 tablet by mouth once daily 4/23/21  Yes ANGELIA Escamilla CNP   metFORMIN (GLUCOPHAGE-XR) 500 MG extended release tablet Take 1 tablet by mouth daily (with breakfast) 3/29/21  Yes ANGELIA Escamilla CNP   omeprazole (PRILOSEC) 20 MG delayed release capsule Take 1 capsule by mouth every morning (before

## 2021-07-08 ENCOUNTER — HOSPITAL ENCOUNTER (OUTPATIENT)
Dept: SLEEP CENTER | Age: 61
Discharge: HOME | End: 2021-07-10
Payer: COMMERCIAL

## 2021-07-08 DIAGNOSIS — G47.10 HYPERSOMNIA: ICD-10-CM

## 2021-07-08 DIAGNOSIS — G47.33 OBSTRUCTIVE SLEEP APNEA: ICD-10-CM

## 2021-07-08 DIAGNOSIS — E66.9 OBESITY (BMI 30-39.9): ICD-10-CM

## 2021-07-08 DIAGNOSIS — R06.83 LOUD SNORING: ICD-10-CM

## 2021-07-08 DIAGNOSIS — G25.81 RLS (RESTLESS LEGS SYNDROME): ICD-10-CM

## 2021-07-08 DIAGNOSIS — I42.8 NICM (NONISCHEMIC CARDIOMYOPATHY) (HCC): ICD-10-CM

## 2021-07-08 PROCEDURE — 95811 POLYSOM 6/>YRS CPAP 4/> PARM: CPT

## 2021-07-09 LAB — STATUS: NORMAL

## 2021-07-09 NOTE — PROGRESS NOTES
Title:  CPAP/BiLevel Titration's    Approved by:  Rylee Aguilera MD      Approval Date:  December, 2019 Next Review:  December, 2021     Responsible Party:  [facility] [employer] Applies to:   Nathalia Bronson Number:  None    Document Type:  Such as Guideline, Policy, Policy & Procedure, or Procedure, Instructions  Manual:  Policy and Procedures    Section: IV Policy Start Date: October, 3680           POLICY: Positive airway pressure device is used to treat patients with sleep related breathing disorders characterized by full or partial occlusion of the upper airway during sleep. A patient must have undergone polysomnography and diagnosed with obstructive sleep apnea. All individuals who record sleep studies must follow best practices for CPAP/bilevel/ASV titrations in order to attain the ideal pressure setting for their patients. Too low of pressure may cause patients to either be sub-optimally treated or to wake up in a panic. Too much pressure may cause the patient to experience bloating or mask leakage. Determining the appropriate pressure setting for each patient will lead to improved adherence and outcome. Titrations are not an exact science, and it is understood that technologists may need to make minor changes for individual patients. The procedures outlined below are meant to be a guideline and follow the spirit of the AASM clinical guidelines. PROCEDURE:    CPAP:    1. Review the patients pertinent medical al history, previous sleep study or studies to ass the severity of sleep disordered breathing. Review of pertinent information will help to attain a better titration. 2. Applications of electrodes, montages, filters, sensitivities and scoring will be performed according to the current version of the AASM Scoring Manual.   3. Prior to initiating study collect all appropriate PAP supplies  a. Tubing   b.  Humidifier (filled with distilled water)  c. Masks   4. The technologist should assess and measure patient for most appropriate mask prior to start of study. 5. CPAP should be initiated at 5 cm H20.  a. More pressure at start of study may be necessary if patient is morbidly obese or unable to fall asleep on a pressure of 5 cm H20   6. If apneas or frequent hypopneas are present, pressure settings should be increased by 2 cm H20. If occasional hypopneas, snoring, or mask flow limitation are present, pressure settings should be increased by 1 cm H20 and maintained for at least fie minutes to determine if events improve or resolve. Pressure settings may need to be increased more quickly during REM sleep given the limited amount of REM during sleep and the need to treat events during this stage. 7. If a mask leak occurs, the tech should first fix the leakage before raising the pressure. Otherwise, the final pressure setting chosen for the patient may be too high. Once the mask leak has been fixed, decrease the pressure to the last setting where mouth breathing and/or mask leakage was not present, and then re-titrate as indicated. Make sure to document directly on the study the steps taken to resolve the leak and the type of masks used. Pressure setting usually do not need to be set as high with a nasal-mask than with a full-face mask. 8. The recording technologist should document directly on the study at least every 30 minutes. 9. If the patient takes a break from wearing the mask, do not decrease the CPAP pressure on attempted return to sleep unless the patient remains awake for 15 minutes or the patient specifically requests that the pressure be lowered. 10. Do not raise pressure for central apneas. If the patient develops central apneas, pressure setting may need to be lowered. 11. If the patient is unable to tolerate CPAP secondary due to:  a. Persistent mouth breathing despite use of a full-face mask/chin strap  b.  Inability to exhale against higher expiratory pressures (typically beginning anywhere from 15 to 20 cm of H20.  c. Has frequent central apneas, the use of bilevel positive airway pressure may be indicated. Document directly on study why the patient is being switched from CPAP to bilevel. 12. Ensure that supine sleep has been seen on the chosen setting. Going above the chosen setting 1 or 2 cm H20 to show range may be helpful to ensure that the correct pressure has been established. BiLEVEL:    1. Technologist may change from CPAP to bilevel during a study if proven the patient is unable to tolerate CPAP. 2. Review the patients pertinent medical al history, previous sleep study or studies to ass the severity of sleep disordered breathing. Review of pertinent information will help to attain a better titration. 3. Applications of electrodes, montages, filters, sensitivities and scoring will be performed according to the current version of the AASM Scoring Manual.   4. Prior to initiating study collect all appropriate PAP supplies  a. Tubing   b. Humidifier (filled with distilled water)  c. Masks   5. The technologist should assess and measure patient for most appropriate mask prior to start of study. 6. If the patient has not previously been on CPAP, beginning pressures should be 8/4 cm H20 or higher if patient is morbidly obese or unable to fall asleep at lower pressures. If the patient has been previously successfully treated on CPAP start expiratory pressure at therapeutic setting and set inspiratory pressure 4 cm H20 higher. If advancing from CPAP to bilevel during a study expiratory pressure should be set at most successful CPAP setting and inspiratory pressure set 4 cm h20 higher. 7. The standard differential pressure utilized during bilevel titrations typically ranges from 3 to 5 cm H20, with 4 cm H20 being the most common.   Higher differential pressures may be needed in patients who are morbidly obese or who have neuromuscular diseases. 8. If apneas or frequent hypopneas are present, inspiratory and expiratory pressure settings should be increased by 2 cm H20. If occasional hypopneas, snoring, or mask flow limitation are present inspiratory and expiratory pressures settings should be increased by 1 cm h20 and maintained for at least 5 minutes to determine if events improve or resolve. Pressure settings may need to be increased more quickly during REM sleep given the limited amount of REM during sleep and the need to treat events during this stage. 9. If a mask leak occurs, the tech should first fix the leakage before raising the pressure. Otherwise, the final pressure setting chosen for the patient may be too high. Once the mask leak has been fixed, decrease the pressure to the last setting where mouth breathing and/or mask leakage was not present, and then re-titrate as indicated. Make sure to document directly on the study the steps taken to resolve the leak and the type of masks used. Pressure setting usually do not need to be set as high with a nasal-mask than with a full-face mask. 10. The recording technologist should document directly on the study at least every 30 minutes. 11. If the patient takes a break from wearing the mask, do not decrease the CPAP pressure on attempted return to sleep unless the patient remains awake for 15 minutes or the patient specifically requests that the pressure be lowered. 12. Do not raise pressure for central apneas. If the patient develops central apneas, pressure setting may need to be lowered. If the patient has central apneas on bilevel, the use of spontaneous (ST) mode may be indicated. a. ST mode may only be used in 2 cases  i. An order for ST mode with a primary diagnosis of central sleep apnea  ii. During a titration if obstructive events are less than 5/ hour and centrals must be greater than 50% of total respiratory events.    13. Ensure that supine sleep has been seen on the chosen setting. Going above the chosen setting 1 or 2 cm H20 to show range may be helpful to ensure that the correct pressure has been established.

## 2021-07-11 DIAGNOSIS — G47.33 OSA (OBSTRUCTIVE SLEEP APNEA): Primary | ICD-10-CM

## 2021-07-12 ENCOUNTER — TELEPHONE (OUTPATIENT)
Dept: SLEEP CENTER | Age: 61
End: 2021-07-12

## 2021-07-12 ENCOUNTER — OFFICE VISIT (OUTPATIENT)
Dept: BEHAVIORAL/MENTAL HEALTH CLINIC | Age: 61
End: 2021-07-12
Payer: COMMERCIAL

## 2021-07-12 DIAGNOSIS — F43.22 ADJUSTMENT DISORDER WITH ANXIOUS MOOD: ICD-10-CM

## 2021-07-12 DIAGNOSIS — F32.1 CURRENT MODERATE EPISODE OF MAJOR DEPRESSIVE DISORDER, UNSPECIFIED WHETHER RECURRENT (HCC): Primary | ICD-10-CM

## 2021-07-12 PROCEDURE — 90832 PSYTX W PT 30 MINUTES: CPT | Performed by: SOCIAL WORKER

## 2021-07-12 PROCEDURE — 1036F TOBACCO NON-USER: CPT | Performed by: SOCIAL WORKER

## 2021-07-12 NOTE — PATIENT INSTRUCTIONS
1. Depression: Facts, Myths & Tips for Feeling Better    Facts    Depression is very common  Nearly 20% of the [place] population experiences a significant depression during their lifetime. Depression is treatable  Because depression affects so many, and can have such a powerful and negative impact on life, there has been an enormous amount of research conducted on how to reduce symptoms and improve functioning. As a result, we now know there are behaviors YOU can engage in to make yourself feel better. Depression is not a weakness  People suffer from depression for a variety of reasons, biological, environmental and behavioral.  Research indicates that [employer] is NOT one of the reasons people become depressed. Depression is not something you are powerless against  Evidence suggests that you can directly impact the intensity and duration of depression by what you do and by altering the way you think about certain things. The Downward Spiral    Depression often begins as a drop in mood due to an environmental or biological trigger that makes people feel less like being active. Being less active, in turn, often causes people to experience an even lower mood and feel even less like being active, and so the cycle begins. How can I start feeling better? The first and best way to reverse the downward cycle is to get active! Your body produces its own anti-depressants every time you exercise or do something pleasurable. Regular exercise is one of the very best ways to improve your mood. In fact some studies have shown that a solid exercise program is as effective as psychotherapy or anti-depressant medication for some people. *See physical activity section of handout    Force yourself to do something you found pleasurable before depression. This may be different for everyone and it doesnt matter if its gardening, playing bridge, walking, reading a novel, or simply talking to a close friend. What matters is that YOU find the activity pleasurable! Even if you dont feel like doing something pleasurable for yourself, DO IT ANYWAY. We call this the fake it until you make it principle. Educate yourself! Often people feel powerless against medical conditions because they do not understand what is happening in their body. Just by reading this handout you know more than most people about depression. Knowledge is power when you can apply it, and make yourself feel better. *See recommended reading list at the bottom of this handout\"    Begin to notice unhealthy and unhelpful thoughts! In addition to how we behave, how we think influences our mood directly. Notice recurrent or alarming thoughts that have an impact on your mood. Ask yourself is this type of thinking helping me or hurting me?  if your answer is it's hurting me here are some things you can do: *see disputation of negative thoughts section of handout  - Challenge the negative thought. Is it truly accurate? Wheres the proof? Become your own  and collect the facts.  - Reframe the negative thought. How can I think differently about this problem, situation, or view of myself? Allow yourself to view a situation from more than one angle, how might my spouse, friend, or someone I admire view this same problem?  - Use the best friend scenario. How would you help your best friend if he or she was having these same thoughts? Would you criticize him or her as harshly as you criticize yourself? Remember, YOU know YOU better than anyone else. You likely know what kinds of activities, thoughts and reinforcement you respond to. Doing whats easiest and most doable is the key. Pick 1 or 2 things that are easy and get started feeling better TODAY!     * Use the following handout sections to guide you through behavioral and thinking exercises to help you manage your depressive symptoms, improve your functioning and to begin living your life well again. Recommended readings on managing depression    - Feeling Good by Dr. Kala Benoit. Puri     - Mind over Eufemia-Jerod by Viviana Wilhelm and [facility] [place] by Dr. Bibiana Gardner by Dr. Corrina Weller      Disputation:  Challenging Upsetting Thinking    Examine your thoughts for key words:  1. must, need, got to, have to, should (unrealistic standards)  2. never, always, completely, totally, all everything, everyone (predictions /  labeling)  3. awful, terrible, horrible, unbearable, disaster, worst ever (labeling / predictions)  4. jerk, slob, creep, hypocrite, bully, stupid (labels)  Dispute or question the accuracy of the questionable thoughts. 1. Am I upsetting myself unnecessarily? How can I see this another way? 2. Is my thinking working for or against me? How could I view this in a less upsetting way? 3. What am I demanding must happen? What do I want or prefer, rather than need? 4. Am I making something too terrible? Is it really that awful? What would be so terrible about that? 5. Am I labeling a person? What is the action that I dont like? 6. Whats untrue about my thoughts? How can I stick to the facts? Whats the proof for what I am thinking or believing about this? 7. Am I using extreme, black-and-white language? What less extreme words might be more accurate? 8. Am I fortune telling or mind reading in a way that gets me upset or unhappy? What are the odds (percent chance -- e.g., there is a 5% chance. ..) that it will really turn out the way Im thinking or imagining? 9. What are my options in this situation? How would I like to respond? 10. Create more moderate, helpful, or realistic statements to replace the upsetting ones. 11. Have I had any experiences that show that this thought might not be totally true? 12. If my best friend or someone I loved had this thought, what would I tell them?   13. If my best friend or someone I loved knew I was thinking this thought, what would they say to me? What evidence would they point out to me that would suggest that my thought is not completely true? 14. Are there strengths in me or positives in the situation that I am ignoring? Am I underestimating my ability to cope with unfortunate circumstances? 15. When I am not feeling this way, do I think about this situation any differently? How?  16. Have I been in this type of situation before? What happened? What have I learned from prior experiences that could help me now? 17. Five years from now, if I look back on this situation, will I look at it any differently? Will I focus on any different part of my experience? 25. Am I blaming myself for something over which I do not have complete control? 2. Pt will read handouts regarding depression, anxiety, grief reaction, relaxation techniques, and self care. 3.Pt will practice self calming skills 2-3x's daily for 3-5 minutes. 4.Pt will promote effective self care habits daily/weekly-rest, relaxation, stress management, supportive relationships, increased physical outlets, engagement in enjoyable activities. 5. What is deep breathing? Deep breathing involves using your diaphragm muscle to help bring about a state of physiological relaxation. The diaphragm is a large muscle that rests across the bottom of your rib cage. When you inhale, the diaphragm muscle drops, opening up space so air can come in. When watching someone do this it looks like your stomach is filling with air. This type of breathing helps activate the part of your nervous system that controls relaxation. It can lead to decreased heart rate, blood pressure, muscle tension and overall feelings of relaxation.       Why be concerned with how I'm breathing?    -To increase your awareness of the role that breathing plays in increased physical tension and contributing to your body's stress response.  -To lower your level of stress-related arousal and tension.  -To give you a method of taking calm, relaxing breaths immediately in a stressful situation to break the cycle of increasing arousal.    What is the best way to use deep breathing exercises?    -Use deep breathing frequently. -Take deep breaths at the first signs of stress, anxiety, physical tension, or other symptoms.  -Schedule time for relaxation. My scheduled time for deep breathing will be _AM, PM, NOON________.   5. Pt will follow up with KYLE Ricketts

## 2021-07-12 NOTE — PROGRESS NOTES
[address] [place] [address]                               SLEEP STUDY REPORT    PATIENT NAME: Harjit Galeas                     :        1960  MED REC NO:   [id number]                           ROOM:  ACCOUNT NO:   [de-identified]                           ADMIT DATE: 2021  PROVIDER:     Larissa Green M.D.    [facility] STUDY:  2021    CPAP TITRATION POLYSOMNOGRAM    REFERRING PROVIDER:  Nan Osorio CNP    HISTORY OF PRESENT ILLNESS:  The patient is a 26-year-old female  recently diagnosed with obstructive sleep apnea. She was brought back  to the sleep lab for PAP titration study. Weight 216 pounds, height 62  inches, BMI 39.5. METHODS:  The patient underwent digital polysomnography in compliance  with the standards and specifications from the AASM Manual including the  simultaneous recording of 3 EEG channels (F4-M1, C4-M1, and O2-M1 with  back up electrodes F3-M2, C3-M2, and O1-M2), 2 EOG channels (E1-M2, and  E2-M1,), EMG (chin, left & right leg), EKG, Nonin pulse oximetry with   less than 2 second averaging time, body position, airflow recorded by  oral-nasal thermal sensor and nasal air pressure transducer, plus  respiratory effort recorded by calibrated respiratory inductance  plethysmography (RIP), flow volume loop, sound and video. Sleep staging  and scoring followed the standard put forth by the American Academy of  Sleep Medicine and utilized the 1B obstructive hypopnea event  desaturation of 4 percent or greater. DETAILS OF THE STUDY:  Lights out 09:11 p.m., lights on 05:04 a.m. Total recording time 473 minutes, sleep period time 440 minutes, total  sleep time 351 minutes, sleep efficiency 74.2%. Latency to sleep 32.8  minutes. Wake after sleep onset 89.5 minutes. Latency to   minutes.     SLEEP STAGIN minutes or 9.7% of total sleep time in N1 sleep, 274  minutes or 78.2% in N2 sleep,

## 2021-07-12 NOTE — PSYCHOTHERAPY
Behavioral Health Consultation  Cristoferandrae EllenKYLE  7/12/2021  8:30 AM EDT  Time spent with Patient: 30 minutes  This is patient's third [facility] appointment. Reason for Consult:    Chief Complaint   Patient presents with    Depression    Anxiety     Referring Provider: RAHUL Maloney  Pt provided informed consent for the behavioral health program. Discussed with patient model of service to include the limits of confidentiality (i.e. abuse reporting, suicide intervention, etc.) and short-term intervention focused approach. Pt indicated understanding. Feedback given to PCP. S:  Pt assessed that symptoms of anxiety and depression have slightly worsened. She discussed concerns of extreme tiredness, occurring over a three day period, and moments of being disoriented to the year and her location. Pt verbalized her concerns of dementia, due to her mother and grandmother having had this condition. She acknowledged that her anxiety has been more problematic, but processed how this has been more specific to the intensity of the situation, in events of finding her son's needed marriage licence necessary for his wedding this week and finding out her green card has been extended, after thinking it was denied. She processed her needs of following up with her medical provider to discuss new issues. Pt discussed how writing things down could help her in managing her many needs and appointments, as these symptoms have increased. Pt was open to discuss her use of self care and coping skills to manage her symptoms of anxiety and depression. Pt was advised of indications of depressive symptoms and instructed to monitor if symptoms worsen or interfere with daily functioning, to consider following-up with either your primary care team or a behavioral health provider for possible counseling or medication management. If suicidal thoughts are experienced, call 911.  An additional 24/7 resource is the [facility] Suicide Prevention Hotline at 8-979-302-PVLJ (2467). Pt will attend a follow up appointment in three weeks. O:  MSE:    Appearance    cooperative  Appetite normal  Sleep disturbance Yes  Fatigue Yes  Loss of pleasure Yes  Impulsive behavior No  Speech    normal rate  Mood    euthymic   Affect    normal affect  Thought Content    intact  Thought Process    coherent  Associations    logical connections  Insight    Fair  Judgment    Intact  Orientation    oriented to person, place, time, and general circumstances  Memory    recent and remote memory intact  Attention/Concentration    intact  Morbid ideation No  Suicide Assessment    no suicidal ideation; fleeting thoughts of being better off dead, no plan, intent, desire. Crisis supports preventively reviewed. History:    TOBACCO:   reports that she quit smoking about 13 months ago. Her smoking use included cigarettes. She started smoking about 39 years ago. She has a 9.25 pack-year smoking history. She has never used smokeless tobacco.  ETOH:   reports no history of alcohol use. Family History:   Family History   Problem Relation Age of Onset    High Blood Pressure Mother     Dementia Mother     Cancer Father     Colon Cancer Father     Mental Retardation Brother     Colon Polyps Brother     Cancer Maternal Grandfather     Esophageal Cancer Neg Hx        A:       Diagnosis:    1. Current moderate episode of major depressive disorder, unspecified whether recurrent ([facility] [address]    2.  Adjustment disorder with anxious mood          Diagnosis Date    Abnormal heart rhythm     Anxiety     Cancer [facility])     breast  2016     CHF (congestive heart failure) (HCC)     Congenital heart disease     DJD (degenerative joint disease)     Enlarged lymph nodes     Hypertension     Hypothyroidism     ICD (implantable cardioverter-defibrillator) in place 02/11/2019    Dr. Daksha Christopher Kidney stones     PONV (postoperative nausea and vomiting)     Prediabetes 10/7/2019    Thyroid disease        Plan: Pt will attend a follow up appointment  three weeks to assess symptoms and evaluate effectiveness of coping skills. Pt interventions:  Provided handout on  depression, anxiety and stress, Trained in relaxation strategies and Discussed self-care (sleep, nutrition, rewarding activities, social support, exercise)    Pt Behavioral Change Plan:   1. Pt will start writing things down in a tablet to manage her needs and appointments. 2. Pt will practice self calming skills 2-3x's daily for 3-5 minutes. 3. Pt will promote effective self care habits daily/weekly-rest, relaxation, stress management, supportive relationships, increased physical outlets, engagement in enjoyable activities. 4. Pt will follow up with KYLE Gilbert  5. Pt was advised of indications of depressive symptoms and instructed to monitor if symptoms worsen or interfere with daily functioning, to consider following-up with either your primary care team or a behavioral health provider for possible counseling or medication management. If suicidal thoughts are experienced, call 911. An additional 24/7 resource is the [facility] at 4-317-571-ZPDL (6472). 6. Pt will schedule an appointment with her provider to discuss new issues concerning her health.

## 2021-07-15 ENCOUNTER — HOSPITAL ENCOUNTER (OUTPATIENT)
Age: 61
Discharge: HOME | End: 2021-07-15
Payer: COMMERCIAL

## 2021-07-15 ENCOUNTER — TELEPHONE (OUTPATIENT)
Dept: FAMILY MEDICINE CLINIC | Age: 61
End: 2021-07-15

## 2021-07-15 ENCOUNTER — HOSPITAL ENCOUNTER (OUTPATIENT)
Dept: CT IMAGING | Age: 61
Discharge: HOME | End: 2021-07-15
Payer: COMMERCIAL

## 2021-07-15 ENCOUNTER — OFFICE VISIT (OUTPATIENT)
Dept: FAMILY MEDICINE CLINIC | Age: 61
End: 2021-07-15
Payer: COMMERCIAL

## 2021-07-15 VITALS
DIASTOLIC BLOOD PRESSURE: 82 MMHG | TEMPERATURE: 98.2 F | SYSTOLIC BLOOD PRESSURE: 120 MMHG | HEART RATE: 88 BPM | WEIGHT: 216 LBS | BODY MASS INDEX: 35.99 KG/M2 | HEIGHT: 65 IN | OXYGEN SATURATION: 99 % | RESPIRATION RATE: 14 BRPM

## 2021-07-15 DIAGNOSIS — R41.3 POOR SHORT TERM MEMORY: ICD-10-CM

## 2021-07-15 DIAGNOSIS — R10.9 LEFT FLANK PAIN: ICD-10-CM

## 2021-07-15 DIAGNOSIS — N39.0 ACUTE URINARY TRACT INFECTION: ICD-10-CM

## 2021-07-15 DIAGNOSIS — F32.1 CURRENT MODERATE EPISODE OF MAJOR DEPRESSIVE DISORDER, UNSPECIFIED WHETHER RECURRENT (HCC): ICD-10-CM

## 2021-07-15 DIAGNOSIS — R31.29 MICROSCOPIC HEMATURIA: ICD-10-CM

## 2021-07-15 DIAGNOSIS — R91.1 LUNG NODULE: ICD-10-CM

## 2021-07-15 DIAGNOSIS — R41.0 EPISODE OF CONFUSION: ICD-10-CM

## 2021-07-15 DIAGNOSIS — G47.33 OSA (OBSTRUCTIVE SLEEP APNEA): ICD-10-CM

## 2021-07-15 DIAGNOSIS — R41.0 EPISODE OF CONFUSION: Primary | ICD-10-CM

## 2021-07-15 DIAGNOSIS — F43.20 ADJUSTMENT DISORDER, UNSPECIFIED TYPE: ICD-10-CM

## 2021-07-15 LAB
BILIRUBIN URINE: ABNORMAL
BLOOD URINE, POC: ABNORMAL
CHARACTER, URINE: ABNORMAL
COLOR, URINE: ABNORMAL
FOLATE: > 20 NG/ML (ref 4.8–24.2)
GLUCOSE URINE: NEGATIVE MG/DL
KETONES, URINE: NEGATIVE
LEUKOCYTE CLUMPS, URINE: NEGATIVE
NITRITE, URINE: NEGATIVE
PH, URINE: 6 (ref 5–9)
PROTEIN, URINE: NEGATIVE MG/DL
SPECIFIC GRAVITY, URINE: 1.02 (ref 1–1.03)
UROBILINOGEN, URINE: 2 EU/DL (ref 0–1)
VITAMIN B-12: 325 PG/ML (ref 211–911)

## 2021-07-15 PROCEDURE — 1036F TOBACCO NON-USER: CPT | Performed by: NURSE PRACTITIONER

## 2021-07-15 PROCEDURE — G8417 CALC BMI ABV UP PARAM F/U: HCPCS | Performed by: NURSE PRACTITIONER

## 2021-07-15 PROCEDURE — 82607 VITAMIN B-12: CPT

## 2021-07-15 PROCEDURE — 36415 COLL VENOUS BLD VENIPUNCTURE: CPT

## 2021-07-15 PROCEDURE — 86592 SYPHILIS TEST NON-TREP QUAL: CPT

## 2021-07-15 PROCEDURE — 71250 CT THORAX DX C-: CPT

## 2021-07-15 PROCEDURE — 3017F COLORECTAL CA SCREEN DOC REV: CPT | Performed by: NURSE PRACTITIONER

## 2021-07-15 PROCEDURE — G8427 DOCREV CUR MEDS BY ELIG CLIN: HCPCS | Performed by: NURSE PRACTITIONER

## 2021-07-15 PROCEDURE — 82746 ASSAY OF FOLIC ACID SERUM: CPT

## 2021-07-15 PROCEDURE — 99214 OFFICE O/P EST MOD 30 MIN: CPT | Performed by: NURSE PRACTITIONER

## 2021-07-15 RX ORDER — NITROFURANTOIN 25; 75 MG/1; MG/1
100 CAPSULE ORAL 2 TIMES DAILY
Qty: 10 CAPSULE | Refills: 0 | Status: SHIPPED | OUTPATIENT
Start: 2021-07-15 | End: 2021-07-20

## 2021-07-15 RX ORDER — VENLAFAXINE HYDROCHLORIDE 75 MG/1
75 CAPSULE, EXTENDED RELEASE ORAL DAILY
Qty: 30 CAPSULE | Refills: 3 | Status: SHIPPED | OUTPATIENT
Start: 2021-07-15 | End: 2021-08-24 | Stop reason: SDUPTHER

## 2021-07-15 NOTE — TELEPHONE ENCOUNTER
Left message for patient to call back. Patient is scheduled for US Renal on 7/28/2021 at 3pm. Patient needs to arrive at 2:45 1st floor main radiology. Patient needs to void 1 hr prior and drink 32 ounces of water and then no voiding until after testing. Patient also scheduled for MRI Brain on 7/28/21 at 4 pm needs to arrive at 3:30 1st floor main radiology. No metal or jewelry.

## 2021-07-15 NOTE — TELEPHONE ENCOUNTER
----- Message from Fredy Course sent at 7/15/2021  3:44 PM EDT -----  Subject: Message to Provider    QUESTIONS  Information for Provider? Patient is returning a phone call to the office   in regards to MRI test results. Please contact   ---------------------------------------------------------------------------  --------------  CALL BACK INFO  What is the best way for the office to contact you? OK to leave message on   voicemail  Preferred Call Back Phone Number? [phone]  ---------------------------------------------------------------------------  --------------  SCRIPT ANSWERS  Relationship to Patient?  Self

## 2021-07-15 NOTE — PROGRESS NOTES
the CPAP machine. She never feels refreshed by sleep. Urinary Symptoms    HPI:    Symptoms present for 2 weeks. Symptoms are unchanged since they initially started. Dysuria? Yes  Hematuria? No  Increased urinary frequency? No  Abdominal discomfort? No  CVA pain? Yes, has intermittent sharp pain in her left flank  Hx of UTIs? No  Sexually active? No  LMP? No LMP recorded.  Patient is postmenopausal.    Age/Gender Health Maintenance    Lipid -  Lab Results   Component Value Date    CHOL 154 07/09/2020     Lab Results   Component Value Date    TRIG 106 07/09/2020     Lab Results   Component Value Date    HDL 49 07/09/2020    HDL 57 02/05/2020    HDL 46 10/04/2019     Lab Results   Component Value Date    LDLCALC 84 07/09/2020    [address] 114 02/05/2020    [address] 152 10/04/2019     No results found for: LABVLDL, VLDL  No results found for: CHOLHDLRATIO    DM Screen -   Lab Results   Component Value Date    LABA1C 6.1 03/29/2021     Colon Cancer Screening - referral Dain Mccormack 10/7/19  Lung Cancer Screening (Age 54 to [de-identified] with 30 pack year hx, current smoker or quit within past 15 years) - n/a    Tetanus - needs  Influenza Vaccine - 10/18  Pneumonia Vaccine - 66  Zostavax - 50     Breast Cancer Screening - 51  Cervical Cancer Screening - needs  Osteoporosis Screening - 73  Chlamydia Screen - n/a    PSA testing discussion - n/a  AAA Screening - n/a    Falls screening - n/a    Current Outpatient Medications   Medication Sig Dispense Refill    nitrofurantoin, macrocrystal-monohydrate, (MACROBID) 100 MG capsule Take 1 capsule by mouth 2 times daily for 5 days 10 capsule 0    venlafaxine (EFFEXOR XR) 75 MG extended release capsule Take 1 capsule by mouth daily (take along with 150 mg Effexor for total dose of 225 mg) 30 capsule 3    polyethylene glycol (GLYCOLAX) 17 GM/SCOOP powder DISSOLVE & TAKE 1 CAPFUL ONCE DAILY      furosemide (LASIX) 20 MG tablet TAKE 1 TABLET BY MOUTH ONCE DAILY AS NEEDED FOR LEG SWELLING, WEIGHT GAIN 90 tablet 0    metoprolol succinate (TOPROL XL) 25 MG extended release tablet Take 0.5 tablets by mouth daily 30 tablet 3    ondansetron (ZOFRAN ODT) 4 MG disintegrating tablet Take 1 tablet by mouth every 8 hours as needed for Nausea 12 tablet 0    EUTHYROX 100 MCG tablet Take 1 tablet by mouth once daily 90 tablet 1    metFORMIN (GLUCOPHAGE-XR) 500 MG extended release tablet Take 1 tablet by mouth daily (with breakfast) 90 tablet 1    omeprazole (PRILOSEC) 20 MG delayed release capsule Take 1 capsule by mouth every morning (before breakfast) 90 capsule 2    digoxin (LANOXIN) 125 MCG tablet Take 1 tablet by mouth daily 90 tablet 3    ENTRESTO 49-51 MG per tablet Take 1 tablet by mouth twice daily 60 tablet 12    letrozole (FEMARA) 2.5 MG tablet Take 1 tablet by mouth daily 90 tablet 3    atorvastatin (LIPITOR) 40 MG tablet Take 1 tablet by mouth nightly 90 tablet 3    venlafaxine (EFFEXOR XR) 150 MG extended release capsule Take 1 capsule by mouth daily 90 capsule 3    aspirin 81 MG chewable tablet Take 1 tablet by mouth daily 30 tablet 3    acetaminophen (TYLENOL) 500 MG tablet Take 500 mg by mouth every 6 hours as needed for Pain       No current facility-administered medications for this visit. Orders Placed This Encounter   Medications    nitrofurantoin, macrocrystal-monohydrate, (MACROBID) 100 MG capsule     Sig: Take 1 capsule by mouth 2 times daily for 5 days     Dispense:  10 capsule     Refill:  0    venlafaxine (EFFEXOR XR) 75 MG extended release capsule     Sig: Take 1 capsule by mouth daily (take along with 150 mg Effexor for total dose of 225 mg)     Dispense:  30 capsule     Refill:  3         All medications reviewed and reconciled, including OTC and herbal medications. Updated list given to patient.        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 9)    Prediabetes    Malignant neoplasm of left breast in female, estrogen receptor positive ([address]    Morbidly obese (HCC)    Facial droop    Facial swelling    Depression with anxiety    Vertigo    Exertional chest pain    Gastroesophageal reflux disease    REINALDO (obstructive sleep apnea)       Past Medical History:   Diagnosis Date    Abnormal heart rhythm     Anxiety     Cancer ([address]     breast  2016     CHF (congestive heart failure) (HCC)     Congenital heart disease     DJD (degenerative joint disease)     Enlarged lymph nodes     Hypertension     Hypothyroidism     ICD (implantable cardioverter-defibrillator) in place 02/11/2019    Dr. Peterson Mendiola Kidney stones     PONV (postoperative nausea and vomiting)     Prediabetes 10/7/2019    Thyroid disease        Past Surgical History:   Procedure Laterality Date    APPENDECTOMY      CARPAL TUNNEL RELEASE  2019    COLONOSCOPY      COLONOSCOPY N/A 7/27/2020    COLONOSCOPY POLYPECTOMY SNARE/COLD BIOPSY performed by Lorie Crowell MD at [address] [facility]    COLONOSCOPY  7/27/2020    COLONOSCOPY POLYPECTOMY HOT BIOPSY performed by Lorie Crowell MD at [address] Right 6/25/2020    DEQUERVAINS RELEASE AND RIGHT RING FINGER TRIGGER RELEASE performed by Jeannine Guerra DO at [address], BILATERAL      PACEMAKER INSERTION Left 02/11/2019    [employer] [facility] HAS A MRI CONDITIONAL SYSTEM    PTCA      TONSILLECTOMY         Allergies   Allergen Reactions    Adhesive Tape Itching       Social History     Socioeconomic History    Marital status:      Spouse name: Not on file     Dementia Mother     Cancer Father     Colon Cancer Father     Mental Retardation Brother     Colon Polyps Brother     Cancer Maternal Grandfather     Esophageal Cancer Neg Hx          I have reviewed the patient's past medical history, past surgical history, allergies, medications, social and family history and I have made updates where appropriate.       Review of Systems  Positive responses are highlighted in bold    Constitutional:  Fever, Chills, Night Sweats, Fatigue, Unexpected changes in weight  Eyes:  Eye discharge, Eye pain, Eye redness, Visual disturbances   HENT:  Ear pain, Tinnitus, Nosebleeds, Trouble swallowing, Hearing loss, Sore throat  Cardiovascular:  Chest Pain, Palpitations, Orthopnea, Paroxysmal Nocturnal Dyspnea  Respiratory:  Cough, Wheezing, Shortness of breath, Chest tightness, Apnea  Gastrointestinal:  Nausea, Vomiting, Diarrhea, Constipation, Heartburn, Blood in stool  Genitourinary:  Difficulty or painful urination, Flank pain, Change in frequency, Urgency  Skin:  Color change, Rash, Itching, Wound  Psychiatric:  Hallucinations, Anxiety, Depression, Suicidal ideation  Hematological:  Enlarged glands, Easy bleeding, Easily bruising  Musculoskeletal:  Joint pain, Back pain, Gait problems, Joint swelling, Myalgias  Neurological:  Dizziness, Headaches, Presyncope, Numbness, Seizures, Tremors  Allergy:  Environmental allergies, Food allergies  Endocrine:  Heat Intolerance, Cold Intolerance, Polydipsia, Polyphagia, Polyuria    Lab Results   Component Value Date     04/30/2021    K 4.0 04/30/2021     04/30/2021    CO2 24 04/30/2021    BUN 12 04/30/2021    CREATININE 0.5 04/30/2021    GLUCOSE 130 (H) 04/30/2021    CALCIUM 9.7 04/30/2021    PROT 7.0 04/30/2021    LABALBU 4.5 04/30/2021    BILITOT 0.3 04/30/2021    ALKPHOS 90 04/30/2021    AST 20 04/30/2021    ALT 16 04/30/2021    LABGLOM >90 04/30/2021     Lab Results   Component Value Date    TSH 0.957 03/29/2021     Lab Results   Component Value Date    WBC 5.6 04/30/2021    HGB 14.4 04/30/2021    HCT 45.1 04/30/2021    MCV 94.5 04/30/2021     04/30/2021       PHYSICAL EXAM:  Vitals:    07/15/21 0907   BP: 120/82   Pulse: 88   Resp: 14   Temp: 98.2 °F (36.8 °C)   TempSrc: Oral   SpO2: 99%   Weight: 216 lb (98 kg)   Height: 5' 5\" (1.651 m)     Body mass index is 35.94 kg/m². VS Reviewed  General Appearance: A&O x 3, No acute distress,well developed and well- nourished  Head: normocephalic and atraumatic  Eyes: pupils equal, round, and reactive to light, extraocular eye movements intact, conjunctivae and eye lids without erythema  Neck: supple and non-tender without mass, no thyromegaly or thyroid nodules, no cervical lymphadenopathy  Pulmonary/Chest: clear to auscultation bilaterally- no wheezes, rales or rhonchi, normal air movement, no respiratory distress or retractions  Cardiovascular: S1 and S2 auscultated w/ RRR. No murmurs, rubs, clicks, or gallops, distal pulses intact. Abdomen: soft, non-tender, non-distended, bowl sounds physiologic,  no rebound or guarding, no masses or hernias noted. Liver and spleen without enlargement. Extremities: no cyanosis, clubbing or edema of the lower extremities  Musculoskeletal: No joint swelling or gross deformity   Neuro:  Alert, 5/5 strength globally and symmetrically  Psych: Affect appropriate. Mood normal. Thought process is normal without evidence of depression or psychosis. Good insight and appropriate interaction. Cognition and memory appear to be intact. Skin: warm and dry, no rash or erythema  Lymph:  No cervical, auricular or supraclavicular lymph nodes palpated    Urine dipstick shows positive for RBC's and positive for protein. Micro exam: not done. ASSESSMENT & PLAN  Dhiraj Irizarry was seen today for other, other and fatigue. Diagnoses and all orders for this visit:    Episode of confusion  -     MRI BRAIN WO CONTRAST;  Future  -     Vitamin B12 & Folate; Future  -     RPR; Future    Poor short term memory  -     MRI BRAIN WO CONTRAST; Future  -     Vitamin B12 & Folate; Future  -     RPR; Future    Acute urinary tract infection  -     POCT Urinalysis No Micro (Auto)  -     Culture, Urine; Future  -     US RENAL COMPLETE; Future  -     nitrofurantoin, macrocrystal-monohydrate, (MACROBID) 100 MG capsule; Take 1 capsule by mouth 2 times daily for 5 days    Microscopic hematuria  -     POCT Urinalysis No Micro (Auto)  -     Culture, Urine; Future  -     US RENAL COMPLETE; Future    Left flank pain  -     POCT Urinalysis No Micro (Auto)  -     Culture, Urine; Future    Adjustment disorder, unspecified type  -     venlafaxine (EFFEXOR XR) 75 MG extended release capsule; Take 1 capsule by mouth daily (take along with 150 mg Effexor for total dose of 225 mg)    Current moderate episode of major depressive disorder, unspecified whether recurrent (HCC)  -     venlafaxine (EFFEXOR XR) 75 MG extended release capsule; Take 1 capsule by mouth daily (take along with 150 mg Effexor for total dose of 225 mg)    REINALDO (obstructive sleep apnea)      - etiology unclear of memory/confusion issues  - obtain MRI brain, rule out CVA, Ca etc  - obtain b12/folic acid and RPR  - send UA for culture and treat for possible UTI with Macrobid  - obtain renal US to rule out any stones  - suspect depression cause of her irritability and mood issues, will max out Effexor, con't with counseling with [facility]  - fatigue issues likely secondary to REINALDO which is in process of being treated, will reassess after she actually starts CPAP      DISPOSITION    Return in about 4 weeks (around 8/12/2021) for anxiety/depression, memory issues. George Snider released without restrictions. PATIENT COUNSELING    Counseling was provided today regarding the following topics: Healthy eating habits, Regular exercise, substance abuse and healthy sleep habits.     George Snider received counseling on the following healthy behaviors:

## 2021-07-16 ENCOUNTER — TELEPHONE (OUTPATIENT)
Dept: FAMILY MEDICINE CLINIC | Age: 61
End: 2021-07-16

## 2021-07-16 LAB
ORGANISM: ABNORMAL
RPR: NONREACTIVE
URINE CULTURE, ROUTINE: ABNORMAL

## 2021-07-16 NOTE — TELEPHONE ENCOUNTER
----- Message from ANGELIA Davis CNP sent at 7/15/2021  4:42 PM EDT -----  Let Moon Shi know her CT of her chest is stable. Has a couple of nodules but they are unchanged. Rec'd repeating CT of chest in 1 year. Will call closer to the time and order it then.

## 2021-07-16 NOTE — TELEPHONE ENCOUNTER
----- Message from ANGELIA Sim CNP sent at 7/16/2021  7:31 AM EDT -----  Let Enedina Dale know her X86 and folic acid were normal.

## 2021-07-16 NOTE — TELEPHONE ENCOUNTER
----- Message from Norberto Yaniv sent at 7/16/2021 11:13 AM EDT -----  Subject: Message to Provider    QUESTIONS  Information for Provider? Pt is returning a call from the office regarding   her CT scan. Several attempts were made to connect her to the office, but   we were unable to reach anyone.   ---------------------------------------------------------------------------  --------------  [address]  What is the best way for the office to contact you? OK to leave message on   voicemail  Preferred Call Back Phone Number? [phone]  ---------------------------------------------------------------------------  --------------  SCRIPT ANSWERS  Relationship to Patient?  Self

## 2021-07-19 ENCOUNTER — PROCEDURE VISIT (OUTPATIENT)
Dept: CARDIOLOGY CLINIC | Age: 61
End: 2021-07-19
Payer: COMMERCIAL

## 2021-07-19 DIAGNOSIS — I50.22 CHRONIC SYSTOLIC CONGESTIVE HEART FAILURE (HCC): Primary | ICD-10-CM

## 2021-07-19 PROCEDURE — G2066 INTER DEVC REMOTE 30D: HCPCS | Performed by: INTERNAL MEDICINE

## 2021-07-19 PROCEDURE — 93297 REM INTERROG DEV EVAL ICPMS: CPT | Performed by: INTERNAL MEDICINE

## 2021-07-19 NOTE — PROGRESS NOTES
[facility] [employer] Single ICD Optivol  Pt of [place]    Battery 9.9 years    Optivol WNL     Episodes   ? ?artifact vs ??  VT rate 293

## 2021-07-20 ENCOUNTER — OFFICE VISIT (OUTPATIENT)
Dept: ONCOLOGY | Age: 61
End: 2021-07-20
Payer: COMMERCIAL

## 2021-07-20 ENCOUNTER — HOSPITAL ENCOUNTER (OUTPATIENT)
Dept: INFUSION THERAPY | Age: 61
Discharge: HOME | End: 2021-07-20
Payer: COMMERCIAL

## 2021-07-20 VITALS
SYSTOLIC BLOOD PRESSURE: 113 MMHG | OXYGEN SATURATION: 96 % | TEMPERATURE: 98.5 F | HEIGHT: 62 IN | DIASTOLIC BLOOD PRESSURE: 72 MMHG | HEART RATE: 78 BPM | WEIGHT: 215 LBS | BODY MASS INDEX: 39.56 KG/M2 | RESPIRATION RATE: 16 BRPM

## 2021-07-20 DIAGNOSIS — R91.1 PULMONARY NODULE: ICD-10-CM

## 2021-07-20 DIAGNOSIS — Z79.811 USE OF LETROZOLE (FEMARA): ICD-10-CM

## 2021-07-20 DIAGNOSIS — Z17.0 MALIGNANT NEOPLASM OF LEFT BREAST IN FEMALE, ESTROGEN RECEPTOR POSITIVE, UNSPECIFIED SITE OF BREAST (HCC): ICD-10-CM

## 2021-07-20 DIAGNOSIS — E04.1 THYROID NODULE: ICD-10-CM

## 2021-07-20 DIAGNOSIS — Z90.13 H/O BILATERAL MASTECTOMY: Primary | ICD-10-CM

## 2021-07-20 DIAGNOSIS — C50.912 MALIGNANT NEOPLASM OF LEFT BREAST IN FEMALE, ESTROGEN RECEPTOR POSITIVE, UNSPECIFIED SITE OF BREAST (HCC): ICD-10-CM

## 2021-07-20 PROCEDURE — G8427 DOCREV CUR MEDS BY ELIG CLIN: HCPCS | Performed by: INTERNAL MEDICINE

## 2021-07-20 PROCEDURE — 3017F COLORECTAL CA SCREEN DOC REV: CPT | Performed by: INTERNAL MEDICINE

## 2021-07-20 PROCEDURE — 99214 OFFICE O/P EST MOD 30 MIN: CPT | Performed by: INTERNAL MEDICINE

## 2021-07-20 PROCEDURE — 1036F TOBACCO NON-USER: CPT | Performed by: INTERNAL MEDICINE

## 2021-07-20 PROCEDURE — 99211 OFF/OP EST MAY X REQ PHY/QHP: CPT

## 2021-07-20 PROCEDURE — G8417 CALC BMI ABV UP PARAM F/U: HCPCS | Performed by: INTERNAL MEDICINE

## 2021-07-20 RX ORDER — LETROZOLE 2.5 MG/1
2.5 TABLET, FILM COATED ORAL DAILY
Qty: 90 TABLET | Refills: 3 | Status: ON HOLD | OUTPATIENT
Start: 2021-07-20 | End: 2022-10-03 | Stop reason: HOSPADM

## 2021-07-20 NOTE — PROGRESS NOTES
[facility], [address] [facility] [address]  [address] [address]  Dept: [phone]  Dept Fax: [phone]: [phone]      Visit Date:7/20/2021     Griffin Lai is a 62 y.o. female who presents today for:   Follow-up breast cancer      HPI:   Dino Granados is a 62year old female with history of left breast cancer diagnosed in December 2016 in [place]. She has relocated to [place] to be closer with her family. We were able to obtain her pathology report and progress notes from the medical oncologist. Final pathology report from left breast modified mastectomy and right simple mastectomy performed on December 29, 2016 at [facility] in [place], [place] showed a 2.2 cm infiltrating ductal carcinoma of grade 2, there was evidence of lymphovascular invasion. One out of 10 axillary lymph node was positive for metastatic breast cancer. The tumor cells were estrogen receptor positive in 100% of tumor cell staining, progesterone receptor positive in 30% of tumor cells staining. HER-2 non-amplified by FISH. Right breast mastectomy was negative for malignancy. The patient  received adjuvant chemotherapy,s 4 cycles of AC followed by weekly Taxol. Chemotherapy was followed by radiation treatment. In 2018 she was diagnosed with a dilated cardiomyopathy with EF of 20-25%. She underwent ICD insertion on 2/11/19. She reports that the last time she saw the Oncologist in [facility] was in August of 2017 when she completed her chemotherapy. After she lost all of her money she wasn't able to follow up with physicians. She reports that she was supposed to be on a medication for 10 years after this, but wasn't able to afford it. After she moved to [facility], [place] she established care with a cardiologist Dr. Shawna Kam. In January 2019, the patient established care with [address] [place] [address] at [facility]. She was placed on aromatase inhibitor with Femara.    The patient was hospitalized in on July 8, 2020 with facial numbness. She had brain MRI that was negative for evidence of brain metastasis. Her facial numbness has resolved    Interval History 07/20/2021:   The patient is here for 6 months follow-up evaluation. Overall, the patient tolerates adjuvant hormonal therapy with Femara well. She denies hot flashes, mood swings or night sweats. Main concern and complaint is weight gain. She denies fever, chills or recurrent infections. or urinary changes. Denies any complaints related to her chest wall. No hospitalizations since last visit with me. Jennifer HUNTER   Past Medical History:   Diagnosis Date    Abnormal heart rhythm     Anxiety     Cancer [facility])     breast  2016     CHF (congestive heart failure) (HCC)     Congenital heart disease     DJD (degenerative joint disease)     Enlarged lymph nodes     Hypertension     Hypothyroidism     ICD (implantable cardioverter-defibrillator) in place 02/11/2019    Dr. Leah Ham Kidney stones     PONV (postoperative nausea and vomiting)     Prediabetes 10/7/2019    Thyroid disease       Past Surgical History:   Procedure Laterality Date    APPENDECTOMY      CARPAL TUNNEL RELEASE  2019    COLONOSCOPY      COLONOSCOPY N/A 7/27/2020    COLONOSCOPY POLYPECTOMY SNARE/COLD BIOPSY performed by True Castañeda MD at [facility]    COLONOSCOPY  7/27/2020    COLONOSCOPY POLYPECTOMY HOT BIOPSY performed by True Castañeda MD at [address] Right 6/25/2020    DEQUERVAINS RELEASE AND RIGHT RING FINGER TRIGGER RELEASE performed by Oscar Rojas DO at [address], BILATERAL      PACEMAKER INSERTION Left 02/11/2019    [employer] VISIA PT HAS A MRI CONDITIONAL SYSTEM    PTCA      TONSILLECTOMY        Family History   Problem Relation Age of Onset    High Blood Pressure Mother     Dementia Mother     Cancer Father     Colon Cancer Father     Mental Retardation Brother     Colon Polyps Brother     Cancer Maternal Grandfather     Esophageal Cancer Neg Hx       Social History     Tobacco Use    Smoking status: Former Smoker     Packs/day: 0.25     Years: 37.00     Pack years: 9.25     Types: Cigarettes     Start date: 1981     Quit date: 2020     Years since quittin.1    Smokeless tobacco: Never Used   Substance Use Topics    Alcohol use: No      Current Outpatient Medications   Medication Sig Dispense Refill    letrozole (FEMARA) 2.5 MG tablet Take 1 tablet by mouth daily 90 tablet 3    venlafaxine (EFFEXOR XR) 75 MG extended release capsule Take 1 capsule by mouth daily (take along with 150 mg Effexor for total dose of 225 mg) 30 capsule 3    polyethylene glycol (GLYCOLAX) 17 GM/SCOOP powder DISSOLVE & TAKE 1 CAPFUL ONCE DAILY      furosemide (LASIX) 20 MG tablet TAKE 1 TABLET BY MOUTH ONCE DAILY AS NEEDED FOR LEG SWELLING, WEIGHT GAIN 90 tablet 0    metoprolol succinate (TOPROL XL) 25 MG extended release tablet Take 0.5 tablets by mouth daily 30 tablet 3    ondansetron (ZOFRAN ODT) 4 MG disintegrating tablet Take 1 tablet by mouth every 8 hours as needed for Nausea 12 tablet 0    EUTHYROX 100 MCG tablet Take 1 tablet by mouth once daily 90 tablet 1    metFORMIN (GLUCOPHAGE-XR) 500 MG extended release tablet Take 1 tablet by mouth daily (with breakfast) 90 tablet 1    omeprazole (PRILOSEC) 20 MG delayed release capsule Take 1 capsule by mouth every morning (before breakfast) 90 capsule 2    digoxin (LANOXIN) 125 MCG tablet Take 1 tablet by mouth daily 90 tablet 3    ENTRESTO 49-51 MG per tablet Take 1 tablet by mouth twice daily 60 tablet 12    atorvastatin (LIPITOR) 40 MG tablet Take 1 tablet by mouth nightly 90 tablet 3    venlafaxine (EFFEXOR XR) 150 MG extended release capsule Take 1 capsule by mouth daily 90 capsule 3    aspirin 81 MG chewable tablet Take 1 tablet by mouth daily 30 tablet 3    acetaminophen (TYLENOL) 500 MG tablet Take 500 mg by mouth every 6 hours as needed for Pain No current facility-administered medications for this visit. Allergies   Allergen Reactions    Adhesive Tape Itching      Health Maintenance   Topic Date Due    Lipid screen  07/09/2021    Flu vaccine (1) 09/01/2021    A1C test (Diabetic or Prediabetic)  03/29/2022    TSH testing  03/29/2022    Potassium monitoring  04/30/2022    Creatinine monitoring  04/30/2022    Cervical cancer screen  10/09/2022    Pneumococcal 0-64 years Vaccine (2 of 2 - PPSV23) 02/22/2026    Colon cancer screen colonoscopy  07/27/2030    DTaP/Tdap/Td vaccine (2 - Td or Tdap) 08/26/2030    Shingles Vaccine  Completed    COVID-19 Vaccine  Completed    Hepatitis C screen  Completed    HIV screen  Completed    Hepatitis A vaccine  Aged Out    Hepatitis B vaccine  Aged Out    Hib vaccine  Aged Out    Meningococcal (ACWY) vaccine  Aged Out       Review of Systems:   Review of Systems   Constitutional: Negative for activity change, appetite change, fatigue and fever. HENT: Negative for congestion, dental problem, facial swelling, hearing loss, mouth sores, nosebleeds, sore throat, tinnitus and trouble swallowing. Eyes: Negative for discharge and visual disturbance. Respiratory: Negative for cough, chest tightness, shortness of breath and wheezing. Cardiovascular: Negative for chest pain, palpitations and leg swelling. Gastrointestinal: Negative for abdominal distention, abdominal pain, blood in stool, constipation, diarrhea, nausea, rectal pain and vomiting. Endocrine: Negative for cold intolerance, polydipsia and polyuria. Genitourinary: Negative for decreased urine volume, difficulty urinating, dysuria, flank pain, hematuria and urgency. Musculoskeletal: Negative for arthralgias, back pain, gait problem, joint swelling, myalgias and neck stiffness. Skin: Negative for color change, rash and wound.    Neurological: Negative for dizziness, tremors, seizures, speech difficulty, weakness, light-headedness, numbness and headaches. Hematological: Negative for adenopathy. Does not bruise/bleed easily. Psychiatric/Behavioral: Negative for confusion and sleep disturbance. The patient is not nervous/anxious. Pertinent review of systems noted in HPI, all other ROS negative. Objective:   Physical Exam   Vitals:    07/20/21 1137   BP: 113/72   Site: Right Upper Arm   Position: Sitting   Cuff Size: Medium Adult   Pulse: 78   Resp: 16   Temp: 98.5 °F (36.9 °C)   TempSrc: Oral   SpO2: 96%   Weight: 215 lb (97.5 kg)   Height: 5' 2\" (1.575 m)       General appearance: No apparent distress, well nourished well developed and cooperative. HEENT: Pupils equal, round, and reactive to light. Conjunctivae/corneas clear. Oral mucosa moist.   Neck: Supple, with full range of motion. Trachea midline. Dressing in place to left neck from catheterization on 4/10/19 in [place]. Respiratory:  Normal respiratory effort. Clear to auscultation, bilaterally without Rales/Wheezes/Rhonchi. Cardiovascular: Regular rate and rhythm with normal S1/S2   Chest: S/P bilateral mastectomy. No nodules palpated. Abdomen: Soft, non-tender, non-distended with active bowel sounds. Musculoskeletal: No clubbing, cyanosis or edema bilaterally. Skin: Skin color, texture, turgor normal.  No rashes or lesions. Neurologic:  Neurovascularly intact without any focal sensory/motor deficits.    Psychiatric: Alert and oriented        Imaging Studies and Labs:   CBC:   Lab Results   Component Value Date    WBC 5.6 04/30/2021    HGB 14.4 04/30/2021    HCT 45.1 04/30/2021    MCV 94.5 04/30/2021     04/30/2021     BMP:   Lab Results   Component Value Date     04/30/2021    K 4.0 04/30/2021     04/30/2021    CO2 24 04/30/2021    BUN 12 04/30/2021    CREATININE 0.5 04/30/2021    GLUCOSE 130 04/30/2021    CALCIUM 9.7 04/30/2021      LFT:   Lab Results   Component Value Date    ALT 16 04/30/2021    AST 20 04/30/2021 ALKPHOS 90 2021    BILITOT 0.3 2021      PET Scan 19  Narrative   PROCEDURE: PET CT SKULL BASE MID THIGH RESTAGE       CLINICAL INFORMATION: 71-year-old woman with left breast cancer, status post bilateral mastectomies, radiation therapy densities completed 2018) and chemotherapy (completed 2017); subsequent treatment strategy.       Radiopharmaceutical: 11.43 mCi F-18 FDG, intravenously.       TECHNIQUE: PET/CT imaging was performed following skull base to the midthigh levels using routine PET acquisition.       Injection site: Right antecubital fossa.       Time of FDG injection: 10:37 AM.       Serum glucose: 96 g/dL at 10:30 AM.       Tiny of imagin:56 AM       COMPARISON: CTA chest 2018       FINDINGS:        Neck: No FDG avid cervical lymphadenopathy. The thyroid gland is poorly visualized but likely enlarged. There are probable FDG avid hypoattenuating nodules bilaterally in the gland. The nodule on the right side is associated with a maximum SUV of 4.5    (image 62). A nodule in the left side is associated with a maximum SUV of 3.8 (image 62).     Chest: There is stable cardiac enlargement. Atherosclerotic calcifications are present in the thoracic aorta without evidence of aneurysm. There is no pleural or pericardial effusion. No FDG avid pulmonary nodules are identified. An indistinct 5 mm are    density in the right lower lobe abutting the major fissure is stable in size and does not demonstrate metabolic activity (image 94). This density is likely at a size below the resolution of PET imaging. There is no FDG avid mediastinal, hilar or axillary    lymphadenopathy. Surgical changes of prior bilateral mastectomy are noted. Diffuse activity in the esophagus is likely infectious, inflammatory or physiologic.  Degenerative changes are present in the thoracic spine without evidence of hypermetabolic    osseous metastatic disease.       Abdomen/pelvis: Physiologic activity is seen in the liver, spleen, urinary collecting system and gastrointestinal tract. Minimal atherosclerotic calcifications are present in the abdominal aorta without evidence of aneurysm. There is at least one    calcified fibroid in a prominent uterus. Phleboliths are seen in the pelvis. There is no FDG avid mesenteric, retroperitoneal, pelvic or inguinal lymphadenopathy. A calcification in the subcutaneous tissues of the posterior right pelvis may be an    injection granuloma. Degenerative changes are present in the lumbar spine and pelvis without evidence of hypermetabolic osseous metastatic disease.           Impression       Poorly visualized FDG avid thyroid nodules of indeterminate etiology. Malignancy cannot be excluded and thyroid ultrasound is recommended for further evaluation.       Final report electronically signed by Dr. Chelle Little on 1/16/2019 1:47 PM         DEXA study on July 5, 2019 showed:  OSTEOPENIA   Patient is at medium risk for fracture     Bone scan on March 30, 2020 showed:  1. No scintigraphic evidence of osseous metastatic disease. 2. Probable degenerative arthropathic changes as detailed above. Brain MRI on July 10, 2020 showed:   Normal MRI of the brain.           CTA of the chest on October 15, 2020 showed:  No acute pulmonary arterial embolism. No discrete lobar consolidation. 3 mm right lower lobe pulmonary nodule similar to prior study dated June 18, 2019. 1 year follow-up is advised to document continued stability     CT of the chest on July 15, 2021 showed:  3 mm noncalcified nodule in the right lower lobe and 3 mm noncalcified nodule in the left lower lobe. These are stable from prior examination in 2018       Assessment and Plan:   1. History of Left Breast Cancer - S/P bilateral mastectomy December 2016. T2,N1,Mx Hormone Responsive Breast Cancer   She was treated with adjuvant chemotherapy: 4 cycles of AC, followed by weekly Taxol.  Afterwards she received radiation treatment. The plan was to place her on adjuvant hormonal treatment but the patient lost her insurance. In September/October 2018 she was diagnosed with a cardiomyopathy, most likely related to chemotherapy. The patient moved to [place] to be with her family. She established care with our clinic in December 2018. 2. Adjuvant Hormonal Therapy  The patient was started on Aromatase inhibitor with Femara in January 2019. Overall she tolerates AI well without significant side effects. She reports weight gain, she attributes this to AI. No evidence of disease recurrence on today's physical examination the patient was instructed to continue Femara. Had a lengthy discussion with the patient about importance of physical exercise and monitoring her diet  3. Thyroid Nodules  Noted on PET scan 1/16/19 as poorly visualized FDG avid thyroid nodules of indeterminate etiology. Thyroid ultrasound showed a 1 cm isoechoic solid nodule, low suspicious category. Follow-up thyroid ultrasound was performed in July 2021. Showed a small complex nodule on the left which was stable. Based on the American thyroid Association criteria this is a had a low suspicions for malignancy   4. Small pulmonary nodule. Seen on CTA of the chest from October 2020.  1 year follow-up was recommended. CT of the chest in July 2021 showed stable pulmonary nodule  5. Cancer surveillance. Management of breast cancer survivors who have completed active treatment and have no evidence of disease are cancer surveillance, lifestyle modifications including pursuing healthy regular exercise program, minimizing alcohol intake, and refraining from smoking. Survivor follow-up will include updated history, regular physical examination. Radiologic imaging to screen for distant recurrence will be not performed unless the patient will develop new symptoms.  Symptoms suspicious for recurrent /metastic disease include:  ?Constitutional symptoms - Anorexia, weight loss, malaise, fatigue, insomnia. ? Bone pain  ? Pulmonary symptoms - persistent cough or dyspnea (at rest or with exertion). ?Neurologic symptoms - Headache, nausea, vomiting, confusion, weakness, numbness or tingling. ?Gastrointestinal symptoms - Right upper quadrant pain, change in bowel habits, presence of bloody or tarry stools.      Electronically signed by   Sara Renteria MD

## 2021-07-21 ENCOUNTER — CARE COORDINATION (OUTPATIENT)
Dept: CARE COORDINATION | Age: 61
End: 2021-07-21

## 2021-07-21 NOTE — CARE COORDINATION
Ambulatory Care Coordination Note  7/21/2021  CM Risk Score: 5  Charlson 10 Year Mortality Risk Score: 98%     ACC: Kayley Tatum    Summary Note: I spoke with the patient for continued Care Coordination follow up and education. Patient did have sleep study completed. Patient is waiting for cpap to come in. Patient denies SOB or edema. Patient is losing weight. Admits she has ate very little for 20 days due to the feeling of nausea and having accidents of diarrhea. Patient is scheduled for colonoscopy on August 12. Patient states she is losing weight, current weight is 213 lbs. She is seeing Roney Larson and feels it is benefiting her greatly. Encouraged to use PCP office versus ED treatment for sx's of chronic conditions. Patient voiced understanding. Patient did have appointment with PCP, Oncologist and GI last week. Testing and medication change was ordered. Care Coordination Interventions    Program Enrollment: Complex Care  Referral from Primary Care Provider: No  Suggested Interventions and Community Resources  Behavorial Health: In Process (Comment: Roney Larson referral 5-3-21)  Fall Risk Prevention: In Process  Disease Specific Clinic: Completed (Comment: CHF clinic)  Home Health Services: Declined  Medication Assistance Program: Declined  Senior Services: Declined  Zone Management Tools: In Process         Goals Addressed    None         Prior to Admission medications    Medication Sig Start Date End Date Taking?  Authorizing Provider   [facility] 2.5 MG tablet Take 1 tablet by mouth daily 7/20/21   Simon Mendez MD   venlafaxine (EFFEXOR XR) 75 MG extended release capsule Take 1 capsule by mouth daily (take along with 150 mg Effexor for total dose of 225 mg) 7/15/21   Ken Jordan, APRN - CNP   polyethylene glycol (GLYCOLAX) 17 GM/SCOOP powder DISSOLVE & TAKE 1 CAPFUL ONCE DAILY 6/28/21   Historical Provider, MD   furosemide (LASIX) 20 MG tablet TAKE 1 TABLET BY MOUTH ONCE DAILY AS NEEDED FOR LEG SWELLING, WEIGHT GAIN 6/1/21   ANGELIA Bradley CNP   metoprolol succinate (TOPROL XL) 25 MG extended release tablet Take 0.5 tablets by mouth daily 5/19/21   Mallika Marina MD   ondansetron (ZOFRAN ODT) 4 MG disintegrating tablet Take 1 tablet by mouth every 8 hours as needed for Nausea 4/30/21   Bunny Russell DO   EUTHYROX 100 MCG tablet Take 1 tablet by mouth once daily 4/23/21   ANGELIA Bradley CNP   metFORMIN (GLUCOPHAGE-XR) 500 MG extended release tablet Take 1 tablet by mouth daily (with breakfast) 3/29/21   Torin PalmANGELIA ya CNP   omeprazole (PRILOSEC) 20 MG delayed release capsule Take 1 capsule by mouth every morning (before breakfast) 3/11/21   ANGELIA Hurst CNP   digoxin (LANOXIN) 125 MCG tablet Take 1 tablet by mouth daily 3/1/21   Mallika Marina MD   ENTRESTO 49-51 MG per tablet Take 1 tablet by mouth twice daily 1/25/21   Beto Niraj L[facility] Behavioral Health[facility]ELIA CNP   atorvastatin (LIPITOR) 40 MG tablet Take 1 tablet by mouth nightly 12/11/20   Torin PalmAGNELIA ya CNP   venlafaxine (EFFEXOR XR) 150 MG extended release capsule Take 1 capsule by mouth daily 10/20/20   Torin PalmANGELIA ya CNP   aspirin 81 MG chewable tablet Take 1 tablet by mouth daily 7/11/20   Glenna Dowd MD   acetaminophen (TYLENOL) 500 MG tablet Take 500 mg by mouth every 6 hours as needed for Pain    Historical Provider, MD       Future Appointments   Date Time Provider Braden Rothman   7/28/2021  3:00 PM STR ULTRASOUND RM 2 STRZ US STR Radiolog   7/28/2021  4:00 PM STR MRI RM1 STRZ MRI STR Radiolog   8/2/2021  1:00 PM MD MARLA Steward Gastroen   8/9/2021  8:30 AM Tobie Hatchet, Steele [place] SRPX [facility] [place]   8/12/2021  9:30 AM MD MARLA Steward AFL Gastroen   8/13/2021  9:00 AM ANGELIA Bradley CNP [facility] [place]   9/9/2021  8:30 AM [address]   12/1/2021  1:00 PM AN[facility]ELIA Ruano [facility] [place]   1/24/2022 10:00 AM Simon Mendez MD N [facility] - [address] [facility]   5/26/2022  2:00 PM Linnea Horton MD N [address] [facility]

## 2021-07-28 ENCOUNTER — APPOINTMENT (OUTPATIENT)
Dept: MRI IMAGING | Age: 61
End: 2021-07-28
Payer: COMMERCIAL

## 2021-07-28 ENCOUNTER — HOSPITAL ENCOUNTER (OUTPATIENT)
Dept: ULTRASOUND IMAGING | Age: 61
Discharge: HOME | End: 2021-07-28
Payer: COMMERCIAL

## 2021-07-28 DIAGNOSIS — R31.29 MICROSCOPIC HEMATURIA: ICD-10-CM

## 2021-07-28 DIAGNOSIS — N39.0 ACUTE URINARY TRACT INFECTION: ICD-10-CM

## 2021-07-28 PROCEDURE — 76770 US EXAM ABDO BACK WALL COMP: CPT

## 2021-07-29 ENCOUNTER — TELEPHONE (OUTPATIENT)
Dept: FAMILY MEDICINE CLINIC | Age: 61
End: 2021-07-29

## 2021-07-29 NOTE — TELEPHONE ENCOUNTER
----- Message from ANGELIA Easton CNP sent at 7/29/2021  3:13 PM EDT -----  Let Joe De Paz know her kidney US shows she has very small kidney stones in both kidneys. This is the reason for the blood in her urine. They are very small and are not causing any obstruction. We can monitor them. They should be small enough that she could pass them on her own. Any questions let me know!

## 2021-07-30 ASSESSMENT — ENCOUNTER SYMPTOMS
ABDOMINAL PAIN: 0
COUGH: 0
EYE DISCHARGE: 0
CONSTIPATION: 0
DIARRHEA: 0
FACIAL SWELLING: 0
RECTAL PAIN: 0
WHEEZING: 0
SHORTNESS OF BREATH: 0
BLOOD IN STOOL: 0
TROUBLE SWALLOWING: 0
NAUSEA: 0
BACK PAIN: 0
CHEST TIGHTNESS: 0
COLOR CHANGE: 0
SORE THROAT: 0
ABDOMINAL DISTENTION: 0
VOMITING: 0

## 2021-08-04 ENCOUNTER — CARE COORDINATION (OUTPATIENT)
Dept: CARE COORDINATION | Age: 61
End: 2021-08-04

## 2021-08-04 NOTE — CARE COORDINATION
Completed (Comment: CHF clinic)  Home Health Services: Declined  Medication Assistance Program: Declined  Senior Services: Declined  Zone Management Tools: In Process         Goals Addressed                    This Visit's Progress      Conditions and Symptoms (pt-stated)   On track      I will schedule office visits, as directed by my provider. I will keep my appointment or reschedule if I have to cancel. I will notify my provider of any barriers to my plan of care. I will follow my Zone Management tool to seek urgent or emergent care. I will notify my provider of any symptoms that indicate a worsening of my condition. CHF  Barriers: overwhelmed by complexity of regimen  Plan for overcoming my barriers: family, ACM, CHF clinic support  Confidence: 8/10  Anticipated Goal Completion Date: 8/3/21 Update 9/4/21              Prior to Admission medications    Medication Sig Start Date End Date Taking?  Authorizing Provider   [facility] 2.5 MG tablet Take 1 tablet by mouth daily 7/20/21  Yes Efren Paalfox MD   venlafaxine (EFFEXOR XR) 75 MG extended release capsule Take 1 capsule by mouth daily (take along with 150 mg Effexor for total dose of 225 mg) 7/15/21  Yes ANGELIA Rojas CNP   polyethylene glycol (GLYCOLAX) 17 GM/SCOOP powder DISSOLVE & TAKE 1 CAPFUL ONCE DAILY 6/28/21  Yes Historical Provider, MD   furosemide (LASIX) 20 MG tablet TAKE 1 TABLET BY MOUTH ONCE DAILY AS NEEDED FOR LEG SWELLING, WEIGHT GAIN 6/1/21  Yes ANGELIA Rojas CNP   metoprolol succinate (TOPROL XL) 25 MG extended release tablet Take 0.5 tablets by mouth daily 5/19/21  Yes Rian Santamaria MD   ondansetron (ZOFRAN ODT) 4 MG disintegrating tablet Take 1 tablet by mouth every 8 hours as needed for Nausea 4/30/21  Yes Merna Nunn DO   EUTHYROX 100 MCG tablet Take 1 tablet by mouth once daily 4/23/21  Yes ANGELIA Rojas CNP   metFORMIN (GLUCOPHAGE-XR) 500 MG extended release tablet Take 1 tablet by mouth daily (with breakfast) 3/29/21  Yes ANGELIA Watts CNP   omeprazole (PRILOSEC) 20 MG delayed release capsule Take 1 capsule by mouth every morning (before breakfast) 3/11/21  Yes ANGELIA Ruiz CNP   digoxin (LANOXIN) 125 MCG tablet Take 1 tablet by mouth daily 3/1/21  Yes Muna Valladares MD   ENTRESTO 49-51 MG per tablet Take 1 tablet by mouth twice daily 1/25/21  Yes ANGELIA Ruano CNP   atorvastatin (LIPITOR) 40 MG tablet Take 1 tablet by mouth nightly 12/11/20  Yes ANGELIA Watts CNP   venlafaxine (EFFEXOR XR) 150 MG extended release capsule Take 1 capsule by mouth daily 10/20/20  Yes ANGELIA Watts CNP   aspirin 81 MG chewable tablet Take 1 tablet by mouth daily 7/11/20  Yes Jessica Jack MD   acetaminophen (TYLENOL) 500 MG tablet Take 500 mg by mouth every 6 hours as needed for Pain   Yes Historical Provider, MD       Future Appointments   Date Time Provider Braden Rothman   8/5/2021  9:00 AM MD MARLA Hoover Gastro   8/9/2021  8:30 AM Ernesto Bagley [facility] [place] [address]   8/12/2021  9:30 AM MD MARLA Hoover AFL [address] [place] [address]   8/13/2021  3:00 PM STR MRI RM1 STRZ MRI STR Radiolog   8/18/2021  9:20 AM [facility] [place]   9/9/2021  8:30 AM Boogie Koenig PA-C N Pul Med [address] [facility]   12/1/2021  1:00 PM ANGELIA Ruano CNP N SRPX CHF [facility] [place] [address]   1/24/2022 10:00 AM Sara Renteria MD N Oncology [facility] [place] [address]   5/26/2022  2:00 PM Juan Rosario MD N SRPX Heart [address] [facility]

## 2021-08-09 ENCOUNTER — OFFICE VISIT (OUTPATIENT)
Dept: BEHAVIORAL/MENTAL HEALTH CLINIC | Age: 61
End: 2021-08-09
Payer: COMMERCIAL

## 2021-08-09 DIAGNOSIS — F43.22 ADJUSTMENT DISORDER WITH ANXIOUS MOOD: ICD-10-CM

## 2021-08-09 DIAGNOSIS — F32.1 CURRENT MODERATE EPISODE OF MAJOR DEPRESSIVE DISORDER, UNSPECIFIED WHETHER RECURRENT (HCC): Primary | ICD-10-CM

## 2021-08-09 PROCEDURE — 1036F TOBACCO NON-USER: CPT | Performed by: SOCIAL WORKER

## 2021-08-09 PROCEDURE — 90832 PSYTX W PT 30 MINUTES: CPT | Performed by: SOCIAL WORKER

## 2021-08-09 NOTE — PSYCHOTHERAPY
Behavioral Health Consultation  KYLE Edwards  8/9/2021  8:30 AM EDT  Time spent with Patient: 30 minutes  This is patient's fourth [facility] appointment. Reason for Consult:    Chief Complaint   Patient presents with    Depression    Anxiety     Referring Provider: RAHUL Vicente  Pt provided informed consent for the behavioral health program. Discussed with patient model of service to include the limits of confidentiality (i.e. abuse reporting, suicide intervention, etc.) and short-term intervention focused approach. Pt indicated understanding. Feedback given to PCP. S:  Pt assessed that symptoms of anxiety and depression remain higher in frequency and intensity. She discussed that her medication has been increased by she continues to feel extreme tiredness and has episodes of being disoriented and unable to recall information. Pt verbalized that she is following up with her providers and further tests have been ordered. She acknowledged that she has been open with her family about her concerns and has invited them to attend her appointments with her. She processed her current needs to manage her physical and emotional health. Pt discussed that she is writing things down, which has been more effective in keeping appointments. She was open to discuss her use of self care and coping skills to manage her symptoms of anxiety and depression. Pt expressed that the family has got a new puppy, which has been a great support and comfort to her. Pt was advised of indications of depressive symptoms and instructed to monitor if symptoms worsen or interfere with daily functioning, to consider following-up with either your primary care team or a behavioral health provider for possible counseling or medication management. If suicidal thoughts are experienced, call 911. An additional 24/7 resource is the [facility] at [phone] ([phone]).  Pt will attend a follow up appointment in two weeks. O:  MSE:    Appearance    cooperative  Appetite normal  Sleep disturbance Yes  Fatigue Yes  Loss of pleasure Yes  Impulsive behavior No  Speech    normal rate  Mood    euthymic   Affect    normal affect  Thought Content    intact  Thought Process    coherent  Associations    logical connections  Insight    Fair  Judgment    Intact  Orientation    oriented to person, place, time, and general circumstances  Memory    recent and remote memory intact  Attention/Concentration    intact  Morbid ideation No  Suicide Assessment    no suicidal ideation; fleeting thoughts of being better off dead, no plan, intent, desire. Crisis supports preventively reviewed. History:    TOBACCO:   reports that she quit smoking about 14 months ago. Her smoking use included cigarettes. She started smoking about 39 years ago. She has a 9.25 pack-year smoking history. She has never used smokeless tobacco.  ETOH:   reports no history of alcohol use. Family History:   Family History   Problem Relation Age of Onset    High Blood Pressure Mother     Dementia Mother     Cancer Father     Colon Cancer Father     Mental Retardation Brother     Colon Polyps Brother     Cancer Maternal Grandfather     Esophageal Cancer Neg Hx        A:       Diagnosis:    1. Current moderate episode of major depressive disorder, unspecified whether recurrent [facility]    2.  Adjustment disorder with anxious mood          Diagnosis Date    Abnormal heart rhythm     Anxiety     Cancer [facility])     breast  2016     CHF (congestive heart failure) (HCC)     Congenital heart disease     DJD (degenerative joint disease)     Enlarged lymph nodes     Hypertension     Hypothyroidism     ICD (implantable cardioverter-defibrillator) in place 02/11/2019    Dr. Alicia Mejia Kidney stones     PONV (postoperative nausea and vomiting)     Prediabetes 10/7/2019    Thyroid disease        Plan: Pt will attend a follow up appointment  two weeks to assess symptoms and evaluate effectiveness of coping skills. Pt interventions:  Provided handout on  depression, anxiety and stress, Trained in relaxation strategies and Discussed self-care (sleep, nutrition, rewarding activities, social support, exercise)    Pt Behavioral Change Plan:   1. Pt will start writing things down in a tablet to manage her needs and appointments. 2. Pt will practice self calming skills 2-3x's daily for 3-5 minutes. 3. Pt will promote effective self care habits daily/weekly-rest, relaxation, stress management, supportive relationships, increased physical outlets, engagement in enjoyable activities. 4. Pt will follow up with KYLE Chance  5. Pt was advised of indications of depressive symptoms and instructed to monitor if symptoms worsen or interfere with daily functioning, to consider following-up with either your primary care team or a behavioral health provider for possible counseling or medication management. If suicidal thoughts are experienced, call 911. An additional 24/7 resource is the [facility] at [phone] ([phone]).

## 2021-08-09 NOTE — PATIENT INSTRUCTIONS
1. Pt will practice self calming skills 2-3x's daily for 3-5 minutes. 2.Pt will promote effective self care habits daily/weekly-rest, relaxation, stress management, supportive relationships, increased physical outlets, engagement in enjoyable activities. 3. What is deep breathing? Deep breathing involves using your diaphragm muscle to help bring about a state of physiological relaxation. The diaphragm is a large muscle that rests across the bottom of your rib cage. When you inhale, the diaphragm muscle drops, opening up space so air can come in. When watching someone do this it looks like your stomach is filling with air. This type of breathing helps activate the part of your nervous system that controls relaxation. It can lead to decreased heart rate, blood pressure, muscle tension and overall feelings of relaxation. Why be concerned with how I'm breathing?    -To increase your awareness of the role that breathing plays in increased physical tension and contributing to your body's stress response.  -To lower your level of stress-related arousal and tension.  -To give you a method of taking calm, relaxing breaths immediately in a stressful situation to break the cycle of increasing arousal.    What is the best way to use deep breathing exercises?    -Use deep breathing frequently. -Take deep breaths at the first signs of stress, anxiety, physical tension, or other symptoms.  -Schedule time for relaxation. My scheduled time for deep breathing will be _AM, PM, NOON________.   4.Pt will follow up with KYLE Valentin

## 2021-08-13 ENCOUNTER — HOSPITAL ENCOUNTER (OUTPATIENT)
Dept: MRI IMAGING | Age: 61
Discharge: HOME | End: 2021-08-13
Payer: COMMERCIAL

## 2021-08-13 DIAGNOSIS — R41.0 EPISODE OF CONFUSION: ICD-10-CM

## 2021-08-13 DIAGNOSIS — R41.3 POOR SHORT TERM MEMORY: ICD-10-CM

## 2021-08-13 PROCEDURE — 70551 MRI BRAIN STEM W/O DYE: CPT

## 2021-08-16 ENCOUNTER — TELEPHONE (OUTPATIENT)
Dept: FAMILY MEDICINE CLINIC | Age: 61
End: 2021-08-16

## 2021-08-16 NOTE — TELEPHONE ENCOUNTER
Detailed message left ok per hippa, stating mri results were normal. Pt is advised to contact us if she has any questions.

## 2021-08-16 NOTE — TELEPHONE ENCOUNTER
----- Message from ANGELIA Wlison CNP sent at 8/16/2021  8:42 AM EDT -----  Let Joyce Vazquez know the MRI of her brain is normal.

## 2021-08-19 ENCOUNTER — CARE COORDINATION (OUTPATIENT)
Dept: CARE COORDINATION | Age: 61
End: 2021-08-19

## 2021-08-19 NOTE — CARE COORDINATION
Attempted to reach patient for continued Care Coordination follow up and education. Patient was unavailable at the time of my call, and a generic voicemail message was left asking patient to return my call at [phone].

## 2021-08-23 ENCOUNTER — OFFICE VISIT (OUTPATIENT)
Dept: BEHAVIORAL/MENTAL HEALTH CLINIC | Age: 61
End: 2021-08-23
Payer: COMMERCIAL

## 2021-08-23 ENCOUNTER — PROCEDURE VISIT (OUTPATIENT)
Dept: CARDIOLOGY CLINIC | Age: 61
End: 2021-08-23
Payer: COMMERCIAL

## 2021-08-23 DIAGNOSIS — Z95.810 ICD (IMPLANTABLE CARDIOVERTER-DEFIBRILLATOR) IN PLACE: ICD-10-CM

## 2021-08-23 DIAGNOSIS — I50.22 CHRONIC SYSTOLIC (CONGESTIVE) HEART FAILURE (HCC): Primary | ICD-10-CM

## 2021-08-23 DIAGNOSIS — F43.22 ADJUSTMENT DISORDER WITH ANXIOUS MOOD: ICD-10-CM

## 2021-08-23 DIAGNOSIS — F32.1 CURRENT MODERATE EPISODE OF MAJOR DEPRESSIVE DISORDER, UNSPECIFIED WHETHER RECURRENT (HCC): Primary | ICD-10-CM

## 2021-08-23 PROCEDURE — 90832 PSYTX W PT 30 MINUTES: CPT | Performed by: SOCIAL WORKER

## 2021-08-23 PROCEDURE — 1036F TOBACCO NON-USER: CPT | Performed by: SOCIAL WORKER

## 2021-08-23 NOTE — PATIENT INSTRUCTIONS
1. Pt will practice self calming skills 2-3x's daily for 3-5 minutes. 2.Pt will promote effective self care habits daily/weekly-rest, relaxation, stress management, supportive relationships, increased physical outlets, engagement in enjoyable activities. 3. What is deep breathing? Deep breathing involves using your diaphragm muscle to help bring about a state of physiological relaxation. The diaphragm is a large muscle that rests across the bottom of your rib cage. When you inhale, the diaphragm muscle drops, opening up space so air can come in. When watching someone do this it looks like your stomach is filling with air. This type of breathing helps activate the part of your nervous system that controls relaxation. It can lead to decreased heart rate, blood pressure, muscle tension and overall feelings of relaxation. Why be concerned with how I'm breathing?    -To increase your awareness of the role that breathing plays in increased physical tension and contributing to your body's stress response.  -To lower your level of stress-related arousal and tension.  -To give you a method of taking calm, relaxing breaths immediately in a stressful situation to break the cycle of increasing arousal.    What is the best way to use deep breathing exercises?    -Use deep breathing frequently. -Take deep breaths at the first signs of stress, anxiety, physical tension, or other symptoms.  -Schedule time for relaxation. My scheduled time for deep breathing will be _AM, PM, NOON________.   4.Pt will follow up with KYLE Paez

## 2021-08-23 NOTE — PROGRESS NOTES
Reviewed. Continue to monitor.
[employer] optivol     9.6 years on device   optivol WNL
Statement Selected

## 2021-08-23 NOTE — PSYCHOTHERAPY
Behavioral Health Consultation  Ronda PelaezgarettKYLE  8/23/2021  8:36 AM EDT  Time spent with Patient: 24 minutes  This is patient's fifth [facility] appointment. Reason for Consult:    Chief Complaint   Patient presents with    Depression    Anxiety     Referring Provider: RAHUL Morrell  Pt provided informed consent for the behavioral health program. Discussed with patient model of service to include the limits of confidentiality (i.e. abuse reporting, suicide intervention, etc.) and short-term intervention focused approach. Pt indicated understanding. Feedback given to PCP. S:  Pt reported symptoms of anxiety and depression have slightly decreased. She discussed things evident of this as feeling more hopeful and a return of her energy level. Pt acknowledged that that receiving good information about her medical tests has helped her emotional health. Pt discussed that she has established more effective boundaries with her , whom she is estranged from. She expressed that trying to maintain a relationship from separate [place] has been taxing and determined that communication can often leave her more hurt and upset, rather than uplifted and supported. Pt was advised of indications of depressive symptoms and instructed to monitor if symptoms worsen or interfere with daily functioning, to consider following-up with either your primary care team or a behavioral health provider for possible counseling or medication management. If suicidal thoughts are experienced, call 911. An additional 24/7 resource is the [facility] at [phone] ([phone]). Pt will attend a follow up appointment in three weeks.     O:  MSE:    Appearance    cooperative  Appetite normal  Sleep disturbance Yes  Fatigue Yes  Loss of pleasure Yes  Impulsive behavior No  Speech    normal rate  Mood    euthymic   Affect    normal affect  Thought Content    intact  Thought Process coherent  Associations    logical connections  Insight    Fair  Judgment    Intact  Orientation    oriented to person, place, time, and general circumstances  Memory    recent and remote memory intact  Attention/Concentration    intact  Morbid ideation No  Suicide Assessment    no suicidal ideation; fleeting thoughts of being better off dead, no plan, intent, desire. Crisis supports preventively reviewed. History:    TOBACCO:   reports that she quit smoking about 15 months ago. Her smoking use included cigarettes. She started smoking about 39 years ago. She has a 9.25 pack-year smoking history. She has never used smokeless tobacco.  ETOH:   reports no history of alcohol use. Family History:   Family History   Problem Relation Age of Onset    High Blood Pressure Mother     Dementia Mother     Cancer Father     Colon Cancer Father     Mental Retardation Brother     Colon Polyps Brother     Cancer Maternal Grandfather     Esophageal Cancer Neg Hx        A:       Diagnosis:    1. Current moderate episode of major depressive disorder, unspecified whether recurrent [facility]    2. Adjustment disorder with anxious mood          Diagnosis Date    Abnormal heart rhythm     Anxiety     Cancer [facility])     breast  2016     CHF (congestive heart failure) (HCC)     Congenital heart disease     DJD (degenerative joint disease)     Enlarged lymph nodes     Hypertension     Hypothyroidism     ICD (implantable cardioverter-defibrillator) in place 02/11/2019    Dr. Sj Davidson Kidney stones     PONV (postoperative nausea and vomiting)     Prediabetes 10/7/2019    Thyroid disease        Plan: Pt will attend a follow up appointment  three weeks to assess symptoms and evaluate effectiveness of coping skills.   Pt interventions:  Provided handout on  depression, anxiety and stress, Trained in relaxation strategies and Discussed self-care (sleep, nutrition, rewarding activities, social support, exercise)    Pt Behavioral Change Plan:   1. Pt will start writing things down in a tablet to manage her needs and appointments. 2. Pt will practice self calming skills 2-3x's daily for 3-5 minutes. 3. Pt will promote effective self care habits daily/weekly-rest, relaxation, stress management, supportive relationships, increased physical outlets, engagement in enjoyable activities. 4. Pt will follow up with KYLE Jaramillo  5. Pt was advised of indications of depressive symptoms and instructed to monitor if symptoms worsen or interfere with daily functioning, to consider following-up with either your primary care team or a behavioral health provider for possible counseling or medication management. If suicidal thoughts are experienced, call 911. An additional 24/7 resource is the [facility] at [phone] ([phone]).

## 2021-08-24 ENCOUNTER — OFFICE VISIT (OUTPATIENT)
Dept: FAMILY MEDICINE CLINIC | Age: 61
End: 2021-08-24
Payer: COMMERCIAL

## 2021-08-24 VITALS
BODY MASS INDEX: 39.6 KG/M2 | HEIGHT: 62 IN | SYSTOLIC BLOOD PRESSURE: 122 MMHG | RESPIRATION RATE: 16 BRPM | DIASTOLIC BLOOD PRESSURE: 70 MMHG | OXYGEN SATURATION: 99 % | WEIGHT: 215.2 LBS | HEART RATE: 94 BPM | TEMPERATURE: 98.5 F

## 2021-08-24 DIAGNOSIS — N20.0 RENAL CALCULI: ICD-10-CM

## 2021-08-24 DIAGNOSIS — F33.42 RECURRENT MAJOR DEPRESSIVE DISORDER, IN FULL REMISSION (HCC): Primary | ICD-10-CM

## 2021-08-24 DIAGNOSIS — F43.20 ADJUSTMENT DISORDER, UNSPECIFIED TYPE: ICD-10-CM

## 2021-08-24 DIAGNOSIS — E66.01 CLASS 2 SEVERE OBESITY DUE TO EXCESS CALORIES WITH SERIOUS COMORBIDITY AND BODY MASS INDEX (BMI) OF 39.0 TO 39.9 IN ADULT (HCC): ICD-10-CM

## 2021-08-24 DIAGNOSIS — E78.2 MIXED HYPERLIPIDEMIA: ICD-10-CM

## 2021-08-24 PROCEDURE — G2066 INTER DEVC REMOTE 30D: HCPCS | Performed by: INTERNAL MEDICINE

## 2021-08-24 PROCEDURE — 1036F TOBACCO NON-USER: CPT | Performed by: NURSE PRACTITIONER

## 2021-08-24 PROCEDURE — 99214 OFFICE O/P EST MOD 30 MIN: CPT | Performed by: NURSE PRACTITIONER

## 2021-08-24 PROCEDURE — G8427 DOCREV CUR MEDS BY ELIG CLIN: HCPCS | Performed by: NURSE PRACTITIONER

## 2021-08-24 PROCEDURE — 93297 REM INTERROG DEV EVAL ICPMS: CPT | Performed by: INTERNAL MEDICINE

## 2021-08-24 PROCEDURE — 3017F COLORECTAL CA SCREEN DOC REV: CPT | Performed by: NURSE PRACTITIONER

## 2021-08-24 PROCEDURE — G8417 CALC BMI ABV UP PARAM F/U: HCPCS | Performed by: NURSE PRACTITIONER

## 2021-08-24 RX ORDER — VENLAFAXINE HYDROCHLORIDE 75 MG/1
75 CAPSULE, EXTENDED RELEASE ORAL DAILY
Qty: 90 CAPSULE | Refills: 3 | Status: ON HOLD | OUTPATIENT
Start: 2021-08-24 | End: 2022-06-05 | Stop reason: HOSPADM

## 2021-08-24 NOTE — PROGRESS NOTES
Chief Complaint   Patient presents with    Results     would like to discuss MRI brain results     Back Pain     low back pain, off and on for the past 3 months. Feels this is kidney stones    Weight Management     would like to discuss options for weight loss        History obtained from chart review and the patient. SUBJECTIVE:  Vicky Little is a 62 y.o. female that presents today for memory concern    Dementia    HPI:    History is obtained from patient. Since last appt she left her . She actually feels so much better. She also had increase in the Effexor and is feeling better. She is also using CPAP machine and this is helping. She denies any depression. New concerns - see above  Since last visit, memory has been stable. Current Meds:  nothing  In terms of ADLs, she needs Independent  In terms of IADLs she needs Independent  Driving - yes  Continence - normal.    The patient is left alone yes. Ambulation:  normal.       Kidney Stones  Has Hx of kidney stones and she has had some difficulty passing them before. She does periodically have pain in her flank, especially in the left flank, occurs mostly after she urinates. She would like to see the Urologist.    Obesity  She would like some assistance with weight loss. She has tried diet and struggles doing it on her own. Some exercise but is often so tired after work that she doesn't feel like exercising. She has lost some weight on her own.     Wt Readings from Last 3 Encounters:   08/24/21 215 lb 3.2 oz (97.6 kg)   07/20/21 215 lb (97.5 kg)   07/15/21 216 lb (98 kg)     Age/Gender Health Maintenance    Lipid -  Lab Results   Component Value Date    CHOL 154 07/09/2020     Lab Results   Component Value Date    TRIG 106 07/09/2020     Lab Results   Component Value Date    HDL 49 07/09/2020    HDL 57 02/05/2020    HDL 46 10/04/2019     Lab Results   Component Value Date    LDLCALC 84 07/09/2020    LDLCALC 114 02/05/2020    LDLCALC 152 10/04/2019     No results found for: LABVLDL, VLDL  No results found for: CHOLHDLRATIO    DM Screen -   Lab Results   Component Value Date    LABA1C 6.1 03/29/2021     Colon Cancer Screening - referral Blawinder Reilly 10/7/19  Lung Cancer Screening (Age 54 to [de-identified] with 27 pack year hx, current smoker or quit within past 15 years) - n/a    Tetanus - needs  Influenza Vaccine - 10/18  Pneumonia Vaccine - 65  Zostavax - 50     Breast Cancer Screening - 50  Cervical Cancer Screening - needs  Osteoporosis Screening - 73  Chlamydia Screen - n/a    PSA testing discussion - n/a  AAA Screening - n/a    Falls screening - n/a    Current Outpatient Medications   Medication Sig Dispense Refill    venlafaxine (EFFEXOR XR) 75 MG extended release capsule Take 1 capsule by mouth daily (take along with 150 mg Effexor for total dose of 225 mg) 90 capsule 3    letrozole (FEMARA) 2.5 MG tablet Take 1 tablet by mouth daily 90 tablet 3    polyethylene glycol (GLYCOLAX) 17 GM/SCOOP powder DISSOLVE & TAKE 1 CAPFUL ONCE DAILY      furosemide (LASIX) 20 MG tablet TAKE 1 TABLET BY MOUTH ONCE DAILY AS NEEDED FOR LEG SWELLING, WEIGHT GAIN 90 tablet 0    metoprolol succinate (TOPROL XL) 25 MG extended release tablet Take 0.5 tablets by mouth daily 30 tablet 3    ondansetron (ZOFRAN ODT) 4 MG disintegrating tablet Take 1 tablet by mouth every 8 hours as needed for Nausea 12 tablet 0    EUTHYROX 100 MCG tablet Take 1 tablet by mouth once daily 90 tablet 1    metFORMIN (GLUCOPHAGE-XR) 500 MG extended release tablet Take 1 tablet by mouth daily (with breakfast) 90 tablet 1    omeprazole (PRILOSEC) 20 MG delayed release capsule Take 1 capsule by mouth every morning (before breakfast) 90 capsule 2    digoxin (LANOXIN) 125 MCG tablet Take 1 tablet by mouth daily 90 tablet 3    ENTRESTO 49-51 MG per tablet Take 1 tablet by mouth twice daily 60 tablet 12    atorvastatin (LIPITOR) 40 MG tablet Take 1 tablet by mouth nightly 90 tablet 3    venlafaxine (EFFEXOR XR) 150 MG extended release capsule Take 1 capsule by mouth daily 90 capsule 3    aspirin 81 MG chewable tablet Take 1 tablet by mouth daily 30 tablet 3    acetaminophen (TYLENOL) 500 MG tablet Take 500 mg by mouth every 6 hours as needed for Pain       No current facility-administered medications for this visit. Orders Placed This Encounter   Medications    venlafaxine (EFFEXOR XR) 75 MG extended release capsule     Sig: Take 1 capsule by mouth daily (take along with 150 mg Effexor for total dose of 225 mg)     Dispense:  90 capsule     Refill:  3         All medications reviewed and reconciled, including OTC and herbal medications. Updated list given to patient. 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 9)    Prediabetes    Malignant neoplasm of left breast in female, estrogen receptor positive ([address]    Class 2 severe obesity due to excess calories with serious comorbidity and body mass index (BMI) of 39.0 to 39.9 in adult [facility])    Facial droop    Facial swelling    Depression with anxiety    Vertigo    Exertional chest pain    Gastroesophageal reflux disease    REINALDO (obstructive sleep apnea)    Renal calculi    Adjustment disorder       Past Medical History:   Diagnosis Date    Abnormal heart rhythm     Anxiety     Cancer [facility])     breast  2016     CHF (congestive heart failure) (HCC)     Congenital heart disease     DJD (degenerative joint disease)     Enlarged lymph nodes     Hypertension     Hypothyroidism     ICD (implantable cardioverter-defibrillator) in place 2019    Dr. Emily Saldana Kidney stones     PONV (postoperative nausea and vomiting)     Prediabetes 10/7/2019    Thyroid disease        Past Surgical History:   Procedure Laterality Date    APPENDECTOMY      CARPAL TUNNEL RELEASE      COLONOSCOPY      COLONOSCOPY N/A 2020    COLONOSCOPY POLYPECTOMY SNARE/COLD BIOPSY performed by Hilary Alfaro MD at [facility]    COLONOSCOPY  2020    COLONOSCOPY POLYPECTOMY HOT BIOPSY performed by Hilary Alfaro MD at [address] Right 2020    DEQUERVAINS RELEASE AND RIGHT RING FINGER TRIGGER RELEASE performed by Yovani Lazar DO at [address], [address] Left 2019    [facility] HAS A MRI CONDITIONAL SYSTEM    PTCA      TONSILLECTOMY         Allergies   Allergen Reactions    Adhesive Tape Itching       Social History     Socioeconomic History    Marital status:      Spouse name: Not on file    Number of children: Not on file    Years of education: Not on file    Highest education level: Not on file   Occupational History    Not on file   Tobacco Use    Smoking status: Former Smoker     Packs/day: 0.25     Years: 37.00     Pack years: 9.25     Types: Cigarettes     Start date: 1981     Quit date: 2020     Years since quittin.2    Smokeless tobacco: Never Used   Vaping Use    Vaping Use: Never assessed   Substance and Sexual Activity    Alcohol use: No    Drug use: No    Sexual activity: Not on file   Other Topics Concern    Not on file   Social History Narrative    Referral placed for counseling    Denies barriers with medication affordability    Denies community services     Denies transportation issues     Social Determinants of Health     Financial Resource Strain: Low Risk     Difficulty of Paying Living Expenses: Not hard at all   Food Insecurity: No Food Insecurity    Worried About Running Out of Food in the Last Year: Never true    Ran Out of Food in the Last Year: Never true   Transportation Needs: No Transportation Needs    Lack of Transportation (Medical): No    Lack of Transportation (Non-Medical): No   Physical Activity: Inactive    Days of Exercise per Week: 0 days    Minutes of Exercise per Session: 0 min   Stress: Stress Concern Present    Feeling of Stress : Rather much   Social Connections: Socially Isolated    Frequency of Communication with Friends and Family: More than three times a week    Frequency of Social Gatherings with Friends and Family: More than three times a week    Attends Scientology Services: Never    Active Member of Clubs or Organizations: No    Attends Club or Organization Meetings: Never    Marital Status:    Intimate Partner Violence:     Fear of Current or Ex-Partner:     Emotionally Abused:     Physically Abused:     Sexually Abused:        Family History   Problem Relation Age of Onset    High Blood Pressure Mother     Dementia Mother     Cancer Father     Colon Cancer Father     Mental Retardation Brother     Colon Polyps Brother     Cancer Maternal Grandfather     Esophageal Cancer Neg Hx          I have reviewed the patient's past medical history, past surgical history, allergies, medications, social and family history and I have made updates where appropriate.       Review of Systems  Positive responses are highlighted in bold    Constitutional:  Fever, Chills, Night Sweats, Fatigue, Unexpected changes in weight  Eyes:  Eye discharge, Eye pain, Eye redness, Visual disturbances   HENT:  Ear pain, Tinnitus, Nosebleeds, Trouble swallowing, Hearing loss, Sore throat  Cardiovascular:  Chest Pain, Palpitations, Orthopnea, Paroxysmal Nocturnal Dyspnea  Respiratory:  Cough, Wheezing, Shortness of breath, Chest tightness, Apnea  Gastrointestinal:  Nausea, Vomiting, Diarrhea, Constipation, Heartburn, Blood in stool  Genitourinary: Difficulty or painful urination, Flank pain, Change in frequency, Urgency  Skin:  Color change, Rash, Itching, Wound  Psychiatric:  Hallucinations, Anxiety, Depression, Suicidal ideation  Hematological:  Enlarged glands, Easy bleeding, Easily bruising  Musculoskeletal:  Joint pain, Back pain, Gait problems, Joint swelling, Myalgias  Neurological:  Dizziness, Headaches, Presyncope, Numbness, Seizures, Tremors  Allergy:  Environmental allergies, Food allergies  Endocrine:  Heat Intolerance, Cold Intolerance, Polydipsia, Polyphagia, Polyuria    Lab Results   Component Value Date     04/30/2021    K 4.0 04/30/2021     04/30/2021    CO2 24 04/30/2021    BUN 12 04/30/2021    CREATININE 0.5 04/30/2021    GLUCOSE 130 (H) 04/30/2021    CALCIUM 9.7 04/30/2021    PROT 7.0 04/30/2021    LABALBU 4.5 04/30/2021    BILITOT 0.3 04/30/2021    ALKPHOS 90 04/30/2021    AST 20 04/30/2021    ALT 16 04/30/2021    LABGLOM >90 04/30/2021     Lab Results   Component Value Date    TSH 0.957 03/29/2021     Lab Results   Component Value Date    WBC 5.6 04/30/2021    HGB 14.4 04/30/2021    HCT 45.1 04/30/2021    MCV 94.5 04/30/2021     04/30/2021     MRI Brain 8/13/21   Normal MRI of the brain. Renal US 7/28/21  Impression   1. Small nonobstructing calculus in each kidney. 2. Study otherwise unremarkable. No acute findings. PHYSICAL EXAM:  Vitals:    08/24/21 0901   BP: 122/70   Pulse: 94   Resp: 16   Temp: 98.5 °F (36.9 °C)   TempSrc: Oral   SpO2: 99%   Weight: 215 lb 3.2 oz (97.6 kg)   Height: 5' 2.25\" (1.581 m)     Body mass index is 39.05 kg/m².          VS Reviewed  General Appearance: A&O x 3, No acute distress,well developed and well- nourished  Head: normocephalic and atraumatic  Eyes: pupils equal, round, and reactive to light, extraocular eye movements intact, conjunctivae and eye lids without erythema  Neck: supple and non-tender without mass, no thyromegaly or thyroid nodules, no cervical lymphadenopathy  Pulmonary/Chest: clear to auscultation bilaterally- no wheezes, rales or rhonchi, normal air movement, no respiratory distress or retractions  Cardiovascular: S1 and S2 auscultated w/ RRR. No murmurs, rubs, clicks, or gallops, distal pulses intact. Abdomen: soft, non-tender, non-distended, bowl sounds physiologic,  no rebound or guarding, no masses or hernias noted. Liver and spleen without enlargement. Extremities: no cyanosis, clubbing or edema of the lower extremities  Musculoskeletal: No joint swelling or gross deformity   Neuro:  Alert, 5/5 strength globally and symmetrically  Psych: Affect appropriate. Mood normal. Thought process is normal without evidence of depression or psychosis. Good insight and appropriate interaction. Cognition and memory appear to be intact. Skin: warm and dry, no rash or erythema  Lymph:  No cervical, auricular or supraclavicular lymph nodes palpated      ASSESSMENT & PLAN  Berhane Yuen was seen today for results, back pain and weight management. Diagnoses and all orders for this visit:    Recurrent major depressive disorder, in full remission ([facility]    Adjustment disorder, unspecified type  -     venlafaxine (EFFEXOR XR) 75 MG extended release capsule; Take 1 capsule by mouth daily (take along with 150 mg Effexor for total dose of 225 mg)    Renal calculi  -     [facility] - Philip Dennis MD, Urology, [facility]    Class 2 severe obesity due to excess calories with serious comorbidity and body mass index (BMI) of 39.0 to 39.9 in adult [facility])  -     Brianna Roth MD, [address] [place] [address], [facility]  -     Lipid, Fasting; Future    Mixed hyperlipidemia  -     Lipid, Fasting; Future      - con't Effexor  - suspect memory issues more related to anxiety/depression  - refer to Urology for kidney stones  - refer to Weight management    DISPOSITION    Return if symptoms worsen or fail to improve. Berhane Yuen released without restrictions.       PATIENT COUNSELING    Counseling was

## 2021-08-26 ENCOUNTER — CARE COORDINATION (OUTPATIENT)
Dept: CARE COORDINATION | Age: 61
End: 2021-08-26

## 2021-08-26 NOTE — CARE COORDINATION
Ambulatory Care Coordination Note  8/26/2021  CM Risk Score: 5  Charlson 10 Year Mortality Risk Score: 98%     ACC: Pat Delgado, TU    Summary Note: Spoke with Cristel Nam. Discussed recent PCP appt and follow up appts with urology and weight management. States she does have an appt with urology set up. She has been contact by weight management and was informed to watch a video first before calling office back to schedule appt. She plans on completing that video and follow up with them soon. She continues to see LISW with behavioral health and feels it is benefiting her. She states the cpap is working well but she will sometimes wake up with a \"suffocation\" episode that will quickly resolve. Plan  -reinforce education completed  -collaborate with CHF clinic as needed  -encourage daily weight tracking and reporting  -collaborate with behavioral health as needed  -work towards graduation once all education completed  Congestive Heart Failure Assessment    Are you currently restricting fluids?: No Restriction  Do you understand a low sodium diet?: Yes  Do you understand how to read food labels?: Yes  How many restaurant meals do you eat per week?: 1-2  Do you salt your food before tasting it?: No         Symptoms:  CHF associated angina: Neg, CHF associated dyspnea on exertion: Neg, CHF associated fatigue: Neg, CHF associated leg swelling: Neg, CHF associated orthostatic hypotension: Neg, CHF associated PND: Neg, CHF associated shortness of breath: Neg, CHF associated weakness: Neg      Symptom course: stable  Salt intake watch compared to last visit: stable             Care Coordination Interventions    Program Enrollment: Complex Care  Referral from Primary Care Provider: No  Suggested Interventions and Community Resources  Behavorial Health: In Process (Comment: Pedro Gaming referral 5-3-21)  Fall Risk Prevention:  In Process  Disease Specific Clinic: Completed (Comment: CHF clinic)  Home Health Services: Declined  Medication Assistance Program: Declined  Senior Services: Declined  Zone Management Tools: In Process         Goals Addressed    None         Prior to Admission medications    Medication Sig Start Date End Date Taking?  Authorizing Provider   venlafaxine (EFFEXOR XR) 75 MG extended release capsule Take 1 capsule by mouth daily (take along with 150 mg Effexor for total dose of 225 mg) 8/24/21  Yes ANGELIA Easton CNP   letrozole [facility]) 2.5 MG tablet Take 1 tablet by mouth daily 7/20/21  Yes Fredy Cornell MD   polyethylene glycol (GLYCOLAX) 17 GM/SCOOP powder DISSOLVE & TAKE 1 CAPFUL ONCE DAILY 6/28/21  Yes Historical Provider, MD   furosemide (LASIX) 20 MG tablet TAKE 1 TABLET BY MOUTH ONCE DAILY AS NEEDED FOR LEG SWELLING, WEIGHT GAIN 6/1/21  Yes ANGELIA Easton CNP   metoprolol succinate (TOPROL XL) 25 MG extended release tablet Take 0.5 tablets by mouth daily 5/19/21  Yes Misty Mace MD   ondansetron (ZOFRAN ODT) 4 MG disintegrating tablet Take 1 tablet by mouth every 8 hours as needed for Nausea 4/30/21  Yes Gaby Swanson DO   EUTHYROX 100 MCG tablet Take 1 tablet by mouth once daily 4/23/21  Yes ANGELIA Easton CNP   metFORMIN (GLUCOPHAGE-XR) 500 MG extended release tablet Take 1 tablet by mouth daily (with breakfast) 3/29/21  Yes ANGELIA Easton CNP   omeprazole (PRILOSEC) 20 MG delayed release capsule Take 1 capsule by mouth every morning (before breakfast) 3/11/21  Yes ANGELIA Tobias CNP   digoxin (LANOXIN) 125 MCG tablet Take 1 tablet by mouth daily 3/1/21  Yes Misty Mace MD   ENTRESTO 49-51 MG per tablet Take 1 tablet by mouth twice daily 1/25/21  Yes ANGELIA Ruano CNP   atorvastatin (LIPITOR) 40 MG tablet Take 1 tablet by mouth nightly 12/11/20  Yes ANGELIA Easton CNP   venlafaxine (EFFEXOR XR) 150 MG extended release capsule Take 1 capsule by mouth daily 10/20/20  Yes ANGELIA Easton CNP   aspirin 81 MG chewable tablet Take 1 tablet by mouth daily 7/11/20  Yes Rafy Ram MD   acetaminophen (TYLENOL) 500 MG tablet Take 500 mg by mouth every 6 hours as needed for Pain   Yes Historical Provider, MD       Future Appointments   Date Time Provider Braden Rothman   8/30/2021  9:00 AM ELISE James [id number]   9/9/2021  8:30 AM Shadi Monae [address]   9/9/2021 10:00 AM ANGELIA Gaona - CNP [facility]   9/13/2021  8:30 AM León Paez [facility]   12/1/2021  1:00 PM ANGELIA Ruano CNP N [facility] [address]   1/24/2022 10:00 AM Dave Ludwig MD N Oncology [address]   5/26/2022  2:00 PM Juan Huddleston MD N [facility] [address]

## 2021-08-28 NOTE — PROGRESS NOTES
[address] [facility] [address]  Dept: [phone]  Loc: [phone]      Ms. Sandra Hull was seen in follow up for   Chief Complaint   Patient presents with    Advice Only     New Patient, B/L non obstructing kidney stones, referred by ANGELIA Easton CNP        HPI:  Patient is a 55-year-old female with a history of nephrolithiasis and microscopic hematuria. She states that she was first diagnosed with kidney stones approximately 20 years ago. The only surgical intervention she has undergone was a left ESWL in 2015 with a urologist in [place]. The last stone she passed was during his breast cancer treatments in 2016. She does experience intermittent bilateral flank pain. She describes the pain has sharp and sometimes radiates to her upper quadrants. She denies any associated symptoms at the time of the flank pain. There are no aggravating or alleviating factors. She has never taken any medication to prevent stone formation. She has never seen blood in her urine but states that it looks jeanie in color at times. She does have an intermittent \"hot\" sensation when she urinates, especially when her urine is darker. She has noticed this over the last few months. She is not aware of any aggravating or alleviating factors. She denies any changes to her hygiene routine or diet. She reports occasional pelvic pressure, along with urgency, frequency, and urge incontinence. She changes three larger pads daily but they are not saturated. She believes she is emptying relatively well and denies weakened stream. She denies any post-menopausal bleeding. She does report a family history significant for bladder cancer in her grandmother. She presents today for further evaluation.     Past Medical History:   Diagnosis Date    Abnormal heart rhythm     Anxiety     Cancer [facility])     breast  2016     CHF (congestive heart failure) ([facility] [address]     Congenital heart disease     DJD (degenerative joint disease)     Enlarged lymph nodes     Hypertension     Hypothyroidism     ICD (implantable cardioverter-defibrillator) in place 02/11/2019    Dr. Giuseppe Lugo Kidney stones     PONV (postoperative nausea and vomiting)     Prediabetes 10/7/2019    Thyroid disease        Past Surgical History:   Procedure Laterality Date    APPENDECTOMY      CARPAL TUNNEL RELEASE  2019    COLONOSCOPY      COLONOSCOPY N/A 07/27/2020    COLONOSCOPY POLYPECTOMY SNARE/COLD BIOPSY performed by Tonya Wallace MD at [facility]    COLONOSCOPY  07/27/2020    COLONOSCOPY POLYPECTOMY HOT BIOPSY performed by Tonya Wallace MD at [facility]    COLONOSCOPY  2021   [place] Right 06/25/2020    DEQUERVAINS RELEASE AND RIGHT RING FINGER TRIGGER RELEASE performed by Anjana Méndez DO at [address] [place] Left 02/11/2019    [employer] [facility] HAS A MRI CONDITIONAL SYSTEM    PTCA      TONSILLECTOMY         Current Outpatient Medications on File Prior to Visit   Medication Sig Dispense Refill    venlafaxine (EFFEXOR XR) 75 MG extended release capsule Take 1 capsule by mouth daily (take along with 150 mg Effexor for total dose of 225 mg) 90 capsule 3    letrozole (FEMARA) 2.5 MG tablet Take 1 tablet by mouth daily 90 tablet 3    polyethylene glycol (GLYCOLAX) 17 GM/SCOOP powder DISSOLVE & TAKE 1 CAPFUL ONCE DAILY      furosemide (LASIX) 20 MG tablet TAKE 1 TABLET BY MOUTH ONCE DAILY AS NEEDED FOR LEG SWELLING, WEIGHT GAIN 90 tablet 0    metoprolol succinate (TOPROL XL) 25 MG extended release tablet Take 0.5 tablets by mouth daily 30 tablet 3    EUTHYROX 100 MCG tablet Take 1 tablet by mouth once daily 90 tablet 1    metFORMIN (GLUCOPHAGE-XR) 500 MG extended release tablet Take 1 tablet by mouth daily (with breakfast) 90 tablet 1    omeprazole (PRILOSEC) 20 MG delayed release capsule Take 1 capsule by mouth every morning (before breakfast) 90 capsule 2    digoxin (LANOXIN) 125 MCG tablet Take 1 tablet by mouth daily 90 tablet 3    ENTRESTO 49-51 MG per tablet Take 1 tablet by mouth twice daily 60 tablet 12    atorvastatin (LIPITOR) 40 MG tablet Take 1 tablet by mouth nightly 90 tablet 3    venlafaxine (EFFEXOR XR) 150 MG extended release capsule Take 1 capsule by mouth daily 90 capsule 3    aspirin 81 MG chewable tablet Take 1 tablet by mouth daily 30 tablet 3    acetaminophen (TYLENOL) 500 MG tablet Take 500 mg by mouth every 6 hours as needed for Pain       No current facility-administered medications on file prior to visit.        Allergies   Allergen Reactions    Adhesive Tape Itching       Family History   Problem Relation Age of Onset    High Blood Pressure Mother     Dementia Mother     Cancer Father     Colon Cancer Father     Mental Retardation Brother     Colon Polyps Brother     Cancer Maternal Grandfather     Esophageal Cancer Neg Hx        Social History     Socioeconomic History    Marital status:      Spouse name: Not on file    Number of children: Not on file    Years of education: Not on file    Highest education level: Not on file   Occupational History    Not on file   Tobacco Use    Smoking status: Former Smoker     Packs/day: 0.25     Years: 37.00     Pack years: 9.25     Types: Cigarettes     Start date: 1981     Quit date: 2020     Years since quittin.2    Smokeless tobacco: Never Used   Vaping Use    Vaping Use: Never assessed   Substance and Sexual Activity    Alcohol use: No    Drug use: No    Sexual activity: Not on file   Other Topics Concern    Not on file   Social History Narrative    Referral placed for counseling    Denies barriers with medication affordability    Denies community services     Denies transportation issues     Social Determinants of Health     Financial Resource Strain: Low Risk     Difficulty of Paying Living Expenses: Not hard at all   Food Insecurity: No Food Insecurity    Worried About [address] in the Last Year: Never true    Narciso of Food in the Last Year: Never true   Transportation Needs: No Transportation Needs    Lack of Transportation (Medical): No    Lack of Transportation (Non-Medical): No   Physical Activity: Inactive    Days of Exercise per Week: 0 days    Minutes of Exercise per Session: 0 min   Stress: Stress Concern Present    Feeling of Stress : Rather much   Social Connections: Socially Isolated    Frequency of Communication with Friends and Family: More than three times a week    Frequency of Social Gatherings with Friends and Family: More than three times a week    Attends Synagogue Services: Never    Active Member of Clubs or Organizations: No    Attends Club or Organization Meetings: Never    Marital Status:    Intimate Partner Violence:     Fear of Current or Ex-Partner:     Emotionally Abused:     Physically Abused:     Sexually Abused:        Review of Systems  Constitutional: Negative for fatigue, fever and unexpected weight change. HENT: Negative for congestion and trouble swallowing. Eyes: Negative for pain and itching. Respiratory: Negative for cough and shortness of breath. Cardiovascular: Negative for chest pain and leg swelling. Gastrointestinal: Negative for abdominal pain, constipation, diarrhea and nausea. Endocrine: Negative for cold intolerance and heat intolerance. Genitourinary: See HPI. Reports weakened stream. Denies frequency and urgency. Musculoskeletal: Negative for back pain and joint swelling. Skin: Negative for rash. Neurological: Negative for dizziness, weakness, numbness and headaches. Psychiatric/Behavioral: The patient is not nervous/anxious. Exam    /68   Ht 5' 2\" (1.575 m)   Wt 215 lb (97.5 kg)   BMI 39.32 kg/m²     Constitutional: Oriented to person, place, and time.  Vital signs are normal. Appears [id number].[id number]   poct post void residual   Result Value Ref Range    post void residual 48 ml       Lab Results   Component Value Date    CREATININE 0.5 04/30/2021    BUN 12 04/30/2021     04/30/2021    K 4.0 04/30/2021     04/30/2021    CO2 24 04/30/2021     Radiology:    Renal Ultrasound (July 2021)    FINDINGS:       RIGHT KIDNEY - 12.9 x 5.6 x 5.3 cm    Resistive Index - 0.66    Cortical Thickness - 1.3 cm        LEFT KIDNEY - 11.8 x 4.8 x 4.3 cm    Resistive Index - 0.64    Cortical Thickness - 1.2 cm        URINARY BLADDER    Pre-Void - 515 mL    Post-Void - 46 mL            KIDNEYS:  Both kidneys are normal in size and contour.  The resistive indices are normal.        MASS/CYST: No solid or cystic lesion of either kidney is seen.          HYDRONEPHROSIS:  There is no hydronephrosis.         CALCULI:  Small nonobstructing 5 mm calculus mid body right kidney. Nonobstructing 9 mm calculus inferior pole left kidney.        BLADDER:  The urinary bladder is unremarkable. .                Impression   1. Small nonobstructing calculus in each kidney. 2. Study otherwise unremarkable. No acute findings. Assessment/Plan:  1. Nephrolithiasis- Status post left ESWL in 2015 with a urologist in [place]. Recent Renal US demonstrates a 5 mm calculus in the mid body right kidney and 9 mm calculus in inferior pole of left kidney. Due to her intermittent flank pain and reports of microscopic hematuria,  a CT scan of the abdomen and pelvis with and without contrast will be performed for further delineation. Follow up in three weeks. 2. History of Microscopic Hematuria- Patient reports at testing at PCP office. Will obtain urine reflex and cytology. 3. History of Breast Cancer- Diagnosed in 2016. Status post left breast modified mastectomy and right simple mastectomy performed on December 29, 2016 at [facility] in [place], [place].  Her pathology demonstrated a 2.2 cm infiltrating ductal carcinoma of grade 2, with evidence of lymphovascular invasion. One out of 10 axillary lymph node was positive for metastatic breast cancer.  The tumor cells were estrogen receptor positive in 100% of tumor cell staining, progesterone receptor positive in 30% of tumor cells staining. HER-2 non-amplified by FISH.  Right breast mastectomy was negative for malignancy. She received adjuvant chemotherapy, four cycles of AC followed by weekly Taxol. She finished treatment in 2017. Chemotherapy was followed by radiation treatment. Currently on Femara. 4. Dilated Cardiomyopathy- Diagnosed in 2018. Underwent ICD insertion in 2/19. On Entresto. 5. Dysuria- Will send urine reflex and cytology. Adequate hydration. Reviewed foods and beverages that can be irritative to bladder. May need cytoscopy if urine studies or CT abnormal.     6. Mixed Incontinence- Primarily urge. PVR acceptable at 48 mL. Will discuss pelvic floor therapy at next visit.         Mendez Meneses PA-C

## 2021-08-30 ENCOUNTER — TELEPHONE (OUTPATIENT)
Dept: UROLOGY | Age: 61
End: 2021-08-30

## 2021-08-30 ENCOUNTER — OFFICE VISIT (OUTPATIENT)
Dept: UROLOGY | Age: 61
End: 2021-08-30
Payer: COMMERCIAL

## 2021-08-30 ENCOUNTER — TELEPHONE (OUTPATIENT)
Dept: FAMILY MEDICINE CLINIC | Age: 61
End: 2021-08-30

## 2021-08-30 ENCOUNTER — HOSPITAL ENCOUNTER (OUTPATIENT)
Age: 61
Discharge: HOME | End: 2021-08-30
Payer: COMMERCIAL

## 2021-08-30 VITALS
DIASTOLIC BLOOD PRESSURE: 68 MMHG | HEIGHT: 62 IN | BODY MASS INDEX: 39.56 KG/M2 | SYSTOLIC BLOOD PRESSURE: 118 MMHG | WEIGHT: 215 LBS

## 2021-08-30 DIAGNOSIS — N20.0 KIDNEY STONES: Primary | ICD-10-CM

## 2021-08-30 DIAGNOSIS — R31.29 MICROSCOPIC HEMATURIA: ICD-10-CM

## 2021-08-30 DIAGNOSIS — E78.2 MIXED HYPERLIPIDEMIA: ICD-10-CM

## 2021-08-30 DIAGNOSIS — E66.01 CLASS 2 SEVERE OBESITY DUE TO EXCESS CALORIES WITH SERIOUS COMORBIDITY AND BODY MASS INDEX (BMI) OF 39.0 TO 39.9 IN ADULT (HCC): ICD-10-CM

## 2021-08-30 LAB
BACTERIA: ABNORMAL /HPF
BILIRUBIN URINE: NEGATIVE
BILIRUBIN URINE: NEGATIVE
BLOOD URINE, POC: NORMAL
BLOOD, URINE: NEGATIVE
CASTS 2: ABNORMAL /LPF
CASTS UA: ABNORMAL /LPF
CHARACTER, URINE: ABNORMAL
CHARACTER, URINE: CLEAR
CHOLESTEROL, FASTING: 166 MG/DL (ref 100–199)
COLOR, URINE: YELLOW
COLOR: YELLOW
CRYSTALS, UA: ABNORMAL
EPITHELIAL CELLS, UA: ABNORMAL /HPF
GLUCOSE URINE: NEGATIVE MG/DL
GLUCOSE URINE: NEGATIVE MG/DL
HDLC SERPL-MCNC: 51 MG/DL
KETONES, URINE: ABNORMAL
KETONES, URINE: NEGATIVE
LDL CHOLESTEROL CALCULATED: 84 MG/DL
LEUKOCYTE CLUMPS, URINE: NEGATIVE
LEUKOCYTE ESTERASE, URINE: NEGATIVE
MISCELLANEOUS 2: ABNORMAL
NITRITE, URINE: NEGATIVE
NITRITE, URINE: NEGATIVE
PH UA: 5.5 (ref 5–9)
PH, URINE: 5.5 (ref 5–9)
POST VOID RESIDUAL (PVR): 48 ML
PROTEIN UA: NEGATIVE
PROTEIN, URINE: NEGATIVE MG/DL
RBC URINE: ABNORMAL /HPF
RENAL EPITHELIAL, UA: ABNORMAL
SPECIFIC GRAVITY, URINE: 1.02 (ref 1–1.03)
SPECIFIC GRAVITY, URINE: 1.02 (ref 1–1.03)
TRIGLYCERIDE, FASTING: 156 MG/DL (ref 0–199)
UROBILINOGEN, URINE: 0.2 EU/DL (ref 0–1)
UROBILINOGEN, URINE: 1 EU/DL (ref 0–1)
WBC UA: ABNORMAL /HPF
YEAST: ABNORMAL

## 2021-08-30 PROCEDURE — 3017F COLORECTAL CA SCREEN DOC REV: CPT | Performed by: PHYSICIAN ASSISTANT

## 2021-08-30 PROCEDURE — 80061 LIPID PANEL: CPT

## 2021-08-30 PROCEDURE — G8428 CUR MEDS NOT DOCUMENT: HCPCS | Performed by: PHYSICIAN ASSISTANT

## 2021-08-30 PROCEDURE — 51798 US URINE CAPACITY MEASURE: CPT | Performed by: PHYSICIAN ASSISTANT

## 2021-08-30 PROCEDURE — 36415 COLL VENOUS BLD VENIPUNCTURE: CPT

## 2021-08-30 PROCEDURE — 99213 OFFICE O/P EST LOW 20 MIN: CPT | Performed by: PHYSICIAN ASSISTANT

## 2021-08-30 PROCEDURE — G8417 CALC BMI ABV UP PARAM F/U: HCPCS | Performed by: PHYSICIAN ASSISTANT

## 2021-08-30 PROCEDURE — 1036F TOBACCO NON-USER: CPT | Performed by: PHYSICIAN ASSISTANT

## 2021-08-30 PROCEDURE — 81003 URINALYSIS AUTO W/O SCOPE: CPT | Performed by: PHYSICIAN ASSISTANT

## 2021-08-30 NOTE — TELEPHONE ENCOUNTER
Patient scheduled for CT Abdomen pelvis W WO  at [facility] MR on 9/9/21 arrival of 9:10 am for a 9:20 am scan time with NPO 4 hours prior. Patient advised of instructions.   Order mailed/given to patient

## 2021-08-30 NOTE — TELEPHONE ENCOUNTER
----- Message from ANGELIA Lee - CNP sent at 8/30/2021 10:01 AM EDT -----  Let [facility] know her cholesterol lab test is stable and within appropriate ranges. Con't the Lipitor.

## 2021-09-08 ENCOUNTER — TELEPHONE (OUTPATIENT)
Dept: FAMILY MEDICINE CLINIC | Age: 61
End: 2021-09-08

## 2021-09-08 NOTE — TELEPHONE ENCOUNTER
Patient called to request a letter from her provider which states that she has to prepare her meals separate from everyone due to being on a special diet so that she is eligible to receive food stamps    Please advise

## 2021-09-08 NOTE — LETTER
[address] [facility]  [address] [facility] [place] [address]  Phone: [phone]  Fax: [address] [facility], ANGELIA - CNP        September 8, 2021     Patient: Ellis Sneed   YOB: 1960   Date of Visit: 9/8/2021       To Whom It May Concern: It is my medical opinion that Yue Gallo has certain health conditions which require her to be on a special diet. Thereby, she must prepare her meals separately from everyone else in her household. If you have any questions or concerns, please don't hesitate to call.     Sincerely,        ANGELIA Maloney CNP

## 2021-09-09 ENCOUNTER — OFFICE VISIT (OUTPATIENT)
Dept: PULMONOLOGY | Age: 61
End: 2021-09-09
Payer: COMMERCIAL

## 2021-09-09 VITALS
TEMPERATURE: 97.6 F | BODY MASS INDEX: 39.49 KG/M2 | WEIGHT: 214.6 LBS | SYSTOLIC BLOOD PRESSURE: 130 MMHG | HEIGHT: 62 IN | HEART RATE: 83 BPM | DIASTOLIC BLOOD PRESSURE: 80 MMHG | OXYGEN SATURATION: 97 %

## 2021-09-09 DIAGNOSIS — I42.9 CARDIOMYOPATHY, UNSPECIFIED TYPE (HCC): ICD-10-CM

## 2021-09-09 DIAGNOSIS — R94.30 EJECTION FRACTION < 50%: ICD-10-CM

## 2021-09-09 DIAGNOSIS — G47.33 OSA (OBSTRUCTIVE SLEEP APNEA): Primary | ICD-10-CM

## 2021-09-09 DIAGNOSIS — F33.0 MILD EPISODE OF RECURRENT MAJOR DEPRESSIVE DISORDER (HCC): ICD-10-CM

## 2021-09-09 DIAGNOSIS — E66.9 OBESITY (BMI 30-39.9): ICD-10-CM

## 2021-09-09 PROCEDURE — 99214 OFFICE O/P EST MOD 30 MIN: CPT | Performed by: PHYSICIAN ASSISTANT

## 2021-09-09 PROCEDURE — 3017F COLORECTAL CA SCREEN DOC REV: CPT | Performed by: PHYSICIAN ASSISTANT

## 2021-09-09 PROCEDURE — G8427 DOCREV CUR MEDS BY ELIG CLIN: HCPCS | Performed by: PHYSICIAN ASSISTANT

## 2021-09-09 PROCEDURE — 1036F TOBACCO NON-USER: CPT | Performed by: PHYSICIAN ASSISTANT

## 2021-09-09 PROCEDURE — G8417 CALC BMI ABV UP PARAM F/U: HCPCS | Performed by: PHYSICIAN ASSISTANT

## 2021-09-09 ASSESSMENT — ENCOUNTER SYMPTOMS
DIARRHEA: 0
BACK PAIN: 0
ALLERGIC/IMMUNOLOGIC NEGATIVE: 1
EYES NEGATIVE: 1
NAUSEA: 0
COUGH: 0
CHEST TIGHTNESS: 0
SHORTNESS OF BREATH: 0
WHEEZING: 0
STRIDOR: 0

## 2021-09-09 NOTE — PROGRESS NOTES
[facility]      Brian Marley         [id number]  9/9/2021   Chief Complaint   Patient presents with    Follow-up     REINALDO 4 month follow up with download         Pt of Dr. Angela Valdez    PAP Download:   Eun Diss or initial AHI: 7.8     Date of initial study: 5/25/2021      Compliant  73%     Noncompliant 27 %     PAP Type Airsense 10 autoset Level  9/16   Avg Hrs/Day 4 hr 32 min  AHI: 1.5   Recorded compliance dates , 8/9/2021  to 9/7/2021   Machine/Mfg:   [x] ResMed    [] Respironics/Dreamstation   Interface:   [] Nasal    [] Nasal pillows   [] FFM      Provider:      [x] [facility]     []Mika     [] [facility]    [] Elon Claude    [] [place]               [] [facility] [facility]      [] Adaptive    [] [address]      [] Other    Neck Size: 15.75  Mallampati Mallampati 4  ESS:  21  SAQLI: 69    Here is a scan of the most recent download:            Presentation:   Enedina Dale presents for sleep medicine follow up for obstructive sleep apnea  Since the last visit, Enedina Dale is adjusting to PAP. She feels like she is suffocating at times. She is tired all the time still. Takes a 3 hour nap in the afternoon. She feels like she is having some depression also. Equipment issues: The pressure is unacceptable, the mask is acceptable     Sleep issues:  Do you feel better? No  More rested? No   Better concentration? no    Progress History:   Since last visit any new medical issues? No  New ER or hospital visits? No  Any new or changes in medicines? No  Any new sleep medicines? No    Review of Systems -   Review of Systems   Constitutional: Negative for activity change, appetite change, chills and fever. HENT: Negative for congestion and postnasal drip. Eyes: Negative. Respiratory: Negative for cough, chest tightness, shortness of breath, wheezing and stridor. Cardiovascular: Negative for chest pain and leg swelling. Gastrointestinal: Negative for diarrhea and nausea. Endocrine: Negative. Genitourinary: Negative. Musculoskeletal: Negative. Negative for arthralgias and back pain. Skin: Negative. Allergic/Immunologic: Negative. Neurological: Negative. Negative for dizziness and light-headedness. Psychiatric/Behavioral: Negative. All other systems reviewed and are negative. Physical Exam:    BMI:  Body mass index is 39.25 kg/m². Wt Readings from Last 3 Encounters:   09/09/21 214 lb 9.6 oz (97.3 kg)   08/30/21 215 lb (97.5 kg)   08/24/21 215 lb 3.2 oz (97.6 kg)     Weight stable / unchanged  Vitals: /80 (Site: Left Upper Arm, Position: Sitting, Cuff Size: Large Adult)   Pulse 83   Temp 97.6 °F (36.4 °C) (Temporal)   Ht 5' 2\" (1.575 m)   Wt 214 lb 9.6 oz (97.3 kg)   SpO2 97% Comment: rm air at rest  BMI 39.25 kg/m²       Physical Exam  Constitutional:       Appearance: She is well-developed. HENT:      Head: Normocephalic and atraumatic. Right Ear: External ear normal.      Left Ear: External ear normal.   Eyes:      Conjunctiva/sclera: Conjunctivae normal.      Pupils: Pupils are equal, round, and reactive to light. Cardiovascular:      Rate and Rhythm: Normal rate and regular rhythm. Heart sounds: Normal heart sounds. Pulmonary:      Effort: Pulmonary effort is normal.      Breath sounds: Normal breath sounds. Musculoskeletal:         General: Normal range of motion. Cervical back: Normal range of motion and neck supple. Skin:     General: Skin is warm and dry. Neurological:      Mental Status: She is alert and oriented to person, place, and time. Psychiatric:         Behavior: Behavior normal.         Thought Content: Thought content normal.         Judgment: Judgment normal.           ASSESSMENT/DIAGNOSIS     Diagnosis Orders   1. REINALDO (obstructive sleep apnea)     2. Obesity (BMI 30-39.9)     3. Ejection fraction < 50%            Plan   Do you need any equipment today?  No  - turn tube temp down  - Dragan adjust pressure to improve comfort  - Hypersomnia likely related to depression, CHF and medications   - Encouraged to increase activity and exercise  - Will get Vit D level to eval as possible cause of hypersomnia   - Download reviewed and discussed with patient  - She  was advised to continue current positive airway pressure therapy with above described pressure. - She  advised to keep good compliance with current recommended pressure to get optimal results and clinical improvement  - Recommend 7-9 hours of sleep with PAP  - She was advised to call [employer] regarding supplies if needed.   -She call my office for earlier appointment if needed for worsening of sleep symptoms.   - She was instructed on weight loss  - Beny Marcano was educated about my impression and plan. Patient verbalizesunderstanding.   We will see Chinyere Peña back in: 2 months with download    Information added by my medical assistant/LPN was reviewed today        Irene Ferrer PA-C, MPAS  9/9/2021

## 2021-09-13 ENCOUNTER — OFFICE VISIT (OUTPATIENT)
Dept: FAMILY MEDICINE CLINIC | Age: 61
End: 2021-09-13
Payer: COMMERCIAL

## 2021-09-13 ENCOUNTER — TELEPHONE (OUTPATIENT)
Dept: FAMILY MEDICINE CLINIC | Age: 61
End: 2021-09-13

## 2021-09-13 ENCOUNTER — OFFICE VISIT (OUTPATIENT)
Dept: BEHAVIORAL/MENTAL HEALTH CLINIC | Age: 61
End: 2021-09-13
Payer: COMMERCIAL

## 2021-09-13 VITALS
BODY MASS INDEX: 39.43 KG/M2 | HEIGHT: 62 IN | OXYGEN SATURATION: 94 % | WEIGHT: 214.25 LBS | DIASTOLIC BLOOD PRESSURE: 78 MMHG | RESPIRATION RATE: 16 BRPM | SYSTOLIC BLOOD PRESSURE: 118 MMHG | TEMPERATURE: 98.3 F | HEART RATE: 90 BPM

## 2021-09-13 DIAGNOSIS — F33.2 SEVERE EPISODE OF RECURRENT MAJOR DEPRESSIVE DISORDER, WITHOUT PSYCHOTIC FEATURES (HCC): ICD-10-CM

## 2021-09-13 DIAGNOSIS — R94.01 ELECTROENCEPHALOGRAM (EEG) ABNORMALITY WITHOUT SEIZURE: ICD-10-CM

## 2021-09-13 DIAGNOSIS — Z23 NEEDS FLU SHOT: Primary | ICD-10-CM

## 2021-09-13 DIAGNOSIS — F32.1 CURRENT MODERATE EPISODE OF MAJOR DEPRESSIVE DISORDER, UNSPECIFIED WHETHER RECURRENT (HCC): Primary | ICD-10-CM

## 2021-09-13 DIAGNOSIS — F43.22 ADJUSTMENT DISORDER WITH ANXIOUS MOOD: ICD-10-CM

## 2021-09-13 PROCEDURE — 1036F TOBACCO NON-USER: CPT | Performed by: SOCIAL WORKER

## 2021-09-13 PROCEDURE — 99213 OFFICE O/P EST LOW 20 MIN: CPT | Performed by: NURSE PRACTITIONER

## 2021-09-13 PROCEDURE — G8427 DOCREV CUR MEDS BY ELIG CLIN: HCPCS | Performed by: NURSE PRACTITIONER

## 2021-09-13 PROCEDURE — 90834 PSYTX W PT 45 MINUTES: CPT | Performed by: SOCIAL WORKER

## 2021-09-13 PROCEDURE — 3017F COLORECTAL CA SCREEN DOC REV: CPT | Performed by: NURSE PRACTITIONER

## 2021-09-13 PROCEDURE — G8417 CALC BMI ABV UP PARAM F/U: HCPCS | Performed by: NURSE PRACTITIONER

## 2021-09-13 PROCEDURE — 90674 CCIIV4 VAC NO PRSV 0.5 ML IM: CPT | Performed by: NURSE PRACTITIONER

## 2021-09-13 PROCEDURE — 1036F TOBACCO NON-USER: CPT | Performed by: NURSE PRACTITIONER

## 2021-09-13 RX ORDER — BUPROPION HYDROCHLORIDE 150 MG/1
150 TABLET ORAL EVERY MORNING
Qty: 30 TABLET | Refills: 3 | Status: SHIPPED | OUTPATIENT
Start: 2021-09-13 | End: 2021-10-11 | Stop reason: SDUPTHER

## 2021-09-13 NOTE — PROGRESS NOTES
Dispense Refill    buPROPion (WELLBUTRIN XL) 150 MG extended release tablet Take 1 tablet by mouth every morning 30 tablet 3    CPAP Machine MISC by Does not apply route Please change CPAP pressure to auto 11-15 cm H20. 1 each 0    omeprazole (PRILOSEC) 40 MG delayed release capsule Take 1 capsule by mouth every morning (before breakfast) 90 capsule 1    venlafaxine (EFFEXOR XR) 75 MG extended release capsule Take 1 capsule by mouth daily (take along with 150 mg Effexor for total dose of 225 mg) 90 capsule 3    letrozole (FEMARA) 2.5 MG tablet Take 1 tablet by mouth daily 90 tablet 3    polyethylene glycol (GLYCOLAX) 17 GM/SCOOP powder DISSOLVE & TAKE 1 CAPFUL ONCE DAILY      furosemide (LASIX) 20 MG tablet TAKE 1 TABLET BY MOUTH ONCE DAILY AS NEEDED FOR LEG SWELLING, WEIGHT GAIN 90 tablet 0    metoprolol succinate (TOPROL XL) 25 MG extended release tablet Take 0.5 tablets by mouth daily 30 tablet 3    EUTHYROX 100 MCG tablet Take 1 tablet by mouth once daily 90 tablet 1    metFORMIN (GLUCOPHAGE-XR) 500 MG extended release tablet Take 1 tablet by mouth daily (with breakfast) 90 tablet 1    digoxin (LANOXIN) 125 MCG tablet Take 1 tablet by mouth daily 90 tablet 3    ENTRESTO 49-51 MG per tablet Take 1 tablet by mouth twice daily 60 tablet 12    atorvastatin (LIPITOR) 40 MG tablet Take 1 tablet by mouth nightly 90 tablet 3    venlafaxine (EFFEXOR XR) 150 MG extended release capsule Take 1 capsule by mouth daily 90 capsule 3    aspirin 81 MG chewable tablet Take 1 tablet by mouth daily 30 tablet 3    acetaminophen (TYLENOL) 500 MG tablet Take 500 mg by mouth every 6 hours as needed for Pain       No current facility-administered medications for this visit.      Orders Placed This Encounter   Medications    buPROPion (WELLBUTRIN XL) 150 MG extended release tablet     Sig: Take 1 tablet by mouth every morning     Dispense:  30 tablet     Refill:  3         All medications reviewed and reconciled, including OTC and herbal medications. Updated list given to patient. 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 9)    Prediabetes    Malignant neoplasm of left breast in female, estrogen receptor positive ([address]    Class 2 severe obesity due to excess calories with serious comorbidity and body mass index (BMI) of 39.0 to 39.9 in adult [facility])    Facial droop    Facial swelling    Depression with anxiety    Vertigo    Exertional chest pain    Gastroesophageal reflux disease    REINALDO (obstructive sleep apnea)    Renal calculi    Adjustment disorder       Past Medical History:   Diagnosis Date    Abnormal heart rhythm     Anxiety     Cancer [facility])     breast  2016     CHF (congestive heart failure) (HCC)     Congenital heart disease     DJD (degenerative joint disease)     Enlarged lymph nodes     Hypertension     Hypothyroidism     ICD (implantable cardioverter-defibrillator) in place 02/11/2019    Dr. Alicia Mejia Kidney stones     PONV (postoperative nausea and vomiting)     Prediabetes 10/7/2019    Thyroid disease        Past Surgical History:   Procedure Laterality Date    APPENDECTOMY      CARPAL TUNNEL RELEASE  2019    COLONOSCOPY      COLONOSCOPY N/A 07/27/2020    COLONOSCOPY POLYPECTOMY SNARE/COLD BIOPSY performed by Soy Madsen MD at [facility]    COLONOSCOPY  07/27/2020    COLONOSCOPY POLYPECTOMY HOT BIOPSY performed by Soy Madsen MD at [facility]    COLONOSCOPY  2021    EGD      FINGER TRIGGER RELEASE Right 06/25/2020 and Family: More than three times a week    Attends [employer] Services: Never    Active Member of Clubs or Organizations: No    Attends Club or Organization Meetings: Never    Marital Status:    Intimate Partner Violence:     Fear of Current or Ex-Partner:     Emotionally Abused:     Physically Abused:     Sexually Abused:        Family History   Problem Relation Age of Onset    High Blood Pressure Mother     Dementia Mother     Cancer Father     Colon Cancer Father     Mental Retardation Brother     Colon Polyps Brother     Cancer Maternal Grandfather     Esophageal Cancer Neg Hx          I have reviewed the patient's past medical history, past surgical history, allergies, medications, social and family history and I have made updates where appropriate.       Review of Systems  Positive responses are highlighted in bold    Constitutional:  Fever, Chills, Night Sweats, Fatigue, Unexpected changes in weight  Eyes:  Eye discharge, Eye pain, Eye redness, Visual disturbances   HENT:  Ear pain, Tinnitus, Nosebleeds, Trouble swallowing, Hearing loss, Sore throat  Cardiovascular:  Chest Pain, Palpitations, Orthopnea, Paroxysmal Nocturnal Dyspnea  Respiratory:  Cough, Wheezing, Shortness of breath, Chest tightness, Apnea  Gastrointestinal:  Nausea, Vomiting, Diarrhea, Constipation, Heartburn, Blood in stool  Genitourinary:  Difficulty or painful urination, Flank pain, Change in frequency, Urgency  Skin:  Color change, Rash, Itching, Wound  Psychiatric:  Hallucinations, Anxiety, Depression, Suicidal ideation  Hematological:  Enlarged glands, Easy bleeding, Easily bruising  Musculoskeletal:  Joint pain, Back pain, Gait problems, Joint swelling, Myalgias  Neurological:  Dizziness, Headaches, Presyncope, Numbness, Seizures, Tremors  Allergy:  Environmental allergies, Food allergies  Endocrine:  Heat Intolerance, Cold Intolerance, Polydipsia, Polyphagia, Polyuria    Lab Results   Component Value Date    NA 139 04/30/2021    K 4.0 04/30/2021     04/30/2021    CO2 24 04/30/2021    BUN 12 04/30/2021    CREATININE 0.5 04/30/2021    GLUCOSE 130 (H) 04/30/2021    CALCIUM 9.7 04/30/2021    PROT 7.0 04/30/2021    LABALBU 4.5 04/30/2021    BILITOT 0.3 04/30/2021    ALKPHOS 90 04/30/2021    AST 20 04/30/2021    ALT 16 04/30/2021    LABGLOM >90 04/30/2021     Lab Results   Component Value Date    TSH 0.957 03/29/2021     Lab Results   Component Value Date    WBC 5.6 04/30/2021    HGB 14.4 04/30/2021    HCT 45.1 04/30/2021    MCV 94.5 04/30/2021     04/30/2021       PHYSICAL EXAM:  Vitals:    09/13/21 0928   BP: 118/78   Pulse: 90   Resp: 16   Temp: 98.3 °F (36.8 °C)   TempSrc: Oral   SpO2: 94%   Weight: 214 lb 4 oz (97.2 kg)   Height: 5' 2\" (1.575 m)     Body mass index is 39.19 kg/m². VS Reviewed  General Appearance: A&O x 3, No acute distress,well developed and well- nourished  Head: normocephalic and atraumatic  Eyes: pupils equal, round, and reactive to light, extraocular eye movements intact, conjunctivae and eye lids without erythema  Neck: supple and non-tender without mass, no thyromegaly or thyroid nodules, no cervical lymphadenopathy  Pulmonary/Chest: clear to auscultation bilaterally- no wheezes, rales or rhonchi, normal air movement, no respiratory distress or retractions  Cardiovascular: S1 and S2 auscultated w/ RRR. No murmurs, rubs, clicks, or gallops, distal pulses intact. Abdomen: soft, non-tender, non-distended, bowl sounds physiologic,  no rebound or guarding, no masses or hernias noted. Liver and spleen without enlargement. Extremities: no cyanosis, clubbing or edema of the lower extremities  Musculoskeletal: No joint swelling or gross deformity   Neuro:  Alert, 5/5 strength globally and symmetrically  Psych: Affect and mood are flat. Thought process is normal without evidence of psychosis. Good insight and appropriate interaction. Cognition and memory appear to be intact.   Skin: warm and dry, no rash or erythema  Lymph:  No cervical, auricular or supraclavicular lymph nodes palpated      ASSESSMENT & PLAN  Gume Hernandez was seen today for depression. Diagnoses and all orders for this visit:    Needs flu shot  -     INFLUENZA, MDCK QUADV, 2 YRS AND OLDER, IM, PF, PREFILL SYR OR SDV, 0.5ML (FLUCELVAX QUADV, PF)    Electroencephalogram (EEG) abnormality without seizure  -     EEG; Future    Severe episode of recurrent major depressive disorder, without psychotic features ([facility]  -     buPROPion (WELLBUTRIN XL) 150 MG extended release tablet; Take 1 tablet by mouth every morning      - con't Effexor  - add Wellbutrin for depression  - con't counseling with [facility] prn  - suicidal precautions given  - repeat EEG     DISPOSITION    Return in about 4 weeks (around 10/11/2021) for MDD. Gume Hernandez released without restrictions. PATIENT COUNSELING    Counseling was provided today regarding the following topics: Healthy eating habits, Regular exercise, substance abuse and healthy sleep habits. Gume Hernandez received counseling on the following healthy behaviors: medication adherence    Patient given educational materials on: See Attached    I have instructed Gume Hernandez to complete a self tracking handout on Blood Pressures  and instructed them to bring it with them to her next appointment. Barriers to learning and self management: none    Discussed use, benefit, and side effects of prescribed medications. Barriers to medication compliance addressed. All patient questions answered. Pt voiced understanding.        Electronically signed by ANGELIA Ellis CNP on 9/13/2021 at 9:45 AM

## 2021-09-13 NOTE — PATIENT INSTRUCTIONS
1. Pt will practice self calming skills 2-3x's daily for 3-5 minutes. 2.Pt will promote effective self care habits daily/weekly-rest, relaxation, stress management, supportive relationships, increased physical outlets, engagement in enjoyable activities. 3. What is deep breathing? Deep breathing involves using your diaphragm muscle to help bring about a state of physiological relaxation. The diaphragm is a large muscle that rests across the bottom of your rib cage. When you inhale, the diaphragm muscle drops, opening up space so air can come in. When watching someone do this it looks like your stomach is filling with air. This type of breathing helps activate the part of your nervous system that controls relaxation. It can lead to decreased heart rate, blood pressure, muscle tension and overall feelings of relaxation. Why be concerned with how I'm breathing?    -To increase your awareness of the role that breathing plays in increased physical tension and contributing to your body's stress response.  -To lower your level of stress-related arousal and tension.  -To give you a method of taking calm, relaxing breaths immediately in a stressful situation to break the cycle of increasing arousal.    What is the best way to use deep breathing exercises?    -Use deep breathing frequently. -Take deep breaths at the first signs of stress, anxiety, physical tension, or other symptoms.  -Schedule time for relaxation. My scheduled time for deep breathing will be _AM, PM, NOON________. 4.Pt will follow up with KYLE Duncan  5. Pt was advised of indications of depressive symptoms and instructed to monitor if symptoms worsen or interfere with daily functioning, to consider following-up with either your primary care team or a behavioral health provider for possible counseling or medication management. If suicidal thoughts are experienced, call 911.  An additional 24/7 resource is the [employer] [place] Suicide Prevention Hotline at 2-204-516-VAII (7040).

## 2021-09-13 NOTE — TELEPHONE ENCOUNTER
LMOM requesting pt to call back at earliest convenience. EEG 10/6/21 @[facility] @10AM with 9:30am arrival to second floor heart [place]. No caffeine 24 hours prior, clean hair, no hair products. I.d and insurance card.

## 2021-09-13 NOTE — PSYCHOTHERAPY
Behavioral Health Consultation  Madisyn ElzbietaKYLE scales  9/13/2021  8:32 AM EDT  Time spent with Patient: 38 minutes  This is patient's sixth [facility] appointment. Reason for Consult:    Chief Complaint   Patient presents with    Depression    Anxiety     Referring Provider: RAHUL Glover  Pt provided informed consent for the behavioral health program. Discussed with patient model of service to include the limits of confidentiality (i.e. abuse reporting, suicide intervention, etc.) and short-term intervention focused approach. Pt indicated understanding. Feedback given to PCP. S:  Pt reported symptoms of anxiety and depression have slightly worsened this last week, with depleted energy and tendencies of oversleeping. She reported some insight into situational things creating sadness and influencing her thoughts, feelings, and behaviors. Pt acknowledged that she is following up with her providers concerning the effectiveness of her medication. She expressed concerns of the possibility of her  being approved to reside in the [place] and processed what the approval process looks like and her decision to maintain physical and emotional distances from him, in the event this is granted. Pt was guided in reviewing her plans for self care, management of emotional distress/anxious thoughts, and seeking opportunities for rut and connection to most effectively manage symptoms. Pt was advised of indications of depressive symptoms and instructed to monitor if symptoms worsen or interfere with daily functioning, to consider following-up with either your primary care team or a behavioral health provider for possible counseling or medication management. If suicidal thoughts are experienced, call 911. An additional 24/7 resource is the [facility] at 4-478-133-YABE ([phone]). No further appointments will be scheduled.  Pt will contact the office to schedule an appointment if needed. O:  MSE:    Appearance    Cooperative/crying  Appetite normal  Sleep disturbance Yes  Fatigue Yes  Loss of pleasure Yes  Impulsive behavior No  Speech    normal rate  Mood    Euthymic/depressed  Affect    normal affect  Thought Content    intact  Thought Process    coherent  Associations    logical connections  Insight    Fair  Judgment    Intact  Orientation    oriented to person, place, time, and general circumstances  Memory    recent and remote memory intact  Attention/Concentration    intact  Morbid ideation No  Suicide Assessment    no suicidal ideation; fleeting thoughts of being better off dead, no plan, intent, desire. Crisis supports preventively reviewed. History:    TOBACCO:   reports that she quit smoking about 15 months ago. Her smoking use included cigarettes. She started smoking about 39 years ago. She has a 9.25 pack-year smoking history. She has never used smokeless tobacco.  ETOH:   reports no history of alcohol use. Family History:   Family History   Problem Relation Age of Onset    High Blood Pressure Mother     Dementia Mother     Cancer Father     Colon Cancer Father     Mental Retardation Brother     Colon Polyps Brother     Cancer Maternal Grandfather     Esophageal Cancer Neg Hx        A:       Diagnosis:    1. Current moderate episode of major depressive disorder, unspecified whether recurrent ([facility]    2. Adjustment disorder with anxious mood          Diagnosis Date    Abnormal heart rhythm     Anxiety     Cancer [facility])     breast  2016     CHF (congestive heart failure) (HCC)     Congenital heart disease     DJD (degenerative joint disease)     Enlarged lymph nodes     Hypertension     Hypothyroidism     ICD (implantable cardioverter-defibrillator) in place 02/11/2019    Dr. Hortencia Lara Kidney stones     PONV (postoperative nausea and vomiting)     Prediabetes 10/7/2019    Thyroid disease        Plan: No further appointments will be scheduled.  Pt will contact the office to schedule an appointment if needed. Pt interventions:  Provided handout on  depression, anxiety and stress, Trained in relaxation strategies and Discussed self-care (sleep, nutrition, rewarding activities, social support, exercise)    Pt Behavioral Change Plan:   1. Pt will start writing things down in a tablet to manage her needs and appointments. 2. Pt will practice self calming skills 2-3x's daily for 3-5 minutes. 3. Pt will promote effective self care habits daily/weekly-rest, relaxation, stress management, supportive relationships, increased physical outlets, engagement in enjoyable activities. No further appointments will be scheduled. Pt will contact the office to schedule an appointment if needed. 5.Pt was advised of indications of depressive symptoms and instructed to monitor if symptoms worsen or interfere with daily functioning, to consider following-up with either your primary care team or a behavioral health provider for possible counseling or medication management. If suicidal thoughts are experienced, call 911. An additional 24/7 resource is the [facility] at 4-643-105-CEQA ([phone]).

## 2021-09-15 ENCOUNTER — TELEPHONE (OUTPATIENT)
Dept: UROLOGY | Age: 61
End: 2021-09-15

## 2021-09-15 DIAGNOSIS — R73.03 PREDIABETES: ICD-10-CM

## 2021-09-15 NOTE — TELEPHONE ENCOUNTER
Patient was to come back in 3 weeks for review of CT Scan. She was scheduled for tomorrow but her scan is on 9/17/21. The first the  was able to give me a date for the reschedule was in November for you.  Please advise

## 2021-09-16 ENCOUNTER — CARE COORDINATION (OUTPATIENT)
Dept: CARE COORDINATION | Age: 61
End: 2021-09-16

## 2021-09-16 RX ORDER — FUROSEMIDE 20 MG/1
TABLET ORAL
Qty: 90 TABLET | Refills: 0 | Status: SHIPPED | OUTPATIENT
Start: 2021-09-16 | End: 2022-05-12 | Stop reason: SDUPTHER

## 2021-09-16 RX ORDER — METFORMIN HYDROCHLORIDE 500 MG/1
TABLET, EXTENDED RELEASE ORAL
Qty: 90 TABLET | Refills: 0 | Status: SHIPPED | OUTPATIENT
Start: 2021-09-16 | End: 2022-06-15 | Stop reason: ALTCHOICE

## 2021-09-16 NOTE — CARE COORDINATION
Ambulatory Care Coordination Note  9/16/2021  CM Risk Score: 5  Charlson 10 Year Mortality Risk Score: 98%     ACC: De Flores, RN    Summary Note: Spoke with Justino Beckham. Asked her how new medication for depression was working. States she just starting taking it today. Encouraged her to take medication as ordered and to give it time to get into her system to evaluate full effect. She states overall she feels like she is doing better. Continues to followup with behavioral health support. CHF at baseline. Plan  -reinforce education completed  -collaborate with behavioral health as needed  -encourage to take medications as ordered and monitor and report any side effects  -encourage daily weight tracking and reporting  Congestive Heart Failure Assessment    Are you currently restricting fluids?: No Restriction  Do you understand a low sodium diet?: Yes  Do you understand how to read food labels?: Yes  How many restaurant meals do you eat per week?: 1-2  Do you salt your food before tasting it?: No     No patient-reported symptoms      Symptoms:                  Care Coordination Interventions    Program Enrollment: Complex Care  Referral from Primary Care Provider: No  Suggested Interventions and [facility] [address]: In Process (Comment: Vahe [place] referral 5-3-21)  Fall Risk Prevention: In Process  Disease Specific Clinic: Completed (Comment: CHF clinic)  Home Health Services: Declined  Medication Assistance Program: Declined  Senior Services: Declined  Zone Management Tools: In Process         Goals Addressed                    This Visit's Progress      Conditions and Symptoms (pt-stated)   On track      I will schedule office visits, as directed by my provider. I will keep my appointment or reschedule if I have to cancel. I will notify my provider of any barriers to my plan of care. I will follow my Zone Management tool to seek urgent or emergent care.   I will notify my provider of any symptoms that indicate a worsening of my condition. CHF  Barriers: overwhelmed by complexity of regimen  Plan for overcoming my barriers: family, ACM, CHF clinic support  Confidence: 8/10  Anticipated Goal Completion Date: 8/3/21 Update 9/4/21 Update 10/16/21              Prior to Admission medications    Medication Sig Start Date End Date Taking?  Authorizing Provider   metFORMIN (GLUCOPHAGE-XR) 500 MG extended release tablet Take 1 tablet by mouth once daily with breakfast 9/16/21  Yes ANGELIA Draper CNP   furosemide (LASIX) 20 MG tablet TAKE 1 TABLET BY MOUTH ONCE DAILY AS NEEDED FOR LEG SWELLING, WEIGHT GAIN 9/16/21  Yes ANGELIA Draper CNP   buPROPion (WELLBUTRIN XL) 150 MG extended release tablet Take 1 tablet by mouth every morning 9/13/21  Yes ANGELIA Draper CNP   CPAP Machine MISC by Does not apply route Please change CPAP pressure to auto 11-15 cm H20. 9/9/21  Yes Chaparrita Talley PA-C   omeprazole (PRILOSEC) 40 MG delayed release capsule Take 1 capsule by mouth every morning (before breakfast) 9/9/21  Yes ANGELIA Canseco CNP   venlafaxine (EFFEXOR XR) 75 MG extended release capsule Take 1 capsule by mouth daily (take along with 150 mg Effexor for total dose of 225 mg) 8/24/21  Yes ANGELIA Draper CNP   letrozole [facility]) 2.5 MG tablet Take 1 tablet by mouth daily 7/20/21  Yes Walter Gardner MD   polyethylene glycol (GLYCOLAX) 17 GM/SCOOP powder DISSOLVE & TAKE 1 CAPFUL ONCE DAILY 6/28/21  Yes Historical Provider, MD   metoprolol succinate (TOPROL XL) 25 MG extended release tablet Take 0.5 tablets by mouth daily 5/19/21  Yes Corey Mills MD   EUTHYROX 100 MCG tablet Take 1 tablet by mouth once daily 4/23/21  Yes ANGELIA Draper CNP   digoxin (LANOXIN) 125 MCG tablet Take 1 tablet by mouth daily 3/1/21  Yes Corey Mills MD   ENTRESTO 49-51 MG per tablet Take 1 tablet by mouth twice daily 1/25/21  Yes ANGELIA Ruano CNP   atorvastatin (LIPITOR) 40 MG tablet Take 1 tablet by mouth nightly 12/11/20  Yes Luc MannANGELIA - CNP   venlafaxine (EFFEXOR XR) 150 MG extended release capsule Take 1 capsule by mouth daily 10/20/20  Yes Luc Mann, ANGELIA - CNP   aspirin 81 MG chewable tablet Take 1 tablet by mouth daily 7/11/20  Yes Nicky Herrera MD   acetaminophen (TYLENOL) 500 MG tablet Take 500 mg by mouth every 6 hours as needed for Pain   Yes Historical Provider, MD       Future Appointments   Date Time Provider Braden Rothman   9/17/2021  9:40 AM STR CT IMAGING RM1  [facility] OUT EXP STR Radiolog   10/6/2021 10:00 AM STR NEURODIAG ROOM [address] HOD   10/11/2021  8:20 AM Luctara Mann, APRN - CNP [facility]   10/21/2021  8:30 AM Alan Parul, APRN - CNP [facility]   11/9/2021  9:30 AM ELISE Melgar Med [facility]   11/22/2021  9:15 AM [place] Adler PA-C [address]   12/1/2021  1:00 PM ANGELIA Ruano CNP N [facility] CHF [facility]   1/24/2022 10:00 AM Kaleigh Hardy MD N Oncology [facility]   5/26/2022  2:00 PM Juan Boone MD N [facility] Heart [address] [facility]

## 2021-09-17 ENCOUNTER — HOSPITAL ENCOUNTER (OUTPATIENT)
Dept: CT IMAGING | Age: 61
Discharge: HOME | End: 2021-09-17
Payer: COMMERCIAL

## 2021-09-17 ENCOUNTER — HOSPITAL ENCOUNTER (OUTPATIENT)
Age: 61
Discharge: HOME | End: 2021-09-17
Payer: COMMERCIAL

## 2021-09-17 DIAGNOSIS — G47.33 OSA (OBSTRUCTIVE SLEEP APNEA): ICD-10-CM

## 2021-09-17 DIAGNOSIS — N20.0 KIDNEY STONES: ICD-10-CM

## 2021-09-17 DIAGNOSIS — R31.29 MICROSCOPIC HEMATURIA: ICD-10-CM

## 2021-09-17 LAB
POC CREATININE WHOLE BLOOD: 0.7 MG/DL (ref 0.5–1.2)
VITAMIN D 25-HYDROXY: 21 NG/ML (ref 30–100)

## 2021-09-17 PROCEDURE — 82306 VITAMIN D 25 HYDROXY: CPT

## 2021-09-17 PROCEDURE — 82565 ASSAY OF CREATININE: CPT

## 2021-09-17 PROCEDURE — 74178 CT ABD&PLV WO CNTR FLWD CNTR: CPT

## 2021-09-17 PROCEDURE — 6360000004 HC RX CONTRAST MEDICATION: Performed by: PHYSICIAN ASSISTANT

## 2021-09-17 PROCEDURE — 36415 COLL VENOUS BLD VENIPUNCTURE: CPT

## 2021-09-17 RX ADMIN — IOPAMIDOL 85 ML: 755 INJECTION, SOLUTION INTRAVENOUS at 08:58

## 2021-09-22 ENCOUNTER — OFFICE VISIT (OUTPATIENT)
Dept: FAMILY MEDICINE CLINIC | Age: 61
End: 2021-09-22
Payer: COMMERCIAL

## 2021-09-22 VITALS
SYSTOLIC BLOOD PRESSURE: 110 MMHG | TEMPERATURE: 96.3 F | RESPIRATION RATE: 20 BRPM | HEART RATE: 71 BPM | WEIGHT: 224.2 LBS | BODY MASS INDEX: 41.26 KG/M2 | HEIGHT: 62 IN | DIASTOLIC BLOOD PRESSURE: 78 MMHG

## 2021-09-22 DIAGNOSIS — M25.562 ACUTE PAIN OF LEFT KNEE: Primary | ICD-10-CM

## 2021-09-22 PROCEDURE — G8427 DOCREV CUR MEDS BY ELIG CLIN: HCPCS | Performed by: NURSE PRACTITIONER

## 2021-09-22 PROCEDURE — 99214 OFFICE O/P EST MOD 30 MIN: CPT | Performed by: NURSE PRACTITIONER

## 2021-09-22 PROCEDURE — G8417 CALC BMI ABV UP PARAM F/U: HCPCS | Performed by: NURSE PRACTITIONER

## 2021-09-22 PROCEDURE — 3017F COLORECTAL CA SCREEN DOC REV: CPT | Performed by: NURSE PRACTITIONER

## 2021-09-22 PROCEDURE — 1036F TOBACCO NON-USER: CPT | Performed by: NURSE PRACTITIONER

## 2021-09-22 RX ORDER — TRAMADOL HYDROCHLORIDE 50 MG/1
50 TABLET ORAL EVERY 6 HOURS PRN
Qty: 20 TABLET | Refills: 0 | Status: SHIPPED | OUTPATIENT
Start: 2021-09-22 | End: 2021-09-27

## 2021-09-22 NOTE — PROGRESS NOTES
SUBJECTIVE:  Georges Min is a 62 y.o. y/o female that presents with Knee Pain (Patient has been experiencing left knee pain that is traveling up to her hip and down to her feet. Patient states it started 10 days ago and it is getting worse and she can barely walk. Patient states that she has heard her knee cracking.)  . HPI:  Pain is present in the left knee. Symptoms have been present for 10 day(s). The pain is moderate. The patient describes the pain as aching and sharp / stabbing. Inciting injury or history of trauma? No fall or trauma, but was painting before this happened, was going up and down a small ladder repeatedly  Pain is aggravated by - movement, walking  Pain is relieved by - rest  Radiation of the pain? Left hip is starting to hurt from the was she is walking from the knee pain  Paresthesias of the extremities? No  Decreased ROM? Yes  Edema? Yes  Treatments tried - NSAIDs        OBJECTIVE:  /78 (Site: Right Upper Arm, Position: Sitting, Cuff Size: Large Adult)   Pulse 71   Temp 96.3 °F (35.7 °C) (Temporal)   Resp 20   Ht 5' 2\" (1.575 m)   Wt 224 lb 3.2 oz (101.7 kg)   BMI 41.01 kg/m²   General appearance: alert, well appearing, and in no distress. Left Knee Exam    5/5 strength in the LEs bilaterally  No gross deformity, edema or discoloration of the knees bilaterally  There is not medial joint line tenderness  There is  lateral joint line tenderness  Valgus/Varus testing is negative  Lachman's test is negative  Artemio test is negative         ASSESSMENT & PLAN  Shelby Dee was seen today for knee pain. Diagnoses and all orders for this visit:    Acute pain of left knee  -     XR KNEE LEFT (3 VIEWS); Future  -     traMADol (ULTRAM) 50 MG tablet; Take 1 tablet by mouth every 6 hours as needed for Pain for up to 5 days. No follow-ups on file.     -Presentation is consistent with left knee pain  -Start above treatments  -Patient advised to contact our office immediately if symptoms worsen or persist  -Patient counseled on conservative care including activity modification and OTC meds

## 2021-09-23 ENCOUNTER — HOSPITAL ENCOUNTER (OUTPATIENT)
Dept: GENERAL RADIOLOGY | Age: 61
Discharge: HOME | End: 2021-09-23
Payer: COMMERCIAL

## 2021-09-23 ENCOUNTER — HOSPITAL ENCOUNTER (OUTPATIENT)
Age: 61
Discharge: HOME | End: 2021-09-23
Payer: COMMERCIAL

## 2021-09-23 DIAGNOSIS — M25.562 ACUTE PAIN OF LEFT KNEE: ICD-10-CM

## 2021-09-23 PROCEDURE — 73562 X-RAY EXAM OF KNEE 3: CPT

## 2021-09-24 DIAGNOSIS — M25.562 ACUTE PAIN OF LEFT KNEE: Primary | ICD-10-CM

## 2021-09-24 RX ORDER — PREDNISONE 20 MG/1
40 TABLET ORAL DAILY
Qty: 14 TABLET | Refills: 0 | Status: SHIPPED | OUTPATIENT
Start: 2021-09-24 | End: 2021-10-01

## 2021-09-27 ENCOUNTER — PROCEDURE VISIT (OUTPATIENT)
Dept: CARDIOLOGY CLINIC | Age: 61
End: 2021-09-27
Payer: COMMERCIAL

## 2021-09-27 DIAGNOSIS — Z95.810 ICD (IMPLANTABLE CARDIOVERTER-DEFIBRILLATOR) IN PLACE: Primary | ICD-10-CM

## 2021-09-27 PROCEDURE — 93296 REM INTERROG EVL PM/IDS: CPT | Performed by: INTERNAL MEDICINE

## 2021-09-27 PROCEDURE — 93295 DEV INTERROG REMOTE 1/2/MLT: CPT | Performed by: INTERNAL MEDICINE

## 2021-09-27 NOTE — PROGRESS NOTES
[facility]     9.6 years on device   RV imped 418  Shock 39  SVC 50  Threshold 0.875 @ 0.4  R waves 13  0% paced   optivol WNL

## 2021-09-28 ENCOUNTER — TELEPHONE (OUTPATIENT)
Dept: FAMILY MEDICINE CLINIC | Age: 61
End: 2021-09-28

## 2021-09-28 DIAGNOSIS — M25.562 ACUTE PAIN OF LEFT KNEE: Primary | ICD-10-CM

## 2021-09-28 RX ORDER — TRAMADOL HYDROCHLORIDE 50 MG/1
50 TABLET ORAL EVERY 6 HOURS PRN
Qty: 28 TABLET | Refills: 0 | Status: SHIPPED | OUTPATIENT
Start: 2021-09-28 | End: 2021-10-05

## 2021-09-28 NOTE — TELEPHONE ENCOUNTER
Patient calling states that she was seen in the walk-in clinic last week for knee pain and hd x-rays done and given a Rx for pain.  Patient states that she is still having pain even with the medication and is asking if there is anything else that she can do or anything else that could be prescribed to her  Patient is in [place] right now and will be back in town on 10.04.2021

## 2021-09-30 ENCOUNTER — TELEPHONE (OUTPATIENT)
Dept: UROLOGY | Age: 61
End: 2021-09-30

## 2021-10-01 ENCOUNTER — TELEPHONE (OUTPATIENT)
Dept: CARDIOLOGY CLINIC | Age: 61
End: 2021-10-01

## 2021-10-01 NOTE — TELEPHONE ENCOUNTER
Pre op Risk Assessment    Procedure External levator advancement and excision of lesion  Physician Dr. Kaye Geller  Date of surgery/procedure 10-14-21    Last OV 5-19-21  Last Stress 10-16-20  Last Echo 10-16-20  Last Cath 10-17-20  Last Stent None in Epic  Is patient on blood thinners ASA 81  Hold Meds/how many days 10-14 days    Fax: 1 Miah Anderson

## 2021-10-05 ENCOUNTER — CARE COORDINATION (OUTPATIENT)
Dept: CARE COORDINATION | Age: 61
End: 2021-10-05

## 2021-10-06 ENCOUNTER — HOSPITAL ENCOUNTER (OUTPATIENT)
Dept: NEUROLOGY | Age: 61
Discharge: HOME | End: 2021-10-06
Payer: COMMERCIAL

## 2021-10-06 DIAGNOSIS — R94.01 ELECTROENCEPHALOGRAM (EEG) ABNORMALITY WITHOUT SEIZURE: ICD-10-CM

## 2021-10-06 PROCEDURE — 95816 EEG AWAKE AND DROWSY: CPT

## 2021-10-06 NOTE — PROGRESS NOTES
[facility] Laboratory Technician worksheet       EEG Date: 10/6/2021    Name: Wilton Stein   : 1960   Age: 62 y.o. SEX: female    Room: OP    MRN: [id number]     CSN: [id number]    Ordering Provider: ANGELIA June CNP  EEG Number: [id number] Time of Test:  1004    Hand: Right   Sedation: No    H.V. Done: No age protocol Photic: Yes    Sleep: No   Drowsy: Yes   Sleep Deprived: No    Seizures observed: no    Mentality: alert       Clinical History: EEG abnormality without seizure. Patient states that she had abnormal EEG at age 29 that was performed in [place]. She states that her doctor correlated the abnormality with depression which she is having again. MRI Brain 21  Impression    Normal MRI of the brain. Past Medical History:       Diagnosis Date    Abnormal heart rhythm     Anxiety     Cancer [facility])     breast  2016     CHF (congestive heart failure) (HCC)     Congenital heart disease     DJD (degenerative joint disease)     Enlarged lymph nodes     Hypertension     Hypothyroidism     ICD (implantable cardioverter-defibrillator) in place 2019    Dr. Andrez Anderson Kidney stones     PONV (postoperative nausea and vomiting)     Prediabetes 10/7/2019    Thyroid disease          Prior to Admission medications    Medication Sig Start Date End Date Taking?  Authorizing Provider   Cholecalciferol 50 MCG (2000) CAPS Take 50 mcg by mouth daily 21   Dee Chaudhari PA-C   metFORMIN (GLUCOPHAGE-XR) 500 MG extended release tablet Take 1 tablet by mouth once daily with breakfast 21   ANGELIA Hartmann CNP   furosemide (LASIX) 20 MG tablet TAKE 1 TABLET BY MOUTH ONCE DAILY AS NEEDED FOR LEG SWELLING, WEIGHT GAIN 21   ANGELIA Hratmann CNP   buPROPion (WELLBUTRIN XL) 150 MG extended release tablet Take 1 tablet by mouth every morning 21   ANGELIA Hartmann CNP   CPAP Machine MISC by Does not apply route Please change CPAP pressure to auto 11-15 cm H20. 9/9/21   Gaby Cota PA-C   omeprazole (PRILOSEC) 40 MG delayed release capsule Take 1 capsule by mouth every morning (before breakfast) 9/9/21   ANGELIA Thomas CNP   venlafaxine (EFFEXOR XR) 75 MG extended release capsule Take 1 capsule by mouth daily (take along with 150 mg Effexor for total dose of 225 mg) 8/24/21   ANGELIA Hinton CNP   letrozole [facility]) 2.5 MG tablet Take 1 tablet by mouth daily 7/20/21   Maria Ines Sultana MD   polyethylene glycol (GLYCOLAX) 17 GM/SCOOP powder DISSOLVE & TAKE 1 CAPFUL ONCE DAILY 6/28/21   Historical Provider, MD   metoprolol succinate (TOPROL XL) 25 MG extended release tablet Take 0.5 tablets by mouth daily 5/19/21   [address], MD   EUTHYROX 100 MCG tablet Take 1 tablet by mouth once daily 4/23/21   ANGELIA Hinton CNP   digoxin (LANOXIN) 125 MCG tablet Take 1 tablet by mouth daily 3/1/21   [address] Summer Guardado MD   ENTRESTO 49-51 MG per tablet Take 1 tablet by mouth twice daily 1/25/21   ANGELIA Plata CNP   atorvastatin (LIPITOR) 40 MG tablet Take 1 tablet by mouth nightly 12/11/20   ANGELIA Hinton CNP   venlafaxine (EFFEXOR XR) 150 MG extended release capsule Take 1 capsule by mouth daily 10/20/20   ANGELIA Hinton CNP   aspirin 81 MG chewable tablet Take 1 tablet by mouth daily 7/11/20   Lydia Andrews MD   acetaminophen (TYLENOL) 500 MG tablet Take 500 mg by mouth every 6 hours as needed for Pain    Historical Provider, MD       Technician: Rahul Doan 10/6/2021

## 2021-10-07 NOTE — PROCEDURES
[address] [place] [address]                          ELECTROENCEPHALOGRAM REPORT    PATIENT NAME: Aury Oscar                     :        1960  MED REC NO:   [id number]                           ROOM:  ACCOUNT NO:   [de-identified]                           ADMIT DATE: 10/06/2021  PROVIDER:     Katina Peguero. Aydin Rhoades MD    DATE OF EEG:  10/06/2021    REFERRING PROVIDER:  Chirag Ibrahim CNP    CLINICAL HISTORY:  A 81-year-old female presenting with EEG abnormality  without seizure, had an abnormal study performed while in [facility]. The patient is having depression. Medications listed are Glucophage,  Lasix, Wellbutrin, Prilosec, Effexor, Femara, GlycoLax, Toprol, Lanoxin,  Lipitor, Tylenol. This is a 17-channel EEG performed without sleep deprivation. Hyperventilation was not performed. Photic stimulation was performed. The patient is described as alert. The background rhythm activity is noted to be 10 Hz in the posterior  parietal area, symmetric, well modulated, attenuates with eye opening. Lead artifact was noted limiting quality of data obtained. EKG tracing  revealed regular heart rate. Hyperventilation was not performed. Photic stimulation was performed with driving seen. There was no  evidence of epileptiform activity appreciated. No clinical seizures  were observed. IMPRESSION:  This is a normal EEG. There was no evidence of  epileptiform activity appreciated.         Rich Modi MD    D: 10/07/2021 9:08:28       T: 10/07/2021 9:10:43     [email or web address]  Job#: [id number]     Doc#: [id number]    CC:

## 2021-10-11 ENCOUNTER — OFFICE VISIT (OUTPATIENT)
Dept: FAMILY MEDICINE CLINIC | Age: 61
End: 2021-10-11
Payer: COMMERCIAL

## 2021-10-11 VITALS
BODY MASS INDEX: 40.04 KG/M2 | HEIGHT: 62 IN | SYSTOLIC BLOOD PRESSURE: 110 MMHG | DIASTOLIC BLOOD PRESSURE: 76 MMHG | TEMPERATURE: 99.5 F | HEART RATE: 89 BPM | RESPIRATION RATE: 14 BRPM | WEIGHT: 217.6 LBS

## 2021-10-11 DIAGNOSIS — M25.562 ACUTE PAIN OF LEFT KNEE: Primary | ICD-10-CM

## 2021-10-11 DIAGNOSIS — E55.9 VITAMIN D DEFICIENCY: ICD-10-CM

## 2021-10-11 DIAGNOSIS — F41.8 DEPRESSION WITH ANXIETY: ICD-10-CM

## 2021-10-11 PROCEDURE — G8417 CALC BMI ABV UP PARAM F/U: HCPCS | Performed by: NURSE PRACTITIONER

## 2021-10-11 PROCEDURE — 1036F TOBACCO NON-USER: CPT | Performed by: NURSE PRACTITIONER

## 2021-10-11 PROCEDURE — G8482 FLU IMMUNIZE ORDER/ADMIN: HCPCS | Performed by: NURSE PRACTITIONER

## 2021-10-11 PROCEDURE — G8427 DOCREV CUR MEDS BY ELIG CLIN: HCPCS | Performed by: NURSE PRACTITIONER

## 2021-10-11 PROCEDURE — 3017F COLORECTAL CA SCREEN DOC REV: CPT | Performed by: NURSE PRACTITIONER

## 2021-10-11 PROCEDURE — 99213 OFFICE O/P EST LOW 20 MIN: CPT | Performed by: NURSE PRACTITIONER

## 2021-10-11 RX ORDER — BUPROPION HYDROCHLORIDE 150 MG/1
150 TABLET ORAL EVERY MORNING
Qty: 90 TABLET | Refills: 1 | Status: ON HOLD | OUTPATIENT
Start: 2021-10-11 | End: 2022-06-05 | Stop reason: HOSPADM

## 2021-10-11 RX ORDER — VENLAFAXINE HYDROCHLORIDE 150 MG/1
150 CAPSULE, EXTENDED RELEASE ORAL DAILY
Qty: 90 CAPSULE | Refills: 3 | Status: ON HOLD | OUTPATIENT
Start: 2021-10-11 | End: 2022-06-05 | Stop reason: HOSPADM

## 2021-10-11 NOTE — PROGRESS NOTES
Chief Complaint   Patient presents with    1 Month Follow-Up     knee pain is improving        History obtained from chart review and the patient. SUBJECTIVE:  Darvin Elena is a 62 y.o. female that presents today for follow up depression and knee pain    Left knee pain  Started having left knee pain about 1 month ago. She went to walk in clinic and was prescribed pain medication and prednisone which didn't help. She was prescribed some Tramadol for pain and she was in [facility] and spent a lot of time resting which did help. The pain is improving. The pain is mostly on the medial side of her knee. Pain is worse with stairs, sometimes does feel like it's going to give out on her. MDD  Moods are doing much better. Taking Effexor and Wellbutrin. She is also taking vitamin D. Has moments of depression and sadness, melt downs, but most of this is due to her exhusband. She is doing much better overall. Denies any SI/HI.       Age/Gender Health Maintenance    Lipid -  Lab Results   Component Value Date    CHOL 154 07/09/2020     Lab Results   Component Value Date    TRIG 106 07/09/2020     Lab Results   Component Value Date    HDL 51 08/30/2021    HDL 49 07/09/2020    HDL 57 02/05/2020     Lab Results   Component Value Date    LDLCALC 84 08/30/2021    LDLCALC 84 07/09/2020    [address] 114 02/05/2020     No results found for: LABVLDL, VLDL  No results found for: CHOLHDLRATIO    DM Screen -   Lab Results   Component Value Date    LABA1C 6.1 03/29/2021     Colon Cancer Screening - referral [facility] 10/7/19  Lung Cancer Screening (Age 54 to [de-identified] with 30 pack year hx, current smoker or quit within past 15 years) - n/a    Tetanus - needs  Influenza Vaccine - 10/18  Pneumonia Vaccine - 65  Zostavax - 50     Breast Cancer Screening - 50  Cervical Cancer Screening - needs  Osteoporosis Screening - 72  Chlamydia Screen - n/a    PSA testing discussion - n/a  AAA Screening - n/a    Falls screening - n/a    Current Outpatient Medications   Medication Sig Dispense Refill    venlafaxine (EFFEXOR XR) 150 MG extended release capsule Take 1 capsule by mouth daily 90 capsule 3    buPROPion (WELLBUTRIN XL) 150 MG extended release tablet Take 1 tablet by mouth every morning 90 tablet 1    Cholecalciferol 50 MCG (2000 UT) CAPS Take 50 mcg by mouth daily 30 capsule 3    metFORMIN (GLUCOPHAGE-XR) 500 MG extended release tablet Take 1 tablet by mouth once daily with breakfast 90 tablet 0    furosemide (LASIX) 20 MG tablet TAKE 1 TABLET BY MOUTH ONCE DAILY AS NEEDED FOR LEG SWELLING, WEIGHT GAIN 90 tablet 0    CPAP Machine MISC by Does not apply route Please change CPAP pressure to auto 11-15 cm H20. 1 each 0    omeprazole (PRILOSEC) 40 MG delayed release capsule Take 1 capsule by mouth every morning (before breakfast) 90 capsule 1    venlafaxine (EFFEXOR XR) 75 MG extended release capsule Take 1 capsule by mouth daily (take along with 150 mg Effexor for total dose of 225 mg) 90 capsule 3    letrozole (FEMARA) 2.5 MG tablet Take 1 tablet by mouth daily 90 tablet 3    polyethylene glycol (GLYCOLAX) 17 GM/SCOOP powder DISSOLVE & TAKE 1 CAPFUL ONCE DAILY      metoprolol succinate (TOPROL XL) 25 MG extended release tablet Take 0.5 tablets by mouth daily 30 tablet 3    EUTHYROX 100 MCG tablet Take 1 tablet by mouth once daily 90 tablet 1    digoxin (LANOXIN) 125 MCG tablet Take 1 tablet by mouth daily 90 tablet 3    ENTRESTO 49-51 MG per tablet Take 1 tablet by mouth twice daily 60 tablet 12    atorvastatin (LIPITOR) 40 MG tablet Take 1 tablet by mouth nightly 90 tablet 3    aspirin 81 MG chewable tablet Take 1 tablet by mouth daily 30 tablet 3    acetaminophen (TYLENOL) 500 MG tablet Take 500 mg by mouth every 6 hours as needed for Pain       No current facility-administered medications for this visit.      Orders Placed This Encounter   Medications    venlafaxine (EFFEXOR XR) 150 MG extended release capsule     Sig: Take 1 capsule by mouth daily     Dispense:  90 capsule     Refill:  3    buPROPion (WELLBUTRIN XL) 150 MG extended release tablet     Sig: Take 1 tablet by mouth every morning     Dispense:  90 tablet     Refill:  1         All medications reviewed and reconciled, including OTC and herbal medications. Updated list given to patient. 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 9)    Prediabetes    Malignant neoplasm of left breast in female, estrogen receptor positive ([facility]    Class 2 severe obesity due to excess calories with serious comorbidity and body mass index (BMI) of 39.0 to 39.9 in adult [facility])    Facial droop    Facial swelling    Depression with anxiety    Vertigo    Exertional chest pain    Gastroesophageal reflux disease    REINALDO (obstructive sleep apnea)    Renal calculi    Adjustment disorder       Past Medical History:   Diagnosis Date    Abnormal heart rhythm     Anxiety     Cancer [facility])     breast  2016     CHF (congestive heart failure) (HCC)     Congenital heart disease     DJD (degenerative joint disease)     Enlarged lymph nodes     Hypertension     Hypothyroidism     ICD (implantable cardioverter-defibrillator) in place 02/11/2019    Dr. Shira Ogden Kidney stones     PONV (postoperative nausea and vomiting)     Prediabetes 10/7/2019    Thyroid disease        Past Surgical History:   Procedure Laterality Date    APPENDECTOMY      CARPAL TUNNEL RELEASE  2019    COLONOSCOPY      COLONOSCOPY N/A 07/27/2020    COLONOSCOPY POLYPECTOMY SNARE/COLD BIOPSY performed by Sara Roberts MD at [facility]    COLONOSCOPY  2020    COLONOSCOPY POLYPECTOMY HOT BIOPSY performed by Sara Roberts MD at [address] [place] [address] Street      EGD     [place] Right 2020    DEQUERVAINS RELEASE AND RIGHT RING FINGER TRIGGER RELEASE performed by Vanessa Plunkett DO at [address], [address] Left 2019    [employer]ISIA PT HAS A MRI CONDITIONAL SYSTEM    PTCA      TONSILLECTOMY         Allergies   Allergen Reactions    Adhesive Tape Itching       Social History     Socioeconomic History    Marital status:      Spouse name: Not on file    Number of children: Not on file    Years of education: Not on file    Highest education level: Not on file   Occupational History    Not on file   Tobacco Use    Smoking status: Former Smoker     Packs/day: 0.25     Years: 37.00     Pack years: 9.25     Types: Cigarettes     Start date: 1981     Quit date: 2020     Years since quittin.3    Smokeless tobacco: Never Used   Vaping Use    Vaping Use: Never assessed   Substance and Sexual Activity    Alcohol use: No    Drug use: No    Sexual activity: Not on file   Other Topics Concern    Not on file   Social History Narrative    Referral placed for counseling    Denies barriers with medication affordability    Denies community services     Denies transportation issues     Social Determinants of Health     Financial Resource Strain: Low Risk     Difficulty of Paying Living Expenses: Not hard at all   Food Insecurity: No Food Insecurity    Worried About Running Out of Food in the Last Year: Never true    Narciso of Food in the Last Year: Never true   Transportation Needs: No Transportation Needs    Lack of Transportation (Medical): No    Lack of Transportation (Non-Medical):  No   Physical Activity: Inactive    Days of Exercise per Week: 0 days    Minutes of Exercise per Session: 0 min Stress: Stress Concern Present    Feeling of Stress : Rather much   Social Connections: Socially Isolated    Frequency of Communication with Friends and Family: More than three times a week    Frequency of Social Gatherings with Friends and Family: More than three times a week    Attends Mosque Services: Never    Active Member of Clubs or Organizations: No    Attends Club or Organization Meetings: Never    Marital Status:    Intimate Partner Violence:     Fear of Current or Ex-Partner:     Emotionally Abused:     Physically Abused:     Sexually Abused:        Family History   Problem Relation Age of Onset    High Blood Pressure Mother     Dementia Mother     Cancer Father     Colon Cancer Father     Mental Retardation Brother     Colon Polyps Brother     Cancer Maternal Grandfather     Esophageal Cancer Neg Hx          I have reviewed the patient's past medical history, past surgical history, allergies, medications, social and family history and I have made updates where appropriate.       Review of Systems  Positive responses are highlighted in bold    Constitutional:  Fever, Chills, Night Sweats, Fatigue, Unexpected changes in weight  Eyes:  Eye discharge, Eye pain, Eye redness, Visual disturbances   HENT:  Ear pain, Tinnitus, Nosebleeds, Trouble swallowing, Hearing loss, Sore throat  Cardiovascular:  Chest Pain, Palpitations, Orthopnea, Paroxysmal Nocturnal Dyspnea  Respiratory:  Cough, Wheezing, Shortness of breath, Chest tightness, Apnea  Gastrointestinal:  Nausea, Vomiting, Diarrhea, Constipation, Heartburn, Blood in stool  Genitourinary:  Difficulty or painful urination, Flank pain, Change in frequency, Urgency  Skin:  Color change, Rash, Itching, Wound  Psychiatric:  Hallucinations, Anxiety, Depression, Suicidal ideation  Hematological:  Enlarged glands, Easy bleeding, Easily bruising  Musculoskeletal:  Joint pain, Back pain, Gait problems, Joint swelling, Myalgias  Neurological:  Dizziness, Headaches, Presyncope, Numbness, Seizures, Tremors  Allergy:  Environmental allergies, Food allergies  Endocrine:  Heat Intolerance, Cold Intolerance, Polydipsia, Polyphagia, Polyuria    Lab Results   Component Value Date     04/30/2021    K 4.0 04/30/2021     04/30/2021    CO2 24 04/30/2021    BUN 12 04/30/2021    CREATININE 0.5 04/30/2021    GLUCOSE 130 (H) 04/30/2021    CALCIUM 9.7 04/30/2021    PROT 7.0 04/30/2021    LABALBU 4.5 04/30/2021    BILITOT 0.3 04/30/2021    ALKPHOS 90 04/30/2021    AST 20 04/30/2021    ALT 16 04/30/2021    LABGLOM >90 04/30/2021     Lab Results   Component Value Date    TSH 0.957 03/29/2021     Lab Results   Component Value Date    WBC 5.6 04/30/2021    HGB 14.4 04/30/2021    HCT 45.1 04/30/2021    MCV 94.5 04/30/2021     04/30/2021       PHYSICAL EXAM:  Vitals:    10/11/21 0814   BP: 110/76   Pulse: 89   Resp: 14   Temp: 99.5 °F (37.5 °C)   TempSrc: Oral   Weight: 217 lb 9.6 oz (98.7 kg)   Height: 5' 2\" (1.575 m)     Body mass index is 39.8 kg/m². VS Reviewed  General Appearance: A&O x 3, No acute distress,well developed and well- nourished  Head: normocephalic and atraumatic  Eyes: pupils equal, round, and reactive to light, extraocular eye movements intact, conjunctivae and eye lids without erythema  Neck: supple and non-tender without mass, no thyromegaly or thyroid nodules, no cervical lymphadenopathy  Pulmonary/Chest: clear to auscultation bilaterally- no wheezes, rales or rhonchi, normal air movement, no respiratory distress or retractions  Cardiovascular: S1 and S2 auscultated w/ RRR. No murmurs, rubs, clicks, or gallops, distal pulses intact. Abdomen: soft, non-tender, non-distended, bowl sounds physiologic,  no rebound or guarding, no masses or hernias noted. Liver and spleen without enlargement.    Extremities: no cyanosis, clubbing or edema of the lower extremities  Musculoskeletal: No joint swelling or gross deformity   Left knee: + medial pain left knee, negative norma's, full ROM no joint effusion  Neuro:  Alert, 5/5 strength globally and symmetrically  Psych: Affect and mood are flat. Thought process is normal without evidence of psychosis. Good insight and appropriate interaction. Cognition and memory appear to be intact. Skin: warm and dry, no rash or erythema  Lymph:  No cervical, auricular or supraclavicular lymph nodes palpated      ASSESSMENT & PLAN  Dandre Gramajo was seen today for 1 month follow-up. Diagnoses and all orders for this visit:    Acute pain of left knee    Vitamin D deficiency  -     Vitamin D 25 Hydroxy; Future    Depression with anxiety  -     venlafaxine (EFFEXOR XR) 150 MG extended release capsule; Take 1 capsule by mouth daily  -     buPROPion (WELLBUTRIN XL) 150 MG extended release tablet; Take 1 tablet by mouth every morning      - home therapy exercises for knee  - consider PT if no improvement  - med refill of Effexor and Wellbutrin    DISPOSITION    Return if symptoms worsen or fail to improve. Dandre Gramajo released without restrictions. PATIENT COUNSELING    Counseling was provided today regarding the following topics: Healthy eating habits, Regular exercise, substance abuse and healthy sleep habits. Dandre Gramajo received counseling on the following healthy behaviors: medication adherence    Patient given educational materials on: See Attached    I have instructed Dandre Gramajo to complete a self tracking handout on Blood Pressures  and instructed them to bring it with them to her next appointment. Barriers to learning and self management: none    Discussed use, benefit, and side effects of prescribed medications. Barriers to medication compliance addressed. All patient questions answered. Pt voiced understanding.        Electronically signed by ANGELIA Irby CNP on 10/11/2021 at 8:39 AM

## 2021-10-11 NOTE — PATIENT INSTRUCTIONS
Patient Education        Rotura del menisco: Ejercicios  Meniscus Tear: Exercises  Instrucciones de cuidado  Aquí se presentan algunos ejemplos de ejercicios típicos de rehabilitación para tratar montalvo afección. Empiece cada ejercicio lentamente. Reduzca la intensidad del ejercicio si Simone Munda a tener dolor. Montalvo médico o fisioterapeuta le dirán cuándo puede comenzar con estos ejercicios y cuáles funcionarán mejor para usted. Cómo hacer los ejercicios  Estiramiento de la pantorrilla contra la pared    1. Párese frente a barrett pared con las nasir en la pared a la altura de los ojos. Coloque la pierna afectada aproximadamente a un paso detrás de la otra pierna. 2. Manteniendo la pierna de atrás estirada y el talón de atrás en el suelo, flexione la rodilla delantera y traiga suavemente la cadera y el pecho hacia la pared hasta que sienta un estiramiento en la pantorrilla de la pierna trasera. 3. Mantenga el estiramiento irwin al menos 15 a 30 segundos. 4. Repita de 2 a 4 veces. 5. Repita los pasos 1 al 4, owen esta vez mantenga flexionada la rodilla trasera. Estiramiento de isquiotibiales contra la pared    1. Acuéstese de espaldas en el umbral de barrett salud, con la pierna christiano a través de la [place]. 2. Deslice la pierna afectada a lo carlee de la pared para enderezar la rodilla. Sachi Bucky sentir un estiramiento suave en la parte posterior de la pierna. 1. No arquee la espalda. 2. No flexione ninguna de [employer]. 3. Mantenga un talón tocando el suelo y el otro talón tocando la pared. No apunte con los dedos del pie. 3. Mantenga el estiramiento irwin al menos 1 minuto. Luego, con el tiempo, trate de alargar el tiempo que hace el estiramiento hasta llegar a 6 minutos. 4. Repita de 2 a 4 veces. 5. Si no tiene un lugar en el que realizar george ejercicio en barrett salud, hay otra manera de hacerlo:  6. Acuéstese de espaldas y doble la pierna afectada.   7. Coloque barrett toalla alrededor de la alexis metatarsiana y los dedos del pie, y sostenga los extremos de la toalla con las nasir. 8. Enderece la rodilla y, lentamente, tire hacia atrás de la [place]. Leane Rook sentir un estiramiento suave en la parte posterior de la pierna. 9. Mantenga el estiramiento irwin al menos 15 a 30 segundos. O, aún mejor, mantenga el estiramiento por hasta 1 minuto si puede. 10. Repita de 2 a 4 veces. Ejercicios para el músculo cuádriceps    1. Siéntese con la pierna estirada y apoyada en el suelo o en barrett cama firme. (Si siente molestias en la parte delantera o trasera de la rodilla, coloque barrett toalla pequeña enrollada debajo de la rodilla). 2. Tense los músculos de la parte superior del muslo presionando la parte posterior de la rodilla extendida sobre el suelo. (Si siente molestias debajo de la [place], coloque barrett toalla pequeña enrollada debajo de la rodilla). 3. Mantenga la posición irwin unos 6 segundos y luego descanse irwin 10 segundos. 4. Emmy de 8 a 12 repeticiones varias veces al día. Elevación de la pierna recta al frente    1. Acuéstese boca arriba con la rodilla christiano doblada de forma que el pie se apoye en el suelo. La pierna lesionada debe estar recta. Asegúrese de que la parte baja de la espalda tenga barrett curva normal. Debería poder deslizar la mano extendida entre el suelo y la parte baja de la espalda, con la garcia de la mano tocando el suelo y la espalda tocando el dorso de la [place]. 2. Tense los músculos del muslo de la pierna lesionada presionando la parte posterior de la rodilla extendida sobre el suelo. Mantenga la rodilla recta. 3. Con los músculos del muslo tensos, levante la pierna lesionada de modo que el talón quede a aproximadamente 12 pulgadas (30 cm) del suelo. Manténgala así irwin 5 segundos y luego bájela lentamente. [address] 8 a 12 repeticiones. Elevación de la pierna recta hacia atrás    1.  Acuéstese boca abajo y levante la pierna extendida hacia atrás (LINDAST techo). 2. Levante los dedos del pie a aproximadamente 6 pulgadas (15 cm) del suelo, mantenga la posición irwin unos 6 segundos y luego baje la pierna lentamente. [address] 8 a 12 repeticiones. Flexiones de isquiotibiales    1. Acuéstese boca abajo con las rodillas rectas. Si tiene incomodidad en la rótula, enrolle un paño y póngalo debajo de la pierna dina por encima de la [place]. 2. Levante el pie de la pierna lesionada flexionando la rodilla de manera que lleve el pie hacia la nalga (glúteo). Si george movimiento le duele, pruebe sin doblar tanto la rodilla. [place] puede ayudarle a evitar cualquier movimiento doloroso. 3. Baje lentamente la pierna hasta el suelo. [address] 8 a 12 repeticiones. 5. Con el permiso de montalvo médico o fisioterapeuta, lucero vez también [place] agregar barrett polaina con peso al tobillo (no más de 5 libras [2.25 kilos]). Con el peso, usted no tiene que levantar la pierna más de 12 pulgadas (30 cm) para [employer] isquiotibiales. Deslizamiento por la pared apretando barrett pelota    1. Párese con la espalda contra barrett pared y con los pies separados aproximadamente a la anchura de los hombros. Los pies deben estar a unas 12 pulgadas (30 cm) de la pared. 2. Coloque barrett pelota de aproximadamente el tamaño de un balón de fútbol entre las rodillas. A continuación, deslícese lentamente por la pared hasta que tenga las rodillas dobladas a entre 21 y 27 grados. 3. Tense los músculos del muslo apretando la pelota entre las rodillas. Mantenga braeden posición irwin unos 10 segundos y luego deje de apretar. Descanse hasta 10 segundos entre repeticiones. 4. Repita de 8 a 12 veces. Levantamientos de talón    1. Párese con los pies separados unas pulgadas, con las nasir reposando ligeramente en barrett encimera o en barrett silla frente a usted. 2. Lentamente, levante los talones del suelo mientras mantiene las rodillas rectas.   3. Mantenga la posición irwin unos 6 segundos, luego baje los talones lentamente hacia el suelo. 4. Emmy de 8 a [address] [place] sostenido con los talones    1. Acuéstese boca arriba con ambas rodillas flexionadas y los tobillos doblados de manera que solo los talones estén haciendo presión contra el suelo. Las rodillas deben estar flexionadas más o menos a 90 grados. 2. A continuación, emmy presión con los talones en el suelo, apriete las nalgas (glúteos) y levante las caderas del suelo hasta que los hombros, las caderas y las rodillas estén en línea recta. 3. Mantenga la posición irwin unos 6 segundos mientras sigue respirando normalmente, y luego baje lentamente las caderas hacia el suelo y descanse irwin 10 segundos. [address] 8 a 12 repeticiones. Flexiones superficiales de rodilla estando de pie    1. Párese con las nasir ligeramente apoyadas sobre barrett encimera o barrett silla frente a usted. Separe los pies a la anchura de los hombros.  2. Flexione lentamente las rodillas para que se acuclille alis si se fuera a sentar en barrett silla. Asegúrese de que las rodillas no queden jaime de los dedos de los pies. 3. Agáchese unas 6 pulgadas (15 cm). Los talones deben permanecer en el suelo en todo momento. 4. Levántese lentamente hasta quedar enderezado. La atención de seguimiento es barrett parte clave de montalvo tratamiento y seguridad. Asegúrese de hacer y acudir a todas las citas, y llame a montalvo médico si está teniendo problemas. También es barrett buena idea saber los resultados de sam exámenes y mantener barrett lista de los medicamentos que dolores. ¿Dónde puede encontrar más información en inglés? Armando Speaker a [email or web address] e ingrese a montalvo cuenta de Sharont. Garett Pill C183 en el [facility] \"Search Health Information\" para más información (en inglés) sobre \"Rotura del menisco: Ejercicios. \"     Si no tiene barrett cuenta, emmy mel en el enlace \"Sign Up Now\".   Revisado: 1 julio, 2021               Versión del contenido: 13.0  © 7080-3001 Healthwise, Incorporated. Las instrucciones de cuidado fueron adaptadas bajo licencia por [facility] ([facility]). Si usted tiene Mayes Monon afección médica o sobre estas instrucciones, siempre pregunte a montalvo profesional de morelia. Healthwise, Incorporated niega toda garantía o responsabilidad por montalvo uso de esta información.

## 2021-10-18 ENCOUNTER — OFFICE VISIT (OUTPATIENT)
Dept: UROLOGY | Age: 61
End: 2021-10-18
Payer: COMMERCIAL

## 2021-10-18 VITALS
WEIGHT: 216 LBS | DIASTOLIC BLOOD PRESSURE: 64 MMHG | BODY MASS INDEX: 39.75 KG/M2 | HEIGHT: 62 IN | SYSTOLIC BLOOD PRESSURE: 119 MMHG

## 2021-10-18 DIAGNOSIS — R10.9 FLANK PAIN: ICD-10-CM

## 2021-10-18 DIAGNOSIS — R10.2 PELVIC PAIN: Primary | ICD-10-CM

## 2021-10-18 DIAGNOSIS — R39.198 DIFFICULTY URINATING: ICD-10-CM

## 2021-10-18 DIAGNOSIS — N20.0 KIDNEY STONES: Primary | ICD-10-CM

## 2021-10-18 DIAGNOSIS — R31.29 MICROSCOPIC HEMATURIA: ICD-10-CM

## 2021-10-18 LAB
BILIRUBIN URINE: NEGATIVE
BLOOD URINE, POC: ABNORMAL
CHARACTER, URINE: CLEAR
COLOR, URINE: YELLOW
GLUCOSE URINE: NEGATIVE MG/DL
KETONES, URINE: NEGATIVE
LEUKOCYTE CLUMPS, URINE: NEGATIVE
NITRITE, URINE: NEGATIVE
PH, URINE: 5.5 (ref 5–9)
PROTEIN, URINE: NEGATIVE MG/DL
REASON FOR REJECTION: NORMAL
REJECTED TEST: NORMAL
SPECIFIC GRAVITY, URINE: >= 1.03 (ref 1–1.03)
UROBILINOGEN, URINE: 0.2 EU/DL (ref 0–1)

## 2021-10-18 PROCEDURE — G8427 DOCREV CUR MEDS BY ELIG CLIN: HCPCS | Performed by: PHYSICIAN ASSISTANT

## 2021-10-18 PROCEDURE — 1036F TOBACCO NON-USER: CPT | Performed by: PHYSICIAN ASSISTANT

## 2021-10-18 PROCEDURE — G8482 FLU IMMUNIZE ORDER/ADMIN: HCPCS | Performed by: PHYSICIAN ASSISTANT

## 2021-10-18 PROCEDURE — 81003 URINALYSIS AUTO W/O SCOPE: CPT | Performed by: PHYSICIAN ASSISTANT

## 2021-10-18 PROCEDURE — G8417 CALC BMI ABV UP PARAM F/U: HCPCS | Performed by: PHYSICIAN ASSISTANT

## 2021-10-18 PROCEDURE — 99213 OFFICE O/P EST LOW 20 MIN: CPT | Performed by: PHYSICIAN ASSISTANT

## 2021-10-18 PROCEDURE — 3017F COLORECTAL CA SCREEN DOC REV: CPT | Performed by: PHYSICIAN ASSISTANT

## 2021-10-18 NOTE — PROGRESS NOTES
[id number] [facility] [id number]  Dept: [phone]  Loc: [phone]      Ms. Brandin Mondragon was seen in follow up for     Chief Complaint   Patient presents with    Follow-up     flank pain    Hematuria        HPI:  Ms. Brandin Mondragon is a 71-year-old female with a history of nephrolithiasis and microscopic hematuria. She underwent a renal US in July 2021 that demonstrated a 5 mm calculus in the mid body right kidney and 9 mm calculus in inferior pole of the left kidney. At her last visit, she reported intermittent discomfort with urination, pelvic pressure, urgency, and frequency. She states that she has noticed an marked improvement of her symptomatology over the last several weeks. She has been performing Kegel exercises and she believes that has helped. She still is experiencing severe left flank pain intermittently. She states the pain is mostly located in the left flank area and is sharp and stabbing in nature. Her pain can reach an 8/10 intensity. She is not aware of any aggravating factors. She denies increased irritative or obstructive symptomatology associated with the pain. Alleviating factors include Norco. The pain lasted for a few hours and then subsided. Currently, she is not experiencing pain in this area. She presents today to review her CT scan of the abdomen and pelvis and urine cytology results.        Past Medical History:   Diagnosis Date    Abnormal heart rhythm     Anxiety     Cancer [facility])     breast  2016     CHF (congestive heart failure) (HCC)     Congenital heart disease     DJD (degenerative joint disease)     Enlarged lymph nodes     Hypertension     Hypothyroidism     ICD (implantable cardioverter-defibrillator) in place 02/11/2019    Dr. Delmy Carroll Kidney stones     PONV (postoperative nausea and vomiting)     Prediabetes 10/7/2019    Thyroid disease        Past Surgical History:   Procedure Laterality Date    APPENDECTOMY      CARPAL TUNNEL RELEASE  2019    COLONOSCOPY      COLONOSCOPY N/A 07/27/2020    COLONOSCOPY POLYPECTOMY SNARE/COLD BIOPSY performed by Brandon Buenrostro [facility]    COLONOSCOPY  07/27/2020    COLONOSCOPY POLYPECTOMY HOT BIOPSY performed by Brandon Bu[facility]rostro MD at [facility]    COLONOSCOPY  2021    EGD      EYE SURGERY Bilateral 10/2021    eyelid lift     FINGER TRIGGER RELEASE Right 06/25/2020    DEQUERVAINS RELEASE AND RIGHT RING FINGER TRIGGER RELEASE performed by Iris Fajardo DO at [address], [facility]      PACEMAKER INSERTION Left 02/11/2019    [facility] HAS A MRI CONDITIONAL SYSTEM    PTCA      TONSILLECTOMY         Current Outpatient Medications on File Prior to Visit   Medication Sig Dispense Refill    venlafaxine (EFFEXOR XR) 150 MG extended release capsule Take 1 capsule by mouth daily 90 capsule 3    buPROPion (WELLBUTRIN XL) 150 MG extended release tablet Take 1 tablet by mouth every morning 90 tablet 1    Cholecalciferol 50 MCG (2000 UT) CAPS Take 50 mcg by mouth daily 30 capsule 3    metFORMIN (GLUCOPHAGE-XR) 500 MG extended release tablet Take 1 tablet by mouth once daily with breakfast 90 tablet 0    furosemide (LASIX) 20 MG tablet TAKE 1 TABLET BY MOUTH ONCE DAILY AS NEEDED FOR LEG SWELLING, WEIGHT GAIN 90 tablet 0    CPAP Machine MISC by Does not apply route Please change CPAP pressure to auto 11-15 cm H20. 1 each 0    omeprazole (PRILOSEC) 40 MG delayed release capsule Take 1 capsule by mouth every morning (before breakfast) 90 capsule 1    venlafaxine (EFFEXOR XR) 75 MG extended release capsule Take 1 capsule by mouth daily (take along with 150 mg Effexor for total dose of 225 mg) 90 capsule 3    letrozole (FEMARA) 2.5 MG tablet Take 1 tablet by mouth daily 90 tablet 3    polyethylene glycol (GLYCOLAX) 17 GM/SCOOP powder DISSOLVE & TAKE 1 CAPFUL ONCE DAILY      metoprolol succinate (TOPROL XL) 25 MG extended release tablet Take 0.5 tablets by mouth daily 30 tablet 3    EUTHYROX 100 MCG tablet Take 1 tablet by mouth once daily 90 tablet 1    digoxin (LANOXIN) 125 MCG tablet Take 1 tablet by mouth daily 90 tablet 3    ENTRESTO 49-51 MG per tablet Take 1 tablet by mouth twice daily 60 tablet 12    atorvastatin (LIPITOR) 40 MG tablet Take 1 tablet by mouth nightly 90 tablet 3    aspirin 81 MG chewable tablet Take 1 tablet by mouth daily 30 tablet 3    acetaminophen (TYLENOL) 500 MG tablet Take 500 mg by mouth every 6 hours as needed for Pain       No current facility-administered medications on file prior to visit.        Allergies   Allergen Reactions    Adhesive Tape Itching       Family History   Problem Relation Age of Onset    High Blood Pressure Mother     Dementia Mother     Cancer Father     Colon Cancer Father     Mental Retardation Brother     Colon Polyps Brother     Cancer Maternal Grandfather     Esophageal Cancer Neg Hx        Social History     Socioeconomic History    Marital status:      Spouse name: Not on file    Number of children: Not on file    Years of education: Not on file    Highest education level: Not on file   Occupational History    Not on file   Tobacco Use    Smoking status: Former Smoker     Packs/day: 0.25     Years: 37.00     Pack years: 9.25     Types: Cigarettes     Start date: 1981     Quit date: 2020     Years since quittin.4    Smokeless tobacco: Never Used   Vaping Use    Vaping Use: Never assessed   Substance and Sexual Activity    Alcohol use: No    Drug use: No    Sexual activity: Not on file   Other Topics Concern    Not on file   Social History Narrative    Referral placed for counseling    Denies barriers with medication affordability    Denies community services     Denies transportation issues     Social Determinants of Health     Financial Resource Strain: Low Risk     Difficulty of Paying Living Expenses: Not hard at all Food Insecurity: No Food Insecurity    Worried About Running Out of Food in the Last Year: Never true    Narciso of Food in the Last Year: Never true   Transportation Needs: No Transportation Needs    Lack of Transportation (Medical): No    Lack of Transportation (Non-Medical): No   Physical Activity: Inactive    Days of Exercise per Week: 0 days    Minutes of Exercise per Session: 0 min   Stress: Stress Concern Present    Feeling of Stress : Rather much   Social Connections: Socially Isolated    Frequency of Communication with Friends and Family: More than three times a week    Frequency of Social Gatherings with Friends and Family: More than three times a week    Attends Ep[employer] Services: Never    Active Member of Clubs or Organizations: No    Attends Club or Organization Meetings: Never    Marital Status:    Intimate Partner Violence:     Fear of Current or Ex-Partner:     Emotionally Abused:     Physically Abused:     Sexually Abused:        Review of Systems  Constitutional: Negative for fatigue, fever and unexpected weight change. HENT: Negative for congestion and trouble swallowing. Eyes: Negative for pain and itching. Respiratory: Negative for cough and shortness of breath. Cardiovascular: Negative for chest pain and leg swelling. Gastrointestinal: Negative for abdominal pain, constipation, diarrhea and nausea. Endocrine: Negative for cold intolerance and heat intolerance. Genitourinary: See HPI. Musculoskeletal:  Negative for back pain and joint swelling. Skin: Negative for rash. Neurological: Negative for dizziness, weakness, numbness and headaches. Psychiatric/Behavioral: The patient is not nervous/anxious. Exam    /64   Ht 5' 2\" (1.575 m)   Wt 216 lb (98 kg)   BMI 39.51 kg/m²     Constitutional: Oriented to person, place, and time. Vital signs are normal. Appears well-developed and well-nourished. Cooperative. No distress.    HENT:    Head: noncalcified nodule in the right lower lobe (series 3 image 1). Visualized lung bases are otherwise clear.       Kidneys/Urinary Tract: There are no renal or ureteral calculi bilaterally. There is no hydronephrosis bilaterally. There is an 11 mm simple cyst within the mid to lower left kidney. No other focal renal lesions are identified bilaterally. There is normal    excretion of contrast into bilateral renal calyces, pelves, and ureters. No definite urothelial thickening is noted along the upper urinary tracts bilaterally. No bladder calculi are identified. The distended urinary bladder is smooth wall, without    focal nodular thickening along the urinary bladder wall.       Liver: Liver is normal. CT appearance of the gallbladder is unremarkable. There is no intrahepatic or extrahepatic biliary dilation.       Pancreas, spleen and adrenal glands: Pancreas, spleen, and adrenal glands are normal. There is an accessory splenule adjacent to the spleen.       Bowel/Peritoneum: There is no small bowel or colonic dilation. The appendix is not seen, which may relate to prior appendectomy. No inflammatory changes in the right lower quadrant. No evidence of free intraperitoneal air. There is no free fluid or fluid    collection within the abdomen or pelvis. There is no abdominal or pelvic lymphadenopathy.       Vascular Structures: Portal venous system is patent. Abdominal aorta is normal in caliber. There is minimal calcified atherosclerotic plaque within the abdominal aorta.       Pelvis: Uterus is intact and is anteverted. Calcifications at the uterine fundus likely relate to fibroids.       Bones: There are no destructive osseous lesions identified. Vertebral body heights are maintained.           Impression       1. No urinary tract calculi or hydronephrosis bilaterally.       2. Unremarkable appearance of the urinary bladder, without nodular thickening along the bladder wall. Assessment/Plan:    1. Nephrolithiasis- Status post left ESWL in 2015 with a urologist in [facility] 21. Recent Renal US demonstrated a 5 mm calculus in the mid body right kidney and 9 mm calculus in inferior pole of left kidney. Her CT scan of the abdomen and pelvis did not demonstrate any stones or hydronephrosis. GFR stable. Left flank pain may be secondary to a musculoskeletal etiology. On physical examination, her pelvis seems to be rotated anterior and to the left. Will send for physical therapy evaluation to discern whether this may be contributing to her intermittent, severe left flank pain without identifiable stone or hydronephrosis.      2. History of Microscopic Hematuria- Urine cytology (8/21) negative. CT abdomen and pelvis with and without contrast is negative for hydronephrosis, stones, abnormal thickening, etc. A 11 mm simple cyst was noted within the mid to lower left kidney. Recommend office cytoscopy to complete evaluation. Will schedule with Dr. Ashanti Cassidy.     3. History of Breast Cancer- Diagnosed in 2016. Status post left breast modified mastectomy and right simple mastectomy performed on December 29, 2016 at [facility] in [place] [address]. Her pathology demonstrated a 2.2 cm infiltrating ductal carcinoma of grade 2, with evidence of lymphovascular invasion. One out of 10 axillary lymph node was positive for metastatic breast cancer.  The tumor cells were estrogen receptor positive in 100% of tumor cell staining, progesterone receptor positive in 30% of tumor cells staining. HER-2 non-amplified by FISH.  Right breast mastectomy was negative for malignancy. She received adjuvant chemotherapy, four cycles of AC followed by weekly Taxol. She finished treatment in 2017. Chemotherapy was followed by radiation treatment. Currently on Femara.     4. Dilated Cardiomyopathy- Diagnosed in 2018. Underwent ICD insertion in 2/19. On Entresto.      5. Dysuria- Resolved.  Scheduling office cystoscopy for persistent microscopic hematuria.     6. Mixed Incontinence- Improved. Patient reports that she has been performing Kegel exercises at home. Primarily urge in nature. PVR is acceptable at 48 mL.  Continue to monitor the need for PT evaluation for pelvic floor therapy.      Sushant Salgado PA-C

## 2021-10-19 LAB
ORGANISM: ABNORMAL
URINE CULTURE, ROUTINE: ABNORMAL

## 2021-10-19 PROCEDURE — 95816 EEG AWAKE AND DROWSY: CPT | Performed by: PSYCHIATRY & NEUROLOGY

## 2021-10-21 ENCOUNTER — TELEPHONE (OUTPATIENT)
Dept: FAMILY MEDICINE CLINIC | Age: 61
End: 2021-10-21

## 2021-10-21 DIAGNOSIS — U07.1 COVID-19: ICD-10-CM

## 2021-10-21 RX ORDER — DIPHENHYDRAMINE HYDROCHLORIDE 50 MG/ML
50 INJECTION INTRAMUSCULAR; INTRAVENOUS ONCE
Status: CANCELLED | OUTPATIENT
Start: 2021-10-21 | End: 2021-10-21

## 2021-10-21 RX ORDER — SODIUM CHLORIDE 0.9 % (FLUSH) 0.9 %
5-40 SYRINGE (ML) INJECTION PRN
Status: CANCELLED | OUTPATIENT
Start: 2021-10-21

## 2021-10-21 RX ORDER — METHYLPREDNISOLONE SODIUM SUCCINATE 125 MG/2ML
125 INJECTION, POWDER, LYOPHILIZED, FOR SOLUTION INTRAMUSCULAR; INTRAVENOUS ONCE
Status: CANCELLED | OUTPATIENT
Start: 2021-10-21 | End: 2021-10-21

## 2021-10-21 RX ORDER — SODIUM CHLORIDE 9 MG/ML
25 INJECTION, SOLUTION INTRAVENOUS PRN
Status: CANCELLED | OUTPATIENT
Start: 2021-10-21

## 2021-10-21 RX ORDER — EPINEPHRINE 1 MG/ML
0.3 INJECTION, SOLUTION, CONCENTRATE INTRAVENOUS PRN
Status: CANCELLED | OUTPATIENT
Start: 2021-10-21

## 2021-10-21 RX ORDER — SODIUM CHLORIDE 9 MG/ML
INJECTION, SOLUTION INTRAVENOUS CONTINUOUS
Status: CANCELLED | OUTPATIENT
Start: 2021-10-21

## 2021-10-21 RX ORDER — HEPARIN SODIUM (PORCINE) LOCK FLUSH IV SOLN 100 UNIT/ML 100 UNIT/ML
500 SOLUTION INTRAVENOUS PRN
Status: CANCELLED | OUTPATIENT
Start: 2021-10-21

## 2021-10-21 NOTE — TELEPHONE ENCOUNTER
Alyssia's son tested positive for COVID. He lives in the same household as Aniya. She has been vaccinated for COVID, however, she is at higher risk for developing severe disease because of obesity and cardiovascular disease, CHF. She is interested in getting the post exposure prophylaxis dose of Regeneron. Can we call [facility] pharmacy and see about getting her in for this? Thanks!

## 2021-10-21 NOTE — TELEPHONE ENCOUNTER
VO given to Sadiq Herman @ [facility] for monoclonal antibody  therapy per Raulito Lord  She will call OP nursing and notify the pt

## 2021-10-23 ENCOUNTER — HOSPITAL ENCOUNTER (OUTPATIENT)
Dept: NURSING | Age: 61
Discharge: HOME | End: 2021-10-23
Payer: COMMERCIAL

## 2021-10-23 ENCOUNTER — HOSPITAL ENCOUNTER (OUTPATIENT)
Age: 61
Discharge: HOME | End: 2021-10-23
Payer: COMMERCIAL

## 2021-10-23 VITALS
OXYGEN SATURATION: 99 % | SYSTOLIC BLOOD PRESSURE: 120 MMHG | RESPIRATION RATE: 16 BRPM | TEMPERATURE: 95.8 F | DIASTOLIC BLOOD PRESSURE: 82 MMHG | HEART RATE: 72 BPM

## 2021-10-23 DIAGNOSIS — U07.1 COVID-19: Primary | ICD-10-CM

## 2021-10-23 DIAGNOSIS — E55.9 VITAMIN D DEFICIENCY: ICD-10-CM

## 2021-10-23 LAB — VITAMIN D 25-HYDROXY: 24 NG/ML (ref 30–100)

## 2021-10-23 PROCEDURE — 36415 COLL VENOUS BLD VENIPUNCTURE: CPT

## 2021-10-23 PROCEDURE — 82306 VITAMIN D 25 HYDROXY: CPT

## 2021-10-23 PROCEDURE — 2580000003 HC RX 258: Performed by: FAMILY MEDICINE

## 2021-10-23 PROCEDURE — M0245 HC IV INFUSION BAMLANIVIMAB & ETESEVIMAB W/MONITORING: HCPCS

## 2021-10-23 PROCEDURE — 2500000003 HC RX 250 WO HCPCS: Performed by: FAMILY MEDICINE

## 2021-10-23 PROCEDURE — 6360000002 HC RX W HCPCS: Performed by: FAMILY MEDICINE

## 2021-10-23 RX ORDER — DIPHENHYDRAMINE HYDROCHLORIDE 50 MG/ML
50 INJECTION INTRAMUSCULAR; INTRAVENOUS ONCE
Status: CANCELLED | OUTPATIENT
Start: 2021-10-23 | End: 2021-10-23

## 2021-10-23 RX ORDER — METHYLPREDNISOLONE SODIUM SUCCINATE 125 MG/2ML
125 INJECTION, POWDER, LYOPHILIZED, FOR SOLUTION INTRAMUSCULAR; INTRAVENOUS ONCE
Status: CANCELLED | OUTPATIENT
Start: 2021-10-23 | End: 2021-10-23

## 2021-10-23 RX ORDER — SODIUM CHLORIDE 9 MG/ML
25 INJECTION, SOLUTION INTRAVENOUS PRN
Status: CANCELLED | OUTPATIENT
Start: 2021-10-23

## 2021-10-23 RX ORDER — SODIUM CHLORIDE 9 MG/ML
INJECTION, SOLUTION INTRAVENOUS CONTINUOUS
Status: CANCELLED | OUTPATIENT
Start: 2021-10-23

## 2021-10-23 RX ORDER — SODIUM CHLORIDE 0.9 % (FLUSH) 0.9 %
5-40 SYRINGE (ML) INJECTION PRN
Status: DISCONTINUED | OUTPATIENT
Start: 2021-10-23 | End: 2021-10-23

## 2021-10-23 RX ORDER — HEPARIN SODIUM (PORCINE) LOCK FLUSH IV SOLN 100 UNIT/ML 100 UNIT/ML
500 SOLUTION INTRAVENOUS PRN
Status: CANCELLED | OUTPATIENT
Start: 2021-10-23

## 2021-10-23 RX ORDER — SODIUM CHLORIDE 9 MG/ML
INJECTION, SOLUTION INTRAVENOUS CONTINUOUS
Status: DISCONTINUED | OUTPATIENT
Start: 2021-10-23 | End: 2021-10-23

## 2021-10-23 RX ORDER — SODIUM CHLORIDE 0.9 % (FLUSH) 0.9 %
5-40 SYRINGE (ML) INJECTION PRN
Status: CANCELLED | OUTPATIENT
Start: 2021-10-23

## 2021-10-23 RX ADMIN — SODIUM CHLORIDE: 9 INJECTION, SOLUTION INTRAVENOUS at 08:14

## 2021-10-23 RX ADMIN — SODIUM CHLORIDE: 9 INJECTION, SOLUTION INTRAVENOUS at 08:16

## 2021-10-23 ASSESSMENT — PAIN - FUNCTIONAL ASSESSMENT: PAIN_FUNCTIONAL_ASSESSMENT: 0-10

## 2021-10-23 NOTE — PROGRESS NOTES
9860 pt admitted to [facility] per ambulation for an monoclonal antibody infusion. Emergency use authorization form given to patient for bamlanivimib and etesevimab Verbalize understanding. Ok to proceed PT RIGHTS AND RESPONSIBILITIES OFFERED TO PT.  0816 infusion in progress   0850 infusion completed. Pt duncan well. Stable. 0945 discharge instructions reviewed with patient. Verbalize understanding. stable. resp even and easy.   Denies pain, sob.    0950 discharge per ambulation to home.            __m__ Safety:       (Environmental)  Mitchel Holding to environment   Ensure ID band is correct and in place/ allergy band as needed   Assess for fall risk   Initiate fall precautions as applicable (fall band, side rails, etc.)   Call light within reach   Bed in low position/ wheels locked    __m__ Pain:        Assess pain level and characteristics   Administer analgesics as ordered   Assess effectiveness of pain management and report to MD as needed    ___m_ Knowledge Deficit:   Assess baseline knowledge   Provide teaching at level of understanding   Provide teaching via preferred learning method   Evaluate teaching effectiveness    _m___ Hemodynamic/Respiratory Status:       (Pre and Post Procedure Monitoring)   Assess/Monitor vital signs and LOC   Assess Baseline SpO2 prior to any sedation   Obtain weight/height   Assess vital signs/ LOC until patient meets discharge criteria   Monitor procedure site and notify MD of any issues    otocol)

## 2021-10-25 ENCOUNTER — TELEPHONE (OUTPATIENT)
Dept: FAMILY MEDICINE CLINIC | Age: 61
End: 2021-10-25

## 2021-10-25 DIAGNOSIS — E55.9 VITAMIN D DEFICIENCY: Primary | ICD-10-CM

## 2021-10-25 NOTE — TELEPHONE ENCOUNTER
Left detailed message. Okay per HIPAA.  Requested call back if any further questions    Mailed lab slip

## 2021-10-25 NOTE — TELEPHONE ENCOUNTER
----- Message from ANGELIA Esqueda CNP sent at 10/25/2021 11:01 AM EDT -----  Let Jalil Torres know her vitamin D level is better, it's up to 24 now. I would like to continue the current dose of the vitamin D for now, and recheck in 6 months. Order placed.

## 2021-10-26 ENCOUNTER — CARE COORDINATION (OUTPATIENT)
Dept: CARE COORDINATION | Age: 61
End: 2021-10-26

## 2021-11-01 ENCOUNTER — PROCEDURE VISIT (OUTPATIENT)
Dept: CARDIOLOGY CLINIC | Age: 61
End: 2021-11-01
Payer: COMMERCIAL

## 2021-11-01 DIAGNOSIS — I50.22 CHRONIC SYSTOLIC (CONGESTIVE) HEART FAILURE (HCC): Primary | ICD-10-CM

## 2021-11-02 PROCEDURE — G2066 INTER DEVC REMOTE 30D: HCPCS | Performed by: INTERNAL MEDICINE

## 2021-11-02 PROCEDURE — 93297 REM INTERROG DEV EVAL ICPMS: CPT | Performed by: INTERNAL MEDICINE

## 2021-11-03 ENCOUNTER — CARE COORDINATION (OUTPATIENT)
Dept: CARE COORDINATION | Age: 61
End: 2021-11-03

## 2021-11-03 NOTE — CARE COORDINATION
Rae Mays have been working with Mable Cadet on Care Coordination program to provide support and education to help manage chronic conditions. She has met those goals and all education has been completed with no new barriers noted. I would like to graduate her from Care Coordination at this time pending PCP approval.  Do you approve of this discharge? Please advise. Thank you.

## 2021-11-09 ENCOUNTER — TELEPHONE (OUTPATIENT)
Dept: PULMONOLOGY | Age: 61
End: 2021-11-09

## 2021-11-29 ENCOUNTER — TELEPHONE (OUTPATIENT)
Dept: CARDIOLOGY CLINIC | Age: 61
End: 2021-11-29

## 2021-11-29 NOTE — LETTER
[facility]  [address] [address]  Phone: [phone]  Fax: [phone]          December 13, 2021    [place]  [address] [address]      Dear Ernesto Sweet: We are sorry that you missed your appointment with Verenice Valladares PA-C at the [address] [place] [address] on 12/2/21. We ask that you please call 24 hours in advance if you are unable to make your appointment so that we can give that time to another patient in need. Routine visits are best in preventing hospitalizations for Heart Failure and are important in managing your care. We are unable to continue to treat and manage your care if you are not routinely seen in our clinic. Please call the office to let us know your plans for treatment and to reschedule your appointment by 1/2/22. Please continue to monitor for signs of worsening heart failure:   · Weight gain (3#/day or 5#/week) - WEIGH daily   · Shortness of breath  · Lower leg edema  · Increased cough, fatigue, chest pain, or trouble sleeping. If you have any questions or concerns, please don't hesitate to call.     Sincerely,      Verenice Valladares PA-C

## 2022-01-03 ENCOUNTER — PROCEDURE VISIT (OUTPATIENT)
Dept: CARDIOLOGY CLINIC | Age: 62
End: 2022-01-03
Payer: COMMERCIAL

## 2022-01-03 DIAGNOSIS — I50.22 CHRONIC SYSTOLIC (CONGESTIVE) HEART FAILURE (HCC): Primary | ICD-10-CM

## 2022-01-03 PROCEDURE — 93297 REM INTERROG DEV EVAL ICPMS: CPT | Performed by: INTERNAL MEDICINE

## 2022-01-03 PROCEDURE — G2066 INTER DEVC REMOTE 30D: HCPCS | Performed by: INTERNAL MEDICINE

## 2022-01-24 ENCOUNTER — TELEPHONE (OUTPATIENT)
Dept: ONCOLOGY | Age: 62
End: 2022-01-24

## 2022-02-07 ENCOUNTER — PROCEDURE VISIT (OUTPATIENT)
Dept: CARDIOLOGY CLINIC | Age: 62
End: 2022-02-07
Payer: COMMERCIAL

## 2022-02-07 DIAGNOSIS — I50.22 CHRONIC SYSTOLIC (CONGESTIVE) HEART FAILURE (HCC): Primary | ICD-10-CM

## 2022-02-07 PROCEDURE — G2066 INTER DEVC REMOTE 30D: HCPCS | Performed by: INTERNAL MEDICINE

## 2022-02-07 PROCEDURE — 93297 REM INTERROG DEV EVAL ICPMS: CPT | Performed by: INTERNAL MEDICINE

## 2022-02-07 NOTE — PROGRESS NOTES
[facility] Medtronic Single ICD Optivol  Pt of [place]    Battery 8.9 years    Night heart rate over 85 for 7 days    Episodes  11 runs NS VT rates 170's  2 runs SVT    Optivol WNL

## 2022-03-16 ENCOUNTER — TELEPHONE (OUTPATIENT)
Dept: CARDIOLOGY CLINIC | Age: 62
End: 2022-03-16

## 2022-03-16 NOTE — TELEPHONE ENCOUNTER
Missed download from device. Due 3/14/22  Called patient. VM full. Call and [facility] with Rufino Kelly and asked to send download.

## 2022-04-29 ENCOUNTER — TELEPHONE (OUTPATIENT)
Dept: CARDIOLOGY CLINIC | Age: 62
End: 2022-04-29

## 2022-04-29 NOTE — TELEPHONE ENCOUNTER
No download from [employer] monitor. Due 3/14/22. Numbers on chart discontinued. Letter sent.

## 2022-04-29 NOTE — LETTER
[address] [place] [address]  Phone: [phone]  Fax: [phone]          April 29, 2022    [facility]  [address] [address]      Dear Therese Sood: We have been unable to reach you by phone. Phone numbers out of service. Please call office to update. We need a download from your [employer] bedside monitor for your pacemaker/defilbilator. If you need help sending, please call [facility] [phone]. Thanks!   [facility]  [phone]

## 2022-05-12 ENCOUNTER — HOSPITAL ENCOUNTER (EMERGENCY)
Age: 62
Discharge: HOME | End: 2022-05-12
Payer: COMMERCIAL

## 2022-05-12 ENCOUNTER — APPOINTMENT (OUTPATIENT)
Dept: CT IMAGING | Age: 62
End: 2022-05-12
Payer: COMMERCIAL

## 2022-05-12 VITALS
DIASTOLIC BLOOD PRESSURE: 63 MMHG | RESPIRATION RATE: 18 BRPM | OXYGEN SATURATION: 94 % | HEART RATE: 94 BPM | WEIGHT: 205 LBS | TEMPERATURE: 98.4 F | HEIGHT: 62 IN | BODY MASS INDEX: 37.73 KG/M2 | SYSTOLIC BLOOD PRESSURE: 101 MMHG

## 2022-05-12 DIAGNOSIS — R06.02 SHORTNESS OF BREATH: Primary | ICD-10-CM

## 2022-05-12 DIAGNOSIS — I50.9 ACUTE ON CHRONIC CONGESTIVE HEART FAILURE, UNSPECIFIED HEART FAILURE TYPE (HCC): ICD-10-CM

## 2022-05-12 LAB
ALBUMIN SERPL-MCNC: 3.8 G/DL (ref 3.5–5.1)
ALP BLD-CCNC: 249 U/L (ref 38–126)
ALT SERPL-CCNC: 177 U/L (ref 11–66)
ANION GAP SERPL CALCULATED.3IONS-SCNC: 18 MEQ/L (ref 8–16)
AST SERPL-CCNC: 217 U/L (ref 5–40)
BASOPHILS # BLD: 0.4 %
BASOPHILS ABSOLUTE: 0 THOU/MM3 (ref 0–0.1)
BILIRUB SERPL-MCNC: 0.8 MG/DL (ref 0.3–1.2)
BILIRUBIN DIRECT: 0.4 MG/DL (ref 0–0.3)
BUN BLDV-MCNC: 15 MG/DL (ref 7–22)
CALCIUM SERPL-MCNC: 8.6 MG/DL (ref 8.5–10.5)
CHLORIDE BLD-SCNC: 101 MEQ/L (ref 98–111)
CO2: 20 MEQ/L (ref 23–33)
CREAT SERPL-MCNC: 0.9 MG/DL (ref 0.4–1.2)
D-DIMER QUANTITATIVE: 5849 NG/ML FEU (ref 0–500)
EKG ATRIAL RATE: 96 BPM
EKG P AXIS: 43 DEGREES
EKG P-R INTERVAL: 190 MS
EKG Q-T INTERVAL: 370 MS
EKG QRS DURATION: 100 MS
EKG QTC CALCULATION (BAZETT): 467 MS
EKG R AXIS: 3 DEGREES
EKG T AXIS: 109 DEGREES
EKG VENTRICULAR RATE: 96 BPM
EOSINOPHIL # BLD: 2.3 %
EOSINOPHILS ABSOLUTE: 0.2 THOU/MM3 (ref 0–0.4)
ERYTHROCYTE [DISTWIDTH] IN BLOOD BY AUTOMATED COUNT: 15 % (ref 11.5–14.5)
ERYTHROCYTE [DISTWIDTH] IN BLOOD BY AUTOMATED COUNT: 52.1 FL (ref 35–45)
FLU A ANTIGEN: NEGATIVE
FLU B ANTIGEN: NEGATIVE
GFR SERPL CREATININE-BSD FRML MDRD: 64 ML/MIN/1.73M2
GLUCOSE BLD-MCNC: 150 MG/DL (ref 70–108)
HCT VFR BLD CALC: 35.5 % (ref 37–47)
HEMOGLOBIN: 11.2 GM/DL (ref 12–16)
IMMATURE GRANS (ABS): 0.04 THOU/MM3 (ref 0–0.07)
IMMATURE GRANULOCYTES: 0.6 %
LIPASE: 20.6 U/L (ref 5.6–51.3)
LYMPHOCYTES # BLD: 27.3 %
LYMPHOCYTES ABSOLUTE: 1.9 THOU/MM3 (ref 1–4.8)
MCH RBC QN AUTO: 29.9 PG (ref 26–33)
MCHC RBC AUTO-ENTMCNC: 31.5 GM/DL (ref 32.2–35.5)
MCV RBC AUTO: 94.9 FL (ref 81–99)
MONOCYTES # BLD: 5.9 %
MONOCYTES ABSOLUTE: 0.4 THOU/MM3 (ref 0.4–1.3)
NUCLEATED RED BLOOD CELLS: 0 /100 WBC
OSMOLALITY CALCULATION: 281.2 MOSMOL/KG (ref 275–300)
PLATELET # BLD: 274 THOU/MM3 (ref 130–400)
PMV BLD AUTO: 10.7 FL (ref 9.4–12.4)
POTASSIUM SERPL-SCNC: 3.8 MEQ/L (ref 3.5–5.2)
PRO-BNP: ABNORMAL PG/ML (ref 0–900)
RBC # BLD: 3.74 MILL/MM3 (ref 4.2–5.4)
SARS-COV-2, NAAT: NOT DETECTED
SCAN OF BLOOD SMEAR: NORMAL
SEG NEUTROPHILS: 63.5 %
SEGMENTED NEUTROPHILS ABSOLUTE COUNT: 4.4 THOU/MM3 (ref 1.8–7.7)
SODIUM BLD-SCNC: 139 MEQ/L (ref 135–145)
TOTAL PROTEIN: 6 G/DL (ref 6.1–8)
TROPONIN T: < 0.01 NG/ML
WBC # BLD: 7 THOU/MM3 (ref 4.8–10.8)

## 2022-05-12 PROCEDURE — 83880 ASSAY OF NATRIURETIC PEPTIDE: CPT

## 2022-05-12 PROCEDURE — 36415 COLL VENOUS BLD VENIPUNCTURE: CPT

## 2022-05-12 PROCEDURE — 87635 SARS-COV-2 COVID-19 AMP PRB: CPT

## 2022-05-12 PROCEDURE — 71275 CT ANGIOGRAPHY CHEST: CPT

## 2022-05-12 PROCEDURE — 99285 EMERGENCY DEPT VISIT HI MDM: CPT

## 2022-05-12 PROCEDURE — 85379 FIBRIN DEGRADATION QUANT: CPT

## 2022-05-12 PROCEDURE — 87804 INFLUENZA ASSAY W/OPTIC: CPT

## 2022-05-12 PROCEDURE — 84484 ASSAY OF TROPONIN QUANT: CPT

## 2022-05-12 PROCEDURE — 93005 ELECTROCARDIOGRAM TRACING: CPT | Performed by: EMERGENCY MEDICINE

## 2022-05-12 PROCEDURE — 93010 ELECTROCARDIOGRAM REPORT: CPT | Performed by: NUCLEAR MEDICINE

## 2022-05-12 PROCEDURE — 82248 BILIRUBIN DIRECT: CPT

## 2022-05-12 PROCEDURE — 85025 COMPLETE CBC W/AUTO DIFF WBC: CPT

## 2022-05-12 PROCEDURE — 6360000004 HC RX CONTRAST MEDICATION: Performed by: NURSE PRACTITIONER

## 2022-05-12 PROCEDURE — 80053 COMPREHEN METABOLIC PANEL: CPT

## 2022-05-12 PROCEDURE — 83690 ASSAY OF LIPASE: CPT

## 2022-05-12 RX ORDER — SACUBITRIL AND VALSARTAN 49; 51 MG/1; MG/1
TABLET, FILM COATED ORAL
Qty: 60 TABLET | Refills: 0 | Status: ON HOLD | OUTPATIENT
Start: 2022-05-12 | End: 2022-06-05 | Stop reason: HOSPADM

## 2022-05-12 RX ORDER — FUROSEMIDE 20 MG/1
TABLET ORAL
Qty: 90 TABLET | Refills: 0 | Status: ON HOLD | OUTPATIENT
Start: 2022-05-12 | End: 2022-05-25 | Stop reason: SDUPTHER

## 2022-05-12 RX ORDER — DIGOXIN 125 MCG
125 TABLET ORAL DAILY
Qty: 90 TABLET | Refills: 0 | Status: SHIPPED | OUTPATIENT
Start: 2022-05-12

## 2022-05-12 RX ADMIN — IOPAMIDOL 80 ML: 755 INJECTION, SOLUTION INTRAVENOUS at 11:45

## 2022-05-12 ASSESSMENT — PAIN DESCRIPTION - DESCRIPTORS: DESCRIPTORS: PRESSURE

## 2022-05-12 ASSESSMENT — ENCOUNTER SYMPTOMS
EYE DISCHARGE: 0
ABDOMINAL PAIN: 1
COLOR CHANGE: 0
WHEEZING: 1
SHORTNESS OF BREATH: 1
NAUSEA: 0
BLOOD IN STOOL: 0
COUGH: 1
VOMITING: 0
SORE THROAT: 0
EYE PAIN: 0
DIARRHEA: 0

## 2022-05-12 ASSESSMENT — PAIN - FUNCTIONAL ASSESSMENT: PAIN_FUNCTIONAL_ASSESSMENT: 0-10

## 2022-05-12 ASSESSMENT — PAIN DESCRIPTION - LOCATION: LOCATION: CHEST

## 2022-05-12 ASSESSMENT — PAIN SCALES - GENERAL: PAINLEVEL_OUTOF10: 5

## 2022-05-12 NOTE — ED NOTES
Patient resting in bed no concerns voiced at this time will continue to monitor      Dilshad Ngo, TU  05/12/22 1981

## 2022-05-12 NOTE — ED NOTES
Patient to ED for cough, chest pressure, and fatigue. Patient states symptoms have been going on for awhile. Patient recently traveled to [facility].  patient reports having heart hx       Coretta Acevedo RN  05/12/22 1630

## 2022-05-12 NOTE — ED NOTES
Patient resting comfortably in bed no concerns voiced at this  time     Imelda Palumbo RN  05/12/22 7162

## 2022-05-12 NOTE — ED PROVIDER NOTES
[facility] [address] [place] [address]       Chief Complaint   Patient presents with    Cough    Fatigue       Nurses Notes reviewed and I agree except as noted in the HPI. HISTORY OF PRESENT ILLNESS    Leander Pennington rose 65 y.o. female who presents to the ED for evaluation of 3 months of cough and fatigue. Recent travel to [facility] 3 months ago, was there for 2 months and states she had these symptoms while there and was not evaluated by a doctor. Describes her cough as mostly dry, but she will have clear mucus every now and then. Denies hemoptysis. States that she has noticed some wheezing and shortness of breath. No PMH of COPD or asthma. Says she has sleep apnea, but does not use a CPAP because insurance wouldn't pay for it. Complains of low energy, headache, dull upper abdominal pain, bloating, low appetite, and chest pressure/pain that radiates to her back. Pleuritic chest pain worse with taking a deep breath and coughing. Has a history of CHF, which is managed by Dr. Dior Baugh, and states that she has recently ran out of some medications. States that she has been swelling more than usual and has noticed an increase in weight. Has a history of L-sided breast cancer for which she has received radiation. States that she had non-bloody diarrhea, nausea, and one episode of non-bloody emesis Saturday, then a fever of 104 F on Sunday. Theses symptoms have since resolved, but is still experiencing cough and fatigue. Denies sick contacts. HPI was provided by the patient. REVIEW OF SYSTEMS     Review of Systems   Constitutional: Positive for appetite change, fatigue and unexpected weight change. Negative for diaphoresis. Low appetite, low energy. Weight increase and bloating noticed by patient. HENT: Negative for congestion, ear pain, mouth sores and sore throat. Eyes: Negative for pain and discharge. Respiratory: Positive for cough, shortness of breath and wheezing. Cardiovascular: Positive for chest pain. Chest pain/pressure that extends into her back, and is worsened with taking a deep breath and coughing. Gastrointestinal: Positive for abdominal pain. Negative for blood in stool, diarrhea, nausea and vomiting. Pain/discomfort throughout upper abdomen with walking    Genitourinary: Negative for difficulty urinating, dysuria, hematuria and pelvic pain. Musculoskeletal: Negative for myalgias. Skin: Negative for color change and pallor. Neurological: Negative for dizziness, syncope and light-headedness. Psychiatric/Behavioral: Negative for confusion. PAST MEDICAL HISTORY     Past Medical History:   Diagnosis Date    Abnormal heart rhythm     Anxiety     Cancer [facility])     breast  2016     CHF (congestive heart failure) (HCC)     Congenital heart disease     DJD (degenerative joint disease)     Enlarged lymph nodes     Hypertension     Hypothyroidism     ICD (implantable cardioverter-defibrillator) in place 02/11/2019    Dr. Ramonita Richardson Kidney stones     PONV (postoperative nausea and vomiting)     Prediabetes 10/7/2019    Thyroid disease        SURGICALHISTORY      has a past surgical history that includes Mastectomy, bilateral; Tonsillectomy; Percutaneous Transluminal Coronary Angio; Appendectomy; Colonoscopy; Carpal tunnel release (2019); Finger trigger release (Right, 06/25/2020); Colonoscopy (N/A, 07/27/2020); Colonoscopy (07/27/2020); Pacemaker insertion (Left, 02/11/2019); Colonoscopy (2021); EGD; and Eye surgery (Bilateral, 10/2021).     CURRENT MEDICATIONS       Discharge Medication List as of 5/12/2022 12:18 PM      CONTINUE these medications which have NOT CHANGED    Details   EUTHYROX 100 MCG tablet Take 1 tablet by mouth once daily, Disp-90 tablet, R-1Normal      !! venlafaxine (EFFEXOR XR) 150 MG extended release capsule Take 1 capsule by mouth daily, Disp-90 capsule, R-3Normal      buPROPion (WELLBUTRIN XL) 150 MG extended release tablet Take 1 tablet by mouth every morning, Disp-90 tablet, R-1Normal      Cholecalciferol 50 MCG (2000) CAPS Take 50 mcg by mouth daily, Disp-30 capsule, R-3Normal      metFORMIN (GLUCOPHAGE-XR) 500 MG extended release tablet Take 1 tablet by mouth once daily with breakfast, Disp-90 tablet, R-0Normal      CPAP Machine MISC Starting Thu 2021, Disp-1 each, R-0, PrintPlease change CPAP pressure to auto 11-15 cm H20.      omeprazole (PRILOSEC) 40 MG delayed release capsule Take 1 capsule by mouth every morning (before breakfast), Disp-90 capsule, R-1Normal      !! venlafaxine (EFFEXOR XR) 75 MG extended release capsule Take 1 capsule by mouth daily (take along with 150 mg Effexor for total dose of 225 mg), Disp-90 capsule, R-3Normal      letrozole (FEMARA) 2.5 MG tablet Take 1 tablet by mouth daily, Disp-90 tablet, R-3Normal      polyethylene glycol (GLYCOLAX) 17 GM/SCOOP powder DISSOLVE & TAKE 1 CAPFUL ONCE DAILYHistorical Med      metoprolol succinate (TOPROL XL) 25 MG extended release tablet Take 0.5 tablets by mouth daily, Disp-30 tablet, R-3Normal      atorvastatin (LIPITOR) 40 MG tablet Take 1 tablet by mouth nightly, Disp-90 tablet, R-3Normal      aspirin 81 MG chewable tablet Take 1 tablet by mouth daily, Disp-30 tablet, R-3Print      acetaminophen (TYLENOL) 500 MG tablet Take 500 mg by mouth every 6 hours as needed for PainHistorical Med       !! - Potential duplicate medications found. Please discuss with provider. ALLERGIES     is allergic to adhesive tape. FAMILY HISTORY     She indicated that her mother is alive. She indicated that her father is . She indicated that both of her brothers are alive. She indicated that the status of her maternal grandfather is unknown.  She indicated that the status of her neg hx is unknown.   family history includes Cancer in her father and maternal grandfather; Oneil Smack in her father; Colon Polyps in her brother; Dementia in her mother; High Blood Pressure in her mother; Mental Retardation in her brother. SOCIAL HISTORY       Social History     Socioeconomic History    Marital status:      Spouse name: Not on file    Number of children: Not on file    Years of education: Not on file    Highest education level: Not on file   Occupational History    Not on file   Tobacco Use    Smoking status: Former Smoker     Packs/day: 0.25     Years: 37.00     Pack years: 9.25     Types: Cigarettes     Start date: 1981     Quit date: 2020     Years since quittin.9    Smokeless tobacco: Never Used   Vaping Use    Vaping Use: Not on file   Substance and Sexual Activity    Alcohol use: No    Drug use: No    Sexual activity: Not on file   Other Topics Concern    Not on file   Social History Narrative    Referral placed for counseling    Denies barriers with medication affordability    Denies community services     Denies transportation issues     Social Determinants of Health     Financial Resource Strain:     Difficulty of Paying Living Expenses: Not on file   Food Insecurity:     Worried About Running Out of Food in the Last Year: Not on file    Narciso of Food in the Last Year: Not on file   Transportation Needs:     Lack of Transportation (Medical): Not on file    Lack of Transportation (Non-Medical):  Not on file   Physical Activity:     Days of Exercise per Week: Not on file    Minutes of Exercise per Session: Not on file   Stress:     Feeling of Stress : Not on file   Social Connections:     Frequency of Communication with Friends and Family: Not on file    Frequency of Social Gatherings with Friends and Family: Not on file    Attends Denominational Services: Not on file    Active Member of Clubs or Organizations: Not on file    Attends Club or Organization Meetings: Not on file    Marital Status: Not on file   Intimate Partner Violence:     Fear of Current or Ex-Partner: Not on file   [employer] Abused: Not on file    Physically Abused: Not on file    Sexually Abused: Not on file   Housing Stability:     Unable to Pay for Housing in the Last Year: Not on file    Number of Places Lived in the Last Year: Not on file    Unstable Housing in the Last Year: Not on file       PHYSICAL EXAM     INITIAL VITALS:  height is 5' 2\" (1.575 m) and weight is 205 lb (93 kg). Her oral temperature is 98.4 °F (36.9 °C). Her blood pressure is 101/63 and her pulse is 94. Her respiration is 18 and oxygen saturation is 94%. Physical Exam  Constitutional:       Appearance: Normal appearance. She is obese. HENT:      Head: Normocephalic and atraumatic. Nose: Nose normal.      Mouth/Throat:      Mouth: Mucous membranes are moist.   Eyes:      Extraocular Movements: Extraocular movements intact. Conjunctiva/sclera: Conjunctivae normal.   Cardiovascular:      Rate and Rhythm: Normal rate. Heart sounds: Murmur heard. Comments: Systolic murmur  Pulmonary:      Effort: No respiratory distress. Breath sounds: No stridor. Rales present. Comments: Crackles In the lung bases bilaterally  Abdominal:      General: Bowel sounds are normal.      Palpations: Abdomen is soft. There is no mass. Tenderness: There is abdominal tenderness. Hernia: No hernia is present. Comments: Rounded abdomen. No ecchymosis or erythema. Tender to palpation of RUQ. Skin:     General: Skin is warm and dry. Coloration: Skin is not jaundiced or pale. Findings: No bruising or erythema. Neurological:      Mental Status: She is alert.          DIFFERENTIAL DIAGNOSIS:   CHF exacerbation, pneumonia, pulmonary embolism, ACS, gastroenteritis, tuberculosis,    DIAGNOSTIC RESULTS     EKG: All EKG's are interpreted by the Emergency Department Physician who eithersigns or Co-signs this chart in the absence of a cardiologist.    EKG read and interpreted by myself with comparison to April 30, 2021 gives impression of normal sinus rhythm with heart rate of 96; interval 190; ;QTc 467; axis P-43, R-3, T-109. RADIOLOGY: non-plainfilm images(s) such as CT, Ultrasound and MRI are read by the radiologist.  Plain radiographic images are visualized and preliminarily interpreted by the emergency physician unless otherwise stated below. CTA CHEST W WO CONTRAST   Final Result       1. No evidence of pulmonary embolism. 2. Mild cardiomegaly status post pacemaker placement. 3. Small right pleural effusion. 4. Slightly increased density in the right upper lobe posteriorly which may represent pneumonitis. 5. Mild thoracic spondylosis. Jerome Matos **This report has been created using voice recognition software. It may contain minor errors which are inherent in voice recognition technology. **      Final report electronically signed by DR Marin Minor on 5/12/2022 12:04 PM            LABS:   Labs Reviewed   CBC WITH AUTO DIFFERENTIAL - Abnormal; Notable for the following components:       Result Value    RBC 3.74 (*)     Hemoglobin 11.2 (*)     Hematocrit 35.5 (*)     MCHC 31.5 (*)     RDW-CV 15.0 (*)     RDW-SD 52.1 (*)     All other components within normal limits   BRAIN NATRIURETIC PEPTIDE - Abnormal; Notable for the following components:    Pro-BNP 75787.0 (*)     All other components within normal limits   HEPATIC FUNCTION PANEL - Abnormal; Notable for the following components:    Bilirubin, Direct 0.4 (*)     Alkaline Phosphatase 249 (*)      (*)      (*)     Total Protein 6.0 (*)     All other components within normal limits   D-DIMER, QUANTITATIVE - Abnormal; Notable for the following components:    D-Dimer, Quant 5849.00 (*)     All other components within normal limits   BASIC METABOLIC PANEL - Abnormal; Notable for the following components:    CO2 20 (*)     Glucose 150 (*)     All other components within normal limits   ANION GAP - Abnormal; Notable for the following components:    Anion Gap 18.0 (*)     All other components within normal limits   GLOMERULAR FILTRATION RATE, ESTIMATED - Abnormal; Notable for the following components:    Est, Glom Filt Rate 64 (*)     All other components within normal limits   RAPID INFLUENZA A/B ANTIGENS   COVID-19, RAPID   LIPASE   TROPONIN   SCAN OF BLOOD SMEAR   OSMOLALITY       EMERGENCY DEPARTMENT COURSE:   Vitals:    Vitals:    05/12/22 0832 05/12/22 1020 05/12/22 1200   BP: 108/89 102/81 101/63   Pulse: 96 87 94   Resp: 20 18 18   Temp: 98.4 °F (36.9 °C)     TempSrc: Oral     SpO2: 95% 95% 94%   Weight: 205 lb (93 kg)     Height: 5' 2\" (1.575 m)       MDM    Patient was seen and evaluated in the emergency department, patient appeared to be in no acute distress, vital signs reviewed, no significant findings noted. Labs were obtained, significant elevation of brain atretic peptide, D-dimer, transaminase. CTA of the chest obtained, no pulmonary embolism noted, small right pleural effusion noted. I discussed the case with the patient's cardiologist, they agreed my plan of care, patient can follow-up outpatient. Discussed plan of care with patient she verbalized understanding, refilled her furosemide, Entresto, digoxin, advised follow-up with cardiology soon as possible. Patient verbalized understanding of plan of care. Medications   iopamidol (ISOVUE-370) 76 % injection 80 mL (80 mLs IntraVENous Given 5/12/22 1145)       Patient was seenindependently by myself. The patient's final impression and disposition and plan was determined by myself. CRITICAL CARE:   None    CONSULTS:  Dr. Kerline Guzmán:  None    FINAL IMPRESSION     1. Shortness of breath    2.  Acute on chronic congestive heart failure, unspecified heart failure type [facility])          DISPOSITION/PLAN   Patient discharged    PATIENT REFERREDTO:  Sonya Finley MD  [address] [place] [address]  [phone]    Call   For follow up and evaluation      DISCHARGE MEDICATIONS:  Discharge Medication List as of 5/12/2022 12:18 PM          (Please note that portions of this note were completed with a voice recognition program.  Efforts were made to edit the dictations but occasionally words are mis-transcribed.)      Provider:  I personally performed the services described in the documentation,reviewed and edited the documentation which was dictated to the scribe in my presence, and it accurately records my words and actions.     Parrish Erickson, CNP 05/12/22 2:53 PM    Sandra Erickson, APRN - CNP          [email or web address], APRN - CNP  05/12/22 7326

## 2022-05-23 ENCOUNTER — APPOINTMENT (OUTPATIENT)
Dept: GENERAL RADIOLOGY | Age: 62
DRG: 286 | End: 2022-05-23
Payer: COMMERCIAL

## 2022-05-23 ENCOUNTER — HOSPITAL ENCOUNTER (INPATIENT)
Age: 62
LOS: 2 days | Discharge: HOME | DRG: 286 | End: 2022-05-25
Attending: HOSPITALIST
Payer: COMMERCIAL

## 2022-05-23 DIAGNOSIS — R06.00 DYSPNEA AND RESPIRATORY ABNORMALITIES: ICD-10-CM

## 2022-05-23 DIAGNOSIS — R94.31 ST SEGMENT CHANGES ON ELECTROCARDIOGRAM: ICD-10-CM

## 2022-05-23 DIAGNOSIS — R07.9 CHEST PAIN, UNSPECIFIED TYPE: Primary | ICD-10-CM

## 2022-05-23 DIAGNOSIS — R06.89 DYSPNEA AND RESPIRATORY ABNORMALITIES: ICD-10-CM

## 2022-05-23 DIAGNOSIS — E03.9 HYPOTHYROIDISM, UNSPECIFIED TYPE: ICD-10-CM

## 2022-05-23 LAB
ALBUMIN SERPL-MCNC: 4.2 G/DL (ref 3.5–5.1)
ALP BLD-CCNC: 177 U/L (ref 38–126)
ALT SERPL-CCNC: 24 U/L (ref 11–66)
ANION GAP SERPL CALCULATED.3IONS-SCNC: 12 MEQ/L (ref 8–16)
AST SERPL-CCNC: 19 U/L (ref 5–40)
BASOPHILS # BLD: 0.8 %
BASOPHILS ABSOLUTE: 0.1 THOU/MM3 (ref 0–0.1)
BILIRUB SERPL-MCNC: 0.8 MG/DL (ref 0.3–1.2)
BUN BLDV-MCNC: 21 MG/DL (ref 7–22)
CALCIUM SERPL-MCNC: 8.9 MG/DL (ref 8.5–10.5)
CHLORIDE BLD-SCNC: 98 MEQ/L (ref 98–111)
CO2: 27 MEQ/L (ref 23–33)
CREAT SERPL-MCNC: 1.1 MG/DL (ref 0.4–1.2)
DIGOXIN LEVEL: 1 NG/ML (ref 0.5–2)
EKG ATRIAL RATE: 91 BPM
EKG P AXIS: 39 DEGREES
EKG P-R INTERVAL: 202 MS
EKG Q-T INTERVAL: 404 MS
EKG QRS DURATION: 108 MS
EKG QTC CALCULATION (BAZETT): 496 MS
EKG R AXIS: -17 DEGREES
EKG T AXIS: 93 DEGREES
EKG VENTRICULAR RATE: 91 BPM
EOSINOPHIL # BLD: 2.2 %
EOSINOPHILS ABSOLUTE: 0.2 THOU/MM3 (ref 0–0.4)
ERYTHROCYTE [DISTWIDTH] IN BLOOD BY AUTOMATED COUNT: 15.5 % (ref 11.5–14.5)
ERYTHROCYTE [DISTWIDTH] IN BLOOD BY AUTOMATED COUNT: 54.2 FL (ref 35–45)
GFR SERPL CREATININE-BSD FRML MDRD: 50 ML/MIN/1.73M2
GLUCOSE BLD-MCNC: 111 MG/DL (ref 70–108)
GLUCOSE BLD-MCNC: 75 MG/DL (ref 70–108)
GLUCOSE BLD-MCNC: 80 MG/DL (ref 70–108)
HCT VFR BLD CALC: 41.3 % (ref 37–47)
HEMOGLOBIN: 12.5 GM/DL (ref 12–16)
IMMATURE GRANS (ABS): 0.03 THOU/MM3 (ref 0–0.07)
IMMATURE GRANULOCYTES: 0.4 %
LYMPHOCYTES # BLD: 29.6 %
LYMPHOCYTES ABSOLUTE: 2.2 THOU/MM3 (ref 1–4.8)
MCH RBC QN AUTO: 29.1 PG (ref 26–33)
MCHC RBC AUTO-ENTMCNC: 30.3 GM/DL (ref 32.2–35.5)
MCV RBC AUTO: 96.3 FL (ref 81–99)
MONOCYTES # BLD: 5.9 %
MONOCYTES ABSOLUTE: 0.4 THOU/MM3 (ref 0.4–1.3)
NUCLEATED RED BLOOD CELLS: 0 /100 WBC
OSMOLALITY CALCULATION: 277.5 MOSMOL/KG (ref 275–300)
PLATELET # BLD: 431 THOU/MM3 (ref 130–400)
PMV BLD AUTO: 11.2 FL (ref 9.4–12.4)
POTASSIUM SERPL-SCNC: 4.6 MEQ/L (ref 3.5–5.2)
PRO-BNP: 7449 PG/ML (ref 0–900)
RBC # BLD: 4.29 MILL/MM3 (ref 4.2–5.4)
SARS-COV-2, NAAT: NOT DETECTED
SEG NEUTROPHILS: 61.1 %
SEGMENTED NEUTROPHILS ABSOLUTE COUNT: 4.6 THOU/MM3 (ref 1.8–7.7)
SODIUM BLD-SCNC: 137 MEQ/L (ref 135–145)
TOTAL PROTEIN: 6.7 G/DL (ref 6.1–8)
TROPONIN T: < 0.01 NG/ML
TROPONIN T: < 0.01 NG/ML
TSH SERPL DL<=0.05 MIU/L-ACNC: 21.54 UIU/ML (ref 0.4–4.2)
WBC # BLD: 7.6 THOU/MM3 (ref 4.8–10.8)

## 2022-05-23 PROCEDURE — 82948 REAGENT STRIP/BLOOD GLUCOSE: CPT

## 2022-05-23 PROCEDURE — 6360000002 HC RX W HCPCS

## 2022-05-23 PROCEDURE — 6370000000 HC RX 637 (ALT 250 FOR IP): Performed by: PHYSICIAN ASSISTANT

## 2022-05-23 PROCEDURE — 84443 ASSAY THYROID STIM HORMONE: CPT

## 2022-05-23 PROCEDURE — 99285 EMERGENCY DEPT VISIT HI MDM: CPT

## 2022-05-23 PROCEDURE — 80053 COMPREHEN METABOLIC PANEL: CPT

## 2022-05-23 PROCEDURE — 99223 1ST HOSP IP/OBS HIGH 75: CPT

## 2022-05-23 PROCEDURE — 6370000000 HC RX 637 (ALT 250 FOR IP)

## 2022-05-23 PROCEDURE — 1200000003 HC TELEMETRY R&B

## 2022-05-23 PROCEDURE — 93010 ELECTROCARDIOGRAM REPORT: CPT | Performed by: INTERNAL MEDICINE

## 2022-05-23 PROCEDURE — 83880 ASSAY OF NATRIURETIC PEPTIDE: CPT

## 2022-05-23 PROCEDURE — 2580000003 HC RX 258

## 2022-05-23 PROCEDURE — 99213 OFFICE O/P EST LOW 20 MIN: CPT | Performed by: EMERGENCY MEDICINE

## 2022-05-23 PROCEDURE — 99215 OFFICE O/P EST HI 40 MIN: CPT

## 2022-05-23 PROCEDURE — 36415 COLL VENOUS BLD VENIPUNCTURE: CPT

## 2022-05-23 PROCEDURE — 85025 COMPLETE CBC W/AUTO DIFF WBC: CPT

## 2022-05-23 PROCEDURE — 80162 ASSAY OF DIGOXIN TOTAL: CPT

## 2022-05-23 PROCEDURE — 84484 ASSAY OF TROPONIN QUANT: CPT

## 2022-05-23 PROCEDURE — 87635 SARS-COV-2 COVID-19 AMP PRB: CPT

## 2022-05-23 PROCEDURE — 71045 X-RAY EXAM CHEST 1 VIEW: CPT

## 2022-05-23 PROCEDURE — 93005 ELECTROCARDIOGRAM TRACING: CPT | Performed by: EMERGENCY MEDICINE

## 2022-05-23 RX ORDER — POTASSIUM CHLORIDE 7.45 MG/ML
10 INJECTION INTRAVENOUS PRN
Status: DISCONTINUED | OUTPATIENT
Start: 2022-05-23 | End: 2022-05-25 | Stop reason: HOSPADM

## 2022-05-23 RX ORDER — ONDANSETRON 2 MG/ML
4 INJECTION INTRAMUSCULAR; INTRAVENOUS EVERY 6 HOURS PRN
Status: DISCONTINUED | OUTPATIENT
Start: 2022-05-23 | End: 2022-05-25 | Stop reason: HOSPADM

## 2022-05-23 RX ORDER — POTASSIUM CHLORIDE 20 MEQ/1
40 TABLET, EXTENDED RELEASE ORAL PRN
Status: DISCONTINUED | OUTPATIENT
Start: 2022-05-23 | End: 2022-05-25 | Stop reason: HOSPADM

## 2022-05-23 RX ORDER — LEVOTHYROXINE SODIUM 0.1 MG/1
100 TABLET ORAL DAILY
Status: DISCONTINUED | OUTPATIENT
Start: 2022-05-24 | End: 2022-05-25 | Stop reason: HOSPADM

## 2022-05-23 RX ORDER — ASPIRIN 81 MG/1
324 TABLET, CHEWABLE ORAL ONCE
Status: COMPLETED | OUTPATIENT
Start: 2022-05-23 | End: 2022-05-23

## 2022-05-23 RX ORDER — BUPROPION HYDROCHLORIDE 150 MG/1
150 TABLET ORAL EVERY MORNING
Status: DISCONTINUED | OUTPATIENT
Start: 2022-05-24 | End: 2022-05-25 | Stop reason: HOSPADM

## 2022-05-23 RX ORDER — VENLAFAXINE HYDROCHLORIDE 150 MG/1
150 CAPSULE, EXTENDED RELEASE ORAL DAILY
Status: DISCONTINUED | OUTPATIENT
Start: 2022-05-24 | End: 2022-05-25 | Stop reason: HOSPADM

## 2022-05-23 RX ORDER — INSULIN LISPRO 100 [IU]/ML
0-6 INJECTION, SOLUTION INTRAVENOUS; SUBCUTANEOUS
Status: DISCONTINUED | OUTPATIENT
Start: 2022-05-23 | End: 2022-05-25 | Stop reason: HOSPADM

## 2022-05-23 RX ORDER — SODIUM CHLORIDE 0.9 % (FLUSH) 0.9 %
5-40 SYRINGE (ML) INJECTION PRN
Status: DISCONTINUED | OUTPATIENT
Start: 2022-05-23 | End: 2022-05-24 | Stop reason: SDUPTHER

## 2022-05-23 RX ORDER — ASPIRIN 81 MG/1
81 TABLET, CHEWABLE ORAL DAILY
Status: DISCONTINUED | OUTPATIENT
Start: 2022-05-24 | End: 2022-05-25 | Stop reason: HOSPADM

## 2022-05-23 RX ORDER — VENLAFAXINE HYDROCHLORIDE 75 MG/1
75 CAPSULE, EXTENDED RELEASE ORAL DAILY
Status: DISCONTINUED | OUTPATIENT
Start: 2022-05-24 | End: 2022-05-25 | Stop reason: HOSPADM

## 2022-05-23 RX ORDER — FUROSEMIDE 10 MG/ML
40 INJECTION INTRAMUSCULAR; INTRAVENOUS 2 TIMES DAILY
Status: DISCONTINUED | OUTPATIENT
Start: 2022-05-23 | End: 2022-05-24

## 2022-05-23 RX ORDER — INSULIN LISPRO 100 [IU]/ML
0-3 INJECTION, SOLUTION INTRAVENOUS; SUBCUTANEOUS NIGHTLY
Status: DISCONTINUED | OUTPATIENT
Start: 2022-05-23 | End: 2022-05-25 | Stop reason: HOSPADM

## 2022-05-23 RX ORDER — ACETAMINOPHEN 325 MG/1
650 TABLET ORAL EVERY 6 HOURS PRN
Status: DISCONTINUED | OUTPATIENT
Start: 2022-05-23 | End: 2022-05-24 | Stop reason: SDUPTHER

## 2022-05-23 RX ORDER — ENOXAPARIN SODIUM 100 MG/ML
40 INJECTION SUBCUTANEOUS DAILY
Status: DISCONTINUED | OUTPATIENT
Start: 2022-05-23 | End: 2022-05-25 | Stop reason: HOSPADM

## 2022-05-23 RX ORDER — SODIUM CHLORIDE 0.9 % (FLUSH) 0.9 %
5-40 SYRINGE (ML) INJECTION EVERY 12 HOURS SCHEDULED
Status: DISCONTINUED | OUTPATIENT
Start: 2022-05-23 | End: 2022-05-24 | Stop reason: SDUPTHER

## 2022-05-23 RX ORDER — DEXTROSE MONOHYDRATE 50 MG/ML
100 INJECTION, SOLUTION INTRAVENOUS PRN
Status: DISCONTINUED | OUTPATIENT
Start: 2022-05-23 | End: 2022-05-25 | Stop reason: HOSPADM

## 2022-05-23 RX ORDER — PANTOPRAZOLE SODIUM 40 MG/1
40 TABLET, DELAYED RELEASE ORAL
Status: DISCONTINUED | OUTPATIENT
Start: 2022-05-24 | End: 2022-05-25 | Stop reason: HOSPADM

## 2022-05-23 RX ORDER — ONDANSETRON 4 MG/1
4 TABLET, ORALLY DISINTEGRATING ORAL EVERY 8 HOURS PRN
Status: DISCONTINUED | OUTPATIENT
Start: 2022-05-23 | End: 2022-05-25 | Stop reason: HOSPADM

## 2022-05-23 RX ORDER — MAGNESIUM SULFATE IN WATER 40 MG/ML
2000 INJECTION, SOLUTION INTRAVENOUS PRN
Status: DISCONTINUED | OUTPATIENT
Start: 2022-05-23 | End: 2022-05-25 | Stop reason: HOSPADM

## 2022-05-23 RX ORDER — DIGOXIN 125 MCG
125 TABLET ORAL DAILY
Status: DISCONTINUED | OUTPATIENT
Start: 2022-05-24 | End: 2022-05-25 | Stop reason: HOSPADM

## 2022-05-23 RX ORDER — ATORVASTATIN CALCIUM 40 MG/1
40 TABLET, FILM COATED ORAL NIGHTLY
Status: DISCONTINUED | OUTPATIENT
Start: 2022-05-23 | End: 2022-05-25 | Stop reason: HOSPADM

## 2022-05-23 RX ORDER — POLYETHYLENE GLYCOL 3350 17 G/17G
17 POWDER, FOR SOLUTION ORAL DAILY PRN
Status: DISCONTINUED | OUTPATIENT
Start: 2022-05-23 | End: 2022-05-25 | Stop reason: HOSPADM

## 2022-05-23 RX ORDER — METOPROLOL SUCCINATE 25 MG/1
12.5 TABLET, EXTENDED RELEASE ORAL DAILY
Status: DISCONTINUED | OUTPATIENT
Start: 2022-05-24 | End: 2022-05-25

## 2022-05-23 RX ORDER — ACETAMINOPHEN 650 MG/1
650 SUPPOSITORY RECTAL EVERY 6 HOURS PRN
Status: DISCONTINUED | OUTPATIENT
Start: 2022-05-23 | End: 2022-05-24 | Stop reason: SDUPTHER

## 2022-05-23 RX ORDER — LETROZOLE 2.5 MG/1
2.5 TABLET, FILM COATED ORAL DAILY
Status: DISCONTINUED | OUTPATIENT
Start: 2022-05-24 | End: 2022-05-25 | Stop reason: HOSPADM

## 2022-05-23 RX ORDER — SODIUM CHLORIDE 9 MG/ML
INJECTION, SOLUTION INTRAVENOUS PRN
Status: DISCONTINUED | OUTPATIENT
Start: 2022-05-23 | End: 2022-05-25 | Stop reason: HOSPADM

## 2022-05-23 RX ADMIN — ASPIRIN 81 MG 324 MG: 81 TABLET ORAL at 12:53

## 2022-05-23 RX ADMIN — SACUBITRIL AND VALSARTAN 1 TABLET: 49; 51 TABLET, FILM COATED ORAL at 20:26

## 2022-05-23 RX ADMIN — FUROSEMIDE 40 MG: 10 INJECTION, SOLUTION INTRAMUSCULAR; INTRAVENOUS at 17:49

## 2022-05-23 RX ADMIN — ATORVASTATIN CALCIUM 40 MG: 40 TABLET, FILM COATED ORAL at 20:26

## 2022-05-23 RX ADMIN — SODIUM CHLORIDE, PRESERVATIVE FREE 10 ML: 5 INJECTION INTRAVENOUS at 20:26

## 2022-05-23 RX ADMIN — ENOXAPARIN SODIUM 40 MG: 100 INJECTION SUBCUTANEOUS at 17:49

## 2022-05-23 ASSESSMENT — ENCOUNTER SYMPTOMS
SORE THROAT: 0
SHORTNESS OF BREATH: 1
RHINORRHEA: 0
DIARRHEA: 0
VOMITING: 0
ABDOMINAL PAIN: 0
NAUSEA: 0
COUGH: 1

## 2022-05-23 ASSESSMENT — HEART SCORE: ECG: 1

## 2022-05-23 ASSESSMENT — PAIN SCALES - GENERAL
PAINLEVEL_OUTOF10: 0
PAINLEVEL_OUTOF10: 5
PAINLEVEL_OUTOF10: 0

## 2022-05-23 ASSESSMENT — PAIN DESCRIPTION - DESCRIPTORS: DESCRIPTORS: OTHER (COMMENT)

## 2022-05-23 ASSESSMENT — PAIN DESCRIPTION - ORIENTATION: ORIENTATION: RIGHT;LEFT;UPPER

## 2022-05-23 ASSESSMENT — PAIN DESCRIPTION - FREQUENCY: FREQUENCY: INTERMITTENT

## 2022-05-23 ASSESSMENT — PAIN - FUNCTIONAL ASSESSMENT: PAIN_FUNCTIONAL_ASSESSMENT: 0-10

## 2022-05-23 ASSESSMENT — PAIN DESCRIPTION - LOCATION: LOCATION: LEG

## 2022-05-23 ASSESSMENT — PAIN DESCRIPTION - PAIN TYPE: TYPE: ACUTE PAIN

## 2022-05-23 NOTE — ED PROVIDER NOTES
[address] [facility] [address] [address] EMERGENCY DEPT      CHIEF COMPLAINT       Chief Complaint   Patient presents with    Leg Pain     bilateral    Shortness of Breath    Chest Pain       Nurses Notes reviewed and I agree except as noted in the HPI. HISTORY OF PRESENT ILLNESS    Miri Love is a 65 y.o. female who presents for chest pain and shortness of breath. For the past 4 days patient has had pain from her hips to her knees when walking, dyspnea on exertion, and chest discomfort described as a heaviness. Symptoms resolve at rest.  Symptoms worsened today prompting her ER visit. Associated symptoms include nocturnal cough and generalized fatigue. Patient has orthopnea which is chronic. Patient denies fever, dizziness, abdominal pain, nausea, vomiting, diarrhea, or other complaints. Patient had a vomiting and diarrhea illness on 5-8 that was accompanied with fever but has resolved. Patient has appointment with her cardiologist Dr. Edgardo Sarkar on 5-26. Location/Symptom: Chest pain, shortness of breath  Timing/Onset: 4 days  Context/Setting: Continuous onset accompanied with painful walking  Quality: Heavy  Duration: Intermittent  Modifying Factors: Occurs with exertion and improves with rest  Severity: Moderate    REVIEW OF SYSTEMS     Review of Systems   Constitutional: Positive for fatigue. Negative for chills, diaphoresis and fever. HENT: Negative for congestion, rhinorrhea and sore throat. Eyes: Negative for visual disturbance. Respiratory: Positive for cough and shortness of breath. Cardiovascular: Positive for chest pain. Gastrointestinal: Negative for abdominal pain, diarrhea, nausea and vomiting. Genitourinary: Negative for decreased urine volume and difficulty urinating. Musculoskeletal: Positive for myalgias. Negative for gait problem. Skin: Negative for rash. Neurological: Negative for dizziness, weakness, light-headedness and numbness. Psychiatric/Behavioral: Negative for confusion.  The patient is not nervous/anxious. PAST MEDICAL HISTORY    has a past medical history of Abnormal heart rhythm, Anxiety, Cancer (HCC), CHF (congestive heart failure) ([address] Congenital heart disease, DJD (degenerative joint disease), Enlarged lymph nodes, Hypertension, Hypothyroidism, ICD (implantable cardioverter-defibrillator) in place, Kidney stones, PONV (postoperative nausea and vomiting), Prediabetes, and Thyroid disease. SURGICAL HISTORY      has a past surgical history that includes Mastectomy, bilateral; Tonsillectomy; Percutaneous Transluminal Coronary Angio; Appendectomy; Colonoscopy; Carpal tunnel release (2019); Finger trigger release (Right, 06/25/2020); Colonoscopy (N/A, 07/27/2020); Colonoscopy (07/27/2020); Pacemaker insertion (Left, 02/11/2019); Colonoscopy (2021); EGD; and Eye surgery (Bilateral, 10/2021). CURRENT MEDICATIONS       Previous Medications    ACETAMINOPHEN (TYLENOL) 500 MG TABLET    Take 500 mg by mouth every 6 hours as needed for Pain    ASPIRIN 81 MG CHEWABLE TABLET    Take 1 tablet by mouth daily    ATORVASTATIN (LIPITOR) 40 MG TABLET    Take 1 tablet by mouth nightly    BUPROPION (WELLBUTRIN XL) 150 MG EXTENDED RELEASE TABLET    Take 1 tablet by mouth every morning    CHOLECALCIFEROL 50 MCG (2000 UT) CAPS    Take 50 mcg by mouth daily    CPAP MACHINE MISC    by Does not apply route Please change CPAP pressure to auto 11-15 cm H20.     DIGOXIN (LANOXIN) 125 MCG TABLET    Take 1 tablet by mouth daily    EUTHYROX 100 MCG TABLET    Take 1 tablet by mouth once daily    FUROSEMIDE (LASIX) 20 MG TABLET    TAKE 1 TABLET BY MOUTH ONCE DAILY AS NEEDED FOR LEG SWELLING, WEIGHT GAIN    LETROZOLE (FEMARA) 2.5 MG TABLET    Take 1 tablet by mouth daily    METFORMIN (GLUCOPHAGE-XR) 500 MG EXTENDED RELEASE TABLET    Take 1 tablet by mouth once daily with breakfast    METOPROLOL SUCCINATE (TOPROL XL) 25 MG EXTENDED RELEASE TABLET    Take 0.5 tablets by mouth daily    OMEPRAZOLE (PRILOSEC) 40 MG DELAYED RELEASE CAPSULE    Take 1 capsule by mouth every morning (before breakfast)    POLYETHYLENE GLYCOL (GLYCOLAX) 17 GM/SCOOP POWDER    DISSOLVE & TAKE 1 CAPFUL ONCE DAILY    SACUBITRIL-VALSARTAN (ENTRESTO) 49-51 MG PER TABLET    Take 1 tablet by mouth twice daily    VENLAFAXINE (EFFEXOR XR) 150 MG EXTENDED RELEASE CAPSULE    Take 1 capsule by mouth daily    VENLAFAXINE (EFFEXOR XR) 75 MG EXTENDED RELEASE CAPSULE    Take 1 capsule by mouth daily (take along with 150 mg Effexor for total dose of 225 mg)       ALLERGIES     is allergic to adhesive tape. FAMILY HISTORY     She indicated that her mother is alive. She indicated that her father is . She indicated that both of her brothers are alive. She indicated that the status of her maternal grandfather is unknown. She indicated that the status of her neg hx is unknown.   family history includes Cancer in her father and maternal grandfather; [place] in her father; Colon Polyps in her brother; Dementia in her mother; High Blood Pressure in her mother; Mental Retardation in her brother. SOCIAL HISTORY    reports that she quit smoking about 2 years ago. Her smoking use included cigarettes. She started smoking about 40 years ago. She has a 9.25 pack-year smoking history. She has never used smokeless tobacco. She reports that she does not drink alcohol and does not use drugs. PHYSICAL EXAM     INITIAL VITALS:  weight is 215 lb (97.5 kg). Her temporal temperature is 97.9 °F (36.6 °C). Her blood pressure is 97/82 and her pulse is 91. Her respiration is 28 and oxygen saturation is 97%. Physical Exam  Constitutional:       Appearance: Normal appearance. She is well-developed. She is not ill-appearing or diaphoretic. HENT:      Head: Normocephalic and atraumatic. Right Ear: Hearing normal.      Left Ear: Hearing normal.      Nose: Nose normal. No rhinorrhea. Mouth/Throat:      Lips: Pink.       Mouth: Mucous membranes are moist. Pharynx: Oropharynx is clear. Eyes:      General: Lids are normal. No scleral icterus. Extraocular Movements: Extraocular movements intact. Conjunctiva/sclera: Conjunctivae normal.      Pupils: Pupils are equal, round, and reactive to light. Neck:      Trachea: Trachea normal.   Cardiovascular:      Rate and Rhythm: Normal rate and regular rhythm. Heart sounds: Murmur heard. Pulmonary:      Effort: Pulmonary effort is normal.      Breath sounds: Normal breath sounds and air entry. No decreased breath sounds, wheezing or rhonchi. Abdominal:      General: There is no distension. Palpations: Abdomen is soft. Tenderness: There is no abdominal tenderness. Musculoskeletal:      Cervical back: Normal range of motion and neck supple. No rigidity. Comments: Well perfused; movement normal as observed; no signs of DVT   Lymphadenopathy:      Cervical: No cervical adenopathy. Skin:     General: Skin is warm and dry. Findings: No rash. Neurological:      General: No focal deficit present. Mental Status: She is alert. Sensory: Sensation is intact. Motor: Motor function is intact. Gait: Gait is intact. Psychiatric:         Mood and Affect: Mood normal.         Speech: Speech normal.         Behavior: Behavior is cooperative. DIFFERENTIAL DIAGNOSIS:   Including but not limited to: ACS, CHF, ischemia, CHRISTIANA, anemia, PAD    DIAGNOSTIC RESULTS     EKG: All EKG's are interpreted [facility] who either signs or Co-signs this chart in the absence of a cardiologist.  Ventricular rate 91 bpm  MS interval 202 ms length restoration 108 ms  QTc interval 496 ms  P discharge T axes 39, -17, 93  Normal sinus rhythm. No STEMI. T wave inversion in leads I, aVL, and V6.   Worsened as compared to EKG from 5-    RADIOLOGY: non-plain film images(s) such as CT,Ultrasound and MRI are read by the radiologist.  Plain radiographic images are visualized and preliminarily interpreted by the emergency physician unless otherwise stated below. XR CHEST PORTABLE   Final Result   1. No acute cardiopulmonary finding. **This report has been created using voice recognition software. It may contain minor errors which are inherent in voice recognition technology. **      Final report electronically signed by Dr Юлия Stone on 5/23/2022 12:54 PM          LABS:   Labs Reviewed   CBC WITH AUTO DIFFERENTIAL - Abnormal; Notable for the following components:       Result Value    MCHC 30.3 (*)     RDW-CV 15.5 (*)     RDW-SD 54.2 (*)     Platelets 655 (*)     All other components within normal limits   COMPREHENSIVE METABOLIC PANEL - Abnormal; Notable for the following components:    Glucose 111 (*)     Alkaline Phosphatase 177 (*)     All other components within normal limits   BRAIN NATRIURETIC PEPTIDE - Abnormal; Notable for the following components:    Pro-BNP 7449.0 (*)     All other components within normal limits   GLOMERULAR FILTRATION RATE, ESTIMATED - Abnormal; Notable for the following components:    Est, Glom Filt Rate 50 (*)     All other components within normal limits   COVID-19, RAPID   TROPONIN   DIGOXIN LEVEL   ANION GAP   OSMOLALITY       EMERGENCY DEPARTMENT COURSE:   Vitals:    Vitals:    05/23/22 1106 05/23/22 1109 05/23/22 1112 05/23/22 1324   BP:  121/77  97/82   Pulse: 88   91   Resp: 20   28   Temp: 97.9 °F (36.6 °C)      TempSrc: Temporal      SpO2: 98%   97%   Weight:   215 lb (97.5 kg)        Heart Score for chest pain patients  History:  Moderately Suspicious  ECG: Non-Specifc repolarization disturbance/LBTB/PM  Patient Age: > 39 and < 65 years  *Risk factors for Atherosclerotic disease: Diabetes Mellitus,Hypercholesterolemia,Obesity,Positive family History  Risk Factors: > 3 Risk factors or history of atherosclerotic disease*  Troponin: < 1X normal limit  Heart Score Total: 5    MDM:  The patient was seen by me in the emergency room for exertional chest pain and shortness of breath. Physical exam revealed a pleasant 71-year-old female in no distress. Vital signs reviewed and noted stable. Old records were reviewed. Appropriate laboratory and imaging studies were ordered and reviewed upon completion. Pertinent findings: proBNP 7449 which is decreased from earlier this month; troponin negative; chest x-ray unremarkable    Interventions: Aspirin, observation    Reexamination: Patient remained stable with good vital signs other blood pressure was mildly hypotensive. Patient was asymptomatic with this. Results were discussed with the patient as well as desire for admission and they were amenable. Shaji Washington NP of our hospitalists' service was consulted and graciously accepted admission. The patient was admitted to the hospital in fair condition. CRITICAL CARE:   During my workup of this patient, it did become clear to me the critical nature of this patient's illness which did require my constant attention, and a critical care time 30 minutes was given    CONSULTS:  [phone] [id number]: Shaji Washington NP (hospitalist) advised Neville Haji PA-C would admit    PROCEDURES:  None    FINAL IMPRESSION      1. Chest pain, unspecified type    2. Dyspnea and respiratory abnormalities    3. ST segment changes on electrocardiogram          DISPOSITION/PLAN     1. Chest pain, unspecified type    2. Dyspnea and respiratory abnormalities    3.  ST segment changes on electrocardiogram    Admission    (Please note that portions of this note were completed with a voice recognition program.  Efforts were made to edit the dictations but occasionally words are mis-transcribed.)    Prabha Hill PA-C 05/23/22 2:09 PM    ELISE Ibarra PA-C  05/23/22 [id number]

## 2022-05-23 NOTE — ED NOTES
Patient is resting in bed with easy and unlabored respirations. Call light in reach. Side rails up x2. Patient denies further complaints or concerns. Will monitor.         Jennifer Troy RN  05/23/22 5940

## 2022-05-23 NOTE — PROGRESS NOTES
Pt admitted to  6 in a wheelchair. Complaints: Chest pain / discomfort. IV none infusing. IV site free of s/s of infection or infiltration. Vital signs obtained. Assessment and data collection initiated. Two nurse skin assessment performed by Татьяна Grey RN and Bianca Bobby RN. Oriented to room. Policies and procedures for  explained. Татьяна Grey RN discussed hourly rounding with patient addressing 5 P's. Fall prevention and safety brochure discussed with patient. Bed alarm on. Call light in reach.

## 2022-05-23 NOTE — ED NOTES
Pt transported to [facility] on cart in stable condition. Floor contacted before transport. Spoke with Michelle Montenegro.      Juanita Quiñones  05/23/22 7984

## 2022-05-23 NOTE — ED TRIAGE NOTES
Pt presents to [facility] with c/o bilateral upper leg pain, SOB, and chest discomfort/pain x 4 days that is worsening.

## 2022-05-23 NOTE — H&P
Hospitalist - History & Physical      Patient: Afia Choudhury    Unit/Bed:6K-09/009-A  YOB: 1960  MRN: [id number]   Acct: [de-identified]   PCP: ANGELIA Draper CNP    Date of Service: Pt seen/examined on 05/23/22  and Admitted to Inpatient with expected LOS greater than two midnights due to medical therapy. Chief Complaint:  Shortness of breath, BL LE pain    Assessment and Plan:-  1. Acute on chronic HFrEF:   · Pt had extensive work-up performed on 05/12 for the worsening shortness of breath, she had not taken her home diuretics for several months as she ran out while in [facility]. BNP was elevated on 5/12, she was discharged home with instructions to restart her home diuretic and to follow-up with cardiology outpatient. Pt is scheduled with Dr. Raghavendra Fam on 05/26. · Prior echo obtained revealing EF of 40-45%. Patient with ICD in place. BNP remains elevated, however improved from 5/12. CXR unremarkable. Will admit patient and repeat echo, start IV diuresis. Bilateral crackles noted on exam. Continue home medications. 2. Chest heaviness:  · Patient was evaluated for this concern on 5/12 as well in our emergency department, extensive work-up was performed. No signs of ischemia at that time, patient discharged home in stable condition with instructions to restart her home diuretics. Pt presents today with chest heaviness, troponin negative x2. EKG however revealing worsening ST depression which was seen on EKG on 5/12. Will consult cardiology due to these changes and patient's persistent chest heaviness. LHC performed in 2020- no CAD noted at that time. · Continue home medications, repeat EKG tomorrow morning. Trend troponin x3.  3. Essential hypertension:  · Patient mildly hypotensive on admission, will continue Lopressor with holding parameters for systolic blood pressure less than 110, heart rate less than 60.   Monitor closely and adjust medications as needed. 4. Hypothyroidism:   · Will obtain TSH, resume home medication  5. History of breast cancer:  · 2016, aware. Patient on Femara at home. 6.  Obstructive sleep apnea:  · Patient reports that she does not use a CPAP as it is not covered by insurance. 7. GERD:  · Resume home PPI    History Of Present Illness:    Beverly Goodell is a 71-year-old female with a history of heart failure, breast cancer, congenital heart disease, ICD presents to the emergency department today for bilateral leg pain, shortness of breath, chest heaviness. Patient reports that for the past 4 days she has pain that radiates from her hips to her knees bilaterally, is experiencing dyspnea on exertion, and chest discomfort which she describes as heaviness, not pain. She notes that the symptoms resolve at rest.  Patient reports that she is unable to do any of her daily activities due to this dyspnea with exertion. She also endorses orthopnea, nocturnal cough, generalized fatigue and weakness. Patient was seen at our ED on 05/12 for this same complaint and discharged home in stable condition. Patient notes that her condition has not improved since, only worsen. Patient was in [facility] for several months prior to May and had not been taking her cardiac medications or diuretics. As she ran out. She denies nausea, vomiting, diaphoresis, abdominal pain, dizziness/lightheadedness, fevers, chills, or any other concerns. Patient reports that she follows with Dr. Kala Kc and does have an appointment set for 5/26. Patient denies any other questions or concerns at this time.       Past Medical History:        Diagnosis Date    Abnormal heart rhythm     Anxiety     Cancer [facility])     breast  2016     CHF (congestive heart failure) (HCC)     Congenital heart disease     DJD (degenerative joint disease)     Enlarged lymph nodes     Hypertension     Hypothyroidism     ICD (implantable cardioverter-defibrillator) in place 02/11/2019    Dr. Alonso Cantu  Kidney stones     PONV (postoperative nausea and vomiting)     Prediabetes 10/7/2019    Thyroid disease        Past Surgical History:        Procedure Laterality Date    APPENDECTOMY      CARPAL TUNNEL RELEASE  2019    COLONOSCOPY      COLONOSCOPY N/A 07/27/2020    COLONOSCOPY POLYPECTOMY SNARE/COLD BIOPSY performed by [facility] Mark [facility] at [facility]    COLONOSCOPY  07/27/2020    COLONOSCOPY POLYPECTOMY HOT BIOPSY performed by Ivy Mark MD at [address] [place] [address]  2021    EGD      EYE SURGERY Bilateral 10/2021    eyelid lift     FINGER TRIGGER RELEASE Right 06/25/2020    DEQUERVAINS RELEASE AND RIGHT RING FINGER TRIGGER RELEASE performed by Leoncio Figueroa DO at [address], BILATERAL      PACEMAKER INSERTION Left 02/11/2019    [facility] HAS A MRI CONDITIONAL SYSTEM    PTCA      TONSILLECTOMY         Home Medications:   No current facility-administered medications on file prior to encounter. Current Outpatient Medications on File Prior to Encounter   Medication Sig Dispense Refill    sacubitril-valsartan (ENTRESTO) 49-51 MG per tablet Take 1 tablet by mouth twice daily 60 tablet 0    digoxin (LANOXIN) 125 MCG tablet Take 1 tablet by mouth daily 90 tablet 0    furosemide (LASIX) 20 MG tablet TAKE 1 TABLET BY MOUTH ONCE DAILY AS NEEDED FOR LEG SWELLING, WEIGHT GAIN 90 tablet 0    EUTHYROX 100 MCG tablet Take 1 tablet by mouth once daily 90 tablet 1    venlafaxine (EFFEXOR XR) 150 MG extended release capsule Take 1 capsule by mouth daily 90 capsule 3    buPROPion (WELLBUTRIN XL) 150 MG extended release tablet Take 1 tablet by mouth every morning 90 tablet 1    Cholecalciferol 50 MCG (2000 UT) CAPS Take 50 mcg by mouth daily 30 capsule 3    metFORMIN (GLUCOPHAGE-XR) 500 MG extended release tablet Take 1 tablet by mouth once daily with breakfast 90 tablet 0    CPAP Machine MISC by Does not apply route Please change CPAP pressure to auto 11-15 cm H20. 1 each 0    omeprazole (PRILOSEC) 40 MG delayed release capsule Take 1 capsule by mouth every morning (before breakfast) 90 capsule 1    venlafaxine (EFFEXOR XR) 75 MG extended release capsule Take 1 capsule by mouth daily (take along with 150 mg Effexor for total dose of 225 mg) 90 capsule 3    letrozole (FEMARA) 2.5 MG tablet Take 1 tablet by mouth daily 90 tablet 3    polyethylene glycol (GLYCOLAX) 17 GM/SCOOP powder DISSOLVE & TAKE 1 CAPFUL ONCE DAILY      metoprolol succinate (TOPROL XL) 25 MG extended release tablet Take 0.5 tablets by mouth daily 30 tablet 3    atorvastatin (LIPITOR) 40 MG tablet Take 1 tablet by mouth nightly 90 tablet 3    aspirin 81 MG chewable tablet Take 1 tablet by mouth daily 30 tablet 3    acetaminophen (TYLENOL) 500 MG tablet Take 500 mg by mouth every 6 hours as needed for Pain         Allergies:    Adhesive tape    Social History:    reports that she quit smoking about 2 years ago. Her smoking use included cigarettes. She started smoking about 40 years ago. She has a 9.25 pack-year smoking history. She has never used smokeless tobacco. She reports that she does not drink alcohol and does not use drugs. Family History:       Problem Relation Age of Onset    High Blood Pressure Mother     Dementia Mother     Cancer Father     Colon Cancer Father     Mental Retardation Brother     Colon Polyps Brother     Cancer Maternal Grandfather     Esophageal Cancer Neg Hx        Diet:  Diet NPO    Review of systems:   Pertinent positives as noted in the HPI. All other systems reviewed and negative. PHYSICAL EXAM:  BP 99/74   Pulse 88   Temp 97.7 °F (36.5 °C) (Oral)   Resp 20   Wt 215 lb (97.5 kg)   SpO2 98%   Breastfeeding No   BMI 39.32 kg/m²   General appearance: No apparent distress, appears stated age and cooperative. HEENT: Normal cephalic, atraumatic without obvious deformity. Pupils equal, round, and reactive to light. Extra ocular muscles intact. Conjunctivae/corneas clear. Neck: Supple, with full range of motion. No jugular venous distention. Trachea midline. Respiratory:  Normal respiratory effort. Clear to auscultation, bilaterally without Rales/Wheezes/Rhonchi. Cardiovascular: Regular rate and rhythm with normal S1/S2 without murmurs, rubs or gallops. Abdomen: Soft, non-tender, non-distended with normal bowel sounds. Musculoskeletal:  No clubbing, cyanosis or edema bilaterally. Skin: Skin color, texture, turgor normal.  No rashes or lesions. Neurologic:  Neurovascularly intact without any focal sensory/motor deficits. Cranial nerves: II-XII intact, grossly non-focal.  Psychiatric: Alert and oriented, thought content appropriate, normal insight  Capillary Refill: Brisk,< 3 seconds   Peripheral Pulses: +2 palpable, equal bilaterally     Labs:   Recent Labs     05/23/22  1148   WBC 7.6   HGB 12.5   HCT 41.3   *     Recent Labs     05/23/22  1148      K 4.6   CL 98   CO2 27   BUN 21   CREATININE 1.1   CALCIUM 8.9     Recent Labs     05/23/22  1148   AST 19   ALT 24   BILITOT 0.8   ALKPHOS 177*     No results for input(s): INR in the last 72 hours. No results for input(s): John Beery in the last 72 hours. Urinalysis:    Lab Results   Component Value Date    NITRU Negative 10/18/2021    WBCUA 0-2 08/30/2021    BACTERIA NONE SEEN 08/30/2021    RBCUA 0-2 08/30/2021    BLOODU Small 10/18/2021    BLOODU NEGATIVE 08/30/2021    SPECGRAV 1.015 08/18/2020    GLUCOSEU Negative 10/18/2021       Radiology:   XR CHEST PORTABLE   Final Result   1. No acute cardiopulmonary finding. **This report has been created using voice recognition software. It may contain minor errors which are inherent in voice recognition technology. **      Final report electronically signed by Dr Serina Nogueira on 5/23/2022 12:54 PM        XR CHEST PORTABLE    Result Date: 5/23/2022  PROCEDURE: XR CHEST PORTABLE CLINICAL INFORMATION: Chest pain, short of breath COMPARISON: Chest radiograph 10/15/2020 TECHNIQUE: AP portable chest radiograph performed. FINDINGS: Cardiac conduction device is seen with single lead which appears intact. . No focal pulmonary consolidation. Cardiac silhouette is moderately enlarged. No pleural effusion. No pneumothorax. No acute bony abnormality. 1. No acute cardiopulmonary finding. **This report has been created using voice recognition software. It may contain minor errors which are inherent in voice recognition technology. ** Final report electronically signed by Dr Jen Reynoso on 5/23/2022 12:54 PM      Electronically signed by Yuan Jimenez PA-C on 5/23/2022 at 4:50 PM

## 2022-05-23 NOTE — ED PROVIDER NOTES
[facility] [address] [address] EMERGENCY DEPT  Urgent Care Encounter       CHIEF COMPLAINT       Chief Complaint   Patient presents with    Leg Pain     bilateral    Shortness of Breath    Chest Pain       Nurses Notes reviewed and I agree except as noted in the HPI. HISTORY OF PRESENT ILLNESS   Leander Pennington is a 65 y.o. female who presents for complaints of bilateral leg pain, chest pain and shortness of breath. Patient was seen at [facility] emergency department on 5/12/2022 for similar complaints. She had a thorough work-up including CTA of the chest performed at that time. Patient has a significant history of CHF and cardiomyopathy. She has ICD in place. The patient had just returned from [facility] prior to her ED visit. She was without her medications for some time. She had a refill of her Lasix, Entresto and digoxin at the emergency department. She states symptoms have gotten worse instead of better since the ED visit. The patient does have a cardiology appointment with Dr. Dior Baugh on 5/26/2022. HPI    REVIEW OF SYSTEMS     Review of Systems   Constitutional: Negative for chills, diaphoresis, fatigue and fever. Respiratory: Positive for shortness of breath. Cardiovascular: Positive for chest pain. Musculoskeletal: Positive for myalgias (bilateral legs). Neurological: Negative for dizziness and headaches. Psychiatric/Behavioral: Negative for behavioral problems.        PAST MEDICAL HISTORY         Diagnosis Date    Abnormal heart rhythm     Anxiety     Cancer [facility])     breast  2016     CHF (congestive heart failure) (HCC)     Congenital heart disease     DJD (degenerative joint disease)     Enlarged lymph nodes     Hypertension     Hypothyroidism     ICD (implantable cardioverter-defibrillator) in place 02/11/2019    Dr. Geno Koehler Kidney stones     PONV (postoperative nausea and vomiting)     Prediabetes 10/7/2019    Thyroid disease        SURGICALHISTORY     Patient  has a past surgical history that includes Mastectomy, bilateral; Tonsillectomy; Percutaneous Transluminal Coronary Angio; Appendectomy; Colonoscopy; Carpal tunnel release (2019); Finger trigger release (Right, 06/25/2020); Colonoscopy (N/A, 07/27/2020); Colonoscopy (07/27/2020); Pacemaker insertion (Left, 02/11/2019); Colonoscopy (2021); EGD; and Eye surgery (Bilateral, 10/2021). CURRENT MEDICATIONS       Previous Medications    ACETAMINOPHEN (TYLENOL) 500 MG TABLET    Take 500 mg by mouth every 6 hours as needed for Pain    ASPIRIN 81 MG CHEWABLE TABLET    Take 1 tablet by mouth daily    ATORVASTATIN (LIPITOR) 40 MG TABLET    Take 1 tablet by mouth nightly    BUPROPION (WELLBUTRIN XL) 150 MG EXTENDED RELEASE TABLET    Take 1 tablet by mouth every morning    CHOLECALCIFEROL 50 MCG (2000 UT) CAPS    Take 50 mcg by mouth daily    CPAP MACHINE MISC    by Does not apply route Please change CPAP pressure to auto 11-15 cm H20.     DIGOXIN (LANOXIN) 125 MCG TABLET    Take 1 tablet by mouth daily    EUTHYROX 100 MCG TABLET    Take 1 tablet by mouth once daily    FUROSEMIDE (LASIX) 20 MG TABLET    TAKE 1 TABLET BY MOUTH ONCE DAILY AS NEEDED FOR LEG SWELLING, WEIGHT GAIN    LETROZOLE (FEMARA) 2.5 MG TABLET    Take 1 tablet by mouth daily    METFORMIN (GLUCOPHAGE-XR) 500 MG EXTENDED RELEASE TABLET    Take 1 tablet by mouth once daily with breakfast    METOPROLOL SUCCINATE (TOPROL XL) 25 MG EXTENDED RELEASE TABLET    Take 0.5 tablets by mouth daily    OMEPRAZOLE (PRILOSEC) 40 MG DELAYED RELEASE CAPSULE    Take 1 capsule by mouth every morning (before breakfast)    POLYETHYLENE GLYCOL (GLYCOLAX) 17 GM/SCOOP POWDER    DISSOLVE & TAKE 1 CAPFUL ONCE DAILY    SACUBITRIL-VALSARTAN (ENTRESTO) 49-51 MG PER TABLET    Take 1 tablet by mouth twice daily    VENLAFAXINE (EFFEXOR XR) 150 MG EXTENDED RELEASE CAPSULE    Take 1 capsule by mouth daily    VENLAFAXINE (EFFEXOR XR) 75 MG EXTENDED RELEASE CAPSULE    Take 1 capsule by mouth daily (take along with 150 distress. Breath sounds: Normal breath sounds. Musculoskeletal:      Cervical back: Normal range of motion. Skin:     General: Skin is warm and dry. Neurological:      General: No focal deficit present. Mental Status: She is alert and oriented to person, place, and time. Psychiatric:         Mood and Affect: Mood normal.         Behavior: Behavior normal.         DIAGNOSTIC RESULTS     Labs:No results found for this visit on 05/23/22. IMAGING:    No orders to display         EKG:  Normal sinus rhythm ventricular rate 91 bpm.  MO interval 202, , corrected  ms. No ST elevation or depression concerning for ischemia or infarction. URGENT CARE COURSE:     Vitals:    05/23/22 1106 05/23/22 1109 05/23/22 1112   BP:  121/77    Pulse: 88     Resp: 20     Temp: 97.9 °F (36.6 °C)     TempSrc: Temporal     SpO2: 98%     Weight:   215 lb (97.5 kg)       Medications - No data to display         PROCEDURES:  None    FINAL IMPRESSION      1. Chest pain, unspecified type          DISPOSITION/ PLAN     Patient presents to the urgent care for chest pain, bilateral leg pain, shortness of breath. I advised the patient that she will require further work-up than can be provided at the urgent care and will require transfer to the emergency department. She is amenable to transfer. She refuses ambulance transport and will have family members drive her in private vehicle. I did contact [facility] and advised of the transfer. Patient is advised to go directly to the emergency department for further work-up.       PATIENT REFERRED TO:  ANGELIA Stauffer CNP  [address] / [place] [address]      DISCHARGE MEDICATIONS:  New Prescriptions    No medications on file       Discontinued Medications    No medications on file       Current Discharge Medication List          ANGELIA Calixto CNP    (Please note that portions of this note were completed with a voice recognition program. Efforts were made to edit the dictations but occasionally words are mis-transcribed.)          Karen Millard, ANGELIA - CNP  05/23/22 1231

## 2022-05-24 ENCOUNTER — APPOINTMENT (OUTPATIENT)
Dept: CARDIAC CATH/INVASIVE PROCEDURES | Age: 62
DRG: 286 | End: 2022-05-24
Payer: COMMERCIAL

## 2022-05-24 LAB
ANION GAP SERPL CALCULATED.3IONS-SCNC: 12 MEQ/L (ref 8–16)
ATYPICAL LYMPHOCYTES: ABNORMAL %
BASOPHILS # BLD: 0.8 %
BASOPHILS ABSOLUTE: 0 THOU/MM3 (ref 0–0.1)
BUN BLDV-MCNC: 19 MG/DL (ref 7–22)
CALCIUM SERPL-MCNC: 8.6 MG/DL (ref 8.5–10.5)
CHLORIDE BLD-SCNC: 98 MEQ/L (ref 98–111)
CO2: 27 MEQ/L (ref 23–33)
CREAT SERPL-MCNC: 1.1 MG/DL (ref 0.4–1.2)
DIFFERENTIAL TYPE: ABNORMAL
EKG ATRIAL RATE: 81 BPM
EKG P AXIS: 31 DEGREES
EKG P-R INTERVAL: 214 MS
EKG Q-T INTERVAL: 408 MS
EKG QRS DURATION: 108 MS
EKG QTC CALCULATION (BAZETT): 473 MS
EKG R AXIS: -18 DEGREES
EKG T AXIS: 91 DEGREES
EKG VENTRICULAR RATE: 81 BPM
ELLIPTOCYTES: ABNORMAL
EOSINOPHIL # BLD: 3.9 %
EOSINOPHILS ABSOLUTE: 0.2 THOU/MM3 (ref 0–0.4)
ERYTHROCYTE [DISTWIDTH] IN BLOOD BY AUTOMATED COUNT: 15.2 % (ref 11.5–14.5)
ERYTHROCYTE [DISTWIDTH] IN BLOOD BY AUTOMATED COUNT: 52.1 FL (ref 35–45)
GFR SERPL CREATININE-BSD FRML MDRD: 50 ML/MIN/1.73M2
GLUCOSE BLD-MCNC: 119 MG/DL (ref 70–108)
GLUCOSE BLD-MCNC: 67 MG/DL (ref 70–108)
GLUCOSE BLD-MCNC: 68 MG/DL (ref 70–108)
GLUCOSE BLD-MCNC: 71 MG/DL (ref 70–108)
GLUCOSE BLD-MCNC: 74 MG/DL (ref 70–108)
GLUCOSE BLD-MCNC: 79 MG/DL (ref 70–108)
GLUCOSE BLD-MCNC: 79 MG/DL (ref 70–108)
GLUCOSE BLD-MCNC: 82 MG/DL (ref 70–108)
GLUCOSE BLD-MCNC: 89 MG/DL (ref 70–108)
HCT VFR BLD CALC: 38.2 % (ref 37–47)
HEMOGLOBIN: 11.7 GM/DL (ref 12–16)
IMMATURE GRANS (ABS): 0.02 THOU/MM3 (ref 0–0.07)
IMMATURE GRANULOCYTES: 0.3 %
LV EF: 25 %
LVEF MODALITY: NORMAL
LYMPHOCYTES # BLD: 35.1 %
LYMPHOCYTES ABSOLUTE: 2.1 THOU/MM3 (ref 1–4.8)
MCH RBC QN AUTO: 29 PG (ref 26–33)
MCHC RBC AUTO-ENTMCNC: 30.6 GM/DL (ref 32.2–35.5)
MCV RBC AUTO: 94.8 FL (ref 81–99)
MONOCYTES # BLD: 5.3 %
MONOCYTES ABSOLUTE: 0.3 THOU/MM3 (ref 0.4–1.3)
NUCLEATED RED BLOOD CELLS: 0 /100 WBC
PATHOLOGIST REVIEW: ABNORMAL
PLATELET # BLD: 361 THOU/MM3 (ref 130–400)
PLATELET ESTIMATE: ADEQUATE
PMV BLD AUTO: 11.2 FL (ref 9.4–12.4)
POTASSIUM REFLEX MAGNESIUM: 4.6 MEQ/L (ref 3.5–5.2)
RBC # BLD: 4.03 MILL/MM3 (ref 4.2–5.4)
SCAN OF BLOOD SMEAR: NORMAL
SEG NEUTROPHILS: 54.6 %
SEGMENTED NEUTROPHILS ABSOLUTE COUNT: 3.2 THOU/MM3 (ref 1.8–7.7)
SODIUM BLD-SCNC: 137 MEQ/L (ref 135–145)
T4 FREE: 0.82 NG/DL (ref 0.93–1.76)
TOTAL CK: 58 U/L (ref 30–135)
WBC # BLD: 5.9 THOU/MM3 (ref 4.8–10.8)

## 2022-05-24 PROCEDURE — 36415 COLL VENOUS BLD VENIPUNCTURE: CPT

## 2022-05-24 PROCEDURE — 4A023N7 MEASUREMENT OF CARDIAC SAMPLING AND PRESSURE, LEFT HEART, PERCUTANEOUS APPROACH: ICD-10-PCS | Performed by: INTERNAL MEDICINE

## 2022-05-24 PROCEDURE — C1769 GUIDE WIRE: HCPCS

## 2022-05-24 PROCEDURE — 93005 ELECTROCARDIOGRAM TRACING: CPT

## 2022-05-24 PROCEDURE — 82948 REAGENT STRIP/BLOOD GLUCOSE: CPT

## 2022-05-24 PROCEDURE — 6360000002 HC RX W HCPCS

## 2022-05-24 PROCEDURE — 84439 ASSAY OF FREE THYROXINE: CPT

## 2022-05-24 PROCEDURE — 99232 SBSQ HOSP IP/OBS MODERATE 35: CPT

## 2022-05-24 PROCEDURE — 93458 L HRT ARTERY/VENTRICLE ANGIO: CPT | Performed by: INTERNAL MEDICINE

## 2022-05-24 PROCEDURE — 80048 BASIC METABOLIC PNL TOTAL CA: CPT

## 2022-05-24 PROCEDURE — 1200000003 HC TELEMETRY R&B

## 2022-05-24 PROCEDURE — 99223 1ST HOSP IP/OBS HIGH 75: CPT | Performed by: INTERNAL MEDICINE

## 2022-05-24 PROCEDURE — 75625 CONTRAST EXAM ABDOMINL AORTA: CPT

## 2022-05-24 PROCEDURE — 36246 INS CATH ABD/L-EXT ART 2ND: CPT | Performed by: INTERNAL MEDICINE

## 2022-05-24 PROCEDURE — 93306 TTE W/DOPPLER COMPLETE: CPT

## 2022-05-24 PROCEDURE — 2580000003 HC RX 258

## 2022-05-24 PROCEDURE — 2580000003 HC RX 258: Performed by: INTERNAL MEDICINE

## 2022-05-24 PROCEDURE — 6360000004 HC RX CONTRAST MEDICATION: Performed by: INTERNAL MEDICINE

## 2022-05-24 PROCEDURE — 94760 N-INVAS EAR/PLS OXIMETRY 1: CPT

## 2022-05-24 PROCEDURE — 36246 INS CATH ABD/L-EXT ART 2ND: CPT

## 2022-05-24 PROCEDURE — 75716 ARTERY X-RAYS ARMS/LEGS: CPT

## 2022-05-24 PROCEDURE — 82550 ASSAY OF CK (CPK): CPT

## 2022-05-24 PROCEDURE — B2151ZZ FLUOROSCOPY OF LEFT HEART USING LOW OSMOLAR CONTRAST: ICD-10-PCS | Performed by: INTERNAL MEDICINE

## 2022-05-24 PROCEDURE — 85025 COMPLETE CBC W/AUTO DIFF WBC: CPT

## 2022-05-24 PROCEDURE — 93458 L HRT ARTERY/VENTRICLE ANGIO: CPT

## 2022-05-24 PROCEDURE — 2580000003 HC RX 258: Performed by: HOSPITALIST

## 2022-05-24 PROCEDURE — 93010 ELECTROCARDIOGRAM REPORT: CPT | Performed by: INTERNAL MEDICINE

## 2022-05-24 PROCEDURE — 75716 ARTERY X-RAYS ARMS/LEGS: CPT | Performed by: INTERNAL MEDICINE

## 2022-05-24 PROCEDURE — 6370000000 HC RX 637 (ALT 250 FOR IP)

## 2022-05-24 PROCEDURE — C1894 INTRO/SHEATH, NON-LASER: HCPCS

## 2022-05-24 PROCEDURE — 2500000003 HC RX 250 WO HCPCS

## 2022-05-24 PROCEDURE — B41D1ZZ FLUOROSCOPY OF AORTA AND BILATERAL LOWER EXTREMITY ARTERIES USING LOW OSMOLAR CONTRAST: ICD-10-PCS | Performed by: INTERNAL MEDICINE

## 2022-05-24 PROCEDURE — B2111ZZ FLUOROSCOPY OF MULTIPLE CORONARY ARTERIES USING LOW OSMOLAR CONTRAST: ICD-10-PCS | Performed by: INTERNAL MEDICINE

## 2022-05-24 RX ORDER — ACETAMINOPHEN 325 MG/1
650 TABLET ORAL EVERY 4 HOURS PRN
Status: DISCONTINUED | OUTPATIENT
Start: 2022-05-24 | End: 2022-05-25 | Stop reason: HOSPADM

## 2022-05-24 RX ORDER — SODIUM CHLORIDE 0.9 % (FLUSH) 0.9 %
5-40 SYRINGE (ML) INJECTION PRN
Status: DISCONTINUED | OUTPATIENT
Start: 2022-05-24 | End: 2022-05-25 | Stop reason: HOSPADM

## 2022-05-24 RX ORDER — SODIUM CHLORIDE 9 MG/ML
INJECTION, SOLUTION INTRAVENOUS PRN
Status: DISCONTINUED | OUTPATIENT
Start: 2022-05-24 | End: 2022-05-25 | Stop reason: HOSPADM

## 2022-05-24 RX ORDER — DEXTROSE MONOHYDRATE 50 MG/ML
INJECTION, SOLUTION INTRAVENOUS CONTINUOUS
Status: DISCONTINUED | OUTPATIENT
Start: 2022-05-24 | End: 2022-05-24 | Stop reason: ALTCHOICE

## 2022-05-24 RX ORDER — SODIUM CHLORIDE 0.9 % (FLUSH) 0.9 %
5-40 SYRINGE (ML) INJECTION EVERY 12 HOURS SCHEDULED
Status: DISCONTINUED | OUTPATIENT
Start: 2022-05-24 | End: 2022-05-25 | Stop reason: HOSPADM

## 2022-05-24 RX ORDER — FUROSEMIDE 20 MG/1
20 TABLET ORAL DAILY
Status: DISCONTINUED | OUTPATIENT
Start: 2022-05-25 | End: 2022-05-25 | Stop reason: HOSPADM

## 2022-05-24 RX ORDER — SODIUM CHLORIDE 9 MG/ML
INJECTION, SOLUTION INTRAVENOUS CONTINUOUS
Status: ACTIVE | OUTPATIENT
Start: 2022-05-24 | End: 2022-05-25

## 2022-05-24 RX ORDER — 0.9 % SODIUM CHLORIDE 0.9 %
500 INTRAVENOUS SOLUTION INTRAVENOUS ONCE
Status: COMPLETED | OUTPATIENT
Start: 2022-05-24 | End: 2022-05-25

## 2022-05-24 RX ORDER — 0.9 % SODIUM CHLORIDE 0.9 %
500 INTRAVENOUS SOLUTION INTRAVENOUS ONCE
Status: COMPLETED | OUTPATIENT
Start: 2022-05-24 | End: 2022-05-24

## 2022-05-24 RX ADMIN — FUROSEMIDE 40 MG: 10 INJECTION, SOLUTION INTRAMUSCULAR; INTRAVENOUS at 08:33

## 2022-05-24 RX ADMIN — IOPAMIDOL 110 ML: 755 INJECTION, SOLUTION INTRAVENOUS at 15:31

## 2022-05-24 RX ADMIN — DEXTROSE MONOHYDRATE 100 ML/HR: 50 INJECTION, SOLUTION INTRAVENOUS at 02:12

## 2022-05-24 RX ADMIN — SODIUM CHLORIDE, PRESERVATIVE FREE 10 ML: 5 INJECTION INTRAVENOUS at 08:28

## 2022-05-24 RX ADMIN — DIGOXIN 125 MCG: 125 TABLET ORAL at 08:27

## 2022-05-24 RX ADMIN — SODIUM CHLORIDE 500 ML: 9 INJECTION, SOLUTION INTRAVENOUS at 17:50

## 2022-05-24 RX ADMIN — ATORVASTATIN CALCIUM 40 MG: 40 TABLET, FILM COATED ORAL at 21:41

## 2022-05-24 RX ADMIN — ASPIRIN 81 MG 81 MG: 81 TABLET ORAL at 08:31

## 2022-05-24 RX ADMIN — LEVOTHYROXINE SODIUM 100 MCG: 0.1 TABLET ORAL at 05:56

## 2022-05-24 RX ADMIN — SACUBITRIL AND VALSARTAN 1 TABLET: 49; 51 TABLET, FILM COATED ORAL at 08:27

## 2022-05-24 RX ADMIN — PANTOPRAZOLE SODIUM 40 MG: 40 TABLET, DELAYED RELEASE ORAL at 05:56

## 2022-05-24 RX ADMIN — DEXTROSE MONOHYDRATE: 50 INJECTION, SOLUTION INTRAVENOUS at 09:49

## 2022-05-24 RX ADMIN — SODIUM CHLORIDE: 9 INJECTION, SOLUTION INTRAVENOUS at 16:05

## 2022-05-24 RX ADMIN — VENLAFAXINE HYDROCHLORIDE 150 MG: 150 CAPSULE, EXTENDED RELEASE ORAL at 08:27

## 2022-05-24 RX ADMIN — VENLAFAXINE HYDROCHLORIDE 75 MG: 75 CAPSULE, EXTENDED RELEASE ORAL at 08:28

## 2022-05-24 RX ADMIN — SODIUM CHLORIDE 500 ML: 9 INJECTION, SOLUTION INTRAVENOUS at 22:13

## 2022-05-24 RX ADMIN — BUPROPION HYDROCHLORIDE 150 MG: 150 TABLET, FILM COATED, EXTENDED RELEASE ORAL at 08:27

## 2022-05-24 RX ADMIN — LETROZOLE 2.5 MG: 2.5 TABLET ORAL at 16:12

## 2022-05-24 RX ADMIN — METOPROLOL SUCCINATE 12.5 MG: 25 TABLET, EXTENDED RELEASE ORAL at 08:27

## 2022-05-24 RX ADMIN — DEXTROSE MONOHYDRATE 125 ML: 100 INJECTION, SOLUTION INTRAVENOUS at 06:33

## 2022-05-24 ASSESSMENT — PAIN SCALES - GENERAL
PAINLEVEL_OUTOF10: 0
PAINLEVEL_OUTOF10: 0

## 2022-05-24 NOTE — PROGRESS NOTES
Hospitalist Progress Note      Patient:  Dhirja Javier    Unit/Bed:6K-09/009-A  YOB: 1960  MRN: [id number]   Acct: [de-identified]   PCP: ANGELIA Joaquin CNP  Date of Admission: 5/23/2022    Assessment/Plan:    1. Acute on chronic HFrEF:   ? Pt had extensive work-up performed on 05/12 for the worsening shortness of breath, she had not taken her home diuretics for several months as she ran out while in [facility]. BNP was elevated on 5/12, she was discharged home with instructions to restart her home diuretic and to follow-up with cardiology outpatient. Pt is scheduled with Dr. Coco Prado on 05/26.  ? Prior echo obtained revealing EF of 40-45%. Patient with ICD in place. BNP remains elevated, however improved from 5/12. CXR unremarkable. TTE pending. Cardiology was consulted on admission, IV diuresis was initiated. Patient's respiratory status seems to be improved today, will continue to diurese. Patient to have [facility] performed 05/24. 2. Chest heaviness:  ? Patient was evaluated for this concern on 5/12 as well in our emergency department, extensive work-up was performed. No signs of ischemia at that time, patient discharged home in stable condition with instructions to restart her home diuretics. Pt presents today with chest heaviness, troponin negative x2. EKG however revealing worsening ST depression which was seen on EKG on 5/12. Troponin trended and remained negative. ? Cardiology consulted on admission due to EKG changes and pts persistent chest heaviness. LHC performed in 2020- no CAD noted at that time. LHC performed today, 05/24. Continue home medications. 3. BL LE intermittent claudication:   · Pt may need peripheral angiogram per cardiology. 4. Essential hypertension:  ? Pt's BP remains soft, will continue Lopressor with holding parameters for systolic blood pressure less than 110, heart rate less than 60.   Monitor closely and adjust medications as needed. 5. Hypothyroidism:   ? TSH very elevated, pt on Euthyrox at home however she had not been taking her home meds for three months prior to May 9th. Will obtain Free T4, continue home med at this time will adjust as needed. 6. History of breast cancer:  ? 2016, aware. Patient on Femara at home. 6.  Obstructive sleep apnea:  ? Patient reports that she does not use a CPAP as it is not covered by insurance. 7. GERD:  ? Continue home PPI      Chief Complaint: SOB, BL LE pain     Initial H and P:-    Starr Reeves is a 28-year-old female with a history of heart failure, breast cancer, congenital heart disease, ICD presents to the emergency department today for bilateral leg pain, shortness of breath, chest heaviness. Patient reports that for the past 4 days she has pain that radiates from her hips to her knees bilaterally, is experiencing dyspnea on exertion, and chest discomfort which she describes as heaviness, not pain. She notes that the symptoms resolve at rest.  Patient reports that she is unable to do any of her daily activities due to this dyspnea with exertion. She also endorses orthopnea, nocturnal cough, generalized fatigue and weakness. Patient was seen at our ED on 05/12 for this same complaint and discharged home in stable condition. Patient notes that her condition has not improved since, only worsen. Patient was in [facility] for several months prior to May and had not been taking her cardiac medications or diuretics. As she ran out. She denies nausea, vomiting, diaphoresis, abdominal pain, dizziness/lightheadedness, fevers, chills, or any other concerns. Patient reports that she follows with Dr. Beverly Davenport and does have an appointment set for 5/26. Patient denies any other questions or concerns at this time. \"     Subjective (past 24 hours):   Pt resting in bed in no acute distress, pt reports that she is feeling minimally better.  She reports mild chest heaviness but states her breathing has improved with the IV diuretics. Pt denies abd pain, n/v, palpitations, or any further concerns. Pt to have LHC performed today per cardio. Past medical history, family history, social history and allergies reviewed again and is unchanged since admission. ROS (All review of systems completed. Pertinent positives noted.  Otherwise All other systems reviewed and negative.)     Medications:  Reviewed    Infusion Medications    dextrose 50 mL/hr at 05/24/22 0949    sodium chloride      dextrose Stopped (05/24/22 0312)     Scheduled Medications    [START ON 5/25/2022] furosemide  20 mg Oral Daily    sacubitril-valsartan  1 tablet Oral BID    sodium chloride flush  5-40 mL IntraVENous 2 times per day    enoxaparin  40 mg SubCUTAneous Daily    aspirin  81 mg Oral Daily    atorvastatin  40 mg Oral Nightly    buPROPion  150 mg Oral QAM    metoprolol succinate  12.5 mg Oral Daily    pantoprazole  40 mg Oral QAM AC    venlafaxine  150 mg Oral Daily    levothyroxine  100 mcg Oral Daily    digoxin  125 mcg Oral Daily    insulin lispro  0-6 Units SubCUTAneous TID WC    insulin lispro  0-3 Units SubCUTAneous Nightly    venlafaxine  75 mg Oral Daily    letrozole  2.5 mg Oral Daily     PRN Meds: sodium chloride flush, sodium chloride, ondansetron **OR** ondansetron, polyethylene glycol, acetaminophen **OR** acetaminophen, magnesium sulfate, potassium chloride **OR** potassium alternative oral replacement **OR** potassium chloride, potassium chloride, glucose, dextrose bolus **OR** dextrose bolus, glucagon (rDNA), dextrose      Intake/Output Summary (Last 24 hours) at 5/24/2022 1248  Last data filed at 5/24/2022 1055  Gross per 24 hour   Intake --   Output 3 ml   Net -3 ml       Diet:  Diet NPO    Exam:  /81   Pulse 84   Temp 97.6 °F (36.4 °C) (Oral)   Resp 18   Wt 212 lb 11.2 oz (96.5 kg)   SpO2 95%   Breastfeeding No   BMI 38.90 kg/m²   General appearance: No apparent distress, appears stated age and cooperative. HEENT: Pupils equal, round, and reactive to light. Conjunctivae/corneas clear. Neck: Supple, with full range of motion. No jugular venous distention. Trachea midline. Respiratory:  Normal respiratory effort. Clear to auscultation, bilaterally without Rales/Wheezes/Rhonchi. Cardiovascular: Regular rate and rhythm with normal S1/S2 without murmurs, rubs or gallops. Abdomen: Soft, non-tender, non-distended with normal bowel sounds. Musculoskeletal: passive and active ROM x 4 extremities. Skin: Skin color, texture, turgor normal.  No rashes or lesions. Neurologic:  Neurovascularly intact without any focal sensory/motor deficits. Cranial nerves: II-XII intact, grossly non-focal.  Psychiatric: Alert and oriented, thought content appropriate, normal insight  Capillary Refill: Brisk,< 3 seconds   Peripheral Pulses: +2 palpable, equal bilaterally     Labs:   Recent Labs     05/23/22  1148 05/24/22  0522   WBC 7.6 5.9   HGB 12.5 11.7*   HCT 41.3 38.2   * 361     Recent Labs     05/23/22  1148 05/24/22  0522    137   K 4.6 4.6   CL 98 98   CO2 27 27   BUN 21 19   CREATININE 1.1 1.1   CALCIUM 8.9 8.6     Recent Labs     05/23/22  1148   AST 19   ALT 24   BILITOT 0.8   ALKPHOS 177*     No results for input(s): INR in the last 72 hours. Recent Labs     05/24/22  0522   CKTOTAL 62       Microbiology:    Blood culture #1: No results found for: [facility]    Blood culture #2:No results found for: BLOODCULT2    Organism:  Lab Results   Component Value Date    ORG Growth of Contaminants 10/18/2021         Lab Results   Component Value Date    LABGRAM  10/09/2019     Rare segmented neutrophils observed. Few epithelial cells observed. Many gram negative bacilli. Moderate gram positive cocci in pairs and chains. Few small gram positive bacilli. Prepubescent and postmenopausal female samples (<15and >47ears of age) are not typically suitable for bacterialvaginosis screening. MRSA culture only:No results found for: [facility]    Urine culture:   Lab Results   Component Value Date    LABURIN  10/18/2021     Growth of Contaminants. The mixture of organisms present are not a common cause of urinary tract infections and probably represent skin austin or distal urethral austin. Respiratory culture: No results found for: CULTRESP    Aerobic and Anaerobic :  No results found for: LABAERO  No results found for: LABANAE    Urinalysis:      Lab Results   Component Value Date    NITRU Negative 10/18/2021    WBCUA 0-2 08/30/2021    BACTERIA NONE SEEN 08/30/2021    RBCUA 0-2 08/30/2021    BLOODU Small 10/18/2021    BLOODU NEGATIVE 08/30/2021    SPECGRAV 1.015 08/18/2020    GLUCOSEU Negative 10/18/2021       Radiology:  XR CHEST PORTABLE   Final Result   1. No acute cardiopulmonary finding. **This report has been created using voice recognition software. It may contain minor errors which are inherent in voice recognition technology. **      Final report electronically signed by Dr Griselda Bobo on 5/23/2022 12:54 PM        XR CHEST PORTABLE    Result Date: 5/23/2022  PROCEDURE: XR CHEST PORTABLE CLINICAL INFORMATION: Chest pain, short of breath COMPARISON: Chest radiograph 10/15/2020 TECHNIQUE: AP portable chest radiograph performed. FINDINGS: Cardiac conduction device is seen with single lead which appears intact. . No focal pulmonary consolidation. Cardiac silhouette is moderately enlarged. No pleural effusion. No pneumothorax. No acute bony abnormality. 1. No acute cardiopulmonary finding. **This report has been created using voice recognition software. It may contain minor errors which are inherent in voice recognition technology. ** Final report electronically signed by Dr Griselda Bobo on 5/23/2022 12:54 PM      Electronically signed by Suzanne Davis PA-C on 5/24/2022 at 12:48 PM

## 2022-05-24 NOTE — BRIEF OP NOTE
[address] [address]  Sedation/Analgesia Post Sedation Record    Pt Name: Guy Strong  Account number: [de-identified]  MRN: [id number]  YOB: 1960  Procedure Performed By: Brenna Regan MD MD   Primary Care Physician: Arlene Chambers, APRN - CNP  Date: 5/24/2022    POST-PROCEDURE    Physicians/Assistants: Brenna Regan MD MD     Procedure Performed:Peripheral Angiogram & left heart catheterization      Sedation/Anesthesia: Versed/ Fentanyl and 2% xylocaine local anesthesia. Estimated Blood Loss: < 50 ml. Specimens Removed: None         Disposition of Specimen: N/A        Complications: No Immediate Complications. Post-procedure Diagnosis/Findings:        No significant CAD noted    Peripheral angiogram revealed no evidence for significant \"obstructive\" PAD     Recommendations:  Medical management      Above findings and plan of care were discussed with patient, questions were answered, agreeable with plan.        Brenna Regan MD, [facility]   Electronically signed 5/24/2022 at 3:27 PM  Interventional Cardiology

## 2022-05-24 NOTE — PLAN OF CARE
Problem: Discharge Planning  Goal: Discharge to home or other facility with appropriate resources  Outcome: Progressing  Flowsheets (Taken 5/23/2022 2022)  Discharge to home or other facility with appropriate resources: Identify barriers to discharge with patient and caregiver     Problem: Pain  Goal: Verbalizes/displays adequate comfort level or baseline comfort level  Outcome: Progressing

## 2022-05-24 NOTE — CONSULTS
[address] [place] [address]                                  CONSULTATION    PATIENT NAME: Richard Christensen                     :        1960  MED REC NO:   [id number]                           ROOM:       0009  ACCOUNT NO:   [de-identified]                           ADMIT DATE: 2022  PROVIDER:     Michael Preciado M.D.    Tonie Landino:  2022    REASON FOR CONSULTATION:  CHF exacerbation, recurrent chest pain and  bilateral lower leg pain, basically thigh pain. PRIMARY CARDIOLOGIST:  Dr. Nan Coleman. HISTORY OF PRESENT ILLNESS:  This is a 50-year-old female patient known  to have history of congestive heart failure, on p.r.n. diuretics;  history of breast cancer; history of chemo-induced cardiomyopathy,  status post ICD, presented to the emergency department because of  bilateral leg pain, shortness of breath, chest pain, and leg swelling. The patient stated that she has been having these symptoms and  progressively getting worse in the last two months and the patient has  been offered medications, all cardiac medications for a month until   because she has been in [facility] for a month and so she has taken  all her medication while she was in [facility] and she resumed taking all  her medications once she came back starting from 2022, but she  started to have this shortness of breath on minimal activity just with  household work and chest pain on minimal activity with house work in the  last two months and unchanged after even she started her medication and  also she started to have this bilateral pain in the hip and thigh after  she worked, exertional in nature, bilateral thigh pain also in the last  two months and unchanged and so she came in and noted to have CHF  exacerbation and she was started on diuretics.   She stated that she had  some orthopnea of two to three pillows, some nocturnal _____ generalized  body weakness, fatigue, and palpitation on exertion. No nausea or  vomiting, but complains of some epigastric discomfort. No dizziness, no  fever or chills. So, she saw Dr. Rizwana Andrews last in 05/2021. So, Cardiology  evaluation was sort in view of the recurrent chest pain, on exertion,  CHF exacerbation and also complains of bilateral thigh pain. Overnight  after she has been admitted with diuresis, her shortness of breath is  better, but she remained to have the chest pain and thigh pain. REVIEW OF SYSTEMS:  Negative except the above-mentioned ones. PAST MEDICAL HISTORY:  Includes congestive heart failure, on p.r.n.  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 PAST SURGICAL HISTORY:  Appendectomy, carpal tunnel, colonoscopy, EGD,  eye surgery, mastectomy, bilateral ICD placement, and tonsillectomy. FAMILY HISTORY:  Positive for cancer. There is a family history of  hypertension and otherwise no family history of coronary artery disease. SOCIAL HISTORY:  She is a former smoker, quit in the year 2020. No  alcohol. No drug use. ALLERGIES:  SHE IS ALLERGIC TO ADHESIVE TAPE. HOME MEDICATIONS:  Prior to admission that she resumed taking starting  from 05/09 includes the following:  Aspirin, Lipitor, Wellbutrin,  cholecalciferol, digoxin, furosemide as needed 20 mg, Femara, metformin,  Toprol-XL 12.5 daily, Prilosec, Entresto 49/50 one tablet twice a day,  Effexor.   Currently, she has been now on aspirin, Lipitor, digoxin, and  Lasix 40 IV twice a day that has been started and the rest of her home  medication include metoprolol and Entresto. PHYSICAL EXAMINATION:  GENERAL:  Looks sick, in no form of distress at this time. VITAL SIGNS:  Blood pressure 102/81, pulse rate 84, respiratory rate 18,  and temperature 97. 6. HEENT:  Pink conjunctivae. Anicteric sclerae. Pupils are equal,  reactive bilateral to light. NECK:  No lymphadenopathy. No goiter. No JVD. CHEST:  Clear to auscultation and resonant to percussion. CARDIOVASCULAR:  Arteries are felt; carotid, femoral, pedal.  No bruits. S1, S2 well heard. No murmur or gallop rhythm. ABDOMEN:  Soft, nontender, and nondistended. Bowel sounds are positive. GENITALIA:  No CVA, flank or suprapubic tenderness. LOCOMOTOR:  No cyanosis, clubbing, or muscle or joint tenderness. SKIN:  No rashes, ulcers or nodules. CNS:  Alert, awake, and oriented to time, person, and place. Cranial  nerves are intact. Sensation is intact to light touch and pain. Motor:  Normal muscle strength and tone. Appropriate mood and affect. WORKUP:  EKG:  Sinus rhythm with first-degree AV block, poor R-wave  progression, cannot rule out old anterior infarction, nonspecific  intraventricular conduction delay has been noted. Blood chemistry:  Sodium 137, potassium 4.6, BUN 19, creatinine 1.1. GFR 60. Cardiac enzymes:  Troponin less than 0.01. BNP 7449. Hematology:  White blood cell 5.9, hemoglobin 11.7, and platelets 661. Chest x-ray, basically no acute cardiopulmonary abnormality noted. She  had an echocardiogram, last in 10/2020, ejection fraction 40% to 45%. Cath in 10/2020, nonobstructive noted. ASSESSMENT:  1. Unstable angina with recurrent chest pain, on exertion over the last  two months and been having persistently and consistently. 2.  Acute-on-chronic CHF exacerbation with reduced ejection fraction.   3.  History of chemo-induced cardiomyopathy with ejection fraction of  40% to 45%, status post ICD.  4.  Status post ICD. 5.  Intermittent claudication with bilateral thigh pain after she  exerted or re-exertion and it has been going on and she has good  peripheral pulses though, but she has this bilateral pain. No hip pain. No hip tenderness. No pain with motion or mobility of the lower  extremity bilaterally. 5.  History of hypertension. 6.  History of SVT, nonsustained, short lasting, history of orthostasis  on tilt table test.  7.  Obstructive sleep apnea, on CPAP. 9.  History of hypothyroidism. 10.  History of breast cancer in 2016. 11.  Tilt table test was done in 02/2021 was suggestive for orthostatic  hypotension. PLAN AND RECOMMENDATION:  At this level, continue with gentle diuresis,  probably she seems to be on diuresis at this time, so we may change it  to oral diuretics and in view of the recurrent chest pain on exertion  and if it is basically consistent with unstable angina, the patient  needs cardiac catheterization and she has bilateral intermittent  palpitation over the last two months with good peripheral pulse though;  however, she does not have any back pain, no hip pain, no knee pain and  so slight thigh pain bilaterally on exertion and I believe there is  intermittent claudication, so probably we might have to obtain arterial  Doppler of the lower extremity/peripheral angiogram.  Aortogram with  peripheral runoff may help to exclude any underlying peripheral artery  disease in this patient. I discussed with the patient the plan of care and home medications have  been resumed already, on Entresto and beta blocker, we will continue  that and adjust the dose to blood pressure. Based on the course, we will gauge further care. I discussed with the patient the need, the risks and benefits have been  explained. The patient agreed to proceed with cardiac catheterization  and possible peripheral angiogram, basically aortogram with peripheral  runoff.     Thank you for allowing me to participate in the care of this patient. I  will follow up with you. Raj Garcia.  Rosezetta Najjar, M.D.    D: 05/24/2022 9:23:43       T: 05/24/2022 12:32:31     [email or web address]  Job#: [id number]     Doc#: [id number]    CC:

## 2022-05-24 NOTE — H&P
discussed. The patient had no further questions and wished to proceed; the consent form was signed. MEDICAL HISTORY  []ASHD/ANGINA/MI/CHF   []Hypertension  []Diabetes  []Hyperlipidemia  []Smoking  []Family Hx of ASHD  []Additional information:       has a past medical history of Abnormal heart rhythm, Anxiety, Cancer (HCC), CHF (congestive heart failure) ([address] Congenital heart disease, DJD (degenerative joint disease), Enlarged lymph nodes, Hypertension, Hypothyroidism, ICD (implantable cardioverter-defibrillator) in place, Kidney stones, PONV (postoperative nausea and vomiting), Prediabetes, and Thyroid disease. SURGICAL HISTORY   has a past surgical history that includes Mastectomy, bilateral; Tonsillectomy; Percutaneous Transluminal Coronary Angio; Appendectomy; Colonoscopy; Carpal tunnel release (2019); Finger trigger release (Right, 06/25/2020); Colonoscopy (N/A, 07/27/2020); Colonoscopy (07/27/2020); Pacemaker insertion (Left, 02/11/2019); Colonoscopy (2021); EGD; and Eye surgery (Bilateral, 10/2021).   Additional information:       ALLERGIES   Allergies as of 05/23/2022 - Fully Reviewed 05/23/2022   Allergen Reaction Noted    Adhesive tape Itching 10/15/2020     Additional information:       MEDICATIONS   Aspirin  [] 81 mg  [] 325 mg  [] None  Antiplatelet drug therapy use last 5 days  []No []Yes  Coumadin Use Last 5 Days []No []Yes  Other anticoagulant use last 5 days  []No []Yes    Current Facility-Administered Medications:     dextrose 5 % solution, , IntraVENous, Continuous, Mehran Sanchez PA-C, Last Rate: 50 mL/hr at 05/24/22 0949, New Bag at 05/24/22 0949    [START ON 5/25/2022] furosemide (LASIX) tablet 20 mg, 20 mg, Oral, Daily, Lana Trejo MD    sacubitril-valsartan (ENTRESTO) 24-26 MG per tablet 1 tablet, 1 tablet, Oral, BID, Lana Trejo MD    sodium chloride flush 0.9 % injection 5-40 mL, 5-40 mL, IntraVENous, 2 times per day, Mehran Sanchez PA-C, 10 mL at 05/24/22 6082   sodium chloride flush 0.9 % injection 5-40 mL, 5-40 mL, IntraVENous, PRN, Lanney Cerise, PA-C    0.9 % sodium chloride infusion, , IntraVENous, PRN, Lanney Cerise, PA-C    enoxaparin (LOVENOX) injection 40 mg, 40 mg, SubCUTAneous, Daily, Lanney Cerise, PA-C, 40 mg at 05/23/22 1749    ondansetron (ZOFRAN-ODT) disintegrating tablet 4 mg, 4 mg, Oral, Q8H PRN **OR** ondansetron (ZOFRAN) injection 4 mg, 4 mg, IntraVENous, Q6H PRN, Lanney Cerise, PA-C    polyethylene glycol (GLYCOLAX) packet 17 g, 17 g, Oral, Daily PRN, Lanney Cerise, PA-C    acetaminophen (TYLENOL) tablet 650 mg, 650 mg, Oral, Q6H PRN **OR** acetaminophen (TYLENOL) suppository 650 mg, 650 mg, Rectal, Q6H PRN, Lanney Cerise, PA-C    magnesium sulfate 2000 mg in 50 mL IVPB premix, 2,000 mg, IntraVENous, PRN, Lanney Cerise, PA-C    potassium chloride (KLOR-CON M) extended release tablet 40 mEq, 40 mEq, Oral, PRN **OR** potassium bicarb-citric acid (EFFER-K) effervescent tablet 40 mEq, 40 mEq, Oral, PRN **OR** potassium chloride 10 mEq/100 mL IVPB (Peripheral Line), 10 mEq, IntraVENous, PRN, Lanney Cerise, PA-C    potassium chloride 10 mEq/100 mL IVPB (Peripheral Line), 10 mEq, IntraVENous, PRN, Lanney Cerise, PA-C    aspirin chewable tablet 81 mg, 81 mg, Oral, Daily, Lanney Cerise, PA-C, 81 mg at 05/24/22 0831    atorvastatin (LIPITOR) tablet 40 mg, 40 mg, Oral, Nightly, Lanney Cerise, PA-C, 40 mg at 05/23/22 2026    buPROPion (WELLBUTRIN XL) extended release tablet 150 mg, 150 mg, Oral, QAM, Lanney Cerise, PA-C, 150 mg at 05/24/22 0827    metoprolol succinate (TOPROL XL) extended release tablet 12.5 mg, 12.5 mg, Oral, Daily, Elana Stubbs, PA-C, 12.5 mg at 05/24/22 0827    pantoprazole (PROTONIX) tablet 40 mg, 40 mg, Oral, Marly KENNY PA-C, 40 mg at 05/24/22 0556    venlafaxine (EFFEXOR XR) extended release capsule 150 mg, 150 mg, Oral, Daily, Elana Stubbs, PA-C, 150 mg at 05/24/22 0827    levothyroxine (SYNTHROID) tablet 100 mcg, 100 mcg, Oral, Daily, Lawernce Dura, PA-C, 100 mcg at 05/24/22 0556    digoxin (LANOXIN) tablet 125 mcg, 125 mcg, Oral, Daily, Lawernce Dura, PA-C, 125 mcg at 05/24/22 0827    insulin lispro (HUMALOG) injection vial 0-6 Units, 0-6 Units, SubCUTAneous, TID WC, Lawernce Dura, PA-C    insulin lispro (HUMALOG) injection vial 0-3 Units, 0-3 Units, SubCUTAneous, Nightly, Lawernce Dura, PA-C    glucose chewable tablet 16 g, 4 tablet, Oral, PRN, Lawernce Dura, PA-C    dextrose bolus 10% 125 mL, 125 mL, IntraVENous, PRN, Stopped at 05/24/22 0720 **OR** dextrose bolus 10% 250 mL, 250 mL, IntraVENous, PRN, Lawernce Dura, PA-C    glucagon (rDNA) injection 1 mg, 1 mg, IntraMUSCular, PRN, Lawernce Dura, PA-C    dextrose 5 % solution, 100 mL/hr, IntraVENous, PRN, Lawernce Dura, PA-C, Stopped at 05/24/22 6464    venlafaxine (EFFEXOR XR) extended release capsule 75 mg, 75 mg, Oral, Daily, Lawernce Dura, PA-C, 75 mg at 05/24/22 0912    letrozole [facility]) tablet 2.5 mg, 2.5 mg, Oral, Daily, Lawernce Dura, PA-C  Prior to Admission medications    Medication Sig Start Date End Date Taking?  Authorizing Provider   sacubitril-valsartan (ENTRESTO) 49-51 MG per tablet Take 1 tablet by mouth twice daily 5/12/22   Westly Pac Counts, APRN - CNP   digoxin (LANOXIN) 125 MCG tablet Take 1 tablet by mouth daily 5/12/22   Westly Pac Counts, APRN - CNP   furosemide (LASIX) 20 MG tablet TAKE 1 TABLET BY MOUTH ONCE DAILY AS NEEDED FOR LEG SWELLING, WEIGHT GAIN 5/12/22   Aprrish Counts, APRN - CNP   EUTHYROX 100 MCG tablet Take 1 tablet by mouth once daily 11/3/21   Esmer Lam, APRN - CNP   venlafaxine (EFFEXOR XR) 150 MG extended release capsule Take 1 capsule by mouth daily 10/11/21   ANGELIA Morrell CNP   buPROPion (WELLBUTRIN XL) 150 MG extended release tablet Take 1 tablet by mouth every morning 10/11/21   ANGELIA Morrell CNP   Cholecalciferol 50 MCG (2000 UT) CAPS Take 50 mcg by mouth daily 9/20/21   Dane Jacques PA-C metFORMIN (GLUCOPHAGE-XR) 500 MG extended release tablet Take 1 tablet by mouth once daily with breakfast 9/16/21   ANGELIA Tubbs CNP   CPAP Machine MISC by Does not apply route Please change CPAP pressure to auto 11-15 cm H20. 9/9/21   Sheri Lanza PA-C   omeprazole (PRILOSEC) 40 MG delayed release capsule Take 1 capsule by mouth every morning (before breakfast) 9/9/21   Kristeen Claude, APRN - CNP   venlafaxine (EFFEXOR XR) 75 MG extended release capsule Take 1 capsule by mouth daily (take along with 150 mg Effexor for total dose of 225 mg) 8/24/21   ANGELIA Tubbs CNP   letrozole [facility]) 2.5 MG tablet Take 1 tablet by mouth daily 7/20/21   Ghassan Bunn MD   polyethylene glycol (GLYCOLAX) 17 GM/SCOOP powder DISSOLVE & TAKE 1 CAPFUL ONCE DAILY 6/28/21   Historical Provider, MD   metoprolol succinate (TOPROL XL) 25 MG extended release tablet Take 0.5 tablets by mouth daily 5/19/21   Apolinar Zarate MD   atorvastatin (LIPITOR) 40 MG tablet Take 1 tablet by mouth nightly 12/11/20   ANGELIA Tubbs CNP   aspirin 81 MG chewable tablet Take 1 tablet by mouth daily 7/11/20   Hugh Myles MD   acetaminophen (TYLENOL) 500 MG tablet Take 500 mg by mouth every 6 hours as needed for Pain    Historical Provider, MD     Additional information:       VITAL SIGNS   Vitals:    05/24/22 1250   BP: 105/76   Pulse: 85   Resp: 16   Temp: 98.2 °F (36.8 °C)   SpO2: 96%       PHYSICAL:   General: No acute distress  HEENT:  Unremarkable for age  Neck: without increased JVD, carotid pulses 2+ bilaterally without bruits  Heart: RRR, S1 & S2 WNL. No murmurs   Lungs: Clear to auscultation    Abdomen: BS present, without HSM, masses, or tenderness    Extremities: without C,C,E.    Mental Status: Alert & Oriented        PLANNED PROCEDURE   [x]Cath  [x]PCI                []Pacemaker/AICD  []CHAPARRO             []Cardioversion []Peripheral angiography/PTA  []Other:      SEDATION  Planned agent: [x]Midazolam []Meperidine []Sublimaze []Morphine  []Diazepam  []Other:     ASA Classification:  []1 []2 [x]3 []4 []5   Class 1: A normal healthy patient  Class 2: Pt with mild to moderate systemic disease  Class 3: Severe systemic disease or disturbance  Class 4: Severe systemic disorders that are already life threatening. Class 5: Moribund pt with little chances of survival, for more than 24 hours. Mallampati I Airway Classification:   []1 []2 [x]3 []4     [x]Pre-procedure diagnostic studies complete and results available. Comment:    [x]Previous sedation/anesthesia experiences assessed. Comment:    [x]The patient is an appropriate candidate to undergo the planned procedure sedation and anesthesia. (Refer to nursing sedation/analgesia documentation record)  [x]Formulation and discussion of sedation/procedure plan, risks, and expectations with patient and/or responsible adult completed. [x]Patient examined immediately prior to the procedure.  (Refer to nursing sedation/analgesia documentation record)      Nancy Wright MD, [facility]    Electronically signed 5/24/2022 at 2:26 PM

## 2022-05-24 NOTE — FLOWSHEET NOTE
05/24/22 1743   Treatment Team Notification   Reason for Communication Review case   Team Member Name Scott Toussaint   Treatment Team Role Physician Assistant   Method of Communication Secure Message       Notified provider of new patient status, pt hypotensive, see orders.

## 2022-05-24 NOTE — PLAN OF CARE
Problem: Discharge Planning  Goal: Discharge to home or other facility with appropriate resources  Outcome: Progressing  Flowsheets (Taken 5/23/2022 2022 by Yuan Medina RN)  Discharge to home or other facility with appropriate resources: Identify barriers to discharge with patient and caregiver  Note: Patient preference to discharge home with family support. Problem: Pain  Goal: Verbalizes/displays adequate comfort level or baseline comfort level  Outcome: Progressing  Flowsheets (Taken 5/24/2022 1937)  Verbalizes/displays adequate comfort level or baseline comfort level: Encourage patient to monitor pain and request assistance  Note: Patient denies pain at this time, will continue to monitor. Pain goal is 0 out of 10. Patient states pain is intermittent in legs bilateral with exertion. Rest and repositioning for non med measures. Problem: Chronic Conditions and Co-morbidities  Goal: Patient's chronic conditions and co-morbidity symptoms are monitored and maintained or improved  Outcome: Progressing  Flowsheets (Taken 5/24/2022 1937)  Care Plan - Patient's Chronic Conditions and Co-Morbidity Symptoms are Monitored and Maintained or Improved: Monitor and assess patient's chronic conditions and comorbid symptoms for stability, deterioration, or improvement     Problem: Cardiovascular - Adult  Goal: Maintains optimal cardiac output and hemodynamic stability  Outcome: Progressing  Flowsheets (Taken 5/24/2022 1937)  Maintains optimal cardiac output and hemodynamic stability:   Monitor blood pressure and heart rate   Assess for signs of decreased cardiac output     Care plan reviewed with patient. Patient verbalize understanding of the plan of care and contribute to goal setting.

## 2022-05-24 NOTE — PROCEDURES
[address] [id number]                            CARDIAC CATHETERIZATION    PATIENT NAME: Richard Christensen                     :        1960  MED REC NO:   [id number]                           ROOM:       0009  ACCOUNT NO:   [de-identified]                           ADMIT DATE: 2022  PROVIDER:     Sola Issa MD    DATE OF PROCEDURE:  2022    INDICATIONS FOR PROCEDURE:  1. Chest pain, suspected unstable angina. 2.  Systolic congestive heart failure, ejection fraction 40% to 45%. 3.  Suspected peripheral arterial disease with worsening intermittent  claudication. PROCEDURES PERFORMED:  1. Left cardiac catheterization with selective coronary angiogram.  2.  Left ventriculogram.  3.  Abdominal aortogram with bilateral iliac angiogram.  4.  Selective angiogram of the left lower extremity. 5.  Selective angiogram of the right lower extremity. DESCRIPTION OF PROCEDURE:  After informed consent, the patient was  brought to the cardiac catheterization room. She was prepped and draped  in a sterile fashion. 2% lidocaine was injected in the skin and  subcutaneous tissue overlying the right radial artery. Under ultrasound  guidance using modified Seldinger technique, access was obtained in the  right radial artery. 5/6 Slender sheath was inserted. Standard  antithrombotic/antispasmodic medications were given. I used 5-Maldivian  JR4, 5-Maldivian JL3.5 diagnostic catheters to complete the cardiac  catheterization. I then redirected the J-wire through the JL catheter  to the descending thoracic aorta and down to the abdominal aorta. Over  the wire, I advanced the pigtail catheter. Pigtail catheter was used to  complete abdominal aortogram and bilateral lower extremity angiograms. First abdominal aortogram was completed. I then advanced the vertebral  catheter over the J-wire to the left common femoral artery. Selective  angiogram of the left lower extremity was performed. I then redirected  the vertebral catheter over the J-wire to the right common femoral  artery. Selective angiogram of the right lower extremity was performed. FINDINGS:  HEMODYNAMICS:  LVEDP 40 mmHg. No significant pressure gradient across  the aortic valve upon pullback. LEFT VENTRICULOGRAM:  There is no regional wall motion abnormality;  however, mild global hypokinesis was noticed with ejection fraction 40%  to 45%. CORONARY ANGIOGRAM:  1. RIGHT CORONARY ARTERY:  Small nondominant vessel. Patent without  significant stenosis. 2.  LEFT MAIN CORONARY ARTERY:  Patent, gives rise to left anterior  descending and left circumflex arteries. 3.  LEFT ANTERIOR DESCENDING ARTERY:  No significant stenotic lesions  were noticed. 4. LEFT CIRCUMFLEX ARTERY:  Large dominant vessel. Patent without any  significant stenosis. ABDOMINAL AORTOGRAM:  Distal abdominal aorta is patent. No aneurysmal  dilation was noticed. Both renal arteries are patent with mild luminal  irregularities. The common iliac arteries, external iliac arteries and  internal iliac arteries on both right and left sides are patent without  significant stenosis. SELECTIVE ANGIOGRAM OF THE LEFT LOWER EXTREMITY:  Common femoral artery  is patent and bifurcates into profunda femoris and SFA. Both profunda  femoris and SFA are patent. Popliteal artery is patent without  significant stenosis. Normal three-vessel runoff below the knee. Mild  luminal irregularities were noticed in the posterior tibial artery with  moderate diffuse disease in that segment. SELECTIVE ANGIOGRAM OF THE RIGHT LOWER EXTREMITY:  Common femoral artery  is patent and bifurcates into profunda femoris and SFA. SFA is patent. Profunda femoris is patent. Popliteal artery has no significant  stenosis.   There is a three-vessel runoff below the knee without  high-grade stenosis, moderate diffuse disease was noticed in the  peroneal artery. MEDICATIONS:  See MAR. COMPLICATIONS:  None. ESTIMATED BLOOD LOSS:  Less than 50 mL. ACCESS:  Right radial artery access. Vasc Band was applied. Hemostasis  was achieved. IMPRESSION:  1. No significant coronary artery disease noticed. 2.  Peripheral angiogram was performed. No significant peripheral  arterial disease was noticed. RECOMMENDATIONS:  Medical management.         Radha Hernandez MD    D: 05/24/2022 15:37:42       T: 05/24/2022 15:42:02     [email or web address]  Job#: [id number]     Doc#: [id number]    CC:

## 2022-05-24 NOTE — CONSULTS
Izaiah  Intermittent claudication- B/l thigh  Acute on chronic HFrEF  Chemo-Cardiomyopathy, EF improved 40-45%, NYHA I, s/p ICD  HTN  No CAD, 2020  Hx SVT - lasted 1 second  Orthostasis on Tilt  REINALDO on cpap  Hypothyroidism  Hx of breast ca 2016    Plan  Decrease entresto  Stop Lasix 40 bid  Start lasix 20 po qd  Echo  Adjust accordingly meds    Gentle diuresis  Need cath  And possible peripheral angiogram( abd  Aortogram with run off)  The risk and benefit of left heart cath has been explain in detail including but not limited to  Bleeding including retroperitoneal bleed 1%, infection, MI, CVA, CHRISTIANA, Limb loss, dissection, allergic reaction,death  Each of them 1 in 2000 range. The alternative managment has been explained. Patient expressed understanding of the risk and benefit and of the alternative managment well. Patient wanted and agreed to proceed with left heart cath. Hence we will schedule her for [address] [place] [address]       [id number]      Laura Culver MD      CORONARY ANGIOGRAM:  LEFT MAIN:  Patent without any significant obstruction.     LAD:  Patent without any significant obstruction. Diagonal branch,  patent without any significant obstruction.     LCX:  No significant obstruction. Gives rise to large OM1, OM2 branches  without any significant obstruction. Left PDA without any significant  obstruction.     RCA:  Nondominant small marginal branch.     IMMEDIATE COMPLICATIONS:  None.     MEDICATIONS:  See EMR.     ACCESS:  Manual pressure used for hemostasis.     ESTIMATED BLOOD LOSS:  Less than 100 mL.     SUMMARY:  No CAD; nonischemic cardiomyopathy.     PLAN:  1. Bedrest.  2.  Optimal medical therapy. 3.  Routine access site care. 4.  Continue management of the patient's anxiety issues. 5.  Cardiomyopathy, therapy per guidelines.     All the above was explained to the patient and the patient's family.    They are agreeable and amenable to the above plan.           Oracio White MD     D: 10/18/2020 9:25:25

## 2022-05-24 NOTE — CARE COORDINATION
5/24/22, 7:31 AM EDT  DISCHARGE PLANNING EVALUATION:    Marie Foote       Admitted: 5/23/2022/ [facility] day: 1   Location: [address] [place], [place] [address] Reason for admit: Chest pain [R07.9]  Dyspnea and respiratory abnormalities [R06.00, R06.89]  ST segment changes on electrocardiogram [R94.31]  Chest pain, unspecified type [R07.9]   PMH:  has a past medical history of Abnormal heart rhythm, Anxiety, Cancer (HCC), CHF (congestive heart failure) ([facility] [address] Congenital heart disease, DJD (degenerative joint disease), Enlarged lymph nodes, Hypertension, Hypothyroidism, ICD (implantable cardioverter-defibrillator) in place, Kidney stones, PONV (postoperative nausea and vomiting), Prediabetes, and Thyroid disease. Procedure: none  Barriers to Discharge:  TSH 21.540, BNP 7449. IV lasix bid. Cardio consult pending. Echo ordered. PCP: ANGELIA Stauffer CNP  Readmission Risk Score: 10.3 ( )%  Patient's Healthcare Decision Maker: Patient Declined (Legal Next of Kin Remains as Decision Maker)    Patient Goals/Plan/Treatment Preferences: Met with Renu Parker, she is from home with her son and daughter-in-law. Her only DME is a CPAP, but she is requesting a rolling walker for discharge. She denies need for [place] services at this time. PCP and insurance are confirmed. Transportation/Food Security/Housekeeping Addressed:  No issues identified.

## 2022-05-24 NOTE — FLOWSHEET NOTE
05/24/22 0747   Treatment Team Notification   Reason for Communication Review case   Team Member Name Radha Joseph   Treatment Team Role Physician Assistant   Method of Communication Secure Message       Notified Provider of Patient being hypoglycemic related to NPO status. Informed provider awaiting on Cardiology plan. See orders.

## 2022-05-25 VITALS
DIASTOLIC BLOOD PRESSURE: 66 MMHG | BODY MASS INDEX: 38.9 KG/M2 | HEART RATE: 90 BPM | OXYGEN SATURATION: 96 % | RESPIRATION RATE: 18 BRPM | WEIGHT: 212.7 LBS | TEMPERATURE: 97.5 F | SYSTOLIC BLOOD PRESSURE: 90 MMHG

## 2022-05-25 LAB
ANION GAP SERPL CALCULATED.3IONS-SCNC: 10 MEQ/L (ref 8–16)
BUN BLDV-MCNC: 17 MG/DL (ref 7–22)
CALCIUM SERPL-MCNC: 8.5 MG/DL (ref 8.5–10.5)
CHLORIDE BLD-SCNC: 101 MEQ/L (ref 98–111)
CO2: 29 MEQ/L (ref 23–33)
CREAT SERPL-MCNC: 1.1 MG/DL (ref 0.4–1.2)
GFR SERPL CREATININE-BSD FRML MDRD: 50 ML/MIN/1.73M2
GLUCOSE BLD-MCNC: 106 MG/DL (ref 70–108)
GLUCOSE BLD-MCNC: 172 MG/DL (ref 70–108)
GLUCOSE BLD-MCNC: 176 MG/DL (ref 70–108)
POTASSIUM SERPL-SCNC: 4.3 MEQ/L (ref 3.5–5.2)
SODIUM BLD-SCNC: 140 MEQ/L (ref 135–145)
TROPONIN T: < 0.01 NG/ML

## 2022-05-25 PROCEDURE — 6370000000 HC RX 637 (ALT 250 FOR IP)

## 2022-05-25 PROCEDURE — 84484 ASSAY OF TROPONIN QUANT: CPT

## 2022-05-25 PROCEDURE — 36415 COLL VENOUS BLD VENIPUNCTURE: CPT

## 2022-05-25 PROCEDURE — 2580000003 HC RX 258: Performed by: INTERNAL MEDICINE

## 2022-05-25 PROCEDURE — 80048 BASIC METABOLIC PNL TOTAL CA: CPT

## 2022-05-25 PROCEDURE — 82948 REAGENT STRIP/BLOOD GLUCOSE: CPT

## 2022-05-25 PROCEDURE — 99239 HOSP IP/OBS DSCHRG MGMT >30: CPT | Performed by: HOSPITALIST

## 2022-05-25 PROCEDURE — 6370000000 HC RX 637 (ALT 250 FOR IP): Performed by: INTERNAL MEDICINE

## 2022-05-25 PROCEDURE — 99232 SBSQ HOSP IP/OBS MODERATE 35: CPT | Performed by: NURSE PRACTITIONER

## 2022-05-25 RX ORDER — METOPROLOL SUCCINATE 25 MG/1
12.5 TABLET, EXTENDED RELEASE ORAL DAILY
Status: DISCONTINUED | OUTPATIENT
Start: 2022-05-26 | End: 2022-05-25 | Stop reason: HOSPADM

## 2022-05-25 RX ORDER — LEVOTHYROXINE SODIUM 0.12 MG/1
125 TABLET ORAL DAILY
Qty: 30 TABLET | Refills: 0 | Status: SHIPPED | OUTPATIENT
Start: 2022-05-25 | End: 2022-06-01 | Stop reason: SDUPTHER

## 2022-05-25 RX ORDER — FUROSEMIDE 20 MG/1
20 TABLET ORAL DAILY
Qty: 90 TABLET | Refills: 0 | Status: ON HOLD | OUTPATIENT
Start: 2022-05-25 | End: 2022-06-05 | Stop reason: HOSPADM

## 2022-05-25 RX ORDER — METOPROLOL SUCCINATE 25 MG/1
12.5 TABLET, EXTENDED RELEASE ORAL DAILY
Qty: 30 TABLET | Refills: 3 | Status: SHIPPED | OUTPATIENT
Start: 2022-05-25

## 2022-05-25 RX ADMIN — FUROSEMIDE 20 MG: 20 TABLET ORAL at 09:33

## 2022-05-25 RX ADMIN — DIGOXIN 125 MCG: 125 TABLET ORAL at 09:33

## 2022-05-25 RX ADMIN — BUPROPION HYDROCHLORIDE 150 MG: 150 TABLET, FILM COATED, EXTENDED RELEASE ORAL at 09:33

## 2022-05-25 RX ADMIN — VENLAFAXINE HYDROCHLORIDE 150 MG: 150 CAPSULE, EXTENDED RELEASE ORAL at 09:33

## 2022-05-25 RX ADMIN — SACUBITRIL AND VALSARTAN 1 TABLET: 24; 26 TABLET, FILM COATED ORAL at 09:33

## 2022-05-25 RX ADMIN — PANTOPRAZOLE SODIUM 40 MG: 40 TABLET, DELAYED RELEASE ORAL at 06:12

## 2022-05-25 RX ADMIN — LETROZOLE 2.5 MG: 2.5 TABLET ORAL at 09:33

## 2022-05-25 RX ADMIN — INSULIN LISPRO 1 UNITS: 100 INJECTION, SOLUTION INTRAVENOUS; SUBCUTANEOUS at 11:56

## 2022-05-25 RX ADMIN — SODIUM CHLORIDE, PRESERVATIVE FREE 10 ML: 5 INJECTION INTRAVENOUS at 09:33

## 2022-05-25 RX ADMIN — ASPIRIN 81 MG 81 MG: 81 TABLET ORAL at 09:43

## 2022-05-25 RX ADMIN — LEVOTHYROXINE SODIUM 100 MCG: 0.1 TABLET ORAL at 06:12

## 2022-05-25 RX ADMIN — VENLAFAXINE HYDROCHLORIDE 75 MG: 75 CAPSULE, EXTENDED RELEASE ORAL at 09:33

## 2022-05-25 ASSESSMENT — PAIN SCALES - GENERAL
PAINLEVEL_OUTOF10: 0
PAINLEVEL_OUTOF10: 0

## 2022-05-25 NOTE — PLAN OF CARE
Problem: Discharge Planning  Goal: Discharge to home or other facility with appropriate resources  5/25/2022 0122 by JORDYN, [address]  Taken 5/25/2022 0122  Discharge to home or other facility with appropriate resources:   Identify barriers to discharge with patient and caregiver   Arrange for needed discharge resources and transportation as appropriate   Identify discharge learning needs (meds, wound care, etc)   Arrange for interpreters to assist at discharge as needed   Refer to discharge planning if patient needs post-hospital services based on physician order or complex needs related to functional status, cognitive ability or social support system  Taken 5/24/2022 1954  Discharge to home or other facility with appropriate resources: Identify barriers to discharge with patient and caregiver  5/24/2022 1937 by Collette Yoder RN  Outcome: Progressing  Flowsheets (Taken 5/23/2022 2022 by Kvng Landaverde RN)  Discharge to home or other facility with appropriate resources: Identify barriers to discharge with patient and caregiver  Note: Patient preference to discharge home with family support. Problem: Pain  Goal: Verbalizes/displays adequate comfort level or baseline comfort level  5/25/2022 0122 by Dilcia Cash  Flowsheets (Taken 5/24/2022 1945)  Verbalizes/displays adequate comfort level or baseline comfort level: Encourage patient to monitor pain and request assistance  5/24/2022 1937 by Collette Yoder RN  Outcome: Progressing  Flowsheets (Taken 5/24/2022 1937)  Verbalizes/displays adequate comfort level or baseline comfort level: Encourage patient to monitor pain and request assistance  Note: Patient denies pain at this time, will continue to monitor. Pain goal is 0 out of 10. Patient states pain is intermittent in legs bilateral with exertion. Rest and repositioning for non med measures.       Problem: Chronic Conditions and Co-morbidities  Goal: Patient's chronic conditions and co-morbidity symptoms are monitored and maintained or improved  5/25/2022 0122 by JORDYN [address] [place] [address] Rd (Taken 5/24/2022 1954)  Care Plan - Patient's Chronic Conditions and Co-Morbidity Symptoms are Monitored and Maintained or Improved: Monitor and assess patient's chronic conditions and comorbid symptoms for stability, deterioration, or improvement  5/24/2022 1937 by Tyron Alarcon RN  Outcome: Progressing  Flowsheets (Taken 5/24/2022 1937)  Care Plan - Patient's Chronic Conditions and Co-Morbidity Symptoms are Monitored and Maintained or Improved: Monitor and assess patient's chronic conditions and comorbid symptoms for stability, deterioration, or improvement     Problem: Cardiovascular - Adult  Goal: Maintains optimal cardiac output and hemodynamic stability  5/25/2022 0122 by Jennifer KIM  Flowsheets (Taken 5/24/2022 1954)  Maintains optimal cardiac output and hemodynamic stability:   Monitor blood pressure and heart rate   Monitor urine output and notify Licensed Independent Practitioner for values outside of normal range   Administer fluid and/or volume expanders as ordered  5/24/2022 1937 by Tyron Alarcon RN  Outcome: Progressing  Flowsheets (Taken 5/24/2022 1937)  Maintains optimal cardiac output and hemodynamic stability:   Monitor blood pressure and heart rate   Assess for signs of decreased cardiac output   Care plan reviewed with patient. Patient verbalize understanding of the plan of care and contribute to goal setting.

## 2022-05-25 NOTE — CARE COORDINATION
Chito Reyes was evaluated today and a DME order was entered for a wheeled walker because she requires this to successfully complete daily living tasks of personal cares and ambulating. A wheeled walker is necessary due to the patient's unsteady gait, upper body weakness, and inability to  an ambulation device; and she can ambulate only by pushing a walker instead of a lesser assistive device such as a cane, crutch, or standard walker. The need for this equipment was discussed with the patient and she understands and is in agreement.

## 2022-05-25 NOTE — PROGRESS NOTES
Cardiology Progress Note      Patient:  Arely Silverio  YOB: 1960  MRN: [id number]   Acct: [de-identified]  Admit Date:  5/23/2022  Primary Cardiologist: Scar Wang MD  Rounding Cardiologist: Dr. Krystal Tomlinson    Rationale for Cardiology consult: CHF exacerbation, recurrent chest pain and  bilateral lower leg pain, basically thigh pain. HPI/PMH: per Dr. Krystal Tomlinson consult of 5/24/2022: This is a 67-year-old female patient known  to have history of congestive heart failure, on p.r.n. diuretics;  history of breast cancer; history of chemo-induced cardiomyopathy,  status post ICD, presented to the emergency department because of  bilateral leg pain, shortness of breath, chest pain, and leg swelling. The patient stated that she has been having these symptoms and  progressively getting worse in the last two months and the patient has  been offered medications, all cardiac medications for a month until  05/09 because she has been in [facility] for a month and so she has taken  all her medication while she was in [facility] and she resumed taking all  her medications once she came back starting from 05/09/2022, but she  started to have this shortness of breath on minimal activity just with  household work and chest pain on minimal activity with house work in the  last two months and unchanged after even she started her medication and  also she started to have this bilateral pain in the hip and thigh after  she worked, exertional in nature, bilateral thigh pain also in the last  two months and unchanged and so she came in and noted to have CHF  exacerbation and she was started on diuretics. She stated that she had  some orthopnea of two to three pillows, some nocturnal _____ generalized  body weakness, fatigue, and palpitation on exertion. No nausea or  vomiting, but complains of some epigastric discomfort. No dizziness, no  fever or chills. So, she saw Dr. Carroll Grant last in 05/2021.   So, Cardiology  evaluation was sort in view of the recurrent chest pain, on exertion,  CHF exacerbation and also complains of bilateral thigh pain. Overnight  after she has been admitted with diuresis, her shortness of breath is  better, but she remained to have the chest pain and thigh pain. PAST MEDICAL HISTORY:  Includes congestive heart failure, on p.r.n.  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     Chief Complaint: \"Still feel a few palpitations and my legs are sore - mostly when I walk\". Subjective (Events in last 24 hours):   VSS; BP on lower side with systolic high 73'U to low 90's; HR 70 - 90  Denies chest discomfort  Breathing improved - still mild dyspnea with activity  No lightheadedness or dizziness; tolerating ambulation  No nausea or vomiting, no abdominal discomfort  Tolerating meds and diet  Reports swelling in her legs is now gone    Objective:   BP 90/66   Pulse 90   Temp 97.5 °F (36.4 °C) (Oral)   Resp 18   Wt 212 lb 11.2 oz (96.5 kg)   SpO2 96%   Breastfeeding No   BMI 38.90 kg/m²        TELEMETRY: SR 70 - 80's - occasional paced beat; no arrhythmias    Physical Exam:  General Appearance: alert and oriented to person, place and time, in no acute distress, resting in bed  Cardiovascular: normal rate, regular rhythm, normal S1 and S2, no murmurs, rubs, clicks, or gallops, distal pulses intact, no JVD  Pulmonary/Chest: clear to auscultation bilaterally- no wheezes, rales or rhonchi, normal air movement, no respiratory distress, RA.  Converses readily without BORDEN lying in bed with HOB up 20 degrees.    Abdomen: obese, soft, non-tender, non-distended, normal bowel sounds Extremities: no cyanosis, clubbing or edema, pulses 2+ AT and PT   Skin: warm and dry, no rash or erythema  Head: normocephalic and atraumatic  Eyes: pupils equal, round, and reactive to light  Neck: supple and non-tender without mass, no thyromegaly   Musculoskeletal: normal range of motion, no joint swelling, deformity or tenderness  Neurological: alert, oriented, normal speech, no focal findings or movement disorder noted    Medications:    [START ON 2022] metoprolol succinate  12.5 mg Oral Daily    furosemide  20 mg Oral Daily    sacubitril-valsartan  1 tablet Oral BID    sodium chloride flush  5-40 mL IntraVENous 2 times per day    enoxaparin  40 mg SubCUTAneous Daily    aspirin  81 mg Oral Daily    atorvastatin  40 mg Oral Nightly    buPROPion  150 mg Oral QAM    pantoprazole  40 mg Oral QAM AC    venlafaxine  150 mg Oral Daily    levothyroxine  100 mcg Oral Daily    digoxin  125 mcg Oral Daily    insulin lispro  0-6 Units SubCUTAneous TID WC    insulin lispro  0-3 Units SubCUTAneous Nightly    venlafaxine  75 mg Oral Daily    letrozole  2.5 mg Oral Daily      sodium chloride      sodium chloride      dextrose Stopped (22 0312)     sodium chloride flush, 5-40 mL, PRN  sodium chloride, , PRN  acetaminophen, 650 mg, Q4H PRN  sodium chloride, , PRN  ondansetron, 4 mg, Q8H PRN   Or  ondansetron, 4 mg, Q6H PRN  polyethylene glycol, 17 g, Daily PRN  magnesium sulfate, 2,000 mg, PRN  potassium chloride, 40 mEq, PRN   Or  potassium alternative oral replacement, 40 mEq, PRN   Or  potassium chloride, 10 mEq, PRN  potassium chloride, 10 mEq, PRN  glucose, 4 tablet, PRN  dextrose bolus, 125 mL, PRN   Or  dextrose bolus, 250 mL, PRN  glucagon (rDNA), 1 mg, PRN  dextrose, 100 mL/hr, PRN        Diagnostics:  EK2022  EKG 12 Lead  Order: [id number]   Status: Final result     Visible to patient: Yes (not seen)     Next appt: 05/26/2022 at 02:00 PM in Cardiology (Nguyen Locke MD)     0 Result Notes    Component Ref Range & Units 5/24/22 0442 5/23/22 1105 5/12/22 0824 4/30/21 1241 10/17/20 0942 10/16/20 0623 10/15/20 1116   Ventricular Rate BPM 81  91  96  85  69  72  72    Atrial Rate BPM 81  91  96  85  69  72  72    P-R Interval ms 214  202  190  162  178  184  178    QRS Duration ms 108  108  100  90  94  90  94    Q-T Interval ms 408  404  370  390  430  418  408    QTc Calculation (Bazett) ms 473  496  467  464  460  457  446    P Axis degrees 31  39  43  37  22  42  35    R Axis degrees -18  -17  3  -27  -26  -22  -20    T Axis degrees 91  93  109  63  13  10  36    Resulting Agency  [address]             Narrative & Impression    Sinus rhythm with 1st degree A-V block  Possible Left atrial enlargement  Minimal voltage criteria for LVH, may be normal variant ( [place] product )  Cannot rule out Anterior infarct (cited on or before 23-MAY-2022)  Abnormal ECG  When compared with ECG of 23-MAY-2022 11:05,  No significant change was found  Confirmed by Jayden Saenz ([id number]) on 5/24/2022 6:10:48 PM               Echo: 5/24/2022   TTE procedure:ECHOCARDIOGRAM COMPLETE 2D W DOPPLER W COLOR. Procedure Date  Date: 05/24/2022 Start: 07:44 AM     Study Location: Bedside  Technical Quality: Limited visualization due to body habitus.     Indications:Chest pain.     Additional Medical History:chest pain, hypertension, hypothyroidism,  cardiomyopathy, ICD, vertigo, COVID, breast cancer     Patient Status: Routine     Height: 61.81 inches Weight: 211.64 pounds BSA: 1.95 m^2 BMI: 38.95 kg/m^2     BP: 99/74 mmHg     Allergies    - No Known Allergies.       - Tape.      Conclusions      Summary   Left Ventricular size is Mildly increased . Normal left ventricular wall thickness.    There was severe global hypokinesis of the left ventricle. Systolic function was severely reduced. Ejection fraction is visually estimated at 25%. The left atrium is Mild to moderate dilated. Mildly enlarged right atrium size. Signature    ----------------------------------------------------------------   Electronically signed by Caleb Kinney MD (Interpreting   physician) on 05/24/2022 at 07:26 PM   ----------------------------------------------------------------      Findings      Mitral Valve   The mitral valve structure was normal with normal leaflet separation. DOPPLER: The transmitral velocity was within the normal range with no   evidence for mitral stenosis. Moderate mitral regurgitation is present. Aortic Valve   The aortic valve was trileaflet with normal thickness and cuspal   separation. DOPPLER: Transaortic velocity was within the normal range with   no evidence of aortic stenosis. There was no evidence of aortic   regurgitation. Tricuspid Valve   The tricuspid valve structure was normal with normal leaflet separation. DOPPLER: There was no evidence of tricuspid stenosis. Moderate tricuspid   regurgitation visualized. Right ventricular systolic pressure measures 40   mmhg. Pulmonic Valve   The pulmonic valve leaflets exhibited normal thickness, no calcification,   and normal cuspal separation. DOPPLER: The transpulmonic velocity was   within the normal range with no evidence for regurgitation. Left Atrium   The left atrium is Mild to moderate dilated. Left Ventricle   Left Ventricular size is Mildly increased . Normal left ventricular wall thickness. There was severe global hypokinesis of the left ventricle. Systolic function was severely reduced. Ejection fraction is visually estimated at 25%. Right Atrium   Mildly enlarged right atrium size. Right Ventricle   The right ventricular size was normal with normal systolic function and   wall thickness.    Pacer Wire visualized in right ventricle. Pericardial Effusion   The pericardium was normal in appearance with no evidence of a pericardial   effusion. Pleural Effusion   No evidence of pleural effusion. Aorta / Great Vessels   -Aortic root dimension within normal limits.   -The Pulmonary artery is within normal limits. -IVC size is within normal limits with normal respiratory phasic changes.      M-Mode/2D Measurements & Calculations      LV Diastolic   LV Systolic Dimension:    AV Cusp Separation: 1.6 cmLA   Dimension: 5.2 4.7 cm                    Dimension: 4.3 cmAO Root   cm             LV Volume Diastolic: 948  Dimension: 2.5 cmLA Area: 29.7   LV FS:9.6 %    ml                        cm^2   LV PW          LV Volume Systolic: 596   Diastolic: 1.4 ml   cm             LV EDV/LV EDV Index: 130   Septum         ml/67 m^2LV ESV/LV ESV    RV Diastolic Dimension: 2.8 cm   Diastolic: 1.2 Index: 742 WJ/37 m^2   cm             EF Calculated: 21.5 %     LA/Aorta: 1.72                                            Ascending Aorta: 2.8 cm                  LV Length: 8.4 cm         LA volume/Index: 102.3 ml /52m^2      LV Area   Diastolic:   30.2 cm^2   LV Area   Systolic: 35   cm^2     Doppler Measurements & Calculations      MV Peak E-Wave: 132 cm/s          AV Peak Velocity: LVOT Peak Velocity:                                     113 cm/s          61.8 cm/s   MV Peak Gradient: 6.97 mmHg       AV Peak Gradient: LVOT Peak Gradient: 2                                     5.11 mmHg         mmHg   MV Deceleration Time: 180 msec                                                       TV Peak E-Wave: 84.8   MV Area (PISA): 0.24 cm^2                           cm/s                                        AV P1/2t: 642     TV Peak Gradient: 2.88                                     msec              mmHg   MR Velocity: 456 cm/s             IVRT: 77 msec     TR Velocity:284 cm/s   MV LEONARDO PISA: 0.23 cm^2 TR Gradient:32.26 mmHg   MR VTI: 137 cm                                      PV Peak Velocity: 56.6   Alias Velocity: 46 cm/sPISA       AV DVI            cm/s   Radius: 0.6 cm                    (Vmax):0.55       PV Peak Gradient: 1.28   MV ERO Volumetric: 0.24 cm^2                        mmHg   PISA area: 2.26 cm^2MR flow rate:   103.96 ml/sMR volume:31.51 ml                                                       ND ED Velocity: 143                                                       cm/s     [email or web address] [id number]%3d%3d    Echo: 10/15/2020   Summary   Left ventricle size is normal.   Mild concentric left ventricular hypertrophy. There was moderate global hypokinesis of the left ventricle. Ejection fraction is visually estimated in the range of 40% to 45%. Mild mitral regurgitation. Signature    ----------------------------------------------------------------   Electronically signed by Ashley Zamora MD (Interpreting   physician) on 10/16/2020 at 01:33 PM      Left Heart Cath: 2022  CARDIAC CATHETERIZATION   Non-obstructive CAD; non-obstructive PAD BLE    PATIENT NAME: Brittany Moseley                     :        1960  MED REC NO:   [id number]                           ROOM:       0009  ACCOUNT NO:   [de-identified]                           ADMIT DATE: 2022  PROVIDER:     Jong Warner MD     DATE OF PROCEDURE:  2022     INDICATIONS FOR PROCEDURE:  1. Chest pain, suspected unstable angina. 2.  Systolic congestive heart failure, ejection fraction 40% to 45%. 3.  Suspected peripheral arterial disease with worsening intermittent  claudication.     PROCEDURES PERFORMED:  1.   Left cardiac catheterization with selective coronary angiogram.  2.  Left ventriculogram.  3.  Abdominal aortogram with bilateral iliac angiogram.  4.  Selective angiogram of the left lower extremity. 5.  Selective angiogram of the right lower extremity.     DESCRIPTION OF PROCEDURE:  After informed consent, the patient was  brought to the cardiac catheterization room. She was prepped and draped  in a sterile fashion. 2% lidocaine was injected in the skin and  subcutaneous tissue overlying the right radial artery. Under ultrasound  guidance using modified Seldinger technique, access was obtained in the  right radial artery. 5/6 Slender sheath was inserted. Standard  antithrombotic/antispasmodic medications were given. I used 5-Turkmen  JR4, 5-Turkmen JL3.5 diagnostic catheters to complete the cardiac  catheterization. I then redirected the J-wire through the JL catheter  to the descending thoracic aorta and down to the abdominal aorta. Over  the wire, I advanced the pigtail catheter. Pigtail catheter was used to  complete abdominal aortogram and bilateral lower extremity angiograms. First abdominal aortogram was completed. I then advanced the vertebral  catheter over the J-wire to the left common femoral artery. Selective  angiogram of the left lower extremity was performed. I then redirected  the vertebral catheter over the J-wire to the right common femoral  artery. Selective angiogram of the right lower extremity was performed.     FINDINGS:  HEMODYNAMICS:  LVEDP 40 mmHg. No significant pressure gradient across  the aortic valve upon pullback.     LEFT VENTRICULOGRAM:  There is no regional wall motion abnormality;  however, mild global hypokinesis was noticed with ejection fraction 40%  to 45%.     CORONARY ANGIOGRAM:  1. RIGHT CORONARY ARTERY:  Small nondominant vessel. Patent without  significant stenosis. 2.  LEFT MAIN CORONARY ARTERY:  Patent, gives rise to left anterior  descending and left circumflex arteries. 3.  LEFT ANTERIOR DESCENDING ARTERY:  No significant stenotic lesions  were noticed. 4. LEFT CIRCUMFLEX ARTERY:  Large dominant vessel.   Patent without any  significant stenosis.     ABDOMINAL AORTOGRAM:  Distal abdominal aorta is patent. No aneurysmal  dilation was noticed. Both renal arteries are patent with mild luminal  irregularities. The common iliac arteries, external iliac arteries and  internal iliac arteries on both right and left sides are patent without  significant stenosis.     SELECTIVE ANGIOGRAM OF THE LEFT LOWER EXTREMITY:  Common femoral artery  is patent and bifurcates into profunda femoris and SFA. Both profunda  femoris and SFA are patent. Popliteal artery is patent without  significant stenosis. Normal three-vessel runoff below the knee. Mild  luminal irregularities were noticed in the posterior tibial artery with  moderate diffuse disease in that segment.     SELECTIVE ANGIOGRAM OF THE RIGHT LOWER EXTREMITY:  Common femoral artery  is patent and bifurcates into profunda femoris and SFA. SFA is patent. Profunda femoris is patent. Popliteal artery has no significant  stenosis. There is a three-vessel runoff below the knee without  high-grade stenosis, moderate diffuse disease was noticed in the  peroneal artery.     MEDICATIONS:  See MAR.     COMPLICATIONS:  None.     ESTIMATED BLOOD LOSS:  Less than 50 mL.     ACCESS:  Right radial artery access. Vasc Band was applied. Hemostasis  was achieved.     IMPRESSION:  1. No significant coronary artery disease noticed. 2.  Peripheral angiogram was performed.   No significant peripheral  arterial disease was noticed.     RECOMMENDATIONS:  Medical management.  Katheryn Elena MD   D: 05/24/2022       Lab Data:    Cardiac Enzymes:  Recent Labs     05/24/22  0522   CKTOTAL 58       CBC:   Lab Results   Component Value Date    WBC 5.9 05/24/2022    RBC 4.03 05/24/2022    HGB 11.7 05/24/2022    HCT 38.2 05/24/2022     05/24/2022       CMP:    Lab Results   Component Value Date     05/25/2022    K 4.3 05/25/2022    K 4.6 05/24/2022     05/25/2022    CO2 29 05/25/2022    BUN 17 05/25/2022    CREATININE 1.1 05/25/2022    LABGLOM 50 05/25/2022    GLUCOSE 172 05/25/2022    CALCIUM 8.5 05/25/2022       Hepatic Function Panel:    Lab Results   Component Value Date    ALKPHOS 177 05/23/2022    ALT 24 05/23/2022    AST 19 05/23/2022    PROT 6.7 05/23/2022    BILITOT 0.8 05/23/2022    BILIDIR 0.4 05/12/2022    LABALBU 4.2 05/23/2022       Magnesium:    Lab Results   Component Value Date    MG 2.1 10/15/2020       PT/INR:    Lab Results   Component Value Date    INR 1.02 07/09/2020       HgBA1c:    Lab Results   Component Value Date    LABA1C 6.1 03/29/2021       FLP:    Lab Results   Component Value Date    TRIG 106 07/09/2020    HDL 51 08/30/2021    LDLCALC 84 08/30/2021       TSH:    Lab Results   Component Value Date    TSH 21.540 05/23/2022         Assessment:  · Acute on chronic CHF; hx HFmrEF 40 - 45%  · Likely triggered by interruption in medications x 1 month  · Echo 5/24/2022 notes drop in EF to 25%; severe global hypokinesis; no regional wall abnormalities  · Cath 5/24/22: non-obstructive CAD  · Has ICD  · GDMT HFrEF  · BB - Toprol XL 12.5 BID  · Lanoxin 0.125 mg QD  · ARNI: Entresto 24-26 BID  · Lasix 20 mg QD  · [place]; recurrent chest pain with exertion  · No anginal sx  · ASA 81 QD  · Intermittent Claudication sx; bilateral thigh pain with exertion  · Peripheral arteriogram with cath 5/24/2022  · Non-obstructive PAD BLE; 3 vessel run-off below knee bilat. · Reports soreness in thighs bilaterally - improved since admission  · HTN  · BP low - aSx - monitor at this time- tolerating OOB  · May benefit from low dose Midodrine if need  · REINALDO      Ok for discharge from cardiology standpoint. No evidence of decompensated HF; euvolemic. Continue current medications. Further optimization in OP setting. Follow-up with Dr. Coleman Fields 5/26/22.   Follow-up with CHF clinic 2 - 3 weeks      Electronically signed by ANGELIA Gomez CNP on 5/25/2022 at 12:08 PM

## 2022-05-25 NOTE — CARE COORDINATION
5/25/22, 9:09 AM EDT    DISCHARGE ON GOING EVALUATION    [facility] day: 2  Location: -09/009-A Reason for admit: Chest pain [R07.9]  Dyspnea and respiratory abnormalities [R06.00, R06.89]  ST segment changes on electrocardiogram [R94.31]  Chest pain, unspecified type [R07.9]   Procedure: 5/24 left heart cath with angiogram of right and left lower extremity  Barriers to Discharge: Hypotensive, last 88/62. Received IV fluid bolus. PCP: ANGELIA Watts CNP  Readmission Risk Score: 9.8 ( )%  Patient Goals/Plan/Treatment Preferences: Home with son and duaghter-in-law. Requested RW.

## 2022-05-25 NOTE — PROGRESS NOTES
Discharge teaching and instructions for diagnosis/procedure of chest pain completed with patient using teachback method. AVS reviewed. Printed prescriptions given to patient. Patient voiced understanding regarding prescriptions, follow up appointments, and care of self at home.  Discharged in a wheelchair to  independent living per family

## 2022-05-25 NOTE — DISCHARGE SUMMARY
Hospital Medicine Discharge Summary      Patient Identification:   Chinyere Peña   : 1960  MRN: [id number]   Account: [de-identified]      Patient's PCP: ANGELIA Bradley CNP    Admit Date: 2022     Discharge Date:   2022      Admitting Physician: No admitting provider for patient encounter. Discharge Physician: Parker Shields MD     Discharge Diagnoses: Active Hospital Problems    Diagnosis Date Noted    Chest pain [R07.9] 2022     Priority: Medium       The patient was seen and examined on day of discharge and this discharge summary is in conjunction with any daily progress note from day of discharge. Hospital Course:   Chinyere Peña is a 65 y.o. female admitted to [address] [place] [address] on 2022 for evaluation and management of query acute decompensated CHF. Cardiology service also following. I took over care on 2022. Pt responded well to medical management, remained clinically stable and was discharged in stable conditions after cleared by consulting services. Assessment/Plan:      - Acute on chronic HFrEF (NICM):   ? Prior echo obtained revealing EF of 40-45%.  ICD in place.  BNP remains elevated, however improved from .  CXR unremarkable. s/p LHC performed  which was clean. On GDMT. Clinically euvolemic on R/A. Explained dynamic nature of volume status management based on daily Wt. Also counseled about salt and water restriction. Nephro/cardio to monitor kidney function closely. CHF clinic referral.    - BL LE intermittent claudication:   ? Pt may need peripheral angiogram. Cardiology to follow up OP  - Essential hypertension:  ? Pt's BP remains soft however asymptomatic. Pt and PCP/cardiology need to monitor BP with antihypertensive regimen adjustment as needed. Hold parameters in place    - Hypothyroidism: suboptimally controlled w/ TSH at 21.5. increased home levothyroxine to 125 from 100 mcg QD.  PCP to repeat TSH in 4-6 wks and further adjust dose accordingly    - History of breast cancer:  ? 2016, aware.  on Letrazole  - Obstructive sleep apnea:  ? Patient reports that she does not use a CPAP as it is not covered by insurance.  - GERD:  ? Continue home PPI  - CKD stage III: stable. PCP to monitor given pt on ARB and diuretics; also pt benefits from Nephro referral OP  - Obesity w/ BMI 38.90    Marie Foote was evaluated today and a DME order was entered for a wheeled walker because she requires this to successfully complete daily living tasks of ambulating. A wheeled walker is necessary due to the patient's unsteady gait, upper body weakness, and inability to  an ambulation device; and she can ambulate only by pushing a walker instead of a lesser assistive device such as a cane, crutch, or standard walker. The need for this equipment was discussed with the patient and she understands and is in agreement. Exam:     Vitals:  Vitals:    05/25/22 0337 05/25/22 0515 05/25/22 0600 05/25/22 0927   BP: (!) 83/59 (!) 80/50 88/62 89/64   Pulse: 76  78 83   Resp: 16   18   Temp: 97.2 °F (36.2 °C)   97.9 °F (36.6 °C)   TempSrc: Oral   Oral   SpO2: 94%   96%   Weight:         Weight: Weight: 212 lb 11.2 oz (96.5 kg)     24 hour intake/output:    Intake/Output Summary (Last 24 hours) at 5/25/2022 0959  Last data filed at 5/24/2022 1403  Gross per 24 hour   Intake 0 ml   Output 2 ml   Net -2 ml           General appearance: Obese. A&O x3, Not ill or toxic, in no apparent distress  HEENT:  MATHEW  EOM intact. Neck: Supple, with full range of motion. No jugular venous distention. Trachea midline. Respiratory:   NL A/E bilat with no adventitious sounds   Cardiovascular:  normal S1/S2 with no murmurs/gallops  Abdomen: Soft, round, obese. non-tender, non-distended, no rigidity or peritoneal signs  Musculoskeletal: NL symmetrical A/PROM bilat U/L extremities   Skin: No rashes. Trace edema bilat  Neurologic:  CN II-XII intact.  NL symmetrical reflexes. NL gait and stance. NL Cerebellar exam. Power 5/5 all muscle groups U/L extremities. Toes downgoing  Capillary Refill: Brisk,< 3 seconds   Peripheral Pulses: +2 palpable, equal bilaterally              Labs: For convenience and continuity at follow-up the following most recent labs are provided:      CBC:    Lab Results   Component Value Date    WBC 5.9 05/24/2022    HGB 11.7 05/24/2022    HCT 38.2 05/24/2022     05/24/2022       Renal:    Lab Results   Component Value Date     05/24/2022    K 4.6 05/24/2022    CL 98 05/24/2022    CO2 27 05/24/2022    BUN 19 05/24/2022    CREATININE 1.1 05/24/2022    CALCIUM 8.6 05/24/2022         Significant Diagnostic Studies    Radiology:   XR CHEST PORTABLE   Final Result   1. No acute cardiopulmonary finding. **This report has been created using voice recognition software. It may contain minor errors which are inherent in voice recognition technology. **      Final report electronically signed by Dr Kirsten Sanchez on 5/23/2022 12:54 PM             Consults:     IP CONSULT TO CARDIOLOGY    Disposition:    [x] Home       [] TCU       [] Rehab       [] Psych       [] SNF       [] [place]       [] Other-    Condition at Discharge: Stable    Code Status:  Full Code     Patient Instructions:    Discharge lab work: BMP, MG in 3 days  Activity: activity as tolerated  Diet: ADULT DIET;  Regular      Follow-up visits:   [facility] [address] [address] EMERGENCY DEPT  [address]             Discharge Medications:        Medication List      CHANGE how you take these medications    furosemide 20 MG tablet  Commonly known as: LASIX  Take 1 tablet by mouth daily TAKE 1 TABLET BY MOUTH ONCE DAILY AS NEEDED FOR LEG SWELLING, WEIGHT GAIN  What changed:   · how much to take  · how to take this  · when to take this     metoprolol succinate 25 MG extended release tablet  Commonly known as: TOPROL XL  Take 0.5 tablets by mouth daily Hold if SBP less than 95 mmHg or HR less than 50 bpm  What changed: additional instructions        CONTINUE taking these medications    aspirin 81 MG chewable tablet  Take 1 tablet by mouth daily     atorvastatin 40 MG tablet  Commonly known as: LIPITOR  Take 1 tablet by mouth nightly     buPROPion 150 MG extended release tablet  Commonly known as: Wellbutrin XL  Take 1 tablet by mouth every morning     Cholecalciferol 50 MCG (2000 UT) Caps  Take 50 mcg by mouth daily     CPAP Machine Misc  by Does not apply route Please change CPAP pressure to auto 11-15 cm H20.     digoxin 125 MCG tablet  Commonly known as: LANOXIN  Take 1 tablet by mouth daily     Entresto 49-51 MG per tablet  Generic drug: sacubitril-valsartan  Take 1 tablet by mouth twice daily     Euthyrox 100 MCG tablet  Generic drug: levothyroxine  Take 1 tablet by mouth once daily     letrozole 2.5 MG tablet  Commonly known as: Femara  Take 1 tablet by mouth daily     metFORMIN 500 mg extended release tablet  Commonly known as: GLUCOPHAGE-XR  Take 1 tablet by mouth once daily with breakfast     omeprazole 40 MG delayed release capsule  Commonly known as: PRILOSEC  Take 1 capsule by mouth every morning (before breakfast)     polyethylene glycol 17 GM/SCOOP powder  Commonly known as: GLYCOLAX     TYLENOL 500 MG tablet  Generic drug: acetaminophen     * venlafaxine 75 MG extended release capsule  Commonly known as: Effexor XR  Take 1 capsule by mouth daily (take along with 150 mg Effexor for total dose of 225 mg)     * venlafaxine 150 MG extended release capsule  Commonly known as: EFFEXOR XR  Take 1 capsule by mouth daily         * This list has 2 medication(s) that are the same as other medications prescribed for you. Read the directions carefully, and ask your doctor or other care provider to review them with you.                Where to Get Your Medications      These medications were sent to [address], [place] - [address] [place]  [address], [address] [address]    Phone: [phone]   · furosemide 20 MG tablet  · metoprolol succinate 25 MG extended release tablet         Time Spent on discharge is more than 45 minutes in the examination, evaluation, counseling and review of medications and discharge plan. With RN in room, patient was updated about the treatment plan, all the questions and concerns were addressed. Alarming signs and symptoms to return to ED were explained in length. Signed: Thank you ANGELIA Bhat CNP for the opportunity to be involved in this patient's care. With RN in room, patient was updated about the treatment plan, all the questions and concerns were addressed. Alarming signs and symptoms to return to ED were explained in length. Pt remains at high risk for readmission given multiple medical comorbidities.         Electronically signed by Jose Danielson MD on 5/25/2022 at 9:59 AM

## 2022-05-26 ENCOUNTER — TELEPHONE (OUTPATIENT)
Dept: FAMILY MEDICINE CLINIC | Age: 62
End: 2022-05-26

## 2022-05-26 ENCOUNTER — OFFICE VISIT (OUTPATIENT)
Dept: CARDIOLOGY CLINIC | Age: 62
End: 2022-05-26
Payer: MEDICAID

## 2022-05-26 VITALS
DIASTOLIC BLOOD PRESSURE: 70 MMHG | SYSTOLIC BLOOD PRESSURE: 118 MMHG | HEART RATE: 88 BPM | HEIGHT: 62 IN | WEIGHT: 212 LBS | BODY MASS INDEX: 39.01 KG/M2

## 2022-05-26 DIAGNOSIS — R06.02 SOB (SHORTNESS OF BREATH): ICD-10-CM

## 2022-05-26 DIAGNOSIS — I50.22 CHRONIC SYSTOLIC (CONGESTIVE) HEART FAILURE (HCC): Primary | ICD-10-CM

## 2022-05-26 PROCEDURE — 99214 OFFICE O/P EST MOD 30 MIN: CPT | Performed by: INTERNAL MEDICINE

## 2022-05-26 NOTE — PROGRESS NOTES
[id number] [facility] [place] [address] [facility] [address]  Dept: [phone]  Dept Fax: [phone]  Loc: [phone]    Visit Date: 5/26/2022    Ms. Rodney Herrera is a 65 y.o. 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 She did not take her meds after a trip to [facility], went to ED for palpitations, was admitted to [facility] and discharged yesterday. Patient had cath of the coronaries and legs without significant obstructive disease 5/2022. She had acute on chronic CHF, EF dropped back to 25%. She was restarted on all of the medications. No shocks. No major volume.       Current Outpatient Medications:     metoprolol succinate (TOPROL XL) 25 MG extended release tablet, Take 0.5 tablets by mouth daily Hold if SBP less than 95 mmHg or HR less than 50 bpm, Disp: 30 tablet, Rfl: 3    furosemide (LASIX) 20 MG tablet, Take 1 tablet by mouth daily TAKE 1 TABLET BY MOUTH ONCE DAILY AS NEEDED FOR LEG SWELLING, WEIGHT GAIN, Disp: 90 tablet, Rfl: 0    levothyroxine (EUTHYROX) 125 MCG tablet, Take 1 tablet by mouth Daily, Disp: 30 tablet, Rfl: 0    sacubitril-valsartan (ENTRESTO) 49-51 MG per tablet, Take 1 tablet by mouth twice daily, Disp: 60 tablet, Rfl: 0    digoxin (LANOXIN) 125 MCG tablet, Take 1 tablet by mouth daily, Disp: 90 tablet, Rfl: 0    venlafaxine (EFFEXOR XR) 150 MG extended release capsule, Take 1 capsule by mouth daily, Disp: 90 capsule, Rfl: 3    buPROPion (WELLBUTRIN XL) 150 MG extended release tablet, Take 1 tablet by mouth every morning, Disp: 90 tablet, Rfl: 1    Cholecalciferol 50 MCG (2000 UT) CAPS, Take 50 mcg by mouth daily, Disp: 30 capsule, Rfl: 3    metFORMIN (GLUCOPHAGE-XR) 500 MG extended release tablet, Take 1 tablet by mouth once daily with breakfast, Disp: 90 tablet, Rfl: 0    CPAP Machine MISC, by Does not apply route Please change CPAP pressure to auto 11-15 cm H20., Disp: 1 each, Rfl: 0    omeprazole (PRILOSEC) 40 MG delayed release capsule, Take 1 capsule by mouth every morning (before breakfast), Disp: 90 capsule, Rfl: 1    venlafaxine (EFFEXOR XR) 75 MG extended release capsule, Take 1 capsule by mouth daily (take along with 150 mg Effexor for total dose of 225 mg), Disp: 90 capsule, Rfl: 3    letrozole (FEMARA) 2.5 MG tablet, Take 1 tablet by mouth daily, Disp: 90 tablet, Rfl: 3    polyethylene glycol (GLYCOLAX) 17 GM/SCOOP powder, DISSOLVE & TAKE 1 CAPFUL ONCE DAILY, Disp: , Rfl:     atorvastatin (LIPITOR) 40 MG tablet, Take 1 tablet by mouth nightly, Disp: 90 tablet, Rfl: 3    aspirin 81 MG chewable tablet, Take 1 tablet by mouth daily, Disp: 30 tablet, Rfl: 3    acetaminophen (TYLENOL) 500 MG tablet, Take 500 mg by mouth every 6 hours as needed for Pain, Disp: , Rfl:     Past Medical History  Denney Lefort  has a past medical history of Abnormal heart rhythm, Anxiety, Cancer (HCC), CHF (congestive heart failure) ([facility] [address] Congenital heart disease, DJD (degenerative joint disease), Enlarged lymph nodes, Hypertension, Hypothyroidism, ICD (implantable cardioverter-defibrillator) in place, Kidney stones, PONV (postoperative nausea and vomiting), Prediabetes, and Thyroid disease. Social History  Denney Lefort  reports that she quit smoking about 2 years ago. Her smoking use included cigarettes. She started smoking about 40 years ago. She has a 9.25 pack-year smoking history. She has never used smokeless tobacco. She reports that she does not drink alcohol and does not use drugs.     Family History  Denney Lefort family history includes Cancer in her father and maternal grandfather; Elizabeth Sisi in her father; Colon Polyps in her brother; Dementia in her mother; High Blood Pressure in her mother; Mental Retardation in her brother. There is no family history of bicuspid aortic valve, aneurysms, heart transplant, pacemakers, defibrillators, or sudden cardiac death. Past Surgical History   Past Surgical History:   Procedure Laterality Date    APPENDECTOMY      CARPAL TUNNEL RELEASE  2019    COLONOSCOPY      COLONOSCOPY N/A 07/27/2020    COLONOSCOPY POLYPECTOMY SNARE/COLD BIOPSY performed by Kory Washington MD at [facility]    COLONOSCOPY  07/27/2020    COLONOSCOPY POLYPECTOMY HOT BIOPSY performed by Kory Washington MD at [address] [place] [address]  2021    EGD      EYE SURGERY Bilateral 10/2021    eyelid lift     FINGER TRIGGER RELEASE Right 06/25/2020    DEQUERVAINS RELEASE AND RIGHT RING FINGER TRIGGER RELEASE performed by Keily Waddell DO at [address], BILATERAL      PACEMAKER INSERTION Left 02/11/2019    [employer] [facility] PT HAS A MRI CONDITIONAL SYSTEM    PTCA      TONSILLECTOMY         Review of Systems   Constitutional: Negative for chills and fever  HENT: Negative for congestion, sinus pressure, sneezing and sore throat. Eyes: Negative for pain, discharge, redness and itching. Respiratory: Negative for apnea, cough  Gastrointestinal: Negative for blood in stool, constipation, diarrhea   Endocrine: Negative for cold intolerance, heat intolerance, polydipsia. Genitourinary: Negative for dysuria, enuresis, flank pain and hematuria. Musculoskeletal: Negative for arthralgias, joint swelling and neck pain. Neurological: Negative for numbness and headaches. Psychiatric/Behavioral: Negative for agitation, confusion, decreased concentration and dysphoric mood.      Objective:     /70   Pulse 88   Ht 5' 2\" (1.575 m)   Wt 212 lb (96.2 kg)   BMI 38.78 kg/m²     Wt Readings from Last 3 Encounters:   05/26/22 212 lb (96.2 kg)   05/24/22 212 lb 11.2 oz (96.5 kg)   05/12/22 205 lb (93 kg)     BP Readings from Last 3 Encounters:   05/26/22 118/70   05/25/22 90/66 05/12/22 101/63       Nursing note and vitals reviewed. Physical Exam   Constitutional: Oriented to person, place, and time. Appears well-developed and well-nourished. HENT:   Head: Normocephalic and atraumatic. Eyes: EOM are normal. Pupils are equal, round, and reactive to light. Neck: Normal range of motion. Neck supple. No JVD present. Cardiovascular: Normal rate, regular rhythm, normal heart sounds and intact distal pulses. No murmur heard. Pulmonary/Chest: Effort normal and breath sounds normal. No respiratory distress. No wheezes. No rales. Abdominal: Soft. Bowel sounds are normal. No distension. There is no tenderness. Musculoskeletal: Normal range of motion. No edema. Neurological: Alert and oriented to person, place, and time. No cranial nerve deficit. Coordination normal.   Skin: Skin is warm and dry. Psychiatric: Normal mood and affect.        Lab Results   Component Value Date    CKTOTAL 58 05/24/2022       Lab Results   Component Value Date    WBC 5.9 05/24/2022    RBC 4.03 05/24/2022    HGB 11.7 05/24/2022    HCT 38.2 05/24/2022    MCV 94.8 05/24/2022    MCH 29.0 05/24/2022    MCHC 30.6 05/24/2022    RDW 11.7 10/10/2019     05/24/2022    MPV 11.2 05/24/2022       Lab Results   Component Value Date     05/25/2022    K 4.3 05/25/2022    K 4.6 05/24/2022     05/25/2022    CO2 29 05/25/2022    BUN 17 05/25/2022    LABALBU 4.2 05/23/2022    CREATININE 1.1 05/25/2022    CALCIUM 8.5 05/25/2022    LABGLOM 50 05/25/2022    GLUCOSE 172 05/25/2022       Lab Results   Component Value Date    ALKPHOS 177 05/23/2022    ALT 24 05/23/2022    AST 19 05/23/2022    PROT 6.7 05/23/2022    BILITOT 0.8 05/23/2022    BILIDIR 0.4 05/12/2022    LABALBU 4.2 05/23/2022       Lab Results   Component Value Date    MG 2.1 10/15/2020       Lab Results   Component Value Date    INR 1.02 07/09/2020    INR 0.98 02/26/2019    INR 0.97 02/11/2019         Lab Results   Component Value Date LABA1C 6.1 03/29/2021       Lab Results   Component Value Date    TRIG 106 07/09/2020    HDL 51 08/30/2021    LDLCALC 84 08/30/2021       Lab Results   Component Value Date    TSH 21.540 05/23/2022         Testing Reviewed:      I have individually reviewed the cardiac test below:    ECHO: Results for orders placed during the hospital encounter of 05/23/22    ECHO Complete 2D W Doppler W Color    Narrative  Transthoracic Echocardiography Report (TTE)    Demographics    Patient Name  Emperatriz Steinberg  Gender             Female  M    MR #          [id number]     Race               Other    Ethnicity           or     Account #     [de-identified]     Room Number        0009    Accession     [id number]    Date of Study      05/24/2022  Number    Date of Birth 1960    Referring          Dilan Carson CNP  Physician          ALFRED Goddard    Age           65 year(s)    Sonographer        LINDA Le, RDCS,  RDMS, RVT    Interpreting       Echo reader of the week  Physician          Fox Huitron MD    Procedure    Type of Study    TTE procedure:ECHOCARDIOGRAM COMPLETE 2D W DOPPLER W COLOR. Procedure Date  Date: 05/24/2022 Start: 07:44 AM    Study Location: Bedside  Technical Quality: Limited visualization due to body habitus. Indications:Chest pain. Additional Medical History:chest pain, hypertension, hypothyroidism,  cardiomyopathy, ICD, vertigo, COVID, breast cancer    Patient Status: Routine    Height: 61.81 inches Weight: 211.64 pounds BSA: 1.95 m^2 BMI: 38.95 kg/m^2    BP: 99/74 mmHg    Allergies  - No Known Allergies. - Tape. Conclusions    Summary  Left Ventricular size is Mildly increased . Normal left ventricular wall thickness. There was severe global hypokinesis of the left ventricle. Systolic function was severely reduced. Ejection fraction is visually estimated at 25%. The left atrium is Mild to moderate dilated.   Mildly enlarged right atrium size.    Signature    ----------------------------------------------------------------  Electronically signed by Janna Esparza MD (Interpreting  physician) on 05/24/2022 at 07:26 PM  ----------------------------------------------------------------    Findings    Mitral Valve  The mitral valve structure was normal with normal leaflet separation. DOPPLER: The transmitral velocity was within the normal range with no  evidence for mitral stenosis. Moderate mitral regurgitation is present. Aortic Valve  The aortic valve was trileaflet with normal thickness and cuspal  separation. DOPPLER: Transaortic velocity was within the normal range with  no evidence of aortic stenosis. There was no evidence of aortic  regurgitation. Tricuspid Valve  The tricuspid valve structure was normal with normal leaflet separation. DOPPLER: There was no evidence of tricuspid stenosis. Moderate tricuspid  regurgitation visualized. Right ventricular systolic pressure measures 40  mmhg. Pulmonic Valve  The pulmonic valve leaflets exhibited normal thickness, no calcification,  and normal cuspal separation. DOPPLER: The transpulmonic velocity was  within the normal range with no evidence for regurgitation. Left Atrium  The left atrium is Mild to moderate dilated. Left Ventricle  Left Ventricular size is Mildly increased . Normal left ventricular wall thickness. There was severe global hypokinesis of the left ventricle. Systolic function was severely reduced. Ejection fraction is visually estimated at 25%. Right Atrium  Mildly enlarged right atrium size. Right Ventricle  The right ventricular size was normal with normal systolic function and  wall thickness. Pacer Wire visualized in right ventricle. Pericardial Effusion  The pericardium was normal in appearance with no evidence of a pericardial  effusion. Pleural Effusion  No evidence of pleural effusion.     Aorta / Great Vessels  -Aortic root dimension within normal limits.  -The Pulmonary artery is within normal limits. -IVC size is within normal limits with normal respiratory phasic changes. M-Mode/2D Measurements & Calculations    LV Diastolic   LV Systolic Dimension:    AV Cusp Separation: 1.6 cmLA  Dimension: 5.2 4.7 cm                    Dimension: 4.3 cmAO Root  cm             LV Volume Diastolic: 102  Dimension: 2.5 cmLA Area: 29.7  LV FS:9.6 %    ml                        cm^2  LV PW          LV Volume Systolic: 681  Diastolic: 1.4 ml  cm             LV EDV/LV EDV Index: 130  Septum         ml/67 m^2LV ESV/LV ESV    RV Diastolic Dimension: 2.8 cm  Diastolic: 1.2 Index: 855 MU/86 m^2  cm             EF Calculated: 21.5 %     LA/Aorta: 1.72  Ascending Aorta: 2.8 cm  LV Length: 8.4 cm         LA volume/Index: 102.3 ml /52m^2    LV Area  Diastolic:  70.2 cm^2  LV Area  Systolic: 35  cm^2    Doppler Measurements & Calculations    MV Peak E-Wave: 132 cm/s          AV Peak Velocity: LVOT Peak Velocity:  113 cm/s          61.8 cm/s  MV Peak Gradient: 6.97 mmHg       AV Peak Gradient: LVOT Peak Gradient: 2  5.11 mmHg         mmHg  MV Deceleration Time: 180 msec  TV Peak E-Wave: 84.8  MV Area (PISA): 0.24 cm^2                           cm/s    AV P1/2t: 642     TV Peak Gradient: 2.88  msec              mmHg  MR Velocity: 456 cm/s             IVRT: 77 msec     TR Velocity:284 cm/s  MV LEONARDO PISA: 0.23 cm^2                              TR Gradient:32.26 mmHg  MR VTI: 137 cm                                      PV Peak Velocity: 56.6  Alias Velocity: 46 cm/sPISA       AV DVI            cm/s  Radius: 0.6 cm                    (Vmax):0.55       PV Peak Gradient: 1.28  MV ERO Volumetric: 0.24 cm^2                        mmHg  PISA area: 2.26 cm^2MR flow rate:  103.96 ml/sMR volume:31.51 ml  LA ED Velocity: 143  cm/s    [email or web address] [id number]%3d Assessment/Plan   Chemo-Cardiomyopathy, EF improved 40-45%, then just dropped to 25% due to medication non-compliance, NYHA II, s/p ICD  HTN  No CAD, 2020  SVT vs VT - lasted 1 second  Orthostasis on Tilt  Stable since discharge, taking all meds, no major volume, discussed not missing meds. Repeat TTE 90 days. Continue GDMT. Add Farxiga 10 mg q day. The patient was advised on risk/benefits of the new Rx and she agreed to proceed with the medication(s). Discussed diet/exercise/BP/weight loss/health lifestyle choices/lipids; the patient understands the goals and will try to comply.     Disposition:  3-6 months         Electronically signed by Melida Lagos MD   5/26/2022 at 2:22 PM EDT

## 2022-05-26 NOTE — TELEPHONE ENCOUNTER
[facility] Transitions Initial Follow Up Call    Outreach made within 2 business days of discharge: Yes    Patient: Rgiffin Falling Patient : 1960   MRN: [id number]  Reason for Admission: There are no discharge diagnoses documented for the most recent discharge.   Discharge Date: 22       Spoke with: No answer DANNIELLE full     Discharge department/facility: [facility]

## 2022-06-01 ENCOUNTER — HOSPITAL ENCOUNTER (INPATIENT)
Age: 62
LOS: 1 days | Discharge: HOME | DRG: 885 | End: 2022-06-02
Attending: PSYCHIATRY & NEUROLOGY | Admitting: PSYCHIATRY & NEUROLOGY
Payer: COMMERCIAL

## 2022-06-01 ENCOUNTER — OFFICE VISIT (OUTPATIENT)
Dept: FAMILY MEDICINE CLINIC | Age: 62
End: 2022-06-01
Payer: COMMERCIAL

## 2022-06-01 VITALS
OXYGEN SATURATION: 97 % | HEART RATE: 99 BPM | SYSTOLIC BLOOD PRESSURE: 128 MMHG | HEIGHT: 63 IN | WEIGHT: 215 LBS | TEMPERATURE: 97.6 F | RESPIRATION RATE: 14 BRPM | BODY MASS INDEX: 38.09 KG/M2 | DIASTOLIC BLOOD PRESSURE: 66 MMHG

## 2022-06-01 DIAGNOSIS — I50.22 CHRONIC SYSTOLIC (CONGESTIVE) HEART FAILURE (HCC): ICD-10-CM

## 2022-06-01 DIAGNOSIS — R45.851 DEPRESSION WITH SUICIDAL IDEATION: ICD-10-CM

## 2022-06-01 DIAGNOSIS — R06.02 SHORTNESS OF BREATH: ICD-10-CM

## 2022-06-01 DIAGNOSIS — F32.A DEPRESSION WITH SUICIDAL IDEATION: Primary | ICD-10-CM

## 2022-06-01 DIAGNOSIS — F32.A DEPRESSION WITH SUICIDAL IDEATION: ICD-10-CM

## 2022-06-01 DIAGNOSIS — E03.9 HYPOTHYROIDISM, UNSPECIFIED TYPE: Primary | ICD-10-CM

## 2022-06-01 DIAGNOSIS — R45.851 DEPRESSION WITH SUICIDAL IDEATION: Primary | ICD-10-CM

## 2022-06-01 DIAGNOSIS — R29.898 BILATERAL LEG WEAKNESS: ICD-10-CM

## 2022-06-01 DIAGNOSIS — R26.89 POOR BALANCE: ICD-10-CM

## 2022-06-01 LAB
ACETAMINOPHEN LEVEL: < 5 UG/ML (ref 0–20)
ALBUMIN SERPL-MCNC: 4.3 G/DL (ref 3.5–5.1)
ALP BLD-CCNC: 126 U/L (ref 38–126)
ALT SERPL-CCNC: 12 U/L (ref 11–66)
ANION GAP SERPL CALCULATED.3IONS-SCNC: 14 MEQ/L (ref 8–16)
AST SERPL-CCNC: 14 U/L (ref 5–40)
BASOPHILS # BLD: 0.8 %
BASOPHILS ABSOLUTE: 0 THOU/MM3 (ref 0–0.1)
BILIRUB SERPL-MCNC: 0.7 MG/DL (ref 0.3–1.2)
BILIRUBIN DIRECT: < 0.2 MG/DL (ref 0–0.3)
BUN BLDV-MCNC: 27 MG/DL (ref 7–22)
CALCIUM SERPL-MCNC: 9.1 MG/DL (ref 8.5–10.5)
CHLORIDE BLD-SCNC: 101 MEQ/L (ref 98–111)
CO2: 20 MEQ/L (ref 23–33)
CREAT SERPL-MCNC: 1 MG/DL (ref 0.4–1.2)
EOSINOPHIL # BLD: 3.9 %
EOSINOPHILS ABSOLUTE: 0.2 THOU/MM3 (ref 0–0.4)
ERYTHROCYTE [DISTWIDTH] IN BLOOD BY AUTOMATED COUNT: 15.7 % (ref 11.5–14.5)
ERYTHROCYTE [DISTWIDTH] IN BLOOD BY AUTOMATED COUNT: 54.4 FL (ref 35–45)
ETHYL ALCOHOL, SERUM: < 0.01 %
GFR SERPL CREATININE-BSD FRML MDRD: 56 ML/MIN/1.73M2
GLUCOSE BLD-MCNC: 122 MG/DL (ref 70–108)
HCT VFR BLD CALC: 42.3 % (ref 37–47)
HEMOGLOBIN: 12.8 GM/DL (ref 12–16)
IMMATURE GRANS (ABS): 0.01 THOU/MM3 (ref 0–0.07)
IMMATURE GRANULOCYTES: 0.2 %
LYMPHOCYTES # BLD: 31.6 %
LYMPHOCYTES ABSOLUTE: 1.9 THOU/MM3 (ref 1–4.8)
MCH RBC QN AUTO: 28.7 PG (ref 26–33)
MCHC RBC AUTO-ENTMCNC: 30.3 GM/DL (ref 32.2–35.5)
MCV RBC AUTO: 94.8 FL (ref 81–99)
MONOCYTES # BLD: 5.9 %
MONOCYTES ABSOLUTE: 0.3 THOU/MM3 (ref 0.4–1.3)
NUCLEATED RED BLOOD CELLS: 0 /100 WBC
OSMOLALITY CALCULATION: 276.5 MOSMOL/KG (ref 275–300)
PLATELET # BLD: 265 THOU/MM3 (ref 130–400)
PMV BLD AUTO: 11.8 FL (ref 9.4–12.4)
POTASSIUM REFLEX MAGNESIUM: 5 MEQ/L (ref 3.5–5.2)
RBC # BLD: 4.46 MILL/MM3 (ref 4.2–5.4)
SALICYLATE, SERUM: < 0.3 MG/DL (ref 2–10)
SEG NEUTROPHILS: 57.6 %
SEGMENTED NEUTROPHILS ABSOLUTE COUNT: 3.4 THOU/MM3 (ref 1.8–7.7)
SODIUM BLD-SCNC: 135 MEQ/L (ref 135–145)
TOTAL PROTEIN: 6.7 G/DL (ref 6.1–8)
TSH SERPL DL<=0.05 MIU/L-ACNC: 10.44 UIU/ML (ref 0.4–4.2)
WBC # BLD: 5.9 THOU/MM3 (ref 4.8–10.8)

## 2022-06-01 PROCEDURE — 80143 DRUG ASSAY ACETAMINOPHEN: CPT

## 2022-06-01 PROCEDURE — 80048 BASIC METABOLIC PNL TOTAL CA: CPT

## 2022-06-01 PROCEDURE — 6370000000 HC RX 637 (ALT 250 FOR IP): Performed by: PSYCHIATRY & NEUROLOGY

## 2022-06-01 PROCEDURE — 83036 HEMOGLOBIN GLYCOSYLATED A1C: CPT

## 2022-06-01 PROCEDURE — 36415 COLL VENOUS BLD VENIPUNCTURE: CPT

## 2022-06-01 PROCEDURE — 82077 ASSAY SPEC XCP UR&BREATH IA: CPT

## 2022-06-01 PROCEDURE — 84443 ASSAY THYROID STIM HORMONE: CPT

## 2022-06-01 PROCEDURE — 1240000000 HC EMOTIONAL WELLNESS R&B

## 2022-06-01 PROCEDURE — 80179 DRUG ASSAY SALICYLATE: CPT

## 2022-06-01 PROCEDURE — 99285 EMERGENCY DEPT VISIT HI MDM: CPT

## 2022-06-01 PROCEDURE — 80076 HEPATIC FUNCTION PANEL: CPT

## 2022-06-01 PROCEDURE — 85025 COMPLETE CBC W/AUTO DIFF WBC: CPT

## 2022-06-01 PROCEDURE — 99215 OFFICE O/P EST HI 40 MIN: CPT | Performed by: NURSE PRACTITIONER

## 2022-06-01 RX ORDER — PANTOPRAZOLE SODIUM 40 MG/1
40 TABLET, DELAYED RELEASE ORAL
Status: DISCONTINUED | OUTPATIENT
Start: 2022-06-02 | End: 2022-06-02 | Stop reason: HOSPADM

## 2022-06-01 RX ORDER — RISPERIDONE 1 MG/1
1 TABLET, FILM COATED ORAL NIGHTLY
Status: ON HOLD | COMMUNITY
End: 2022-06-05 | Stop reason: HOSPADM

## 2022-06-01 RX ORDER — ACETAMINOPHEN 325 MG/1
650 TABLET ORAL EVERY 4 HOURS PRN
Status: DISCONTINUED | OUTPATIENT
Start: 2022-06-01 | End: 2022-06-02 | Stop reason: HOSPADM

## 2022-06-01 RX ORDER — HYDROXYZINE HYDROCHLORIDE 25 MG/1
50 TABLET, FILM COATED ORAL 3 TIMES DAILY PRN
Status: DISCONTINUED | OUTPATIENT
Start: 2022-06-01 | End: 2022-06-02 | Stop reason: HOSPADM

## 2022-06-01 RX ORDER — LEVOTHYROXINE SODIUM 0.12 MG/1
125 TABLET ORAL DAILY
Status: DISCONTINUED | OUTPATIENT
Start: 2022-06-02 | End: 2022-06-02 | Stop reason: HOSPADM

## 2022-06-01 RX ORDER — LEVOTHYROXINE SODIUM 0.12 MG/1
125 TABLET ORAL DAILY
Qty: 30 TABLET | Refills: 1 | Status: SHIPPED | OUTPATIENT
Start: 2022-06-01 | End: 2022-07-21

## 2022-06-01 RX ORDER — METOPROLOL SUCCINATE 25 MG/1
12.5 TABLET, EXTENDED RELEASE ORAL DAILY
Status: DISCONTINUED | OUTPATIENT
Start: 2022-06-01 | End: 2022-06-02 | Stop reason: HOSPADM

## 2022-06-01 RX ORDER — FLUOXETINE HYDROCHLORIDE 20 MG/1
20 CAPSULE ORAL DAILY
Status: DISCONTINUED | OUTPATIENT
Start: 2022-06-01 | End: 2022-06-02 | Stop reason: HOSPADM

## 2022-06-01 RX ORDER — VITAMIN B COMPLEX
50 TABLET ORAL DAILY
Status: DISCONTINUED | OUTPATIENT
Start: 2022-06-02 | End: 2022-06-02 | Stop reason: HOSPADM

## 2022-06-01 RX ORDER — CLONAZEPAM 1 MG/1
0.5 TABLET ORAL EVERY 12 HOURS PRN
Status: DISCONTINUED | OUTPATIENT
Start: 2022-06-01 | End: 2022-06-02 | Stop reason: HOSPADM

## 2022-06-01 RX ORDER — TRAZODONE HYDROCHLORIDE 50 MG/1
50 TABLET ORAL NIGHTLY PRN
Status: DISCONTINUED | OUTPATIENT
Start: 2022-06-01 | End: 2022-06-02 | Stop reason: HOSPADM

## 2022-06-01 RX ORDER — DIGOXIN 125 MCG
125 TABLET ORAL DAILY
Status: DISCONTINUED | OUTPATIENT
Start: 2022-06-01 | End: 2022-06-02 | Stop reason: HOSPADM

## 2022-06-01 RX ORDER — ATORVASTATIN CALCIUM 40 MG/1
40 TABLET, FILM COATED ORAL NIGHTLY
Status: DISCONTINUED | OUTPATIENT
Start: 2022-06-02 | End: 2022-06-02 | Stop reason: HOSPADM

## 2022-06-01 RX ORDER — METFORMIN HYDROCHLORIDE 500 MG/1
500 TABLET, EXTENDED RELEASE ORAL
Status: DISCONTINUED | OUTPATIENT
Start: 2022-06-02 | End: 2022-06-02 | Stop reason: HOSPADM

## 2022-06-01 RX ORDER — FUROSEMIDE 20 MG/1
20 TABLET ORAL DAILY
Status: DISCONTINUED | OUTPATIENT
Start: 2022-06-02 | End: 2022-06-02 | Stop reason: HOSPADM

## 2022-06-01 RX ORDER — NICOTINE 21 MG/24HR
1 PATCH, TRANSDERMAL 24 HOURS TRANSDERMAL DAILY
Status: DISCONTINUED | OUTPATIENT
Start: 2022-06-01 | End: 2022-06-01

## 2022-06-01 RX ORDER — LETROZOLE 2.5 MG/1
2.5 TABLET, FILM COATED ORAL DAILY
Status: DISCONTINUED | OUTPATIENT
Start: 2022-06-02 | End: 2022-06-02 | Stop reason: HOSPADM

## 2022-06-01 RX ORDER — ASPIRIN 81 MG/1
81 TABLET, CHEWABLE ORAL DAILY
Status: DISCONTINUED | OUTPATIENT
Start: 2022-06-02 | End: 2022-06-02 | Stop reason: HOSPADM

## 2022-06-01 RX ORDER — IBUPROFEN 200 MG
400 TABLET ORAL EVERY 6 HOURS PRN
Status: DISCONTINUED | OUTPATIENT
Start: 2022-06-01 | End: 2022-06-02 | Stop reason: HOSPADM

## 2022-06-01 RX ORDER — CLONAZEPAM 1 MG/1
1 TABLET ORAL NIGHTLY
Status: ON HOLD | COMMUNITY
End: 2022-06-11 | Stop reason: HOSPADM

## 2022-06-01 RX ORDER — VENLAFAXINE HYDROCHLORIDE 75 MG/1
75 CAPSULE, EXTENDED RELEASE ORAL DAILY
Status: DISCONTINUED | OUTPATIENT
Start: 2022-06-02 | End: 2022-06-02 | Stop reason: HOSPADM

## 2022-06-01 RX ORDER — BUPROPION HYDROCHLORIDE 150 MG/1
150 TABLET ORAL EVERY MORNING
Status: DISCONTINUED | OUTPATIENT
Start: 2022-06-02 | End: 2022-06-02 | Stop reason: HOSPADM

## 2022-06-01 RX ADMIN — FLUOXETINE 20 MG: 20 CAPSULE ORAL at 22:43

## 2022-06-01 RX ADMIN — METOPROLOL SUCCINATE 12.5 MG: 25 TABLET, EXTENDED RELEASE ORAL at 22:43

## 2022-06-01 RX ADMIN — SACUBITRIL AND VALSARTAN 1 TABLET: 49; 51 TABLET, FILM COATED ORAL at 22:43

## 2022-06-01 RX ADMIN — DIGOXIN 125 MCG: 125 TABLET ORAL at 22:43

## 2022-06-01 ASSESSMENT — LIFESTYLE VARIABLES
HOW OFTEN DO YOU HAVE A DRINK CONTAINING ALCOHOL: NEVER
HOW OFTEN DO YOU HAVE A DRINK CONTAINING ALCOHOL: NEVER

## 2022-06-01 ASSESSMENT — ENCOUNTER SYMPTOMS
DIARRHEA: 0
EYE PAIN: 0
NAUSEA: 0
RHINORRHEA: 0
ABDOMINAL PAIN: 0
WHEEZING: 0
SHORTNESS OF BREATH: 0
CONSTIPATION: 0
VOMITING: 0
BLOOD IN STOOL: 0
COUGH: 0

## 2022-06-01 ASSESSMENT — SLEEP AND FATIGUE QUESTIONNAIRES
AVERAGE NUMBER OF SLEEP HOURS: 12
DO YOU USE A SLEEP AID: NO
DO YOU HAVE DIFFICULTY SLEEPING: NO

## 2022-06-01 ASSESSMENT — PAIN SCALES - GENERAL
PAINLEVEL_OUTOF10: 0
PAINLEVEL_OUTOF10: 0

## 2022-06-01 ASSESSMENT — PATIENT HEALTH QUESTIONNAIRE - PHQ9
SUM OF ALL RESPONSES TO PHQ QUESTIONS 1-9: 22
SUM OF ALL RESPONSES TO PHQ QUESTIONS 1-9: 22

## 2022-06-01 NOTE — ED NOTES
Pt resting on cot with eyes closed. Lights off for comfort.  ED sitter at bedside     Roxann Castro RN  06/01/22 9783

## 2022-06-01 NOTE — PROGRESS NOTES
Behavioral Health   Admission Note   Admission Type: Voluntary    Reason for Admission: I'm really looking for help    Patient Strengths/Barriers  Strengths (Must Choose Two): Motivation level for treatment,Support from family  Barriers: Recreational/leisure/hobbies    Addictive Behavior  In the Past 3 Months, Have You Felt or Has Someone Told You That You Have a Problem With  : None    Medical Problems:   Past Medical History:   Diagnosis Date    Abnormal heart rhythm     Anxiety     Cancer ([address]     breast  2016     CHF (congestive heart failure) (HCC)     Congenital heart disease     DJD (degenerative joint disease)     Enlarged lymph nodes     Hypertension     Hypothyroidism     ICD (implantable cardioverter-defibrillator) in place 02/11/2019    Dr. Jay Sepulveda Kidney stones     PONV (postoperative nausea and vomiting)     Prediabetes 10/7/2019    Thyroid disease        Status EXAM:  Mental Status and Behavioral Exam  Normal: Yes  Level of Assistance: Independent/Self  Facial Expression: Sad  Affect: Appropriate  Level of Consciousness: Alert  Frequency of Checks: 4 times per hour, close  Mood:Normal: No  Mood: Depressed,Worthless, low self-esteem,Ashamed/humiliated  Motor Activity:Normal: Yes  Eye Contact: Good  Observed Behavior: Friendly,Cooperative  Sexual Misconduct History: Current - no  Preception: [place] to person,[place] to time,[place] to place,[place] to situation  Attention:Normal: Yes  Thought Processes: Circumstantial  Thought Content:Normal: Yes  Depression Symptoms: Appetite change,Feelings of helplessness,Impaired concentration,Feelings of worthlessness  Anxiety Symptoms: Generalized  Erum Symptoms: No problems reported or observed. Hallucinations: None  Delusions: No  Memory:Normal: Yes  Insight and Judgment: Yes    Pt admitted with followings belongings:  Vision - Corrective Lenses: None  Hearing Aid: None  Clothing:  Footwear,Shirt,Pants  Other Valuables: Alan Ryan (Comment),Katy (passport)     Admission order obtained Yes  Belongings sent home with N/A. Valuables placed in safe in security envelope, number:  N/A. Patient's home medications were locked up in cupboard, [id number]. Patient oriented to surroundings and program expectations and copy of patient rights given. Received admission packet:  Yes  Consents reviewed, signed Yes. Outcomes Questionnaire completed No.  \"An Important Message from Medicare About Your Rights\" form reviewed, signed: N/A . Patient verbalize understanding: Yes. Patient education on precautions: YES/NO/NA: N/A          Patient screened positive for suicide risk on CSSR-S (\"yes\" to question #4, 5, OR 6)  YES. Physician notified of risk score. Orders received no pt not currently suicidal .  2 person skin assessment completed upon admission pt declined. Explained patients right to have family, representative or physician notified of their admission. Patient has Declined for physician to be notified. Patient has Declined for family/representative to be notified. Provided pt with [employer] handout entitled \"Quitting Smoking. \"  Reviewed handout with pt addressing dangers of smoking, developing coping skills, and providing basic information about quitting. Pt response to counseling:  Pt does not smoke    Admission summary: pt came to 4E from the [facility]FFILIATE OF [facility]. She has been waking up and going to sleep with suicidal thoughts and she feels like her medications are not working. She just got back from [facility] where her ex- and his GF are and she has been depressed and suicidal ever since. She would like to get started with a counselor. She is bilingual (English and [place] and was recently discharged from the hospital with CHF and SOB. She lives with her son, his wife and their kids.           Henry Peterson RN

## 2022-06-01 NOTE — ED NOTES
Pt sleeping on cot. Lights off for comfort. ED sitter at bedside.      Alee Nguyen RN  06/01/22 0406

## 2022-06-01 NOTE — PROGRESS NOTES
Chief Complaint:   Suicidal      Provisional Diagnosis: Major Depressive Disorder      Risk, Psychosocial and Contextual Factors: [facility], new to area      Current  Treatment: PCP - Tamanna Bacon      Present Suicidal Behavior:    Verbal: Yes    Attempt: Denies      Access to Weapons: RIKKI      C-SSRS Current Suicide Risk: Low, Moderate or High: High      Past Suicidal Behavior:    Verbal: Yes    Attempts: Yes - last attempt at age 29      Self-Injurious/Self-Mutilation: RIKKI      Traumatic Event Within Past 2 Weeks: Denies       Current Abuse:  Denies      Legal: Denies      Violence: Denies      Protective Factors:  Positive support (daughter-in-law), motivated for change      Housing: Currently resides with son, daughter in law and their children      CPAP/Oxygen/Ambulation Difficulties: Walker      Risk Factors: No outpatient [facility] provider      Clinical Summary:      Patient is a 65year old female who presents to the ED voluntarily reporting suicidal thoughts with a plan to throw herself in a river. Patient reports she recently moved to [facility] 5/8/2022 from [facility]. Patient reports homicidal thoughts towards ex- and his current girlfriend (both located in [facility]) with plan to stab both. Patient reports increased depression and suicidal ideation. \"I wake up with an idea, I go to sleep with an idea\". Patient denies substance use. Currently resides with son, daughter in law and their children. Patient reports \"everyone has happiness and I do not, that is what I want\". Level of Care Disposition:      Consulted with medical provider. Patient is medically cleared. Consulted with Dr. Ilda Youngblood. Patient to be admitted to 4E. Patient, provider and nurse updated on POC. Voluntary form signed. Report given to Godfrey MAE on 4E.

## 2022-06-01 NOTE — ED PROVIDER NOTES
[address]  EMERGENCY MEDICINE     Pt Name: Yady Queen  MRN: [id number]  [place] 1960  Date of evaluation: 6/1/2022  PCP:    Claude Members, APRN - CNP  Provider: ANGELIA Jamison CNP    CHIEF COMPLAINT       Chief Complaint   Patient presents with    Suicidal           HISTORY OF PRESENT ILLNESS    Yady Queen is a 65 y.o. female patient that presents to ER from her doctor's office where she presented for evaluation and told him that she was suicidal and had a plan to slit her wrists. Patient states she is still suicidal and her plan remains to cut her wrist in the bath water. Patient denies current physical symptoms including chest pain or shortness of breath, but she does state that she has Soosalu sick heart\". Patient states she has not been eating or drinking very much and is hungry. She was provided with a box lunch and apple juice and ginger ale. Triage notes and Nursing notes were reviewed by myself. Any discrepancies are addressed above.     PAST MEDICAL HISTORY     Past Medical History:   Diagnosis Date    Abnormal heart rhythm     Anxiety     Cancer [facility])     breast  2016     CHF (congestive heart failure) (HCC)     Congenital heart disease     DJD (degenerative joint disease)     Enlarged lymph nodes     Hypertension     Hypothyroidism     ICD (implantable cardioverter-defibrillator) in place 02/11/2019    Dr. Benavides Leader Kidney stones     PONV (postoperative nausea and vomiting)     Prediabetes 10/7/2019    Thyroid disease        SURGICAL HISTORY       Past Surgical History:   Procedure Laterality Date    APPENDECTOMY      CARPAL TUNNEL RELEASE  2019    COLONOSCOPY      COLONOSCOPY N/A 07/27/2020    COLONOSCOPY POLYPECTOMY SNARE/COLD BIOPSY performed by Lior Rabago MD at [facility]    COLONOSCOPY  07/27/2020    COLONOSCOPY POLYPECTOMY HOT BIOPSY performed by Lior Rabago MD at [facility]    COLONOSCOPY  2021    EGD      EYE SURGERY Bilateral 10/2021    eyelid lift     FINGER TRIGGER RELEASE Right 06/25/2020    DEQUERVAINS RELEASE AND RIGHT RING FINGER TRIGGER RELEASE performed by Sandra Cameron DO at [facility] [address] BILATERAL      PACEMAKER INSERTION Left 02/11/2019    [facility] HAS A MRI CONDITIONAL SYSTEM    PTCA      TONSILLECTOMY         CURRENT MEDICATIONS       Current Discharge Medication List      CONTINUE these medications which have NOT CHANGED    Details   risperiDONE (RISPERDAL) 1 MG tablet Take 1 mg by mouth nightly Unsure who is prescribing      clonazePAM (KLONOPIN) 1 MG tablet Take 1 mg by mouth nightly.  Unsure who is prescribing      levothyroxine (EUTHYROX) 125 MCG tablet Take 1 tablet by mouth Daily  Qty: 30 tablet, Refills: 1    Associated Diagnoses: Hypothyroidism, unspecified type      dapagliflozin (FARXIGA) 10 MG tablet Take 1 tablet by mouth every morning  Qty: 30 tablet, Refills: 5      metoprolol succinate (TOPROL XL) 25 MG extended release tablet Take 0.5 tablets by mouth daily Hold if SBP less than 95 mmHg or HR less than 50 bpm  Qty: 30 tablet, Refills: 3      furosemide (LASIX) 20 MG tablet Take 1 tablet by mouth daily TAKE 1 TABLET BY MOUTH ONCE DAILY AS NEEDED FOR LEG SWELLING, WEIGHT GAIN  Qty: 90 tablet, Refills: 0      sacubitril-valsartan (ENTRESTO) 49-51 MG per tablet Take 1 tablet by mouth twice daily  Qty: 60 tablet, Refills: 0      digoxin (LANOXIN) 125 MCG tablet Take 1 tablet by mouth daily  Qty: 90 tablet, Refills: 0      !! venlafaxine (EFFEXOR XR) 150 MG extended release capsule Take 1 capsule by mouth daily  Qty: 90 capsule, Refills: 3    Associated Diagnoses: Depression with anxiety      buPROPion (WELLBUTRIN XL) 150 MG extended release tablet Take 1 tablet by mouth every morning  Qty: 90 tablet, Refills: 1    Associated Diagnoses: Depression with anxiety      Cholecalciferol 50 MCG (2000 UT) CAPS Take 50 mcg by mouth daily  Qty: 30 capsule, Refills: 3      metFORMIN (GLUCOPHAGE-XR) 500 MG extended release tablet Take 1 tablet by mouth once daily with breakfast  Qty: 90 tablet, Refills: 0    Associated Diagnoses: Prediabetes      CPAP Machine MISC by Does not apply route Please change CPAP pressure to auto 11-15 cm H20. Qty: 1 each, Refills: 0      omeprazole (PRILOSEC) 40 MG delayed release capsule Take 1 capsule by mouth every morning (before breakfast)  Qty: 90 capsule, Refills: 1    Associated Diagnoses: Gastroesophageal reflux disease without esophagitis; Chronic superficial gastritis without bleeding      !! venlafaxine (EFFEXOR XR) 75 MG extended release capsule Take 1 capsule by mouth daily (take along with 150 mg Effexor for total dose of 225 mg)  Qty: 90 capsule, Refills: 3    Associated Diagnoses: Adjustment disorder, unspecified type      letrozole (FEMARA) 2.5 MG tablet Take 1 tablet by mouth daily  Qty: 90 tablet, Refills: 3    Associated Diagnoses: Malignant neoplasm of left breast in female, estrogen receptor positive, unspecified site of breast ([facility] [address] Use of letrozole (Femara)      polyethylene glycol (GLYCOLAX) 17 GM/SCOOP powder DISSOLVE & TAKE 1 CAPFUL ONCE DAILY      atorvastatin (LIPITOR) 40 MG tablet Take 1 tablet by mouth nightly  Qty: 90 tablet, Refills: 3    Associated Diagnoses: Mixed hyperlipidemia      aspirin 81 MG chewable tablet Take 1 tablet by mouth daily  Qty: 30 tablet, Refills: 3      acetaminophen (TYLENOL) 500 MG tablet Take 500 mg by mouth every 6 hours as needed for Pain       !! - Potential duplicate medications found. Please discuss with provider.           ALLERGIES       Allergies   Allergen Reactions    Adhesive Tape Itching       FAMILY HISTORY       Family History   Problem Relation Age of Onset    High Blood Pressure Mother     Dementia Mother     Cancer Father     Colon Cancer Father     Mental Retardation Brother     Colon Polyps Brother     Cancer Maternal Grandfather     Esophageal Cancer Neg Hx         SOCIAL HISTORY Social History     Socioeconomic History    Marital status:      Spouse name: None    Number of children: None    Years of education: None    Highest education level: None   Occupational History    None   Tobacco Use    Smoking status: Former Smoker     Packs/day: 0.25     Years: 37.00     Pack years: 9.25     Types: Cigarettes     Start date: 1981     Quit date: 2020     Years since quittin.0    Smokeless tobacco: Never Used   Vaping Use    Vaping Use: None   Substance and Sexual Activity    Alcohol use: No    Drug use: No    Sexual activity: None   Other Topics Concern    None   Social History Narrative    Referral placed for counseling    Denies barriers with medication affordability    Denies community services     Denies transportation issues     Social Determinants of Health     Financial Resource Strain:     Difficulty of Paying Living Expenses: Not on file   Food Insecurity:     Worried About Running Out of Food in the Last Year: Not on file    Narciso of Food in the Last Year: Not on file   Transportation Needs:     Lack of Transportation (Medical): Not on file    Lack of Transportation (Non-Medical):  Not on file   Physical Activity:     Days of Exercise per Week: Not on file    Minutes of Exercise per Session: Not on file   Stress:     Feeling of Stress : Not on file   Social Connections:     Frequency of Communication with Friends and Family: Not on file    Frequency of Social Gatherings with Friends and Family: Not on file    Attends Restoration Services: Not on file    Active Member of Clubs or Organizations: Not on file    Attends Club or Organization Meetings: Not on file    Marital Status: Not on file   Intimate Partner Violence:     Fear of Current or Ex-Partner: Not on file    Emotionally Abused: Not on file    Physically Abused: Not on file    Sexually Abused: Not on file   Housing Stability:     Unable to Pay for Housing in the Last Year: Not on file    Number of Places Lived in the Last Year: Not on file    Unstable Housing in the Last Year: Not on file       REVIEW OF SYSTEMS     Review of Systems   Constitutional: Negative for appetite change, chills, fatigue, fever and unexpected weight change. HENT: Negative for ear pain and rhinorrhea. Eyes: Negative for pain and visual disturbance. Respiratory: Negative for cough, shortness of breath and wheezing. Cardiovascular: Negative for chest pain, palpitations and leg swelling. Gastrointestinal: Negative for abdominal pain, blood in stool, constipation, diarrhea, nausea and vomiting. Genitourinary: Negative for dysuria, frequency and hematuria. Musculoskeletal: Negative for arthralgias, joint swelling and neck stiffness. Skin: Negative for rash. Neurological: Negative for dizziness, syncope, weakness, light-headedness and headaches. Hematological: Does not bruise/bleed easily. Psychiatric/Behavioral: Positive for sleep disturbance and suicidal ideas. Except as noted above the remainder of the review of systems was reviewed and is negative. SCREENINGS        Maynor Coma Scale  Eye Opening: Spontaneous  Best Verbal Response: Oriented  Best Motor Response: Obeys commands  Maynor Coma Scale Score: 15               PHYSICAL EXAM    (up to 7 for level 4, 8 or more for level 5)     ED Triage Vitals [02/19/22 1512]   BP Temp Temp Source Pulse Resp SpO2 Height Weight   (!) 134/95 98.2 °F (36.8 °C) Oral 124 16 100 % -- --       Physical Exam  Vitals and nursing note reviewed. Constitutional:       Appearance: She is well-developed. She is not diaphoretic. HENT:      Head: Normocephalic and atraumatic. Right Ear: External ear normal.      Left Ear: External ear normal.   Eyes:      Conjunctiva/sclera: Conjunctivae normal.      Pupils: Pupils are equal, round, and reactive to light. Cardiovascular:      Rate and Rhythm: Normal rate and regular rhythm.       Heart sounds: Normal heart sounds. No murmur heard. No gallop. Pulmonary:      Effort: Pulmonary effort is normal. No respiratory distress. Breath sounds: Normal breath sounds. No wheezing or rales. Abdominal:      General: Bowel sounds are normal. There is no distension. Palpations: Abdomen is soft. Musculoskeletal:         General: Normal range of motion. Cervical back: Normal range of motion. Skin:     General: Skin is warm and dry. Neurological:      Mental Status: She is alert and oriented to person, place, and time. Psychiatric:         Mood and Affect: Mood is depressed. Affect is tearful. Thought Content: Thought content is not paranoid or delusional. Thought content includes suicidal ideation. Thought content does not include homicidal ideation. Thought content includes suicidal plan. Thought content does not include homicidal plan. DIAGNOSTIC RESULTS     EKG:(none if blank)  All EKGs are interpreted by the Emergency Department Physician who either signs or Co-signs this chart in the absence of a cardiologist.        RADIOLOGY: (none if blank)   I directly visualized the following images and reviewed the radiologist interpretations.   Interpretation per the Radiologist below, if available at the time of this note:  No orders to display       LABS:  Labs Reviewed   SALICYLATE LEVEL - Abnormal; Notable for the following components:       Result Value    Salicylate, Serum < 0.3 (*)     All other components within normal limits   CBC WITH AUTO DIFFERENTIAL - Abnormal; Notable for the following components:    MCHC 30.3 (*)     RDW-CV 15.7 (*)     RDW-SD 54.4 (*)     Monocytes Absolute 0.3 (*)     All other components within normal limits   BASIC METABOLIC PANEL W/ REFLEX TO MG FOR LOW K - Abnormal; Notable for the following components:    CO2 20 (*)     Glucose 122 (*)     BUN 27 (*)     All other components within normal limits   TSH - Abnormal; Notable for the following components:    TSH 10.440 (*)     All other components within normal limits   GLOMERULAR FILTRATION RATE, ESTIMATED - Abnormal; Notable for the following components:    Est, Glom Filt Rate 56 (*)     All other components within normal limits   ETHANOL   ACETAMINOPHEN LEVEL   HEPATIC FUNCTION PANEL   ANION GAP   OSMOLALITY   URINE DRUG SCREEN       All other labs were within normal range or not returned as of this dictation. Please note, any cultures that may have been sent were not resulted at the time of this patient visit. EMERGENCY DEPARTMENT COURSE and Medical Decision Making:     Vitals:    Vitals:    06/01/22 1141 06/01/22 1523 06/01/22 1614   BP: 104/79  106/73   Pulse: 98 87 99   Resp: 18 16 18   Temp: 98.1 °F (36.7 °C)  98.3 °F (36.8 °C)   TempSrc: Oral  Tympanic   SpO2: 94% 95% 96%   Weight:   215 lb (97.5 kg)   Height:   5' 3\" (1.6 m)       PROCEDURES: (None if blank)  Procedures    ED Course as of 06/01/22 2001 Wed Jun 01, 2022   1316 No organic cause was found to explain patient's change in psychiatric behavior. JACOB notified that patient has been cleared for psychiatric evaluation. [NW]      ED Course User Index  [NW] Nakia Ranks, APRN - CNP     MDM  Number of Diagnoses or Management Options  Depression with suicidal ideation: new, needed workup     Amount and/or Complexity of Data Reviewed  Clinical lab tests: ordered and reviewed    Risk of Complications, Morbidity, and/or Mortality  Presenting problems: moderate  Diagnostic procedures: moderate  Management options: moderate        Patient that presents to ER from her doctor's office where she presented for evaluation and told him that she was suicidal and had a plan to slit her wrists. Differential diagnosis includes but not limited to thyroid abnormality, electrolyte abnormality, dehydration or depressive state. No organic cause was identified to explain change in patient's psychiatric behavior.   JACOB was in to see patient and contacted psych. Psych is agreed to admit patient for definitive care. Patient is agreeable with this plan and will sign in voluntarily. Strict return precautions and follow up instructions were discussed with the patient with which the patient agrees    ED Medications administered this visit:    Medications   acetaminophen (TYLENOL) tablet 650 mg (has no administration in time range)   ibuprofen (ADVIL;MOTRIN) tablet 400 mg (has no administration in time range)   hydrOXYzine (ATARAX) tablet 50 mg (has no administration in time range)   traZODone (DESYREL) tablet 50 mg (has no administration in time range)   magnesium hydroxide (MILK OF MAGNESIA) 400 MG/5ML suspension 30 mL (has no administration in time range)         FINAL IMPRESSION      1. Depression with suicidal ideation          DISPOSITION/PLAN   DISPOSITION Admitted 06/01/2022 03:25:32 PM      PATIENT REFERRED TO:  No follow-up provider specified.     DISCHARGE MEDICATIONS:  Current Discharge Medication List                 ANGELIA Victor CNP (electronically signed)           ANGELIA Victor CNP  06/01/22 2002

## 2022-06-01 NOTE — ED NOTES
Report for this patient given by Elina Hall RN at this time. Patient in bed lying on side with eyes closed. Patient appears to be resting at this time. Patient respirations are regular and unlabored. Patient vital signs are stable and respirations are noted. Will continue to monitor patient and call light placed within patients reach.        Angela Garsia RN  06/01/22 8043

## 2022-06-01 NOTE — PROGRESS NOTES
Transition of Care Visit/Hospital Follow Up:      Brian Marley is a 65 y.o. female that presents for Follow-Up from [facility]  ) and Discuss Medications (from [place] - Rispex  2mg tabs and clonazapam 2 mg tabs )        Date of Discharge:   5/25/22  Was patient contacted within 2 business days of discharge (see chart for documentation):  yes - 5/26/22      Patient presents for hospital follow up. Patient recently hospitalized at [facility] for treatment of CHF    Symptoms prior to admission:  [place] Course per DC Summary:    Hospital Course:   Brian Marley is a 65 y.o. female admitted to [address] [place] [address] on 5/23/2022 for evaluation and management of query acute decompensated CHF. Cardiology service also following. I took over care on 5/25/2022. Pt responded well to medical management, remained clinically stable and was discharged in stable conditions after cleared by consulting services.         Assessment/Plan:        - Acute on chronic HFrEF (NICM):   ? Prior echo obtained revealing EF of 40-45%.  ICD in place.  BNP remains elevated, however improved from 5/12.  CXR unremarkable. s/p LHC performed 05/24 which was clean. On GDMT. Clinically euvolemic on R/A. Explained dynamic nature of volume status management based on daily Wt. Also counseled about salt and water restriction. Nephro/cardio to monitor kidney function closely. CHF clinic referral.     - BL LE intermittent claudication:   ? Pt may need peripheral angiogram. Cardiology to follow up OP  - Essential hypertension:  ? Pt's BP remains soft however asymptomatic. Pt and PCP/cardiology need to monitor BP with antihypertensive regimen adjustment as needed. Hold parameters in place     - Hypothyroidism: suboptimally controlled w/ TSH at 21.5. increased home levothyroxine to 125 from 100 mcg QD.  PCP to repeat TSH in 4-6 wks and further adjust dose accordingly     - History of breast cancer:  ? 2016, aware. Alexey Flores  - Obstructive sleep apnea:  ? Patient reports that she does not use a CPAP as it is not covered by insurance.  - GERD:  ? Continue home PPI  - CKD stage III: stable. PCP to monitor given pt on ARB and diuretics; also pt benefits from Nephro referral OP  - Obesity w/ [place] 75.01     Devin Sullivan was evaluated today and a DME order was entered for a wheeled walker because she requires this to successfully complete daily living tasks of ambulating. A wheeled walker is necessary due to the patient's unsteady gait, upper body weakness, and inability to  an ambulation device; and she can ambulate only by pushing a walker instead of a lesser assistive device such as a cane, crutch, or standard walker. The need for this equipment was discussed with the patient and she understands and is in agreement. Medication changes at discharge:  Med list reconciled today    Clinical course since discharge:      Patient is home and is doing well. She is very weak, and also feeling short of breath with exertion, but denies any chest pain. She has since seen the cardiologist 5/26. They have since started her on Farxiga and increased the dose of the thyroid medication. Her last A1C was 6.1. She also is having a lot of weakness in her legs which affects her ability to walk, and she also has a lot of shortness of breath with exertion. She also sometimes gets very dizzy, lightheaded. She also has poor balance. She was apparently in a behavioral health hospital in [facility]. She went there because she had a divorce from her . He was apparently cheating on her. She had a mental breakdown, and she wanted to hill her  and also the woman he was cheating with. She was very sad and depressed. She felt like if she couldn't kill them, she will commit suicide. She did attempt suicide by cutting herself. She was there for 1 month. She is still struggling with suicidal thoughts every day.  She does have plan, states that she just needs a knife and a river. She is still taking the Wellbutrin and Effexor.     Current Outpatient Medications   Medication Sig Dispense Refill    levothyroxine (EUTHYROX) 125 MCG tablet Take 1 tablet by mouth Daily 30 tablet 1    dapagliflozin (FARXIGA) 10 MG tablet Take 1 tablet by mouth every morning 30 tablet 5    metoprolol succinate (TOPROL XL) 25 MG extended release tablet Take 0.5 tablets by mouth daily Hold if SBP less than 95 mmHg or HR less than 50 bpm 30 tablet 3    furosemide (LASIX) 20 MG tablet Take 1 tablet by mouth daily TAKE 1 TABLET BY MOUTH ONCE DAILY AS NEEDED FOR LEG SWELLING, WEIGHT GAIN 90 tablet 0    sacubitril-valsartan (ENTRESTO) 49-51 MG per tablet Take 1 tablet by mouth twice daily 60 tablet 0    digoxin (LANOXIN) 125 MCG tablet Take 1 tablet by mouth daily 90 tablet 0    venlafaxine (EFFEXOR XR) 150 MG extended release capsule Take 1 capsule by mouth daily 90 capsule 3    buPROPion (WELLBUTRIN XL) 150 MG extended release tablet Take 1 tablet by mouth every morning 90 tablet 1    Cholecalciferol 50 MCG (2000 UT) CAPS Take 50 mcg by mouth daily 30 capsule 3    metFORMIN (GLUCOPHAGE-XR) 500 MG extended release tablet Take 1 tablet by mouth once daily with breakfast 90 tablet 0    CPAP Machine MISC by Does not apply route Please change CPAP pressure to auto 11-15 cm H20. 1 each 0    omeprazole (PRILOSEC) 40 MG delayed release capsule Take 1 capsule by mouth every morning (before breakfast) 90 capsule 1    venlafaxine (EFFEXOR XR) 75 MG extended release capsule Take 1 capsule by mouth daily (take along with 150 mg Effexor for total dose of 225 mg) 90 capsule 3    letrozole (FEMARA) 2.5 MG tablet Take 1 tablet by mouth daily 90 tablet 3    polyethylene glycol (GLYCOLAX) 17 GM/SCOOP powder DISSOLVE & TAKE 1 CAPFUL ONCE DAILY      atorvastatin (LIPITOR) 40 MG tablet Take 1 tablet by mouth nightly 90 tablet 3    aspirin 81 MG chewable tablet Take 1 tablet by mouth daily 30 tablet 3    acetaminophen (TYLENOL) 500 MG tablet Take 500 mg by mouth every 6 hours as needed for Pain       No current facility-administered medications for this visit.        Past Medical History:   Diagnosis Date    Abnormal heart rhythm     Anxiety     Cancer [facility])     breast  2016     CHF (congestive heart failure) (HCC)     Congenital heart disease     DJD (degenerative joint disease)     Enlarged lymph nodes     Hypertension     Hypothyroidism     ICD (implantable cardioverter-defibrillator) in place 2019    Dr. Archie Miranda Kidney stones     PONV (postoperative nausea and vomiting)     Prediabetes 10/7/2019    Thyroid disease        Past Surgical History:   Procedure Laterality Date    APPENDECTOMY      CARPAL TUNNEL RELEASE      COLONOSCOPY      COLONOSCOPY N/A 2020    COLONOSCOPY POLYPECTOMY SNARE/COLD BIOPSY performed by Stephen Ortega MD at [address] [facility]    COLONOSCOPY  2020    COLONOSCOPY POLYPECTOMY HOT BIOPSY performed by Stephen Ortega MD at [facility]      EGD      EYE SURGERY Bilateral 10/2021    eyelid lift     FINGER TRIGGER RELEASE Right 2020    DEQUERVAINS RELEASE AND RIGHT RING FINGER TRIGGER RELEASE performed [facility] Rayshawn Dallas DO [facility] [address] Left 2019    [employer] VISIA PT HAS A MRI CONDITIONAL SYSTEM    PTCA      TONSILLECTOMY         Social History     Tobacco Use    Smoking status: Former Smoker     Packs/day: 0.25     Years: 37.00     Pack years: 9.25     Types: Cigarettes     Start date: 1981     Quit date: 2020     Years since quittin.0    Smokeless tobacco: Never Used   Vaping Use    Vaping Use: Not on file   Substance Use Topics    Alcohol use: No    Drug use: No       Family History   Problem Relation Age of Onset    High Blood Pressure Mother     Dementia Mother     Cancer Father     Colon Cancer Father     Mental Retardation Brother     Colon Polyps Brother     Cancer Maternal Grandfather     Esophageal Cancer Neg Hx          I have reviewed the patient's past medical history, past surgical history, allergies, medications, social and family history and I have made updates where appropriate. PHYSICAL EXAM:  /66   Pulse 99   Temp 97.6 °F (36.4 °C) (Oral)   Resp 14   Ht 5' 3\" (1.6 m)   Wt 215 lb (97.5 kg)   SpO2 97%   BMI 38.09 kg/m²   General Appearance: well developed and well- nourished, in no acute distress  Head: normocephalic and atraumatic  ENT: external ear and ear canal normal bilaterally, nose without deformity, nasal mucosa and turbinates normal without polyps, oropharynx normal, dentition is normal for age, no lip or gum lesions noted  Neck: supple and non-tender without mass, no thyromegaly or thyroid nodules, no cervical lymphadenopathy  Pulmonary/Chest: clear to auscultation bilaterally- no wheezes, rales or rhonchi, normal air movement, no respiratory distress or retractions  Cardiovascular: normal rate, regular rhythm, normal S1 and S2, no murmurs, rubs, clicks, or gallops, distal pulses intact  Abdomen: soft, non-tender, non-distended, no rebound or guarding, no masses or hernias noted, no hepatospleenomegaly  Extremities: no cyanosis, clubbing or edema of the lower extremities  Psych:  Normal affect without evidence of depression or anxiety, insight and judgement are appropriate, memory appears intact  Skin: warm and dry, no rash or erythema      Diagnostic test results reviewed: inpatient labs, chest x-ray and echocardiogram and heart catheterization    Patient risk of morbidity and mortality: high      ASSESSMENT & PLAN  Joyce Vazquez was seen today for follow-up from hospital and discuss medications. Diagnoses and all orders for this visit:    Hypothyroidism, unspecified type  -     levothyroxine (EUTHYROX) 125 MCG tablet;  Take 1 tablet by mouth Daily  -     TSH With Reflex Ft4; Future    Bilateral leg weakness  -     [facility]    Poor balance  -     [facility]    Chronic systolic (congestive) heart failure (HCC)    Shortness of breath    Depression with suicidal ideation      - con't Euthyrox, repeat thyroid labs in 6 weeks  - start physical therapy  - f/u with cardiology  - severe MDD, already admitted to [facility] hospital in [facility] for suicide attempt. Still feeling depressed with daily SI and plan. Discussed with patient and daughter in law that for her safety she really needs more intensive psychiatric evaluation in the ER. Patient and daughter agreeable. Huber called, report called to Royal Wright RN at [facility] ER. Patient transferred to [facility] ED in stable condition    Return in about 3 months (around 9/1/2022) for prediabetes, chronic conditions. Level of medical complexity:  high    -Overall, patient is improving  -Continue current meds  -Advised to continue to closely monitor her symptoms and if any worsening to seek treatment    All copied or forwarded information in the progress note was verified by me to be accurate at the time of visit  Patient's past medical, surgical, social and family history were reviewed and updated      All patient questions answered. Patient voiced understanding.

## 2022-06-02 ENCOUNTER — APPOINTMENT (OUTPATIENT)
Dept: GENERAL RADIOLOGY | Age: 62
DRG: 641 | End: 2022-06-02
Attending: INTERNAL MEDICINE
Payer: COMMERCIAL

## 2022-06-02 ENCOUNTER — HOSPITAL ENCOUNTER (INPATIENT)
Age: 62
LOS: 3 days | Discharge: HOME | DRG: 641 | End: 2022-06-05
Attending: INTERNAL MEDICINE | Admitting: INTERNAL MEDICINE
Payer: COMMERCIAL

## 2022-06-02 ENCOUNTER — APPOINTMENT (OUTPATIENT)
Dept: GENERAL RADIOLOGY | Age: 62
DRG: 885 | End: 2022-06-02
Payer: COMMERCIAL

## 2022-06-02 VITALS
WEIGHT: 215 LBS | RESPIRATION RATE: 16 BRPM | HEART RATE: 86 BPM | OXYGEN SATURATION: 97 % | TEMPERATURE: 96.9 F | SYSTOLIC BLOOD PRESSURE: 108 MMHG | DIASTOLIC BLOOD PRESSURE: 86 MMHG | BODY MASS INDEX: 38.09 KG/M2 | HEIGHT: 63 IN

## 2022-06-02 DIAGNOSIS — E87.5 HYPERKALEMIA: Primary | ICD-10-CM

## 2022-06-02 LAB
ANION GAP SERPL CALCULATED.3IONS-SCNC: 10 MEQ/L (ref 8–16)
AVERAGE GLUCOSE: 123 MG/DL (ref 70–126)
BUN BLDV-MCNC: 25 MG/DL (ref 7–22)
CALCIUM SERPL-MCNC: 9 MG/DL (ref 8.5–10.5)
CHLORIDE BLD-SCNC: 102 MEQ/L (ref 98–111)
CO2: 26 MEQ/L (ref 23–33)
CREAT SERPL-MCNC: 1.1 MG/DL (ref 0.4–1.2)
GFR SERPL CREATININE-BSD FRML MDRD: 50 ML/MIN/1.73M2
GLUCOSE BLD-MCNC: 105 MG/DL (ref 70–108)
GLUCOSE BLD-MCNC: 106 MG/DL (ref 70–108)
GLUCOSE BLD-MCNC: 131 MG/DL (ref 70–108)
GLUCOSE BLD-MCNC: 135 MG/DL (ref 70–108)
GLUCOSE BLD-MCNC: 99 MG/DL (ref 70–108)
HBA1C MFR BLD: 6.1 % (ref 4.4–6.4)
POTASSIUM REFLEX MAGNESIUM: 5.6 MEQ/L (ref 3.5–5.2)
POTASSIUM SERPL-SCNC: 4.6 MEQ/L (ref 3.5–5.2)
PRO-BNP: 6149 PG/ML (ref 0–900)
REASON FOR REJECTION: NORMAL
REJECTED TEST: NORMAL
SODIUM BLD-SCNC: 138 MEQ/L (ref 135–145)
T4 FREE: 1.08 NG/DL (ref 0.93–1.76)
TROPONIN T: < 0.01 NG/ML
TSH SERPL DL<=0.05 MIU/L-ACNC: 7.22 UIU/ML (ref 0.4–4.2)

## 2022-06-02 PROCEDURE — 84484 ASSAY OF TROPONIN QUANT: CPT

## 2022-06-02 PROCEDURE — 99222 1ST HOSP IP/OBS MODERATE 55: CPT

## 2022-06-02 PROCEDURE — 6370000000 HC RX 637 (ALT 250 FOR IP)

## 2022-06-02 PROCEDURE — 82948 REAGENT STRIP/BLOOD GLUCOSE: CPT

## 2022-06-02 PROCEDURE — 36415 COLL VENOUS BLD VENIPUNCTURE: CPT

## 2022-06-02 PROCEDURE — 99253 IP/OBS CNSLTJ NEW/EST LOW 45: CPT

## 2022-06-02 PROCEDURE — 1200000003 HC TELEMETRY R&B

## 2022-06-02 PROCEDURE — 84443 ASSAY THYROID STIM HORMONE: CPT

## 2022-06-02 PROCEDURE — 80048 BASIC METABOLIC PNL TOTAL CA: CPT

## 2022-06-02 PROCEDURE — 93005 ELECTROCARDIOGRAM TRACING: CPT

## 2022-06-02 PROCEDURE — 2580000003 HC RX 258

## 2022-06-02 PROCEDURE — 84132 ASSAY OF SERUM POTASSIUM: CPT

## 2022-06-02 PROCEDURE — 6370000000 HC RX 637 (ALT 250 FOR IP): Performed by: PSYCHIATRY & NEUROLOGY

## 2022-06-02 PROCEDURE — 6360000002 HC RX W HCPCS

## 2022-06-02 PROCEDURE — 71045 X-RAY EXAM CHEST 1 VIEW: CPT

## 2022-06-02 PROCEDURE — 84439 ASSAY OF FREE THYROXINE: CPT

## 2022-06-02 PROCEDURE — 2500000003 HC RX 250 WO HCPCS

## 2022-06-02 PROCEDURE — 83880 ASSAY OF NATRIURETIC PEPTIDE: CPT

## 2022-06-02 RX ORDER — ACETAMINOPHEN 325 MG/1
650 TABLET ORAL EVERY 4 HOURS PRN
Status: DISCONTINUED | OUTPATIENT
Start: 2022-06-02 | End: 2022-06-02 | Stop reason: SDUPTHER

## 2022-06-02 RX ORDER — PANTOPRAZOLE SODIUM 40 MG/1
40 TABLET, DELAYED RELEASE ORAL
Status: DISCONTINUED | OUTPATIENT
Start: 2022-06-03 | End: 2022-06-05 | Stop reason: HOSPADM

## 2022-06-02 RX ORDER — FLUOXETINE HYDROCHLORIDE 20 MG/1
20 CAPSULE ORAL DAILY
Status: DISCONTINUED | OUTPATIENT
Start: 2022-06-03 | End: 2022-06-03

## 2022-06-02 RX ORDER — NITROGLYCERIN 0.4 MG/1
0.4 TABLET SUBLINGUAL EVERY 5 MIN PRN
Status: CANCELLED | OUTPATIENT
Start: 2022-06-02

## 2022-06-02 RX ORDER — METOPROLOL SUCCINATE 25 MG/1
12.5 TABLET, EXTENDED RELEASE ORAL DAILY
Status: DISCONTINUED | OUTPATIENT
Start: 2022-06-03 | End: 2022-06-05 | Stop reason: HOSPADM

## 2022-06-02 RX ORDER — DEXTROSE MONOHYDRATE 50 MG/ML
100 INJECTION, SOLUTION INTRAVENOUS PRN
Status: DISCONTINUED | OUTPATIENT
Start: 2022-06-02 | End: 2022-06-02 | Stop reason: HOSPADM

## 2022-06-02 RX ORDER — ACETAMINOPHEN 325 MG/1
650 TABLET ORAL EVERY 6 HOURS PRN
Status: CANCELLED | OUTPATIENT
Start: 2022-06-02

## 2022-06-02 RX ORDER — DIGOXIN 125 MCG
125 TABLET ORAL DAILY
Status: DISCONTINUED | OUTPATIENT
Start: 2022-06-03 | End: 2022-06-05 | Stop reason: HOSPADM

## 2022-06-02 RX ORDER — ONDANSETRON 2 MG/ML
4 INJECTION INTRAMUSCULAR; INTRAVENOUS EVERY 6 HOURS PRN
Status: CANCELLED | OUTPATIENT
Start: 2022-06-02

## 2022-06-02 RX ORDER — VENLAFAXINE HYDROCHLORIDE 75 MG/1
75 CAPSULE, EXTENDED RELEASE ORAL DAILY
Status: CANCELLED | OUTPATIENT
Start: 2022-06-02 | End: 2022-06-03

## 2022-06-02 RX ORDER — POLYETHYLENE GLYCOL 3350 17 G/17G
17 POWDER, FOR SOLUTION ORAL DAILY PRN
Status: CANCELLED | OUTPATIENT
Start: 2022-06-02

## 2022-06-02 RX ORDER — ONDANSETRON 2 MG/ML
4 INJECTION INTRAMUSCULAR; INTRAVENOUS EVERY 6 HOURS PRN
Status: DISCONTINUED | OUTPATIENT
Start: 2022-06-02 | End: 2022-06-05 | Stop reason: HOSPADM

## 2022-06-02 RX ORDER — CALCIUM GLUCONATE 10 MG/ML
1000 INJECTION, SOLUTION INTRAVENOUS ONCE
Status: COMPLETED | OUTPATIENT
Start: 2022-06-02 | End: 2022-06-02

## 2022-06-02 RX ORDER — FUROSEMIDE 20 MG/1
20 TABLET ORAL DAILY
Status: CANCELLED | OUTPATIENT
Start: 2022-06-02

## 2022-06-02 RX ORDER — ATORVASTATIN CALCIUM 40 MG/1
40 TABLET, FILM COATED ORAL NIGHTLY
Status: DISCONTINUED | OUTPATIENT
Start: 2022-06-02 | End: 2022-06-05 | Stop reason: HOSPADM

## 2022-06-02 RX ORDER — LETROZOLE 2.5 MG/1
2.5 TABLET, FILM COATED ORAL DAILY
Status: DISCONTINUED | OUTPATIENT
Start: 2022-06-03 | End: 2022-06-05 | Stop reason: HOSPADM

## 2022-06-02 RX ORDER — POLYETHYLENE GLYCOL 3350 17 G/17G
17 POWDER, FOR SOLUTION ORAL DAILY PRN
Status: DISCONTINUED | OUTPATIENT
Start: 2022-06-02 | End: 2022-06-05 | Stop reason: HOSPADM

## 2022-06-02 RX ORDER — ENOXAPARIN SODIUM 100 MG/ML
40 INJECTION SUBCUTANEOUS NIGHTLY
Status: DISCONTINUED | OUTPATIENT
Start: 2022-06-02 | End: 2022-06-05 | Stop reason: HOSPADM

## 2022-06-02 RX ORDER — FLUOXETINE HYDROCHLORIDE 20 MG/1
20 CAPSULE ORAL DAILY
Status: CANCELLED | OUTPATIENT
Start: 2022-06-02

## 2022-06-02 RX ORDER — VENLAFAXINE HYDROCHLORIDE 75 MG/1
75 CAPSULE, EXTENDED RELEASE ORAL DAILY
Status: COMPLETED | OUTPATIENT
Start: 2022-06-03 | End: 2022-06-03

## 2022-06-02 RX ORDER — DEXTROSE MONOHYDRATE 50 MG/ML
100 INJECTION, SOLUTION INTRAVENOUS PRN
Status: CANCELLED | OUTPATIENT
Start: 2022-06-02

## 2022-06-02 RX ORDER — ACETAMINOPHEN 325 MG/1
650 TABLET ORAL EVERY 4 HOURS PRN
Status: CANCELLED | OUTPATIENT
Start: 2022-06-02

## 2022-06-02 RX ORDER — FUROSEMIDE 20 MG/1
20 TABLET ORAL DAILY
Status: DISCONTINUED | OUTPATIENT
Start: 2022-06-03 | End: 2022-06-05 | Stop reason: HOSPADM

## 2022-06-02 RX ORDER — METOPROLOL SUCCINATE 25 MG/1
12.5 TABLET, EXTENDED RELEASE ORAL DAILY
Status: CANCELLED | OUTPATIENT
Start: 2022-06-02

## 2022-06-02 RX ORDER — LEVOTHYROXINE SODIUM 0.12 MG/1
125 TABLET ORAL DAILY
Status: DISCONTINUED | OUTPATIENT
Start: 2022-06-03 | End: 2022-06-05 | Stop reason: HOSPADM

## 2022-06-02 RX ORDER — DEXTROSE MONOHYDRATE 50 MG/ML
100 INJECTION, SOLUTION INTRAVENOUS PRN
Status: DISCONTINUED | OUTPATIENT
Start: 2022-06-02 | End: 2022-06-05 | Stop reason: HOSPADM

## 2022-06-02 RX ORDER — ACETAMINOPHEN 650 MG/1
650 SUPPOSITORY RECTAL EVERY 6 HOURS PRN
Status: CANCELLED | OUTPATIENT
Start: 2022-06-02

## 2022-06-02 RX ORDER — VITAMIN B COMPLEX
50 TABLET ORAL DAILY
Status: DISCONTINUED | OUTPATIENT
Start: 2022-06-03 | End: 2022-06-05 | Stop reason: HOSPADM

## 2022-06-02 RX ORDER — LETROZOLE 2.5 MG/1
2.5 TABLET, FILM COATED ORAL DAILY
Status: CANCELLED | OUTPATIENT
Start: 2022-06-02

## 2022-06-02 RX ORDER — DIGOXIN 125 MCG
125 TABLET ORAL DAILY
Status: CANCELLED | OUTPATIENT
Start: 2022-06-02

## 2022-06-02 RX ORDER — BUPROPION HYDROCHLORIDE 150 MG/1
150 TABLET ORAL EVERY MORNING
Status: DISCONTINUED | OUTPATIENT
Start: 2022-06-03 | End: 2022-06-05 | Stop reason: HOSPADM

## 2022-06-02 RX ORDER — IBUPROFEN 400 MG/1
400 TABLET ORAL EVERY 6 HOURS PRN
Status: DISCONTINUED | OUTPATIENT
Start: 2022-06-02 | End: 2022-06-02

## 2022-06-02 RX ORDER — ONDANSETRON 4 MG/1
4 TABLET, ORALLY DISINTEGRATING ORAL EVERY 8 HOURS PRN
Status: CANCELLED | OUTPATIENT
Start: 2022-06-02

## 2022-06-02 RX ORDER — TRAZODONE HYDROCHLORIDE 50 MG/1
50 TABLET ORAL NIGHTLY PRN
Status: CANCELLED | OUTPATIENT
Start: 2022-06-02

## 2022-06-02 RX ORDER — CLONAZEPAM 0.5 MG/1
0.5 TABLET ORAL EVERY 12 HOURS PRN
Status: CANCELLED | OUTPATIENT
Start: 2022-06-02

## 2022-06-02 RX ORDER — VITAMIN B COMPLEX
50 TABLET ORAL DAILY
Status: CANCELLED | OUTPATIENT
Start: 2022-06-02

## 2022-06-02 RX ORDER — IBUPROFEN 400 MG/1
400 TABLET ORAL EVERY 6 HOURS PRN
Status: CANCELLED | OUTPATIENT
Start: 2022-06-02

## 2022-06-02 RX ORDER — ACETAMINOPHEN 650 MG/1
650 SUPPOSITORY RECTAL EVERY 6 HOURS PRN
Status: DISCONTINUED | OUTPATIENT
Start: 2022-06-02 | End: 2022-06-05 | Stop reason: HOSPADM

## 2022-06-02 RX ORDER — METFORMIN HYDROCHLORIDE 500 MG/1
500 TABLET, EXTENDED RELEASE ORAL
Status: DISCONTINUED | OUTPATIENT
Start: 2022-06-03 | End: 2022-06-05 | Stop reason: HOSPADM

## 2022-06-02 RX ORDER — SODIUM CHLORIDE 9 MG/ML
INJECTION, SOLUTION INTRAVENOUS PRN
Status: CANCELLED | OUTPATIENT
Start: 2022-06-02

## 2022-06-02 RX ORDER — ACETAMINOPHEN 325 MG/1
650 TABLET ORAL EVERY 6 HOURS PRN
Status: DISCONTINUED | OUTPATIENT
Start: 2022-06-02 | End: 2022-06-05 | Stop reason: HOSPADM

## 2022-06-02 RX ORDER — ASPIRIN 81 MG/1
81 TABLET, CHEWABLE ORAL DAILY
Status: DISCONTINUED | OUTPATIENT
Start: 2022-06-03 | End: 2022-06-05 | Stop reason: HOSPADM

## 2022-06-02 RX ORDER — SODIUM CHLORIDE 9 MG/ML
INJECTION, SOLUTION INTRAVENOUS PRN
Status: DISCONTINUED | OUTPATIENT
Start: 2022-06-02 | End: 2022-06-05 | Stop reason: HOSPADM

## 2022-06-02 RX ORDER — SODIUM CHLORIDE 0.9 % (FLUSH) 0.9 %
5-40 SYRINGE (ML) INJECTION EVERY 12 HOURS SCHEDULED
Status: DISCONTINUED | OUTPATIENT
Start: 2022-06-02 | End: 2022-06-05 | Stop reason: HOSPADM

## 2022-06-02 RX ORDER — TRAZODONE HYDROCHLORIDE 50 MG/1
50 TABLET ORAL NIGHTLY PRN
Status: DISCONTINUED | OUTPATIENT
Start: 2022-06-02 | End: 2022-06-05 | Stop reason: HOSPADM

## 2022-06-02 RX ORDER — ENOXAPARIN SODIUM 100 MG/ML
40 INJECTION SUBCUTANEOUS DAILY
Status: CANCELLED | OUTPATIENT
Start: 2022-06-02

## 2022-06-02 RX ORDER — ONDANSETRON 4 MG/1
4 TABLET, ORALLY DISINTEGRATING ORAL EVERY 8 HOURS PRN
Status: DISCONTINUED | OUTPATIENT
Start: 2022-06-02 | End: 2022-06-05 | Stop reason: HOSPADM

## 2022-06-02 RX ORDER — CLONAZEPAM 1 MG/1
0.5 TABLET ORAL EVERY 12 HOURS PRN
Status: DISCONTINUED | OUTPATIENT
Start: 2022-06-02 | End: 2022-06-05 | Stop reason: HOSPADM

## 2022-06-02 RX ORDER — ATORVASTATIN CALCIUM 40 MG/1
40 TABLET, FILM COATED ORAL NIGHTLY
Status: CANCELLED | OUTPATIENT
Start: 2022-06-02

## 2022-06-02 RX ORDER — NITROGLYCERIN 0.4 MG/1
0.4 TABLET SUBLINGUAL EVERY 5 MIN PRN
Status: DISCONTINUED | OUTPATIENT
Start: 2022-06-02 | End: 2022-06-05 | Stop reason: HOSPADM

## 2022-06-02 RX ORDER — SODIUM CHLORIDE 0.9 % (FLUSH) 0.9 %
5-40 SYRINGE (ML) INJECTION PRN
Status: DISCONTINUED | OUTPATIENT
Start: 2022-06-02 | End: 2022-06-05 | Stop reason: HOSPADM

## 2022-06-02 RX ORDER — HYDROXYZINE HYDROCHLORIDE 25 MG/1
50 TABLET, FILM COATED ORAL 3 TIMES DAILY PRN
Status: DISCONTINUED | OUTPATIENT
Start: 2022-06-02 | End: 2022-06-05 | Stop reason: HOSPADM

## 2022-06-02 RX ORDER — SODIUM CHLORIDE 0.9 % (FLUSH) 0.9 %
5-40 SYRINGE (ML) INJECTION EVERY 12 HOURS SCHEDULED
Status: CANCELLED | OUTPATIENT
Start: 2022-06-02

## 2022-06-02 RX ORDER — PANTOPRAZOLE SODIUM 40 MG/1
40 TABLET, DELAYED RELEASE ORAL
Status: CANCELLED | OUTPATIENT
Start: 2022-06-03

## 2022-06-02 RX ORDER — FUROSEMIDE 10 MG/ML
40 INJECTION INTRAMUSCULAR; INTRAVENOUS DAILY
Status: DISCONTINUED | OUTPATIENT
Start: 2022-06-03 | End: 2022-06-03

## 2022-06-02 RX ORDER — LEVOTHYROXINE SODIUM 0.12 MG/1
125 TABLET ORAL DAILY
Status: CANCELLED | OUTPATIENT
Start: 2022-06-03

## 2022-06-02 RX ORDER — SODIUM CHLORIDE 0.9 % (FLUSH) 0.9 %
5-40 SYRINGE (ML) INJECTION PRN
Status: CANCELLED | OUTPATIENT
Start: 2022-06-02

## 2022-06-02 RX ORDER — BUPROPION HYDROCHLORIDE 150 MG/1
150 TABLET ORAL EVERY MORNING
Status: CANCELLED | OUTPATIENT
Start: 2022-06-02

## 2022-06-02 RX ORDER — DEXTROSE MONOHYDRATE 50 MG/ML
100 INJECTION, SOLUTION INTRAVENOUS PRN
Status: DISCONTINUED | OUTPATIENT
Start: 2022-06-02 | End: 2022-06-02 | Stop reason: SDUPTHER

## 2022-06-02 RX ORDER — METFORMIN HYDROCHLORIDE 500 MG/1
500 TABLET, EXTENDED RELEASE ORAL
Status: CANCELLED | OUTPATIENT
Start: 2022-06-03

## 2022-06-02 RX ORDER — ASPIRIN 81 MG/1
81 TABLET, CHEWABLE ORAL DAILY
Status: CANCELLED | OUTPATIENT
Start: 2022-06-02

## 2022-06-02 RX ORDER — HYDROXYZINE HYDROCHLORIDE 25 MG/1
50 TABLET, FILM COATED ORAL 3 TIMES DAILY PRN
Status: CANCELLED | OUTPATIENT
Start: 2022-06-02

## 2022-06-02 RX ADMIN — INSULIN HUMAN 10 UNITS: 100 INJECTION, SOLUTION PARENTERAL at 17:01

## 2022-06-02 RX ADMIN — TRAZODONE HYDROCHLORIDE 50 MG: 50 TABLET ORAL at 20:55

## 2022-06-02 RX ADMIN — ENOXAPARIN SODIUM 40 MG: 100 INJECTION SUBCUTANEOUS at 20:54

## 2022-06-02 RX ADMIN — Medication 2000 UNITS: at 08:39

## 2022-06-02 RX ADMIN — METFORMIN HYDROCHLORIDE 500 MG: 500 TABLET, EXTENDED RELEASE ORAL at 08:39

## 2022-06-02 RX ADMIN — FLUOXETINE 20 MG: 20 CAPSULE ORAL at 08:40

## 2022-06-02 RX ADMIN — LETROZOLE 2.5 MG: 2.5 TABLET ORAL at 08:39

## 2022-06-02 RX ADMIN — ATORVASTATIN CALCIUM 40 MG: 40 TABLET, FILM COATED ORAL at 20:54

## 2022-06-02 RX ADMIN — VENLAFAXINE HYDROCHLORIDE 75 MG: 75 CAPSULE, EXTENDED RELEASE ORAL at 08:40

## 2022-06-02 RX ADMIN — BUPROPION HYDROCHLORIDE 150 MG: 150 TABLET, FILM COATED, EXTENDED RELEASE ORAL at 08:39

## 2022-06-02 RX ADMIN — ASPIRIN 81 MG 81 MG: 81 TABLET ORAL at 10:47

## 2022-06-02 RX ADMIN — FUROSEMIDE 20 MG: 20 TABLET ORAL at 08:40

## 2022-06-02 RX ADMIN — DIGOXIN 125 MCG: 125 TABLET ORAL at 08:37

## 2022-06-02 RX ADMIN — CALCIUM GLUCONATE 1000 MG: 10 INJECTION, SOLUTION INTRAVENOUS at 16:53

## 2022-06-02 RX ADMIN — SODIUM ZIRCONIUM CYCLOSILICATE 5 G: 5 POWDER, FOR SUSPENSION ORAL at 17:01

## 2022-06-02 RX ADMIN — METOPROLOL SUCCINATE 12.5 MG: 25 TABLET, EXTENDED RELEASE ORAL at 08:38

## 2022-06-02 RX ADMIN — PANTOPRAZOLE SODIUM 40 MG: 40 TABLET, DELAYED RELEASE ORAL at 06:50

## 2022-06-02 RX ADMIN — HYDROXYZINE HYDROCHLORIDE 50 MG: 25 TABLET, FILM COATED ORAL at 08:39

## 2022-06-02 RX ADMIN — SACUBITRIL AND VALSARTAN 1 TABLET: 49; 51 TABLET, FILM COATED ORAL at 08:39

## 2022-06-02 RX ADMIN — SODIUM CHLORIDE, PRESERVATIVE FREE 10 ML: 5 INJECTION INTRAVENOUS at 20:54

## 2022-06-02 RX ADMIN — LEVOTHYROXINE SODIUM 125 MCG: 0.12 TABLET ORAL at 06:50

## 2022-06-02 ASSESSMENT — PAIN SCALES - GENERAL: PAINLEVEL_OUTOF10: 0

## 2022-06-02 NOTE — PLAN OF CARE
Problem: Self Harm/Suicidality  Goal: Will have no self-injury during hospital stay  Description: INTERVENTIONS:  1. Q 30 MINUTES: Routine safety checks  2. Q SHIFT & PRN: Assess risk to determine if routine checks are adequate to maintain patient safety  6/2/2022 1109 by Noah Zhong LPN  Outcome: Progressing  Note: No self harm behaviors were observed or reported so far this shift. Remains on every 15 minutes precautions for safety. Problem: Depression  Goal: Will be euthymic at discharge  Description: INTERVENTIONS:  1. Administer medication as ordered  2. Provide emotional support via 1:1 interaction with staff  3. Encourage involvement in milieu/groups/activities  4. Monitor for social isolation  6/2/2022 1109 by Noah Zhong LPN  Outcome: Progressing  Note: Patient is withdrawn and tearful,  educated on coping and going to group. Will continue to monitor. Problem: Anxiety  Goal: Will report anxiety at manageable levels  Description: INTERVENTIONS:  1. Administer medication as ordered  2. Teach and rehearse alternative coping skills  3. Provide emotional support with 1:1 interaction with staff  6/2/2022 1109 by Noah Zhong LPN  Outcome: Progressing  Note: Patient reports 5 anxiety. Pt taking atarax 50mg prn. Verbalized medication was effective. Problem: Sleep Disturbance  Goal: Will exhibit normal sleeping pattern  Description: INTERVENTIONS:  1. Administer medication as ordered  2. Decrease environmental stimuli, including noise, as appropriate  3. Discourage social isolation and naps during the day  6/2/2022 1109 by Noah Zhong LPN  Outcome: Progressing  Note: Patient slept 7 hours last night, reports they slept well. Encourage patient not to take naps during the day, relax several hours before bed and to take prescribed sleep meds as ordered. Patient verbalized understanding.        Problem: Metabolic/Fluid and Electrolytes - Adult  Goal: Electrolytes maintained within normal limits  6/2/2022 1109 by Jose Mg LPN  Outcome: Progressing  Flowsheets (Taken 6/2/2022 1109)  Electrolytes maintained within normal limits: Monitor labs and assess patient for signs and symptoms of electrolyte imbalances  Note: Hospitalist was contacted     Problem: Musculoskeletal - Adult  Goal: Return mobility to safest level of function  Recent Flowsheet Documentation  Taken 6/2/2022 1058 by Jose Mg LPN  Return Mobility to Safest Level of Function: Assess patient stability and activity tolerance for standing, transferring and ambulating with or without assistive devices     Problem: Musculoskeletal - Adult  Goal: Return ADL status to a safe level of function  Recent Flowsheet Documentation  Taken 6/2/2022 1058 by Jose Mg LPN  Return ADL Status to a Safe Level of Function: Assist and instruct patient to increase activity and self care as tolerated     Problem: Chronic Conditions and Co-morbidities  Goal: Patient's chronic conditions and co-morbidity symptoms are monitored and maintained or improved  6/1/2022 2311 by Bethany Castrejon RN  Outcome: Progressing  Flowsheets (Taken 6/1/2022 2311)  Care Plan - Patient's Chronic Conditions and Co-Morbidity Symptoms are Monitored and Maintained or Improved: Monitor and assess patient's chronic conditions and comorbid symptoms for stability, deterioration, or improvement  Note: Patient's chronic conditions are being monitored . Hospitalist consult received.      Problem: Cardiovascular - Adult  Goal: Maintains optimal cardiac output and hemodynamic stability  6/1/2022 2311 by Bethany Castrejon RN  Outcome: Progressing  Flowsheets  Taken 6/1/2022 2311 by Bethany Castrejon RN  Maintains optimal cardiac output and hemodynamic stability: Monitor blood pressure and heart rate  Taken 6/1/2022 1757 by Ronaldo Murrell RN  Maintains optimal cardiac output and hemodynamic stability: Monitor blood pressure and heart rate  Note: Patient's blood pressure atte
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
## 2022-06-02 NOTE — BH NOTE
PLAN OF CARE:     Start Time: 0900  End Time:  0945    Group Topic:  Daily Goals    Group Type:   Goal Group    Intervention/Goal:  To increase support and identify daily goals    Attendance: Pt was in attendance to the group    Affect: bright    Behavior: Pt was appropriate during the group    Response: Pt was able to identify her daily goal    Daily Goal: \"To clear my mind. \"    Progress: Pt is working toward her daily goal
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
## 2022-06-02 NOTE — PLAN OF CARE
Problem: Self Harm/Suicidality  Goal: Will have no self-injury during hospital stay  Outcome: Progressing  Note: No self-injury this shift. Problem: Depression  Goal: Will be euthymic at discharge  Outcome: Not Progressing  Note: Patient remains sad. Patient has many physical problems that exacerbate her depression. Patient remains upset of the demise of her 36 year marriage reporting her ex- cheated on her. Problem: Psychosis  Goal: Will report no hallucinations or delusions  Outcome: Adequate for Discharge  Note: Patient denies hallucinations this shift. Patient is not delusional this shift. Problem: Psychosis  Goal: Will report no hallucinations or delusions  Outcome: Adequate for Discharge  Note: Patient denies hallucinations this shift. Patient is not delusional this shift. Problem: Anxiety  Goal: Will report anxiety at manageable levels  Outcome: Progressing  Flowsheets (Taken 6/1/2022 1757 by Sola Turk RN)  Will report anxiety at manageable levels: Provide emotional support with 1:1 interaction with staff  Note: Patient reports feeling anxious. Problem: Sleep Disturbance  Goal: Will exhibit normal sleeping pattern  Outcome: Progressing  Note: This is the ist night of hospitalization for this patient. Problem: Metabolic/Fluid and Electrolytes - Adult  Goal: Electrolytes maintained within normal limits  Outcome: Progressing  Flowsheets (Taken 6/1/2022 2311)  Electrolytes maintained within normal limits: Monitor labs and assess patient for signs and symptoms of electrolyte imbalances  Note: Patient's electrolytes are within norms.      Problem: Musculoskeletal - Adult  Goal: Return mobility to safest level of function  Outcome: Progressing  Flowsheets (Taken 6/1/2022 1757 by Sola Turk RN)  Return Mobility to Safest Level of Function: Assess patient stability and activity tolerance for standing, transferring and ambulating with or without assistive devices  Note: Patient has a difficult time related to mobility related to her shortness of breath. Patient is steady with the use of a walker. Goal: Return ADL status to a safe level of function  Outcome: Progressing  Flowsheets (Taken 6/1/2022 2311)  Return ADL Status to a Safe Level of Function: Assess activities of daily living deficits and provide assistive devices as needed  Note: Patient's activities of normal living will be assessed. Patient is a new admit. Problem: Cardiovascular - Adult  Goal: Maintains optimal cardiac output and hemodynamic stability  Outcome: Progressing  Flowsheets  Taken 6/1/2022 2311 by Juan José Hernandez RN  Maintains optimal cardiac output and hemodynamic stability: Monitor blood pressure and heart rate  Taken 6/1/2022 1757 by Satish Tamayo RN  Maintains optimal cardiac output and hemodynamic stability: Monitor blood pressure and heart rate  Note: Patient's blood pressure atte beginning of the shift was 97/78 with a heart rate of 62. Later prior to receiving her heart medication it was 96/60 with a heart rate of 98. Problem: Chronic Conditions and Co-morbidities  Goal: Patient's chronic conditions and co-morbidity symptoms are monitored and maintained or improved  Outcome: Progressing  Flowsheets (Taken 6/1/2022 2311)  Care Plan - Patient's Chronic Conditions and Co-Morbidity Symptoms are Monitored and Maintained or Improved: Monitor and assess patient's chronic conditions and comorbid symptoms for stability, deterioration, or improvement  Note: Patient's chronic conditions are being monitored . Hospitalist consult received. Problem: Discharge Planning  Goal: Discharge to home or other facility with appropriate resources  Outcome: Progressing  Flowsheets (Taken 6/1/2022 2322)  Discharge to home or other facility with appropriate resources: Identify barriers to discharge with patient and caregiver  Note: Discharge planning is in process.

## 2022-06-02 NOTE — PROGRESS NOTES
1345 Received pt escorted by transportation and police. Pt was alert and oriented, no distress noted. Pt denies any pain or discomfort at this time. Pt was assessed at bedside. Pt voiced no intention of self harm at this time. Pt vital signs sable, telemetry monitor attached asa ordered. Pt has been oriented to room and call light. Bed in lowest position, all needs items within reach. No other changes with pt at this time. Will continue to implement safety and comfort measures.

## 2022-06-02 NOTE — PROGRESS NOTES
Behavioral Health   Discharge Note    Pt discharged with followings belongings:   Vision - Corrective Lenses: None  Hearing Aid: None  Clothing: Footwear,Shirt,Pants  Other Valuables: Enio Blanks (Comment),Purse (passport)   Valuables retrieved from safe, security envelope returned to patient. Patient left department with transport via wheelchair. Discharged to [id number] [facility]. \"An Important Message from Medicare About Your Rights\" (IMM) form photocopy original from admission and provided to pt at least 4 hours prior to discharge N/A. If pt left within 4 hours of receiving 2nd delivery of IMM, this is because pt was agreeable with hospital discharge. Patient/guardian education on aftercare instructions: Yes  Bridge appointment completed:  N/A. Reviewed Discharge Instructions with patient/family/nursing facility. Patient/family verbalizes understanding and agreement with the discharge plan using the teachback method. Patient/family verbalize understanding of AVS:Yes    Status EXAM upon discharge:  Mental Status and Behavioral Exam  Normal: No  Level of Assistance: Independent/Self  Facial Expression: Flat,Sad,Worried  Affect: Congruent  Level of Consciousness: Alert  Frequency of Checks: 4 times per hour, close  Mood:Normal: No  Mood: Depressed,Anxious,Angry,Sad,Helpless,Worthless, low self-esteem,Ashamed/humiliated,Despair  Motor Activity:Normal: No  Motor Activity: Decreased  Eye Contact: Good  Observed Behavior: Friendly,Withdrawn,Cooperative,Preoccupied  Sexual Misconduct History: Current - no  Preception: [place] to person,[place] to time,[place] to place,[place] to situation  Attention:Normal: Yes  Thought Processes: Circumstantial  Thought Content:Normal: Yes  Depression Symptoms: Feelings of helplessness,Isolative,Loss of interest,Change in energy level,Feelings of worthlessness,Feelings of hopelessess  Anxiety Symptoms: Generalized  Erum Symptoms: No problems reported or observed.   Hallucinations: None  Delusions: No  Memory:Normal: No  Insight and Judgment: No  Insight and Judgment: Poor judgment    Saray Del Rosario LPN     Patient was tranferred to 6k-06 for medical treatment

## 2022-06-02 NOTE — PROGRESS NOTES
Psychosocial Assessment    Current Level of Psychosocial Functioning     Independent   Dependent    Minimal Assist     Comments:      Psychosocial High Risk Factors (check all that apply)    Unable to obtain meds   Chronic illness/pain    Substance abuse   Lack of Family Support   Financial stress   Isolation   Inadequate Community Resources  Suicide attempt(s)  Not taking medications   Victim of crime   Developmental Delay  Unable to manage personal needs    Age 72 or older   Homeless  No transportation   Readmission within 30 days  Unemployment  Traumatic Event  Issues related to her    XXX    Family/Supports identified:    Son and her daughter in law. Sexual Orientation:      Heterosexual    Patient Strengths:     Stable housing, support    Patient Barriers:     Safety plan:      Contracts for safety    CMHC/ history:     XXX     Plan of Care:  medication management, group/individual therapies, family meetings, psycho -education, treatment team meetings to assist with stabilization    Initial Discharge Plan:      Clinical Summary:  Dandre Carlson is a 65year old female, who presented to the ED due to she having depression and thoughts of suicide.

## 2022-06-02 NOTE — PROGRESS NOTES
[facility]  Initial Interdisciplinary Treatment Plan NOTE    Review Date & Time: 06/02/22  ***    Patient {WAS/WAS NOT:[id number]::\"was not\"} in treatment team.  See Multidisciplinary Treatment Team sheet for participants. Admission Type:   Admission Type: Voluntary    Reason for admission:  Reason for Admission: I'm really looking for help      Estimated Length of Stay Update:  06/03/22  Estimated Discharge Date Update: 3-5 days    Patient Strengths/Barriers  Strengths (Must Choose Two): Motivation level for treatment,Support from family  Barriers: Recreational/leisure/hobbies  Addictive Behavior:Addictive Behavior  In the Past 3 Months, Have You Felt or Has Someone Told You That You Have a Problem With  : None  Medical Problems:  Past Medical History:   Diagnosis Date    Abnormal heart rhythm     Anxiety     Cancer ([facility] [address]     breast  2016     CHF (congestive heart failure) (HCC)     Congenital heart disease     DJD (degenerative joint disease)     Enlarged lymph nodes     Hypertension     Hypothyroidism     ICD (implantable cardioverter-defibrillator) in place 02/11/2019    Dr. David Zamorano Kidney stones     PONV (postoperative nausea and vomiting)     Prediabetes 10/7/2019    Thyroid disease        EDUCATION:   Learner Progress Toward Treatment Goals: {[facility] Education Learner Progress:030043334}    Method: {[facility] Education Learner Method:687147741}    Outcome: {[facility] Education Learner Outcome:739483219}    PATIENT GOALS: ***    PLAN/TREATMENT RECOMMENDATIONS UPDATE:   1. What is the most important thing we can help you with while you are here? 2. Who is your support system? Son and daughter in law  3. Do you have follow-up providers? 4. Do you have the ability to pay for your medications? [employer]  5. Where will you be residing when you leave the hospital? With Son and her daughter in law  6. Will need a return to work slip or FMLA paper completion? None. Unemployed      GOALS UPDATE:   Time frame for Short-Term Goals: Daily    Aldona Hamman, LSW

## 2022-06-02 NOTE — DISCHARGE INSTR - DIET
Good nutrition is important when healing from an illness, injury, or surgery. Follow any nutrition recommendations given to you during your hospital stay. If you were given an oral nutrition supplement while in the hospital, continue to take this supplement at home. You can take it with meals, in-between meals, and/or before bedtime. These supplements can be purchased at most local grocery stores, pharmacies, and chain [employer]-stores. If you have any questions about your diet or nutrition, call the hospital and ask for the dietitian.    Reg

## 2022-06-02 NOTE — PROGRESS NOTES
Daily Progress Note  Bridgett Gutierrez MD  6/2/2022    Reviewed patient's current plan of care and vital signs with nursing staff. Sleep:  7 hours last night broken  Attending groups: No: Too late yesterday but went this AM  No reported suicidal or homicidal thought but she has a lot of hate toward her  and his girlfriend; No interaction with peers & staff; Mood 5 on a scale of 1 to 10 with 10 is feeling normal.    SUBJECTIVE:    Patient is feeling better. SUICIDAL IDEATION denies suicidal ideation. Patient does not have medication side effects. ROS: Patient has new complaints:  No  Sleeping adequately:  Yes  Visitors: No    Mental Status Examination:  Patient is cooperative. Speech: Normal rate and tone. No abnormal movements, tics or mannerisms. Mood anxious and dysthymic; affect Restricted. Suicidal ideation Absent. Homicidal ideations Absent. Hallucinations Absent. Delusions Absent. Thought Content: normal. Thought Processes: Goal-Directed. Alert and oriented X 3. Attention and concentration fair. MEMORY intact. Insight and Judgement fair. Data   height is 5' 3\" (1.6 m) and weight is 215 lb (97.5 kg). Her tympanic temperature is 97.9 °F (36.6 °C). Her blood pressure is 96/60 and her pulse is 98. Her respiration is 16 and oxygen saturation is 93%.    Labs:   Admission on 06/01/2022   Component Date Value Ref Range Status    ETHYL ALCOHOL, SERUM 06/01/2022 < 0.01  0.00 % Final    Performed at [address] [place] [address], [facility]    Salicylate, Serum 99/77/0752 < 0.3* 2.0 - 10.0 mg/dL Final    Performed at [address] [place] [address], [address]    Acetaminophen Level 06/01/2022 < 5.0  0.0 - 20.0 ug/mL Final    Performed at [address] [place] [address], [address]    WBC 06/01/2022 5.9  4.8 - 10.8 thou/mm3 Final    RBC 06/01/2022 4.46  4.20 - 5.40 mill/mm3 Final    Hemoglobin 06/01/2022 12.8  12.0 - 16.0 gm/dl Final    Hematocrit 06/01/2022 42.3  37.0 - 47.0 % Final    MCV 06/01/2022 94.8  81.0 - 99.0 fL Final    MCH 06/01/2022 28.7  26.0 - 33.0 pg Final    MCHC 06/01/2022 30.3* 32.2 - 35.5 gm/dl Final    RDW-CV 06/01/2022 15.7* 11.5 - 14.5 % Final    RDW-SD 06/01/2022 54.4* 35.0 - 45.0 fL Final    Platelets 12/70/2857 265  130 - 400 thou/mm3 Final    MPV 06/01/2022 11.8  9.4 - 12.4 fL Final    Seg Neutrophils 06/01/2022 57.6  % Final    Lymphocytes 06/01/2022 31.6  % Final    Monocytes 06/01/2022 5.9  % Final    Eosinophils 06/01/2022 3.9  % Final    Basophils 06/01/2022 0.8  % Final    Immature Granulocytes 06/01/2022 0.2  % Final    Segs Absolute 06/01/2022 3.4  1.8 - 7.7 thou/mm3 Final    Lymphocytes Absolute 06/01/2022 1.9  1.0 - 4.8 thou/mm3 Final    Monocytes Absolute 06/01/2022 0.3* 0.4 - 1.3 thou/mm3 Final    Eosinophils Absolute 06/01/2022 0.2  0.0 - 0.4 thou/mm3 Final    Basophils Absolute 06/01/2022 0.0  0.0 - 0.1 thou/mm3 Final    Immature Grans (Abs) 06/01/2022 0.01  0.00 - 0.07 thou/mm3 Final    nRBC 06/01/2022 0  /100 wbc Final    Performed at [address] [place] [address], [address]    Sodium 06/01/2022 135  135 - 145 meq/L Final    Potassium reflex Magnesium 06/01/2022 5.0  3.5 - 5.2 meq/L Final    Chloride 06/01/2022 101  98 - 111 meq/L Final    CO2 06/01/2022 20* 23 - 33 meq/L Final    Glucose 06/01/2022 122* 70 - 108 mg/dL Final    BUN 06/01/2022 27* 7 - 22 mg/dL Final    CREATININE 06/01/2022 1.0  0.4 - 1.2 mg/dL Final    Calcium 06/01/2022 9.1  8.5 - 10.5 mg/dL Final    Performed at [address] [place] [id number]    Albumin 06/01/2022 4.3  3.5 - 5.1 g/dL Final    Total Bilirubin 06/01/2022 0.7  0.3 - 1.2 mg/dL Final    Bilirubin, Direct 06/01/2022 <0.2  0.0 - 0.3 mg/dL Final    Alkaline Phosphatase 06/01/2022 126  38 - 126 U/L Final    AST 06/01/2022 14  5 - 40 U/L Final    ALT 06/01/2022 12  11 - 66 U/L Final    Total Protein 06/01/2022 6.7  6.1 - 8.0 g/dL Final    Performed at [address] [place] [address], [address] [facility] TSH 06/01/2022 10.440* 0.400 - 4.200 uIU/mL Final    Performed at [address] [place] [address], [address]    Anion Gap 06/01/2022 14.0  8.0 - 16.0 meq/L Final    Comment: ANION GAP = Sodium -(Chloride + CO2)  Performed at [address] [place] [address], [address]      EstJorden Filt Rate 06/01/2022 56* ml/min/1.73m2 Final    Comment: Stage Description                    GFR, ml/min/1.73 m2   -   At increased risk               > or = 60 (with chronic                                       kidney disease risk factors)   1   Normal or increased GFR         > or = 90   2   Mildly or decreased GFR         60 - 89   3   Moderately decreased GFR        30 - 59   4   Severely decreased GFR          15 - 29   5   Kidney failure                  <15 (or dialysis)  Estimated GFR calculated using abbreviated MDRD formula as  recommended by [employer]. Calculation based  upon serum creatinine and adjusted for age, gender & race. Hoda. Internal Med., Vol. 139 (2) pg 137-147.   Performed at [address] [place] [address], [address]      Osmolality Calc 06/01/2022 276.5  275.0 - 300.0 mOsmol/kg Final    Performed at [address] [place] [address], [address] [facility] [address] [facility] Glucose 06/02/2022 135* 70 - 108 mg/dl Final    Performed at [address] [place] [address], [address]    Sodium 06/02/2022 138  135 - 145 meq/L Final    Potassium reflex Magnesium 06/02/2022 5.6* 3.5 - 5.2 meq/L Final    Chloride 06/02/2022 102  98 - 111 meq/L Final    CO2 06/02/2022 26  23 - 33 meq/L Final    Glucose 06/02/2022 131* 70 - 108 mg/dL Final    BUN 06/02/2022 25* 7 - 22 mg/dL Final    CREATININE 06/02/2022 1.1  0.4 - 1.2 mg/dL Final    Calcium 06/02/2022 9.0  8.5 - 10.5 mg/dL Final    Performed at [address] [place] [address], [address]    Anion Gap 06/02/2022 10.0  8.0 - 16.0 meq/L Final    Comment: ANION GAP = Sodium -(Chloride + CO2)  Performed at [address] [place] [address], [address]      Est, Glom Filt Rate 06/02/2022 50* ml/min/1.73m2 Final    Comment: Stage Description                    GFR, ml/min/1.73 m2   -   At increased risk               > or = 60 (with chronic                                       kidney disease risk factors)   1   Normal or increased GFR         > or = 90   2   Mildly or decreased GFR         60 - 89   3   Moderately decreased GFR        30 - 59   4   Severely decreased GFR          15 - 29   5   Kidney failure                  <15 (or dialysis)  Estimated GFR calculated using abbreviated MDRD formula as  recommended by [employer]. Calculation based  upon serum creatinine and adjusted for age, gender & race. Hoda. Internal Med., Vol. 139 (2) pg 137-147.   Performed at [address] [place] [address], [address]              Medications  Current Facility-Administered Medications: glucose chewable tablet 16 g, 4 tablet, Oral, PRN  dextrose bolus 10% 125 mL, 125 mL, IntraVENous, PRN **OR** dextrose bolus 10% 250 mL, 250 mL, IntraVENous, PRN  glucagon (rDNA) injection 1 mg, 1 mg, IntraMUSCular, PRN  dextrose 5 % solution, 100 mL/hr, IntraVENous, PRN  acetaminophen (TYLENOL) tablet 650 mg, 650 mg, Oral, Q4H PRN  ibuprofen (ADVIL;MOTRIN) tablet 400 mg, 400 mg, Oral, Q6H PRN  hydrOXYzine (ATARAX) tablet 50 mg, 50 mg, Oral, TID PRN  traZODone (DESYREL) tablet 50 mg, 50 mg, Oral, Nightly PRN  magnesium hydroxide (MILK OF MAGNESIA) 400 MG/5ML suspension 30 mL, 30 mL, Oral, Daily PRN  aspirin chewable tablet 81 mg, 81 mg, Oral, Daily  atorvastatin (LIPITOR) tablet 40 mg, 40 mg, Oral, Nightly  buPROPion (WELLBUTRIN XL) extended release tablet 150 mg, 150 mg, Oral, QAM  Vitamin D (CHOLECALCIFEROL) tablet 2,000 Units, 50 mcg, Oral, Daily  clonazePAM (KLONOPIN) tablet 0.5 mg, 0.5 mg, Oral, Q12H PRN  dapagliflozin (FARXIGA) tablet 10 mg, 10 mg, Oral, QAM  digoxin (LANOXIN) tablet 125 mcg, 125 mcg, Oral, Daily  furosemide (LASIX) tablet 20 mg, 20 mg, Oral, Daily  letrozole (FEMARA) tablet 2.5 mg, 2.5 mg, Oral, Daily  levothyroxine (SYNTHROID) tablet 125 mcg, 125 mcg, Oral, Daily  metFORMIN (GLUCOPHAGE-XR) extended release tablet 500 mg, 500 mg, Oral, Daily with breakfast  metoprolol succinate (TOPROL XL) extended release tablet 12.5 mg, 12.5 mg, Oral, Daily  pantoprazole (PROTONIX) tablet 40 mg, 40 mg, Oral, QAM AC  sacubitril-valsartan (ENTRESTO) 49-51 MG per tablet 1 tablet, 1 tablet, Oral, BID  venlafaxine (EFFEXOR XR) extended release capsule 75 mg, 75 mg, Oral, Daily  FLUoxetine (PROZAC) capsule 20 mg, 20 mg, Oral, Daily    ASSESSMENT  MDD (major depressive disorder), recurrent severe, without psychosis ([facility]     PLAN  Patient's symptoms are improving  Continue with current medications. Attempt to develop insight  Psycho-education conducted. Supportive Therapy conducted.

## 2022-06-02 NOTE — PROGRESS NOTES
Patient is being transferred for medical reasons. Called Dr. Barry Clinton to see is a sitter is needed. Dr. Barry Clinton declined. [facility]

## 2022-06-02 NOTE — PROGRESS NOTES
Pharmacy Medication History Note      List of current medications patient is taking is incomplete. Source of information: OARRS report and dispense history    Changes made to medication list:  Medications removed (include reason, ex. therapy complete or physician discontinued):  Clonazepam marked not taking    Medications added/doses adjusted:  none    Other notes (ex.  Recent course of antibiotics, Coumadin dosing):  none      Electronically signed by Anna Andrade [facility] on 6/1/2022 at 9:39 PM

## 2022-06-02 NOTE — H&P
History & Physical        Patient:  Arely Silverio  YOB: 1960    MRN: [id number]     Acct: [de-identified]    PCP: ANGELIA Bhat CNP    Date of Admission: 6/2/2022    Date of Service: Pt seen/examined on 06/02/22  and Admitted to Inpatient with expected LOS greater than two midnights due to medical therapy. ASSESSMENT/PLAN:    1. Hyperkalemia   -Patient potassium this morning 5.6 (6/2). This is up from 5 per the C POA). Reported associated chest pain incident overnight see #2   - Order for 1 time dose IV insulin w/ dextrose -> Accu-Cheks, hypoglycemia protocol ordered   - Order for 1 time dose Lokelma TID    - Order for 1 time dose IV calcium gluconate   - Repeat potassium level ordered for this evening   - Hold entresto given valsartan can cause potassium retention.   - Daily BMP. 2. Chest pain, unspecified   -  Patient did complain of some 8/10 sharp midsternal nonradiating chest pain last night that woke her out of her sleep. Currently denies chest pain right now. Continues to report associated dyspnea on exertion and states that this is a little bit worse than her normal dyspnea on exertion.   -Trend troponins   - BNP ordered   - ECG notable for NSR with first degree AV block, prolonged , low voltage QRS, axis, nonspecific T wave abnormality in V6. None significantly changed from previous ECG 5/24    - CXR notable for mild interstitial prominence in bilateral lungs concerning for possible pulmonary edema vs interstitia lung disease.   - Continuous telemetry.     3. Suicidal ideation, history of major depressive disorder & anxiety              - Discharge readmit from psych unit 6/2 given #1 & #2   - Continue Wellbutrin, Prozac, Effexor   - Continuous observation, bedside sitter   - Psychiatry consulted, appreciate recommendations     2. Cardiomyopathy s/p ICD (2/11/2019), chronic HFrEF, compensated              - Follows Dr. Carroll Grant, [address] cardiology              - Echo (5/24/2022) EF estimated at 25%, LV size mildly increased, severe global hypokinesis of LV, left atrium is mild to moderately dilated, mildly enlarged right atrium, moderate MR, moderate TR, RVSP measures 40 mmHg              - [facility] (5/24/22) notable for EF estimated 40-45%, mild global hypokinesis, no regional WMA, no CAD or PAD noticed              - Daily weight, strict I&O              - Cardiac diet, 2 g sodium, 2L fluid restriction.              - Continue ASA, statin, Lasix   - Hold entresto given #1      3. Essential hypertension, controlled              -Continue metoprolol, Lasix   - Hold entresto given #1     4. Non-insulin dependent T2DM              -Hemoglobin A1c 6.1 (3/29/2021). Repeat hemoglobin A1c ordered and pending              -Continue Farxiga, metformin              -Accu-Cheks              -Diabetic diet              -Hypoglycemia protocol     5. Hypothyroidism:               -TSH on last admission 21.5.  Levothyroxine increased to 125 mcg daily at that time.  TSH on arrival to ER on 6/1 improved to 10. 4.              - Continue Synthroid     6. Hyperlipidemia              - Statin     7. History of breast cancer s/p mastectomy of left breast and right simple mastectomy (12/29/2016)              -Patient diagnosed with left breast cancer in December 2016 while in [place]. Had bilateral mastectomy performed in December 2016, followed by adjuvant chemotherapy and radiation therapy. Follows Dr. Armando Overall, [address] oncology. - Continue letrozole     8. GERD:              -  PPI     9. Chronic kidney disease stage III              - Baseline creatinine range ~ 0.6 - 1. Creatinine stable at 1.1. eGFR 56 (POA)              - Trend and monitor.     10.  Obesity              -BMI 38.09 kg/m²              -Discussed and educated on lifestyle modifications      Chief Complaint:  Hyperkalmeia      History Of Present Illness:    65 y.o. female with a past medical history of cardiomyopathy status post ICD placement, chronic HFrEF, CKD, hypothyroidism, HLD, HTN, T2DM major depressive disorder, anxiety who presented to [address] [place] [address] on 6/1/22 after reporting to family medicine for hospital follow-up where patient was actively suicidal and had a plan. Patient was sent to the ER and was later admitted to the psychiatry unit. Hospitalist service was consulted to assist in management of patient's chronic health conditions. Upon lab work this morning, patient's potassium was noted to be 5.6. During evaluation patient did report that she experienced 8/10 sharp nonradiating midsternal chest pain last night that woke her out of her sleep. Also reporting that she is having some worsening dyspnea on exertion outside of her baseline. Currently denies active chest pain, shortness of breath at rest, abdominal pain, nausea, vomiting, diarrhea, palpitations, dizziness, diaphoresis, pleurisy, fever, chills, cough, orthopnea, PND, leg swelling. Patient was just recently discharged from [facility] on 5/25 following a diagnosis of acute on chronic heart failure. Patient did have left heart cath performed at that time which was not significant for coronary artery disease but did note EF of 40-45%. Patient is a discharge readmit to inpatient service for further treatment of hyperkalemia and further chest pain work-up.       Past Medical History:          Diagnosis Date    Abnormal heart rhythm     Anxiety     Cancer [facility])     breast  2016     CHF (congestive heart failure) (HCC)     Congenital heart disease     DJD (degenerative joint disease)     Enlarged lymph nodes     Hypertension     Hypothyroidism     ICD (implantable cardioverter-defibrillator) in place 02/11/2019    Dr. Tavera Flank Kidney stones     PONV (postoperative nausea and vomiting)     Prediabetes 10/7/2019    Thyroid disease        Past Surgical History:          Procedure Laterality Date    APPENDECTOMY      CARPAL TUNNEL RELEASE  2019    COLONOSCOPY      COLONOSCOPY N/A 07/27/2020    COLONOSCOPY POLYPECTOMY SNARE/COLD BIOPSY performed by Shweta Triplett MD at [facility]    COLONOSCOPY  07/27/2020    COLONOSCOPY POLYPECTOMY HOT BIOPSY performed by Shweta Triplett MD at [address] [place] [address]  2021    EGD      EYE SURGERY Bilateral 10/2021    eyelid lift     FINGER TRIGGER RELEASE Right 06/25/2020    DEQUERVAINS RELEASE AND RIGHT RING FINGER TRIGGER RELEASE performed by Lexie Erwin DO at [address], [place] [address] Left 02/11/2019    [facility] PT HAS A MRI CONDITIONAL SYSTEM    PTCA      TONSILLECTOMY         Medications Prior to Admission:      Prior to Admission medications    Medication Sig Start Date End Date Taking? Authorizing Provider   levothyroxine (EUTHYROX) 125 MCG tablet Take 1 tablet by mouth Daily 6/1/22   ANGELIA Cano - CNP   risperiDONE (RISPERDAL) 1 MG tablet Take 1 mg by mouth nightly Unsure who is prescribing    Historical Provider, MD   clonazePAM (KLONOPIN) 1 MG tablet Take 1 mg by mouth nightly.  Unsure who is prescribing  Patient not taking: Reported on 6/1/2022    Historical Provider, MD   dapagliflozin (FARXIGA) 10 MG tablet Take 1 tablet by mouth every morning  Patient taking differently: Take 10 mg by mouth every morning Pt has not started yet 5/26/22   Manjeet Krishnan MD   metoprolol succinate (TOPROL XL) 25 MG extended release tablet Take 0.5 tablets by mouth daily Hold if SBP less than 95 mmHg or HR less than 50 bpm  Patient taking differently: Take 12.5 mg by mouth daily Hold if SBP less than 95 mmHg or HR less than 50 bp  Takes at 1400 5/25/22   Viky Robert MD   furosemide (LASIX) 20 MG tablet Take 1 tablet by mouth daily TAKE 1 TABLET BY MOUTH ONCE DAILY AS NEEDED FOR LEG SWELLING, WEIGHT GAIN 5/25/22 6/24/22  Viky Robert MD   sacubitril-valsartan (ENTRESTO) 49-51 MG per tablet Take 1 tablet by mouth twice daily 5/12/22   Miriam Erickson, APRN - CNP   digoxin (LANOXIN) 125 MCG tablet Take 1 tablet by mouth daily  Patient taking differently: Take 125 mcg by mouth daily Takes at 1400 5/12/22   ANGELIA Guillermo CNP   venlafaxine (EFFEXOR XR) 150 MG extended release capsule Take 1 capsule by mouth daily 10/11/21   ANGELIA Fong CNP   buPROPion (WELLBUTRIN XL) 150 MG extended release tablet Take 1 tablet by mouth every morning 10/11/21   ANGELIA Fong CNP   Cholecalciferol 50 MCG (2000 UT) CAPS Take 50 mcg by mouth daily 9/20/21   Tiny Jurado PA-C   metFORMIN (GLUCOPHAGE-XR) 500 MG extended release tablet Take 1 tablet by mouth once daily with breakfast 9/16/21   ANGELIA Fong CNP   CPAP Machine MISC by Does not apply route Please change CPAP pressure to auto 11-15 cm H20. 9/9/21   Tiny Jurado PA-C   omeprazole (PRILOSEC) 40 MG delayed release capsule Take 1 capsule by mouth every morning (before breakfast) 9/9/21   ANGELIA Gutierrez CNP   venlafaxine (EFFEXOR XR) 75 MG extended release capsule Take 1 capsule by mouth daily (take along with 150 mg Effexor for total dose of 225 mg) 8/24/21   ANGELIA Fong CNP   letrozole [facility]) 2.5 MG tablet Take 1 tablet by mouth daily 7/20/21   Danielle Bustos MD   polyethylene glycol (GLYCOLAX) 17 GM/SCOOP powder DISSOLVE & TAKE 1 CAPFUL ONCE DAILY 6/28/21   Historical Provider, MD   atorvastatin (LIPITOR) 40 MG tablet Take 1 tablet by mouth nightly 12/11/20   ANGELIA Fong CNP   aspirin 81 MG chewable tablet Take 1 tablet by mouth daily 7/11/20   Bala Alonso MD   acetaminophen (TYLENOL) 500 MG tablet Take 500 mg by mouth every 6 hours as needed for Pain    Historical Provider, MD       Allergies:  Adhesive tape    Social History:   reports that she quit smoking about 2 years ago. Her smoking use included cigarettes. She started smoking about 40 years ago. She has a 9.25 pack-year smoking history.  She has never used smokeless tobacco. She reports that she does not drink alcohol and does not use drugs. Family History:      Positive as follows:        Problem Relation Age of Onset    High Blood Pressure Mother     Dementia Mother     Cancer Father     Colon Cancer Father     Mental Retardation Brother     Colon Polyps Brother     Cancer Maternal Grandfather     Esophageal Cancer Neg Hx        REVIEW OF SYSTEMS:     Constitutional: ROS: negative for - chills, fatigue, fever or weight gain  Head: no headache, no head injury, no migraine   Eyes ROS: denies blurred/double vision  Ears ROS: no hearing difficulty, no tinnitus  Mouth and Throat ROS: no ulceration, dysphagia, dental caries  Psychological ROS: positive for depression, no anxiety, no panic attacks, positive for suicidal ideation  Endocrine ROS: denies polyuria, polydypsia, no heat or cold intolerance  Respiratory ROS: positive for - shortness of breath w/ exertion  negative for - cough, hemoptysis, orthopnea or pleuritic pain  Cardiovascular ROS: positive for - chest pain and dyspnea on exertion  negative for - edema, orthopnea, palpitations or paroxysmal nocturnal dyspnea  Gastrointestinal ROS: no abdominal pain, change in bowel habits, or black or bloody stools  Genito-Urinary ROS: denies dysuria, frequency, urgency; denies hematuria  Musculoskeletal ROS: negative  Neurological ROS: no syncope, no seizures, no numbness or tingling of hands, no numbness or tingling of feet, no paresis  Dermatology: no skin rash, no eczema  Endocrine: no polyuria, polydypsia, no heat/cold intolerance  Hematology: denies bruising easily, denies bleeding problems, denies clotting disorders    PHYSICAL EXAM:    BP 92/73   Pulse 90   Temp 98.2 °F (36.8 °C) (Oral)   Resp 16   Wt 217 lb 2.5 oz (98.5 kg)   SpO2 97%   BMI 38.47 kg/m²     General appearance:  No apparent distress, appears older than stated age and cooperative. Chronically ill appearing  HEENT:  Normal cephalic, atraumatic without obvious deformity. Pupils equal, round, and reactive to light. Conjunctivae/corneas clear. Neck: Supple, with full range of motion. No jugular venous distention. Trachea midline. Respiratory:  Normal respiratory effort, able to speak full clear sentences. Clear to auscultation, bilaterally without Rales/Wheezes/Rhonchi. Cardiovascular:  Regular rate and rhythm with normal S1/S2 without murmurs, rubs or gallops. No edema noted on bilateral lower extremities  Abdomen: Soft, non-tender, non-distended with normal bowel sounds. Musculoskeletal:  No clubbing, cyanosis or edema bilaterally. Full range of motion without deformity. Skin: Skin color, texture, turgor normal.    Neurologic:  Neurovascularly intact without any focal sensory/motor deficits. Cranial nerves: II-XII intact, grossly non-focal.  Psychiatric:  Alert and oriented, thought content appropriate  Capillary Refill: Brisk,< 3 seconds   Peripheral Pulses: +2 palpable, equal bilaterally       Labs:     Recent Labs     06/01/22  1200   WBC 5.9   HGB 12.8   HCT 42.3        Recent Labs     06/01/22  1200 06/02/22  0718    138   K 5.0 5.6*    102   CO2 20* 26   BUN 27* 25*   CREATININE 1.0 1.1   CALCIUM 9.1 9.0     Recent Labs     06/01/22  1200   AST 14   ALT 12   BILIDIR <0.2   BILITOT 0.7   ALKPHOS 126     No results for input(s): INR in the last 72 hours. No results for input(s): Abner Oiler in the last 72 hours. Procalcitonin:  No results for input(s): PROCAL in the last 72 hours. Lactic Acid: No results for input(s): LACTA in the last 72 hours.      Urinalysis:      Lab Results   Component Value Date    NITRU Negative 10/18/2021    WBCUA 0-2 08/30/2021    BACTERIA NONE SEEN 08/30/2021    RBCUA 0-2 08/30/2021    BLOODU Small 10/18/2021    BLOODU NEGATIVE 08/30/2021    SPECGRAV 1.015 08/18/2020    GLUCOSEU Negative 10/18/2021       Radiology:     CXR: I have reviewed the CXR with the following interpretation:CXR notable for mild interstitial prominence in bilateral lungs concerning for possible pulmonary edema vs interstitia lung disease. No results found. EKG:  I have reviewed the EKG with the following interpretation: notable for NSR with first degree AV block, prolonged , low voltage QRS, axis, nonspecific T wave abnormality in V6. None significantly changed from previous ECG 5/24      Thank you ANGELIA Stauffer CNP for the opportunity to be involved in this patient's care.     Electronically signed by ANGELIA Murphy CNP on 6/2/2022 at 4:29 PM

## 2022-06-02 NOTE — FLOWSHEET NOTE
Pt was having major depressive disorder that causes her emotional issues. It's all about her  cheating on her. It was too much for her to handle. We talked about it and some solutions were offered. Prayer was provided and she was grateful.      06/02/22 0957   Encounter Summary   Encounter Overview/Reason  Initial Encounter   Service Provided For: Patient   Referral/Consult From: Nurse   Support System Children   Last Encounter  06/02/22   Complexity of Encounter Moderate   Begin Time 0756   End Time  0816   Total Time Calculated 20 min   Spiritual/Emotional needs   Type Spiritual Support   Assessment/Intervention/Outcome   Assessment Anxious; Angry; Fearful   Intervention Active listening;Empowerment   Outcome Acceptance; Connection/Belonging;Expressed regrets; Expressed Gratitude

## 2022-06-02 NOTE — CONSULTS
Hospitalist Consult Note        Patient:  Georges Min  YOB: 1960  Date of Service: 6/2/2022  MRN: [id number]   Acct:  [de-identified]   Primary Care Physician: ANGELIA Rojas CNP    Chief Complaint: Suicidal ideation  Reason for consult Chronic shortness of breath    Date of Service: Pt seen/examined in consultation on 6/2/2022     History Of Present Illness:      Cly[age] Lor[age] y.o. female who we are asked to see/evaluate by Shikha Gaston MD for medical management of congestive heart failure, hypothyroidism, hypertension. Patient recently discharged from [facility] on 5/25 following a diagnosis of acute on chronic heart failure. Presented to [facility] medicine for hospital follow-up on 6/1 where she reported suicidal ideation as well as a plan. Patient was sent to ER and admitted to inpatient psychiatric unit. Hospitalist service consulted for continued shortness of breath and medical management. On exam patient initially sleeping flat in bed. She reports chronic exertional dyspnea which is at baseline. She denies chest pain. Assessment and Plan:-    1. Suicidal ideation: Managed per primary. 2. History of systolic heart failure, compensated: ICD in place following chemo induced cardiomyopathy in 2019. Patient underwent left heart cath on 5/24 which showed an ejection fraction of 40 to 45% and no significant coronary artery disease. Continue GDMT. Patient appears clinically euvolemic on room air. Patient had follow-up with cardiology on 5/26. Azul To was added. Will continue home meds. 3. History of primary hypertension, controlled: Pressure on arrival 104/79. Blood pressure documented 96/60. Will add hold parameters to blood pressure medications. 4. History of hypothyroidism: TSH on last admission 21.5. Levothyroxine increased to 125 mcg daily at that time. TSH on arrival to ER on 6/1 improved to 10.4. Continue home dose.   5. History of breast cancer: Continue letrozole. 6. History of GERD: Continue PPI  7. History of CKD stage III: GFR 56 on arrival, creatinine 1.0, which is at baseline. No evidence of acute renal injury. Past Medical History:        Diagnosis Date    Abnormal heart rhythm     Anxiety     Cancer [facility])     breast  2016     CHF (congestive heart failure) (HCC)     Congenital heart disease     DJD (degenerative joint disease)     Enlarged lymph nodes     Hypertension     Hypothyroidism     ICD (implantable cardioverter-defibrillator) in place 02/11/2019    Dr. Peterson [place] Kidney stones     PONV (postoperative nausea and vomiting)     Prediabetes 10/7/2019    Thyroid disease        Past Surgical History:        Procedure Laterality Date    APPENDECTOMY      CARPAL TUNNEL RELEASE  2019    COLONOSCOPY      COLONOSCOPY N/A 07/27/2020    COLONOSCOPY POLYPECTOMY SNARE/COLD BIOPSY performed by Lorie Crowell MD at [facility]    COLONOSCOPY  07/27/2020    COLONOSCOPY POLYPECTOMY HOT BIOPSY performed by Lorie Crowell MD at [facility]    COLONOSCOPY  2021    EGD      EYE SURGERY Bilateral 10/2021    eyelid lift     FINGER TRIGGER RELEASE Right 06/25/2020    DEQUERVAINS RELEASE AND RIGHT RING FINGER TRIGGER RELEASE performed by Jeannine Guerra DO at [facility] Brogue Left 02/11/2019    [facility] HAS A MRI CONDITIONAL SYSTEM    PTCA      TONSILLECTOMY         Home Medications:   No current facility-administered medications on file prior to encounter. Current Outpatient Medications on File Prior to Encounter   Medication Sig Dispense Refill    risperiDONE (RISPERDAL) 1 MG tablet Take 1 mg by mouth nightly Unsure who is prescribing      levothyroxine (EUTHYROX) 125 MCG tablet Take 1 tablet by mouth Daily 30 tablet 1    clonazePAM (KLONOPIN) 1 MG tablet Take 1 mg by mouth nightly.  Unsure who is prescribing (Patient not taking: Reported on 6/1/2022)      dapagliflozin (FARXIGA) 10 MG tablet Take 1 tablet by mouth every morning (Patient taking differently: Take 10 mg by mouth every morning Pt has not started yet) 30 tablet 5    metoprolol succinate (TOPROL XL) 25 MG extended release tablet Take 0.5 tablets by mouth daily Hold if SBP less than 95 mmHg or HR less than 50 bpm (Patient taking differently: Take 12.5 mg by mouth daily Hold if SBP less than 95 mmHg or HR less than 50 bp  Takes at 1400) 30 tablet 3    furosemide (LASIX) 20 MG tablet Take 1 tablet by mouth daily TAKE 1 TABLET BY MOUTH ONCE DAILY AS NEEDED FOR LEG SWELLING, WEIGHT GAIN 90 tablet 0    sacubitril-valsartan (ENTRESTO) 49-51 MG per tablet Take 1 tablet by mouth twice daily 60 tablet 0    digoxin (LANOXIN) 125 MCG tablet Take 1 tablet by mouth daily (Patient taking differently: Take 125 mcg by mouth daily Takes at 1400) 90 tablet 0    venlafaxine (EFFEXOR XR) 150 MG extended release capsule Take 1 capsule by mouth daily 90 capsule 3    buPROPion (WELLBUTRIN XL) 150 MG extended release tablet Take 1 tablet by mouth every morning 90 tablet 1    Cholecalciferol 50 MCG (2000 UT) CAPS Take 50 mcg by mouth daily 30 capsule 3    metFORMIN (GLUCOPHAGE-XR) 500 MG extended release tablet Take 1 tablet by mouth once daily with breakfast 90 tablet 0    CPAP Machine MISC by Does not apply route Please change CPAP pressure to auto 11-15 cm H20. 1 each 0    omeprazole (PRILOSEC) 40 MG delayed release capsule Take 1 capsule by mouth every morning (before breakfast) 90 capsule 1    venlafaxine (EFFEXOR XR) 75 MG extended release capsule Take 1 capsule by mouth daily (take along with 150 mg Effexor for total dose of 225 mg) 90 capsule 3    letrozole (FEMARA) 2.5 MG tablet Take 1 tablet by mouth daily 90 tablet 3    polyethylene glycol (GLYCOLAX) 17 GM/SCOOP powder DISSOLVE & TAKE 1 CAPFUL ONCE DAILY      atorvastatin (LIPITOR) 40 MG tablet Take 1 tablet by mouth nightly 90 tablet 3    aspirin 81 MG chewable tablet Take 1 tablet by mouth daily 30 tablet 3    acetaminophen (TYLENOL) 500 MG tablet Take 500 mg by mouth every 6 hours as needed for Pain         Allergies:    Adhesive tape    Social History:    reports that she quit smoking about 2 years ago. Her smoking use included cigarettes. She started smoking about 40 years ago. She has a 9.25 pack-year smoking history. She has never used smokeless tobacco. She reports that she does not drink alcohol and does not use drugs. Family History:       Problem Relation Age of Onset    High Blood Pressure Mother     Dementia Mother     Cancer Father     Colon Cancer Father     Mental Retardation Brother     Colon Polyps Brother     Cancer Maternal Grandfather     Esophageal Cancer Neg Hx        Diet:  ADULT DIET; Regular    Review of systems:   Pertinent positives as noted in the HPI. All other systems reviewed and negative. PHYSICAL EXAM:  BP 96/60   Pulse 98   Temp 97.9 °F (36.6 °C) (Tympanic)   Resp 16   Ht 5' 3\" (1.6 m)   Wt 215 lb (97.5 kg)   SpO2 93%   BMI 38.09 kg/m²   General appearance: No apparent distress, appears stated age and cooperative. HEENT: Normal cephalic, atraumatic without obvious deformity. Pupils equal, round, and reactive to light. Conjunctivae/corneas clear. Neck: Supple, with full range of motion. No jugular venous distention. Trachea midline. Respiratory: Exertional dyspnea, lung sounds clear throughout  Cardiovascular: Regular rate and rhythm with normal S1/S2 without murmurs, rubs or gallops. Trace edema bilateral ankles  Abdomen: Soft, non-tender, non-distended with normal bowel sounds. Musculoskeletal:  No clubbing, cyanosis or edema bilaterally. Skin: Skin color, texture, turgor normal.  No rashes or lesions. Neurologic:  Neurovascularly intact without any focal sensory/motor deficits.    Psychiatric: Alert and oriented, thought content appropriate, normal insight  Capillary Refill: Brisk,< 3 seconds   Peripheral Pulses: +2 palpable, equal bilaterally     Labs:   Recent Labs     06/01/22  1200   WBC 5.9   HGB 12.8   HCT 42.3        Recent Labs     06/01/22  1200      K 5.0      CO2 20*   BUN 27*   CREATININE 1.0   CALCIUM 9.1     Recent Labs     06/01/22  1200   AST 14   ALT 12   BILIDIR <0.2   BILITOT 0.7   ALKPHOS 126     No results for input(s): INR in the last 72 hours. No results for input(s): Jenifer Belts in the last 72 hours. Urinalysis:    Lab Results   Component Value Date    NITRU Negative 10/18/2021    WBCUA 0-2 08/30/2021    BACTERIA NONE SEEN 08/30/2021    RBCUA 0-2 08/30/2021    BLOODU Small 10/18/2021    BLOODU NEGATIVE 08/30/2021    SPECGRAV 1.015 08/18/2020    GLUCOSEU Negative 10/18/2021       Radiology:   No orders to display     No results found.       EKG: Sinus rhythm with first-degree AV block on 5/24      [facility] YOU FOR THE CONSULT    Electronically signed by Gareld Habermann, APRN - CNP on 6/2/2022 at 1:34 AM

## 2022-06-03 LAB
AMPHETAMINE+METHAMPHETAMINE URINE SCREEN: NEGATIVE
ANION GAP SERPL CALCULATED.3IONS-SCNC: 13 MEQ/L (ref 8–16)
BARBITURATE QUANTITATIVE URINE: NEGATIVE
BENZODIAZEPINE QUANTITATIVE URINE: NEGATIVE
BUN BLDV-MCNC: 24 MG/DL (ref 7–22)
CALCIUM SERPL-MCNC: 8.8 MG/DL (ref 8.5–10.5)
CANNABINOID QUANTITATIVE URINE: NEGATIVE
CHLORIDE BLD-SCNC: 102 MEQ/L (ref 98–111)
CO2: 23 MEQ/L (ref 23–33)
COCAINE METABOLITE QUANTITATIVE URINE: NEGATIVE
CREAT SERPL-MCNC: 1 MG/DL (ref 0.4–1.2)
EKG ATRIAL RATE: 89 BPM
EKG P AXIS: 32 DEGREES
EKG P-R INTERVAL: 204 MS
EKG Q-T INTERVAL: 392 MS
EKG QRS DURATION: 102 MS
EKG QTC CALCULATION (BAZETT): 476 MS
EKG R AXIS: -19 DEGREES
EKG T AXIS: 88 DEGREES
EKG VENTRICULAR RATE: 89 BPM
ERYTHROCYTE [DISTWIDTH] IN BLOOD BY AUTOMATED COUNT: 15.5 % (ref 11.5–14.5)
ERYTHROCYTE [DISTWIDTH] IN BLOOD BY AUTOMATED COUNT: 52.2 FL (ref 35–45)
GFR SERPL CREATININE-BSD FRML MDRD: 56 ML/MIN/1.73M2
GLUCOSE BLD-MCNC: 105 MG/DL (ref 70–108)
GLUCOSE BLD-MCNC: 106 MG/DL (ref 70–108)
GLUCOSE BLD-MCNC: 109 MG/DL (ref 70–108)
GLUCOSE BLD-MCNC: 99 MG/DL (ref 70–108)
GLUCOSE BLD-MCNC: 99 MG/DL (ref 70–108)
HCT VFR BLD CALC: 39 % (ref 37–47)
HEMOGLOBIN: 12.2 GM/DL (ref 12–16)
MCH RBC QN AUTO: 29 PG (ref 26–33)
MCHC RBC AUTO-ENTMCNC: 31.3 GM/DL (ref 32.2–35.5)
MCV RBC AUTO: 92.6 FL (ref 81–99)
OPIATES, URINE: NEGATIVE
OXYCODONE: NEGATIVE
PHENCYCLIDINE QUANTITATIVE URINE: NEGATIVE
PLATELET # BLD: 217 THOU/MM3 (ref 130–400)
PMV BLD AUTO: 12.5 FL (ref 9.4–12.4)
POTASSIUM REFLEX MAGNESIUM: 4.4 MEQ/L (ref 3.5–5.2)
RBC # BLD: 4.21 MILL/MM3 (ref 4.2–5.4)
SODIUM BLD-SCNC: 138 MEQ/L (ref 135–145)
WBC # BLD: 6.2 THOU/MM3 (ref 4.8–10.8)

## 2022-06-03 PROCEDURE — 85027 COMPLETE CBC AUTOMATED: CPT

## 2022-06-03 PROCEDURE — 80307 DRUG TEST PRSMV CHEM ANLYZR: CPT

## 2022-06-03 PROCEDURE — 99232 SBSQ HOSP IP/OBS MODERATE 35: CPT

## 2022-06-03 PROCEDURE — 1200000003 HC TELEMETRY R&B

## 2022-06-03 PROCEDURE — 6360000002 HC RX W HCPCS

## 2022-06-03 PROCEDURE — 2580000003 HC RX 258

## 2022-06-03 PROCEDURE — 6370000000 HC RX 637 (ALT 250 FOR IP)

## 2022-06-03 PROCEDURE — 93010 ELECTROCARDIOGRAM REPORT: CPT | Performed by: INTERNAL MEDICINE

## 2022-06-03 PROCEDURE — 36415 COLL VENOUS BLD VENIPUNCTURE: CPT

## 2022-06-03 PROCEDURE — 82948 REAGENT STRIP/BLOOD GLUCOSE: CPT

## 2022-06-03 PROCEDURE — 80048 BASIC METABOLIC PNL TOTAL CA: CPT

## 2022-06-03 PROCEDURE — 6370000000 HC RX 637 (ALT 250 FOR IP): Performed by: PSYCHIATRY & NEUROLOGY

## 2022-06-03 RX ORDER — FLUOXETINE HYDROCHLORIDE 20 MG/1
20 CAPSULE ORAL ONCE
Status: COMPLETED | OUTPATIENT
Start: 2022-06-03 | End: 2022-06-03

## 2022-06-03 RX ORDER — LIDOCAINE 4 G/G
1 PATCH TOPICAL DAILY
Status: DISCONTINUED | OUTPATIENT
Start: 2022-06-03 | End: 2022-06-05 | Stop reason: HOSPADM

## 2022-06-03 RX ORDER — FUROSEMIDE 10 MG/ML
40 INJECTION INTRAMUSCULAR; INTRAVENOUS ONCE
Status: COMPLETED | OUTPATIENT
Start: 2022-06-03 | End: 2022-06-03

## 2022-06-03 RX ORDER — FLUOXETINE HYDROCHLORIDE 20 MG/1
40 CAPSULE ORAL DAILY
Status: DISCONTINUED | OUTPATIENT
Start: 2022-06-04 | End: 2022-06-05 | Stop reason: HOSPADM

## 2022-06-03 RX ORDER — MIDODRINE HYDROCHLORIDE 2.5 MG/1
2.5 TABLET ORAL
Status: DISCONTINUED | OUTPATIENT
Start: 2022-06-03 | End: 2022-06-04

## 2022-06-03 RX ADMIN — DIGOXIN 125 MCG: 125 TABLET ORAL at 09:03

## 2022-06-03 RX ADMIN — METFORMIN HYDROCHLORIDE 500 MG: 500 TABLET, EXTENDED RELEASE ORAL at 09:03

## 2022-06-03 RX ADMIN — FLUOXETINE 20 MG: 20 CAPSULE ORAL at 10:24

## 2022-06-03 RX ADMIN — VENLAFAXINE HYDROCHLORIDE 75 MG: 75 CAPSULE, EXTENDED RELEASE ORAL at 09:04

## 2022-06-03 RX ADMIN — SACUBITRIL AND VALSARTAN 1 TABLET: 49; 51 TABLET, FILM COATED ORAL at 21:29

## 2022-06-03 RX ADMIN — TRAZODONE HYDROCHLORIDE 50 MG: 50 TABLET ORAL at 21:28

## 2022-06-03 RX ADMIN — SODIUM CHLORIDE, PRESERVATIVE FREE 10 ML: 5 INJECTION INTRAVENOUS at 09:04

## 2022-06-03 RX ADMIN — LETROZOLE 2.5 MG: 2.5 TABLET ORAL at 09:03

## 2022-06-03 RX ADMIN — BUPROPION HYDROCHLORIDE 150 MG: 150 TABLET, FILM COATED, EXTENDED RELEASE ORAL at 09:04

## 2022-06-03 RX ADMIN — ATORVASTATIN CALCIUM 40 MG: 40 TABLET, FILM COATED ORAL at 21:28

## 2022-06-03 RX ADMIN — FLUOXETINE 20 MG: 20 CAPSULE ORAL at 09:03

## 2022-06-03 RX ADMIN — ASPIRIN 81 MG CHEWABLE TABLET 81 MG: 81 TABLET CHEWABLE at 09:02

## 2022-06-03 RX ADMIN — PANTOPRAZOLE SODIUM 40 MG: 40 TABLET, DELAYED RELEASE ORAL at 04:55

## 2022-06-03 RX ADMIN — ENOXAPARIN SODIUM 40 MG: 100 INJECTION SUBCUTANEOUS at 21:28

## 2022-06-03 RX ADMIN — SACUBITRIL AND VALSARTAN 1 TABLET: 49; 51 TABLET, FILM COATED ORAL at 09:07

## 2022-06-03 RX ADMIN — Medication 2000 UNITS: at 09:02

## 2022-06-03 RX ADMIN — SODIUM CHLORIDE, PRESERVATIVE FREE 10 ML: 5 INJECTION INTRAVENOUS at 21:40

## 2022-06-03 RX ADMIN — FUROSEMIDE 40 MG: 10 INJECTION, SOLUTION INTRAMUSCULAR; INTRAVENOUS at 09:02

## 2022-06-03 RX ADMIN — LEVOTHYROXINE SODIUM 125 MCG: 0.12 TABLET ORAL at 04:55

## 2022-06-03 RX ADMIN — MIDODRINE HYDROCHLORIDE 2.5 MG: 2.5 TABLET ORAL at 18:02

## 2022-06-03 RX ADMIN — HYDROXYZINE HYDROCHLORIDE 50 MG: 25 TABLET, FILM COATED ORAL at 09:02

## 2022-06-03 ASSESSMENT — PAIN SCALES - GENERAL
PAINLEVEL_OUTOF10: 0
PAINLEVEL_OUTOF10: 0

## 2022-06-03 NOTE — PROGRESS NOTES
Daily Progress Note  Mando Rolon MD  6/3/2022    Reviewed patient's current plan of care and vital signs with nursing staff. Patient was transferred to [facility] from  yesterday due to hyperkalemia. She is still feeling depressed but she denies suicidal and homicidal ideation. SUBJECTIVE:    Patient is feeling better. SUICIDAL IDEATION denies suicidal ideation. Patient does not have medication side effects. ROS: Patient has new complaints:  No  Sleeping adequately:  Yes      Mental Status Examination:  Patient is cooperative. Speech: Normal rate and tone. No abnormal movements, tics or mannerisms. Mood dysthymic; affect Restricted. Suicidal ideation Absent. Homicidal ideations Absent. Hallucinations Absent. Delusions Absent. Thought Content: normal. Thought Processes: Goal-Directed. Alert and oriented X 3. Attention and concentration fair. MEMORY intact. Insight and Judgement fair. Data   weight is 211 lb 10.3 oz (96 kg). Her oral temperature is 98.5 °F (36.9 °C). Her blood pressure is 93/53 (abnormal) and her pulse is 94. Her respiration is 18 and oxygen saturation is 95%.    Labs:            Medications  Current Facility-Administered Medications: enoxaparin (LOVENOX) injection 40 mg, 40 mg, SubCUTAneous, Nightly  nitroGLYCERIN (NITROSTAT) SL tablet 0.4 mg, 0.4 mg, SubLINGual, Q5 Min PRN  0.9 % sodium chloride infusion, , IntraVENous, PRN  acetaminophen (TYLENOL) tablet 650 mg, 650 mg, Oral, Q6H PRN **OR** acetaminophen (TYLENOL) suppository 650 mg, 650 mg, Rectal, Q6H PRN  ondansetron (ZOFRAN-ODT) disintegrating tablet 4 mg, 4 mg, Oral, Q8H PRN **OR** ondansetron (ZOFRAN) injection 4 mg, 4 mg, IntraVENous, Q6H PRN  polyethylene glycol (GLYCOLAX) packet 17 g, 17 g, Oral, Daily PRN  sodium chloride flush 0.9 % injection 5-40 mL, 5-40 mL, IntraVENous, 2 times per day  sodium chloride flush 0.9 % injection 5-40 mL, 5-40 mL, IntraVENous, PRN  hydrOXYzine (ATARAX) tablet 50 mg, 50 mg, Oral, TID PRN  magnesium hydroxide (MILK OF MAGNESIA) 400 MG/5ML suspension 30 mL, 30 mL, Oral, Daily PRN  traZODone (DESYREL) tablet 50 mg, 50 mg, Oral, Nightly PRN  aspirin chewable tablet 81 mg, 81 mg, Oral, Daily  atorvastatin (LIPITOR) tablet 40 mg, 40 mg, Oral, Nightly  buPROPion (WELLBUTRIN XL) extended release tablet 150 mg, 150 mg, Oral, QAM  clonazePAM (KLONOPIN) tablet 0.5 mg, 0.5 mg, Oral, Q12H PRN  dapagliflozin (FARXIGA) tablet 10 mg, 10 mg, Oral, QAM  dextrose 5 % solution, 100 mL/hr, IntraVENous, PRN  dextrose bolus 10% 125 mL, 125 mL, IntraVENous, PRN **OR** dextrose bolus 10% 250 mL, 250 mL, IntraVENous, PRN  digoxin (LANOXIN) tablet 125 mcg, 125 mcg, Oral, Daily  FLUoxetine (PROZAC) capsule 20 mg, 20 mg, Oral, Daily  [Held by provider] furosemide (LASIX) tablet 20 mg, 20 mg, Oral, Daily  glucagon (rDNA) injection 1 mg, 1 mg, IntraMUSCular, PRN  glucose chewable tablet 16 g, 4 tablet, Oral, PRN  letrozole (FEMARA) tablet 2.5 mg, 2.5 mg, Oral, Daily  levothyroxine (SYNTHROID) tablet 125 mcg, 125 mcg, Oral, Daily  metFORMIN (GLUCOPHAGE-XR) extended release tablet 500 mg, 500 mg, Oral, Daily with breakfast  metoprolol succinate (TOPROL XL) extended release tablet 12.5 mg, 12.5 mg, Oral, Daily  pantoprazole (PROTONIX) tablet 40 mg, 40 mg, Oral, QAM AC  sacubitril-valsartan (ENTRESTO) 49-51 MG per tablet 1 tablet, 1 tablet, Oral, BID  Vitamin D (CHOLECALCIFEROL) tablet 2,000 Units, 50 mcg, Oral, Daily  [Held by provider] sodium zirconium cyclosilicate (LOKELMA) oral suspension 5 g, 5 g, Oral, TID    ASSESSMENT  MDD (major depressive disorder), recurrent severe, without psychosis (HCC)     PLAN  Patient's symptoms are improving  Continue with current psychotropics but increase Fluoxetine. Attempt to develop insight  Psycho-education conducted. Supportive Therapy conducted.

## 2022-06-03 NOTE — CARE COORDINATION
6/3/22, 7:15 AM EDT  DISCHARGE PLANNING EVALUATION:    Georges Min       Admitted: 6/2/2022/ [address] day: 1   Location: [facility]06/006-A Reason for admit: Hyperkalemia [E87.5]   PMH:  has a past medical history of Abnormal heart rhythm, Anxiety, Cancer ([address] CHF (congestive heart failure) ([facility] [address] Congenital heart disease, DJD (degenerative joint disease), Enlarged lymph nodes, Hypertension, Hypothyroidism, ICD (implantable cardioverter-defibrillator) in place, Kidney stones, PONV (postoperative nausea and vomiting), Prediabetes, and Thyroid disease. Procedure: none  Barriers to Discharge:  Sent from PCP office for suicidal ideation and was admitted to . She was transferred to [facility] after labwork showed hyperkalemia. BNP 6149. Hypotensive, 74-73P systolic. IV lasix daily. Psych consult pending. PCP: ANGELIA Rojas CNP  Readmission Risk Score: 18.3 ( )%  Patient's Healthcare Decision Maker: Legal Next of Kin    Patient Goals/Plan/Treatment Preferences: Shelby Dee is from home with her son and daughter-in-law. RW was provided to her at last admission. SW has been consulted for \"discharge needs\". Psych is also consulted for return to . Transportation/Food Security/Housekeeping Addressed:  No issues identified.

## 2022-06-03 NOTE — CARE COORDINATION
06/03/22 1207   Readmission Assessment   Number of Days since last admission? 1-7 days   Previous Disposition Home with Family   Who is being [place]   (documentation taken from chart)   What was the patient's/caregiver's perception as to why they think they needed to return back to the hospital? Other (Comment)  (family brought patient to hospital as she is suicidal and has a plan)   Did you visit your Primary Care Physician after you left the hospital, before you returned this time? Yes   Did you see a specialist, such as Cardiac, Pulmonary, Orthopedic Physician, etc. after you left the hospital? No   Who advised the patient to return to the hospital? Physician   Does the patient report anything that got in the way of taking their medications?  No

## 2022-06-03 NOTE — PLAN OF CARE
Problem: Discharge Planning  Goal: Discharge to home or other facility with appropriate resources  Outcome: Progressing     Problem: Chronic Conditions and Co-morbidities  Goal: Patient's chronic conditions and co-morbidity symptoms are monitored and maintained or improved  Outcome: Progressing  Flowsheets (Taken 6/2/2022 2045)  Care Plan - Patient's Chronic Conditions and Co-Morbidity Symptoms are Monitored and Maintained or Improved: Monitor and assess patient's chronic conditions and comorbid symptoms for stability, deterioration, or improvement     Problem: Safety - Adult  Goal: Free from fall injury  Outcome: Progressing     Problem: Self Harm/Suicidality  Goal: Will have no self-injury during hospital stay  Description: INTERVENTIONS:  1. Q 30 MINUTES: Routine safety checks  2. Q SHIFT & PRN: Assess risk to determine if routine checks are adequate to maintain patient safety  Outcome: Progressing       1:1 sitter at bedside initiated after start of shift when orders reviewed. Suicide screening done with patient. Medications locked in bag in medicine cabinet. Belongings locked up above sink. Patient on telemetry monitor and call light within reach. Environment reviewed and ligature items removed including trash cans and telephone.

## 2022-06-03 NOTE — PROGRESS NOTES
This RN reached out to Dr Zahida Salcedo to see if the sitter could be discontinued. Dr Zahida Salcedo stated the patient does not need a suicide sitter any longer and sitter can be removed. This RN discontinued constant observer order.

## 2022-06-03 NOTE — PROGRESS NOTES
Chuckie Staff, [address] cardiology              - Echo (5/24/2022) EF estimated at 25%, LV size mildly increased, severe global hypokinesis of LV, left atrium is mild to moderately dilated, mildly enlarged right atrium, moderate MR, moderate TR, RVSP measures 40 mmHg              - [facility] (5/24/22) notable for EF estimated 40-45%, mild global hypokinesis, no regional WMA, no CAD or PAD noticed              - Daily weight, strict I&O              - Cardiac diet, 2 g sodium, 2L fluid restriction.              - Continue ASA, statin, Lasix              - Okay to resume entresto given resolution of #1      3. Essential hypertension, controlled, Hx of:              - Patient has reported history of HTN however has been noted to be hypotensive.    -Continue metoprolol, Lasix, entresto with hold parameters   - Monitor BP's      4. Non-insulin dependent T2DM              -EIBSINNAKO A1c 6.1 (6/1)              -Continue Farxiga, metformin              -Accu-Cheks              -Diabetic diet              -Hypoglycemia protocol     5. Hypothyroidism:               -TSH on last admission 21.5.  Levothyroxine increased to 125 mcg daily at that time. TSH 7.22 (6/2) - improving              - Continue Synthroid     6. Hyperlipidemia              - Statin     7. History of breast cancer s/p mastectomy of left breast and right simple mastectomy (12/29/2016)              -AMANDA diagnosed with left breast cancer in December 2016 while in [address] [place] [address] bilateral mastectomy performed in December 2016, followed by adjuvant chemotherapy and radiation therapy. Follows Dr. Cecilia Benz, [address] oncology.             - Continue letrozole     8. GERD:              -  PPI     9. Chronic kidney disease stage III              - Baseline creatinine range ~ 0.6 - 1. Creatinine stable. eGFR 56 (POA)              - Trend and monitor.     10.  Obesity              -BMI 37.09 kg/m²              -Discussed and educated on lifestyle modifications      Expected discharge date: Pending clearance from psych    Disposition:    [x] Home       [] TCU       [] Rehab       [] Psych       [] SNF       [] [facility]       [] Other-    Chief Complaint: Hyperkalemia    Hospital Course: 65 y.o. female with a past medical history of cardiomyopathy status post ICD placement, chronic HFrEF, CKD, hypothyroidism, HLD, HTN, T2DM major depressive disorder, anxiety who presented to [facility] on 6/1/22 after reporting to [facility] medicine for hospital follow-up where patient was actively suicidal and had a plan. Patient was sent to the ER and was later admitted to the psychiatry unit. Hospitalist service was consulted to assist in management of patient's chronic health conditions. Upon lab work this morning, patient's potassium was noted to be 5.6. During evaluation patient did report that she experienced 8/10 sharp nonradiating midsternal chest pain last night that woke her out of her sleep. Also reporting that she is having some worsening dyspnea on exertion outside of her baseline. Currently denies active chest pain, shortness of breath at rest, abdominal pain, nausea, vomiting, diarrhea, palpitations, dizziness, diaphoresis, pleurisy, fever, chills, cough, orthopnea, PND, leg swelling. Patient was just recently discharged from [facility] on 5/25 following a diagnosis of acute on chronic heart failure. Patient did have left heart cath performed at that time which was not significant for coronary artery disease but did note EF of 40-45%. Patient is a discharge readmit to inpatient service for further treatment of hyperkalemia and further chest pain work-up. 6/3/22: Patient resting in bed, nontoxic in appearance, no apparent distress. Remains afebrile, with hemodynamically stable vital signs. Patient had acute hypotensive episode over night 86/57. Patient reportedly asymptomatic per nursing staff.  Patient has had low norm BP while in hospital. Will start pt on 2.5 mg Midodrine. Patient stating she feels better this morning. Denies chest pain, dyspnea on exertion, shortness of breath at rest.  States when she took a shower this morning she felt very tired, denies dizziness, palpitations, syncope. Received IV calcium gluconate, IV insulin, IV dextrose, and one-time dose Lokelma. Potassium level 4.4 this morning. We will restart patient on her Entresto and repeat work in the acute increases in potassium. Patient will need clearance from psych prior to discharge, appreciate recommendations    Subjective (past 24 hours):     Denies chest pain, BORDEN, shortness of breath at rest, abdominal pain, nausea, vomiting, fever, chills, dizziness, palpitations, syncope.     Medications:  Reviewed    Infusion Medications    sodium chloride      dextrose       Scheduled Medications    [START ON 6/4/2022] FLUoxetine  40 mg Oral Daily    enoxaparin  40 mg SubCUTAneous Nightly    sodium chloride flush  5-40 mL IntraVENous 2 times per day    aspirin  81 mg Oral Daily    atorvastatin  40 mg Oral Nightly    buPROPion  150 mg Oral QAM    dapagliflozin  10 mg Oral QAM    digoxin  125 mcg Oral Daily    [Held by provider] furosemide  20 mg Oral Daily    letrozole  2.5 mg Oral Daily    levothyroxine  125 mcg Oral Daily    metFORMIN  500 mg Oral Daily with breakfast    metoprolol succinate  12.5 mg Oral Daily    pantoprazole  40 mg Oral QAM AC    sacubitril-valsartan  1 tablet Oral BID    Vitamin D  50 mcg Oral Daily    [Held by provider] sodium zirconium cyclosilicate  5 g Oral TID     PRN Meds: nitroGLYCERIN, sodium chloride, acetaminophen **OR** acetaminophen, ondansetron **OR** ondansetron, polyethylene glycol, sodium chloride flush, hydrOXYzine HCl, magnesium hydroxide, traZODone, clonazePAM, dextrose, dextrose bolus **OR** dextrose bolus, glucagon (rDNA), glucose      Intake/Output Summary (Last 24 hours) at 6/3/2022 1612  Last data filed at 6/3/2022 0323  Gross per 24 hour Intake 0 ml   Output 0 ml   Net 0 ml       Diet:  ADULT DIET; Regular; 4 carb choices (60 gm/meal); Low Fat/Low Chol/High Fiber/2 gm Na; 2000 ml; Safety Tray; Safety Tray (Disposables)    Exam:  /71   Pulse 95   Temp 98.3 °F (36.8 °C) (Oral)   Resp 18   Wt 211 lb 10.3 oz (96 kg)   SpO2 94%   BMI 37.49 kg/m²       General appearance:  No apparent distress, appears older than stated age and cooperative. Chronically ill appearing  HEENT:  Normal cephalic, atraumatic without obvious deformity. Pupils equal, round, and reactive to light. Conjunctivae/corneas clear. Neck: Supple, with full range of motion. No jugular venous distention. Trachea midline. Respiratory:  Normal respiratory effort, able to speak full clear sentences. Clear to auscultation, bilaterally without Rales/Wheezes/Rhonchi. Cardiovascular:  Regular rate and rhythm with normal S1/S2 without murmurs, rubs or gallops. No edema noted on bilateral lower extremities  Abdomen: Soft, non-tender, non-distended with normal bowel sounds. Musculoskeletal:  No clubbing, cyanosis or edema bilaterally. Full range of motion without deformity. Skin: Skin color, texture, turgor normal.    Neurologic:  Neurovascularly intact without any focal sensory/motor deficits. Cranial nerves: II-XII intact, grossly non-focal.  Psychiatric:  Alert and oriented, thought content appropriate  Capillary Refill: Brisk,< 3 seconds   Peripheral Pulses: +2 palpable, equal bilaterally       Labs:   Recent Labs     06/01/22  1200 06/03/22  0348   WBC 5.9 6.2   HGB 12.8 12.2   HCT 42.3 39.0    217     Recent Labs     06/01/22  1200 06/01/22  1200 06/02/22  0718 06/02/22  1658 06/03/22  0348     --  138  --  138   K 5.0   < > 5.6* 4.6 4.4     --  102  --  102   CO2 20*  --  26  --  23   BUN 27*  --  25*  --  24*   CREATININE 1.0  --  1.1  --  1.0   CALCIUM 9.1  --  9.0  --  8.8    < > = values in this interval not displayed.      Recent Labs 06/01/22  1200   AST 14   ALT 12   BILIDIR <0.2   BILITOT 0.7   ALKPHOS 126     No results for input(s): INR in the last 72 hours. No results for input(s): [place] in the last 72 hours. Microbiology:      Urinalysis:      Lab Results   Component Value Date    NITRU Negative 10/18/2021    WBCUA 0-2 08/30/2021    BACTERIA NONE SEEN 08/30/2021    RBCUA 0-2 08/30/2021    BLOODU Small 10/18/2021    BLOODU NEGATIVE 08/30/2021    SPECGRAV 1.015 08/18/2020    GLUCOSEU Negative 10/18/2021       Radiology:  XR CHEST PORTABLE    Result Date: 6/2/2022  PROCEDURE: XR CHEST PORTABLE CLINICAL INFORMATION: chest pain. COMPARISON: Chest radiograph dated 5/23/2022. TECHNIQUE: AP upright view of the chest. FINDINGS: There is a stable left-sided cardiac pacer/defibrillator generator with single lead. There is mild interstitial prominence in both lungs. The cardiac silhouette is mildly enlarged, similar to prior exam. Visualized osseous structures appear grossly intact. Mild interstitial prominence. Differential considerations include pulmonary edema and interstitial pneumonitis. **This report has been created using voice recognition software. It may contain minor errors which are inherent in voice recognition technology. ** Final report electronically signed by Dr. Carson Hughes MD on 6/2/2022 2:51 PM      DVT prophylaxis: [x] Lovenox                                 [] SCDs                                 [] SQ Heparin                                 [] Encourage ambulation           [] Already on Anticoagulation     Code Status: Full Code    PT/OT Eval Status: None    Tele:   [x] yes             [] no    Normal sinus rhythm with first-degree AV block, no ectopy.     Active Hospital Problems    Diagnosis Date Noted    Hyperkalemia [E87.5] 06/02/2022     Priority: Medium       Electronically signed by ANGELIA Burks CNP on 6/3/2022 at 4:12 PM

## 2022-06-04 LAB
ANION GAP SERPL CALCULATED.3IONS-SCNC: 12 MEQ/L (ref 8–16)
BUN BLDV-MCNC: 26 MG/DL (ref 7–22)
CALCIUM SERPL-MCNC: 8.8 MG/DL (ref 8.5–10.5)
CHLORIDE BLD-SCNC: 102 MEQ/L (ref 98–111)
CO2: 26 MEQ/L (ref 23–33)
CREAT SERPL-MCNC: 1 MG/DL (ref 0.4–1.2)
EKG ATRIAL RATE: 91 BPM
EKG P AXIS: 41 DEGREES
EKG P-R INTERVAL: 210 MS
EKG Q-T INTERVAL: 392 MS
EKG QRS DURATION: 100 MS
EKG QTC CALCULATION (BAZETT): 482 MS
EKG R AXIS: -23 DEGREES
EKG T AXIS: 72 DEGREES
EKG VENTRICULAR RATE: 91 BPM
GFR SERPL CREATININE-BSD FRML MDRD: 56 ML/MIN/1.73M2
GLUCOSE BLD-MCNC: 100 MG/DL (ref 70–108)
GLUCOSE BLD-MCNC: 100 MG/DL (ref 70–108)
GLUCOSE BLD-MCNC: 112 MG/DL (ref 70–108)
GLUCOSE BLD-MCNC: 87 MG/DL (ref 70–108)
GLUCOSE BLD-MCNC: 99 MG/DL (ref 70–108)
POTASSIUM REFLEX MAGNESIUM: 4.1 MEQ/L (ref 3.5–5.2)
SODIUM BLD-SCNC: 140 MEQ/L (ref 135–145)

## 2022-06-04 PROCEDURE — 6360000002 HC RX W HCPCS

## 2022-06-04 PROCEDURE — 1200000003 HC TELEMETRY R&B

## 2022-06-04 PROCEDURE — 6370000000 HC RX 637 (ALT 250 FOR IP): Performed by: PSYCHIATRY & NEUROLOGY

## 2022-06-04 PROCEDURE — 93010 ELECTROCARDIOGRAM REPORT: CPT | Performed by: INTERNAL MEDICINE

## 2022-06-04 PROCEDURE — 99232 SBSQ HOSP IP/OBS MODERATE 35: CPT

## 2022-06-04 PROCEDURE — 6370000000 HC RX 637 (ALT 250 FOR IP)

## 2022-06-04 PROCEDURE — 82948 REAGENT STRIP/BLOOD GLUCOSE: CPT

## 2022-06-04 PROCEDURE — 2580000003 HC RX 258

## 2022-06-04 PROCEDURE — 36415 COLL VENOUS BLD VENIPUNCTURE: CPT

## 2022-06-04 PROCEDURE — 80048 BASIC METABOLIC PNL TOTAL CA: CPT

## 2022-06-04 RX ORDER — MIDODRINE HYDROCHLORIDE 5 MG/1
5 TABLET ORAL
Status: DISCONTINUED | OUTPATIENT
Start: 2022-06-04 | End: 2022-06-05

## 2022-06-04 RX ORDER — SODIUM CHLORIDE, SODIUM LACTATE, POTASSIUM CHLORIDE, AND CALCIUM CHLORIDE .6; .31; .03; .02 G/100ML; G/100ML; G/100ML; G/100ML
1000 INJECTION, SOLUTION INTRAVENOUS ONCE
Status: COMPLETED | OUTPATIENT
Start: 2022-06-04 | End: 2022-06-04

## 2022-06-04 RX ORDER — VALSARTAN 40 MG/1
20 TABLET ORAL DAILY
Status: DISCONTINUED | OUTPATIENT
Start: 2022-06-05 | End: 2022-06-04

## 2022-06-04 RX ADMIN — METFORMIN HYDROCHLORIDE 500 MG: 500 TABLET, EXTENDED RELEASE ORAL at 08:59

## 2022-06-04 RX ADMIN — SODIUM CHLORIDE, PRESERVATIVE FREE 10 ML: 5 INJECTION INTRAVENOUS at 09:01

## 2022-06-04 RX ADMIN — ATORVASTATIN CALCIUM 40 MG: 40 TABLET, FILM COATED ORAL at 20:28

## 2022-06-04 RX ADMIN — FLUOXETINE 40 MG: 20 CAPSULE ORAL at 08:58

## 2022-06-04 RX ADMIN — ENOXAPARIN SODIUM 40 MG: 100 INJECTION SUBCUTANEOUS at 20:28

## 2022-06-04 RX ADMIN — SACUBITRIL AND VALSARTAN 1 TABLET: 49; 51 TABLET, FILM COATED ORAL at 08:58

## 2022-06-04 RX ADMIN — BUPROPION HYDROCHLORIDE 150 MG: 150 TABLET, FILM COATED, EXTENDED RELEASE ORAL at 08:59

## 2022-06-04 RX ADMIN — MIDODRINE HYDROCHLORIDE 2.5 MG: 2.5 TABLET ORAL at 08:58

## 2022-06-04 RX ADMIN — MIDODRINE HYDROCHLORIDE 5 MG: 5 TABLET ORAL at 13:06

## 2022-06-04 RX ADMIN — Medication 2000 UNITS: at 08:57

## 2022-06-04 RX ADMIN — MIDODRINE HYDROCHLORIDE 5 MG: 5 TABLET ORAL at 16:11

## 2022-06-04 RX ADMIN — PANTOPRAZOLE SODIUM 40 MG: 40 TABLET, DELAYED RELEASE ORAL at 05:24

## 2022-06-04 RX ADMIN — LETROZOLE 2.5 MG: 2.5 TABLET ORAL at 08:59

## 2022-06-04 RX ADMIN — LEVOTHYROXINE SODIUM 125 MCG: 0.12 TABLET ORAL at 05:24

## 2022-06-04 RX ADMIN — TRAZODONE HYDROCHLORIDE 50 MG: 50 TABLET ORAL at 20:29

## 2022-06-04 RX ADMIN — ONDANSETRON 4 MG: 2 INJECTION INTRAMUSCULAR; INTRAVENOUS at 09:18

## 2022-06-04 RX ADMIN — FUROSEMIDE 20 MG: 20 TABLET ORAL at 08:58

## 2022-06-04 RX ADMIN — ASPIRIN 81 MG CHEWABLE TABLET 81 MG: 81 TABLET CHEWABLE at 08:57

## 2022-06-04 RX ADMIN — SODIUM CHLORIDE, POTASSIUM CHLORIDE, SODIUM LACTATE AND CALCIUM CHLORIDE 1000 ML: 600; 310; 30; 20 INJECTION, SOLUTION INTRAVENOUS at 17:29

## 2022-06-04 RX ADMIN — DIGOXIN 125 MCG: 125 TABLET ORAL at 08:59

## 2022-06-04 ASSESSMENT — PAIN SCALES - GENERAL
PAINLEVEL_OUTOF10: 0
PAINLEVEL_OUTOF10: 0

## 2022-06-04 NOTE — PROGRESS NOTES
Hospitalist Progress Note    Patient:  Vicky Little      Unit/Bed:6K-06/006-A    YOB: 1960    MRN: [id number]       Acct: [de-identified]     PCP: ANGELIA Wilson - CNP    Date of Admission: 6/2/2022    Assessment/Plan:    1. Hyperkalemia, resolved              -Potassium 4.1 (6/4)              - S/p 1 time dose IV insulin w/ dextrose -> Accu-Cheks, hypoglycemia protocol ordered              - S/p 1 time dose lokelma               - S/p 1 time dose IV calcium gluconate              - Okay to resume entresto              - Daily BMP.     2. Chest pain, unspecified, resolved              -  Currently denies chest pain right now. Denies BORDEN exertion today. - Troponin negative              - BNP elevated, however through chart review patient has h/o chronically elevated BNP levels. BNP level 6/2 improved from previous levels. - ECG notable for NSR with first degree AV block, prolonged , low voltage QRS, axis, nonspecific T wave abnormality in V6. Not significantly changed from previous ECG 5/24               - CXR notable for mild interstitial prominence in bilateral lungs concerning for possible pulmonary edema vs interstitial lung disease.              - Continuous telemetry.     3. Hypotension, improving.   - Continue midodrine -> increase to 5 mg TID    4.  Suicidal ideation, history of major depressive disorder & anxiety              - Discharge readmit from psych unit 6/2 given #1 & #2              - Continue Wellbutrin, Prozac, Effexor              - suicide precautions, Safety tray   -Continuous observation and bedside sitter discontinued by psych 6/3              - Psychiatry consulted, appreciate recommendations     2. Cardiomyopathy s/p ICD (2/11/2019), chronic HFrEF, compensated  Tyesha Frances, [address] cardiology              - Echo (5/24/2022) EF estimated at 25%, LV size mildly increased, severe global hypokinesis of LV, left atrium is mild to moderately dilated, mildly enlarged right atrium, moderate MR, moderate TR, RVSP measures 40 mmHg              - [facility] (5/24/22) notable for EF estimated 40-45%, mild global hypokinesis, no regional WMA, no CAD or PAD noticed              - Daily weight, strict I&O              - Cardiac diet, 2 g sodium, 2L fluid restriction.              - Continue ASA, statin, Lasix, entresto      3. Essential hypertension, controlled, Hx of:              - Patient has reported history of HTN however has been noted to be hypotensive.    -Continue metoprolol, Lasix, entresto with hold parameters   - Monitor BP's      4. Non-insulin dependent T2DM              -UJDYKALUEL A1c 6.1 (6/1)              -Continue Farxiga, metformin              -Accu-Cheks              -Diabetic diet              -Hypoglycemia protocol     5. Hypothyroidism:               -TSH on last admission 21.5.  Levothyroxine increased to 125 mcg daily at that time. TSH 7.22 (6/2) - improving              - Continue Synthroid     6. Hyperlipidemia              - Statin     7. History of breast cancer s/p mastectomy of left breast and right simple mastectomy (12/29/2016)              -AYLA diagnosed with left breast cancer in December 2016 while in [address] [place] [address] bilateral mastectomy performed in December 2016, followed by adjuvant chemotherapy and radiation therapy. Follows Dr. Osborn Shone, [address] oncology.             - Continue letrozole     8. GERD:              -  PPI     9. Chronic kidney disease stage III              - Baseline creatinine range ~ 0.6 - 1. Creatinine stable. eGFR 56 (POA)              - Trend and monitor.     10.  Obesity              -BMI 37.38 kg/m²              -Discussed and educated on lifestyle modifications      Expected discharge date:  Pending clearance from psych    Disposition:    [x] Home       [] TCU       [] Rehab       [] Psych       [] SNF       [] [facility]       [] Other-    Chief Complaint: Hyperkalemia    Hospital Course: 65 y.o. female with a past medical history of cardiomyopathy status post ICD placement, chronic HFrEF, CKD, hypothyroidism, HLD, HTN, T2DM major depressive disorder, anxiety who presented to [facility] on 6/1/22 after reporting to [employer] [employer] [facility] medicine for hospital follow-up where patient was actively suicidal and had a plan. Patient was sent to the ER and was later admitted to the psychiatry unit. Hospitalist service was consulted to assist in management of patient's chronic health conditions. Upon lab work this morning, patient's potassium was noted to be 5.6. During evaluation patient did report that she experienced 8/10 sharp nonradiating midsternal chest pain last night that woke her out of her sleep. Also reporting that she is having some worsening dyspnea on exertion outside of her baseline. Currently denies active chest pain, shortness of breath at rest, abdominal pain, nausea, vomiting, diarrhea, palpitations, dizziness, diaphoresis, pleurisy, fever, chills, cough, orthopnea, PND, leg swelling. Patient was just recently discharged from [facility] on 5/25 following a diagnosis of acute on chronic heart failure. Patient did have left heart cath performed at that time which was not significant for coronary artery disease but did note EF of 40-45%. Patient is a discharge readmit to inpatient service for further treatment of hyperkalemia and further chest pain work-up. 6/3/22: Patient resting in bed, nontoxic in appearance, no apparent distress. Remains afebrile, with hemodynamically stable vital signs. Patient had acute hypotensive episode over night 86/57. Patient reportedly asymptomatic per nursing staff. Patient has had low norm BP while in hospital. Will start pt on 2.5 mg Midodrine. Patient stating she feels better this morning.   Denies chest pain, dyspnea on exertion, shortness of breath at rest.  States when she took a shower this morning she felt very tired, denies dizziness, palpitations, syncope. Received IV calcium gluconate, IV insulin, IV dextrose, and one-time dose Lokelma. Potassium level 4.4 this morning. We will restart patient on her Entresto and repeat work in the acute increases in potassium. Patient will need clearance from psych prior to discharge, appreciate recommendations    6/4/22: Patient remains afebrile, with hemodynamically stable vital signs. Patient did not experience any hypotensive episodes overnight but still having some relative hypotension with SBP's in range of mid 90s, low 100s. Increase midodrine to 5 mg 3 times daily. Will monitor for her response. Patient's potassium remains within normal range at 4.1 after restarting her Entresto yesterday. Patient has no complaints this morning. She denies chest pain, shortness of breath, BORDEN, abdominal pain, nausea, vomiting, fever, chills, dizziness, palpitations. States she is feeling much better this morning. Patient is still under evaluation by psychiatry service. I called and spoke with Dr. Opal Mortensen over the phone who is planning to see the patient this afternoon. Once cleared by psych patient will be discharged from hospital.  If no plans to clear her patient will then have to be discharged and readmitted back to 4E psychiatry services. Subjective (past 24 hours):     Denies chest pain, BORDEN, shortness of breath at rest, abdominal pain, nausea, vomiting, fever, chills, dizziness, palpitations, syncope.     Medications:  Reviewed    Infusion Medications    sodium chloride      dextrose       Scheduled Medications    midodrine  5 mg Oral TID WC    FLUoxetine  40 mg Oral Daily    lidocaine  1 patch TransDERmal Daily    enoxaparin  40 mg SubCUTAneous Nightly    sodium chloride flush  5-40 mL IntraVENous 2 times per day    aspirin  81 mg Oral Daily    atorvastatin  40 mg Oral Nightly    buPROPion  150 mg Oral QAM    dapagliflozin  10 mg Oral QAM    digoxin 125 mcg Oral Daily    furosemide  20 mg Oral Daily    letrozole  2.5 mg Oral Daily    levothyroxine  125 mcg Oral Daily    metFORMIN  500 mg Oral Daily with breakfast    metoprolol succinate  12.5 mg Oral Daily    pantoprazole  40 mg Oral QAM AC    sacubitril-valsartan  1 tablet Oral BID    Vitamin D  50 mcg Oral Daily    [Held by provider] sodium zirconium cyclosilicate  5 g Oral TID     PRN Meds: nitroGLYCERIN, sodium chloride, acetaminophen **OR** acetaminophen, ondansetron **OR** ondansetron, polyethylene glycol, sodium chloride flush, hydrOXYzine HCl, magnesium hydroxide, traZODone, clonazePAM, dextrose, dextrose bolus **OR** dextrose bolus, glucagon (rDNA), glucose      Intake/Output Summary (Last 24 hours) at 6/4/2022 1203  Last data filed at 6/3/2022 1620  Gross per 24 hour   Intake 400 ml   Output 0 ml   Net 400 ml       Diet:  ADULT DIET; Regular; 4 carb choices (60 gm/meal); Low Fat/Low Chol/High Fiber/2 gm Na; 2000 ml; Safety Tray; Safety Tray (Disposables)    Exam:  BP 94/62   Pulse 95   Temp 98 °F (36.7 °C) (Oral)   Resp 18   Wt 211 lb (95.7 kg)   SpO2 94%   BMI 37.38 kg/m²       General appearance:  No apparent distress, appears older than stated age and cooperative. Chronically ill appearing  HEENT:  Normal cephalic, atraumatic without obvious deformity. Pupils equal, round, and reactive to light. Conjunctivae/corneas clear. Neck: Supple, with full range of motion. No jugular venous distention. Trachea midline. Respiratory:  Normal respiratory effort, able to speak full clear sentences. Clear to auscultation, bilaterally without Rales/Wheezes/Rhonchi. Cardiovascular:  Regular rate and rhythm with normal S1/S2 without murmurs, rubs or gallops. No edema noted on bilateral lower extremities  Abdomen: Soft, non-tender, non-distended with normal bowel sounds. Musculoskeletal:  No clubbing, cyanosis or edema bilaterally. Full range of motion without deformity.   Skin: Skin color, texture, turgor normal.    Neurologic:  Neurovascularly intact without any focal sensory/motor deficits. Cranial nerves: II-XII intact, grossly non-focal.  Psychiatric:  Alert and oriented, thought content appropriate  Capillary Refill: Brisk,< 3 seconds   Peripheral Pulses: +2 palpable, equal bilaterally       Labs:   Recent Labs     06/03/22  0348   WBC 6.2   HGB 12.2   HCT 39.0        Recent Labs     06/02/22  0718 06/02/22  1658 06/03/22  0348 06/04/22  0551     --  138 140   K 5.6* 4.6 4.4 4.1     --  102 102   CO2 26  --  23 26   BUN 25*  --  24* 26*   CREATININE 1.1  --  1.0 1.0   CALCIUM 9.0  --  8.8 8.8     No results for input(s): AST, ALT, BILIDIR, BILITOT, ALKPHOS in the last 72 hours. No results for input(s): INR in the last 72 hours. No results for input(s): [place] in the last 72 hours. Microbiology:      Urinalysis:      Lab Results   Component Value Date    NITRU Negative 10/18/2021    WBCUA 0-2 08/30/2021    BACTERIA NONE SEEN 08/30/2021    RBCUA 0-2 08/30/2021    BLOODU Small 10/18/2021    BLOODU NEGATIVE 08/30/2021    SPECGRAV 1.015 08/18/2020    GLUCOSEU Negative 10/18/2021       Radiology:  XR CHEST PORTABLE    Result Date: 6/2/2022  PROCEDURE: XR CHEST PORTABLE CLINICAL INFORMATION: chest pain. COMPARISON: Chest radiograph dated 5/23/2022. TECHNIQUE: AP upright view of the chest. FINDINGS: There is a stable left-sided cardiac pacer/defibrillator generator with single lead. There is mild interstitial prominence in both lungs. The cardiac silhouette is mildly enlarged, similar to prior exam. Visualized osseous structures appear grossly intact. Mild interstitial prominence. Differential considerations include pulmonary edema and interstitial pneumonitis. **This report has been created using voice recognition software. It may contain minor errors which are inherent in voice recognition technology. ** Final report electronically signed by Dr. Raman Cavazos

## 2022-06-04 NOTE — PROGRESS NOTES
Patient's BP was 80/59, this RN gave her \ 5 mg of Minodrine. It went up to 91/57. BP was rechecked and was 87/57. Manual BP check and BP is 85/51, HR 90. Patient nonsymptomatic. ANGELIA Weber-CNP notified. Approval to give 5 mg Minodrine.

## 2022-06-04 NOTE — PROGRESS NOTES
Daily Progress Note  Tanja Castaneda MD  6/4/2022    Reviewed patient's current plan of care and vital signs with nursing staff. Patient is feeling better emotionally not physically. She denies major depressive symptoms. She is no longer having suicidal or homicidal ideation. Her mood is 9/10 with 10 is feeling normal.     SUBJECTIVE:    Patient is feeling better. SUICIDAL IDEATION denies suicidal ideation. Patient does not have medication side effects. ROS: Patient has new complaints:  No  Sleeping adequately:  Yes      Mental Status Examination:  Patient is cooperative. Speech: Normal rate and tone. No abnormal movements, tics or mannerisms. Mood dysthymic; affect appropriate. Suicidal ideation Absent. Homicidal ideations Absent. Hallucinations Absent. Delusions Absent. Thought Content: normal. Thought Processes: Goal-Directed. Alert and oriented X 3. Attention and concentration fair. MEMORY intact. Insight and Judgement fair. Data   weight is 211 lb (95.7 kg). Her oral temperature is 97.8 °F (36.6 °C). Her blood pressure is 85/51 (abnormal) and her pulse is 90. Her respiration is 16 and oxygen saturation is 93%.    Labs:            Medications  Current Facility-Administered Medications: midodrine (PROAMATINE) tablet 5 mg, 5 mg, Oral, TID WC  FLUoxetine (PROZAC) capsule 40 mg, 40 mg, Oral, Daily  lidocaine 4 % external patch 1 patch, 1 patch, TransDERmal, Daily  enoxaparin (LOVENOX) injection 40 mg, 40 mg, SubCUTAneous, Nightly  nitroGLYCERIN (NITROSTAT) SL tablet 0.4 mg, 0.4 mg, SubLINGual, Q5 Min PRN  0.9 % sodium chloride infusion, , IntraVENous, PRN  acetaminophen (TYLENOL) tablet 650 mg, 650 mg, Oral, Q6H PRN **OR** acetaminophen (TYLENOL) suppository 650 mg, 650 mg, Rectal, Q6H PRN  ondansetron (ZOFRAN-ODT) disintegrating tablet 4 mg, 4 mg, Oral, Q8H PRN **OR** ondansetron (ZOFRAN) injection 4 mg, 4 mg, IntraVENous, Q6H PRN  polyethylene glycol (GLYCOLAX) packet 17 g, 17 g, Oral, Daily PRN  sodium chloride flush 0.9 % injection 5-40 mL, 5-40 mL, IntraVENous, 2 times per day  sodium chloride flush 0.9 % injection 5-40 mL, 5-40 mL, IntraVENous, PRN  hydrOXYzine (ATARAX) tablet 50 mg, 50 mg, Oral, TID PRN  magnesium hydroxide (MILK OF MAGNESIA) 400 MG/5ML suspension 30 mL, 30 mL, Oral, Daily PRN  traZODone (DESYREL) tablet 50 mg, 50 mg, Oral, Nightly PRN  aspirin chewable tablet 81 mg, 81 mg, Oral, Daily  atorvastatin (LIPITOR) tablet 40 mg, 40 mg, Oral, Nightly  buPROPion (WELLBUTRIN XL) extended release tablet 150 mg, 150 mg, Oral, QAM  clonazePAM (KLONOPIN) tablet 0.5 mg, 0.5 mg, Oral, Q12H PRN  dapagliflozin (FARXIGA) tablet 10 mg, 10 mg, Oral, QAM  dextrose 5 % solution, 100 mL/hr, IntraVENous, PRN  dextrose bolus 10% 125 mL, 125 mL, IntraVENous, PRN **OR** dextrose bolus 10% 250 mL, 250 mL, IntraVENous, PRN  digoxin (LANOXIN) tablet 125 mcg, 125 mcg, Oral, Daily  [Held by provider] furosemide (LASIX) tablet 20 mg, 20 mg, Oral, Daily  glucagon (rDNA) injection 1 mg, 1 mg, IntraMUSCular, PRN  glucose chewable tablet 16 g, 4 tablet, Oral, PRN  letrozole (FEMARA) tablet 2.5 mg, 2.5 mg, Oral, Daily  levothyroxine (SYNTHROID) tablet 125 mcg, 125 mcg, Oral, Daily  metFORMIN (GLUCOPHAGE-XR) extended release tablet 500 mg, 500 mg, Oral, Daily with breakfast  metoprolol succinate (TOPROL XL) extended release tablet 12.5 mg, 12.5 mg, Oral, Daily  pantoprazole (PROTONIX) tablet 40 mg, 40 mg, Oral, QAM AC  [Held by provider] sacubitril-valsartan (ENTRESTO) 49-51 MG per tablet 1 tablet, 1 tablet, Oral, BID  Vitamin D (CHOLECALCIFEROL) tablet 2,000 Units, 50 mcg, Oral, Daily  [Held by provider] sodium zirconium cyclosilicate (LOKELMA) oral suspension 5 g, 5 g, Oral, TID    ASSESSMENT  MDD (major depressive disorder), recurrent severe, without psychosis (HCC)     PLAN  Patient's symptoms are improving  Continue with current psychotropics. Attempt to develop insight  Psycho-education conducted.   Supportive Therapy conducted.

## 2022-06-04 NOTE — PLAN OF CARE
Problem: Self Harm/Suicidality  Goal: Will have no self-injury during hospital stay  Description: INTERVENTIONS:  1. Q 30 MINUTES: Routine safety checks  2. Q SHIFT & PRN: Assess risk to determine if routine checks are adequate to maintain patient safety  Outcome: Progressing  Note: Patient denies wanting to harm self or others at this time. Patient was provided emotional support from this nurse and listened to her feelings. Will continue to monitor patient for feelings of self harm.

## 2022-06-05 VITALS
BODY MASS INDEX: 37.38 KG/M2 | TEMPERATURE: 98.2 F | SYSTOLIC BLOOD PRESSURE: 102 MMHG | HEART RATE: 93 BPM | WEIGHT: 211 LBS | RESPIRATION RATE: 18 BRPM | DIASTOLIC BLOOD PRESSURE: 74 MMHG | OXYGEN SATURATION: 95 %

## 2022-06-05 LAB
ANION GAP SERPL CALCULATED.3IONS-SCNC: 14 MEQ/L (ref 8–16)
BUN BLDV-MCNC: 23 MG/DL (ref 7–22)
CALCIUM SERPL-MCNC: 8.6 MG/DL (ref 8.5–10.5)
CHLORIDE BLD-SCNC: 103 MEQ/L (ref 98–111)
CO2: 24 MEQ/L (ref 23–33)
CREAT SERPL-MCNC: 1.1 MG/DL (ref 0.4–1.2)
GFR SERPL CREATININE-BSD FRML MDRD: 50 ML/MIN/1.73M2
GLUCOSE BLD-MCNC: 100 MG/DL (ref 70–108)
GLUCOSE BLD-MCNC: 100 MG/DL (ref 70–108)
GLUCOSE BLD-MCNC: 96 MG/DL (ref 70–108)
POTASSIUM REFLEX MAGNESIUM: 4.5 MEQ/L (ref 3.5–5.2)
SODIUM BLD-SCNC: 141 MEQ/L (ref 135–145)

## 2022-06-05 PROCEDURE — 6370000000 HC RX 637 (ALT 250 FOR IP)

## 2022-06-05 PROCEDURE — 80048 BASIC METABOLIC PNL TOTAL CA: CPT

## 2022-06-05 PROCEDURE — 2580000003 HC RX 258

## 2022-06-05 PROCEDURE — 82948 REAGENT STRIP/BLOOD GLUCOSE: CPT

## 2022-06-05 PROCEDURE — 36415 COLL VENOUS BLD VENIPUNCTURE: CPT

## 2022-06-05 PROCEDURE — 6370000000 HC RX 637 (ALT 250 FOR IP): Performed by: PSYCHIATRY & NEUROLOGY

## 2022-06-05 PROCEDURE — 99232 SBSQ HOSP IP/OBS MODERATE 35: CPT

## 2022-06-05 RX ORDER — MIDODRINE HYDROCHLORIDE 10 MG/1
10 TABLET ORAL
Qty: 90 TABLET | Refills: 1 | Status: ON HOLD | OUTPATIENT
Start: 2022-06-05 | End: 2022-09-29

## 2022-06-05 RX ORDER — TRAZODONE HYDROCHLORIDE 50 MG/1
50 TABLET ORAL NIGHTLY PRN
Qty: 30 TABLET | Refills: 0 | Status: SHIPPED | OUTPATIENT
Start: 2022-06-05 | End: 2022-06-15 | Stop reason: SDUPTHER

## 2022-06-05 RX ORDER — MIDODRINE HYDROCHLORIDE 10 MG/1
10 TABLET ORAL
Status: DISCONTINUED | OUTPATIENT
Start: 2022-06-05 | End: 2022-06-05 | Stop reason: HOSPADM

## 2022-06-05 RX ORDER — BUPROPION HYDROCHLORIDE 150 MG/1
150 TABLET ORAL EVERY MORNING
Qty: 30 TABLET | Refills: 0 | Status: SHIPPED | OUTPATIENT
Start: 2022-06-06 | End: 2022-06-15 | Stop reason: SDUPTHER

## 2022-06-05 RX ORDER — FLUOXETINE HYDROCHLORIDE 40 MG/1
40 CAPSULE ORAL DAILY
Qty: 30 CAPSULE | Refills: 0 | Status: SHIPPED | OUTPATIENT
Start: 2022-06-06 | End: 2022-06-15 | Stop reason: SDUPTHER

## 2022-06-05 RX ORDER — BLOOD PRESSURE TEST KIT
1 KIT MISCELLANEOUS 3 TIMES DAILY
Qty: 1 KIT | Refills: 0 | Status: SHIPPED | OUTPATIENT
Start: 2022-06-05 | End: 2022-06-15 | Stop reason: ALTCHOICE

## 2022-06-05 RX ADMIN — FLUOXETINE 40 MG: 20 CAPSULE ORAL at 08:52

## 2022-06-05 RX ADMIN — BUPROPION HYDROCHLORIDE 150 MG: 150 TABLET, FILM COATED, EXTENDED RELEASE ORAL at 08:52

## 2022-06-05 RX ADMIN — PANTOPRAZOLE SODIUM 40 MG: 40 TABLET, DELAYED RELEASE ORAL at 05:10

## 2022-06-05 RX ADMIN — ASPIRIN 81 MG CHEWABLE TABLET 81 MG: 81 TABLET CHEWABLE at 08:52

## 2022-06-05 RX ADMIN — MIDODRINE HYDROCHLORIDE 5 MG: 5 TABLET ORAL at 05:19

## 2022-06-05 RX ADMIN — METFORMIN HYDROCHLORIDE 500 MG: 500 TABLET, EXTENDED RELEASE ORAL at 08:52

## 2022-06-05 RX ADMIN — DIGOXIN 125 MCG: 125 TABLET ORAL at 08:52

## 2022-06-05 RX ADMIN — LEVOTHYROXINE SODIUM 125 MCG: 0.12 TABLET ORAL at 05:10

## 2022-06-05 RX ADMIN — MIDODRINE HYDROCHLORIDE 10 MG: 5 TABLET ORAL at 12:51

## 2022-06-05 RX ADMIN — SODIUM CHLORIDE, PRESERVATIVE FREE 10 ML: 5 INJECTION INTRAVENOUS at 08:52

## 2022-06-05 RX ADMIN — Medication 2000 UNITS: at 08:52

## 2022-06-05 RX ADMIN — LETROZOLE 2.5 MG: 2.5 TABLET ORAL at 08:52

## 2022-06-05 NOTE — PLAN OF CARE
Problem: Discharge Planning  Goal: Discharge to home or other facility with appropriate resources  Outcome: Adequate for Discharge     Problem: Chronic Conditions and Co-morbidities  Goal: Patient's chronic conditions and co-morbidity symptoms are monitored and maintained or improved  Outcome: Adequate for Discharge  Flowsheets (Taken 6/5/2022 1359)  Care Plan - Patient's Chronic Conditions and Co-Morbidity Symptoms are Monitored and Maintained or Improved: Monitor and assess patient's chronic conditions and comorbid symptoms for stability, deterioration, or improvement     Problem: Safety - Adult  Goal: Free from fall injury  Outcome: Adequate for Discharge  Flowsheets (Taken 6/5/2022 1350)  Free From Fall Injury:   Instruct family/caregiver on patient safety   Based on caregiver fall risk screen, instruct family/caregiver to ask for assistance with transferring infant if caregiver noted to have fall risk factors     Problem: Self Harm/Suicidality  Goal: Will have no self-injury during hospital stay  Description: INTERVENTIONS:  1. Q 30 MINUTES: Routine safety checks  2. Q SHIFT & PRN: Assess risk to determine if routine checks are adequate to maintain patient safety  Note: Pt voices no plan or desire to harm self.  Pt to call [facility] upon discharge to set appointment

## 2022-06-05 NOTE — PROGRESS NOTES
Keep all follow-up appointment and these are set up for you in less than a week. It is very important that you take medications as directed. To call the National Suicide Prevention Lifeline: 7-662-425-RQTB (1956)    To call the local [facility] Line:    [facility] Phone Number     [place] , and Ruffus Simmonds   [phone]     Giselle De Paz, and Laura Friends   [address] [facility]     [phone]     [facility]     Michael Germain and Kg   [phone]     [place], [place], [facility]   [phone]     Patient verbalized understanding about Suicide prevention numbers and the importance of keeping all follow up appointments. I re-instructed patient and family of the increase risk of an attempt of suicide after the first week of discharge and removing any potential harmful items from the home.

## 2022-06-05 NOTE — PROGRESS NOTES
Reviewed AVS & Suicide PreventionPlan. Pt verbalized understanding. Removed IV.  Meds sent to home pharmac

## 2022-06-05 NOTE — DISCHARGE SUMMARY
Hospital Medicine Discharge Summary      Patient Identification:   Chinyere Correa   : 1960  MRN: [id number]   Account: [de-identified]      Patient's PCP: ANGELIA Fong CNP    Admit Date: 2022     Discharge Date:   22     Admitting Physician: No admitting provider for patient encounter. Discharging Nurse Practitioner: ANGELIA Buckner CNP     Discharge Diagnoses with Assessment/Plan:    1. Hyperkalemia, resolved              - S/p 1 time dose IV insulin w/ dextrose -> Accu-Cheks, hypoglycemia protocol ordered              - S/p 1 time dose lokelma               - S/p 1 time dose IV calcium gluconate              - Repeat potassium level in 1 week and f/u with PCP in 1 week.      2. Chest pain, unspecified, resolved              -  Currently denies chest pain              - Troponin negative              - BNP elevated, however through chart review patient has h/o chronically elevated BNP levels. BNP level  improved from previous levels.              - ECG notable for NSR with first degree AV block, prolonged , low voltage QRS, axis, nonspecific T wave abnormality in V6.  Not significantly changed from previous ECG                - CXR notable for mild interstitial prominence in bilateral lungs concerning for possible pulmonary edema vs interstitial lung disease.              - Continuous telemetry -> discontinued at discharge. - F/u with Dr. Monico Nageotte in 1 week.      3. Hypotension, improving.              - Continue 10 mg midodrine TID     4. Suicidal ideation, history of major depressive disorder & anxiety              - Discharge readmit from psych unit  given #1 & #2              - Continue Wellbutrin, Prozac              - Psychiatry consulted, appreciate recommendations -> Okay for discharge      2.  Cardiomyopathy s/p ICD (2019), chronic HFrEF, compensated              - Follows Dr. Monico Nageotte, [address] cardiology              - Echo (2022) EF estimated at 25%, LV size mildly increased, severe global hypokinesis of LV, left atrium is mild to moderately dilated, mildly enlarged right atrium, moderate MR, moderate TR, RVSP measures 40 mmHg              - [facility] (5/24/22) notable for EF estimated 40-45%, mild global hypokinesis, no regional WMA, no CAD or PAD noticed              - Daily weight, strict I&O              - Cardiac diet, 2 g sodium, 2L fluid restriction.              - Continue ASA, statin, BB (w/ hold parameters)               - Hold Lasix, entresto given #3.                - F/u with Dr. Coco Prado within 1 week for further evaluation of cardiac medications given hypotensive.     3. Essential hypertension, controlled, Hx of:              - Patient has reported history of HTN however has been noted to be hypotensive.               -Continue metoprolol (w/ hold parameters)               - Hold Lasix, entresto. - okay to resume once cleared by PCP or Dr. Coco Prado. - Monitor BP's - Home BP kit ordered and pt educated on how to take BP.       4. Non-insulin dependent T2DM              -Hemoglobin A1c 6.1 (6/1)              -Continue Farxiga, metformin              -Accu-Cheks              -Diabetic diet              -Hypoglycemia protocol     5. Hypothyroidism:               -TSH on last admission 21.5.  Levothyroxine increased to 125 mcg daily at that time. TSH 7.22 (6/2) - improving              - Continue Synthroid     6. Hyperlipidemia              - Statin     7. History of breast cancer s/p mastectomy of left breast and right simple mastectomy (12/29/2016)              -QZWKS diagnosed with left breast cancer in December 2016 while in [address] [place] [address] bilateral mastectomy performed in December 2016, followed by adjuvant chemotherapy and radiation therapy. Follows Dr. Kenyetta Christie, [address] oncology.             - Continue letrozole     8. GERD:              -  PPI     9. Chronic kidney disease stage III              - Baseline creatinine range ~ 0.6 - 1. Creatinine stable. eGFR 56 (POA)              - Trend and monitor.     10. Obesity              -BMI 37.38 kg/m²              -Discussed and educated on lifestyle modifications    The patient was seen and examined on day of discharge and this discharge summary is in conjunction with any daily progress note from day of discharge. Hospital Course:   65 y.o. female with a past medical history of cardiomyopathy status post ICD placement, chronic HFrEF, CKD, hypothyroidism, HLD, HTN, T2DM major depressive disorder, anxiety who presented to [address] [place] [address] on 6/1/22 after reporting to [facility] for hospital follow-up where patient was actively suicidal and had a plan.  Patient was sent to the ER and was later admitted to the psychiatry unit.  Hospitalist service was consulted to assist in management of patient's chronic health conditions.  Upon lab work this morning, patient's potassium was noted to be 5.6.  During evaluation patient did report that she experienced 8/10 sharp nonradiating midsternal chest pain last night that woke her out of her sleep.  Also reporting that she is having some worsening dyspnea on exertion outside of her baseline.  Currently denies active chest pain, shortness of breath at rest, abdominal pain, nausea, vomiting, diarrhea, palpitations, dizziness, diaphoresis, pleurisy, fever, chills, cough, orthopnea, PND, leg swelling.  Patient was just recently discharged from [facility] on 5/25 following a diagnosis of acute on chronic heart failure.  Patient did have left heart cath performed at that time which was not significant for coronary artery disease but did note EF of 40-45%.  Patient is a discharge readmit to inpatient service for further treatment of hyperkalemia and further chest pain work-up.     6/3/22: Patient resting in bed, nontoxic in appearance, no apparent distress. Remains afebrile, with hemodynamically stable vital signs.   Patient had acute hypotensive episode over night 86/57. Patient reportedly asymptomatic per nursing staff. Patient has had low norm BP while in hospital. Will start pt on 2.5 mg Midodrine. Patient stating she feels better this morning. Denies chest pain, dyspnea on exertion, shortness of breath at rest.  States when she took a shower this morning she felt very tired, denies dizziness, palpitations, syncope. Received IV calcium gluconate, IV insulin, IV dextrose, and one-time dose Lokelma. Potassium level 4.4 this morning. We will restart patient on her Entresto and repeat work in the acute increases in potassium. Patient will need clearance from psych prior to discharge, appreciate recommendations     6/4/22: Patient remains afebrile, with hemodynamically stable vital signs. Patient did not experience any hypotensive episodes overnight but still having some relative hypotension with SBP's in range of mid 90s, low 100s. Increase midodrine to 5 mg 3 times daily. Will monitor for her response. Patient's potassium remains within normal range at 4.1 after restarting her Entresto yesterday. Patient has no complaints this morning. She denies chest pain, shortness of breath, BORDEN, abdominal pain, nausea, vomiting, fever, chills, dizziness, palpitations. States she is feeling much better this morning. Patient is still under evaluation by psychiatry service. I called and spoke with Dr. Merari Stacy over the phone who is planning to see the patient this afternoon. Once cleared by psych patient will be discharged from hospital.  If no plans to clear her patient will then have to be discharged and readmitted back to 4E psychiatry services.     6/4/22: Patient resting in bed comfortably, no apparent distress. Remains afebrile, with hemodynamically stable vital signs. Yesterday afternoon patient was noted to become hypotensive into the mid to low 80s. Patient was asymptomatic during these event per nursing staff. Had no complaints at that time.   Patient's enstresto and Lasix held, patient given 1 L IV fluids. Blood pressures improved some. Negative orthostats. Midodrine was increased to 10 mg this morning, repeat blood pressure is afternoon 99/71. We will continue to hold Entresto and Lasix at this time. We will continue midodrine 10 mg 3 times daily. Patient was seen and evaluated by Dr. Reece Teixeira, psychiatry today. Patient has been cleared from psychiatric services given that she initially presented to the hospital for suicidal ideation. Patient instructed that she needs to check her blood pressures 3 times a day before taking her midodrine. Patient instructed not to take midodrine if her top number is greater than 120 mmHg. Also instructed patient that she needs to stop taking her Lasix and Entresto given her hypotension and needs to follow-up with Dr. Amy More or her primary care provider within 1 week for further evaluation of these medications. Instructed patient that she would need clearance from Dr. Amy More her PCP to resume them. .  Instructed patient that it is okay to resume her metoprolol 12.5 mg as her blood pressure this morning was 95/54 she was given her metoprolol w/ the 10 mg midodrine, and her BP has remained stable at 99/71 this afternoon. Instructed patient not to take her metoprolol if her blood pressure was less than 90 mmHg. Patient informed me that she has a blood pressure kit at home and knows how to take her blood pressure. I did order an additional blood pressure kit for her if she needs it. Patient noted to be hyperkalemic on this admission. This resolved after she received one time dose IV insulin, IV dextrose, one-time dose of Lokelma. Her potassium levels have been within normal limits. Instructed patient that she needs to get repeat potassium level collected in 1 week and follow-up with her PCP in 1 week. At this time patient is stable for discharge.     Exam:     Vitals:  Vitals:    06/05/22 0025 06/05/22 0500 06/05/22 3796 06/05/22 1143   BP: 92/60 87/60 (!) 95/54 99/71   Pulse: 90 88 (!) 105 91   Resp: 16 16 16 16   Temp: 98 °F (36.7 °C) 98.2 °F (36.8 °C) 98.1 °F (36.7 °C) 98.6 °F (37 °C)   TempSrc: Oral Oral Oral Oral   SpO2: 96% 97% 96% 93%   Weight:         Weight: Weight: 211 lb (95.7 kg)     24 hour intake/output:    Intake/Output Summary (Last 24 hours) at 6/5/2022 1404  Last data filed at 6/5/2022 1140  Gross per 24 hour   Intake 420 ml   Output --   Net 420 ml         General appearance:  No apparent distress, appears older than stated age and cooperative. Chronically ill appearing  HEENT:  Normal cephalic, atraumatic without obvious deformity. Pupils equal, round, and reactive to light.   Conjunctivae/corneas clear. Neck: Supple, with full range of motion. No jugular venous distention. Trachea midline. Respiratory:  Normal respiratory effort, able to speak full clear sentences. Clear to auscultation, bilaterally without Rales/Wheezes/Rhonchi. Cardiovascular:  Regular rate and rhythm with normal S1/S2 without murmurs, rubs or gallops.  No edema noted on bilateral lower extremities  Abdomen: Soft, non-tender, non-distended with normal bowel sounds. Musculoskeletal:  No clubbing, cyanosis or edema bilaterally.  Full range of motion without deformity. Skin: Skin color, texture, turgor normal.    Neurologic:  Neurovascularly intact without any focal sensory/motor deficits. Cranial nerves: II-XII intact, grossly non-focal.  Psychiatric:  Alert and oriented, thought content appropriate  Capillary Refill: Brisk,< 3 seconds   Peripheral Pulses: +2 palpable, equal bilaterally       Labs:  For convenience and continuity at follow-up the following most recent labs are provided:      CBC:    Lab Results   Component Value Date    WBC 6.2 06/03/2022    HGB 12.2 06/03/2022    HCT 39.0 06/03/2022     06/03/2022       Renal:    Lab Results   Component Value Date     06/05/2022    K 4.5 06/05/2022     06/05/2022 CO2 24 06/05/2022    BUN 23 06/05/2022    CREATININE 1.1 06/05/2022    CALCIUM 8.6 06/05/2022       Cardiac:   Recent Labs     06/02/22  1658   TROPONINT < 0.010       Significant Diagnostic Studies    Radiology:   XR CHEST PORTABLE   Final Result   Mild interstitial prominence. Differential considerations include pulmonary edema and interstitial pneumonitis. **This report has been created using voice recognition software. It may contain minor errors which are inherent in voice recognition technology. **      Final report electronically signed by Dr. Kennedy Tran MD on 6/2/2022 2:51 PM             Consults:     IP CONSULT TO SOCIAL WORK  IP CONSULT TO PSYCHIATRY    Disposition:    [x] Home       [] TCU       [] Rehab       [] Psych       [] SNF       [] [place]       [] Other-    Condition at Discharge: Stable    Code Status:  Full Code     Pending tests at discharge: None    Patient Instructions:    Discharge lab work: BMP  Activity: activity as tolerated  Diet: ADULT DIET; Regular; 4 carb choices (60 gm/meal); Low Fat/Low Chol/High Fiber/2 gm Na; 2000 ml; Safety Tray; Safety Tray (Disposables)      Follow-up visits:   ANGELIA Gallo - CNP  [address]  [address] [facility]  [phone]    On 6/10/2022  Your Appt is at 11 AM, Please bring insurance card, Please arrive 15 minutes before    [address] [facility].  [address] [facility] [address] [phone]  Schedule an appointment as soon as possible for a visit in 1 day  call to make an appointment for initial exam once discharged    Sabina Albarran MD  [place] [address] [address]  [phone]    In 1 week  Further evaluation of heart medications         Discharge Medications:        Medication List      START taking these medications    Blood Pressure Kit  1 each by Does not apply route in the morning, at noon, and at bedtime     FLUoxetine 40 MG capsule  Commonly known as: PROZAC  Take 1 capsule by mouth daily  Start taking on: June 6, 2022     midodrine 10 MG tablet  Commonly known as: PROAMATINE  Take 1 tablet by mouth 3 times daily (with meals)     traZODone 50 MG tablet  Commonly known as: DESYREL  Take 1 tablet by mouth nightly as needed for Sleep        CHANGE how you take these medications    dapagliflozin 10 MG tablet  Commonly known as: Farxiga  Take 1 tablet by mouth every morning  What changed: additional instructions     digoxin 125 MCG tablet  Commonly known as: LANOXIN  Take 1 tablet by mouth daily  What changed: additional instructions     metoprolol succinate 25 MG extended release tablet  Commonly known as: TOPROL XL  Take 0.5 tablets by mouth daily Hold if SBP less than 95 mmHg or HR less than 50 bpm  What changed: additional instructions        CONTINUE taking these medications    aspirin 81 MG chewable tablet  Take 1 tablet by mouth daily     atorvastatin 40 MG tablet  Commonly known as: LIPITOR  Take 1 tablet by mouth nightly     buPROPion 150 MG extended release tablet  Commonly known as: Wellbutrin XL  Take 1 tablet by mouth every morning  Start taking on: June 6, 2022     Cholecalciferol 50 MCG (2000 UT) Caps  Take 50 mcg by mouth daily     clonazePAM 1 MG tablet  Commonly known as: KLONOPIN     CPAP Machine Misc  by Does not apply route Please change CPAP pressure to auto 11-15 cm H20.      Entresto 49-51 MG per tablet  Generic drug: sacubitril-valsartan  Take 1 tablet by mouth twice daily     furosemide 20 MG tablet  Commonly known as: LASIX  Take 1 tablet by mouth daily TAKE 1 TABLET BY MOUTH ONCE DAILY AS NEEDED FOR LEG SWELLING, WEIGHT GAIN     letrozole 2.5 MG tablet  Commonly known as: Femara  Take 1 tablet by mouth daily     levothyroxine 125 MCG tablet  Commonly known as: Euthyrox  Take 1 tablet by mouth Daily     metFORMIN 500 mg extended release tablet  Commonly known as: GLUCOPHAGE-XR  Take 1 tablet by mouth once daily with breakfast     omeprazole 40 MG delayed release capsule  Commonly known as: PRILOSEC  Take 1 capsule by mouth every morning (before breakfast)     polyethylene glycol 17 GM/SCOOP powder  Commonly known as: GLYCOLAX     TYLENOL 500 MG tablet  Generic drug: acetaminophen        STOP taking these medications    risperiDONE 1 MG tablet  Commonly known as: RISPERDAL     venlafaxine 150 MG extended release capsule  Commonly known as: EFFEXOR XR     venlafaxine 75 MG extended release capsule  Commonly known as: Effexor XR           Where to Get Your Medications      These medications were [facility] to [address] [facility] [phone] [address], [place] [address]    Phone: [phone]   · Blood Pressure Kit  · buPROPion 150 MG extended release tablet  · FLUoxetine 40 MG capsule  · midodrine 10 MG tablet  · traZODone 50 MG tablet         Time Spent on discharge is more than 1 hour in the examination, evaluation, counseling and review of medications and discharge plan. Signed: Thank you Claude Members, APRN - CNP for the opportunity to be involved in this patient's care.     Electronically signed by ANGELIA Burks CNP on 6/5/2022 at 2:05 PM

## 2022-06-05 NOTE — PROGRESS NOTES
Daily Progress Note  Celso Riddle MD  6/5/2022    Reviewed patient's current plan of care and vital signs with nursing staff. Patient continues to do well emotionally. She denies feeling depressed. She also denies suicidal and homicidal ideation. Her mood is 10/10 with 10 is feeling normal.  She feels ready to go home. SUBJECTIVE:    Patient is feeling better. SUICIDAL IDEATION denies suicidal ideation. Patient does not have medication side effects. ROS: Patient has new complaints:  No  Sleeping adequately:  Yes      Mental Status Examination:  Patient is cooperative. Speech: Normal rate and tone. No abnormal movements, tics or mannerisms. Mood euthymic; affect appropriate. Suicidal ideation Absent. Homicidal ideations Absent. Hallucinations Absent. Delusions Absent. Thought Content: normal. Thought Processes: Goal-Directed. Alert and oriented X 3. Attention and concentration fair. MEMORY intact. Insight and Judgement fair. Data   weight is 211 lb (95.7 kg). Her oral temperature is 98.6 °F (37 °C). Her blood pressure is 99/71 and her pulse is 91. Her respiration is 16 and oxygen saturation is 93%.    Labs:            Medications  Current Facility-Administered Medications: midodrine (PROAMATINE) tablet 10 mg, 10 mg, Oral, TID WC  FLUoxetine (PROZAC) capsule 40 mg, 40 mg, Oral, Daily  lidocaine 4 % external patch 1 patch, 1 patch, TransDERmal, Daily  enoxaparin (LOVENOX) injection 40 mg, 40 mg, SubCUTAneous, Nightly  nitroGLYCERIN (NITROSTAT) SL tablet 0.4 mg, 0.4 mg, SubLINGual, Q5 Min PRN  0.9 % sodium chloride infusion, , IntraVENous, PRN  acetaminophen (TYLENOL) tablet 650 mg, 650 mg, Oral, Q6H PRN **OR** acetaminophen (TYLENOL) suppository 650 mg, 650 mg, Rectal, Q6H PRN  ondansetron (ZOFRAN-ODT) disintegrating tablet 4 mg, 4 mg, Oral, Q8H PRN **OR** ondansetron (ZOFRAN) injection 4 mg, 4 mg, IntraVENous, Q6H PRN  polyethylene glycol (GLYCOLAX) packet 17 g, 17 g, Oral, Daily PRN  sodium chloride flush 0.9 % injection 5-40 mL, 5-40 mL, IntraVENous, 2 times per day  sodium chloride flush 0.9 % injection 5-40 mL, 5-40 mL, IntraVENous, PRN  hydrOXYzine (ATARAX) tablet 50 mg, 50 mg, Oral, TID PRN  magnesium hydroxide (MILK OF MAGNESIA) 400 MG/5ML suspension 30 mL, 30 mL, Oral, Daily PRN  traZODone (DESYREL) tablet 50 mg, 50 mg, Oral, Nightly PRN  aspirin chewable tablet 81 mg, 81 mg, Oral, Daily  atorvastatin (LIPITOR) tablet 40 mg, 40 mg, Oral, Nightly  buPROPion (WELLBUTRIN XL) extended release tablet 150 mg, 150 mg, Oral, QAM  clonazePAM (KLONOPIN) tablet 0.5 mg, 0.5 mg, Oral, Q12H PRN  dapagliflozin (FARXIGA) tablet 10 mg, 10 mg, Oral, QAM  dextrose 5 % solution, 100 mL/hr, IntraVENous, PRN  dextrose bolus 10% 125 mL, 125 mL, IntraVENous, PRN **OR** dextrose bolus 10% 250 mL, 250 mL, IntraVENous, PRN  digoxin (LANOXIN) tablet 125 mcg, 125 mcg, Oral, Daily  [Held by provider] furosemide (LASIX) tablet 20 mg, 20 mg, Oral, Daily  glucagon (rDNA) injection 1 mg, 1 mg, IntraMUSCular, PRN  glucose chewable tablet 16 g, 4 tablet, Oral, PRN  letrozole (FEMARA) tablet 2.5 mg, 2.5 mg, Oral, Daily  levothyroxine (SYNTHROID) tablet 125 mcg, 125 mcg, Oral, Daily  metFORMIN (GLUCOPHAGE-XR) extended release tablet 500 mg, 500 mg, Oral, Daily with breakfast  metoprolol succinate (TOPROL XL) extended release tablet 12.5 mg, 12.5 mg, Oral, Daily  pantoprazole (PROTONIX) tablet 40 mg, 40 mg, Oral, QAM AC  [Held by provider] sacubitril-valsartan (ENTRESTO) 49-51 MG per tablet 1 tablet, 1 tablet, Oral, BID  Vitamin D (CHOLECALCIFEROL) tablet 2,000 Units, 50 mcg, Oral, Daily  [Held by provider] sodium zirconium cyclosilicate (LOKELMA) oral suspension 5 g, 5 g, Oral, TID    ASSESSMENT  MDD (major depressive disorder), recurrent severe, without psychosis (HCC)     PLAN  Patient's symptoms are improving  Continue with current psychotropics which were sent to the pharmacy.   Attempt to develop insight  Psycho-education conducted. Supportive Therapy conducted. Ok to discharge per Psych standpoint  Follow-up at [facility].

## 2022-06-05 NOTE — PROGRESS NOTES
Hospitalist Progress Note    Patient:  Arely Silverio      Unit/Bed:6K-06/006-A    YOB: 1960    MRN: [id number]       Acct: [de-identified]     PCP: ANGELIA Bhat CNP    Date of Admission: 6/2/2022    Assessment/Plan:    1. Hyperkalemia, resolved              - S/p 1 time dose IV insulin w/ dextrose -> Accu-Cheks, hypoglycemia protocol ordered              - S/p 1 time dose lokelma               - S/p 1 time dose IV calcium gluconate              - Daily BMP.     2. Chest pain, unspecified, resolved              -  Currently denies chest pain right now. Denies BORDEN exertion today. - Troponin negative              - BNP elevated, however through chart review patient has h/o chronically elevated BNP levels. BNP level 6/2 improved from previous levels. - ECG notable for NSR with first degree AV block, prolonged , low voltage QRS, axis, nonspecific T wave abnormality in V6. Not significantly changed from previous ECG 5/24               - CXR notable for mild interstitial prominence in bilateral lungs concerning for possible pulmonary edema vs interstitial lung disease.              - Continuous telemetry.     3. Hypotension, improving.   - Continue midodrine -> increase to 10 mg TID    4.  Suicidal ideation, history of major depressive disorder & anxiety              - Discharge readmit from psych unit 6/2 given #1 & #2              - Continue Wellbutrin, Prozac, Effexor              - suicide precautions, Safety tray   -Continuous observation and bedside sitter discontinued by psych 6/3              - Psychiatry consulted, appreciate recommendations -> Need psych clearance for discharge.     2. Cardiomyopathy s/p ICD (2/11/2019), chronic HFrEF, compensated  Tee Grant, [address] cardiology              - Echo (5/24/2022) EF estimated at 25%, LV size mildly increased, severe global hypokinesis of LV, left atrium is mild to moderately dilated, mildly enlarged right atrium, moderate MR, moderate TR, RVSP measures 40 mmHg              - [facility] (5/24/22) notable for EF estimated 40-45%, mild global hypokinesis, no regional WMA, no CAD or PAD noticed              - Daily weight, strict I&O              - Cardiac diet, 2 g sodium, 2L fluid restriction.              - Continue ASA, statin, BB (w/ hold parameters)    - Hold Lasix, entresto given #3.     - Will need to follow up with Dr. Rizwana Andrews within 1 week for further evaluation of cardiac medications given patient being hypotensive.     3. Essential hypertension, controlled, Hx of:              - Patient has reported history of HTN however has been noted to be hypotensive.    -Continue metoprolol (w/ hold parameters)    - Hold Lasix, entresto. - Monitor BP's      4. Non-insulin dependent T2DM              -VWMHYZUJXP A1c 6.1 (6/1)              -Continue Farxiga, metformin              -Accu-Cheks              -Diabetic diet              -Hypoglycemia protocol     5. Hypothyroidism:               -TSH on last admission 21.5.  Levothyroxine increased to 125 mcg daily at that time. TSH 7.22 (6/2) - improving              - Continue Synthroid     6. Hyperlipidemia              - Statin     7. History of breast cancer s/p mastectomy of left breast and right simple mastectomy (12/29/2016)              -[facility] diagnosed with left breast cancer in December 2016 while in [address] [place] [address] bilateral mastectomy performed in December 2016, followed by adjuvant chemotherapy and radiation therapy. Follows Dr. Judit Thomas, [address] oncology.             - Continue letrozole     8. GERD:              -  PPI     9. Chronic kidney disease stage III              - Baseline creatinine range ~ 0.6 - 1. Creatinine stable. eGFR 56 (POA)              - Trend and monitor.     10.  Obesity              -BMI 37.38 kg/m²              -Discussed and educated on lifestyle modifications      Expected discharge date:  Pending clearance from psych    Disposition:    [x] Home       [] TCU       [] Rehab       [] Psych       [] SNF       [] Ramila       [] Other-    Chief Complaint: Hyperkalemia    Hospital Course: 65 y.o. female with a past medical history of cardiomyopathy status post ICD placement, chronic HFrEF, CKD, hypothyroidism, HLD, HTN, T2DM major depressive disorder, anxiety who presented to [address] [place] [address] on 6/1/22 after reporting to [facility] for hospital follow-up where patient was actively suicidal and had a plan. Patient was sent to the ER and was later admitted to the psychiatry unit. Hospitalist service was consulted to assist in management of patient's chronic health conditions. Upon lab work this morning, patient's potassium was noted to be 5.6. During evaluation patient did report that she experienced 8/10 sharp nonradiating midsternal chest pain last night that woke her out of her sleep. Also reporting that she is having some worsening dyspnea on exertion outside of her baseline. Currently denies active chest pain, shortness of breath at rest, abdominal pain, nausea, vomiting, diarrhea, palpitations, dizziness, diaphoresis, pleurisy, fever, chills, cough, orthopnea, PND, leg swelling. Patient was just recently discharged from [facility] on 5/25 following a diagnosis of acute on chronic heart failure. Patient did have left heart cath performed at that time which was not significant for coronary artery disease but did note EF of 40-45%. Patient is a discharge readmit to inpatient service for further treatment of hyperkalemia and further chest pain work-up. 6/3/22: Patient resting in bed, nontoxic in appearance, no apparent distress. Remains afebrile, with hemodynamically stable vital signs. Patient had acute hypotensive episode over night 86/57. Patient reportedly asymptomatic per nursing staff. Patient has had low norm BP while in hospital. Will start pt on 2.5 mg Midodrine.  Patient stating she feels better this morning. Denies chest pain, dyspnea on exertion, shortness of breath at rest.  States when she took a shower this morning she felt very tired, denies dizziness, palpitations, syncope. Received IV calcium gluconate, IV insulin, IV dextrose, and one-time dose Lokelma. Potassium level 4.4 this morning. We will restart patient on her Entresto and repeat work in the acute increases in potassium. Patient will need clearance from psych prior to discharge, appreciate recommendations    6/4/22: Patient remains afebrile, with hemodynamically stable vital signs. Patient did not experience any hypotensive episodes overnight but still having some relative hypotension with SBP's in range of mid 90s, low 100s. Increase midodrine to 5 mg 3 times daily. Will monitor for her response. Patient's potassium remains within normal range at 4.1 after restarting her Entresto yesterday. Patient has no complaints this morning. She denies chest pain, shortness of breath, BORDEN, abdominal pain, nausea, vomiting, fever, chills, dizziness, palpitations. States she is feeling much better this morning. Patient is still under evaluation by psychiatry service. I called and spoke with Dr. Angel Bonilla over the phone who is planning to see the patient this afternoon. Once cleared by psych patient will be discharged from hospital.  If no plans to clear her patient will then have to be discharged and readmitted back to 4E psychiatry services. 6/4/22: Patient resting in bed comfortably, no apparent distress. Remains afebrile, with hemodynamically stable vital signs. Yesterday afternoon patient was noted to become hypotensive into the mid to low 80s. Patient was asymptomatic during these event per nursing staff. Had no complaints at that time. Patient's enstresto and Lasix held, patient given 1 L IV fluids. Blood pressures improved some. Negative orthostats.   Midodrine was increased to 10 mg this morning, repeat blood pressure 99/71. We will continue to hold Entresto and Lasix at this time. We will continue midodrine 10 mg 3 times daily. Continue metoprolol with holding parameters. Again I reached out to Dr. Sukhdeep Penaloza over the phone asking if this patient is cleared for discharge as would like to get patient not here today. Dr. Belen Shepherd stated he would see patient later this evening. Patient needs to be cleared by psych given that she was initially admitted to the hospital for suicidal ideation. Subjective (past 24 hours):     Denies chest pain, BORDEN, shortness of breath at rest, abdominal pain, nausea, vomiting, fever, chills, dizziness, palpitations, syncope.     Medications:  Reviewed    Infusion Medications    sodium chloride      dextrose       Scheduled Medications    midodrine  10 mg Oral TID WC    FLUoxetine  40 mg Oral Daily    lidocaine  1 patch TransDERmal Daily    enoxaparin  40 mg SubCUTAneous Nightly    sodium chloride flush  5-40 mL IntraVENous 2 times per day    aspirin  81 mg Oral Daily    atorvastatin  40 mg Oral Nightly    buPROPion  150 mg Oral QAM    dapagliflozin  10 mg Oral QAM    digoxin  125 mcg Oral Daily    [Held by provider] furosemide  20 mg Oral Daily    letrozole  2.5 mg Oral Daily    levothyroxine  125 mcg Oral Daily    metFORMIN  500 mg Oral Daily with breakfast    metoprolol succinate  12.5 mg Oral Daily    pantoprazole  40 mg Oral QAM AC    [Held by provider] sacubitril-valsartan  1 tablet Oral BID    Vitamin D  50 mcg Oral Daily    [Held by provider] sodium zirconium cyclosilicate  5 g Oral TID     PRN Meds: nitroGLYCERIN, sodium chloride, acetaminophen **OR** acetaminophen, ondansetron **OR** ondansetron, polyethylene glycol, sodium chloride flush, hydrOXYzine HCl, magnesium hydroxide, traZODone, clonazePAM, dextrose, dextrose bolus **OR** dextrose bolus, glucagon (rDNA), glucose      Intake/Output Summary (Last 24 hours) at 6/5/2022 8932  Last data filed at 6/5/2022 1140  Gross per 24 hour   Intake 420 ml   Output --   Net 420 ml       Diet:  ADULT DIET; Regular; 4 carb choices (60 gm/meal); Low Fat/Low Chol/High Fiber/2 gm Na; 2000 ml; Safety Tray; Safety Tray (Disposables)    Exam:  BP 99/71   Pulse 91   Temp 98.6 °F (37 °C) (Oral)   Resp 16   Wt 211 lb (95.7 kg)   SpO2 93%   BMI 37.38 kg/m²       General appearance:  No apparent distress, appears older than stated age and cooperative. Chronically ill appearing  HEENT:  Normal cephalic, atraumatic without obvious deformity. Pupils equal, round, and reactive to light. Conjunctivae/corneas clear. Neck: Supple, with full range of motion. No jugular venous distention. Trachea midline. Respiratory:  Normal respiratory effort, able to speak full clear sentences. Clear to auscultation, bilaterally without Rales/Wheezes/Rhonchi. Cardiovascular:  Regular rate and rhythm with normal S1/S2 without murmurs, rubs or gallops. No edema noted on bilateral lower extremities  Abdomen: Soft, non-tender, non-distended with normal bowel sounds. Musculoskeletal:  No clubbing, cyanosis or edema bilaterally. Full range of motion without deformity. Skin: Skin color, texture, turgor normal.    Neurologic:  Neurovascularly intact without any focal sensory/motor deficits. Cranial nerves: II-XII intact, grossly non-focal.  Psychiatric:  Alert and oriented, thought content appropriate  Capillary Refill: Brisk,< 3 seconds   Peripheral Pulses: +2 palpable, equal bilaterally       Labs:   Recent Labs     06/03/22  0348   WBC 6.2   HGB 12.2   HCT 39.0        Recent Labs     06/03/22  0348 06/04/22  0551 06/05/22  0537    140 141   K 4.4 4.1 4.5    102 103   CO2 23 26 24   BUN 24* 26* 23*   CREATININE 1.0 1.0 1.1   CALCIUM 8.8 8.8 8.6     No results for input(s): AST, ALT, BILIDIR, BILITOT, ALKPHOS in the last 72 hours. No results for input(s): INR in the last 72 hours.   No results for input(s): Lorenso Favre in the last 72 hours. Microbiology:      Urinalysis:      Lab Results   Component Value Date    NITRU Negative 10/18/2021    WBCUA 0-2 08/30/2021    BACTERIA NONE SEEN 08/30/2021    RBCUA 0-2 08/30/2021    BLOODU Small 10/18/2021    BLOODU NEGATIVE 08/30/2021    SPECGRAV 1.015 08/18/2020    GLUCOSEU Negative 10/18/2021       Radiology:  XR CHEST PORTABLE    Result Date: 6/2/2022  PROCEDURE: XR CHEST PORTABLE CLINICAL INFORMATION: chest pain. COMPARISON: Chest radiograph dated 5/23/2022. TECHNIQUE: AP upright view of the chest. FINDINGS: There is a stable left-sided cardiac pacer/defibrillator generator with single lead. There is mild interstitial prominence in both lungs. The cardiac silhouette is mildly enlarged, similar to prior exam. Visualized osseous structures appear grossly intact. Mild interstitial prominence. Differential considerations include pulmonary edema and interstitial pneumonitis. **This report has been created using voice recognition software. It may contain minor errors which are inherent in voice recognition technology. ** Final report electronically signed by Dr. Lizbeth Lundberg MD on 6/2/2022 2:51 PM      DVT prophylaxis: [x] Lovenox                                 [] SCDs                                 [] SQ Heparin                                 [] Encourage ambulation           [] Already on Anticoagulation     Code Status: Full Code    PT/OT Eval Status: None    Tele:   [x] yes             [] no    Normal sinus rhythm without ectopy.     Active Hospital Problems    Diagnosis Date Noted    Hyperkalemia [E87.5] 06/02/2022     Priority: Medium       Electronically signed by ANGELIA Mahajan CNP on 6/5/2022 at 11:54 AM

## 2022-06-06 ENCOUNTER — TELEPHONE (OUTPATIENT)
Dept: FAMILY MEDICINE CLINIC | Age: 62
End: 2022-06-06

## 2022-06-06 NOTE — TELEPHONE ENCOUNTER
[facility] Transitions Initial Follow Up Call    Outreach made within 2 business days of discharge: Yes    Patient: Raina Stafford Patient : 1960   MRN: [id number]  Reason for Admission: There are no discharge diagnoses documented for the most recent discharge.   Discharge Date: 22       Spoke with: [facility] for call back    Discharge department/facility: [facility]        Scheduled appointment with PCP within 7-14 days    Follow Up  Future Appointments   Date Time Provider Braden Rothman   6/10/2022 11:00 AM Chris Olmstead, APRN - [phone] [id number] [place]   2022  9:30 AM STR ECHO RM1 [facility] ECHO [place] [facility]   2022  2:00 PM Li Smith MD N [facility] - Robin Ozuna LPN

## 2022-06-07 ENCOUNTER — TELEPHONE (OUTPATIENT)
Dept: CARDIOLOGY CLINIC | Age: 62
End: 2022-06-07

## 2022-06-10 ENCOUNTER — APPOINTMENT (OUTPATIENT)
Dept: GENERAL RADIOLOGY | Age: 62
End: 2022-06-10
Payer: COMMERCIAL

## 2022-06-10 ENCOUNTER — HOSPITAL ENCOUNTER (OUTPATIENT)
Age: 62
Setting detail: OBSERVATION
Discharge: HOME | End: 2022-06-11
Admitting: INTERNAL MEDICINE
Payer: COMMERCIAL

## 2022-06-10 DIAGNOSIS — I50.9 ACUTE ON CHRONIC CONGESTIVE HEART FAILURE, UNSPECIFIED HEART FAILURE TYPE (HCC): Primary | ICD-10-CM

## 2022-06-10 DIAGNOSIS — K52.9 GASTROENTERITIS: ICD-10-CM

## 2022-06-10 LAB
ALBUMIN SERPL-MCNC: 4.6 G/DL (ref 3.5–5.1)
ALP BLD-CCNC: 130 U/L (ref 38–126)
ALT SERPL-CCNC: 55 U/L (ref 11–66)
ANION GAP SERPL CALCULATED.3IONS-SCNC: 16 MEQ/L (ref 8–16)
AST SERPL-CCNC: 59 U/L (ref 5–40)
BACTERIA: ABNORMAL /HPF
BASOPHILS # BLD: 0.7 %
BASOPHILS ABSOLUTE: 0 THOU/MM3 (ref 0–0.1)
BILIRUB SERPL-MCNC: 1.8 MG/DL (ref 0.3–1.2)
BILIRUBIN URINE: ABNORMAL
BLOOD, URINE: NEGATIVE
BUN BLDV-MCNC: 22 MG/DL (ref 7–22)
CALCIUM SERPL-MCNC: 9.2 MG/DL (ref 8.5–10.5)
CASTS 2: ABNORMAL /LPF
CASTS UA: ABNORMAL /LPF
CHARACTER, URINE: ABNORMAL
CHLORIDE BLD-SCNC: 100 MEQ/L (ref 98–111)
CO2: 22 MEQ/L (ref 23–33)
COLOR: ABNORMAL
CREAT SERPL-MCNC: 1.1 MG/DL (ref 0.4–1.2)
CRYSTALS, UA: ABNORMAL
DIGOXIN LEVEL: 0.8 NG/ML (ref 0.5–2)
EKG ATRIAL RATE: 96 BPM
EKG P AXIS: 40 DEGREES
EKG P-R INTERVAL: 198 MS
EKG Q-T INTERVAL: 368 MS
EKG QRS DURATION: 98 MS
EKG QTC CALCULATION (BAZETT): 464 MS
EKG R AXIS: -9 DEGREES
EKG T AXIS: 71 DEGREES
EKG VENTRICULAR RATE: 96 BPM
EOSINOPHIL # BLD: 1.3 %
EOSINOPHILS ABSOLUTE: 0.1 THOU/MM3 (ref 0–0.4)
EPITHELIAL CELLS, UA: ABNORMAL /HPF
ERYTHROCYTE [DISTWIDTH] IN BLOOD BY AUTOMATED COUNT: 16 % (ref 11.5–14.5)
ERYTHROCYTE [DISTWIDTH] IN BLOOD BY AUTOMATED COUNT: 53.1 FL (ref 35–45)
FLU A ANTIGEN: NEGATIVE
FLU B ANTIGEN: NEGATIVE
GFR SERPL CREATININE-BSD FRML MDRD: 50 ML/MIN/1.73M2
GLUCOSE BLD-MCNC: 110 MG/DL (ref 70–108)
GLUCOSE BLD-MCNC: 112 MG/DL (ref 70–108)
GLUCOSE BLD-MCNC: 172 MG/DL (ref 70–108)
GLUCOSE URINE: NEGATIVE MG/DL
HCT VFR BLD CALC: 39.9 % (ref 37–47)
HEMOGLOBIN: 12.5 GM/DL (ref 12–16)
ICTOTEST: NEGATIVE
IMMATURE GRANS (ABS): 0.04 THOU/MM3 (ref 0–0.07)
IMMATURE GRANULOCYTES: 0.6 %
KETONES, URINE: ABNORMAL
LACTIC ACID: 1.9 MMOL/L (ref 0.5–2)
LACTIC ACID: 2.3 MMOL/L (ref 0.5–2)
LEUKOCYTE ESTERASE, URINE: ABNORMAL
LYMPHOCYTES # BLD: 27.1 %
LYMPHOCYTES ABSOLUTE: 1.9 THOU/MM3 (ref 1–4.8)
MAGNESIUM: 2.3 MG/DL (ref 1.6–2.4)
MCH RBC QN AUTO: 28.9 PG (ref 26–33)
MCHC RBC AUTO-ENTMCNC: 31.3 GM/DL (ref 32.2–35.5)
MCV RBC AUTO: 92.4 FL (ref 81–99)
MISCELLANEOUS 2: ABNORMAL
MONOCYTES # BLD: 7.1 %
MONOCYTES ABSOLUTE: 0.5 THOU/MM3 (ref 0.4–1.3)
NITRITE, URINE: POSITIVE
NUCLEATED RED BLOOD CELLS: 0 /100 WBC
OSMOLALITY CALCULATION: 279.8 MOSMOL/KG (ref 275–300)
PH UA: 5 (ref 5–9)
PHOSPHORUS: 4 MG/DL (ref 2.4–4.7)
PLATELET # BLD: 205 THOU/MM3 (ref 130–400)
PMV BLD AUTO: 12.4 FL (ref 9.4–12.4)
POTASSIUM SERPL-SCNC: 4.2 MEQ/L (ref 3.5–5.2)
PRO-BNP: ABNORMAL PG/ML (ref 0–900)
PROCALCITONIN: 0.08 NG/ML (ref 0.01–0.09)
PROTEIN UA: 100
RBC # BLD: 4.32 MILL/MM3 (ref 4.2–5.4)
RBC URINE: ABNORMAL /HPF
RENAL EPITHELIAL, UA: ABNORMAL
SARS-COV-2, NAAT: NOT DETECTED
SEG NEUTROPHILS: 63.2 %
SEGMENTED NEUTROPHILS ABSOLUTE COUNT: 4.4 THOU/MM3 (ref 1.8–7.7)
SODIUM BLD-SCNC: 138 MEQ/L (ref 135–145)
SPECIFIC GRAVITY, URINE: 1.02 (ref 1–1.03)
T4 FREE: 1.37 NG/DL (ref 0.93–1.76)
TOTAL PROTEIN: 6.8 G/DL (ref 6.1–8)
TROPONIN T: < 0.01 NG/ML
TSH SERPL DL<=0.05 MIU/L-ACNC: 14.87 UIU/ML (ref 0.4–4.2)
UROBILINOGEN, URINE: 2 EU/DL (ref 0–1)
WBC # BLD: 6.9 THOU/MM3 (ref 4.8–10.8)
WBC UA: ABNORMAL /HPF
YEAST: ABNORMAL

## 2022-06-10 PROCEDURE — 87635 SARS-COV-2 COVID-19 AMP PRB: CPT

## 2022-06-10 PROCEDURE — G0378 HOSPITAL OBSERVATION PER HR: HCPCS

## 2022-06-10 PROCEDURE — 96375 TX/PRO/DX INJ NEW DRUG ADDON: CPT

## 2022-06-10 PROCEDURE — 6360000002 HC RX W HCPCS

## 2022-06-10 PROCEDURE — 6360000002 HC RX W HCPCS: Performed by: PHYSICIAN ASSISTANT

## 2022-06-10 PROCEDURE — 87804 INFLUENZA ASSAY W/OPTIC: CPT

## 2022-06-10 PROCEDURE — 82948 REAGENT STRIP/BLOOD GLUCOSE: CPT

## 2022-06-10 PROCEDURE — 93010 ELECTROCARDIOGRAM REPORT: CPT | Performed by: INTERNAL MEDICINE

## 2022-06-10 PROCEDURE — 99220 PR INITIAL OBSERVATION CARE/DAY 70 MINUTES: CPT

## 2022-06-10 PROCEDURE — 71045 X-RAY EXAM CHEST 1 VIEW: CPT

## 2022-06-10 PROCEDURE — 2580000003 HC RX 258

## 2022-06-10 PROCEDURE — 96365 THER/PROPH/DIAG IV INF INIT: CPT

## 2022-06-10 PROCEDURE — 96374 THER/PROPH/DIAG INJ IV PUSH: CPT

## 2022-06-10 PROCEDURE — 96361 HYDRATE IV INFUSION ADD-ON: CPT

## 2022-06-10 PROCEDURE — 83880 ASSAY OF NATRIURETIC PEPTIDE: CPT

## 2022-06-10 PROCEDURE — 93005 ELECTROCARDIOGRAM TRACING: CPT | Performed by: PHYSICIAN ASSISTANT

## 2022-06-10 PROCEDURE — 83735 ASSAY OF MAGNESIUM: CPT

## 2022-06-10 PROCEDURE — 85025 COMPLETE CBC W/AUTO DIFF WBC: CPT

## 2022-06-10 PROCEDURE — 84484 ASSAY OF TROPONIN QUANT: CPT

## 2022-06-10 PROCEDURE — 2580000003 HC RX 258: Performed by: PHYSICIAN ASSISTANT

## 2022-06-10 PROCEDURE — 99285 EMERGENCY DEPT VISIT HI MDM: CPT

## 2022-06-10 PROCEDURE — 83605 ASSAY OF LACTIC ACID: CPT

## 2022-06-10 PROCEDURE — 84439 ASSAY OF FREE THYROXINE: CPT

## 2022-06-10 PROCEDURE — 81001 URINALYSIS AUTO W/SCOPE: CPT

## 2022-06-10 PROCEDURE — 93005 ELECTROCARDIOGRAM TRACING: CPT

## 2022-06-10 PROCEDURE — 96372 THER/PROPH/DIAG INJ SC/IM: CPT

## 2022-06-10 PROCEDURE — 36415 COLL VENOUS BLD VENIPUNCTURE: CPT

## 2022-06-10 PROCEDURE — 80162 ASSAY OF DIGOXIN TOTAL: CPT

## 2022-06-10 PROCEDURE — 84443 ASSAY THYROID STIM HORMONE: CPT

## 2022-06-10 PROCEDURE — 87086 URINE CULTURE/COLONY COUNT: CPT

## 2022-06-10 PROCEDURE — 80053 COMPREHEN METABOLIC PANEL: CPT

## 2022-06-10 PROCEDURE — 84145 PROCALCITONIN (PCT): CPT

## 2022-06-10 PROCEDURE — 84100 ASSAY OF PHOSPHORUS: CPT

## 2022-06-10 PROCEDURE — 6370000000 HC RX 637 (ALT 250 FOR IP)

## 2022-06-10 RX ORDER — VITAMIN B COMPLEX
50 TABLET ORAL DAILY
Status: DISCONTINUED | OUTPATIENT
Start: 2022-06-10 | End: 2022-06-11 | Stop reason: HOSPADM

## 2022-06-10 RX ORDER — MIDODRINE HYDROCHLORIDE 10 MG/1
10 TABLET ORAL
Status: DISCONTINUED | OUTPATIENT
Start: 2022-06-10 | End: 2022-06-11 | Stop reason: HOSPADM

## 2022-06-10 RX ORDER — SODIUM CHLORIDE 0.9 % (FLUSH) 0.9 %
10 SYRINGE (ML) INJECTION EVERY 12 HOURS SCHEDULED
Status: DISCONTINUED | OUTPATIENT
Start: 2022-06-10 | End: 2022-06-11 | Stop reason: HOSPADM

## 2022-06-10 RX ORDER — ONDANSETRON 2 MG/ML
4 INJECTION INTRAMUSCULAR; INTRAVENOUS ONCE
Status: COMPLETED | OUTPATIENT
Start: 2022-06-10 | End: 2022-06-10

## 2022-06-10 RX ORDER — LEVOTHYROXINE SODIUM 0.12 MG/1
125 TABLET ORAL DAILY
Status: DISCONTINUED | OUTPATIENT
Start: 2022-06-10 | End: 2022-06-11 | Stop reason: HOSPADM

## 2022-06-10 RX ORDER — ASPIRIN 81 MG/1
81 TABLET, CHEWABLE ORAL DAILY
Status: DISCONTINUED | OUTPATIENT
Start: 2022-06-10 | End: 2022-06-11 | Stop reason: HOSPADM

## 2022-06-10 RX ORDER — MAGNESIUM SULFATE IN WATER 40 MG/ML
2000 INJECTION, SOLUTION INTRAVENOUS PRN
Status: DISCONTINUED | OUTPATIENT
Start: 2022-06-10 | End: 2022-06-11 | Stop reason: HOSPADM

## 2022-06-10 RX ORDER — SODIUM CHLORIDE 0.9 % (FLUSH) 0.9 %
10 SYRINGE (ML) INJECTION PRN
Status: DISCONTINUED | OUTPATIENT
Start: 2022-06-10 | End: 2022-06-11 | Stop reason: HOSPADM

## 2022-06-10 RX ORDER — DEXTROSE MONOHYDRATE 50 MG/ML
100 INJECTION, SOLUTION INTRAVENOUS PRN
Status: DISCONTINUED | OUTPATIENT
Start: 2022-06-10 | End: 2022-06-11 | Stop reason: HOSPADM

## 2022-06-10 RX ORDER — BUPROPION HYDROCHLORIDE 150 MG/1
150 TABLET ORAL EVERY MORNING
Status: DISCONTINUED | OUTPATIENT
Start: 2022-06-11 | End: 2022-06-11 | Stop reason: HOSPADM

## 2022-06-10 RX ORDER — ACETAMINOPHEN 650 MG/1
650 SUPPOSITORY RECTAL EVERY 6 HOURS PRN
Status: DISCONTINUED | OUTPATIENT
Start: 2022-06-10 | End: 2022-06-11 | Stop reason: HOSPADM

## 2022-06-10 RX ORDER — ONDANSETRON 2 MG/ML
4 INJECTION INTRAMUSCULAR; INTRAVENOUS EVERY 6 HOURS PRN
Status: DISCONTINUED | OUTPATIENT
Start: 2022-06-10 | End: 2022-06-11 | Stop reason: HOSPADM

## 2022-06-10 RX ORDER — ONDANSETRON 4 MG/1
4 TABLET, ORALLY DISINTEGRATING ORAL EVERY 8 HOURS PRN
Status: DISCONTINUED | OUTPATIENT
Start: 2022-06-10 | End: 2022-06-11 | Stop reason: HOSPADM

## 2022-06-10 RX ORDER — FLUOXETINE HYDROCHLORIDE 20 MG/1
40 CAPSULE ORAL DAILY
Status: DISCONTINUED | OUTPATIENT
Start: 2022-06-10 | End: 2022-06-11 | Stop reason: HOSPADM

## 2022-06-10 RX ORDER — POLYETHYLENE GLYCOL 3350 17 G/17G
17 POWDER, FOR SOLUTION ORAL DAILY PRN
Status: DISCONTINUED | OUTPATIENT
Start: 2022-06-10 | End: 2022-06-11 | Stop reason: HOSPADM

## 2022-06-10 RX ORDER — POTASSIUM CHLORIDE 7.45 MG/ML
10 INJECTION INTRAVENOUS PRN
Status: DISCONTINUED | OUTPATIENT
Start: 2022-06-10 | End: 2022-06-11 | Stop reason: HOSPADM

## 2022-06-10 RX ORDER — POTASSIUM CHLORIDE 20 MEQ/1
40 TABLET, EXTENDED RELEASE ORAL PRN
Status: DISCONTINUED | OUTPATIENT
Start: 2022-06-10 | End: 2022-06-11 | Stop reason: HOSPADM

## 2022-06-10 RX ORDER — ENOXAPARIN SODIUM 100 MG/ML
40 INJECTION SUBCUTANEOUS EVERY 24 HOURS
Status: DISCONTINUED | OUTPATIENT
Start: 2022-06-10 | End: 2022-06-11 | Stop reason: HOSPADM

## 2022-06-10 RX ORDER — METOPROLOL SUCCINATE 25 MG/1
12.5 TABLET, EXTENDED RELEASE ORAL DAILY
Status: DISCONTINUED | OUTPATIENT
Start: 2022-06-10 | End: 2022-06-11 | Stop reason: HOSPADM

## 2022-06-10 RX ORDER — ATORVASTATIN CALCIUM 40 MG/1
40 TABLET, FILM COATED ORAL NIGHTLY
Status: DISCONTINUED | OUTPATIENT
Start: 2022-06-10 | End: 2022-06-11 | Stop reason: HOSPADM

## 2022-06-10 RX ORDER — DIGOXIN 125 MCG
125 TABLET ORAL DAILY
Status: DISCONTINUED | OUTPATIENT
Start: 2022-06-10 | End: 2022-06-11 | Stop reason: HOSPADM

## 2022-06-10 RX ORDER — INSULIN LISPRO 100 [IU]/ML
0-6 INJECTION, SOLUTION INTRAVENOUS; SUBCUTANEOUS NIGHTLY
Status: DISCONTINUED | OUTPATIENT
Start: 2022-06-10 | End: 2022-06-11 | Stop reason: HOSPADM

## 2022-06-10 RX ORDER — TRAZODONE HYDROCHLORIDE 50 MG/1
50 TABLET ORAL NIGHTLY PRN
Status: DISCONTINUED | OUTPATIENT
Start: 2022-06-10 | End: 2022-06-11 | Stop reason: HOSPADM

## 2022-06-10 RX ORDER — 0.9 % SODIUM CHLORIDE 0.9 %
500 INTRAVENOUS SOLUTION INTRAVENOUS ONCE
Status: COMPLETED | OUTPATIENT
Start: 2022-06-10 | End: 2022-06-10

## 2022-06-10 RX ORDER — INSULIN LISPRO 100 [IU]/ML
0-12 INJECTION, SOLUTION INTRAVENOUS; SUBCUTANEOUS
Status: DISCONTINUED | OUTPATIENT
Start: 2022-06-10 | End: 2022-06-11 | Stop reason: HOSPADM

## 2022-06-10 RX ORDER — SODIUM CHLORIDE 9 MG/ML
INJECTION, SOLUTION INTRAVENOUS PRN
Status: DISCONTINUED | OUTPATIENT
Start: 2022-06-10 | End: 2022-06-11 | Stop reason: HOSPADM

## 2022-06-10 RX ORDER — ACETAMINOPHEN 325 MG/1
650 TABLET ORAL EVERY 6 HOURS PRN
Status: DISCONTINUED | OUTPATIENT
Start: 2022-06-10 | End: 2022-06-11 | Stop reason: HOSPADM

## 2022-06-10 RX ADMIN — FLUOXETINE 40 MG: 20 CAPSULE ORAL at 17:18

## 2022-06-10 RX ADMIN — DIGOXIN 125 MCG: 125 TABLET ORAL at 17:19

## 2022-06-10 RX ADMIN — ONDANSETRON 4 MG: 2 INJECTION INTRAMUSCULAR; INTRAVENOUS at 10:45

## 2022-06-10 RX ADMIN — METOPROLOL SUCCINATE 12.5 MG: 25 TABLET, EXTENDED RELEASE ORAL at 17:18

## 2022-06-10 RX ADMIN — Medication 2000 UNITS: at 17:18

## 2022-06-10 RX ADMIN — LEVOTHYROXINE SODIUM 125 MCG: 0.12 TABLET ORAL at 17:18

## 2022-06-10 RX ADMIN — SODIUM CHLORIDE, PRESERVATIVE FREE 10 ML: 5 INJECTION INTRAVENOUS at 20:19

## 2022-06-10 RX ADMIN — CEFTRIAXONE SODIUM 1000 MG: 1 INJECTION, POWDER, FOR SOLUTION INTRAMUSCULAR; INTRAVENOUS at 15:31

## 2022-06-10 RX ADMIN — INSULIN LISPRO 1 UNITS: 100 INJECTION, SOLUTION INTRAVENOUS; SUBCUTANEOUS at 20:19

## 2022-06-10 RX ADMIN — MIDODRINE HYDROCHLORIDE 10 MG: 10 TABLET ORAL at 17:18

## 2022-06-10 RX ADMIN — ENOXAPARIN SODIUM 40 MG: 100 INJECTION SUBCUTANEOUS at 17:18

## 2022-06-10 RX ADMIN — SODIUM CHLORIDE 500 ML: 9 INJECTION, SOLUTION INTRAVENOUS at 10:44

## 2022-06-10 RX ADMIN — ATORVASTATIN CALCIUM 40 MG: 40 TABLET, FILM COATED ORAL at 20:19

## 2022-06-10 RX ADMIN — ASPIRIN 81 MG 81 MG: 81 TABLET ORAL at 17:18

## 2022-06-10 ASSESSMENT — ENCOUNTER SYMPTOMS
CONSTIPATION: 0
SHORTNESS OF BREATH: 1
VOMITING: 1
RHINORRHEA: 0
DIARRHEA: 1
SORE THROAT: 0
COLOR CHANGE: 0
ABDOMINAL PAIN: 0
VOMITING: 0
ABDOMINAL DISTENTION: 0
NAUSEA: 1
COUGH: 1

## 2022-06-10 ASSESSMENT — PAIN DESCRIPTION - PAIN TYPE
TYPE: ACUTE PAIN

## 2022-06-10 ASSESSMENT — PAIN - FUNCTIONAL ASSESSMENT
PAIN_FUNCTIONAL_ASSESSMENT: NONE - DENIES PAIN
PAIN_FUNCTIONAL_ASSESSMENT: NONE - DENIES PAIN

## 2022-06-10 ASSESSMENT — PAIN SCALES - GENERAL
PAINLEVEL_OUTOF10: 6
PAINLEVEL_OUTOF10: 0
PAINLEVEL_OUTOF10: 0

## 2022-06-10 ASSESSMENT — PAIN DESCRIPTION - DESCRIPTORS
DESCRIPTORS: SHARP
DESCRIPTORS: TIGHTNESS

## 2022-06-10 ASSESSMENT — PAIN DESCRIPTION - FREQUENCY
FREQUENCY: INTERMITTENT

## 2022-06-10 ASSESSMENT — PAIN DESCRIPTION - LOCATION
LOCATION: CHEST
LOCATION: CHEST

## 2022-06-10 ASSESSMENT — PAIN DESCRIPTION - ORIENTATION: ORIENTATION: MID

## 2022-06-10 ASSESSMENT — LIFESTYLE VARIABLES: HOW OFTEN DO YOU HAVE A DRINK CONTAINING ALCOHOL: NEVER

## 2022-06-10 NOTE — ED NOTES
ED to inpatient nurses report    Chief Complaint   Patient presents with    Diarrhea    Emesis    Fatigue      Present to ED from home  LOC: alert and orientated to name, place, date  Vital signs   Vitals:    06/10/22 0852 06/10/22 1045 06/10/22 1146   BP: 109/84 114/89 (!) 143/73   Pulse: 96 98 91   Resp: 16 16 18   Temp: 97.8 °F (36.6 °C)     TempSrc: Oral     SpO2: 97% 96% 99%   Weight: 213 lb (96.6 kg)     Height: 5' 2\" (1.575 m)        Oxygen Baseline Room Air    Current needs required Room Air Bipap/Cpap No  LDAs:   Peripheral IV 06/10/22 Right Antecubital (Active)   Site Assessment Clean, dry & intact 06/10/22 1045   Phlebitis Assessment No symptoms 06/10/22 1045   Infiltration Assessment 0 06/10/22 1045   Alcohol Cap Used No 06/10/22 1045   Dressing Status New dressing applied;Clean, dry & intact 06/10/22 1045   Dressing Type Transparent 06/10/22 1045   Dressing Intervention New 06/10/22 1045     Mobility: Requires assistance * 2  Pending ED orders: Gastrointestinal Panel  Present condition: pt resting on cot in position of comfort. Pt is A&ox4, resps easy and unlabored at rest. IV shows no s/s of infection or infiltration. Pt denies pain currently.   Person of contract Keith Glasco (son), phone number [phone]  Our promise was given to patient    Electronically signed by Irvin Banerjee RN on 6/10/2022 at 1:30 PM       Irvin BanerjeeDuke Lifepoint Healthcare  06/10/22 8198

## 2022-06-10 NOTE — H&P
Hospitalist - History & Physical      Patient: Vicky Little    Unit/Bed:41/041A  YOB: 1960  MRN: [id number]   Acct: [de-identified]   PCP: Yolanda Pack, APRN - CNP    Date of Service: Pt seen/examined on 06/10/22  and Admitted to Observation with expected LOS less than two midnights due to medical therapy. Chief Complaint:  Nausea and fatigue     Assessment and Plan:  1. UTI, with lactic acidosis:    UA remarkable for leuks, nitrites, bacteria today. PT presents afebrile, non-toxic appearing, denies urinary symptoms. Labs remarkable for LA 2.3, no WBC. Given 1cc in ED. Start Rocephin. Repeat BMP and CBC in the AM.     2. Acute diarrhea:    Etiololgy unclear at this time. TSH noted to be elevated. Pt afebrile, without an acute leukocytosis. Suspect possible viral gastroenteritis vs hypothyroidism. GI panel ordered. 3. Elevated LFTs:    Alk phos, AST, Bilirubin elevated today. Etiology unclear at this time. Pt endorses nausea and diarrhea. No abdominal tenderness noted on exam. Continue to monitor with daily CMP. 4. Hx of suicidal ideation: To note, pt was recently admitted for suicidal ideation. Pt denies suicidal ideation or intention for self harm today. Pt recently changed psychiatric mediations. 5. HFrEF, with ICD:    Last ECHO 5/23/2022 reveals EF 25%. Follows with Dr. Renetta Frances outpatient. BNP elevated today. Appears compensated today. CXR unremarkable. Continue home meds. Strict I&Os, daily weights, 2L fluid restriction. Telemetry. 6. Hypothyroidism:    TSH elevated, TSH 1.37. Pt endorses recent change in Synthroid. She states she has been compliant with home meds. Continue synthroid. Can consider endocrinology referral outpatient. 7. Essential HTN:    BP appears overall controlled. Continue home meds and continue to monitor. 8. GERD:    Continue Protonix. 9. Hx of breast cancer: Follows with Dr. Demond Eastman. Hold letrozole.      History Of Present Illness: Luke Irizarry is a 66 y/o [address] female with a PMHx of Hypothyroidism, HFrE, GERD, Breast CA who presents to [address] [place] [address] ED today for the evaluation of nausea and fatigue. Pt was recently hospitalized for suicidal ideation. Pt states her nausea has been present since her last hospitalization, and has been noteable worse since the last few days. She states she has associated fatigue, generalized weakness, decreased oral intake for the last week, shortness of breath, and chronic productive cough of white sputum. She states she recently followed up with Dr. Froy Gill and had some medication changes because she has been noted to have high potassium in the past. She denies chest pain, worsening shortness of breath, lightheadedness, dizziness, fever. Pt denies urinary symptoms at this time.        Past Medical History:        Diagnosis Date    Abnormal heart rhythm     Anxiety     Cancer [facility])     breast  2016     CHF (congestive heart failure) (HCC)     Congenital heart disease     DJD (degenerative joint disease)     Enlarged lymph nodes     Hypertension     Hypothyroidism     ICD (implantable cardioverter-defibrillator) in place 02/11/2019    Dr. Leah Ham Kidney stones     PONV (postoperative nausea and vomiting)     Prediabetes 10/7/2019    Thyroid disease        Past Surgical History:        Procedure Laterality Date    APPENDECTOMY      CARPAL TUNNEL RELEASE  2019    COLONOSCOPY      COLONOSCOPY N/A 07/27/2020    COLONOSCOPY POLYPECTOMY SNARE/COLD BIOPSY performed by True Castañeda MD at [address] [facility]    COLONOSCOPY  07/27/2020    COLONOSCOPY POLYPECTOMY HOT BIOPSY performed by True Castañeda MD at [address] [facility]    COLONOSCOPY  2021    EGD      EYE SURGERY Bilateral 10/2021    eyelid lift     MASTECTOMY, BILATERAL      PACEMAKER INSERTION Left 02/11/2019    [employer] VISIA PT HAS A MRI CONDITIONAL SYSTEM    PTCA      TONSILLECTOMY      TRIGGER FINGER RELEASE Right 06/25/2020    DEQUERVAINS RELEASE AND RIGHT RING FINGER TRIGGER RELEASE performed by Dee Mohan DO at [address] Medications:   No current facility-administered medications on file prior to encounter. Current Outpatient Medications on File Prior to Encounter   Medication Sig Dispense Refill    buPROPion (WELLBUTRIN XL) 150 MG extended release tablet Take 1 tablet by mouth every morning 30 tablet 0    FLUoxetine (PROZAC) 40 MG capsule Take 1 capsule by mouth daily 30 capsule 0    traZODone (DESYREL) 50 MG tablet Take 1 tablet by mouth nightly as needed for Sleep 30 tablet 0    midodrine (PROAMATINE) 10 MG tablet Take 1 tablet by mouth 3 times daily (with meals) 90 tablet 1    Blood Pressure KIT 1 each by Does not apply route in the morning, at noon, and at bedtime 1 kit 0    levothyroxine (EUTHYROX) 125 MCG tablet Take 1 tablet by mouth Daily 30 tablet 1    clonazePAM (KLONOPIN) 1 MG tablet Take 1 mg by mouth nightly.  Unsure who is prescribing (Patient not taking: Reported on 6/1/2022)      dapagliflozin (FARXIGA) 10 MG tablet Take 1 tablet by mouth every morning (Patient taking differently: Take 10 mg by mouth every morning Pt has not started yet) 30 tablet 5    metoprolol succinate (TOPROL XL) 25 MG extended release tablet Take 0.5 tablets by mouth daily Hold if SBP less than 95 mmHg or HR less than 50 bpm (Patient taking differently: Take 12.5 mg by mouth daily Hold if SBP less than 95 mmHg or HR less than 50 bp  Takes at 1400) 30 tablet 3    digoxin (LANOXIN) 125 MCG tablet Take 1 tablet by mouth daily (Patient taking differently: Take 125 mcg by mouth daily Takes at 1400) 90 tablet 0    Cholecalciferol 50 MCG ([address]) CAPS Take 50 mcg by mouth daily 30 capsule 3    metFORMIN (GLUCOPHAGE-XR) 500 MG extended release tablet Take 1 tablet by mouth once daily with breakfast 90 tablet 0    CPAP Machine MISC by Does not apply route Please change CPAP pressure to auto 11-15 cm H20. 1 each 0    omeprazole (PRILOSEC) 40 MG delayed release capsule Take 1 capsule by mouth every morning (before breakfast) 90 capsule 1    letrozole (FEMARA) 2.5 MG tablet Take 1 tablet by mouth daily 90 tablet 3    polyethylene glycol (GLYCOLAX) 17 GM/SCOOP powder DISSOLVE & TAKE 1 CAPFUL ONCE DAILY      atorvastatin (LIPITOR) 40 MG tablet Take 1 tablet by mouth nightly 90 tablet 3    aspirin 81 MG chewable tablet Take 1 tablet by mouth daily 30 tablet 3    acetaminophen (TYLENOL) 500 MG tablet Take 500 mg by mouth every 6 hours as needed for Pain         Allergies:    Adhesive tape    Social History:    reports that she quit smoking about 2 years ago. Her smoking use included cigarettes. She started smoking about 40 years ago. She has a 9.25 pack-year smoking history. She has never used smokeless tobacco. She reports that she does not drink alcohol and does not use drugs. Family History:       Problem Relation Age of Onset    High Blood Pressure Mother     Dementia Mother     Cancer Father     Colon Cancer Father     Mental Retardation Brother     Colon Polyps Brother     Cancer Maternal Grandfather     Esophageal Cancer Neg Hx        Diet:  No diet orders on file    Review of systems:     Review of Systems   Constitutional: Positive for activity change and fatigue. Negative for appetite change, chills and fever. HENT: Negative for congestion, rhinorrhea and sore throat. Respiratory: Positive for cough and shortness of breath. Cardiovascular: Negative for chest pain, palpitations and leg swelling. Gastrointestinal: Positive for diarrhea and nausea. Negative for abdominal distention, abdominal pain, constipation and vomiting. Genitourinary: Negative for difficulty urinating, frequency and urgency. Musculoskeletal: Positive for gait problem. Negative for arthralgias. Neurological: Positive for weakness. Negative for dizziness, light-headedness and headaches.              PHYSICAL EXAM:  BP (!) 143/73   Pulse 91   Temp 97.8 °F (36.6 °C) (Oral)   Resp 18   Ht 5' 2\" (1.575 m)   Wt 213 lb (96.6 kg)   SpO2 99%   BMI 38.96 kg/m²   General appearance: No apparent distress. Appears stated age and cooperative. Skin: Skin color, texture, turgor normal.  No rashes or lesions. HEENT: Normal cephalic, atraumatic without obvious deformity. Pupils equal, round, and reactive to light. Extra-ocular muscles intact. Conjunctivae/corneas clear. Neck: Trachea midline. Supple, with full range of motion. No jugular venous distention. Cardiovascular: Regular rate and rhythm with normal S1/S2. No murmurs, rubs or gallops. Respiratory:  Normal respiratory effort. Clear to auscultation, bilaterally without rales, wheezes, or rhonchi. Abdomen: Soft, non-tender, non-distended. Normal bowel sounds. Musculoskeletal:  No weakness or instability noted. No edema, erythema, or gross deformity noted. Vascular: Pulses +2 palpable, equal bilaterally. Neurologic:  Neurovascularly intact without any focal sensory/motor deficits. Cranial nerves: II-XII grossly intact. Psychiatric: Alert and oriented, thought content appropriate, normal insight      Labs:   Recent Labs     06/10/22  1000   WBC 6.9   HGB 12.5   HCT 39.9        Recent Labs     06/10/22  1000      K 4.2      CO2 22*   BUN 22   CREATININE 1.1   CALCIUM 9.2   PHOS 4.0     Recent Labs     06/10/22  1000   AST 59*   ALT 55   BILITOT 1.8*   ALKPHOS 130*     No results for input(s): INR in the last 72 hours. No results for input(s): Keyshawn Snowball in the last 72 hours. Urinalysis:    Lab Results   Component Value Date    NITRU Negative 10/18/2021    WBCUA 0-2 08/30/2021    BACTERIA NONE SEEN 08/30/2021    RBCUA 0-2 08/30/2021    BLOODU Small 10/18/2021    BLOODU NEGATIVE 08/30/2021    SPECGRAV 1.015 08/18/2020    GLUCOSEU Negative 10/18/2021       Radiology:   XR CHEST PORTABLE   Final Result   1. No acute cardiopulmonary finding.             **This report has been created using voice recognition software. It may contain minor errors which are inherent in voice recognition technology. **      Final report electronically signed by Dr Burgess Layton on 6/10/2022 10:43 AM        XR CHEST PORTABLE    Result Date: 6/10/2022  PROCEDURE: XR CHEST PORTABLE CLINICAL INFORMATION: Chest pain, shortness of breath, fatigue COMPARISON: Chest radiograph 6/2/2022 TECHNIQUE: AP portable chest radiograph performed. FINDINGS: Cardiac conduction device is seen with single lead. The lead is intact. No focal pulmonary consolidation. Cardiac silhouette is moderately enlarged. Platelike atelectasis seen in the lingula No pleural effusion. No pneumothorax. No acute bony abnormality. 1. No acute cardiopulmonary finding. **This report has been created using voice recognition software. It may contain minor errors which are inherent in voice recognition technology. ** Final report electronically signed by Dr Burgess Layton on 6/10/2022 10:43 AM        EKG: NSR, possible left atrial enlargement, non-specific T wave abnormality    Electronically signed by ELISE Ospina on 6/10/2022 at 12:44 PM

## 2022-06-10 NOTE — PROGRESS NOTES
ALFRED Ospina made aware that patient scored high on her suicide screening performed on admission. Per patient, she was admitted about a week ago for SI and is not currently having any thoughts of harming herself. The patient stated that she has been provided with the necessary help but has been unable to seek that help or take her medication due to her abdominal pain and nausea. Per ALFRED Ospina, the patient does not require constant observation or a sitter at this time.

## 2022-06-10 NOTE — ED NOTES
Patient presents to the ED with complaints of being fatigued, having diarrhea and vomiting. She has no other complaints at this time.       Neha Collazo LPN  87/44/63 2014

## 2022-06-10 NOTE — ED NOTES
In for hourly rounding. Pt resting on cot in position of comfort. Pt remains A&Ox4, resps easy and unlabored. IV infusing and shows no s/s of infection or infiltration. Pt currently denies pain, reports fatigue and generalized \"blah\" feeling. Medicated pt per MAR. Monitor remains in place. Updated pt on POC. Will monitor.      Gretel Lundborg, RN  06/10/22 1852

## 2022-06-10 NOTE — ED NOTES
Pt transported to [facility] on cart in stable condition. Floor contacted before transport and spoke with Camden Bautista.      Winsome Elmore  06/10/22 [id number]

## 2022-06-10 NOTE — PLAN OF CARE
Problem: Discharge Planning  Goal: Discharge to home or other facility with appropriate resources  Outcome: Progressing  Flowsheets (Taken 6/10/2022 1413)  Discharge to home or other facility with appropriate resources: Identify barriers to discharge with patient and caregiver     Problem: Safety - Adult  Goal: Free from fall injury  Outcome: Progressing     Problem: ABCDS Injury Assessment  Goal: Absence of physical injury  Outcome: Progressing     Problem: Self Harm/Suicidality  Goal: Will have no self-injury during hospital stay  Description: INTERVENTIONS:  1. Q 30 MINUTES: Routine safety checks  2. Q SHIFT & PRN: Assess risk to determine if routine checks are adequate to maintain patient safety  Outcome: Progressing

## 2022-06-10 NOTE — ED PROVIDER NOTES
[facility]  eMERGENCY dEPARTMENT eNCOUnter          [address]       Chief Complaint   Patient presents with    Diarrhea    Emesis    Fatigue       Nurses Notes reviewed and I agree except as noted inthe HPI. HISTORY OF PRESENT ILLNESS    Josee Owens is a 65 y.o. female who presents to the Emergency Department for the evaluation of vomiting, diarrhea, fatigue. States that she has had nausea and vomiting ongoing throughout the week. She had normal bowel movements the first couple days of symptoms with loose stools 2 days ago and diarrhea since then. Reports stools have possibly been black and denies having taken Pepto-Bismol or iron. She reports seeing some blood spots in her emesis and reports orange urinary output. Denied any known sick contacts or dietary changes. She reports multiple medication changes earlier this month secondary to hospitalization for hyperkalemia. She has not been able to tolerate her medications recently due to the nausea and vomiting which has been getting worse. She denies any associated fevers or generalized body aches. She states that when she walks, she can feel all of her muscles but describes this as being due to overwhelming fatigue and weakness. States it is exhausting and difficult for her to ambulate or shower. She reports discomfort in the left neck that occurs with exertion for the past 2 days, no prior history of this. She also reports shortness of breath that is worse on exertion, worsening of her chronic cough, worsening lower extremity edema due to her inability to keep her Lasix down, and worsening of chronic orthopnea. She complains of several episodes of a sharp, intermittent, midsternal chest pain that occurs after exertion. It lasts approximately 2 seconds and she denies any currently. No associated abdominal pain.   States that she had to go to [facility] for a family medical emergency and was there from February until May 8 and was out of her medications for about 3 months while she was down there, had been able to resume them prior to coming back to the [address] [place] [address],[address]. Denies dizziness. The HPI was provided by the patient. REVIEW OF SYSTEMS     Review of Systems   Constitutional: Positive for appetite change, chills and fatigue. Negative for fever. Respiratory: Positive for cough and shortness of breath. Cardiovascular: Positive for chest pain, palpitations and leg swelling. Gastrointestinal: Positive for diarrhea, nausea and vomiting. Negative for abdominal pain. Genitourinary: Negative for dysuria. Musculoskeletal: Negative for arthralgias and myalgias. Skin: Negative for color change. Neurological: Negative for syncope and light-headedness. All other systems reviewed and are negative. PAST MEDICAL HISTORY    has a past medical history of Abnormal heart rhythm, Anxiety, Cancer (HCC), CHF (congestive heart failure) ([facility] [address] Congenital heart disease, DJD (degenerative joint disease), Enlarged lymph nodes, Hypertension, Hypothyroidism, ICD (implantable cardioverter-defibrillator) in place, Kidney stones, PONV (postoperative nausea and vomiting), Prediabetes, and Thyroid disease. SURGICAL HISTORY      has a past surgical history that includes Mastectomy, bilateral; Tonsillectomy; Percutaneous Transluminal Coronary Angio; Appendectomy; Colonoscopy; Carpal tunnel release (2019); Finger trigger release (Right, 06/25/2020); Colonoscopy (N/A, 07/27/2020); Colonoscopy (07/27/2020); Pacemaker insertion (Left, 02/11/2019); Colonoscopy (2021); EGD; and Eye surgery (Bilateral, 10/2021).     CURRENT MEDICATIONS       Current Discharge Medication List      CONTINUE these medications which have NOT CHANGED    Details   buPROPion (WELLBUTRIN XL) 150 MG extended release tablet Take 1 tablet by mouth every morning  Qty: 30 tablet, Refills: 0      FLUoxetine (PROZAC) 40 MG capsule Take 1 capsule by mouth daily  Qty: 30 capsule, Refills: 0      traZODone (DESYREL) 50 MG tablet Take 1 tablet by mouth nightly as needed for Sleep  Qty: 30 tablet, Refills: 0      midodrine (PROAMATINE) 10 MG tablet Take 1 tablet by mouth 3 times daily (with meals)  Qty: 90 tablet, Refills: 1      Blood Pressure KIT 1 each by Does not apply route in the morning, at noon, and at bedtime  Qty: 1 kit, Refills: 0      levothyroxine (EUTHYROX) 125 MCG tablet Take 1 tablet by mouth Daily  Qty: 30 tablet, Refills: 1    Associated Diagnoses: Hypothyroidism, unspecified type      clonazePAM (KLONOPIN) 1 MG tablet Take 1 mg by mouth nightly. Unsure who is prescribing      dapagliflozin (FARXIGA) 10 MG tablet Take 1 tablet by mouth every morning  Qty: 30 tablet, Refills: 5      metoprolol succinate (TOPROL XL) 25 MG extended release tablet Take 0.5 tablets by mouth daily Hold if SBP less than 95 mmHg or HR less than 50 bpm  Qty: 30 tablet, Refills: 3      digoxin (LANOXIN) 125 MCG tablet Take 1 tablet by mouth daily  Qty: 90 tablet, Refills: 0      Cholecalciferol 50 MCG (2000 UT) CAPS Take 50 mcg by mouth daily  Qty: 30 capsule, Refills: 3      metFORMIN (GLUCOPHAGE-XR) 500 MG extended release tablet Take 1 tablet by mouth once daily with breakfast  Qty: 90 tablet, Refills: 0    Associated Diagnoses: Prediabetes      CPAP Machine MISC by Does not apply route Please change CPAP pressure to auto 11-15 cm H20. Qty: 1 each, Refills: 0      omeprazole (PRILOSEC) 40 MG delayed release capsule Take 1 capsule by mouth every morning (before breakfast)  Qty: 90 capsule, Refills: 1    Associated Diagnoses: Gastroesophageal reflux disease without esophagitis; Chronic superficial gastritis without bleeding      letrozole (FEMARA) 2.5 MG tablet Take 1 tablet by mouth daily  Qty: 90 tablet, Refills: 3    Associated Diagnoses: Malignant neoplasm of left breast in female, estrogen receptor positive, unspecified site of breast ([facility] [address]  Use of letrozole (Femara)      polyethylene glycol (GLYCOLAX) 17 GM/SCOOP powder DISSOLVE & TAKE 1 CAPFUL ONCE DAILY      atorvastatin (LIPITOR) 40 MG tablet Take 1 tablet by mouth nightly  Qty: 90 tablet, Refills: 3    Associated Diagnoses: Mixed hyperlipidemia      aspirin 81 MG chewable tablet Take 1 tablet by mouth daily  Qty: 30 tablet, Refills: 3      acetaminophen (TYLENOL) 500 MG tablet Take 500 mg by mouth every 6 hours as needed for Pain             ALLERGIES     is allergic to adhesive tape. FAMILY HISTORY     She indicated that her mother is alive. She indicated that her father is . She indicated that both of her brothers are alive. She indicated that the status of her maternal grandfather is unknown. She indicated that the status of her neg hx is unknown.   family history includes Cancer in her father and maternal grandfather; Barbara Opitz in her father; Colon Polyps in her brother; Dementia in her mother; High Blood Pressure in her mother; Mental Retardation in her brother. SOCIAL HISTORY      reports that she quit smoking about 2 years ago. Her smoking use included cigarettes. She started smoking about 40 years ago. She has a 9.25 pack-year smoking history. She has never used smokeless tobacco. She reports that she does not drink alcohol and does not use drugs. PHYSICAL EXAM     INITIAL VITALS:  height is 5' 2\" (1.575 m) and weight is 208 lb 1.8 oz (94.4 kg). Her oral temperature is 97.9 °F (36.6 °C). Her blood pressure is 103/71 and her pulse is 78. Her respiration is 18 and oxygen saturation is 99%. Physical Exam  Vitals and nursing note reviewed. HENT:      Head: Normocephalic and atraumatic. Mouth/Throat:      Pharynx: Oropharynx is clear. Eyes:      Conjunctiva/sclera: Conjunctivae normal.   Cardiovascular:      Rate and Rhythm: Normal rate and regular rhythm. Heart sounds: Heart sounds are distant (Possibly due to body habitus). No murmur heard.       Pulmonary:      Effort: Pulmonary effort is normal. No respiratory distress. Breath sounds: Normal breath sounds. No wheezing or rhonchi. Abdominal:      Palpations: Abdomen is soft. Tenderness: There is no abdominal tenderness. There is no guarding or rebound. Musculoskeletal:      Cervical back: Normal range of motion. Right lower leg: No tenderness. 1+ Pitting Edema present. Left lower leg: No tenderness. 1+ Pitting Edema present. Skin:     General: Skin is warm and dry. Neurological:      General: No focal deficit present. Mental Status: She is alert and oriented to person, place, and time. Psychiatric:         Mood and Affect: Mood is anxious. DIFFERENTIAL DIAGNOSIS:   Differential diagnoses are discussed    DIAGNOSTIC RESULTS     EKG: All EKG's are interpreted by the Emergency Department Physician who either signs or Co-signsthis chart in the absence of a cardiologist.    Daocindy Julien. Rate: 96 bpm  PRinterval: 198 ms  QRS duration: 98 ms  QTc: 464 ms  P-R-T axes: 40, -9, 71  NSR. No STEMI. Compared to old EKG on 6-2-22      RADIOLOGY: non-plain film images(s) such as CT, Ultrasound and MRI are read by the radiologist.    XR CHEST PORTABLE   Final Result   1. No acute cardiopulmonary finding. **This report has been created using voice recognition software. It may contain minor errors which are inherent in voice recognition technology. **      Final report electronically signed by Dr Sharon Cano on 6/10/2022 10:43 AM          LABS:      Labs Reviewed   CBC WITH AUTO DIFFERENTIAL - Abnormal; Notable for the following components:       Result Value    MCHC 31.3 (*)     RDW-CV 16.0 (*)     RDW-SD 53.1 (*)     All other components within normal limits   COMPREHENSIVE METABOLIC PANEL - Abnormal; Notable for the following components:    Glucose 112 (*)     CO2 22 (*)     AST 59 (*)     Alkaline Phosphatase 130 (*)     Total Bilirubin 1.8 (*)     All other components within normal limits   BRAIN NATRIURETIC PEPTIDE - Abnormal; Notable for the following components:    Pro-BNP 72403.0 (*)     All other components within normal limits   LACTIC ACID - Abnormal; Notable for the following components:    Lactic Acid 2.3 (*)     All other components within normal limits   TSH WITH REFLEX - Abnormal; Notable for the following components:    TSH 14.870 (*)     All other components within normal limits   GLOMERULAR FILTRATION RATE, ESTIMATED - Abnormal; Notable for the following components:    Est, Glom Filt Rate 50 (*)     All other components within normal limits   URINE WITH REFLEXED MICRO - Abnormal; Notable for the following components:    Bilirubin Urine SMALL (*)     Ketones, Urine TRACE (*)     Protein,  (*)     Urobilinogen, Urine 2.0 (*)     Nitrite, Urine POSITIVE (*)     Leukocyte Esterase, Urine MODERATE (*)     Color, UA DARK YELLOW (*)     Character, Urine CLOUDY (*)     All other components within normal limits   COMPREHENSIVE METABOLIC PANEL W/ REFLEX TO MG FOR LOW K - Abnormal; Notable for the following components:    BUN 28 (*)     CO2 17 (*)      (*)     Alkaline Phosphatase 172 (*)     Total Bilirubin 2.1 (*)      (*)     All other components within normal limits   CBC WITH AUTO DIFFERENTIAL - Abnormal; Notable for the following components:    MCHC 29.6 (*)     RDW-CV 16.3 (*)     RDW-SD 55.8 (*)     MPV 12.8 (*)     All other components within normal limits   ANION GAP - Abnormal; Notable for the following components:    Anion Gap 19.0 (*)     All other components within normal limits   GLOMERULAR FILTRATION RATE, ESTIMATED - Abnormal; Notable for the following components:    Est, Glom Filt Rate 46 (*)     All other components within normal limits   POCT GLUCOSE - Abnormal; Notable for the following components:    POC Glucose 110 (*)     All other components within normal limits   POCT GLUCOSE - Abnormal; Notable for the following components:    POC Glucose 172 (*)     All other components within normal limits   RAPID INFLUENZA A/B ANTIGENS   COVID-19, RAPID   GASTROINTESTINAL PANEL, MOLECULAR   CULTURE, REFLEXED, URINE   C DIFF TOXIN/ANTIGEN   MAGNESIUM   PHOSPHORUS   DIGOXIN LEVEL   TROPONIN   PROCALCITONIN   ANION GAP   OSMOLALITY   T4, FREE   BILE ACIDS, TOTAL   LACTIC ACID   POCT GLUCOSE   POCT GLUCOSE   POCT GLUCOSE       EMERGENCY DEPARTMENT COURSE:   Vitals:    Vitals:    06/10/22 2015 06/10/22 2240 06/10/22 2245 06/11/22 0318   BP: 114/82  103/79 103/71   Pulse: 92  87 78   Resp: 18  18 18   Temp: 97.5 °F (36.4 °C)   97.9 °F (36.6 °C)   TempSrc: Oral   Oral   SpO2: 96% (!) 87% 98% 99%   Weight:       Height:          10:42 AM EDT: The patient was seen and evaluated. Patient presents with reassuring vital signs for complaints of overwhelming fatigue/exhaustion, exertional shortness of breath, worsening of chronic cough/orthopnea which is suspected to be secondary to the nausea, vomiting, diarrhea that is been ongoing throughout the week. Abdomen is soft, nontender. Lung sounds clear. She has grossly reassuring work-up aside from elevated BNP, lactic acid, TSH. Chest x-ray unremarkable. On attempt at ambulatory trial, patient is maintaining oxygen saturation at 94% but only able to walk from her bed into the hallway before she is reliant on a wheelchair to rest.  Given her minimal function to perform ADLs and suspected acute on chronic CHF exacerbation, we did discuss admission and patient was agreeable. Discussed with the hospitalist service will admit the patient for further care. Urinalysis and GI pathogen's panel still pending at this time. CRITICAL CARE:   None    CONSULTS:  Hospitalist    PROCEDURES:  None    FINAL IMPRESSION      1. Acute on chronic congestive heart failure, unspecified heart failure type ([facility] [address]    2. Gastroenteritis          DISPOSITION/PLAN   Admit    PATIENT REFERRED TO:  No follow-up provider specified.     DISCHARGEMEDICATIONS:  Current Discharge Medication List

## 2022-06-11 VITALS
OXYGEN SATURATION: 94 % | HEART RATE: 80 BPM | DIASTOLIC BLOOD PRESSURE: 83 MMHG | SYSTOLIC BLOOD PRESSURE: 104 MMHG | TEMPERATURE: 97.5 F | RESPIRATION RATE: 18 BRPM | HEIGHT: 62 IN | BODY MASS INDEX: 38.3 KG/M2 | WEIGHT: 208.11 LBS

## 2022-06-11 LAB
ALBUMIN SERPL-MCNC: 4 G/DL (ref 3.5–5.1)
ALP BLD-CCNC: 172 U/L (ref 38–126)
ALT SERPL-CCNC: 110 U/L (ref 11–66)
ANION GAP SERPL CALCULATED.3IONS-SCNC: 19 MEQ/L (ref 8–16)
AST SERPL-CCNC: 118 U/L (ref 5–40)
ATYPICAL LYMPHOCYTES: ABNORMAL %
BASOPHILS # BLD: 0.5 %
BASOPHILS ABSOLUTE: 0 THOU/MM3 (ref 0–0.1)
BILIRUB SERPL-MCNC: 2.1 MG/DL (ref 0.3–1.2)
BUN BLDV-MCNC: 28 MG/DL (ref 7–22)
CALCIUM SERPL-MCNC: 8.9 MG/DL (ref 8.5–10.5)
CHLORIDE BLD-SCNC: 101 MEQ/L (ref 98–111)
CO2: 17 MEQ/L (ref 23–33)
CREAT SERPL-MCNC: 1.2 MG/DL (ref 0.4–1.2)
EKG ATRIAL RATE: 89 BPM
EKG P AXIS: 36 DEGREES
EKG P-R INTERVAL: 200 MS
EKG Q-T INTERVAL: 392 MS
EKG QRS DURATION: 98 MS
EKG QTC CALCULATION (BAZETT): 476 MS
EKG R AXIS: -21 DEGREES
EKG T AXIS: 99 DEGREES
EKG VENTRICULAR RATE: 89 BPM
EOSINOPHIL # BLD: 1.8 %
EOSINOPHILS ABSOLUTE: 0.1 THOU/MM3 (ref 0–0.4)
ERYTHROCYTE [DISTWIDTH] IN BLOOD BY AUTOMATED COUNT: 16.3 % (ref 11.5–14.5)
ERYTHROCYTE [DISTWIDTH] IN BLOOD BY AUTOMATED COUNT: 55.8 FL (ref 35–45)
GFR SERPL CREATININE-BSD FRML MDRD: 46 ML/MIN/1.73M2
GLUCOSE BLD-MCNC: 100 MG/DL (ref 70–108)
GLUCOSE BLD-MCNC: 106 MG/DL (ref 70–108)
GLUCOSE BLD-MCNC: 107 MG/DL (ref 70–108)
HCT VFR BLD CALC: 40.6 % (ref 37–47)
HEMOGLOBIN: 12 GM/DL (ref 12–16)
IMMATURE GRANS (ABS): 0.04 THOU/MM3 (ref 0–0.07)
IMMATURE GRANULOCYTES: 0.5 %
LYMPHOCYTES # BLD: 30.4 %
LYMPHOCYTES ABSOLUTE: 2.3 THOU/MM3 (ref 1–4.8)
MCH RBC QN AUTO: 28.3 PG (ref 26–33)
MCHC RBC AUTO-ENTMCNC: 29.6 GM/DL (ref 32.2–35.5)
MCV RBC AUTO: 95.8 FL (ref 81–99)
MONOCYTES # BLD: 6.5 %
MONOCYTES ABSOLUTE: 0.5 THOU/MM3 (ref 0.4–1.3)
NUCLEATED RED BLOOD CELLS: 0 /100 WBC
ORGANISM: ABNORMAL
PLATELET # BLD: 208 THOU/MM3 (ref 130–400)
PLATELET ESTIMATE: ADEQUATE
PMV BLD AUTO: 12.8 FL (ref 9.4–12.4)
POTASSIUM REFLEX MAGNESIUM: 4.8 MEQ/L (ref 3.5–5.2)
RBC # BLD: 4.24 MILL/MM3 (ref 4.2–5.4)
SCAN OF BLOOD SMEAR: NORMAL
SEG NEUTROPHILS: 60.3 %
SEGMENTED NEUTROPHILS ABSOLUTE COUNT: 4.6 THOU/MM3 (ref 1.8–7.7)
SODIUM BLD-SCNC: 137 MEQ/L (ref 135–145)
TOTAL PROTEIN: 6.4 G/DL (ref 6.1–8)
URINE CULTURE REFLEX: ABNORMAL
WBC # BLD: 7.7 THOU/MM3 (ref 4.8–10.8)

## 2022-06-11 PROCEDURE — 93010 ELECTROCARDIOGRAM REPORT: CPT | Performed by: INTERNAL MEDICINE

## 2022-06-11 PROCEDURE — 80053 COMPREHEN METABOLIC PANEL: CPT

## 2022-06-11 PROCEDURE — 36415 COLL VENOUS BLD VENIPUNCTURE: CPT

## 2022-06-11 PROCEDURE — 6370000000 HC RX 637 (ALT 250 FOR IP)

## 2022-06-11 PROCEDURE — 85025 COMPLETE CBC W/AUTO DIFF WBC: CPT

## 2022-06-11 PROCEDURE — 2580000003 HC RX 258

## 2022-06-11 PROCEDURE — 99217 PR OBSERVATION CARE DISCHARGE MANAGEMENT: CPT | Performed by: HOSPITALIST

## 2022-06-11 PROCEDURE — 82948 REAGENT STRIP/BLOOD GLUCOSE: CPT

## 2022-06-11 PROCEDURE — G0378 HOSPITAL OBSERVATION PER HR: HCPCS

## 2022-06-11 RX ORDER — CEPHALEXIN 500 MG/1
500 CAPSULE ORAL 4 TIMES DAILY
Qty: 12 CAPSULE | Refills: 0 | Status: SHIPPED | OUTPATIENT
Start: 2022-06-11 | End: 2022-06-14

## 2022-06-11 RX ADMIN — MIDODRINE HYDROCHLORIDE 10 MG: 10 TABLET ORAL at 13:00

## 2022-06-11 RX ADMIN — BUPROPION HYDROCHLORIDE 150 MG: 150 TABLET, FILM COATED, EXTENDED RELEASE ORAL at 09:12

## 2022-06-11 RX ADMIN — Medication 2000 UNITS: at 09:12

## 2022-06-11 RX ADMIN — SODIUM CHLORIDE, PRESERVATIVE FREE 10 ML: 5 INJECTION INTRAVENOUS at 09:13

## 2022-06-11 RX ADMIN — METOPROLOL SUCCINATE 12.5 MG: 25 TABLET, EXTENDED RELEASE ORAL at 09:12

## 2022-06-11 RX ADMIN — DIGOXIN 125 MCG: 125 TABLET ORAL at 09:12

## 2022-06-11 RX ADMIN — ASPIRIN 81 MG 81 MG: 81 TABLET ORAL at 09:12

## 2022-06-11 RX ADMIN — FLUOXETINE 40 MG: 20 CAPSULE ORAL at 09:12

## 2022-06-11 RX ADMIN — LEVOTHYROXINE SODIUM 125 MCG: 0.12 TABLET ORAL at 06:08

## 2022-06-11 RX ADMIN — MIDODRINE HYDROCHLORIDE 10 MG: 10 TABLET ORAL at 09:12

## 2022-06-11 NOTE — PLAN OF CARE
assessment  Taken 6/10/2022 2245 by Britt Palumbo RN  Absence of Physical Injury: Implement safety measures based on patient assessment  Note: Ongoing assessment & interventions provided throughout shift. Skin assessments provided. Encouraged patient to turn as needed. Problem: Self Harm/Suicidality  Goal: Will have no self-injury during hospital stay  Description: INTERVENTIONS:  1. Q 30 MINUTES: Routine safety checks  2. Q SHIFT & PRN: Assess risk to determine if routine checks are adequate to maintain patient safety  6/11/2022 0022 by Bren Hook RN  Outcome: Progressing  Note: Patient was assessed for suicidal ideation. Problem: Anxiety  Goal: Will report anxiety at manageable levels  Description: INTERVENTIONS:  1. Administer medication as ordered  2. Teach and rehearse alternative coping skills  3. Provide emotional support with 1:1 interaction with staff  Outcome: Progressing  Flowsheets (Taken 6/11/2022 0022)  Will report anxiety at manageable levels:   Administer medication as ordered   Provide emotional support with 1:1 interaction with staff  Note: Patient was assessed for level of anxiousness. Problem: Depression/Self Harm  Goal: Effect of psychiatric condition will be minimized and patient will be protected from self harm  Description: INTERVENTIONS:  1. Assess impact of patient's symptoms on level of functioning, self care needs and offer support as indicated  2. Assess patient/family knowledge of depression, impact on illness and need for teaching  3. Provide emotional support, presence and reassurance  4. Assess for possible suicidal thoughts or ideation. If patient expresses suicidal thoughts or statements do not leave alone, initiate Suicide Precautions, move to a room close to the nursing station and obtain sitter  5.  Initiate consults as appropriate with Mental Health Professional, Spiritual Care, Psychosocial CNS, and consider a recommendation to the LIP for a Psychiatric Consultation  Outcome: Progressing  Flowsheets (Taken 6/11/2022 0022)  Effect of psychiatric condition will be minimized and patient will be protected from self harm:   Assess impact of patients symptoms on level of functioning, self care needs and offer support as indicated   Assess patient/family knowledge of depression, impact on illness and need for teaching     Problem: Gastrointestinal - Adult  Goal: Minimal or absence of nausea and vomiting  Outcome: Progressing  Flowsheets (Taken 6/11/2022 0022)  Minimal or absence of nausea and vomiting:   Administer IV fluids as ordered to ensure adequate hydration   Nasogastric tube to low intermittent suction as ordered  Note: Nasogastric tube was assessed and clean, dry and intact. Problem: Genitourinary - Adult  Goal: Absence of urinary retention  Outcome: Progressing  Flowsheets (Taken 6/11/2022 0022)  Absence of urinary retention:   Assess patients ability to void and empty bladder   Monitor intake/output and perform bladder scan as needed  Note: Patient's output was measured. Problem: Infection - Adult  Goal: Absence of infection at discharge  Outcome: Progressing  Flowsheets (Taken 6/10/2022 2016 by Mayra Mejia RN)  Absence of infection at discharge:   Assess and monitor for signs and symptoms of infection   Monitor lab/diagnostic results  Note: Signs and symptoms were monitored for infection.       Problem: Pain  Goal: Verbalizes/displays adequate comfort level or baseline comfort level  Outcome: Progressing  Flowsheets  Taken 6/11/2022 0022 by Eduardo Damico RN  Verbalizes/displays adequate comfort level or baseline comfort level:   Encourage patient to monitor pain and request assistance   Administer analgesics based on type and severity of pain and evaluate response   Assess pain using appropriate pain scale  Taken 6/10/2022 1353 by Sheryl Phelps RN  Verbalizes/displays adequate comfort level or baseline comfort level: Encourage patient to monitor pain and request assistance  Note: Ongoing assessment & interventions provided throughout shift. Reminded patient to report any pain, pressure, or shortness of breath to the nurse. Pain medications provided per physician's orders. Care plan reviewed with patient. Patient verbalizes understanding of the care plan and contributed to goal setting.

## 2022-06-11 NOTE — DISCHARGE SUMMARY
Discharge Summary    Patient:  Pancho Sabillon  YOB: 1960    MRN: [id number]   Acct: [de-identified]    Primary Care Physician: ANGELIA Chadwick CNP    Admit date:  6/10/2022    Discharge date:  6/11/2022       Discharge Diagnoses:   Abdominal pain  Principal Problem:    Abdominal pain  Resolved Problems:    * No resolved hospital problems. *        Admitted for: (HPI)  Deidre Trinh is a 66 y/o [address] female with a PMHx of Hypothyroidism, HFrE, GERD, Breast CA who presents to [facility] ED today for the evaluation of nausea and fatigue. Pt was recently hospitalized for suicidal ideation. Pt states her nausea has been present since her last hospitalization, and has been noteable worse since the last few days. She states she has associated fatigue, generalized weakness, decreased oral intake for the last week, shortness of breath, and chronic productive cough of white sputum. She states she recently followed up with Dr. Marisol Ren and had some medication changes because she has been noted to have high potassium in the past. She denies chest pain, worsening shortness of breath, lightheadedness, dizziness, fever. Pt denies urinary symptoms at this time. Hospital Course:  62-year-old female with history of cardiomyopathy with an ejection fraction 25% was admitted with nausea/vomiting, diarrhea and fatigue. Patient was admitted for gastroenteritis, had an elevated BNP but appeared to be euvolemic and compensated. Patient's symptoms resolved, she had no further diarrhea in the hospital.  Patient was placed on a regular diet and was tolerating that well. She was placed on 2 L nasal cannula for comfort however was saturating well on room air. Initiated urinalysis suspicious for UTI, she is being discharged with p.o. Keflex x3 days. Follow-up with PCP. Patient was recently hospitalized for suicidal ideation, she denies any suicidal or homicidal ideation at this time.        Assessment and Plan:  1. UTI, with lactic acidosis:               UA remarkable for leuks, nitrites, bacteria today. PT presents afebrile, non-toxic appearing, denies urinary symptoms. Labs remarkable for LA 2.3, no WBC. Given 1cc in ED. Rx for Keflex X 3 days     2. Acute diarrhea:   Diarrhea resolved, follow up with pcpc     3. HFrEF, with ICD:               Last ECHO 5/23/2022 reveals EF 25%. Follows with Dr. Gareth Bonds outpatient. BNP elevated today. Appears compensated today. CXR unremarkable. Room air    Consultants:  Patient Care Team:  ANGELIA Lee CNP as PCP - General (Family Medicine)  ANGELIA Lee CNP as PCP [facility]    Discharge Medications:       Medication List      START taking these medications    cephALEXin 500 MG capsule  Commonly known as: KEFLEX  Take 1 capsule by mouth 4 times daily for 3 days        CHANGE how you take these medications    dapagliflozin 10 MG tablet  Commonly known as: Farxiga  Take 1 tablet by mouth every morning  What changed: additional instructions     digoxin 125 MCG tablet  Commonly known as: LANOXIN  Take 1 tablet by mouth daily  What changed: additional instructions     metoprolol succinate 25 MG extended release tablet  Commonly known as: TOPROL XL  Take 0.5 tablets by mouth daily Hold if SBP less than 95 mmHg or HR less than 50 bpm  What changed: additional instructions        CONTINUE taking these medications    aspirin 81 MG chewable tablet  Take 1 tablet by mouth daily     atorvastatin 40 MG tablet  Commonly known as: LIPITOR  Take 1 tablet by mouth nightly     Blood Pressure Kit  1 each by Does not apply route in the morning, at noon, and at bedtime     buPROPion 150 MG extended release tablet  Commonly known as: Wellbutrin XL  Take 1 tablet by mouth every morning     Cholecalciferol 50 MCG (2000 UT) Caps  Take 50 mcg by mouth daily     CPAP Machine Misc  by Does not apply route Please change CPAP pressure to auto 11-15 cm H20.      FLUoxetine 40 MG capsule  Commonly known as: PROZAC  Take 1 capsule by mouth daily     letrozole 2.5 MG tablet  Commonly known as: Femara  Take 1 tablet by mouth daily     levothyroxine 125 MCG tablet  Commonly known as: Euthyrox  Take 1 tablet by mouth Daily     metFORMIN 500 mg extended release tablet  Commonly known as: GLUCOPHAGE-XR  Take 1 tablet by mouth once daily with breakfast     midodrine 10 MG tablet  Commonly known as: PROAMATINE  Take 1 tablet by mouth 3 times daily (with meals)     omeprazole 40 MG delayed release capsule  Commonly known as: PRILOSEC  Take 1 capsule by mouth every morning (before breakfast)     polyethylene glycol 17 GM/SCOOP powder  Commonly known as: GLYCOLAX     traZODone 50 MG tablet  Commonly known as: DESYREL  Take 1 tablet by mouth nightly as needed for Sleep     TYLENOL 500 MG tablet  Generic drug: acetaminophen        STOP taking these medications    clonazePAM 1 MG tablet  Commonly known as: KLONOPIN           Where to Get Your Medications      These medications were sent to [facility] -  [phone]  [address] [place] [id number]    Phone: [phone]   · cephALEXin 500 MG capsule           Physical Exam:    Vitals:  Vitals:    06/10/22 2245 06/11/22 0318 06/11/22 0910 06/11/22 1101   BP: 103/79 103/71 105/76 104/83   Pulse: 87 78 76 80   Resp: 18 18 18 18   Temp:  97.9 °F (36.6 °C) 98.4 °F (36.9 °C) 97.5 °F (36.4 °C)   TempSrc:  Oral Oral Oral   SpO2: 98% 99% 94% 94%   Weight:       Height:         Weight: Weight: 208 lb 1.8 oz (94.4 kg)     24 hour intake/output:    Intake/Output Summary (Last 24 hours) at 6/11/2022 1428  Last data filed at 6/11/2022 0840  Gross per 24 hour   Intake 360 ml   Output --   Net 360 ml       General appearance - alert awake appears to be in no acute distress  Chest - Bilateral air entry, no wheeze  Heart - S1S2 RRR, no murmurs or gallops  Abdomen - soft, non tender, normoactive bowel sounds  Neurological - non focal  Extremities - no edema  Skin - no rashes or lesions    Procedures:  na    Diagnostic Test:  na    Radiology reports as per the Radiologist  Radiology:  ECHO Complete 2D W Doppler W Color    Result Date: 5/24/2022  Transthoracic Echocardiography Report (TTE)  Demographics   Patient Name  Elizabeth Gonzalez  Gender             Female                M   MR #          [id number]     Race               Other                               Ethnicity           or    Account #     [de-identified]     Room Number        0009   Accession     [id number]    Date of Study      05/24/2022  Number   Date of Birth 1960    Referring          Esther Villarreal CNP                              Physician          ALFRED Fraire   Age           65 year(s)    Sonographer        LINDA Bishop, RDCS,                                                 RDMS, RVT                               Interpreting       Echo reader of the week                              Physician          Lourdes Escobar MD  Procedure Type of Study   TTE procedure:ECHOCARDIOGRAM COMPLETE 2D W DOPPLER W COLOR. Procedure Date Date: 05/24/2022 Start: 07:44 AM Study Location: Bedside Technical Quality: Limited visualization due to body habitus. Indications:Chest pain. Additional Medical History:chest pain, hypertension, hypothyroidism, cardiomyopathy, ICD, vertigo, COVID, breast cancer Patient Status: Routine Height: 61.81 inches Weight: 211.64 pounds BSA: 1.95 m^2 BMI: 38.95 kg/m^2 BP: 99/74 mmHg Allergies   - No Known Allergies. - Tape. Conclusions   Summary  Left Ventricular size is Mildly increased . Normal left ventricular wall thickness. There was severe global hypokinesis of the left ventricle. Systolic function was severely reduced. Ejection fraction is visually estimated at 25%. The left atrium is Mild to moderate dilated. Mildly enlarged right atrium size.    Signature ----------------------------------------------------------------  Electronically signed by Rowena Bennett MD (Interpreting  physician) on 05/24/2022 at 07:26 PM  ----------------------------------------------------------------   Findings   Mitral Valve  The mitral valve structure was normal with normal leaflet separation. DOPPLER: The transmitral velocity was within the normal range with no  evidence for mitral stenosis. Moderate mitral regurgitation is present. Aortic Valve  The aortic valve was trileaflet with normal thickness and cuspal  separation. DOPPLER: Transaortic velocity was within the normal range with  no evidence of aortic stenosis. There was no evidence of aortic  regurgitation. Tricuspid Valve  The tricuspid valve structure was normal with normal leaflet separation. DOPPLER: There was no evidence of tricuspid stenosis. Moderate tricuspid  regurgitation visualized. Right ventricular systolic pressure measures 40  mmhg. Pulmonic Valve  The pulmonic valve leaflets exhibited normal thickness, no calcification,  and normal cuspal separation. DOPPLER: The transpulmonic velocity was  within the normal range with no evidence for regurgitation. Left Atrium  The left atrium is Mild to moderate dilated. Left Ventricle  Left Ventricular size is Mildly increased . Normal left ventricular wall thickness. There was severe global hypokinesis of the left ventricle. Systolic function was severely reduced. Ejection fraction is visually estimated at 25%. Right Atrium  Mildly enlarged right atrium size. Right Ventricle  The right ventricular size was normal with normal systolic function and  wall thickness. Pacer Wire visualized in right ventricle. Pericardial Effusion  The pericardium was normal in appearance with no evidence of a pericardial  effusion. Pleural Effusion  No evidence of pleural effusion.    Aorta / Great Vessels  -Aortic root dimension within normal limits.  -The Pulmonary artery is within normal limits. -IVC size is within normal limits with normal respiratory phasic changes.   M-Mode/2D Measurements & Calculations   LV Diastolic   LV Systolic Dimension:    AV Cusp Separation: 1.6 cmLA  Dimension: 5.2 4.7 cm                    Dimension: 4.3 cmAO Root  cm             LV Volume Diastolic: 746  Dimension: 2.5 cmLA Area: 29.7  LV FS:9.6 %    ml                        cm^2  LV PW          LV Volume Systolic: 403  Diastolic: 1.4 ml  cm             LV EDV/LV EDV Index: 130  Septum         ml/67 m^2LV ESV/LV ESV    RV Diastolic Dimension: 2.8 cm  Diastolic: 1.2 Index: 090 CA/47 m^2  cm             EF Calculated: 21.5 %     LA/Aorta: 1.72                                           Ascending Aorta: 2.8 cm                 LV Length: 8.4 cm         LA volume/Index: 102.3 ml /52m^2   LV Area  Diastolic:  29.7 cm^2  LV Area  Systolic: 35  cm^2  Doppler Measurements & Calculations   MV Peak E-Wave: 132 cm/s          AV Peak Velocity: LVOT Peak Velocity:                                    113 cm/s          61.8 cm/s  MV Peak Gradient: 6.97 mmHg       AV Peak Gradient: LVOT Peak Gradient: 2                                    5.11 mmHg         mmHg  MV Deceleration Time: 180 msec                                                      TV Peak E-Wave: 84.8  MV Area (PISA): 0.24 cm^2                           cm/s                                     AV P1/2t: 642     TV Peak Gradient: 2.88                                    msec              mmHg  MR Velocity: 456 cm/s             IVRT: 77 msec     TR Velocity:284 cm/s  MV LEONARDO PISA: 0.23 cm^2                              TR Gradient:32.26 mmHg  MR VTI: 137 cm                                      PV Peak Velocity: 56.6  Alias Velocity: 46 cm/sPISA       AV DVI            cm/s  Radius: 0.6 cm                    (Vmax):0.55       PV Peak Gradient: 1.28  MV ERO Volumetric: 0.24 cm^2                        mmHg  PISA area: 2.26 cm^2MR flow rate:  103.96 ml/sMR volume:31.51 ml                                                      AL ED Velocity: 143                                                      cm/s  [email or web address] [id number]    XR CHEST PORTABLE    Result Date: 6/10/2022  PROCEDURE: XR CHEST PORTABLE CLINICAL INFORMATION: Chest pain, shortness of breath, fatigue COMPARISON: Chest radiograph 6/2/2022 TECHNIQUE: AP portable chest radiograph performed. FINDINGS: Cardiac conduction device is seen with single lead. The lead is intact. No focal pulmonary consolidation. Cardiac silhouette is moderately enlarged. Platelike atelectasis seen in the lingula No pleural effusion. No pneumothorax. No acute bony abnormality. 1. No acute cardiopulmonary finding. **This report has been created using voice recognition software. It may contain minor errors which are inherent in voice recognition technology. ** Final report electronically signed by Dr Gregoria Ennis on 6/10/2022 10:43 AM    XR CHEST PORTABLE    Result Date: 6/2/2022  PROCEDURE: XR CHEST PORTABLE CLINICAL INFORMATION: chest pain. COMPARISON: Chest radiograph dated 5/23/2022. TECHNIQUE: AP upright view of the chest. FINDINGS: There is a stable left-sided cardiac pacer/defibrillator generator with single lead. There is mild interstitial prominence in both lungs. The cardiac silhouette is mildly enlarged, similar to prior exam. Visualized osseous structures appear grossly intact. Mild interstitial prominence. Differential considerations include pulmonary edema and interstitial pneumonitis. **This report has been created using voice recognition software. It may contain minor errors which are inherent in voice recognition technology. ** Final report electronically signed by Dr. Ann Yang MD on 6/2/2022 2:51 PM    XR CHEST PORTABLE    Result Date: 5/23/2022  PROCEDURE: XR CHEST PORTABLE CLINICAL INFORMATION: Chest pain, short of breath COMPARISON: Chest radiograph 10/15/2020 TECHNIQUE: AP portable chest radiograph performed. FINDINGS: Cardiac conduction device is seen with single lead which appears intact. . No focal pulmonary consolidation. Cardiac silhouette is moderately enlarged. No pleural effusion. No pneumothorax. No acute bony abnormality. 1. No acute cardiopulmonary finding. **This report has been created using voice recognition software. It may contain minor errors which are inherent in voice recognition technology. ** Final report electronically signed by Dr Teresa Abdalla on 5/23/2022 12:54 PM      Results for orders placed or performed during the hospital encounter of 06/10/22   Rapid influenza A/B antigens    Specimen: Nasopharyngeal   Result Value Ref Range    Flu A Antigen Negative NEGATIVE    Flu B Antigen Negative NEGATIVE   COVID-19, Rapid    Specimen: Nasopharyngeal Swab   Result Value Ref Range    SARS-CoV-2, NAAT NOT DETECTED NOT DETECTED   Culture, Reflexed, Urine    Specimen: Urine, clean catch   Result Value Ref Range    Urine Culture Reflex (A)      Mixed growth. The mixture of organisms present represents both organisms that may cause urinary tract infections and organisms that are not a common cause of urinary tract infections and are possibly skin austin or distal urethral austin.      Organism Mixed Growth (A)    CBC with Auto Differential   Result Value Ref Range    WBC 6.9 4.8 - 10.8 thou/mm3    RBC 4.32 4.20 - 5.40 mill/mm3    Hemoglobin 12.5 12.0 - 16.0 gm/dl    Hematocrit 39.9 37.0 - 47.0 %    MCV 92.4 81.0 - 99.0 fL    MCH 28.9 26.0 - 33.0 pg    MCHC 31.3 (L) 32.2 - 35.5 gm/dl    RDW-CV 16.0 (H) 11.5 - 14.5 %    RDW-SD 53.1 (H) 35.0 - 45.0 fL    Platelets 620 078 - 957 thou/mm3    MPV 12.4 9.4 - 12.4 fL    Seg Neutrophils 63.2 %    Lymphocytes 27.1 %    Monocytes 7.1 %    Eosinophils 1.3 %    Basophils 0.7 %    Immature Granulocytes 0.6 %    Segs Absolute 4.4 1.8 - 7.7 thou/mm3    Lymphocytes Absolute 1.9 1.0 - 4.8 thou/mm3    Monocytes Absolute 0.5 0.4 - 1.3 thou/mm3    Eosinophils Absolute 0.1 0.0 - 0.4 thou/mm3    Basophils Absolute 0.0 0.0 - 0.1 thou/mm3    Immature Grans (Abs) 0.04 0.00 - 0.07 thou/mm3    nRBC 0 /100 wbc   Comprehensive Metabolic Panel   Result Value Ref Range    Glucose 112 (H) 70 - 108 mg/dL    CREATININE 1.1 0.4 - 1.2 mg/dL    BUN 22 7 - 22 mg/dL    Sodium 138 135 - 145 meq/L    Potassium 4.2 3.5 - 5.2 meq/L    Chloride 100 98 - 111 meq/L    CO2 22 (L) 23 - 33 meq/L    Calcium 9.2 8.5 - 10.5 mg/dL    AST 59 (H) 5 - 40 U/L    Alkaline Phosphatase 130 (H) 38 - 126 U/L    Total Protein 6.8 6.1 - 8.0 g/dL    Albumin 4.6 3.5 - 5.1 g/dL    Total Bilirubin 1.8 (H) 0.3 - 1.2 mg/dL    ALT 55 11 - 66 U/L   Magnesium   Result Value Ref Range    Magnesium 2.3 1.6 - 2.4 mg/dL   Phosphorus   Result Value Ref Range    Phosphorus 4.0 2.4 - 4.7 mg/dL   Digoxin Level   Result Value Ref Range    Digoxin Lvl 0.8 0.5 - 2.0 ng/mL   Troponin   Result Value Ref Range    Troponin T < 0.010 ng/ml   Brain Natriuretic Peptide   Result Value Ref Range    Pro-BNP 15199.0 (H) 0.0 - 900.0 pg/mL   Lactic Acid   Result Value Ref Range    Lactic Acid 2.3 (H) 0.5 - 2.0 mmol/L   Procalcitonin   Result Value Ref Range    Procalcitonin 0.08 0.01 - 0.09 ng/mL   TSH with Reflex   Result Value Ref Range    TSH 14.870 (H) 0.400 - 4.200 uIU/mL   Anion Gap   Result Value Ref Range    Anion Gap 16.0 8.0 - 16.0 meq/L   Osmolality   Result Value Ref Range    Osmolality Calc 279.8 275.0 - 300.0 mOsmol/kg   Glomerular Filtration Rate, Estimated   Result Value Ref Range    Est, Glom Filt Rate 50 (A) ml/min/1.73m2   T4, Free   Result Value Ref Range    T4 Free 1.37 0.93 - 1.76 ng/dL   Urine with Reflexed Micro   Result Value Ref Range    Glucose, Ur NEGATIVE NEGATIVE mg/dl    Bilirubin Urine SMALL (A) NEGATIVE    Ketones, Urine TRACE (A) NEGATIVE    Specific Gravity, Urine 1.022 1.002 - 1.030 Blood, Urine NEGATIVE NEGATIVE    pH, UA 5.0 5.0 - 9.0    Protein,  (A) NEGATIVE    Urobilinogen, Urine 2.0 (A) 0.0 - 1.0 eu/dl    Nitrite, Urine POSITIVE (A) NEGATIVE    Leukocyte Esterase, Urine MODERATE (A) NEGATIVE    Color, UA DARK YELLOW (A) STRAW-YELLOW    Character, Urine CLOUDY (A) CLEAR-SL CLOUD    RBC, UA 3-5 0-2/hpf /hpf    WBC, UA 25-50 0-4/hpf /hpf    Epithelial Cells, UA 0-2 3-5/hpf /hpf    Bacteria, UA NONE SEEN FEW/NONE SEEN /hpf    Casts UA 8-15 HYALINE NONE SEEN /lpf    Crystals, UA NONE SEEN NONE SEEN    Renal Epithelial, UA NONE SEEN NONE SEEN    Yeast, UA NONE SEEN NONE SEEN    CASTS 2 NONE SEEN NONE SEEN /lpf    MISCELLANEOUS 2 NONE SEEN    Bile Acids, Total   Result Value Ref Range    Ictotest NEGATIVE NEGATIVE   Lactic Acid   Result Value Ref Range    Lactic Acid 1.9 0.5 - 2.0 mmol/L   Comprehensive Metabolic Panel w/ Reflex to MG   Result Value Ref Range    Glucose 106 70 - 108 mg/dL    CREATININE 1.2 0.4 - 1.2 mg/dL    BUN 28 (H) 7 - 22 mg/dL    Sodium 137 135 - 145 meq/L    Potassium reflex Magnesium 4.8 3.5 - 5.2 meq/L    Chloride 101 98 - 111 meq/L    CO2 17 (L) 23 - 33 meq/L    Calcium 8.9 8.5 - 10.5 mg/dL     (H) 5 - 40 U/L    Alkaline Phosphatase 172 (H) 38 - 126 U/L    Total Protein 6.4 6.1 - 8.0 g/dL    Albumin 4.0 3.5 - 5.1 g/dL    Total Bilirubin 2.1 (H) 0.3 - 1.2 mg/dL     (H) 11 - 66 U/L   CBC with Auto Differential   Result Value Ref Range    WBC 7.7 4.8 - 10.8 thou/mm3    RBC 4.24 4.20 - 5.40 mill/mm3    Hemoglobin 12.0 12.0 - 16.0 gm/dl    Hematocrit 40.6 37.0 - 47.0 %    MCV 95.8 81.0 - 99.0 fL    MCH 28.3 26.0 - 33.0 pg    MCHC 29.6 (L) 32.2 - 35.5 gm/dl    RDW-CV 16.3 (H) 11.5 - 14.5 %    RDW-SD 55.8 (H) 35.0 - 45.0 fL    Platelets 963 956 - 891 thou/mm3    MPV 12.8 (H) 9.4 - 12.4 fL    Seg Neutrophils 60.3 %    Lymphocytes 30.4 %    Monocytes 6.5 %    Eosinophils 1.8 %    Basophils 0.5 %    Immature Granulocytes 0.5 %    Atypical Lymphocytes OCC. % Platelet Estimate ADEQUATE Adequate    Segs Absolute 4.6 1.8 - 7.7 thou/mm3    Lymphocytes Absolute 2.3 1.0 - 4.8 thou/mm3    Monocytes Absolute 0.5 0.4 - 1.3 thou/mm3    Eosinophils Absolute 0.1 0.0 - 0.4 thou/mm3    Basophils Absolute 0.0 0.0 - 0.1 thou/mm3    Immature Grans (Abs) 0.04 0.00 - 0.07 thou/mm3    nRBC 0 /100 wbc   Anion Gap   Result Value Ref Range    Anion Gap 19.0 (H) 8.0 - 16.0 meq/L   Glomerular Filtration Rate, Estimated   Result Value Ref Range    Est, Glom Filt Rate 46 (A) ml/min/1.73m2   Scan of Blood Smear   Result Value Ref Range    SCAN OF BLOOD SMEAR see below    POCT glucose   Result Value Ref Range    POC Glucose 110 (H) 70 - 108 mg/dl   POCT glucose   Result Value Ref Range    POC Glucose 172 (H) 70 - 108 mg/dl   POCT glucose   Result Value Ref Range    POC Glucose 100 70 - 108 mg/dl   POCT glucose   Result Value Ref Range    POC Glucose 107 70 - 108 mg/dl   EKG Emergency   Result Value Ref Range    Ventricular Rate 96 BPM    Atrial Rate 96 BPM    P-R Interval 198 ms    QRS Duration 98 ms    Q-T Interval 368 ms    QTc Calculation (Bazett) 464 ms    P Axis 40 degrees    R Axis -9 degrees    T Axis 71 degrees   EKG 12 Lead   Result Value Ref Range    Ventricular Rate 89 BPM    Atrial Rate 89 BPM    P-R Interval 200 ms    QRS Duration 98 ms    Q-T Interval 392 ms    QTc Calculation (Bazett) 476 ms    P Axis 36 degrees    R Axis -21 degrees    T Axis 99 degrees       Diet:  ADULT DIET; Regular; 3 carb choices (45 gm/meal);  Low Sodium (2 gm); 2000 ml; Safety Tray; Safety Tray (Disposables)    Activity:  Activity as tolerated (Patient may move about with assist as indicated or with supervision.)    Follow-up:  in the next few days with ANGELIA De La Rosa CNP    Disposition: home    Condition: Stable      Time Spent: 35 minutes    Electronically signed by Cristine Loyd MD on 6/11/2022 at 2:28 PM    Discharging Hospitalist

## 2022-06-11 NOTE — PROGRESS NOTES
Discharge teaching and instructions for diagnosis/procedure of abdominal pain completed with patient using teachback method. AVS reviewed. Printed prescriptions given to patient. Patient voiced understanding regarding prescriptions, follow up appointments, and care of self at home.  Discharged in a wheelchair to  home with support per daughter

## 2022-06-13 ENCOUNTER — TELEPHONE (OUTPATIENT)
Dept: FAMILY MEDICINE CLINIC | Age: 62
End: 2022-06-13

## 2022-06-13 NOTE — TELEPHONE ENCOUNTER
[facility] Transitions Initial Follow Up Call    Outreach made within 2 business days of discharge: Yes    Patient: Girffin Falling Patient : 1960   MRN: [id number]  Reason for Admission: There are no discharge diagnoses documented for the most recent discharge. Discharge Date: 22       Spoke with: Ani [facility]    Discharge department/facility: [facility]     TCM Interactive Patient Contact:  Was patient able to fill all prescriptions: Yes  Was patient instructed to bring all medications to the follow-up visit: Yes  Is patient taking all medications as directed in the discharge summary?  No  Does patient understand their discharge instructions: Yes  Does patient have questions or concerns that need addressed prior to 7-14 day follow up office visit: no    Scheduled appointment with PCP within 7-14 days    Follow Up  Future Appointments   Date Time Provider Braden Rothman   6/15/2022  3:20 PM Luc Mann APRN - [id number] [address] [place] [address]   2022  2:00 PM Rolanda Bamberger, PA-C [id number] [facility]   2022  3:00 PM SCHEDULE, SRPS PACER NURSE N SRPX PACER [place] [address] [place] [address]   2022  2:15 PM Babs Phillips PT STRZ PT [place] HOD   2022  9:30 AM STR ECHO RM1 STRZ ECHO [place] HOD   2022  2:00  [facility] 635, MD N [facility] - Kassi Zapata LPN

## 2022-06-15 ENCOUNTER — OFFICE VISIT (OUTPATIENT)
Dept: FAMILY MEDICINE CLINIC | Age: 62
End: 2022-06-15
Payer: COMMERCIAL

## 2022-06-15 VITALS
BODY MASS INDEX: 40.3 KG/M2 | OXYGEN SATURATION: 97 % | SYSTOLIC BLOOD PRESSURE: 132 MMHG | RESPIRATION RATE: 18 BRPM | WEIGHT: 219 LBS | DIASTOLIC BLOOD PRESSURE: 86 MMHG | TEMPERATURE: 97.2 F | HEART RATE: 91 BPM | HEIGHT: 62 IN

## 2022-06-15 DIAGNOSIS — E03.9 HYPOTHYROIDISM, UNSPECIFIED TYPE: ICD-10-CM

## 2022-06-15 DIAGNOSIS — K29.30 CHRONIC SUPERFICIAL GASTRITIS WITHOUT BLEEDING: ICD-10-CM

## 2022-06-15 DIAGNOSIS — I50.22 CHRONIC SYSTOLIC (CONGESTIVE) HEART FAILURE (HCC): Primary | ICD-10-CM

## 2022-06-15 DIAGNOSIS — C50.912 MALIGNANT NEOPLASM OF LEFT BREAST IN FEMALE, ESTROGEN RECEPTOR POSITIVE, UNSPECIFIED SITE OF BREAST (HCC): ICD-10-CM

## 2022-06-15 DIAGNOSIS — R53.81 PHYSICAL DECONDITIONING: ICD-10-CM

## 2022-06-15 DIAGNOSIS — K21.9 GASTROESOPHAGEAL REFLUX DISEASE WITHOUT ESOPHAGITIS: ICD-10-CM

## 2022-06-15 DIAGNOSIS — F33.2 MDD (MAJOR DEPRESSIVE DISORDER), RECURRENT SEVERE, WITHOUT PSYCHOSIS (HCC): ICD-10-CM

## 2022-06-15 DIAGNOSIS — R79.89 ELEVATED LIVER FUNCTION TESTS: ICD-10-CM

## 2022-06-15 DIAGNOSIS — Z17.0 MALIGNANT NEOPLASM OF LEFT BREAST IN FEMALE, ESTROGEN RECEPTOR POSITIVE, UNSPECIFIED SITE OF BREAST (HCC): ICD-10-CM

## 2022-06-15 DIAGNOSIS — E11.9 TYPE 2 DIABETES MELLITUS WITHOUT COMPLICATION, WITHOUT LONG-TERM CURRENT USE OF INSULIN (HCC): ICD-10-CM

## 2022-06-15 PROCEDURE — 99496 TRANSJ CARE MGMT HIGH F2F 7D: CPT | Performed by: NURSE PRACTITIONER

## 2022-06-15 RX ORDER — OMEPRAZOLE 40 MG/1
40 CAPSULE, DELAYED RELEASE ORAL
Qty: 90 CAPSULE | Refills: 1 | Status: SHIPPED | OUTPATIENT
Start: 2022-06-15 | End: 2022-08-18 | Stop reason: SDUPTHER

## 2022-06-15 RX ORDER — HYDROXYZINE HYDROCHLORIDE 25 MG/1
TABLET, FILM COATED ORAL
COMMUNITY
End: 2022-06-15 | Stop reason: ALTCHOICE

## 2022-06-15 RX ORDER — BUPROPION HYDROCHLORIDE 150 MG/1
150 TABLET ORAL EVERY MORNING
Qty: 90 TABLET | Refills: 0 | Status: ON HOLD | OUTPATIENT
Start: 2022-06-15 | End: 2022-10-03 | Stop reason: HOSPADM

## 2022-06-15 RX ORDER — FLUOXETINE HYDROCHLORIDE 40 MG/1
40 CAPSULE ORAL DAILY
Qty: 90 CAPSULE | Refills: 0 | Status: ON HOLD | OUTPATIENT
Start: 2022-06-15 | End: 2022-10-03 | Stop reason: SDUPTHER

## 2022-06-15 RX ORDER — TRAZODONE HYDROCHLORIDE 50 MG/1
50 TABLET ORAL NIGHTLY PRN
Qty: 90 TABLET | Refills: 0 | Status: ON HOLD | OUTPATIENT
Start: 2022-06-15 | End: 2022-10-03 | Stop reason: SDUPTHER

## 2022-06-15 SDOH — ECONOMIC STABILITY: FOOD INSECURITY: WITHIN THE PAST 12 MONTHS, THE FOOD YOU BOUGHT JUST DIDN'T LAST AND YOU DIDN'T HAVE MONEY TO GET MORE.: NEVER TRUE

## 2022-06-15 SDOH — ECONOMIC STABILITY: FOOD INSECURITY: WITHIN THE PAST 12 MONTHS, YOU WORRIED THAT YOUR FOOD WOULD RUN OUT BEFORE YOU GOT MONEY TO BUY MORE.: NEVER TRUE

## 2022-06-15 ASSESSMENT — PATIENT HEALTH QUESTIONNAIRE - PHQ9
SUM OF ALL RESPONSES TO PHQ QUESTIONS 1-9: 3
SUM OF ALL RESPONSES TO PHQ9 QUESTIONS 1 & 2: 0
5. POOR APPETITE OR OVEREATING: 1
10. IF YOU CHECKED OFF ANY PROBLEMS, HOW DIFFICULT HAVE THESE PROBLEMS MADE IT FOR YOU TO DO YOUR WORK, TAKE CARE OF THINGS AT HOME, OR GET ALONG WITH OTHER PEOPLE: 0
6. FEELING BAD ABOUT YOURSELF - OR THAT YOU ARE A FAILURE OR HAVE LET YOURSELF OR YOUR FAMILY DOWN: 0
4. FEELING TIRED OR HAVING LITTLE ENERGY: 1
3. TROUBLE FALLING OR STAYING ASLEEP: 1
SUM OF ALL RESPONSES TO PHQ QUESTIONS 1-9: 3
2. FEELING DOWN, DEPRESSED OR HOPELESS: 0
SUM OF ALL RESPONSES TO PHQ QUESTIONS 1-9: 3
8. MOVING OR SPEAKING SO SLOWLY THAT OTHER PEOPLE COULD HAVE NOTICED. OR THE OPPOSITE, BEING SO FIGETY OR RESTLESS THAT YOU HAVE BEEN MOVING AROUND A LOT MORE THAN USUAL: 0
SUM OF ALL RESPONSES TO PHQ QUESTIONS 1-9: 3
1. LITTLE INTEREST OR PLEASURE IN DOING THINGS: 0
9. THOUGHTS THAT YOU WOULD BE BETTER OFF DEAD, OR OF HURTING YOURSELF: 0
7. TROUBLE CONCENTRATING ON THINGS, SUCH AS READING THE NEWSPAPER OR WATCHING TELEVISION: 0

## 2022-06-15 ASSESSMENT — SOCIAL DETERMINANTS OF HEALTH (SDOH): HOW HARD IS IT FOR YOU TO PAY FOR THE VERY BASICS LIKE FOOD, HOUSING, MEDICAL CARE, AND HEATING?: NOT VERY HARD

## 2022-06-15 NOTE — PROGRESS NOTES
Transition of Care Visit/Hospital Follow Up:      Maria Luisa Cui is a 65 y.o. female that presents for Follow-Up from Hospital and Discuss Medications      Date of Discharge:   6/11/22  Was patient contacted within 2 business days of discharge (see chart for documentation):  yes - 6/13/22      Patient presents for hospital follow up. Patient recently hospitalized at [facility] for treatment of CHF. Symptoms prior to admission:  diarrhea    Hospital Course per DC Summary:    Patient was first admitted to inpatient Psych at [facility] for MDD with suicidal ideation. She was previously on Effexor, this was stopped and she was transitioned to Prozac. She was then transferred to inpatient due to hyperkalemia and had chest pain. See hospital d/c summary below for further details. She was d/c and then readmitted to [facility] 6/10. See details below.     Hospital Course:   65 y.o. female with a past medical history of cardiomyopathy status post ICD placement, chronic HFrEF, CKD, hypothyroidism, HLD, HTN, T2DM major depressive disorder, anxiety who presented to [facility] on 6/1/22 after reporting to family medicine for hospital follow-up where patient was actively suicidal and had a plan.  Patient was sent to the ER and was later admitted to the psychiatry unit.  Hospitalist service was consulted to assist in management of patient's chronic health conditions.  Upon lab work this morning, patient's potassium was noted to be 5.6.  During evaluation patient did report that she experienced 8/10 sharp nonradiating midsternal chest pain last night that woke her out of her sleep.  Also reporting that she is having some worsening dyspnea on exertion outside of her baseline.  Currently denies active chest pain, shortness of breath at rest, abdominal pain, nausea, vomiting, diarrhea, palpitations, dizziness, diaphoresis, pleurisy, fever, chills, cough, orthopnea, PND, leg swelling.  Patient was just recently discharged from [facility] on 5/25 following a diagnosis of acute on chronic heart failure.  Patient did have left heart cath performed at that time which was not significant for coronary artery disease but did note EF of 40-45%.  Patient is a discharge readmit to inpatient service for further treatment of hyperkalemia and further chest pain work-up.     6/3/22: Patient resting in bed, nontoxic in appearance, no apparent distress.  Remains afebrile, with hemodynamically stable vital signs.  Patient had acute hypotensive episode over night 86/57. Patient reportedly asymptomatic per nursing staff. Patient has had low norm BP while in hospital. Will start pt on 2.5 mg Midodrine.  Patient stating she feels better this morning.  Denies chest pain, dyspnea on exertion, shortness of breath at rest.  States when she took a shower this morning she felt very tired, denies dizziness, palpitations, syncope.  Received IV calcium gluconate, IV insulin, IV dextrose, and one-time dose Lokelma.  Potassium level 4.4 this morning.  We will restart patient on her Entresto and repeat work in the acute increases in potassium.  Patient will need clearance from psych prior to discharge, appreciate recommendations     6/4/22: Patient remains afebrile, with hemodynamically stable vital signs.  Patient did not experience any hypotensive episodes overnight but still having some relative hypotension with SBP's in range of mid 90s, low 100s.  Increase midodrine to 5 mg 3 times daily.  Will monitor for her response.  Patient's potassium remains within normal range at 4.1 after restarting her Entresto yesterday. Prema Vega has no complaints this morning.  She denies chest pain, shortness of breath, BORDEN, abdominal pain, nausea, vomiting, fever, chills, dizziness, palpitations.  States she is feeling much better this morning.  Patient is still under evaluation by psychiatry service. Eddie Faith called and spoke with Dr. Merari Stacy over the phone who is planning to see the patient this afternoon.  Once cleared by psych patient will be discharged from hospital.  If no plans to clear her patient will then have to be discharged and readmitted back to 4E psychiatry services.     6/4/22: Patient resting in bed comfortably, no apparent distress.  Remains afebrile, with hemodynamically stable vital signs.  Yesterday afternoon patient was noted to become hypotensive into the mid to low 80s.  Patient was asymptomatic during these event per nursing staff. Anju Bene no complaints at that time.  Patient's enstresto and Lasix held, patient given 1 L IV fluids.  Blood pressures improved some.  Negative orthostats.  Midodrine was increased to 10 mg this morning, repeat blood pressure is afternoon 99/71.  We will continue to hold Entresto and Lasix at this time.  We will continue midodrine 10 mg 3 times daily. Patient was seen and evaluated by Dr. Delta Soares, psychiatry today. Patient has been cleared from psychiatric services given that she initially presented to the hospital for suicidal ideation. Patient instructed that she needs to check her blood pressures 3 times a day before taking her midodrine. Patient instructed not to take midodrine if her top number is greater than 120 mmHg. Also instructed patient that she needs to stop taking her Lasix and Entresto given her hypotension and needs to follow-up with Dr. Luke Weaver or her primary care provider within 1 week for further evaluation of these medications. Instructed patient that she would need clearance from Dr. Luke Weaver her PCP to resume them. .  Instructed patient that it is okay to resume her metoprolol 12.5 mg as her blood pressure this morning was 95/54 she was given her metoprolol w/ the 10 mg midodrine, and her BP has remained stable at 99/71 this afternoon. Instructed patient not to take her metoprolol if her blood pressure was less than 90 mmHg. Patient informed me that she has a blood pressure kit at home and knows how to take her blood pressure.   I did order an additional blood pressure kit for her if she needs it. Patient noted to be hyperkalemic on this admission. This resolved after she received one time dose IV insulin, IV dextrose, one-time dose of Lokelma. Her potassium levels have been within normal limits. Instructed patient that she needs to get repeat potassium level collected in 1 week and follow-up with her PCP in 1 week. At this time patient is stable for discharge.     Hospital Course:  80-year-old female with history of cardiomyopathy with an ejection fraction 25% was admitted with nausea/vomiting, diarrhea and fatigue. Patient was admitted for gastroenteritis, had an elevated BNP but appeared to be euvolemic and compensated. Patient's symptoms resolved, she had no further diarrhea in the hospital.  Patient was placed on a regular diet and was tolerating that well. She was placed on 2 L nasal cannula for comfort however was saturating well on room air. Initiated urinalysis suspicious for UTI, she is being discharged with p.o. Keflex x3 days. Follow-up with PCP. Patient was recently hospitalized for suicidal ideation, she denies any suicidal or homicidal ideation at this time. Medication changes at discharge:  Med list reconciled today    Clinical course since discharge:      Since d/c moods are much better. She denies feeling depressed. She took her test yesterday for her citizenship yesterday, so she is feeling well. She is taking Prozac and Wellbutrin. She plans on following up with [facility]. She is sleeping well with Trazodone. Her diarrhea has stopped. She is feeling SOB with minimal activity, cannot even walk around the house without feeling SOB. Her daughter in law reports that two weeks ago she was completely independent in care, and now she needs assistance with a lot of care. She is weak and deconditioned in her legs. She does not weigh herself at all.     Current Outpatient Medications   Medication Sig Dispense Refill    buPROPion (WELLBUTRIN XL) 150 MG extended release tablet Take 1 tablet by mouth every morning 90 tablet 0    traZODone (DESYREL) 50 MG tablet Take 1 tablet by mouth nightly as needed for Sleep 90 tablet 0    FLUoxetine (PROZAC) 40 MG capsule Take 1 capsule by mouth daily 90 capsule 0    dapagliflozin (FARXIGA) 10 MG tablet Take 1 tablet by mouth every morning 30 tablet 5    omeprazole (PRILOSEC) 40 MG delayed release capsule Take 1 capsule by mouth every morning (before breakfast) 90 capsule 1    midodrine (PROAMATINE) 10 MG tablet Take 1 tablet by mouth 3 times daily (with meals) 90 tablet 1    levothyroxine (EUTHYROX) 125 MCG tablet Take 1 tablet by mouth Daily 30 tablet 1    metoprolol succinate (TOPROL XL) 25 MG extended release tablet Take 0.5 tablets by mouth daily Hold if SBP less than 95 mmHg or HR less than 50 bpm (Patient taking differently: Take 12.5 mg by mouth daily Hold if SBP less than 95 mmHg or HR less than 50 bp  Takes at 1400) 30 tablet 3    digoxin (LANOXIN) 125 MCG tablet Take 1 tablet by mouth daily (Patient taking differently: Take 125 mcg by mouth daily Takes at 1400) 90 tablet 0    letrozole (FEMARA) 2.5 MG tablet Take 1 tablet by mouth daily 90 tablet 3    atorvastatin (LIPITOR) 40 MG tablet Take 1 tablet by mouth nightly 90 tablet 3    aspirin 81 MG chewable tablet Take 1 tablet by mouth daily 30 tablet 3    acetaminophen (TYLENOL) 500 MG tablet Take 500 mg by mouth every 6 hours as needed for Pain      CPAP Machine MISC by Does not apply route Please change CPAP pressure to auto 11-15 cm H20. 1 each 0     No current facility-administered medications for this visit.        Past Medical History:   Diagnosis Date    Abnormal heart rhythm     Anxiety     Cancer [facility])     breast  2016     CHF (congestive heart failure) (HCC)     Congenital heart disease     DJD (degenerative joint disease)     Enlarged lymph nodes     Hypertension     Hypothyroidism     ICD (implantable cardioverter-defibrillator) in place 2019    Dr. David Zamorano Kidney stones     PONV (postoperative nausea and vomiting)     Prediabetes 10/7/2019    Thyroid disease        Past Surgical History:   Procedure Laterality Date    APPENDECTOMY      CARPAL TUNNEL RELEASE      COLONOSCOPY      COLONOSCOPY N/A 2020    COLONOSCOPY POLYPECTOMY SNARE/COLD BIOPSY performed by Darrick Sy MD at [address] [facility]    COLONOSCOPY  2020    COLONOSCOPY POLYPECTOMY HOT BIOPSY performed by Darrick Sy MD at [address] [place] [address]      EGD      EYE SURGERY Bilateral 10/2021    eyelid lift     FINGER TRIGGER RELEASE Right 2020    DEQUERVAINS RELEASE AND RIGHT RING FINGER TRIGGER RELEASE performed by Arik Ames DO at [address] Left 2019    [employer] [facility] HAS A MRI CONDITIONAL SYSTEM    PTCA      TONSILLECTOMY         Social History     Tobacco Use    Smoking status: Former Smoker     Packs/day: 0.25     Years: 37.00     Pack years: 9.25     Types: Cigarettes     Start date: 1981     Quit date: 2020     Years since quittin.0    Smokeless tobacco: Never Used   Vaping Use    Vaping Use: Never used   Substance Use Topics    Alcohol use: No    Drug use: No       Family History   Problem Relation Age of Onset    High Blood Pressure Mother     Dementia Mother     Cancer Father     Colon Cancer Father     Mental Retardation Brother     Colon Polyps Brother     Cancer Maternal Grandfather     Esophageal Cancer Neg Hx          I have reviewed the patient's past medical history, past surgical history, allergies, medications, social and family history and I have made updates where appropriate.         PHYSICAL EXAM:  /86   Pulse 91   Temp 97.2 °F (36.2 °C) (Infrared)   Resp 18   Ht 5' 2\" (1.575 m)   Wt 219 lb (99.3 kg)   SpO2 97%   BMI 40.06 kg/m²   General Appearance: well developed and well- nourished, in no acute distress  Head: normocephalic and atraumatic  ENT: external ear and ear canal normal bilaterally, nose without deformity, nasal mucosa and turbinates normal without polyps, oropharynx normal, dentition is normal for age, no lip or gum lesions noted  Neck: supple and non-tender without mass, no thyromegaly or thyroid nodules, no cervical lymphadenopathy  Pulmonary/Chest: clear to auscultation bilaterally- no wheezes, rales or rhonchi, normal air movement, no respiratory distress or retractions  Cardiovascular: normal rate, regular rhythm, normal S1 and S2, no murmurs, rubs, clicks, or gallops, distal pulses intact  Abdomen: soft, non-tender, non-distended, no rebound or guarding, no masses or hernias noted, no hepatospleenomegaly  Extremities: no cyanosis, clubbing of the lower extremities, 2+ edema bilat lower extremities  Psych:  Normal affect without evidence of depression or anxiety, insight and judgement are appropriate, memory appears intact  Skin: warm and dry, no rash or erythema      Diagnostic test results reviewed: inpatient labs, chest x-ray and echocardiogram    Patient risk of morbidity and mortality: high      ASSESSMENT & PLAN  Moon Shi was seen today for follow-up from hospital and discuss medications. Diagnoses and all orders for this visit:    Chronic systolic (congestive) heart failure ([facility] [address]  -     [facility]  -     dapagliflozin (FARXIGA) 10 MG tablet; Take 1 tablet by mouth every morning    Malignant neoplasm of left breast in female, estrogen receptor positive, unspecified site of breast [facility])  -     [facility]    MDD (major depressive disorder), recurrent severe, without psychosis ([facility] [address]  -     buPROPion (WELLBUTRIN XL) 150 MG extended release tablet; Take 1 tablet by mouth every morning  -     traZODone (DESYREL) 50 MG tablet;  Take 1 tablet by mouth nightly as needed for Sleep  -     FLUoxetine (PROZAC) 40 MG capsule; Take 1 capsule by mouth daily  -     [address] [place] [address] [facility]    Elevated liver function tests  -     Hepatic Function Panel; Future    Type 2 diabetes mellitus without complication, without long-term current use of insulin (HCC)  -     dapagliflozin (FARXIGA) 10 MG tablet; Take 1 tablet by mouth every morning    Gastroesophageal reflux disease without esophagitis  -     omeprazole (PRILOSEC) 40 MG delayed release capsule; Take 1 capsule by mouth every morning (before breakfast)    Chronic superficial gastritis without bleeding  -     omeprazole (PRILOSEC) 40 MG delayed release capsule; Take 1 capsule by mouth every morning (before breakfast)    Hypothyroidism, unspecified type  -     TSH With Reflex Ft4; Future      - follows with hematology/oncology for Hx of breast Ca  - con't Prozac, Trazodone and Wellbutrin  - she is planning on following up with [facility]  - refill Farxiga, stop Metformin due to elevated LFT  - repeat LFT in 2 weeks  - con't Synthroid 125 mcg, recheck thyroid labs 6 weeks  - f/u cardiology tomorrow as scheduled and discuss cardiac meds and diuretics  - med refill Omeprazole  - refer to [facility]    Return in about 6 weeks (around 7/27/2022) for chronic conditions. Level of medical complexity:  high    -Overall, patient is improving  -Continue current meds  -Advised to continue to closely monitor her symptoms and if any worsening to seek treatment    All copied or forwarded information in the progress note was verified by me to be accurate at the time of visit  Patient's past medical, surgical, social and family history were reviewed and updated      All patient questions answered. Patient voiced understanding.

## 2022-06-16 ENCOUNTER — NURSE ONLY (OUTPATIENT)
Dept: CARDIOLOGY CLINIC | Age: 62
End: 2022-06-16
Payer: COMMERCIAL

## 2022-06-16 ENCOUNTER — OFFICE VISIT (OUTPATIENT)
Dept: CARDIOLOGY CLINIC | Age: 62
End: 2022-06-16
Payer: COMMERCIAL

## 2022-06-16 VITALS
DIASTOLIC BLOOD PRESSURE: 75 MMHG | HEIGHT: 62 IN | WEIGHT: 219 LBS | BODY MASS INDEX: 40.3 KG/M2 | SYSTOLIC BLOOD PRESSURE: 96 MMHG | HEART RATE: 91 BPM

## 2022-06-16 DIAGNOSIS — I10 ESSENTIAL HYPERTENSION: ICD-10-CM

## 2022-06-16 DIAGNOSIS — Z95.810 ICD (IMPLANTABLE CARDIOVERTER-DEFIBRILLATOR) IN PLACE: Primary | ICD-10-CM

## 2022-06-16 DIAGNOSIS — I50.20 HFREF (HEART FAILURE WITH REDUCED EJECTION FRACTION) (HCC): Primary | ICD-10-CM

## 2022-06-16 DIAGNOSIS — Z95.810 ICD (IMPLANTABLE CARDIOVERTER-DEFIBRILLATOR) IN PLACE: ICD-10-CM

## 2022-06-16 PROCEDURE — 1111F DSCHRG MED/CURRENT MED MERGE: CPT | Performed by: STUDENT IN AN ORGANIZED HEALTH CARE EDUCATION/TRAINING PROGRAM

## 2022-06-16 PROCEDURE — 93282 PRGRMG EVAL IMPLANTABLE DFB: CPT | Performed by: INTERNAL MEDICINE

## 2022-06-16 PROCEDURE — 3017F COLORECTAL CA SCREEN DOC REV: CPT | Performed by: STUDENT IN AN ORGANIZED HEALTH CARE EDUCATION/TRAINING PROGRAM

## 2022-06-16 PROCEDURE — G8417 CALC BMI ABV UP PARAM F/U: HCPCS | Performed by: STUDENT IN AN ORGANIZED HEALTH CARE EDUCATION/TRAINING PROGRAM

## 2022-06-16 PROCEDURE — 99214 OFFICE O/P EST MOD 30 MIN: CPT | Performed by: STUDENT IN AN ORGANIZED HEALTH CARE EDUCATION/TRAINING PROGRAM

## 2022-06-16 PROCEDURE — 1036F TOBACCO NON-USER: CPT | Performed by: STUDENT IN AN ORGANIZED HEALTH CARE EDUCATION/TRAINING PROGRAM

## 2022-06-16 PROCEDURE — G8427 DOCREV CUR MEDS BY ELIG CLIN: HCPCS | Performed by: STUDENT IN AN ORGANIZED HEALTH CARE EDUCATION/TRAINING PROGRAM

## 2022-06-16 NOTE — PROGRESS NOTES
[facility] PROFESSIONAL SERVICES  [facility]   [address]   [address] [address]   Dept: [phone]   Dept Fax: [phone]   Loc: [phone]      Chief Complaint   Patient presents with    Follow-Up from Hospital     Cardiologist:     66 yo female presents for hfu for n/v, UTI. Hx of chemo-related systolic CHF s/p ICD, EF 53%, no CAD, OH. Still nausea and bloating feeling. Still with some swelling also. Had optivol check today with elevated impedance. No chest pain. Has not felt like eating much lately d/t other symptoms. Not able to do as much now. Has some questions about medications and whether to take or not.      General:   No fever, no chills, no weight loss, no fatigue  Pulmonary:    + dyspnea, no wheezing  Cardiac:    Denies recent chest pain   GI:     No nausea or vomiting, no abdominal pain  Neuro:      + dizziness or light headedness  Musculoskeletal:  No recent active issues  Extremities:   + edema      Past Medical History:   Diagnosis Date    Abnormal heart rhythm     Anxiety     Cancer ([address]     breast  2016     CHF (congestive heart failure) (HCC)     Congenital heart disease     DJD (degenerative joint disease)     Enlarged lymph nodes     Hypertension     Hypothyroidism     ICD (implantable cardioverter-defibrillator) in place 02/11/2019    Dr. Stanton Escangela Kidney stones     PONV (postoperative nausea and vomiting)     Prediabetes 10/7/2019    Thyroid disease        Allergies   Allergen Reactions    Adhesive Tape Itching       Current Outpatient Medications   Medication Sig Dispense Refill    buPROPion (WELLBUTRIN XL) 150 MG extended release tablet Take 1 tablet by mouth every morning 90 tablet 0    traZODone (DESYREL) 50 MG tablet Take 1 tablet by mouth nightly as needed for Sleep 90 tablet 0    FLUoxetine (PROZAC) 40 MG capsule Take 1 capsule by mouth daily 90 capsule 0    dapagliflozin (FARXIGA) 10 MG tablet Take 1 tablet by mouth every morning 30 tablet 5    omeprazole (PRILOSEC) 40 MG delayed release capsule Take 1 capsule by mouth every morning (before breakfast) 90 capsule 1    midodrine (PROAMATINE) 10 MG tablet Take 1 tablet by mouth 3 times daily (with meals) 90 tablet 1    levothyroxine (EUTHYROX) 125 MCG tablet Take 1 tablet by mouth Daily 30 tablet 1    metoprolol succinate (TOPROL XL) 25 MG extended release tablet Take 0.5 tablets by mouth daily Hold if SBP less than 95 mmHg or HR less than 50 bpm (Patient taking differently: Take 12.5 mg by mouth daily Hold if SBP less than 95 mmHg or HR less than 50 bp  Takes at 1400) 30 tablet 3    digoxin (LANOXIN) 125 MCG tablet Take 1 tablet by mouth daily (Patient taking differently: Take 125 mcg by mouth daily Takes at 1400) 90 tablet 0    CPAP Machine MISC by Does not apply route Please change CPAP pressure to auto 11-15 cm H20. 1 each 0    letrozole (FEMARA) 2.5 MG tablet Take 1 tablet by mouth daily 90 tablet 3    atorvastatin (LIPITOR) 40 MG tablet Take 1 tablet by mouth nightly 90 tablet 3    aspirin 81 MG chewable tablet Take 1 tablet by mouth daily 30 tablet 3    acetaminophen (TYLENOL) 500 MG tablet Take 500 mg by mouth every 6 hours as needed for Pain       No current facility-administered medications for this visit.        Social History     Socioeconomic History    Marital status:      Spouse name: None    Number of children: None    Years of education: None    Highest education level: None   Occupational History    None   Tobacco Use    Smoking status: Former Smoker     Packs/day: 0.25     Years: 37.00     Pack years: 9.25     Types: Cigarettes     Start date: 1981     Quit date: 2020     Years since quittin.0    Smokeless tobacco: Never Used   Vaping Use    Vaping Use: Never used   Substance and Sexual Activity    Alcohol use: No    Drug use: No    Sexual activity: None   Other Topics Concern    None   Social History Narrative Referral placed for counseling    Denies barriers with medication affordability    Denies community services     Denies transportation issues     Social Determinants of Health     Financial Resource Strain: Low Risk     Difficulty of Paying Living Expenses: Not very hard   Food Insecurity: No Food Insecurity    Worried About Running Out of Food in the Last Year: Never true    Narciso of Food in the Last Year: Never true   Transportation Needs:     Lack of Transportation (Medical): Not on file    Lack of Transportation (Non-Medical): Not on file   Physical Activity:     Days of Exercise per Week: Not on file    Minutes of Exercise per Session: Not on file   Stress:     Feeling of Stress : Not on file   Social Connections:     Frequency of Communication with Friends and Family: Not on file    Frequency of Social Gatherings with Friends and Family: Not on file    Attends [employer] Services: Not on file    Active Member of [address] [place] [address] [employer] or Organizations: Not on file    Attends Club or Organization Meetings: Not on file    Marital Status: Not on file   Intimate Partner Violence:     Fear of Current or Ex-Partner: Not on file    Emotionally Abused: Not on file    Physically Abused: Not on file    Sexually Abused: Not on file   Housing Stability:     Unable to Pay for Housing in the Last Year: Not on file    Number of [place] in the Last Year: Not on file    Unstable Housing in the Last Year: Not on file       Family History   Problem Relation Age of Onset    High Blood Pressure Mother     Dementia Mother     Cancer Father     Colon Cancer Father     Mental Retardation Brother     Colon Polyps Brother     Cancer Maternal Grandfather     Esophageal Cancer Neg Hx        Blood pressure 96/75, pulse 91, height 5' 2\" (1.575 m), weight 219 lb (99.3 kg), not currently breastfeeding.     General:   Well developed, well nourished  Lungs:   Clear to auscultation, no rales  Heart:    RRR, Normal S1 S2, No

## 2022-06-16 NOTE — PROGRESS NOTES
medtronic single ICD in office/here to see Rodríguez Schuster     Elevated optivol, Rodríguez Don aware   RVR     8.2 years on device   RV imped 418  Shock 34  SVC 43  0.1% paced   R waves 11.3  Threshold 1 @ 0.4    States carelink monitor has error message, given number to medtronic to troubleshoot when she gets home

## 2022-06-21 ENCOUNTER — OFFICE VISIT (OUTPATIENT)
Dept: FAMILY MEDICINE CLINIC | Age: 62
End: 2022-06-21
Payer: COMMERCIAL

## 2022-06-21 VITALS
HEIGHT: 62 IN | SYSTOLIC BLOOD PRESSURE: 94 MMHG | OXYGEN SATURATION: 96 % | WEIGHT: 218.8 LBS | HEART RATE: 80 BPM | BODY MASS INDEX: 40.27 KG/M2 | DIASTOLIC BLOOD PRESSURE: 60 MMHG | TEMPERATURE: 98 F | RESPIRATION RATE: 14 BRPM

## 2022-06-21 DIAGNOSIS — K92.1 MELENA: Primary | ICD-10-CM

## 2022-06-21 DIAGNOSIS — R11.0 DAILY NAUSEA: ICD-10-CM

## 2022-06-21 DIAGNOSIS — K21.9 GASTROESOPHAGEAL REFLUX DISEASE WITHOUT ESOPHAGITIS: ICD-10-CM

## 2022-06-21 PROCEDURE — G8427 DOCREV CUR MEDS BY ELIG CLIN: HCPCS | Performed by: NURSE PRACTITIONER

## 2022-06-21 PROCEDURE — 1036F TOBACCO NON-USER: CPT | Performed by: NURSE PRACTITIONER

## 2022-06-21 PROCEDURE — G8417 CALC BMI ABV UP PARAM F/U: HCPCS | Performed by: NURSE PRACTITIONER

## 2022-06-21 PROCEDURE — 99214 OFFICE O/P EST MOD 30 MIN: CPT | Performed by: NURSE PRACTITIONER

## 2022-06-21 PROCEDURE — 1111F DSCHRG MED/CURRENT MED MERGE: CPT | Performed by: NURSE PRACTITIONER

## 2022-06-21 PROCEDURE — 3017F COLORECTAL CA SCREEN DOC REV: CPT | Performed by: NURSE PRACTITIONER

## 2022-06-21 NOTE — PROGRESS NOTES
Chief Complaint   Patient presents with    Diarrhea     nausea, and black stools - started yesterday        History obtained from chart review and the patient. SUBJECTIVE:  Reva Su is a 65 y.o. female that presents today for melena    C/o nausea, but no vomiting. She has had constant nausea for about 1.5 months. Yesterday she had diarrhea which was very watery. She wasn't able to make it to the bathroom. She had a formed stool last evening and today and they have been black. No abdominal pain, but she has sensation of fullness/bloating. She is having difficulty eating and drinking, feels like her food gets stuck.     Age/Gender Health Maintenance    Lipid -  Lab Results   Component Value Date    CHOL 154 07/09/2020     Lab Results   Component Value Date    TRIG 106 07/09/2020     Lab Results   Component Value Date    HDL 51 08/30/2021    HDL 49 07/09/2020    HDL 57 02/05/2020     Lab Results   Component Value Date    LDLCALC 84 08/30/2021    [address] 84 07/09/2020    [address] 114 02/05/2020     No results found for: LABVLDL, VLDL  No results found for: CHOLHDLRATIO    DM Screen -   Lab Results   Component Value Date    LABA1C 6.1 06/01/2022     Colon Cancer Screening - referral Gm Manner 10/7/19  Lung Cancer Screening (Age 54 to [de-identified] with 30 pack year hx, current smoker or quit within past 15 years) - n/a    Tetanus - needs  Influenza Vaccine - 10/18  Pneumonia Vaccine - 66  Zostavax - 50     Breast Cancer Screening - 50  Cervical Cancer Screening - needs  Osteoporosis Screening - 73  Chlamydia Screen - n/a    PSA testing discussion - n/a  AAA Screening - n/a    Falls screening - n/a    Current Outpatient Medications   Medication Sig Dispense Refill    sacubitril-valsartan (ENTRESTO) 24-26 MG per tablet Take 1 tablet by mouth 2 times daily 60 tablet 3    buPROPion (WELLBUTRIN XL) 150 MG extended release tablet Take 1 tablet by mouth every morning 90 tablet 0    traZODone (DESYREL) 50 MG tablet Take 1 tablet by mouth nightly as needed for Sleep 90 tablet 0    FLUoxetine (PROZAC) 40 MG capsule Take 1 capsule by mouth daily 90 capsule 0    dapagliflozin (FARXIGA) 10 MG tablet Take 1 tablet by mouth every morning 30 tablet 5    omeprazole (PRILOSEC) 40 MG delayed release capsule Take 1 capsule by mouth every morning (before breakfast) 90 capsule 1    midodrine (PROAMATINE) 10 MG tablet Take 1 tablet by mouth 3 times daily (with meals) 90 tablet 1    levothyroxine (EUTHYROX) 125 MCG tablet Take 1 tablet by mouth Daily 30 tablet 1    metoprolol succinate (TOPROL XL) 25 MG extended release tablet Take 0.5 tablets by mouth daily Hold if SBP less than 95 mmHg or HR less than 50 bpm (Patient taking differently: Take 12.5 mg by mouth daily Hold if SBP less than 95 mmHg or HR less than 50 bp  Takes at 1400) 30 tablet 3    digoxin (LANOXIN) 125 MCG tablet Take 1 tablet by mouth daily (Patient taking differently: Take 125 mcg by mouth daily Takes at 1400) 90 tablet 0    CPAP Machine MISC by Does not apply route Please change CPAP pressure to auto 11-15 cm H20. 1 each 0    letrozole (FEMARA) 2.5 MG tablet Take 1 tablet by mouth daily 90 tablet 3    atorvastatin (LIPITOR) 40 MG tablet Take 1 tablet by mouth nightly 90 tablet 3    aspirin 81 MG chewable tablet Take 1 tablet by mouth daily 30 tablet 3    acetaminophen (TYLENOL) 500 MG tablet Take 500 mg by mouth every 6 hours as needed for Pain       No current facility-administered medications for this visit. No orders of the defined types were placed in this encounter. All medications reviewed and reconciled, including OTC and herbal medications. Updated list given to patient.        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 9)    Prediabetes    Malignant neoplasm of left breast in female, estrogen receptor positive ([address]    Class 2 severe obesity due to excess calories with serious comorbidity and body mass index (BMI) of 39.0 to 39.9 in adult (HCC)    Facial droop    Facial swelling    Depression with anxiety    Vertigo    Exertional chest pain    Gastroesophageal reflux disease    REINALDO (obstructive sleep apnea)    Renal calculi    Adjustment disorder    COVID-19    Chest pain    HFrEF (heart failure with reduced ejection fraction) ([address]    MDD (major depressive disorder), recurrent severe, without psychosis ([address]    Panic disorder without agoraphobia    Hyperkalemia    Abdominal pain       Past Medical History:   Diagnosis Date    Abnormal heart rhythm     Anxiety     Cancer ([address]     breast  2016     CH[facility]congestive heart failure) ([facility]     Congenital heart disease     DJD (degenerative joint disease)     Enlarged lymph nodes     Hypertension     Hypothyroidism     ICD (implantable cardioverter-defibrillator) in place 02/11/2019    Dr. Stanton Escort Kidney stones     PONV (postoperative nausea and vomiting)     Prediabetes 10/7/2019    Thyroid disease        Past Surgical History:   Procedure Laterality Date    APPENDECTOMY      CARPAL TUNNEL RELEASE  2019    COLONOSCOPY      COLONOSCOPY N/A 07/27/2020    COLONOSCOPY POLYPECTOMY SNARE/COLD BIOPSY performed by Dustin Sen [facility] at [facility]    COLONOSCOPY  07/27/2020    COLONOSCOPY POLYPECTOMY HOT BIOPSY performed by Dustin Sen MD at [facility]    COLONOSCOPY  2021    EGD      EYE SURGERY Bilateral 10/2021    eyelid lift     FINGER TRIGGER RELEASE Right 06/25/2020    DEQUERVAINS RELEASE AND RIGHT RING FINGER TRIGGER RELEASE performed by Dee Mohan DO at [address], [address] Left 2019    [facility] HAS A MRI CONDITIONAL SYSTEM    PTCA      TONSILLECTOMY         Allergies   Allergen Reactions    Adhesive Tape Itching       Social History     Socioeconomic History    Marital status:      Spouse name: Not on file    Number of children: Not on file    Years of education: Not on file    Highest education level: Not on file   Occupational History    Not on file   Tobacco Use    Smoking status: Former Smoker     Packs/day: 0.25     Years: 37.00     Pack years: 9.25     Types: Cigarettes     Start date: 1981     Quit date: 2020     Years since quittin.0    Smokeless tobacco: Never Used   Vaping Use    Vaping Use: Never used   Substance and Sexual Activity    Alcohol use: No    Drug use: No    Sexual activity: Not on file   Other Topics Concern    Not on file   Social History Narrative    Referral placed for counseling    Denies barriers with medication affordability    Denies community services     Denies transportation issues     Social Determinants of Health     Financial Resource Strain: Low Risk     Difficulty of Paying Living Expenses: Not very hard   Food Insecurity: No Food Insecurity    Worried About Running Out of Food in the Last Year: Never true    Narciso of Food in the Last Year: Never true   Transportation Needs:     Lack of Transportation (Medical): Not on file    Lack of Transportation (Non-Medical):  Not on file   Physical Activity:     Days of Exercise per Week: Not on file    Minutes of Exercise per Session: Not on file   Stress:     Feeling of Stress : Not on file   Social Connections:     Frequency of Communication with Friends and Family: Not on file    Frequency of Social Gatherings with Friends and Family: Not on file    Attends Religion Services: Not on file    Active Member of Clubs or Organizations: Not on file    Attends Club or Organization Meetings: Not on file    Marital Status: Not on file   Intimate Partner Violence:     Fear of Current or Ex-Partner: Not on file    Emotionally Abused: Not on file    Physically Abused: Not on file    Sexually Abused: Not on file   Housing Stability:     Unable to Pay for Housing in the Last Year: Not on file    Number of [place] in the Last Year: Not on file    Unstable Housing in the Last Year: Not on file       Family History   Problem Relation Age of Onset    High Blood Pressure Mother     Dementia Mother     Cancer Father     Colon Cancer Father     Mental Retardation Brother     Colon Polyps Brother     Cancer Maternal Grandfather     Esophageal Cancer Neg Hx          I have reviewed the patient's past medical history, past surgical history, allergies, medications, social and family history and I have made updates where appropriate.       Review of Systems  Positive responses are highlighted in bold    Constitutional:  Fever, Chills, Night Sweats, Fatigue, Unexpected changes in weight  Eyes:  Eye discharge, Eye pain, Eye redness, Visual disturbances   HENT:  Ear pain, Tinnitus, Nosebleeds, Trouble swallowing, Hearing loss, Sore throat  Cardiovascular:  Chest Pain, Palpitations, Orthopnea, Paroxysmal Nocturnal Dyspnea  Respiratory:  Cough, Wheezing, Shortness of breath, Chest tightness, Apnea  Gastrointestinal:  Nausea, Vomiting, Diarrhea, Constipation, Heartburn, Blood in stool  Genitourinary:  Difficulty or painful urination, Flank pain, Change in frequency, Urgency  Skin:  Color change, Rash, Itching, Wound  Psychiatric:  Hallucinations, Anxiety, Depression, Suicidal ideation  Hematological:  Enlarged glands, Easy bleeding, Easily bruising  Musculoskeletal:  Joint pain, Back pain, Gait problems, Joint swelling, Myalgias  Neurological:  Dizziness, Headaches, Presyncope, Numbness, Seizures, Tremors  Allergy:  Environmental allergies, Food allergies  Endocrine:  Heat Intolerance, Cold Intolerance, Polydipsia, Polyphagia, Polyuria    Lab Results   Component Value Date     06/11/2022    K 4.8 06/11/2022     06/11/2022    CO2 17 (L) 06/11/2022    BUN 28 (H) 06/11/2022    CREATININE 1.2 06/11/2022    GLUCOSE 106 06/11/2022    CALCIUM 8.9 06/11/2022    PROT 6.4 06/11/2022    LABALBU 4.0 06/11/2022    BILITOT 2.1 (H) 06/11/2022    ALKPHOS 172 (H) 06/11/2022     (H) 06/11/2022     (H) 06/11/2022    LABGLOM 46 (A) 06/11/2022     Lab Results   Component Value Date    TSH 14.870 (H) 06/10/2022     Lab Results   Component Value Date    WBC 7.7 06/11/2022    HGB 12.0 06/11/2022    HCT 40.6 06/11/2022    MCV 95.8 06/11/2022     06/11/2022       PHYSICAL EXAM:  Vitals:    06/21/22 1347   BP: 94/60   Pulse: 80   Resp: 14   Temp: 98 °F (36.7 °C)   TempSrc: Oral   SpO2: 96%   Weight: 218 lb 12.8 oz (99.2 kg)   Height: 5' 2\" (1.575 m)     Body mass index is 40.02 kg/m². VS Reviewed  General Appearance: A&O x 3, No acute distress,well developed and well- nourished  Head: normocephalic and atraumatic  Eyes: pupils equal, round, and reactive to light, extraocular eye movements intact, conjunctivae and eye lids without erythema  Neck: supple and non-tender without mass, no thyromegaly or thyroid nodules, no cervical lymphadenopathy  Pulmonary/Chest: clear to auscultation bilaterally- no wheezes, rales or rhonchi, normal air movement, no respiratory distress or retractions  Cardiovascular: S1 and S2 auscultated w/ RRR. No murmurs, rubs, clicks, or gallops, distal pulses intact. Abdomen: soft, non-tender, non-distended, bowl sounds physiologic,  no rebound or guarding, no masses or hernias noted. Liver and spleen without enlargement.    Extremities: no cyanosis, clubbing or edema of the lower extremities  Musculoskeletal: No joint swelling or gross deformity   Neuro:  Alert, 5/5 strength globally and symmetrically  Psych: Affect and mood are flat. Thought process is normal without evidence of psychosis. Good insight and appropriate interaction. Cognition and memory appear to be intact. Skin: warm and dry, no rash or erythema  Lymph:  No cervical, auricular or supraclavicular lymph nodes palpated      ASSESSMENT & PLAN  Enedina Dale was seen today for diarrhea. Diagnoses and all orders for this visit:    Melena  -     Comprehensive Metabolic Panel; Future  -     CBC with Auto Differential; Future  -     Blood Occult Stool Screen #1; Future  -     [facility] ([facility]) - Michelle Kennedy MD, Gastroenterology, [facility]    Daily nausea  -     Comprehensive Metabolic Panel; Future  -     CBC with Auto Differential; Future  -     Blood Occult Stool Screen #1; Future  -     [facility] ([address] [place] [address]) - Michelle Kennedy MD, Gastroenterology, [facility]ong    Gastroesophageal reflux disease without esophagitis      - con't Omeprazole  - obtain labs today and stool for occult blood  - refer back to GI for evaluation, will likely need upper GI  - ER precautions    DISPOSITION    Return if symptoms worsen or fail to improve. Enedina Dale released without restrictions. PATIENT COUNSELING    Counseling was provided today regarding the following topics: Healthy eating habits, Regular exercise, substance abuse and healthy sleep habits. Enedina Dale received counseling on the following healthy behaviors: medication adherence    Patient given educational materials on: See Attached    I have instructed Enedina Dale to complete a self tracking handout on Blood Pressures  and instructed them to bring it with them to her next appointment. Barriers to learning and self management: none    Discussed use, benefit, and side effects of prescribed medications. Barriers to medication compliance addressed. All patient questions answered. Pt voiced understanding.        Electronically signed by ANGELIA Sim CNP on 6/21/2022 at 2:01 PM

## 2022-06-22 ENCOUNTER — TELEPHONE (OUTPATIENT)
Dept: FAMILY MEDICINE CLINIC | Age: 62
End: 2022-06-22

## 2022-06-22 ENCOUNTER — NURSE ONLY (OUTPATIENT)
Dept: LAB | Age: 62
End: 2022-06-22

## 2022-06-22 DIAGNOSIS — K92.1 MELENA: ICD-10-CM

## 2022-06-22 DIAGNOSIS — R11.0 DAILY NAUSEA: ICD-10-CM

## 2022-06-22 LAB
ALBUMIN SERPL-MCNC: 4.1 G/DL (ref 3.5–5.1)
ALP BLD-CCNC: 171 U/L (ref 38–126)
ALT SERPL-CCNC: 68 U/L (ref 11–66)
ANION GAP SERPL CALCULATED.3IONS-SCNC: 15 MEQ/L (ref 8–16)
AST SERPL-CCNC: 17 U/L (ref 5–40)
BASOPHILS # BLD: 0.7 %
BASOPHILS ABSOLUTE: 0 THOU/MM3 (ref 0–0.1)
BILIRUB SERPL-MCNC: 1.5 MG/DL (ref 0.3–1.2)
BUN BLDV-MCNC: 15 MG/DL (ref 7–22)
CALCIUM SERPL-MCNC: 9.9 MG/DL (ref 8.5–10.5)
CHLORIDE BLD-SCNC: 104 MEQ/L (ref 98–111)
CO2: 25 MEQ/L (ref 23–33)
CREAT SERPL-MCNC: 1 MG/DL (ref 0.4–1.2)
EOSINOPHIL # BLD: 1.8 %
EOSINOPHILS ABSOLUTE: 0.1 THOU/MM3 (ref 0–0.4)
ERYTHROCYTE [DISTWIDTH] IN BLOOD BY AUTOMATED COUNT: 16.8 % (ref 11.5–14.5)
ERYTHROCYTE [DISTWIDTH] IN BLOOD BY AUTOMATED COUNT: 56.9 FL (ref 35–45)
GFR SERPL CREATININE-BSD FRML MDRD: 56 ML/MIN/1.73M2
GLUCOSE BLD-MCNC: 111 MG/DL (ref 70–108)
HCT VFR BLD CALC: 41.5 % (ref 37–47)
HEMOCCULT STL QL: NEGATIVE
HEMOGLOBIN: 12.3 GM/DL (ref 12–16)
IMMATURE GRANS (ABS): 0.01 THOU/MM3 (ref 0–0.07)
IMMATURE GRANULOCYTES: 0.2 %
LYMPHOCYTES # BLD: 21.6 %
LYMPHOCYTES ABSOLUTE: 1.2 THOU/MM3 (ref 1–4.8)
MCH RBC QN AUTO: 27.5 PG (ref 26–33)
MCHC RBC AUTO-ENTMCNC: 29.6 GM/DL (ref 32.2–35.5)
MCV RBC AUTO: 92.8 FL (ref 81–99)
MONOCYTES # BLD: 6 %
MONOCYTES ABSOLUTE: 0.3 THOU/MM3 (ref 0.4–1.3)
NUCLEATED RED BLOOD CELLS: 0 /100 WBC
PLATELET # BLD: 293 THOU/MM3 (ref 130–400)
PMV BLD AUTO: 11.7 FL (ref 9.4–12.4)
POTASSIUM SERPL-SCNC: 5.4 MEQ/L (ref 3.5–5.2)
RBC # BLD: 4.47 MILL/MM3 (ref 4.2–5.4)
SEG NEUTROPHILS: 69.7 %
SEGMENTED NEUTROPHILS ABSOLUTE COUNT: 3.9 THOU/MM3 (ref 1.8–7.7)
SODIUM BLD-SCNC: 144 MEQ/L (ref 135–145)
TOTAL PROTEIN: 6.2 G/DL (ref 6.1–8)
WBC # BLD: 5.6 THOU/MM3 (ref 4.8–10.8)

## 2022-06-22 NOTE — TELEPHONE ENCOUNTER
----- Message from ANGELIA Edward CNP sent at 6/22/2022  1:18 PM EDT -----  Let Ismael Vallecillo know her CBC is normal, the stool test is negative for blood. Her liver function tests are also improved, almost back to normal. Her potassium is very mildly elevated at 5.4. I know the heart doctor wanted her to recheck her kidney function in 1 week, I would go ahead and do that as planned.  Also f/u with GI about the black stools, etc

## 2022-07-19 ENCOUNTER — OFFICE VISIT (OUTPATIENT)
Dept: FAMILY MEDICINE CLINIC | Age: 62
End: 2022-07-19
Payer: COMMERCIAL

## 2022-07-19 VITALS
DIASTOLIC BLOOD PRESSURE: 70 MMHG | SYSTOLIC BLOOD PRESSURE: 118 MMHG | OXYGEN SATURATION: 95 % | RESPIRATION RATE: 14 BRPM | WEIGHT: 216.2 LBS | HEART RATE: 85 BPM | BODY MASS INDEX: 39.79 KG/M2 | TEMPERATURE: 97.6 F | HEIGHT: 62 IN

## 2022-07-19 DIAGNOSIS — I50.22 CHRONIC SYSTOLIC (CONGESTIVE) HEART FAILURE (HCC): ICD-10-CM

## 2022-07-19 DIAGNOSIS — R53.81 PHYSICAL DECONDITIONING: ICD-10-CM

## 2022-07-19 DIAGNOSIS — I50.20 HFREF (HEART FAILURE WITH REDUCED EJECTION FRACTION) (HCC): Primary | ICD-10-CM

## 2022-07-19 DIAGNOSIS — G47.33 OSA (OBSTRUCTIVE SLEEP APNEA): ICD-10-CM

## 2022-07-19 PROCEDURE — 99214 OFFICE O/P EST MOD 30 MIN: CPT | Performed by: NURSE PRACTITIONER

## 2022-07-19 PROCEDURE — G8417 CALC BMI ABV UP PARAM F/U: HCPCS | Performed by: NURSE PRACTITIONER

## 2022-07-19 PROCEDURE — 3017F COLORECTAL CA SCREEN DOC REV: CPT | Performed by: NURSE PRACTITIONER

## 2022-07-19 PROCEDURE — 1036F TOBACCO NON-USER: CPT | Performed by: NURSE PRACTITIONER

## 2022-07-19 PROCEDURE — G8427 DOCREV CUR MEDS BY ELIG CLIN: HCPCS | Performed by: NURSE PRACTITIONER

## 2022-07-20 ENCOUNTER — NURSE ONLY (OUTPATIENT)
Dept: LAB | Age: 62
End: 2022-07-20

## 2022-07-20 DIAGNOSIS — R79.89 ELEVATED LIVER FUNCTION TESTS: ICD-10-CM

## 2022-07-20 DIAGNOSIS — E03.9 HYPOTHYROIDISM, UNSPECIFIED TYPE: ICD-10-CM

## 2022-07-20 LAB
ALBUMIN SERPL-MCNC: 4 G/DL (ref 3.5–5.1)
ALP BLD-CCNC: 207 U/L (ref 38–126)
ALT SERPL-CCNC: 16 U/L (ref 11–66)
AST SERPL-CCNC: 19 U/L (ref 5–40)
BILIRUB SERPL-MCNC: 1.8 MG/DL (ref 0.3–1.2)
BILIRUBIN DIRECT: 1 MG/DL (ref 0–0.3)
T4 FREE: 1.39 NG/DL (ref 0.93–1.76)
TOTAL PROTEIN: 6.3 G/DL (ref 6.1–8)
TSH SERPL DL<=0.05 MIU/L-ACNC: 17.71 UIU/ML (ref 0.4–4.2)

## 2022-07-21 ENCOUNTER — TELEPHONE (OUTPATIENT)
Dept: FAMILY MEDICINE CLINIC | Age: 62
End: 2022-07-21

## 2022-07-21 ENCOUNTER — OFFICE VISIT (OUTPATIENT)
Dept: CARDIOLOGY CLINIC | Age: 62
End: 2022-07-21
Payer: COMMERCIAL

## 2022-07-21 VITALS
DIASTOLIC BLOOD PRESSURE: 60 MMHG | BODY MASS INDEX: 39.69 KG/M2 | OXYGEN SATURATION: 93 % | HEART RATE: 90 BPM | WEIGHT: 217 LBS | SYSTOLIC BLOOD PRESSURE: 100 MMHG

## 2022-07-21 DIAGNOSIS — R74.8 ELEVATED ALKALINE PHOSPHATASE LEVEL: Primary | ICD-10-CM

## 2022-07-21 DIAGNOSIS — I50.23 ACUTE ON CHRONIC SYSTOLIC CONGESTIVE HEART FAILURE, NYHA CLASS 3 (HCC): Primary | ICD-10-CM

## 2022-07-21 DIAGNOSIS — E03.9 HYPOTHYROIDISM, UNSPECIFIED TYPE: ICD-10-CM

## 2022-07-21 LAB — POTASSIUM SERPL-SCNC: 5 MEQ/L (ref 3.5–5.2)

## 2022-07-21 PROCEDURE — 1036F TOBACCO NON-USER: CPT | Performed by: NURSE PRACTITIONER

## 2022-07-21 PROCEDURE — G8417 CALC BMI ABV UP PARAM F/U: HCPCS | Performed by: NURSE PRACTITIONER

## 2022-07-21 PROCEDURE — 3017F COLORECTAL CA SCREEN DOC REV: CPT | Performed by: NURSE PRACTITIONER

## 2022-07-21 PROCEDURE — 99214 OFFICE O/P EST MOD 30 MIN: CPT | Performed by: NURSE PRACTITIONER

## 2022-07-21 PROCEDURE — G8427 DOCREV CUR MEDS BY ELIG CLIN: HCPCS | Performed by: NURSE PRACTITIONER

## 2022-07-21 PROCEDURE — 36415 COLL VENOUS BLD VENIPUNCTURE: CPT | Performed by: NURSE PRACTITIONER

## 2022-07-21 PROCEDURE — 96374 THER/PROPH/DIAG INJ IV PUSH: CPT | Performed by: NURSE PRACTITIONER

## 2022-07-21 RX ORDER — POTASSIUM CHLORIDE 750 MG/1
10 TABLET, EXTENDED RELEASE ORAL DAILY
Qty: 90 TABLET | Refills: 3 | Status: SHIPPED | OUTPATIENT
Start: 2022-07-21

## 2022-07-21 RX ORDER — POTASSIUM CHLORIDE 20 MEQ/1
20 TABLET, EXTENDED RELEASE ORAL ONCE
Status: COMPLETED | OUTPATIENT
Start: 2022-07-21 | End: 2022-07-21

## 2022-07-21 RX ORDER — LEVOTHYROXINE SODIUM 0.15 MG/1
150 TABLET ORAL DAILY
Qty: 30 TABLET | Refills: 2 | Status: SHIPPED | OUTPATIENT
Start: 2022-07-21 | End: 2022-08-17

## 2022-07-21 RX ORDER — FUROSEMIDE 10 MG/ML
60 INJECTION INTRAMUSCULAR; INTRAVENOUS ONCE
Status: COMPLETED | OUTPATIENT
Start: 2022-07-21 | End: 2022-07-21

## 2022-07-21 RX ADMIN — FUROSEMIDE 60 MG: 10 INJECTION INTRAMUSCULAR; INTRAVENOUS at 13:59

## 2022-07-21 RX ADMIN — POTASSIUM CHLORIDE 20 MEQ: 20 TABLET, EXTENDED RELEASE ORAL at 14:00

## 2022-07-21 ASSESSMENT — ENCOUNTER SYMPTOMS
COUGH: 1
ABDOMINAL PAIN: 0
SHORTNESS OF BREATH: 1
WHEEZING: 0
ABDOMINAL DISTENTION: 1

## 2022-07-21 NOTE — TELEPHONE ENCOUNTER
----- Message from ANGELIA Escobedo Caro, CNP sent at 7/21/2022  7:37 AM EDT -----  Let Floridalma Gurmeet know overall her liver function tests are improved. One of them is still elevated, but I know she is seeing GI and I would rec'd she continue to f/u with them regarding this. Her thyroid is still quite underactive. I'm increasing her Rx of Synthroid to 150 mcg and i'd like to recheck her thyroid lab in 2 months. Orders placed.

## 2022-07-21 NOTE — PATIENT INSTRUCTIONS
You may receive a survey regarding the care you received during your visit. Your input is valuable to us. We encourage you to complete and return your survey. We hope you will choose us in the future for your healthcare needs. Continue:  Continue current medications  Daily weights and record  Fluid restriction of 2 Liters per day  Limit sodium in diet to around 0018-9971 mg/day  Monitor BP  Activity as tolerated     Call the [facility] for any of the following symptoms: [phone]  Weight gain of 2-3 pounds in 1 day or 5 pounds in 1 week  Increased shortness of breath  Shortness of breath while laying down  Cough  Chest pain  Swelling in feet, ankles or legs  Tenderness or bloating in the abdomen  Fatigue   Decreased appetite or nausea   Confusion      Repeat blood work in 2 weeks    Start taking Lasix 40 daily  Starting the tomorrow take Lasix 40 twice a day for 3 days. Will add potassium based on K level today      Continue diet/fluid adherence  Continue daily wts.   F/U w/ Cardiology  F/U in clinic in 2-3 weeks

## 2022-07-21 NOTE — PROGRESS NOTES
Heart Failure Clinic       Visit Date: 7/21/2022  Cardiologist:  Dr. Rangel [facility]  Primary Care Physician: Dr. Yocasta Lopez, APRN - CNP    Truong Cha is a 65 y.o. female who presents today for:  Chief Complaint   Patient presents with    Congestive Heart Failure       HPI:     TYPE HF: HFrEF 25% 2022 (40-45%, 2020)  Cause: nonischemic, hx of chemotherapy  Device: ICD  HX: REINALDO but never has gotten CPAP, COVID, prediabetic, HTN< hypertriglyceridemia, hypothyroidism,   Dry Wt:  217 on 7/21/22      Truong Cha is a 65 y.o. female who presents to the office for a follow up patient visit in the heart failure clinic. Concerns today: pt has followed her in the past but has not been here in over a year. She was recently seen by her PCP c/o worsening SOB and was instructed to call here for appointment. Also seen by cardiology last month and started on Lasix. She also notes worsening fatigue and orthopnea.          Hospitalization:  recent hospitalization but HF document as compensated      Activity: ADLs with limitation d/t weakness and SOB  Diet: reeducated today    Patient has:  Chest Pain: intermittent   SOB: yes  Orthopnea/PND: yes  REINALDO: yes noncompliant  Edema: some  Fatigue: yes  Abdominal bloating: yes  Cough: yes  Appetite: poor      Past Medical History:   Diagnosis Date    Abnormal heart rhythm     Anxiety     Cancer ([address]     breast  2016     CHF (congestive heart failure) (HCC)     Congenital heart disease     DJD (degenerative joint disease)     Enlarged lymph nodes     Hypertension     Hypothyroidism     ICD (implantable cardioverter-defibrillator) in place 02/11/2019    Dr. Trav Walls    Kidney stones     PONV (postoperative nausea and vomiting)     Prediabetes 10/7/2019    Thyroid disease      Past Surgical History:   Procedure Laterality Date    APPENDECTOMY      CARPAL TUNNEL RELEASE  2019    COLONOSCOPY      COLONOSCOPY N/A 07/27/2020    COLONOSCOPY POLYPECTOMY SNARE/COLD BIOPSY performed by Obed Lr Kristine Moore MD at [facility]    COLONOSCOPY  2020    COLONOSCOPY POLYPECTOMY HOT BIOPSY performed by Barb Johnson MD at [facility]    COLONOSCOPY      EGD      EYE SURGERY Bilateral 10/2021    eyelid lift     FINGER TRIGGER RELEASE Right 2020    DEQUERVAINS RELEASE AND RIGHT RING FINGER TRIGGER RELEASE performed by Soledad Kelsey DO at [address] [place] [address], [facility] Left 2019    [facility] PT HAS A MRI CONDITIONAL SYSTEM    PTCA      TONSILLECTOMY       Family History   Problem Relation Age of Onset    High Blood Pressure Mother     Dementia Mother     Cancer Father     Colon Cancer Father     Mental Retardation Brother     Colon Polyps Brother     Cancer Maternal Grandfather     Esophageal Cancer Neg Hx      Social History     Tobacco Use    Smoking status: Former     Packs/day: 0.25     Years: 37.00     Pack years: 9.25     Types: Cigarettes     Start date: 1981     Quit date: 2020     Years since quittin.1    Smokeless tobacco: Never   Substance Use Topics    Alcohol use: No     Current Outpatient Medications   Medication Sig Dispense Refill    levothyroxine (EUTHYROX) 150 MCG tablet Take 1 tablet by mouth in the morning. 30 tablet 2    bisacodyl (DULCOLAX) 5 MG EC tablet See Prep Instructions 4 tablet 0    polyethylene glycol (GLYCOLAX) 17 GM/SCOOP powder Dispense 238 Gram Bottle.   Use as Directed 238 g 0    sacubitril-valsartan (ENTRESTO) 24-26 MG per tablet Take 1 tablet by mouth 2 times daily 60 tablet 3    buPROPion (WELLBUTRIN XL) 150 MG extended release tablet Take 1 tablet by mouth every morning 90 tablet 0    traZODone (DESYREL) 50 MG tablet Take 1 tablet by mouth nightly as needed for Sleep 90 tablet 0    FLUoxetine (PROZAC) 40 MG capsule Take 1 capsule by mouth daily 90 capsule 0    dapagliflozin (FARXIGA) 10 MG tablet Take 1 tablet by mouth every morning 30 tablet 5    omeprazole (PRILOSEC) 40 MG delayed release capsule Take 1 capsule by mouth every morning (before breakfast) 90 capsule 1    metoprolol succinate (TOPROL XL) 25 MG extended release tablet Take 0.5 tablets by mouth daily Hold if SBP less than 95 mmHg or HR less than 50 bpm (Patient taking differently: Take 12.5 mg by mouth in the morning. Hold if SBP less than 95 mmHg or HR less than 50 bp  Takes at 1400.) 30 tablet 3    digoxin (LANOXIN) 125 MCG tablet Take 1 tablet by mouth daily (Patient taking differently: Take 125 mcg by mouth in the morning. Takes at 1400.) 90 tablet 0    CPAP Machine MISC by Does not apply route Please change CPAP pressure to auto 11-15 cm H20. 1 each 0    letrozole (FEMARA) 2.5 MG tablet Take 1 tablet by mouth daily 90 tablet 3    atorvastatin (LIPITOR) 40 MG tablet Take 1 tablet by mouth nightly 90 tablet 3    aspirin 81 MG chewable tablet Take 1 tablet by mouth daily 30 tablet 3    acetaminophen (TYLENOL) 500 MG tablet Take 500 mg by mouth every 6 hours as needed for Pain      midodrine (PROAMATINE) 10 MG tablet Take 1 tablet by mouth 3 times daily (with meals) 90 tablet 1     Current Facility-Administered Medications   Medication Dose Route Frequency Provider Last Rate Last Admin    furosemide (LASIX) injection 60 mg  60 mg IntraVENous Once ANGELIA Schaefer CNP        potassium chloride (KLOR-CON M) extended release tablet 20 mEq  20 mEq Oral Once ANGELIA Schaefer CNP         Allergies   Allergen Reactions    Adhesive Tape Itching       SUBJECTIVE:   Review of Systems   Constitutional:  Positive for fatigue. Negative for activity change and appetite change. Respiratory:  Positive for cough and shortness of breath. Negative for wheezing. Cardiovascular:  Negative for chest pain, palpitations and leg swelling. Gastrointestinal:  Positive for abdominal distention. Negative for abdominal pain. Musculoskeletal:  Negative for gait problem. Neurological:  Negative for weakness, light-headedness and headaches. Psychiatric/Behavioral:  Negative for sleep disturbance. OBJECTIVE:   Today's Vitals:  /60   Pulse 90   Wt 217 lb (98.4 kg)   SpO2 93%   BMI 39.69 kg/m²     Physical Exam  Vitals reviewed. Constitutional:       General: She is not in acute distress. Appearance: Normal appearance. She is well-developed. She is not diaphoretic. HENT:      Head: Normocephalic and atraumatic. Eyes:      Conjunctiva/sclera: Conjunctivae normal.   Cardiovascular:      Rate and Rhythm: Normal rate and regular rhythm. Heart sounds: Murmur heard. Pulmonary:      Effort: Pulmonary effort is normal. No respiratory distress. Breath sounds: Normal breath sounds. No wheezing, rhonchi or rales. Abdominal:      General: Bowel sounds are normal. There is no distension. Palpations: Abdomen is soft. Tenderness: no abdominal tenderness   Musculoskeletal:         General: Normal range of motion. Cervical back: Normal range of motion and neck supple. Right lower leg: Edema (+1) present. Left lower leg: Edema (+1) present. Skin:     General: Skin is warm and dry. Capillary Refill: Capillary refill takes less than 2 seconds. Neurological:      Mental Status: She is alert and oriented to person, place, and time. Coordination: Coordination normal.   Psychiatric:         Behavior: Behavior normal.       Wt Readings from Last 3 Encounters:   07/21/22 217 lb (98.4 kg)   07/19/22 216 lb 3.2 oz (98.1 kg)   07/01/22 208 lb (94.3 kg)     BP Readings from Last 3 Encounters:   07/21/22 100/60   07/19/22 118/70   07/01/22 100/69     Pulse Readings from Last 3 Encounters:   07/21/22 90   07/19/22 85   07/01/22 78     Body mass index is 39.69 kg/m². ECHO:   Summary  Left Ventricular size is Mildly increased . Normal left ventricular wall thickness. There was severe global hypokinesis of the left ventricle. Systolic function was severely reduced.   Ejection fraction is visually estimated at 25%. The left atrium is Mild to moderate dilated. Mildly enlarged right atrium size. Signature     ----------------------------------------------------------------  Electronically signed by Anabell Kim MD (Interpreting  physician) on 05/24/2022 at 07:26 PM  ----------------------------------------------------------------    Summary   Left ventricle size is normal.   Mild concentric left ventricular hypertrophy. There was moderate global hypokinesis of the left ventricle. Ejection fraction is visually estimated in the range of 40% to 45%. Mild mitral regurgitation. Signature      ----------------------------------------------------------------   Electronically signed by Ann Phillips MD (Interpreting   physician) on 10/16/2020 at 01:33 PM   ----------------------------------------------------------------    CATH/STRESS:     IMPRESSION:  1. No significant coronary artery disease noticed. 2.  Peripheral angiogram was performed. No significant peripheral  arterial disease was noticed. RECOMMENDATIONS:  Medical management.         Izabela Arauz MD     D: 05/24/2022 15:37:42       T: 05/24/2022 15:42:02     [email or web address]  Job#: [id number]     Doc#: [id number]      Results reviewed:  BNP: No results found for: BNP  CBC:   Lab Results   Component Value Date/Time    WBC 5.6 06/22/2022 09:25 AM    RBC 4.47 06/22/2022 09:25 AM    HGB 12.3 06/22/2022 09:25 AM    HCT 41.5 06/22/2022 09:25 AM     06/22/2022 09:25 AM     CMP:    Lab Results   Component Value Date/Time     06/22/2022 09:25 AM    K 5.4 06/22/2022 09:25 AM    K 4.8 06/11/2022 05:01 AM     06/22/2022 09:25 AM    CO2 25 06/22/2022 09:25 AM    BUN 15 06/22/2022 09:25 AM    CREATININE 1.0 06/22/2022 09:25 AM    LABGLOM 56 06/22/2022 09:25 AM    GLUCOSE 111 06/22/2022 09:25 AM    CALCIUM 9.9 06/22/2022 09:25 AM     Hepatic Function Panel:    Lab Results   Component Value Date/Time    ALKPHOS 207 07/20/2022 02:35 PM    ALT 16 07/20/2022 02:35 PM    AST 19 07/20/2022 02:35 PM    PROT 6.3 07/20/2022 02:35 PM    BILITOT 1.8 07/20/2022 02:35 PM    BILIDIR 1.0 07/20/2022 02:35 PM    LABALBU 4.0 07/20/2022 02:35 PM     Magnesium:    Lab Results   Component Value Date/Time    MG 2.3 06/10/2022 10:00 AM     PT/INR:    Lab Results   Component Value Date/Time    INR 1.02 07/09/2020 01:04 AM     Lipids:    Lab Results   Component Value Date/Time    TRIG 106 07/09/2020 03:57 AM    HDL 51 08/30/2021 08:39 AM    LDLCALC 84 08/30/2021 08:39 AM       ASSESSMENT AND PLAN:   The patient's condition/symptoms are stable        Diagnosis Orders   1. Acute on chronic systolic congestive heart failure, NYHA class 3 (HCC)  Basic Metabolic Panel    Brain Natriuretic Peptide    Potassium        Continue:  GDMT:   ACE/ARB/ARNI - Enresto 24/26 BID   BB - Toprol 12.5 daily   Diuretic - Lasix 40 daily  AA - none: hypotensive today  SGLT2 -  Farxiga 10 daily  Vasodilator - none  Other - asa levothyroxine      HFrEF 25%   Stable, fluid on exam today. Lasix 60 w/ 20 of Kcl (last k was 5.4)  GDMT: work on, need aldactone yet but hypotensive    Lab reviewed -  TSH 17 (just increased med)   ECHO 2022: moderate MR, RVSP 40, mild to mod dilated LA, mild dilated LA  CATH 2022: nonobstructive: LVEDP 40    Repeat blood work in 2 weeks    Start taking Lasix 40 daily  Starting the tomorrow take Lasix 40 twice a day for 3 days. Going to add 10 of Kcl     D/t hypotension: told pt she may hold her Entresto if BP to low with higher dose of Lasix     Will add potassium based on K level today    Continue diet/fluid adherence  Continue daily wts. F/U w/ Cardiology  F/U in clinic in 2-3 weeks      Tolerating above noted HF meds, no ill side effects noted. Will continue to monitor kidney function and electrolytes. Will optimize as tolerated. Pt is compliant w/ medications.     Total visit time of 30 minutes has been spent with patient on education of symptoms, management, medication, and plan of care; as well as review of chart: labs, ECHO, radiology reports, etc.   I personally spent more then 50% of the appt time face to face with the patient. Daily weights  Fluid restriction of 2 Liters per day  Limit sodium in diet to around 9214-0934 mg/day  Monitor BP  Activity as tolerated     Patient was instructed to call the [facility] for any changes in symptoms as noted in AVS.      Return in about 3 weeks (around 8/11/2022). or sooner if needed     Patient given educational materials - see patient instructions. We discussed the importance of weighing oneself and recording daily. We also discussed the importance of a low sodium diet, higher sodium foods to avoid and better low sodium food options. Patient verbalizes understanding of plan of care using teach back method, and is agreeable to the treatment plan.        Electronically signed by ANGELIA Poole CNP on 7/21/2022 at 1:49 PM

## 2022-07-21 NOTE — PROGRESS NOTES
Venipuncture obtained from [facility] Labs obtained  IV lasix and  po potassium given per order   Patient tolerated procedure without complications or complaints.

## 2022-07-22 ENCOUNTER — TELEPHONE (OUTPATIENT)
Dept: CARDIOLOGY CLINIC | Age: 62
End: 2022-07-22

## 2022-07-22 ENCOUNTER — PROCEDURE VISIT (OUTPATIENT)
Dept: CARDIOLOGY CLINIC | Age: 62
End: 2022-07-22
Payer: COMMERCIAL

## 2022-07-22 DIAGNOSIS — I50.23 ACUTE ON CHRONIC SYSTOLIC CONGESTIVE HEART FAILURE, NYHA CLASS 3 (HCC): Primary | ICD-10-CM

## 2022-07-22 PROCEDURE — G2066 INTER DEVC REMOTE 30D: HCPCS | Performed by: INTERNAL MEDICINE

## 2022-07-22 PROCEDURE — 93297 REM INTERROG DEV EVAL ICPMS: CPT | Performed by: INTERNAL MEDICINE

## 2022-07-22 NOTE — TELEPHONE ENCOUNTER
IV lasix given in CHF clinic on 7/21  Spoke with patient   Urinated more yesterday  Took lasix this am at 830 and has not urinated much today  Wt 211 today  Wt 212 yesterday at home

## 2022-07-22 NOTE — PROGRESS NOTES
DR LAGUERRE PT   [employer] OPTIVOL REMOTE   BATTERY 8.4 YRS REMAINING  8 HIGH VENT RATE EPISODES/LONGEST :02 SECONDS   OPTIVOL OFF THE CHART / ROUTED TO [place] [id number] IN CHF IN PHONE ENCOUNTER

## 2022-07-22 NOTE — PROGRESS NOTES
July 22, 2022       TO: Melia Braxton  [address] [facility] [address]       DearMs.Irais,    We are writing to inform you of your test results.    {[facility] results letter list:490238}    No results found from the In Basket message.    ***     Chief Complaint   Patient presents with    Fatigue     Shortness of breath is getting worse     Other     Pt would like to talk about getting PT and Home care       History obtained from chart review and the patient. SUBJECTIVE:  Alonzo Olivares is a 65 y.o. female that presents today for follow up chronic conditions    The patient is really struggling with self care. She is so weak, and has heart palpitations, SOB with minimal activity. She is requesting home health and therapy to come work with her. She had been referred to [facility] but she reports that [facility] never came. She also reports that she has had Sleep apnea testing done, but has never gotten her CPAP. Would like referred back to Pulmonary. Age/Gender Health Maintenance    Lipid -  Lab Results   Component Value Date    CHOL 154 07/09/2020     Lab Results   Component Value Date    TRIG 106 07/09/2020     Lab Results   Component Value Date    HDL 51 08/30/2021    HDL 49 07/09/2020    HDL 57 02/05/2020     Lab Results   Component Value Date    LDLCALC 84 08/30/2021    [address] 84 07/09/2020    [address] 114 02/05/2020     No results found for: LABVLDL, VLDL  No results found for: CHOLHDLRATIO    DM Screen -   Lab Results   Component Value Date    LABA1C 6.1 06/01/2022     Colon Cancer Screening - referral Rosaura Valdez 10/7/19  Lung Cancer Screening (Age 54 to [de-identified] with 30 pack year hx, current smoker or quit within past 15 years) - n/a    Tetanus - needs  Influenza Vaccine - 10/18  Pneumonia Vaccine - 66  Zostavax - 50     Breast Cancer Screening - 50  Cervical Cancer Screening - needs  Osteoporosis Screening - 73  Chlamydia Screen - n/a    PSA testing discussion - n/a  AAA Screening - n/a    Falls screening - n/a    Current Outpatient Medications   Medication Sig Dispense Refill    bisacodyl (DULCOLAX) 5 MG EC tablet See Prep Instructions 4 tablet 0    polyethylene glycol (GLYCOLAX) 17 GM/SCOOP powder Dispense 238 Gram Bottle.   Use as Directed 238 g 0 sacubitril-valsartan (ENTRESTO) 24-26 MG per tablet Take 1 tablet by mouth 2 times daily 60 tablet 3    buPROPion (WELLBUTRIN XL) 150 MG extended release tablet Take 1 tablet by mouth every morning 90 tablet 0    traZODone (DESYREL) 50 MG tablet Take 1 tablet by mouth nightly as needed for Sleep 90 tablet 0    FLUoxetine (PROZAC) 40 MG capsule Take 1 capsule by mouth daily 90 capsule 0    dapagliflozin (FARXIGA) 10 MG tablet Take 1 tablet by mouth every morning 30 tablet 5    omeprazole (PRILOSEC) 40 MG delayed release capsule Take 1 capsule by mouth every morning (before breakfast) 90 capsule 1    midodrine (PROAMATINE) 10 MG tablet Take 1 tablet by mouth 3 times daily (with meals) 90 tablet 1    levothyroxine (EUTHYROX) 125 MCG tablet Take 1 tablet by mouth Daily 30 tablet 1    metoprolol succinate (TOPROL XL) 25 MG extended release tablet Take 0.5 tablets by mouth daily Hold if SBP less than 95 mmHg or HR less than 50 bpm (Patient taking differently: Take 12.5 mg by mouth in the morning. Hold if SBP less than 95 mmHg or HR less than 50 bp  Takes at 1400.) 30 tablet 3    digoxin (LANOXIN) 125 MCG tablet Take 1 tablet by mouth daily (Patient taking differently: Take 125 mcg by mouth in the morning. Takes at 1400.) 90 tablet 0    letrozole (FEMARA) 2.5 MG tablet Take 1 tablet by mouth daily 90 tablet 3    atorvastatin (LIPITOR) 40 MG tablet Take 1 tablet by mouth nightly 90 tablet 3    aspirin 81 MG chewable tablet Take 1 tablet by mouth daily 30 tablet 3    acetaminophen (TYLENOL) 500 MG tablet Take 500 mg by mouth every 6 hours as needed for Pain      CPAP Machine MISC by Does not apply route Please change CPAP pressure to auto 11-15 cm H20. (Patient not taking: Reported on 7/19/2022) 1 each 0     No current facility-administered medications for this visit. No orders of the defined types were placed in this encounter. All medications reviewed and reconciled, including OTC and herbal medications.  Updated list given to patient. 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 9)    Prediabetes    Malignant neoplasm of left breast in female, estrogen receptor positive ([address]    Class 2 severe obesity due to excess calories with serious comorbidity and body mass index (BMI) of 39.0 to 39.9 in adult [facility])    Facial droop    Facial swelling    Depression with anxiety    Vertigo    Exertional chest pain    Gastroesophageal reflux disease    REINALDO (obstructive sleep apnea)    Renal calculi    Adjustment disorder    COVID-19    Chest pain    HFrEF (heart failure with reduced ejection fraction) (HCC)    MDD (major depressive disorder), recurrent severe, without psychosis ([address]    Panic disorder without agoraphobia    Hyperkalemia    Abdominal pain       Past Medical History:   Diagnosis Date    Abnormal heart rhythm     Anxiety     Cancer ([address]     breast  2016     CHF (congestive heart failure) (HCC)     Congenital heart disease     DJD (degenerative joint disease)     Enlarged lymph nodes     Hypertension     Hypothyroidism     ICD (implantable cardioverter-defibrillator) in place 02/11/2019    Dr. Flores Bess    Kidney stones     PONV (postoperative nausea and vomiting)     Prediabetes 10/7/2019    Thyroid disease        Past Surgical History:   Procedure Laterality Date    APPENDECTOMY      CARPAL TUNNEL RELEASE  2019    COLONOSCOPY      COLONOSCOPY N/A 07/27/2020    COLONOSCOPY POLYPECTOMY SNARE/COLD BIOPSY performed by Phoenix Platt MD at [facility]    COLONOSCOPY  07/27/2020    COLONOSCOPY POLYPECTOMY HOT BIOPSY performed by Coco Aguilar MD at [facility]    COLONOSCOPY      EGD      EYE SURGERY Bilateral 10/2021    eyelid lift     FINGER TRIGGER RELEASE Right 2020    DEQUERVAINS RELEASE AND RIGHT RING FINGER TRIGGER RELEASE performed by Boubacar Zavaleta DO at [address] [place] [address] Left 2019    [facility] HAS A MRI CONDITIONAL SYSTEM    PTCA      TONSILLECTOMY         Allergies   Allergen Reactions    Adhesive Tape Itching       Social History     Socioeconomic History    Marital status:      Spouse name: Not on file    Number of children: Not on file    Years of education: Not on file    Highest education level: Not on file   Occupational History    Not on file   Tobacco Use    Smoking status: Former     Packs/day: 0.25     Years: 37.00     Pack years: 9.25     Types: Cigarettes     Start date: 1981     Quit date: 2020     Years since quittin.1    Smokeless tobacco: Never   Vaping Use    Vaping Use: Never used   Substance and Sexual Activity    Alcohol use: No    Drug use: No    Sexual activity: Not on file   Other Topics Concern    Not on file   Social History Narrative    Referral placed for counseling    Denies barriers with medication affordability    Denies community services     Denies transportation issues     Social Determinants of Health     Financial Resource Strain: Low Risk     Difficulty of Paying Living Expenses: Not very hard   Food Insecurity: No Food Insecurity    Worried About Running Out of Food in the Last Year: Never true    Ran Out of Food in the Last Year: Never true   Transportation Needs: Not on file   Physical Activity: Not on file   Stress: Not on file   Social Connections: Not on file   Intimate Partner Violence: Not on file   Housing Stability: Not on file       Family History   Problem Relation Age of Onset    High Blood Pressure Mother     Dementia Mother     Cancer Father Date    WBC 5.6 06/22/2022    HGB 12.3 06/22/2022    HCT 41.5 06/22/2022    MCV 92.8 06/22/2022     06/22/2022       PHYSICAL EXAM:  Vitals:    07/19/22 1541   BP: 118/70   Pulse: 85   Resp: 14   Temp: 97.6 °F (36.4 °C)   TempSrc: Oral   SpO2: 95%   Weight: 216 lb 3.2 oz (98.1 kg)   Height: 5' 2\" (1.575 m)     Body mass index is 39.54 kg/m². VS Reviewed  General Appearance: A&O x 3, No acute distress,well developed and well- nourished  Head: normocephalic and atraumatic  Eyes: pupils equal, round, and reactive to light, extraocular eye movements intact, conjunctivae and eye lids without erythema  Neck: supple and non-tender without mass, no thyromegaly or thyroid nodules, no cervical lymphadenopathy  Pulmonary/Chest: clear to auscultation bilaterally- no wheezes, rales or rhonchi, normal air movement, no respiratory distress or retractions  Cardiovascular: S1 and S2 auscultated w/ RRR. No murmurs, rubs, clicks, or gallops, distal pulses intact. Abdomen: soft, non-tender, non-distended, bowl sounds physiologic,  no rebound or guarding, no masses or hernias noted. Liver and spleen without enlargement. Extremities: no cyanosis, clubbing or edema of the lower extremities  Musculoskeletal: No joint swelling or gross deformity   Neuro:  Alert, 5/5 strength globally and symmetrically  Psych: Affect and mood are flat. Thought process is normal without evidence of psychosis. Good insight and appropriate interaction. Cognition and memory appear to be intact. Skin: warm and dry, no rash or erythema  Lymph:  No cervical, auricular or supraclavicular lymph nodes palpated      ASSESSMENT & PLAN  Trisha Rodriguez was seen today for fatigue and other.     Diagnoses and all orders for this visit:    HFrEF (heart failure with reduced ejection fraction) ([facility]  -     [address] [facility]    Chronic systolic (congestive) heart failure [facility])  -     [address] [facility] [address] [facility]    REINALDO (obstructive sleep apnea)    Physical deconditioning  -     [address] [facility]    - referral placed to [facility]  - referral also to the CHF clinic for further management of CHF  - rec'd she also f/u with cardiology  - ER precautions  - info given to patient so she can contact Pulm to schedule follow up appt    DISPOSITION    Return if symptoms worsen or fail to improve. Sameer Feliciano released without restrictions. PATIENT COUNSELING    Counseling was provided today regarding the following topics: Healthy eating habits, Regular exercise, substance abuse and healthy sleep habits. Sameer Feliciano received counseling on the following healthy behaviors: medication adherence    Patient given educational materials on: See Attached    I have instructed Sameer Feliciano to complete a self tracking handout on Blood Pressures  and instructed them to bring it with them to her next appointment. Barriers to learning and self management: none    Discussed use, benefit, and side effects of prescribed medications. Barriers to medication compliance addressed. All patient questions answered. Pt voiced understanding.        Electronically signed by ANGELIA Chiu CNP on 7/19/2022 at 4:10 PM

## 2022-07-25 NOTE — TELEPHONE ENCOUNTER
Pt states she is feeling much better, down to 204 lbs, pt does have to take breaks at times, and when she does she has fatigue, and sob. Pt feels hungry now, but feels like the lasix did not work last night. She will notify us if weight starts going back on.

## 2022-07-27 ENCOUNTER — TELEPHONE (OUTPATIENT)
Dept: FAMILY MEDICINE CLINIC | Age: 62
End: 2022-07-27

## 2022-07-27 DIAGNOSIS — R53.81 PHYSICAL DECONDITIONING: Primary | ICD-10-CM

## 2022-07-27 DIAGNOSIS — M19.90 OSTEOARTHRITIS, UNSPECIFIED OSTEOARTHRITIS TYPE, UNSPECIFIED SITE: ICD-10-CM

## 2022-07-27 NOTE — TELEPHONE ENCOUNTER
Diagnosis Orders   1. Physical deconditioning  Misc. Devices MISC      2. Osteoarthritis, unspecified osteoarthritis type, unspecified site  Misc.  Devices MISC

## 2022-08-03 ENCOUNTER — TELEPHONE (OUTPATIENT)
Dept: FAMILY MEDICINE CLINIC | Age: 62
End: 2022-08-03

## 2022-08-03 DIAGNOSIS — Z91.81 AT HIGH RISK FOR FALLS: ICD-10-CM

## 2022-08-03 DIAGNOSIS — R53.81 PHYSICAL DECONDITIONING: Primary | ICD-10-CM

## 2022-08-03 NOTE — TELEPHONE ENCOUNTER
I don't see the Lasix on her med list either. So this would actually need to be clarified with the CHF clinic, they are managing diuretics. As far as her BP goes, I know she does tend to run low. This is the reason why she's on the Midodrine. As long as she's not feeling lightheaded, or like she's going to pass out, i'm ok to monitor.

## 2022-08-03 NOTE — TELEPHONE ENCOUNTER
Sue from [facility]  reporting that the patients 1st BP today 86/64 P-82. Sue arredondo sthat she had the patient drink 4 oz of water then took a 2nd /66 P-not recorded  Sue states that he patient informed her that her lasix dose was recently adjusted.  I informed Sue that we do not currently show that lasix as being active in patients med list and that it was discontinued on 06.05.2022 when patient was being discharged from the hospital.  Coreen Jimenez is also requesting an order for a straight cane to be sent over to [facility] home medical     Please advise

## 2022-08-04 DIAGNOSIS — I50.23 ACUTE ON CHRONIC SYSTOLIC CONGESTIVE HEART FAILURE, NYHA CLASS 3 (HCC): Primary | ICD-10-CM

## 2022-08-04 RX ORDER — FUROSEMIDE 40 MG/1
40 TABLET ORAL 2 TIMES DAILY
Qty: 60 TABLET | Refills: 5 | Status: ON HOLD | OUTPATIENT
Start: 2022-08-04 | End: 2022-09-29

## 2022-08-04 RX ORDER — FUROSEMIDE 40 MG/1
40 TABLET ORAL 2 TIMES DAILY
Qty: 60 TABLET | Refills: 5
Start: 2022-08-04 | End: 2022-08-04 | Stop reason: SDUPTHER

## 2022-08-04 NOTE — TELEPHONE ENCOUNTER
Please have them fax the weight log over, dated back to 7/21. Will order lasix as 40 BID. No K at this time. BMP in two weeks. Appointment w/ me already on 8/10.

## 2022-08-04 NOTE — TELEPHONE ENCOUNTER
Liz Rather nurse called pt c/o gaining a pound a day after medication back to normal dose , not urinating as much as , bloated in abdomen area, can not lay flat feels like elephant on chest when lying flat . Wt today is 207lb    Chelsea advise?

## 2022-08-09 ENCOUNTER — OFFICE VISIT (OUTPATIENT)
Dept: PULMONOLOGY | Age: 62
End: 2022-08-09
Payer: COMMERCIAL

## 2022-08-09 VITALS
TEMPERATURE: 97.1 F | HEIGHT: 62 IN | BODY MASS INDEX: 39.93 KG/M2 | WEIGHT: 217 LBS | SYSTOLIC BLOOD PRESSURE: 118 MMHG | DIASTOLIC BLOOD PRESSURE: 78 MMHG | HEART RATE: 75 BPM | OXYGEN SATURATION: 98 %

## 2022-08-09 DIAGNOSIS — G47.10 HYPERSOMNIA: ICD-10-CM

## 2022-08-09 DIAGNOSIS — G47.33 OSA (OBSTRUCTIVE SLEEP APNEA): Primary | ICD-10-CM

## 2022-08-09 DIAGNOSIS — E66.9 OBESITY (BMI 30-39.9): ICD-10-CM

## 2022-08-09 PROCEDURE — 1036F TOBACCO NON-USER: CPT | Performed by: PHYSICIAN ASSISTANT

## 2022-08-09 PROCEDURE — G8417 CALC BMI ABV UP PARAM F/U: HCPCS | Performed by: PHYSICIAN ASSISTANT

## 2022-08-09 PROCEDURE — 3017F COLORECTAL CA SCREEN DOC REV: CPT | Performed by: PHYSICIAN ASSISTANT

## 2022-08-09 PROCEDURE — G8427 DOCREV CUR MEDS BY ELIG CLIN: HCPCS | Performed by: PHYSICIAN ASSISTANT

## 2022-08-09 PROCEDURE — 99214 OFFICE O/P EST MOD 30 MIN: CPT | Performed by: PHYSICIAN ASSISTANT

## 2022-08-09 ASSESSMENT — ENCOUNTER SYMPTOMS
COUGH: 0
EYES NEGATIVE: 1
WHEEZING: 0
ALLERGIC/IMMUNOLOGIC NEGATIVE: 1
STRIDOR: 0
BACK PAIN: 0
NAUSEA: 0
CHEST TIGHTNESS: 0
SHORTNESS OF BREATH: 1
DIARRHEA: 0

## 2022-08-09 NOTE — PROGRESS NOTES
[facility]      Dossie Found         [id number]  8/9/2022   Chief Complaint   Patient presents with    Follow-up     1 year REINALDO follow up, no currently on Pap machine, returned AMA         Pt of Dr. Sherlyn Rivas    PAP Download:   Original or initial AHI: 7.8     Date of initial study: 05/25/2021      Neck Size: 15.75  Mallampati Mallampati 4  ESS:  11  SAQLI: 41    Here is a scan of the most recent download:  N/A, not currently on Pap     Presentation:   Mario Huynh presents for sleep medicine follow up for obstructive sleep apnea  Since the last visit, Mario Huynh had to turn PAP back into DME in Jan secondary to insurance change and moving to [place]. She moved back in May and now wants back on PAP. She is more tired since not being on PAP. Sleep issues:  Do you feel better? No  More rested? No   Better concentration? no    Progress History:   Since last visit any new medical issues? No  New ER or hospital visits? No  Any new or changes in medicines? No  Any new sleep medicines? No    Review of Systems -   Review of Systems   Constitutional:  Positive for fatigue. Negative for activity change, appetite change, chills and fever. HENT:  Negative for congestion and postnasal drip. Eyes: Negative. Respiratory:  Positive for shortness of breath. Negative for cough, chest tightness, wheezing and stridor. Cardiovascular:  Negative for chest pain and leg swelling. Gastrointestinal:  Negative for diarrhea and nausea. Endocrine: Negative. Genitourinary: Negative. Musculoskeletal: Negative. Negative for arthralgias and back pain. Skin: Negative. Allergic/Immunologic: Negative. Neurological: Negative. Negative for dizziness and light-headedness. Psychiatric/Behavioral: Negative. All other systems reviewed and are negative. Physical Exam:    BMI:  Body mass index is 39.69 kg/m².     Wt Readings from Last 3 Encounters:   08/09/22 217 lb (98.4 kg) 07/21/22 217 lb (98.4 kg)   07/19/22 216 lb 3.2 oz (98.1 kg)     Weight stable / unchanged  Vitals: /78   Pulse 75   Temp 97.1 °F (36.2 °C)   Ht 5' 2\" (1.575 m)   Wt 217 lb (98.4 kg)   SpO2 98% Comment: r/a  BMI 39.69 kg/m²       Physical Exam  Constitutional:       Appearance: Normal appearance. She is normal weight. HENT:      Head: Normocephalic and atraumatic. Right Ear: External ear normal.      Left Ear: External ear normal.      Nose: Nose normal.   Eyes:      Extraocular Movements: Extraocular movements intact. Conjunctiva/sclera: Conjunctivae normal.      Pupils: Pupils are equal, round, and reactive to light. Pulmonary:      Effort: Pulmonary effort is normal.   Musculoskeletal:      Cervical back: Normal range of motion and neck supple. Neurological:      General: No focal deficit present. Mental Status: She is alert and oriented to person, place, and time. Psychiatric:         Attention and Perception: Attention and perception normal.         Mood and Affect: Mood and affect normal.         Speech: Speech normal.         Behavior: Behavior normal. Behavior is cooperative. Thought Content: Thought content normal.         Cognition and Memory: Cognition normal.         Judgment: Judgment normal.         ASSESSMENT/DIAGNOSIS     Diagnosis Orders   1. REINALDO (obstructive sleep apnea)  Baseline Diagnostic Sleep Study      2. Obesity (BMI 30-39. 9)  Baseline Diagnostic Sleep Study      3. Hypersomnia  Baseline Diagnostic Sleep Study               Plan   - Need to repeat PSG for insurance  - Will get back on PAP  - Follow up 6 weeks after set up   -She call my office for earlier appointment if needed for worsening of sleep symptoms.   - She was instructed on weight loss  - Julien Smith was educated about my impression and plan. Patient verbalizesunderstanding.       Information added by my medical assistant/LPN was reviewed today         RU Avila PA-C  8/9/2022

## 2022-08-10 ENCOUNTER — TELEPHONE (OUTPATIENT)
Dept: CARDIOLOGY CLINIC | Age: 62
End: 2022-08-10

## 2022-08-16 ENCOUNTER — NURSE ONLY (OUTPATIENT)
Dept: LAB | Age: 62
End: 2022-08-16

## 2022-08-16 DIAGNOSIS — E66.9 OBESITY (BMI 30-39.9): ICD-10-CM

## 2022-08-16 DIAGNOSIS — E87.5 HYPERKALEMIA: ICD-10-CM

## 2022-08-16 DIAGNOSIS — G47.33 OSA (OBSTRUCTIVE SLEEP APNEA): ICD-10-CM

## 2022-08-16 DIAGNOSIS — R74.8 ELEVATED ALKALINE PHOSPHATASE LEVEL: ICD-10-CM

## 2022-08-16 DIAGNOSIS — I50.20 HFREF (HEART FAILURE WITH REDUCED EJECTION FRACTION) (HCC): ICD-10-CM

## 2022-08-16 DIAGNOSIS — G47.10 HYPERSOMNIA: ICD-10-CM

## 2022-08-16 DIAGNOSIS — E03.9 HYPOTHYROIDISM, UNSPECIFIED TYPE: ICD-10-CM

## 2022-08-16 DIAGNOSIS — I50.23 ACUTE ON CHRONIC SYSTOLIC CONGESTIVE HEART FAILURE, NYHA CLASS 3 (HCC): ICD-10-CM

## 2022-08-16 LAB
ANION GAP SERPL CALCULATED.3IONS-SCNC: 12 MEQ/L (ref 8–16)
BUN BLDV-MCNC: 17 MG/DL (ref 7–22)
CALCIUM SERPL-MCNC: 9.8 MG/DL (ref 8.5–10.5)
CHLORIDE BLD-SCNC: 100 MEQ/L (ref 98–111)
CO2: 29 MEQ/L (ref 23–33)
CREAT SERPL-MCNC: 0.8 MG/DL (ref 0.4–1.2)
GAMMA GLUTAMYL TRANSFERASE: 73 U/L (ref 8–69)
GFR SERPL CREATININE-BSD FRML MDRD: 73 ML/MIN/1.73M2
GLUCOSE BLD-MCNC: 98 MG/DL (ref 70–108)
POTASSIUM SERPL-SCNC: 4.8 MEQ/L (ref 3.5–5.2)
PRO-BNP: ABNORMAL PG/ML (ref 0–900)
PTH INTACT: 171.5 PG/ML (ref 15–65)
SODIUM BLD-SCNC: 141 MEQ/L (ref 135–145)
T4 FREE: 1.58 NG/DL (ref 0.93–1.76)
TSH SERPL DL<=0.05 MIU/L-ACNC: 6.09 UIU/ML (ref 0.4–4.2)
VITAMIN D 25-HYDROXY: 35 NG/ML (ref 30–100)

## 2022-08-17 ENCOUNTER — TELEPHONE (OUTPATIENT)
Dept: FAMILY MEDICINE CLINIC | Age: 62
End: 2022-08-17

## 2022-08-17 ENCOUNTER — TELEPHONE (OUTPATIENT)
Dept: CARDIOLOGY CLINIC | Age: 62
End: 2022-08-17

## 2022-08-17 ENCOUNTER — OFFICE VISIT (OUTPATIENT)
Dept: CARDIOLOGY CLINIC | Age: 62
End: 2022-08-17
Payer: COMMERCIAL

## 2022-08-17 VITALS
HEIGHT: 62 IN | WEIGHT: 202 LBS | HEART RATE: 87 BPM | OXYGEN SATURATION: 97 % | BODY MASS INDEX: 37.17 KG/M2 | SYSTOLIC BLOOD PRESSURE: 106 MMHG | DIASTOLIC BLOOD PRESSURE: 72 MMHG

## 2022-08-17 DIAGNOSIS — E04.1 THYROID NODULE: Primary | ICD-10-CM

## 2022-08-17 DIAGNOSIS — I50.22 CHRONIC SYSTOLIC CONGESTIVE HEART FAILURE (HCC): Primary | ICD-10-CM

## 2022-08-17 DIAGNOSIS — E03.9 HYPOTHYROIDISM, UNSPECIFIED TYPE: ICD-10-CM

## 2022-08-17 DIAGNOSIS — E21.3 HYPERPARATHYROIDISM (HCC): ICD-10-CM

## 2022-08-17 DIAGNOSIS — R00.2 HEART PALPITATIONS: ICD-10-CM

## 2022-08-17 PROCEDURE — 99213 OFFICE O/P EST LOW 20 MIN: CPT | Performed by: NURSE PRACTITIONER

## 2022-08-17 PROCEDURE — 1036F TOBACCO NON-USER: CPT | Performed by: NURSE PRACTITIONER

## 2022-08-17 PROCEDURE — G8427 DOCREV CUR MEDS BY ELIG CLIN: HCPCS | Performed by: NURSE PRACTITIONER

## 2022-08-17 PROCEDURE — G8417 CALC BMI ABV UP PARAM F/U: HCPCS | Performed by: NURSE PRACTITIONER

## 2022-08-17 PROCEDURE — 3017F COLORECTAL CA SCREEN DOC REV: CPT | Performed by: NURSE PRACTITIONER

## 2022-08-17 PROCEDURE — 93000 ELECTROCARDIOGRAM COMPLETE: CPT | Performed by: NURSE PRACTITIONER

## 2022-08-17 RX ORDER — LEVOTHYROXINE SODIUM 175 UG/1
175 TABLET ORAL DAILY
Qty: 30 TABLET | Refills: 1 | Status: SHIPPED | OUTPATIENT
Start: 2022-08-17 | End: 2022-10-31

## 2022-08-17 ASSESSMENT — ENCOUNTER SYMPTOMS
ABDOMINAL DISTENTION: 0
WHEEZING: 0
COUGH: 0
SHORTNESS OF BREATH: 1
ABDOMINAL PAIN: 0

## 2022-08-17 NOTE — PROGRESS NOTES
Heart Failure Clinic       Visit Date: 8/17/2022  Cardiologist:  Dr. Zavala Credenisha  Primary Care Physician: Dr. Grecia Scott, APRN - CNP    Alonzo Olivares is a 65 y.o. female who presents today for:  Chief Complaint   Patient presents with    Congestive Heart Failure       HPI:     TYPE HF: HFrEF 25% 2022 (40-45%, 2020)  Cause: nonischemic, hx of chemotherapy  Device: ICD  HX: REINALDO but never has gotten CPAP, COVID, prediabetic, HTN< hypertriglyceridemia, hypothyroidism,   Dry Wt:  217 on 7/21/22; 202 on 8/17/22      Alonzo Olivares is a 65 y.o. female who presents to the office for a follow up patient visit in the heart failure clinic. Concerns today: weight loss of 15lbs since last visit. Increased lasix with good response. Notable improvement of lower leg swelling and SOB. She is in cardiac rehab and doing well but has recently went down to her basement to do laundry and has notes significant fatigue since. She also notes \"butterfly\" feeling in her chest after doing activity. Denies chest pain, N/V. Visit on 7/21/22: pt has followed here in the past but has not been here in over a year. She was recently seen by her PCP c/o worsening SOB and was instructed to call here for appointment. Also seen by cardiology last month and started on Lasix. She also notes worsening fatigue and orthopnea.          Hospitalization:  recent hospitalization but HF document as compensated      Activity: ADLs with limitation d/t weakness and SOB  Diet: reeducated today    Patient has:  Chest Pain: intermittent   SOB: yes  Orthopnea/PND: yes  REINALDO: yes noncompliant  Edema: some  Fatigue: yes  Abdominal bloating: yes  Cough: yes  Appetite: poor      Past Medical History:   Diagnosis Date    Abnormal heart rhythm     Anxiety     Cancer ([facility] [address]     breast  2016     CHF (congestive heart failure) (HCC)     Congenital heart disease     DJD (degenerative joint disease)     Enlarged lymph nodes     Hypertension     Hypothyroidism     ICD (implantable cardioverter-defibrillator) in place 2019    Dr. Chey Bravo    Kidney stones     PONV (postoperative nausea and vomiting)     Prediabetes 10/7/2019    Thyroid disease      Past Surgical History:   Procedure Laterality Date    APPENDECTOMY      CARPAL TUNNEL RELEASE      COLONOSCOPY      COLONOSCOPY N/A 2020    COLONOSCOPY POLYPECTOMY SNARE/COLD BIOPSY performed by Jeffrey Strong MD at [facility]    COLONOSCOPY  2020    COLONOSCOPY POLYPECTOMY HOT BIOPSY performed by Jeffrey Strong MD at [facility]    COLONOSCOPY      EGD      EYE SURGERY Bilateral 10/2021    eyelid lift     FINGER TRIGGER RELEASE Right 2020    DEQUERVAINS RELEASE AND RIGHT RING FINGER TRIGGER RELEASE performed by Steven Ibarra DO at [address] [place] [address] Left 2019    [facility] PT HAS A MRI CONDITIONAL SYSTEM    PTCA      TONSILLECTOMY       Family History   Problem Relation Age of Onset    High Blood Pressure Mother     Dementia Mother     Cancer Father     Colon Cancer Father     Mental Retardation Brother     Colon Polyps Brother     Cancer Maternal Grandfather     Esophageal Cancer Neg Hx      Social History     Tobacco Use    Smoking status: Former     Packs/day: 0.25     Years: 37.00     Pack years: 9.25     Types: Cigarettes     Start date: 1981     Quit date: 2020     Years since quittin.2    Smokeless tobacco: Never   Substance Use Topics    Alcohol use: No     Current Outpatient Medications   Medication Sig Dispense Refill    furosemide (LASIX) 40 MG tablet Take 1 tablet by mouth in the morning and 1 tablet before bedtime. 60 tablet 5    Misc. Devices (CANE) MISC Dispense 1 straight cane 1 each 0    Misc. Devices MISC Shower Chair 1 each 0    potassium chloride (KLOR-CON M) 10 MEQ extended release tablet Take 1 tablet by mouth in the morning.  90 tablet 3    bisacodyl (DULCOLAX) 5 MG EC tablet See Prep Instructions 4 tablet 0 Positive for fatigue. Negative for activity change and appetite change. Respiratory:  Positive for shortness of breath. Negative for cough and wheezing. Cardiovascular:  Negative for chest pain, palpitations and leg swelling. Gastrointestinal:  Negative for abdominal distention and abdominal pain. Musculoskeletal:  Negative for gait problem. Neurological:  Negative for weakness, light-headedness and headaches. Psychiatric/Behavioral:  Negative for sleep disturbance. OBJECTIVE:   Today's Vitals:  /72   Pulse 87   Ht 5' 2\" (1.575 m)   Wt 202 lb (91.6 kg)   SpO2 97%   BMI 36.95 kg/m²     Physical Exam  Vitals reviewed. Constitutional:       General: She is not in acute distress. Appearance: Normal appearance. She is well-developed. She is not diaphoretic. HENT:      Head: Normocephalic and atraumatic. Eyes:      Conjunctiva/sclera: Conjunctivae normal.   Cardiovascular:      Rate and Rhythm: Normal rate and regular rhythm. Heart sounds: No murmur heard. Pulmonary:      Effort: Pulmonary effort is normal. No respiratory distress. Breath sounds: Normal breath sounds. No wheezing, rhonchi or rales. Abdominal:      General: Bowel sounds are normal. There is no distension. Palpations: Abdomen is soft. Tenderness: There is no abdominal tenderness. Musculoskeletal:         General: Normal range of motion. Cervical back: Normal range of motion and neck supple. Right lower leg: Edema (trace) present. Left lower leg: Edema (trace) present. Skin:     General: Skin is warm and dry. Capillary Refill: Capillary refill takes less than 2 seconds. Neurological:      Mental Status: She is alert and oriented to person, place, and time.       Coordination: Coordination normal.   Psychiatric:         Behavior: Behavior normal.       Wt Readings from Last 3 Encounters:   08/17/22 202 lb (91.6 kg)   08/09/22 217 lb (98.4 kg)   07/21/22 217 lb (98.4 kg) BP Readings from Last 3 Encounters:   08/17/22 106/72   08/09/22 118/78   07/21/22 100/60     Pulse Readings from Last 3 Encounters:   08/17/22 87   08/09/22 75   07/21/22 90     Body mass index is 36.95 kg/m². ECHO:   Summary  Left Ventricular size is Mildly increased . Normal left ventricular wall thickness. There was severe global hypokinesis of the left ventricle. Systolic function was severely reduced. Ejection fraction is visually estimated at 25%. The left atrium is Mild to moderate dilated. Mildly enlarged right atrium size. Signature     ----------------------------------------------------------------  Electronically signed by Marissa Najera MD (Interpreting  physician) on 05/24/2022 at 07:26 PM  ----------------------------------------------------------------    Summary   Left ventricle size is normal.   Mild concentric left ventricular hypertrophy. There was moderate global hypokinesis of the left ventricle. Ejection fraction is visually estimated in the range of 40% to 45%. Mild mitral regurgitation. Signature      ----------------------------------------------------------------   Electronically signed by Jewel Garnica MD (Interpreting   physician) on 10/16/2020 at 01:33 PM   ----------------------------------------------------------------    CATH/STRESS:     IMPRESSION:  1. No significant coronary artery disease noticed. 2.  Peripheral angiogram was performed. No significant peripheral  arterial disease was noticed. RECOMMENDATIONS:  Medical management.         Yocasta Berman MD     D: 05/24/2022 15:37:42       T: 05/24/2022 15:42:02     [email or web address]  Job#: [id number]     Doc#: [id number]      Results reviewed:  BNP: No results found for: BNP  CBC:   Lab Results   Component Value Date/Time    WBC 5.6 06/22/2022 09:25 AM    RBC 4.47 06/22/2022 09:25 AM    HGB 12.3 06/22/2022 09:25 AM    HCT 41.5 06/22/2022 09:25 AM     06/22/2022 09:25 AM     CMP:    Lab Results Component Value Date/Time     08/16/2022 02:14 PM    K 4.8 08/16/2022 02:14 PM    K 4.8 06/11/2022 05:01 AM     08/16/2022 02:14 PM    CO2 29 08/16/2022 02:14 PM    BUN 17 08/16/2022 02:14 PM    CREATININE 0.8 08/16/2022 02:14 PM    LABGLOM 73 08/16/2022 02:14 PM    GLUCOSE 98 08/16/2022 02:14 PM    CALCIUM 9.8 08/16/2022 02:14 PM     Hepatic Function Panel:    Lab Results   Component Value Date/Time    ALKPHOS 207 07/20/2022 02:35 PM    ALT 16 07/20/2022 02:35 PM    AST 19 07/20/2022 02:35 PM    PROT 6.3 07/20/2022 02:35 PM    BILITOT 1.8 07/20/2022 02:35 PM    BILIDIR 1.0 07/20/2022 02:35 PM    LABALBU 4.0 07/20/2022 02:35 PM     Magnesium:    Lab Results   Component Value Date/Time    MG 2.3 06/10/2022 10:00 AM     PT/INR:    Lab Results   Component Value Date/Time    INR 1.02 07/09/2020 01:04 AM     Lipids:    Lab Results   Component Value Date/Time    TRIG 106 07/09/2020 03:57 AM    HDL 51 08/30/2021 08:39 AM    LDLCALC 84 08/30/2021 08:39 AM       ASSESSMENT AND PLAN:   The patient's condition/symptoms are stable        Diagnosis Orders   1. Chronic systolic congestive heart failure (HCC)  EKG 12 lead    Basic Metabolic Panel    Brain Natriuretic Peptide      2. Heart palpitations  EKG 12 lead    Holter monitor 24 hour        Continue:  GDMT:   ACE/ARB/ARNI - Enresto 24/26 BID   BB - Toprol 12.5 daily   Diuretic - Lasix 40 BID  AA - none: hypotensive today  SGLT2 -  Farxiga 10 daily  Vasodilator - none  Other - asa levothyroxine Kcl 10 daily      HFrEF 25% nonischemic: ICD  Stable, notable improvement w/ increase of Lasix. Resolution of abdominal bloating and improvement of LE (was +1, trace today) and improved SOB.    24hr holter monitor d/t c/o of palpitations: EKG negative in office today    GDMT: work on, need aldactone yet but hypotensive    Lab reviewed -  TSH was 17, med change, now 6.09, another increase in thyroid     ECHO 2022: moderate MR, RVSP 40, mild to mod dilated LA, mild dilated RA  CATH 2022: nonobstructive: LVEDP 40    Repeat blood work in 3 months    No medication changes today  **do not take midodrine if: blood pressure greater than 90/60   If becoming more lightheaded or dizzy to call the office  ECHO on Monday     24 hr holter monitor ordered    Continue diet/fluid adherence  Continue daily wts. F/U w/ Cardiology  F/U in clinic in 3 months      Tolerating above noted HF meds, no ill side effects noted. Will continue to monitor kidney function and electrolytes. Will optimize as tolerated. Pt is compliant w/ medications. Total visit time of 20 minutes has been spent with patient on education of symptoms, management, medication, and plan of care; as well as review of chart: labs, ECHO, radiology reports, etc.   I personally spent more then 50% of the appt time face to face with the patient. Daily weights  Fluid restriction of 2 Liters per day  Limit sodium in diet to around 2557-6838 mg/day  Monitor BP  Activity as tolerated     Patient was instructed to call the [facility] for any changes in symptoms as noted in AVS.      Return in about 3 months (around 11/17/2022). or sooner if needed     Patient given educational materials - see patient instructions. We discussed the importance of weighing oneself and recording daily. We also discussed the importance of a low sodium diet, higher sodium foods to avoid and better low sodium food options. Patient verbalizes understanding of plan of care using teach back method, and is agreeable to the treatment plan.        Electronically signed by ANGELIA Blas CNP on 8/17/2022 at 9:11 AM

## 2022-08-17 NOTE — TELEPHONE ENCOUNTER
Pt informed and understanding with no further questions  Lab slip mailed to pt   Pt transferred to Jessica Thompson at central scheduling     Referral placed at

## 2022-08-17 NOTE — PATIENT INSTRUCTIONS
You may receive a survey regarding the care you received during your visit. Your input is valuable to us. We encourage you to complete and return your survey. We hope you will choose us in the future for your healthcare needs. Continue:  Continue current medications  Daily weights and record  Fluid restriction of 2 Liters per day  Limit sodium in diet to around 0210-5412 mg/day  Monitor BP  Activity as tolerated     Call the Heart Failure Clinic for any of the following symptoms: [phone]  Weight gain of 2-3 pounds in 1 day or 5 pounds in 1 week  Increased shortness of breath  Shortness of breath while laying down  Cough  Chest pain  Swelling in feet, ankles or legs  Tenderness or bloating in the abdomen  Fatigue   Decreased appetite or nausea   Confusion        Repeat blood work in 3 months    No medication changes today  **do not take midodrine if: blood pressure greater than 90/60   If becoming more lightheaded or dizzy to call the office    ECHO on Monday     24 hr holter monitor ordered      Continue diet/fluid adherence  Continue daily wts.   F/U w/ Cardiology  F/U in clinic in 3 months

## 2022-08-17 NOTE — TELEPHONE ENCOUNTER
Can experience confusion w/ CHF exacerbation or low oxygenation otherwise no associated.  Should direct this to PCP

## 2022-08-17 NOTE — TELEPHONE ENCOUNTER
Please call to check on pt. BNP continues to be significantly elevated. Is she taking her Lasix and urinating well? Swelling? Weight here on 7/21 was 217.

## 2022-08-17 NOTE — TELEPHONE ENCOUNTER
Pt was in office today, remembered a question she had , having some memory loss and forgetfulness, can that be related to her heart failure. Pt states Alzheimers does run in her family. Chelsea advise?

## 2022-08-19 ENCOUNTER — HOSPITAL ENCOUNTER (OUTPATIENT)
Dept: ULTRASOUND IMAGING | Age: 62
Discharge: HOME | End: 2022-08-19
Payer: COMMERCIAL

## 2022-08-19 DIAGNOSIS — E04.1 THYROID NODULE: ICD-10-CM

## 2022-08-19 DIAGNOSIS — E03.9 HYPOTHYROIDISM, UNSPECIFIED TYPE: ICD-10-CM

## 2022-08-19 DIAGNOSIS — E21.3 HYPERPARATHYROIDISM (HCC): ICD-10-CM

## 2022-08-19 LAB — ALKALINE PHOSPHATASE ISOENZYMES: NORMAL

## 2022-08-19 PROCEDURE — 76536 US EXAM OF HEAD AND NECK: CPT

## 2022-08-22 ENCOUNTER — HOSPITAL ENCOUNTER (OUTPATIENT)
Dept: NON INVASIVE DIAGNOSTICS | Age: 62
Discharge: HOME | End: 2022-08-22
Payer: COMMERCIAL

## 2022-08-22 ENCOUNTER — TELEPHONE (OUTPATIENT)
Dept: FAMILY MEDICINE CLINIC | Age: 62
End: 2022-08-22

## 2022-08-22 DIAGNOSIS — I50.22 CHRONIC SYSTOLIC (CONGESTIVE) HEART FAILURE (HCC): ICD-10-CM

## 2022-08-22 DIAGNOSIS — R06.02 SOB (SHORTNESS OF BREATH): ICD-10-CM

## 2022-08-22 DIAGNOSIS — R74.8 ELEVATED ALKALINE PHOSPHATASE LEVEL: Primary | ICD-10-CM

## 2022-08-22 LAB
LV EF: 23 %
LVEF MODALITY: NORMAL

## 2022-08-22 PROCEDURE — 93306 TTE W/DOPPLER COMPLETE: CPT

## 2022-08-22 NOTE — TELEPHONE ENCOUNTER
----- Message from ANGELIA Dean - CNP sent at 8/22/2022  8:20 AM EDT -----  Let Balwinder Dominguez know her thyroid US is stable, rec'd repeating 1 year.

## 2022-08-22 NOTE — TELEPHONE ENCOUNTER
Pt informed and understanding with no further questions   Pt transferred to [facility] at central scheduling

## 2022-08-22 NOTE — TELEPHONE ENCOUNTER
----- Message from ANGELIA Javier - CNP sent at 8/22/2022  8:27 AM EDT -----  Let Cici Flood know I got one of her labs back as part of the elevated liver function tests, looks like it is elevated but mildly. I would like to get a liver ultrasound, if this is normal, then no further workup needs done.

## 2022-08-23 ENCOUNTER — TELEPHONE (OUTPATIENT)
Dept: CARDIOLOGY CLINIC | Age: 62
End: 2022-08-23

## 2022-08-24 ENCOUNTER — OFFICE VISIT (OUTPATIENT)
Dept: CARDIOLOGY CLINIC | Age: 62
End: 2022-08-24
Payer: COMMERCIAL

## 2022-08-24 ENCOUNTER — HOSPITAL ENCOUNTER (OUTPATIENT)
Dept: NON INVASIVE DIAGNOSTICS | Age: 62
Discharge: HOME | End: 2022-08-24
Payer: COMMERCIAL

## 2022-08-24 VITALS
SYSTOLIC BLOOD PRESSURE: 90 MMHG | DIASTOLIC BLOOD PRESSURE: 62 MMHG | BODY MASS INDEX: 36.58 KG/M2 | HEIGHT: 62 IN | HEART RATE: 80 BPM

## 2022-08-24 DIAGNOSIS — I50.22 CHRONIC SYSTOLIC (CONGESTIVE) HEART FAILURE (HCC): ICD-10-CM

## 2022-08-24 DIAGNOSIS — R00.2 HEART PALPITATIONS: ICD-10-CM

## 2022-08-24 DIAGNOSIS — I42.8 NONISCHEMIC CARDIOMYOPATHY (HCC): Primary | ICD-10-CM

## 2022-08-24 PROCEDURE — 99213 OFFICE O/P EST LOW 20 MIN: CPT | Performed by: INTERNAL MEDICINE

## 2022-08-24 PROCEDURE — 93226 XTRNL ECG REC<48 HR SCAN A/R: CPT

## 2022-08-24 PROCEDURE — G8417 CALC BMI ABV UP PARAM F/U: HCPCS | Performed by: INTERNAL MEDICINE

## 2022-08-24 PROCEDURE — 1036F TOBACCO NON-USER: CPT | Performed by: INTERNAL MEDICINE

## 2022-08-24 PROCEDURE — 93225 XTRNL ECG REC<48 HRS REC: CPT

## 2022-08-24 PROCEDURE — 3017F COLORECTAL CA SCREEN DOC REV: CPT | Performed by: INTERNAL MEDICINE

## 2022-08-24 PROCEDURE — G8427 DOCREV CUR MEDS BY ELIG CLIN: HCPCS | Performed by: INTERNAL MEDICINE

## 2022-08-24 NOTE — PROGRESS NOTES
[address]  [facility] [address]  Dept: [phone]  Dept Fax: [phone]  Loc: [phone]    Visit Date: 8/24/2022    Ms. Imagene Runner is a 65 y.o. 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 Ef continues to drop. She is taking all meds. She cannot walk or do anything due to significant symptoms. BP will not tolerate more meds. She is on midodrine just to tolerate the medical therapy. Last BNP 31411. No shocks. Current Outpatient Medications:     omeprazole (PRILOSEC) 40 MG delayed release capsule, Take 1 capsule by mouth 2 times daily (before meals), Disp: 60 capsule, Rfl: 2    levothyroxine (EUTHYROX) 175 MCG tablet, Take 1 tablet by mouth Daily, Disp: 30 tablet, Rfl: 1    furosemide (LASIX) 40 MG tablet, Take 1 tablet by mouth in the morning and 1 tablet before bedtime. , Disp: 60 tablet, Rfl: 5    Misc. Devices (CANE) MISC, Dispense 1 straight cane, Disp: 1 each, Rfl: 0    Misc.  Devices MISC, Shower Chair, Disp: 1 each, Rfl: 0    potassium chloride (KLOR-CON M) 10 MEQ extended release tablet, Take 1 tablet by mouth in the morning., Disp: 90 tablet, Rfl: 3    sacubitril-valsartan (ENTRESTO) 24-26 MG per tablet, Take 1 tablet by mouth 2 times daily, Disp: 60 tablet, Rfl: 3    buPROPion (WELLBUTRIN XL) 150 MG extended release tablet, Take 1 tablet by mouth every morning, Disp: 90 tablet, Rfl: 0    traZODone (DESYREL) 50 MG tablet, Take 1 tablet by mouth nightly as needed for Sleep, Disp: 90 tablet, Rfl: 0    FLUoxetine (PROZAC) 40 MG capsule, Take 1 capsule by mouth daily, Disp: 90 capsule, Rfl: 0    dapagliflozin (FARXIGA) 10 MG tablet, Take 1 tablet by mouth every morning, Disp: 30 tablet, Rfl: 5    metoprolol succinate (TOPROL XL) 25 MG extended release tablet, Take 0.5 tablets by mouth daily Hold if SBP less than 95 mmHg or HR less than 50 bpm (Patient taking differently: Take 12.5 mg by mouth daily Hold if SBP less than 95 mmHg or HR less than 50 bp Takes at 1400), Disp: 30 tablet, Rfl: 3    digoxin (LANOXIN) 125 MCG tablet, Take 1 tablet by mouth daily (Patient taking differently: Take 125 mcg by mouth daily Takes at 1400), Disp: 90 tablet, Rfl: 0    CPAP Machine MISC, by Does not apply route Please change CPAP pressure to auto 11-15 cm H20., Disp: 1 each, Rfl: 0    letrozole (FEMARA) 2.5 MG tablet, Take 1 tablet by mouth daily, Disp: 90 tablet, Rfl: 3    atorvastatin (LIPITOR) 40 MG tablet, Take 1 tablet by mouth nightly, Disp: 90 tablet, Rfl: 3    aspirin 81 MG chewable tablet, Take 1 tablet by mouth daily, Disp: 30 tablet, Rfl: 3    acetaminophen (TYLENOL) 500 MG tablet, Take 500 mg by mouth every 6 hours as needed for Pain, Disp: , Rfl:     midodrine (PROAMATINE) 10 MG tablet, Take 1 tablet by mouth 3 times daily (with meals), Disp: 90 tablet, Rfl: 1    Past Medical History  Bryce Davenport  has a past medical history of Abnormal heart rhythm, Anxiety, Cancer (HCC), CHF (congestive heart failure) ([address] Congenital heart disease, DJD (degenerative joint disease), Enlarged lymph nodes, Hypertension, Hypothyroidism, ICD (implantable cardioverter-defibrillator) in place, Kidney stones, PONV (postoperative nausea and vomiting), Prediabetes, and Thyroid disease. Social History  Bryce Davenport  reports that she quit smoking about 2 years ago. Her smoking use included cigarettes. She started smoking about 40 years ago. She has a 9.25 pack-year smoking history. She has never used smokeless tobacco. She reports that she does not drink alcohol and does not use drugs.     Family History  Bryce Davenport family history includes Cancer in her father and maternal grandfather; Shona Do in her father; Colon Polyps in her brother; Dementia in her mother; High Blood Pressure in her mother; Mental Retardation in her brother. There is no family history of bicuspid aortic valve, aneurysms, heart transplant, pacemakers, defibrillators, or sudden cardiac death. Past Surgical History   Past Surgical History:   Procedure Laterality Date    APPENDECTOMY      CARPAL TUNNEL RELEASE  2019    COLONOSCOPY      COLONOSCOPY N/A 07/27/2020    COLONOSCOPY POLYPECTOMY SNARE/COLD BIOPSY performed by Sara Roberts MD at [facility]    COLONOSCOPY  07/27/2020    COLONOSCOPY [facility]    COLONOSCOPY  2021    EGD      EYE SURGERY Bilateral 10/2021    eyelid lift     FINGER TRIGGER RELEASE Right 06/25/2020    DEQUERVAINS RELEASE AND RIGHT RING FINGER TRIGGER RELEASE performed by Vanessa Plunkett DO at [address] Left 02/11/2019    [employer] [facility] HAS A MRI CONDITIONAL SYSTEM    PTCA      TONSILLECTOMY         Review of Systems   Constitutional: Negative for chills and fever  HENT: Negative for congestion, sinus pressure, sneezing and sore throat. Eyes: Negative for pain, discharge, redness and itching. Respiratory: Negative for apnea, cough  Gastrointestinal: Negative for blood in stool, constipation, diarrhea   Endocrine: Negative for cold intolerance, heat intolerance, polydipsia. Genitourinary: Negative for dysuria, enuresis, flank pain and hematuria. Musculoskeletal: Negative for arthralgias, joint swelling and neck pain. Neurological: Negative for numbness and headaches. Psychiatric/Behavioral: Negative for agitation, confusion, decreased concentration and dysphoric mood.      Objective:     BP 90/62   Pulse 80   Ht 5' 2\" (1.575 m)   BMI 36.58 kg/m²     Wt Readings from Last 3 Encounters:   08/18/22 200 lb (90.7 kg)   08/17/22 202 lb (91.6 kg)   08/09/22 217 lb (98.4 kg)     BP Readings from Last 3 Encounters:   08/24/22 90/62   08/18/22 103/72 08/17/22 106/72       Nursing note and vitals reviewed. Physical Exam   Constitutional: Oriented to person, place, and time. Appears well-developed and well-nourished. HENT:   Head: Normocephalic and atraumatic. Eyes: EOM are normal. Pupils are equal, round, and reactive to light. Neck: Normal range of motion. Neck supple. No JVD present. Cardiovascular: Normal rate, regular rhythm, normal heart sounds and intact distal pulses. No murmur heard. Pulmonary/Chest: Effort normal and breath sounds normal. No respiratory distress. No wheezes. No rales. Abdominal: Soft. Bowel sounds are normal. No distension. There is no tenderness. Musculoskeletal: Normal range of motion. No edema. Neurological: Alert and oriented to person, place, and time. No cranial nerve deficit. Coordination normal.   Skin: Skin is warm and dry. Psychiatric: Normal mood and affect.        Lab Results   Component Value Date/Time    CKTOTAL 58 05/24/2022 05:22 AM       Lab Results   Component Value Date/Time    WBC 5.6 06/22/2022 09:25 AM    RBC 4.47 06/22/2022 09:25 AM    HGB 12.3 06/22/2022 09:25 AM    HCT 41.5 06/22/2022 09:25 AM    MCV 92.8 06/22/2022 09:25 AM    MCH 27.5 06/22/2022 09:25 AM    MCHC 29.6 06/22/2022 09:25 AM    RDW 11.7 10/10/2019 09:05 AM     06/22/2022 09:25 AM    MPV 11.7 06/22/2022 09:25 AM       Lab Results   Component Value Date/Time     08/16/2022 02:14 PM    K 4.8 08/16/2022 02:14 PM    K 4.8 06/11/2022 05:01 AM     08/16/2022 02:14 PM    CO2 29 08/16/2022 02:14 PM    BUN 17 08/16/2022 02:14 PM    LABALBU 4.0 07/20/2022 02:35 PM    CREATININE 0.8 08/16/2022 02:14 PM    CALCIUM 9.8 08/16/2022 02:14 PM    LABGLOM 73 08/16/2022 02:14 PM    GLUCOSE 98 08/16/2022 02:14 PM       Lab Results   Component Value Date/Time    ALKPHOS 207 07/20/2022 02:35 PM    ALT 16 07/20/2022 02:35 PM    AST 19 07/20/2022 02:35 PM    PROT 6.3 07/20/2022 02:35 PM    BILITOT 1.8 07/20/2022 02:35 PM    BILIDIR mmHg    Allergies  - No Known Allergies. - Tape. Conclusions    Summary  Normal left ventricular size and severely reduced systolic function. There was severe global hypokinesis. Wall thickness was within normal limits. Ejection fraction was estimated at 20-25%. Features were consistent with a pseudonormal left ventricular filling  pattern, with concomitant abnormal relaxation and increased filling  pressure (grade 2 diastolic dysfunction). There was mild-moderate mitral regurgitation. There was mild-moderate tricuspid regurgitation. IVC size is dilated with reduced mobility (CVP~10-15 mmHg). Signature    ----------------------------------------------------------------  Electronically signed by Rubén Berger MD (Interpreting  physician) on 08/22/2022 at 05:01 PM  ----------------------------------------------------------------    Findings    Mitral Valve  The mitral valve structure was normal with normal leaflet separation. DOPPLER: The transmitral velocity was within the normal range with no  evidence for mitral stenosis. There was mild-moderate mitral  regurgitation. Aortic Valve  The aortic valve was trileaflet with normal thickness and cuspal  separation. DOPPLER: Transaortic velocity was within the normal range with  no evidence of aortic stenosis. There was no evidence of aortic  regurgitation. Tricuspid Valve  The tricuspid valve structure was normal with normal leaflet separation. DOPPLER: There was no evidence of tricuspid stenosis. There was  mild-moderate tricuspid regurgitation. Pulmonic Valve  The pulmonic valve leaflets were not well seen. DOPPLER: The transpulmonic  velocity was within the normal range with no evidence for regurgitation. Left Atrium  Left atrial size was moderately dilated. Left Ventricle  Normal left ventricular size and severely reduced systolic function. There was severe global hypokinesis. Wall thickness was within normal limits.   Ejection fraction was estimated at 20-25%. Features were consistent with a pseudonormal left ventricular filling  pattern, with concomitant abnormal relaxation and increased filling  pressure (grade 2 diastolic dysfunction). Right Atrium  Right atrial size was normal.    Right Ventricle  The right ventricular size was normal with normal systolic function and  wall thickness. Device lead seen in the right heart. Pericardial Effusion  The pericardium was normal in appearance with no evidence of a pericardial  effusion. Pleural Effusion  No evidence of pleural effusion. Aorta / Great Vessels  IVC size is dilated with reduced mobility (CVP~10-15 mmHg).     M-Mode/2D Measurements & Calculations    LV Diastolic   LV Systolic Dimension: 5  AV Cusp Separation: 1.8 cmLA  Dimension: 5.4 cm                        Dimension: 5 cmAO Root Dimension:  cm             LV Volume Diastolic:      2.5 cmLA Area: 25.5 cm^2  LV FS:7.4 %    121.2 ml  LV PW          LV Volume Systolic: 55.40  Diastolic: 1.3 ml  cm             LV EDV/LV EDV Index:      RV Diastolic Dimension: 4.2 cm  Septum         121.2 ml/63 A^8LO ESV/LV  Diastolic: 1.2 ESV Index: 26.81 ml/51    LA/Aorta: 2  cm             m^2                       Ascending Aorta: 3.3 cm  EF Calculated: 19 %       LA volume/Index: 84.2 ml /44m^2    LV Length: 7.9 cm  LV Area  Diastolic:  91.1 cm^2  LV Area  Systolic: 44.3  cm^2    Doppler Measurements & Calculations    MV Peak E-Wave: 133 cm/s          AV Peak Velocity: LVOT Peak Velocity:  MV Peak A-Wave: 46 cm/s           95.7 cm/s         75.2 cm/s  MV E/A Ratio: 2.89                AV Peak Gradient: LVOT Peak Gradient: 2  MV Peak Gradient: 7.08 mmHg       3.66 mmHg         mmHg    MV Deceleration Time: 169 msec                      TV Peak E-Wave: 110  cm/s  MV Area (PISA): 0.2 cm^2                            TV Peak A-Wave: 64.5  MV E' Septal Velocity: 6.2 cm/s   IVRT: 53 msec     cm/s  MV A' Septal Velocity: 4.5 cm/s  MV E' Lateral Velocity: 10 cm/s                     TV Peak Gradient: 4.84  MV A' Lateral Velocity: 3.6 cm/s  AV DVI            mmHg  E/E' septal: 21.45                (Vmax):0.79       TR Velocity:213 cm/s  E/E' lateral: 13.3                                  TR Gradient:18.15 mmHg  MR Velocity: 411 cm/s                               PV Peak Velocity: 53.1  MV LEONARDO PISA: 0.21 cm^2                              cm/s  MR VTI: 126 cm                                      PV Peak Gradient: 1.13  Alias Velocity: 38.5 cm/sPISA                       mmHg  Radius: 0.6 cm  MV ERO Volumetric: 0.2 cm^2  PISA area: 2.26 cm^2MR flow rate:  87.01 ml/sMR volume:26.46 ml    [id number]       Assessment/Plan   Chemo-Cardiomyopathy, EF dropped 20-25%, NYHA III, s/p ICD  HTN  No CAD, 2020  SVT vs VT - lasted 1 second  Orthostasis on Tilt  Cannot tolerate any more meds, no major volume one exam, taking all meds. Will need to proceed with tertiary care f/u for transplant/VAD. Continue the current therapy. She is unable to work. Her children are nearby. Thyroid being optimized. Discussed diet/exercise/BP/weight loss/health lifestyle choices/lipids; the patient understands the goals and will try to comply.     Disposition:  6 months           Electronically signed by Carrie Ansari MD   8/24/2022 at 2:34 PM EDT

## 2022-08-26 ENCOUNTER — PROCEDURE VISIT (OUTPATIENT)
Dept: CARDIOLOGY CLINIC | Age: 62
End: 2022-08-26
Payer: COMMERCIAL

## 2022-08-26 DIAGNOSIS — I50.22 CHRONIC SYSTOLIC (CONGESTIVE) HEART FAILURE (HCC): Primary | ICD-10-CM

## 2022-08-26 PROCEDURE — 93297 REM INTERROG DEV EVAL ICPMS: CPT | Performed by: INTERNAL MEDICINE

## 2022-08-26 PROCEDURE — G2066 INTER DEVC REMOTE 30D: HCPCS | Performed by: INTERNAL MEDICINE

## 2022-08-26 NOTE — PROGRESS NOTES
Dr Celestine Esteves pt   [employer] carelink optivol remote   Battery 8 yrs remaining    8 high vent rate episodes/ all look like vt   Optivol was off the chart but now back to normal limits

## 2022-08-29 ENCOUNTER — TELEPHONE (OUTPATIENT)
Dept: FAMILY MEDICINE CLINIC | Age: 62
End: 2022-08-29

## 2022-08-29 ENCOUNTER — HOSPITAL ENCOUNTER (OUTPATIENT)
Dept: ULTRASOUND IMAGING | Age: 62
Discharge: HOME | End: 2022-08-29
Payer: COMMERCIAL

## 2022-08-29 DIAGNOSIS — R74.8 ELEVATED ALKALINE PHOSPHATASE LEVEL: ICD-10-CM

## 2022-08-29 PROCEDURE — 76705 ECHO EXAM OF ABDOMEN: CPT

## 2022-08-29 NOTE — TELEPHONE ENCOUNTER
----- Message from ANGELIA Valenzuela - CNP sent at 8/29/2022 10:20 AM EDT -----  Let Christiana Arceo know her liver US does show some fatty liver type changes, and enlargement of the liver. The gallbladder wall is also mildly thickened. I know she is already seeing OSVALDO Farmer) and she has an upcoming appt with Dio Ardon.  Rec'd she discuss this further with them at her upcoming appt

## 2022-09-03 LAB
ACQUISITION DURATION: NORMAL S
AVERAGE HEART RATE: 83 BPM
HOOKUP DATE: NORMAL
HOOKUP TIME: NORMAL
MAX HEART RATE TIME/DATE: NORMAL
MAX HEART RATE: 103 BPM
MIN HEART RATE TIME/DATE: NORMAL
MIN HEART RATE: 74 BPM
NUMBER OF QRS COMPLEXES: NORMAL
NUMBER OF SUPRAVENTRICULAR COUPLETS: 0
NUMBER OF SUPRAVENTRICULAR ECTOPICS: 46
NUMBER OF SUPRAVENTRICULAR ISOLATED BEATS: 46
NUMBER OF VENTRICULAR BIGEMINAL CYCLES: 0
NUMBER OF VENTRICULAR COUPLETS: 22
NUMBER OF VENTRICULAR ECTOPICS: 219

## 2022-09-06 ENCOUNTER — TELEPHONE (OUTPATIENT)
Dept: CARDIOLOGY CLINIC | Age: 62
End: 2022-09-06

## 2022-09-06 NOTE — TELEPHONE ENCOUNTER
HOLTER MONITOR:  Hours  24     INDICATION FOR STUDY:  Palpitaitons     Diary not completed     CONCLUSION:  *  Normal Sinus rhythm    Average Heart Rate  83 bpm  Range from  74 bpm to  103 bpm   One NSVT composed of 5 beats at rate of 87 bpm  Rare Premature atrial complexes  Rare Premature ventricular complexes  No long pause or profound bradycardia  No Supraventricular Tachycardia     No Atrial Fibrillation           Confirmed by Derick Sanchez MD, Anselmo Medico ([id number]) on 9/3/2022 6:32:20 PM      Please address, thank you

## 2022-09-26 DIAGNOSIS — E78.2 MIXED HYPERLIPIDEMIA: ICD-10-CM

## 2022-09-26 RX ORDER — ATORVASTATIN CALCIUM 40 MG/1
40 TABLET, FILM COATED ORAL NIGHTLY
Qty: 90 TABLET | Refills: 3 | Status: SHIPPED | OUTPATIENT
Start: 2022-09-26

## 2022-09-26 NOTE — TELEPHONE ENCOUNTER
Patient requesting the following to go to [facility] [address]  Please approve or refuse Rx request:  Requested Prescriptions     Pending Prescriptions Disp Refills    atorvastatin (LIPITOR) 40 MG tablet 90 tablet 3     Sig: Take 1 tablet by mouth nightly       Next appointment:  Visit date not found

## 2022-09-27 ENCOUNTER — APPOINTMENT (OUTPATIENT)
Dept: GENERAL RADIOLOGY | Age: 62
DRG: 751 | End: 2022-09-27
Payer: COMMERCIAL

## 2022-09-27 ENCOUNTER — HOSPITAL ENCOUNTER (INPATIENT)
Age: 62
LOS: 1 days | Discharge: HOME | DRG: 751 | End: 2022-09-28
Attending: PSYCHIATRY & NEUROLOGY | Admitting: PSYCHIATRY & NEUROLOGY
Payer: COMMERCIAL

## 2022-09-27 DIAGNOSIS — R45.851 DEPRESSION WITH SUICIDAL IDEATION: ICD-10-CM

## 2022-09-27 DIAGNOSIS — R07.9 CHEST PAIN, UNSPECIFIED TYPE: Primary | ICD-10-CM

## 2022-09-27 DIAGNOSIS — F32.A DEPRESSION WITH SUICIDAL IDEATION: ICD-10-CM

## 2022-09-27 LAB
ALBUMIN SERPL-MCNC: 3.7 G/DL (ref 3.5–5.1)
ALP BLD-CCNC: 143 U/L (ref 38–126)
ALT SERPL-CCNC: 19 U/L (ref 11–66)
ANION GAP SERPL CALCULATED.3IONS-SCNC: 13 MEQ/L (ref 8–16)
ANISOCYTOSIS: PRESENT
AST SERPL-CCNC: 26 U/L (ref 5–40)
BASOPHILS # BLD: 0.5 %
BASOPHILS ABSOLUTE: 0 THOU/MM3 (ref 0–0.1)
BILIRUB SERPL-MCNC: 0.4 MG/DL (ref 0.3–1.2)
BUN BLDV-MCNC: 21 MG/DL (ref 7–22)
CALCIUM SERPL-MCNC: 9.6 MG/DL (ref 8.5–10.5)
CHLORIDE BLD-SCNC: 101 MEQ/L (ref 98–111)
CO2: 28 MEQ/L (ref 23–33)
CREAT SERPL-MCNC: 0.8 MG/DL (ref 0.4–1.2)
DIGOXIN LEVEL: 0.8 NG/ML (ref 0.5–2)
EKG ATRIAL RATE: 86 BPM
EKG ATRIAL RATE: 87 BPM
EKG P AXIS: 37 DEGREES
EKG P AXIS: 43 DEGREES
EKG P-R INTERVAL: 194 MS
EKG P-R INTERVAL: 196 MS
EKG Q-T INTERVAL: 394 MS
EKG Q-T INTERVAL: 396 MS
EKG QRS DURATION: 102 MS
EKG QRS DURATION: 104 MS
EKG QTC CALCULATION (BAZETT): 471 MS
EKG QTC CALCULATION (BAZETT): 476 MS
EKG R AXIS: -24 DEGREES
EKG R AXIS: -30 DEGREES
EKG T AXIS: 34 DEGREES
EKG T AXIS: 66 DEGREES
EKG VENTRICULAR RATE: 86 BPM
EKG VENTRICULAR RATE: 87 BPM
EOSINOPHIL # BLD: 2.6 %
EOSINOPHILS ABSOLUTE: 0.1 THOU/MM3 (ref 0–0.4)
ERYTHROCYTE [DISTWIDTH] IN BLOOD BY AUTOMATED COUNT: 25.2 % (ref 11.5–14.5)
ERYTHROCYTE [DISTWIDTH] IN BLOOD BY AUTOMATED COUNT: 72.8 FL (ref 35–45)
GFR SERPL CREATININE-BSD FRML MDRD: 73 ML/MIN/1.73M2
GLUCOSE BLD-MCNC: 146 MG/DL (ref 70–108)
HCT VFR BLD CALC: 46.6 % (ref 37–47)
HEMOGLOBIN: 14.2 GM/DL (ref 12–16)
IMMATURE GRANS (ABS): 0.05 THOU/MM3 (ref 0–0.07)
IMMATURE GRANULOCYTES: 0.9 %
LYMPHOCYTES # BLD: 21.2 %
LYMPHOCYTES ABSOLUTE: 1.2 THOU/MM3 (ref 1–4.8)
MCH RBC QN AUTO: 24.8 PG (ref 26–33)
MCHC RBC AUTO-ENTMCNC: 30.5 GM/DL (ref 32.2–35.5)
MCV RBC AUTO: 81.5 FL (ref 81–99)
MONOCYTES # BLD: 6.8 %
MONOCYTES ABSOLUTE: 0.4 THOU/MM3 (ref 0.4–1.3)
NUCLEATED RED BLOOD CELLS: 0 /100 WBC
OSMOLALITY CALCULATION: 288.7 MOSMOL/KG (ref 275–300)
PLATELET # BLD: 178 THOU/MM3 (ref 130–400)
PLATELET ESTIMATE: ADEQUATE
PMV BLD AUTO: ABNORMAL FL (ref 9.4–12.4)
POTASSIUM SERPL-SCNC: 4.4 MEQ/L (ref 3.5–5.2)
PRO-BNP: 4500 PG/ML (ref 0–900)
RBC # BLD: 5.72 MILL/MM3 (ref 4.2–5.4)
SCAN OF BLOOD SMEAR: NORMAL
SEG NEUTROPHILS: 68 %
SEGMENTED NEUTROPHILS ABSOLUTE COUNT: 3.9 THOU/MM3 (ref 1.8–7.7)
SODIUM BLD-SCNC: 142 MEQ/L (ref 135–145)
T4 FREE: 1.26 NG/DL (ref 0.93–1.76)
TOTAL PROTEIN: 6.8 G/DL (ref 6.1–8)
TROPONIN T: < 0.01 NG/ML
TROPONIN T: < 0.01 NG/ML
TSH SERPL DL<=0.05 MIU/L-ACNC: 0.05 UIU/ML (ref 0.4–4.2)
WBC # BLD: 5.7 THOU/MM3 (ref 4.8–10.8)

## 2022-09-27 PROCEDURE — 93005 ELECTROCARDIOGRAM TRACING: CPT | Performed by: PSYCHIATRY & NEUROLOGY

## 2022-09-27 PROCEDURE — 93010 ELECTROCARDIOGRAM REPORT: CPT | Performed by: NUCLEAR MEDICINE

## 2022-09-27 PROCEDURE — 85025 COMPLETE CBC W/AUTO DIFF WBC: CPT

## 2022-09-27 PROCEDURE — 80053 COMPREHEN METABOLIC PANEL: CPT

## 2022-09-27 PROCEDURE — 71045 X-RAY EXAM CHEST 1 VIEW: CPT

## 2022-09-27 PROCEDURE — 80162 ASSAY OF DIGOXIN TOTAL: CPT

## 2022-09-27 PROCEDURE — 96374 THER/PROPH/DIAG INJ IV PUSH: CPT

## 2022-09-27 PROCEDURE — 36415 COLL VENOUS BLD VENIPUNCTURE: CPT

## 2022-09-27 PROCEDURE — 83880 ASSAY OF NATRIURETIC PEPTIDE: CPT

## 2022-09-27 PROCEDURE — 1240000000 HC EMOTIONAL WELLNESS R&B

## 2022-09-27 PROCEDURE — 93005 ELECTROCARDIOGRAM TRACING: CPT | Performed by: PHYSICIAN ASSISTANT

## 2022-09-27 PROCEDURE — 6370000000 HC RX 637 (ALT 250 FOR IP): Performed by: PSYCHIATRY & NEUROLOGY

## 2022-09-27 PROCEDURE — 6360000002 HC RX W HCPCS: Performed by: PHYSICIAN ASSISTANT

## 2022-09-27 PROCEDURE — 84443 ASSAY THYROID STIM HORMONE: CPT

## 2022-09-27 PROCEDURE — 84484 ASSAY OF TROPONIN QUANT: CPT

## 2022-09-27 PROCEDURE — 84439 ASSAY OF FREE THYROXINE: CPT

## 2022-09-27 PROCEDURE — 99285 EMERGENCY DEPT VISIT HI MDM: CPT

## 2022-09-27 RX ORDER — IBUPROFEN 200 MG
400 TABLET ORAL EVERY 6 HOURS PRN
Status: DISCONTINUED | OUTPATIENT
Start: 2022-09-27 | End: 2022-09-28 | Stop reason: HOSPADM

## 2022-09-27 RX ORDER — FENTANYL CITRATE 50 UG/ML
25 INJECTION, SOLUTION INTRAMUSCULAR; INTRAVENOUS ONCE
Status: COMPLETED | OUTPATIENT
Start: 2022-09-27 | End: 2022-09-27

## 2022-09-27 RX ORDER — ACETAMINOPHEN 325 MG/1
650 TABLET ORAL EVERY 4 HOURS PRN
Status: DISCONTINUED | OUTPATIENT
Start: 2022-09-27 | End: 2022-09-28 | Stop reason: HOSPADM

## 2022-09-27 RX ORDER — SPIRONOLACTONE 25 MG/1
25 TABLET ORAL DAILY
COMMUNITY

## 2022-09-27 RX ORDER — HYDROXYZINE HYDROCHLORIDE 25 MG/1
50 TABLET, FILM COATED ORAL 3 TIMES DAILY PRN
Status: DISCONTINUED | OUTPATIENT
Start: 2022-09-27 | End: 2022-09-28 | Stop reason: HOSPADM

## 2022-09-27 RX ORDER — MAGNESIUM HYDROXIDE/ALUMINUM HYDROXICE/SIMETHICONE 120; 1200; 1200 MG/30ML; MG/30ML; MG/30ML
30 SUSPENSION ORAL EVERY 6 HOURS PRN
Status: DISCONTINUED | OUTPATIENT
Start: 2022-09-27 | End: 2022-09-28 | Stop reason: HOSPADM

## 2022-09-27 RX ORDER — POLYETHYLENE GLYCOL 3350 17 G/17G
17 POWDER, FOR SOLUTION ORAL DAILY PRN
Status: DISCONTINUED | OUTPATIENT
Start: 2022-09-27 | End: 2022-09-28 | Stop reason: HOSPADM

## 2022-09-27 RX ORDER — MIRTAZAPINE 15 MG/1
15 TABLET, FILM COATED ORAL NIGHTLY
Status: ON HOLD | COMMUNITY
End: 2022-10-03 | Stop reason: HOSPADM

## 2022-09-27 RX ORDER — PHENOL 1.4 %
20 AEROSOL, SPRAY (ML) MUCOUS MEMBRANE NIGHTLY
COMMUNITY

## 2022-09-27 RX ORDER — TRAZODONE HYDROCHLORIDE 50 MG/1
50 TABLET ORAL NIGHTLY PRN
Status: DISCONTINUED | OUTPATIENT
Start: 2022-09-27 | End: 2022-09-28 | Stop reason: HOSPADM

## 2022-09-27 RX ADMIN — TRAZODONE HYDROCHLORIDE 50 MG: 50 TABLET ORAL at 20:33

## 2022-09-27 RX ADMIN — HYDROXYZINE HYDROCHLORIDE 50 MG: 25 TABLET, FILM COATED ORAL at 20:33

## 2022-09-27 RX ADMIN — FENTANYL CITRATE 25 MCG: 50 INJECTION, SOLUTION INTRAMUSCULAR; INTRAVENOUS at 10:32

## 2022-09-27 ASSESSMENT — PAIN SCALES - GENERAL
PAINLEVEL_OUTOF10: 0
PAINLEVEL_OUTOF10: 7
PAINLEVEL_OUTOF10: 5
PAINLEVEL_OUTOF10: 9
PAINLEVEL_OUTOF10: 10

## 2022-09-27 ASSESSMENT — PAIN DESCRIPTION - FREQUENCY: FREQUENCY: INTERMITTENT

## 2022-09-27 ASSESSMENT — LIFESTYLE VARIABLES
HOW MANY STANDARD DRINKS CONTAINING ALCOHOL DO YOU HAVE ON A TYPICAL DAY: PATIENT DOES NOT DRINK
HOW OFTEN DO YOU HAVE A DRINK CONTAINING ALCOHOL: NEVER

## 2022-09-27 ASSESSMENT — SLEEP AND FATIGUE QUESTIONNAIRES
DO YOU USE A SLEEP AID: YES
SLEEP PATTERN: DIFFICULTY FALLING ASLEEP;DISTURBED/INTERRUPTED SLEEP;RESTLESSNESS
AVERAGE NUMBER OF SLEEP HOURS: 4
DO YOU HAVE DIFFICULTY SLEEPING: YES

## 2022-09-27 ASSESSMENT — PAIN - FUNCTIONAL ASSESSMENT
PAIN_FUNCTIONAL_ASSESSMENT: ACTIVITIES ARE NOT PREVENTED
PAIN_FUNCTIONAL_ASSESSMENT: NONE - DENIES PAIN
PAIN_FUNCTIONAL_ASSESSMENT: 0-10

## 2022-09-27 ASSESSMENT — PATIENT HEALTH QUESTIONNAIRE - PHQ9
SUM OF ALL RESPONSES TO PHQ QUESTIONS 1-9: 15
SUM OF ALL RESPONSES TO PHQ QUESTIONS 1-9: 15

## 2022-09-27 ASSESSMENT — ENCOUNTER SYMPTOMS
CHEST TIGHTNESS: 1
RHINORRHEA: 0
SINUS PAIN: 0
NAUSEA: 1
VOMITING: 1
DIARRHEA: 0
SHORTNESS OF BREATH: 1
COUGH: 0
SORE THROAT: 0
ABDOMINAL PAIN: 1
CONSTIPATION: 0

## 2022-09-27 ASSESSMENT — PAIN DESCRIPTION - ONSET: ONSET: ON-GOING

## 2022-09-27 ASSESSMENT — PAIN DESCRIPTION - ORIENTATION: ORIENTATION: UPPER

## 2022-09-27 ASSESSMENT — PAIN DESCRIPTION - PAIN TYPE: TYPE: ACUTE PAIN

## 2022-09-27 ASSESSMENT — PAIN DESCRIPTION - LOCATION: LOCATION: OTHER (COMMENT)

## 2022-09-27 ASSESSMENT — PAIN DESCRIPTION - DESCRIPTORS: DESCRIPTORS: ACHING

## 2022-09-27 NOTE — ED NOTES
Upon bedside report, the patient appears to be resting on cot. Family at bedside. A sitter remains at bedside as well for safety. Call light in reach.       Jonnathan Lauren RN  09/27/22 [id number]

## 2022-09-27 NOTE — PROGRESS NOTES
Chief Complaint:   Chest pain      Provisional Diagnosis: Major Depressive Disorder      Risk, Psychosocial and Contextual Factors: Recent separation from , no transportation      Current MH Treatment: Denies      Present Suicidal Behavior:    Verbal: Yes    Attempt: Denies      Access to Weapons: Denies      C-SSRS Current Suicide Risk: Low, Moderate or High: High      Past Suicidal Behavior:    Verbal: Yes    Attempts: Yes - cut wrist at age 29      Self-Injurious/Self-Mutilation: Denies      Traumatic Event Within Past 2 Weeks: Denies      Current Abuse:  Denies      Legal: Denies      Violence: Denies      Protective Factors: Motivated for change      Housing: Lives with son and daughter in law      Clinical Summary:      Patient is a 65year old female who presents to ED voluntarily for chest pain and suicidal ideation. Patient assessed once medically clear and reports current suicidal thoughts with a plan to take her sleeping pills with whiskey. Patient reports these are located in her bedroom and \"like having a gun\". Patient reports current homicidal thoughts towards her ex- and his girlfriend who are both located in [facility]. Patient provides extensive history of relationship with her ex- and the impact their separation has had on her surrounding finances and her mental/physical health. Patient is not currently connected to any outpatient providers however does express interest in being connected. Patient denies any substance use. Denies hallucinations. No delusions noted. Level of Care Disposition:      Consulted with medical provider. Patient is medically cleared and will be placed on [facility]. Consulted with Dr. Annamaria Baltazar. Patient to be admitted to 4E. Medical staff updated on POC. Report given to Nurse Olga Penaloza on 4E. Patient to 4E room 56A. Patient updated on POC. No questions expressed at this time.

## 2022-09-27 NOTE — PROGRESS NOTES
Consulted with Key Piper PA-C concerning assessment. JACOB will complete assessment if able to medically clear patient per medical provider.

## 2022-09-27 NOTE — BH NOTE
INPATIENT RECREATIONAL THERAPY  ADULT BEHAVIORAL SERVICES  EVALUATION    REFERRING PHYSICIAN:   Dr. Richard Parker  DIAGNOSIS:    Depression with Suicidal Ideation  PRECAUTIONS:  Standard precautions    HISTORY OF PRESENT ILLNESS/INJURY:   Patient was admitted to the unit due to suicidal ideation and depression. Patient reported having thoughts of taking an overdose of sleeping pills with alcohol prior to admission. Patient stated that she has relationship stress with her ex- (who lives in [facility]). Patient reported having homicidal thoughts toward him and his ex-'s girlfriend. Patient also stated that she has heart issues and was recently taken off of the heart transplant since she was doing so well. Patient continues to worry about her heart and originally came into the ED for chest pain. Patient is from [facility] and speaks [address]  and [facility]. Patient was cooperative and pleasant. PMH:  Please see medical chart for prior medical history, allergies, and medication    HISTORY OF PSYCHIATRIC TREATMENT:  IP:  Cheli/JENNYFER  OP: Counseling/PCP    DATE OF BIRTH:  [birth year]  GENDER:   female  MARITAL STATUS:   with 3 sons  EMPLOYMENT STATUS:  unemployed    LIVING SITUATION/SUPPORT:  Patient lives with her son and daughter-in-law. EDUCATIONAL LEVEL:   graduate    MEDICATION/DRUG USE:  Medication compliant. LEISURE INTERESTS:   watching TV and movies, listening to music, spiritual activities, activities with her family, going out to eat, \"cleaning\", cooking  ACTIVITY PREFERENCE:  small group  ACTIVITY TYPES:  Passive. Indoor. Outdoor. Active. COGNITION:  A&Ox4    COPING:    poor  ATTENTION:  fair to poor concentration  RELAXATION:  Patient reported anxiety, panic attacks, a poor appetite and poor sleep. SELF-ESTEEM:   poor  MOTIVATION:   poor - poor insight    SOCIAL SKILLS:  fair  FRUSTRATION TOLERANCE:   poor - history of cutting herself. ATTENTION SEEKING:  history of cutting herself. COOPERATION:   cooperative and pleasant  AFFECT:  blunt  APPEARANCE:  appropriate    HEARING:    no problems noted  VISION:   no problems noted   VERBAL COMMUNICATION:   Patient is from [facility] and has an accent but can speak both [address] and [address] WRITTEN COMMUNICATION:   Did not assess    COORDINATION:  No problems noted but patient reported a history of diabetes, GERD, CHF, SOB, chest pain, neck pain, DJD and HTN. MOBILITY:  Ambulates independently     GOALS:   Identify 2 new positive coping strategies by time of discharge.

## 2022-09-27 NOTE — ED NOTES
This RN called to beside due to increased chest pain. Pt states that she ambulated to the restroom and that is when her chest pain increased. Pt medicated.  Repeat EKG in process     Porsche Gilliland RN  09/27/22 [id number]

## 2022-09-27 NOTE — ED TRIAGE NOTES
Patient presents to ED via EMS with complaints of chest pain. Patient states she began having chest pain around 0300 while laying in bed. Patient states the pain is in the middle of her chest and radiated to the neck. Patient states she is having SOB and it is hard to breathe. Patient on 95% on room air and breathing is unlabored. EMS states she received 4 baby Asprin. EMS states they held Nitro due to low BP. Patient is alert and oriented x4. Patient states she has had thoughts of killing herself recently, and she began to work out the plan. Patient states \"I would take my sleeping pills and drown them down with whiskey\". Patient states she has access to both at home.

## 2022-09-27 NOTE — ED NOTES
Called 4E at this time. Patient able to be transported to the floor.       Leodan Pugh RN  09/27/22 [id number]

## 2022-09-27 NOTE — ED NOTES
Patient resting in cot with eyes closed. Patient breathing even and unlabored. Call light within reach and sitter at bedside.      Christophe Gabriel RN  09/27/22 [id number]

## 2022-09-27 NOTE — ED PROVIDER NOTES
[address] [facility] [address] [address] EMERGENCY DEPT  EMERGENCY DEPARTMENT ENCOUNTER      Pt Name: Eris Martino  MRN: [id number]  [place] 1960  Date of evaluation: 9/27/2022  Provider: Yesi Wilkerson PA-C    CHIEF COMPLAINT     Chief Complaint   Patient presents with    Chest Pain       HISTORY OF PRESENT ILLNESS    Eris Martino is a 65 y.o. female who presents to the emergency department with multiple complaints. Initial complaint was chest pain as well as depression with suicidal ideation. Patient's care was endorsed to me by the night shift midlevel. Please refer to her dictation for further admission. I did assume this patient's care at time of shift change. By time I assessed the patient her chest pain had completely resolved. Patient had 2 troponins that were negative. EKG is unchanged from previous. Her BNP was slightly elevated but her chest x-ray did not show any acute cardiopulmonary abnormalities. Patient again was completely pain-free and had no complaint of chest pain. The patient's son informing that she was on the heart transplant list but had a recent appointment in [facility] and was doing so well that he took her off the transplant list.  Patient does report of having suicidal ideations. Patient does report that her plan would be to drink whiskey with all of her pills. Patient does have access to whiskey and her pills. Therefore behavior health assessment was obtained. This time patient was assessed by behavioral health and will be admitted to inpatient psychiatric facility here at [facility]. Triage notes and Nursing notes were reviewed by myself. Any discrepancies are addressed above.     PAST MEDICAL HISTORY     Past Medical History:   Diagnosis Date    Abnormal heart rhythm     Anxiety     Cancer ([address]     breast  2016     CHF (congestive heart failure) (HCC)     Congenital heart disease     DJD (degenerative joint disease)     Enlarged lymph nodes     Hypertension     Hypothyroidism     ICD (implantable cardioverter-defibrillator) in place 02/11/2019    Dr. Jacque Cortes    Kidney stones     PONV (postoperative nausea and vomiting)     Prediabetes 10/7/2019    Thyroid disease        SURGICAL HISTORY       Past Surgical History:   Procedure Laterality Date    APPENDECTOMY      CARPAL TUNNEL RELEASE  2019    COLONOSCOPY      COLONOSCOPY N/A 07/27/2020    COLONOSCOPY POLYPECTOMY SNARE/COLD BIOPSY performed by Madalyn Mendoza MD at [facility]    COLONOSCOPY  07/27/2020    COLONOSCOPY POLYPECTOMY HOT BIOPSY performed by Madalyn Mendoza MD at [facility]    COLONOSCOPY  2021    EGD      EYE SURGERY Bilateral 10/2021    eyelid lift     FINGER TRIGGER RELEASE Right 06/25/2020    DEQUERVAINS RELEASE AND RIGHT RING FINGER TRIGGER RELEASE performed by Antonia Gong DO at [address] Left 02/11/2019    [facility] PT HAS A MRI CONDITIONAL SYSTEM    PTCA      TONSILLECTOMY         CURRENT MEDICATIONS       Previous Medications    ACETAMINOPHEN (TYLENOL) 500 MG TABLET    Take 500 mg by mouth every 6 hours as needed for Pain    ASPIRIN 81 MG CHEWABLE TABLET    Take 1 tablet by mouth daily    ATORVASTATIN (LIPITOR) 40 MG TABLET    Take 1 tablet by mouth nightly    BUPROPION (WELLBUTRIN XL) 150 MG EXTENDED RELEASE TABLET    Take 1 tablet by mouth every morning    CPAP MACHINE MISC    by Does not apply route Please change CPAP pressure to auto 11-15 cm H20. DAPAGLIFLOZIN (FARXIGA) 10 MG TABLET    Take 1 tablet by mouth every morning    DIGOXIN (LANOXIN) 125 MCG TABLET    Take 1 tablet by mouth daily    FLUOXETINE (PROZAC) 40 MG CAPSULE    Take 1 capsule by mouth daily    FUROSEMIDE (LASIX) 40 MG TABLET    Take 1 tablet by mouth in the morning and 1 tablet before bedtime.     LETROZOLE (FEMARA) 2.5 MG TABLET    Take 1 tablet by mouth daily    LEVOTHYROXINE (EUTHYROX) 175 MCG TABLET    Take 1 tablet by mouth Daily    METOPROLOL SUCCINATE (TOPROL XL) 25 MG EXTENDED RELEASE TABLET    Take 0.5 tablets by mouth daily Hold if SBP less than 95 mmHg or HR less than 50 bpm    MIDODRINE (PROAMATINE) 10 MG TABLET    Take 1 tablet by mouth 3 times daily (with meals)    MISC. DEVICES (CANE) MISC    Dispense 1 straight cane    MISC. DEVICES MISC    Shower Chair    OMEPRAZOLE (PRILOSEC) 40 MG DELAYED RELEASE CAPSULE    Take 1 capsule by mouth 2 times daily (before meals)    POTASSIUM CHLORIDE (KLOR-CON M) 10 MEQ EXTENDED RELEASE TABLET    Take 1 tablet by mouth in the morning.     SACUBITRIL-VALSARTAN (ENTRESTO) 24-26 MG PER TABLET    Take 1 tablet by mouth 2 times daily    TRAZODONE (DESYREL) 50 MG TABLET    Take 1 tablet by mouth nightly as needed for Sleep       ALLERGIES     Adhesive tape    FAMILY HISTORY       Family History   Problem Relation Age of Onset    High Blood Pressure Mother     Dementia Mother     Cancer Father     Colon Cancer Father     Mental Retardation Brother     Colon Polyps Brother     Cancer Maternal Grandfather     Esophageal Cancer Neg Hx         SOCIAL HISTORY     Social History     Socioeconomic History    Marital status:      Spouse name: None    Number of children: None    Years of education: None    Highest education level: None   Tobacco Use    Smoking status: Former     Packs/day: 0.25     Years: 37.00     Pack years: 9.25     Types: Cigarettes     Start date: 1981     Quit date: 2020     Years since quittin.3    Smokeless tobacco: Never   Vaping Use    Vaping Use: Never used   Substance and Sexual Activity    Alcohol use: No    Drug use: No   Social History Narrative    Referral placed for counseling    Denies barriers with medication affordability    Denies community services     Denies transportation issues     Social Determinants of Health     Financial Resource Strain: Low Risk     Difficulty of Paying Living Expenses: Not very hard   Food Insecurity: No Food Insecurity    Worried About Running Out of Food in the Last Year: Never true    Ran Out of Food in the Last Year: Never true       REVIEW OF SYSTEMS       A 10 point review of systems discussed the patient and the pertinent positives and names are listed in the HPI    Except as noted above the remainder of the review of systems was reviewed and is negative. PHYSICAL EXAM    (up to 7 for level 4, 8 or more for level 5)     ED Triage Vitals   BP Temp Temp Source Heart Rate Resp SpO2 Height Weight   09/27/22 0452 09/27/22 0452 09/27/22 0442 09/27/22 0442 09/27/22 0452 09/27/22 0452 -- --   95/70 98.2 °F (36.8 °C) Oral 87 15 95 %         General: nontoxic appearing. HEENT: Normocephalic/atraumatic. Extraocular muscles are intact. Neck: Full range of motion. No meningeal signs noted. Lungs: Clear to auscultation in all lung fields. No retractions. No respiratory distress. Heart: Regular rate and rhythm. Abdomen: Soft, nontender. No guarding or rebound tenderness. Bowel sounds are noted. Extremities: Range of motion is full. Radial pulses 2 out of 4 bilaterally. Back: No range of motion. Neurologic: Alert and oriented. Normal motor and sensory function. Skin: Warm, dry, free of rashes  DIAGNOSTIC RESULTS           RADIOLOGY: (none if blank)   Interpretationper the Radiologist below, if available at the time of this note:    XR CHEST PORTABLE   Final Result   1. No acute findings or significant interval change.       This document has been electronically signed by: Wilton Peterson MD on    09/27/2022 06:15 AM          LABS:  Labs Reviewed   CBC WITH AUTO DIFFERENTIAL - Abnormal; Notable for the following components:       Result Value    RBC 5.72 (*)     MCH 24.8 (*)     MCHC 30.5 (*)     RDW-CV 25.2 (*)     RDW-SD 72.8 (*)     All other components within normal limits   COMPREHENSIVE METABOLIC PANEL - Abnormal; Notable for the following components:    Glucose 146 (*)     Alkaline Phosphatase 143 (*)     All other components within normal limits   BRAIN NATRIURETIC PEPTIDE - Abnormal; Notable for the following components:    Pro-BNP 4500.0 (*)     All other components within normal limits   GLOMERULAR FILTRATION RATE, ESTIMATED - Abnormal; Notable for the following components:    Est, Glom Filt Rate 73 (*)     All other components within normal limits   TROPONIN   DIGOXIN LEVEL   ANION GAP   OSMOLALITY   TROPONIN   SCAN OF BLOOD SMEAR       All other labs were within normal range or not returned as of this dictation. EMERGENCY DEPARTMENT COURSE and Medical Decision Making:     MDM  /   Patient to be admitted for inpatient psychiatric care at [address] CONSULTS: (None if blank)  None    Procedures: (None if blank)       CLINICAL IMPRESSION      1. Chest pain, unspecified type    2. Depression with suicidal ideation          DISPOSITION/PLAN   DISPOSITION Decision To Admit 09/27/2022 10:01:32 AM      PATIENT REFERRED TO:  No follow-up provider specified.     DISCHARGE MEDICATIONS:  New Prescriptions    No medications on file              (Please note that portions of this note were completed with a voice recognition program.  Efforts weremade to edit the dictations but occasionally words are mis-transcribed.)      Dar Wilkerson PA-C(electronically signed)              Dar Wilkerson PA-C  09/27/22 1 [facility] ELISE Wilkerson  09/27/22 6674

## 2022-09-27 NOTE — ED NOTES
Pt appears to be resting on cot. No distress noted. reparations even and unlabored. Call light in reach. Sitter remains at bedside for pt safety.       Erven Holstein, RN  09/27/22 2561

## 2022-09-27 NOTE — ED PROVIDER NOTES
[facility] EMERGENCY DEPT      CHIEF COMPLAINT       Chief Complaint   Patient presents with    Chest Pain       Nurses Notes reviewed and I agree except as noted in the HPI. HISTORY OF PRESENT ILLNESS    Florinda Benítez is a 65 y.o. female with past medical history significant for abnormal heart rhythm, congestive heart failure with reduced ejection fraction, hypertension, prediabetes, who presents via ambulance with her son for evaluation of chest pain. In the ambulance the patient was given 324 mg of aspirin, and nitroglycerin was held due to low blood pressure. She endorses a sudden-onset of 9/10 substernal chest pain that radiates to the neck and right arm. She describes the pain as a \"stabbing\" and \"knife-like\" pain which is constant. She has never experienced chest pain like this before. Patient states that the pain became \"so severe\" that she became nauseous and had one episode of non-bloody emesis. Additionally, she is having shortness of breath and states that it \"hurts to take a deep breath. \" Her son states that they ate at [employer] last night and that he thinks the patient has GERD-like symptoms due to eating a salad with andrew cheese and spicy chicken balls. Denies fever, chills, leg edema. Additionally, patient reports suicidal ideation. She states she has a plan to \"take my sleeping pills and drown them down with whiskey. \" She has attempted suicide in the past and was recently admitted for depression in June of this year. Location/Symptom: Substernally, radiation to neck and right arm  Timing/Onset: 2:30 AM, sudden onset  Context/Setting: At rest  Quality: Sharp, knife-like pain  Duration: Constant  Modifying Factors: Aggravated with deep breaths, denies alleviating factors  Severity: 9/10    REVIEW OF SYSTEMS     Review of Systems   Constitutional:  Negative for chills, diaphoresis, fatigue and fever. HENT:  Negative for congestion, rhinorrhea, sinus pain and sore throat.     Eyes: Negative for visual disturbance. Respiratory:  Positive for chest tightness and shortness of breath. Negative for cough. Cardiovascular:  Positive for chest pain. Negative for palpitations and leg swelling. Gastrointestinal:  Positive for abdominal pain (Epigastric), nausea and vomiting (1 episode). Negative for constipation and diarrhea. Genitourinary:  Negative for dysuria, frequency and urgency. Musculoskeletal:  Positive for neck pain. Negative for neck stiffness. Skin:  Negative for rash. Neurological:  Negative for dizziness, light-headedness, numbness and headaches. Hematological:  Does not bruise/bleed easily. Psychiatric/Behavioral:  Positive for suicidal ideas. PAST MEDICAL HISTORY    has a past medical history of Abnormal heart rhythm, Anxiety, Cancer (HCC), CHF (congestive heart failure) ([facility] [address] Congenital heart disease, DJD (degenerative joint disease), Enlarged lymph nodes, Hypertension, Hypothyroidism, ICD (implantable cardioverter-defibrillator) in place, Kidney stones, PONV (postoperative nausea and vomiting), Prediabetes, and Thyroid disease. SURGICAL HISTORY      has a past surgical history that includes Mastectomy, bilateral; Tonsillectomy; Percutaneous Transluminal Coronary Angio; Appendectomy; Colonoscopy; Carpal tunnel release (2019); Finger trigger release (Right, 06/25/2020); Colonoscopy (N/A, 07/27/2020); Colonoscopy (07/27/2020); Pacemaker insertion (Left, 02/11/2019); Colonoscopy (2021); EGD; and Eye surgery (Bilateral, 10/2021).     CURRENT MEDICATIONS       Previous Medications    ACETAMINOPHEN (TYLENOL) 500 MG TABLET    Take 500 mg by mouth every 6 hours as needed for Pain    ASPIRIN 81 MG CHEWABLE TABLET    Take 1 tablet by mouth daily    ATORVASTATIN (LIPITOR) 40 MG TABLET    Take 1 tablet by mouth nightly    BUPROPION (WELLBUTRIN XL) 150 MG EXTENDED RELEASE TABLET    Take 1 tablet by mouth every morning    CPAP MACHINE MISC    by Does not apply route Please change CPAP pressure to auto 11-15 cm H20. DAPAGLIFLOZIN (FARXIGA) 10 MG TABLET    Take 1 tablet by mouth every morning    DIGOXIN (LANOXIN) 125 MCG TABLET    Take 1 tablet by mouth daily    FLUOXETINE (PROZAC) 40 MG CAPSULE    Take 1 capsule by mouth daily    FUROSEMIDE (LASIX) 40 MG TABLET    Take 1 tablet by mouth in the morning and 1 tablet before bedtime. LETROZOLE (FEMARA) 2.5 MG TABLET    Take 1 tablet by mouth daily    LEVOTHYROXINE (EUTHYROX) 175 MCG TABLET    Take 1 tablet by mouth Daily    METOPROLOL SUCCINATE (TOPROL XL) 25 MG EXTENDED RELEASE TABLET    Take 0.5 tablets by mouth daily Hold if SBP less than 95 mmHg or HR less than 50 bpm    MIDODRINE (PROAMATINE) 10 MG TABLET    Take 1 tablet by mouth 3 times daily (with meals)    MISC. DEVICES (CANE) MISC    Dispense 1 straight cane    MISC. DEVICES MISC    Shower Chair    OMEPRAZOLE (PRILOSEC) 40 MG DELAYED RELEASE CAPSULE    Take 1 capsule by mouth 2 times daily (before meals)    POTASSIUM CHLORIDE (KLOR-CON M) 10 MEQ EXTENDED RELEASE TABLET    Take 1 tablet by mouth in the morning. SACUBITRIL-VALSARTAN (ENTRESTO) 24-26 MG PER TABLET    Take 1 tablet by mouth 2 times daily    TRAZODONE (DESYREL) 50 MG TABLET    Take 1 tablet by mouth nightly as needed for Sleep       ALLERGIES     is allergic to adhesive tape. FAMILY HISTORY     She indicated that her mother is alive. She indicated that her father is . She indicated that both of her brothers are alive. She indicated that the status of her maternal grandfather is unknown. She indicated that the status of her neg hx is unknown.   family history includes Cancer in her father and maternal grandfather; Lindaann Mani in her father; Colon Polyps in her brother; Dementia in her mother; High Blood Pressure in her mother; Mental Retardation in her brother. SOCIAL HISTORY    reports that she quit smoking about 2 years ago. Her smoking use included cigarettes.  She started smoking about 40 years ago. She has a 9.25 pack-year smoking history. She has never used smokeless tobacco. She reports that she does not drink alcohol and does not use drugs. PHYSICAL EXAM     INITIAL VITALS:  temperature is 98.2 °F (36.8 °C). Her blood pressure is 86/64 and her pulse is 78. Her respiration is 16 and oxygen saturation is 95%. Physical Exam  Constitutional:       Appearance: Normal appearance. She is well-developed. She is not ill-appearing or diaphoretic. HENT:      Head: Normocephalic and atraumatic. Right Ear: Hearing normal.      Left Ear: Hearing normal.      Nose: Nose normal. No rhinorrhea. Mouth/Throat:      Lips: Pink. Mouth: Mucous membranes are moist.      Pharynx: Oropharynx is clear. Eyes:      General: Lids are normal. No scleral icterus. Extraocular Movements: Extraocular movements intact. Conjunctiva/sclera: Conjunctivae normal.      Pupils: Pupils are equal, round, and reactive to light. Neck:      Trachea: Trachea normal.   Cardiovascular:      Rate and Rhythm: Normal rate and regular rhythm. Pulses:           Radial pulses are 2+ on the right side and 2+ on the left side. Heart sounds: Normal heart sounds. No murmur heard. Pulmonary:      Effort: Pulmonary effort is normal.      Breath sounds: Normal breath sounds and air entry. No decreased breath sounds, wheezing or rhonchi. Chest:      Chest wall: Tenderness present. Abdominal:      General: There is no distension. Palpations: Abdomen is soft. Tenderness: There is abdominal tenderness in the epigastric area. There is no guarding or rebound. Musculoskeletal:      Cervical back: Normal range of motion and neck supple. No rigidity. Right lower leg: No swelling. No edema. Left lower leg: No swelling. No edema. Comments: Well perfused; movement normal as observed; no signs of DVT   Lymphadenopathy:      Cervical: No cervical adenopathy.    Skin:     General: Skin is warm and dry. Findings: No rash. Neurological:      General: No focal deficit present. Mental Status: She is alert. Sensory: Sensation is intact. Motor: Motor function is intact. Gait: Gait is intact. Psychiatric:         Mood and Affect: Mood normal.         Speech: Speech normal.         Behavior: Behavior is cooperative. Thought Content: Thought content includes suicidal ideation. Thought content includes suicidal plan. DIFFERENTIAL DIAGNOSIS:   Including but not limited to: acute coronary syndrome, myocardial infarction, CHF, costochondritis, GERD, food poisoning, anxiety, suicidal ideation, depression. DIAGNOSTIC RESULTS     EKG: All [facility]'s [facility] who either signs or Co-signs this chart in the absence of a cardiologist.  Reviewed and was normal sinus rhythm and negative for STEMI. Was compared to previous EKG in June 2022 and was unchanged    RADIOLOGY: non-plain film images(s) such as CT,Ultrasound and MRI are read by the radiologist.  Plain radiographic images are visualized and preliminarily interpreted by the emergency physician unless otherwise stated below. XR CHEST PORTABLE   Final Result   1. No acute findings or significant interval change.       This document has been electronically signed by: Alexandra Lazar MD on    09/27/2022 06:15 AM          LABS:   Labs Reviewed   CBC WITH AUTO DIFFERENTIAL - Abnormal; Notable for the following components:       Result Value    RBC 5.72 (*)     MCH 24.8 (*)     MCHC 30.5 (*)     RDW-CV 25.2 (*)     RDW-SD 72.8 (*)     All other components within normal limits   COMPREHENSIVE METABOLIC PANEL - Abnormal; Notable for the following components:    Glucose 146 (*)     Alkaline Phosphatase 143 (*)     All other components within normal limits   BRAIN NATRIURETIC PEPTIDE - Abnormal; Notable for the following components:    Pro-BNP 4500.0 (*)     All other components within normal limits GLOMERULAR FILTRATION RATE, ESTIMATED - Abnormal; Notable for the following components:    Est, Glom Filt Rate 73 (*)     All other components within normal limits   TROPONIN   DIGOXIN LEVEL   ANION GAP   OSMOLALITY       EMERGENCY DEPARTMENT COURSE:   Vitals:    Vitals:    09/27/22 0442 09/27/22 0452 09/27/22 0602   BP:  95/70 86/64   Pulse: 87 82 78   Resp:  15 16   Temp:  98.2 °F (36.8 °C)    TempSrc: Oral     SpO2:  95% 95%         MDM:  The patient was seen by me in the emergency room for chest pain. Physical exam revealed tenderness to palpation of the chest wall and epigastric tenderness to palpation. Vital signs reviewed and noted stable. Old records were reviewed. Appropriate laboratory and imaging studies were ordered and were pending at the end of my shift. Patient was turned over to [facility] DORIE Wilkerson PA-C. Patient aware she would be admitted medically most likely or possibly psychiatrically. CRITICAL CARE:   None    CONSULTS:  None    PROCEDURES:  None    FINAL IMPRESSION      1. Chest pain, unspecified type    2. Depression with suicidal ideation          DISPOSITION/PLAN     1. Chest pain, unspecified type    2.  Depression with suicidal ideation    Pending admission      (Please note that portions of this note were completed with a voice recognition program.  Efforts were made to edit the dictations but occasionally words are mis-transcribed.)    Aline Avalos PA-C 09/27/22 6:16 AM    ELISE Savage PA-C  09/27/22 0054       Aline Avalos PA-C  09/27/22 1812

## 2022-09-27 NOTE — ED NOTES
ED to inpatient nurses report    Chief Complaint   Patient presents with    Chest Pain      Present to ED from home  LOC: alert and orientated to name, place, date  Vital signs   Vitals:    09/27/22 0701 09/27/22 0839 09/27/22 0955 09/27/22 1041   BP:  91/79 94/80 122/66   Pulse:  86 84 88   Resp:  23 18 20   Temp:       TempSrc:       SpO2: 95% 98% 95% 96%      Oxygen Baseline 96%    Current needs required RA Bipap/Cpap No  LDAs:   Peripheral IV 09/27/22 Right Antecubital (Active)   Site Assessment Clean, dry & intact 09/27/22 5067   Line Status Blood return noted 09/27/22 8732     Mobility: Independent  Pending ED orders: NA  Present condition: stable    C-SSRS Risk of Suicide: High Risk  Swallow Screening    Preferred Language: Tristanian     Electronically signed by Erven Holstein, RN on 9/27/2022 at 11:43 AM       Erven Holstein, RN  09/27/22 9184

## 2022-09-28 ENCOUNTER — APPOINTMENT (OUTPATIENT)
Dept: CT IMAGING | Age: 62
DRG: 751 | End: 2022-09-28
Attending: INTERNAL MEDICINE
Payer: COMMERCIAL

## 2022-09-28 ENCOUNTER — HOSPITAL ENCOUNTER (INPATIENT)
Age: 62
LOS: 3 days | Discharge: PSYCHIATRIC OR SUBSTANCE USE FACILITY | DRG: 751 | End: 2022-10-01
Attending: INTERNAL MEDICINE | Admitting: PSYCHIATRY & NEUROLOGY
Payer: COMMERCIAL

## 2022-09-28 ENCOUNTER — APPOINTMENT (OUTPATIENT)
Dept: GENERAL RADIOLOGY | Age: 62
DRG: 751 | End: 2022-09-28
Payer: COMMERCIAL

## 2022-09-28 VITALS
SYSTOLIC BLOOD PRESSURE: 108 MMHG | TEMPERATURE: 97.7 F | DIASTOLIC BLOOD PRESSURE: 79 MMHG | RESPIRATION RATE: 16 BRPM | WEIGHT: 192 LBS | HEART RATE: 86 BPM | HEIGHT: 62 IN | BODY MASS INDEX: 35.33 KG/M2 | OXYGEN SATURATION: 97 %

## 2022-09-28 PROBLEM — R07.9 CHEST PAIN IN ADULT: Status: ACTIVE | Noted: 2022-09-28

## 2022-09-28 LAB
ALBUMIN SERPL-MCNC: 3.6 G/DL (ref 3.5–5.1)
ALP BLD-CCNC: 136 U/L (ref 38–126)
ALT SERPL-CCNC: 20 U/L (ref 11–66)
AMPHETAMINE+METHAMPHETAMINE URINE SCREEN: NEGATIVE
ANION GAP SERPL CALCULATED.3IONS-SCNC: 11 MEQ/L (ref 8–16)
ANISOCYTOSIS: PRESENT
AST SERPL-CCNC: 27 U/L (ref 5–40)
BARBITURATE QUANTITATIVE URINE: NEGATIVE
BASOPHILS # BLD: 0.3 %
BASOPHILS ABSOLUTE: 0 THOU/MM3 (ref 0–0.1)
BENZODIAZEPINE QUANTITATIVE URINE: NEGATIVE
BILIRUB SERPL-MCNC: 0.6 MG/DL (ref 0.3–1.2)
BUN BLDV-MCNC: 17 MG/DL (ref 7–22)
CALCIUM SERPL-MCNC: 9 MG/DL (ref 8.5–10.5)
CANNABINOID QUANTITATIVE URINE: NEGATIVE
CHLORIDE BLD-SCNC: 100 MEQ/L (ref 98–111)
CO2: 26 MEQ/L (ref 23–33)
COCAINE METABOLITE QUANTITATIVE URINE: NEGATIVE
CREAT SERPL-MCNC: 0.8 MG/DL (ref 0.4–1.2)
D-DIMER QUANTITATIVE: 862 NG/ML FEU (ref 0–500)
EKG ATRIAL RATE: 100 BPM
EKG P AXIS: 74 DEGREES
EKG P-R INTERVAL: 184 MS
EKG Q-T INTERVAL: 370 MS
EKG QRS DURATION: 102 MS
EKG QTC CALCULATION (BAZETT): 477 MS
EKG R AXIS: -68 DEGREES
EKG T AXIS: 74 DEGREES
EKG VENTRICULAR RATE: 100 BPM
EOSINOPHIL # BLD: 1.6 %
EOSINOPHILS ABSOLUTE: 0.1 THOU/MM3 (ref 0–0.4)
ERYTHROCYTE [DISTWIDTH] IN BLOOD BY AUTOMATED COUNT: 25.4 % (ref 11.5–14.5)
ERYTHROCYTE [DISTWIDTH] IN BLOOD BY AUTOMATED COUNT: 73 FL (ref 35–45)
GFR SERPL CREATININE-BSD FRML MDRD: 73 ML/MIN/1.73M2
GLUCOSE BLD-MCNC: 108 MG/DL (ref 70–108)
GLUCOSE BLD-MCNC: 144 MG/DL (ref 70–108)
HCT VFR BLD CALC: 43.3 % (ref 37–47)
HEMOGLOBIN: 12.8 GM/DL (ref 12–16)
IMMATURE GRANS (ABS): 0.04 THOU/MM3 (ref 0–0.07)
IMMATURE GRANULOCYTES: 0.5 %
LIPASE: 34.5 U/L (ref 5.6–51.3)
LYMPHOCYTES # BLD: 15.6 %
LYMPHOCYTES ABSOLUTE: 1.2 THOU/MM3 (ref 1–4.8)
MAGNESIUM: 2.2 MG/DL (ref 1.6–2.4)
MCH RBC QN AUTO: 24.5 PG (ref 26–33)
MCHC RBC AUTO-ENTMCNC: 29.6 GM/DL (ref 32.2–35.5)
MCV RBC AUTO: 82.8 FL (ref 81–99)
MONOCYTES # BLD: 7.6 %
MONOCYTES ABSOLUTE: 0.6 THOU/MM3 (ref 0.4–1.3)
NUCLEATED RED BLOOD CELLS: 0 /100 WBC
OPIATES, URINE: NEGATIVE
OXYCODONE: NEGATIVE
PHENCYCLIDINE QUANTITATIVE URINE: NEGATIVE
PLATELET # BLD: 162 THOU/MM3 (ref 130–400)
PLATELET ESTIMATE: ADEQUATE
PMV BLD AUTO: ABNORMAL FL (ref 9.4–12.4)
POTASSIUM REFLEX MAGNESIUM: 4.5 MEQ/L (ref 3.5–5.2)
PREGNANCY, URINE: NEGATIVE
PRO-BNP: 4557 PG/ML (ref 0–900)
RBC # BLD: 5.23 MILL/MM3 (ref 4.2–5.4)
SCAN OF BLOOD SMEAR: NORMAL
SEG NEUTROPHILS: 74.4 %
SEGMENTED NEUTROPHILS ABSOLUTE COUNT: 5.7 THOU/MM3 (ref 1.8–7.7)
SODIUM BLD-SCNC: 137 MEQ/L (ref 135–145)
TOTAL PROTEIN: 6.5 G/DL (ref 6.1–8)
TROPONIN T: < 0.01 NG/ML
WBC # BLD: 7.7 THOU/MM3 (ref 4.8–10.8)

## 2022-09-28 PROCEDURE — 85379 FIBRIN DEGRADATION QUANT: CPT

## 2022-09-28 PROCEDURE — 80307 DRUG TEST PRSMV CHEM ANLYZR: CPT

## 2022-09-28 PROCEDURE — 81025 URINE PREGNANCY TEST: CPT

## 2022-09-28 PROCEDURE — 93010 ELECTROCARDIOGRAM REPORT: CPT | Performed by: NUCLEAR MEDICINE

## 2022-09-28 PROCEDURE — 6370000000 HC RX 637 (ALT 250 FOR IP)

## 2022-09-28 PROCEDURE — 36415 COLL VENOUS BLD VENIPUNCTURE: CPT

## 2022-09-28 PROCEDURE — 83880 ASSAY OF NATRIURETIC PEPTIDE: CPT

## 2022-09-28 PROCEDURE — 6360000002 HC RX W HCPCS

## 2022-09-28 PROCEDURE — 83690 ASSAY OF LIPASE: CPT

## 2022-09-28 PROCEDURE — 84484 ASSAY OF TROPONIN QUANT: CPT

## 2022-09-28 PROCEDURE — 99255 IP/OBS CONSLTJ NEW/EST HI 80: CPT | Performed by: PHYSICIAN ASSISTANT

## 2022-09-28 PROCEDURE — 1200000003 HC TELEMETRY R&B

## 2022-09-28 PROCEDURE — 99222 1ST HOSP IP/OBS MODERATE 55: CPT

## 2022-09-28 PROCEDURE — 71275 CT ANGIOGRAPHY CHEST: CPT

## 2022-09-28 PROCEDURE — 82948 REAGENT STRIP/BLOOD GLUCOSE: CPT

## 2022-09-28 PROCEDURE — 71045 X-RAY EXAM CHEST 1 VIEW: CPT

## 2022-09-28 PROCEDURE — 6370000000 HC RX 637 (ALT 250 FOR IP): Performed by: PHYSICIAN ASSISTANT

## 2022-09-28 PROCEDURE — 85025 COMPLETE CBC W/AUTO DIFF WBC: CPT

## 2022-09-28 PROCEDURE — 93005 ELECTROCARDIOGRAM TRACING: CPT | Performed by: STUDENT IN AN ORGANIZED HEALTH CARE EDUCATION/TRAINING PROGRAM

## 2022-09-28 PROCEDURE — 6360000004 HC RX CONTRAST MEDICATION

## 2022-09-28 PROCEDURE — 6370000000 HC RX 637 (ALT 250 FOR IP): Performed by: PSYCHIATRY & NEUROLOGY

## 2022-09-28 PROCEDURE — 83735 ASSAY OF MAGNESIUM: CPT

## 2022-09-28 PROCEDURE — 80053 COMPREHEN METABOLIC PANEL: CPT

## 2022-09-28 RX ORDER — METOPROLOL SUCCINATE 25 MG/1
12.5 TABLET, EXTENDED RELEASE ORAL NIGHTLY
Status: DISCONTINUED | OUTPATIENT
Start: 2022-09-28 | End: 2022-09-28 | Stop reason: HOSPADM

## 2022-09-28 RX ORDER — FUROSEMIDE 40 MG/1
40 TABLET ORAL 2 TIMES DAILY
Status: DISCONTINUED | OUTPATIENT
Start: 2022-09-28 | End: 2022-09-28 | Stop reason: HOSPADM

## 2022-09-28 RX ORDER — BUPROPION HYDROCHLORIDE 150 MG/1
150 TABLET ORAL EVERY MORNING
Status: DISCONTINUED | OUTPATIENT
Start: 2022-09-28 | End: 2022-09-28 | Stop reason: HOSPADM

## 2022-09-28 RX ORDER — MAGNESIUM SULFATE IN WATER 40 MG/ML
2000 INJECTION, SOLUTION INTRAVENOUS PRN
Status: DISCONTINUED | OUTPATIENT
Start: 2022-09-28 | End: 2022-10-01 | Stop reason: HOSPADM

## 2022-09-28 RX ORDER — POTASSIUM CHLORIDE 7.45 MG/ML
10 INJECTION INTRAVENOUS PRN
Status: DISCONTINUED | OUTPATIENT
Start: 2022-09-28 | End: 2022-10-01 | Stop reason: HOSPADM

## 2022-09-28 RX ORDER — TRAZODONE HYDROCHLORIDE 50 MG/1
50 TABLET ORAL NIGHTLY PRN
Status: DISCONTINUED | OUTPATIENT
Start: 2022-09-28 | End: 2022-10-01 | Stop reason: HOSPADM

## 2022-09-28 RX ORDER — SODIUM CHLORIDE 9 MG/ML
INJECTION, SOLUTION INTRAVENOUS PRN
Status: DISCONTINUED | OUTPATIENT
Start: 2022-09-28 | End: 2022-10-01 | Stop reason: HOSPADM

## 2022-09-28 RX ORDER — POTASSIUM CHLORIDE 20 MEQ/1
40 TABLET, EXTENDED RELEASE ORAL PRN
Status: DISCONTINUED | OUTPATIENT
Start: 2022-09-28 | End: 2022-10-01 | Stop reason: HOSPADM

## 2022-09-28 RX ORDER — SODIUM CHLORIDE 0.9 % (FLUSH) 0.9 %
10 SYRINGE (ML) INJECTION EVERY 12 HOURS SCHEDULED
Status: DISCONTINUED | OUTPATIENT
Start: 2022-09-28 | End: 2022-10-01 | Stop reason: HOSPADM

## 2022-09-28 RX ORDER — INSULIN LISPRO 100 [IU]/ML
0-8 INJECTION, SOLUTION INTRAVENOUS; SUBCUTANEOUS
Status: DISCONTINUED | OUTPATIENT
Start: 2022-09-28 | End: 2022-10-01 | Stop reason: HOSPADM

## 2022-09-28 RX ORDER — DEXTROSE MONOHYDRATE 100 MG/ML
INJECTION, SOLUTION INTRAVENOUS CONTINUOUS PRN
Status: DISCONTINUED | OUTPATIENT
Start: 2022-09-28 | End: 2022-09-28 | Stop reason: HOSPADM

## 2022-09-28 RX ORDER — ASPIRIN 81 MG/1
81 TABLET, CHEWABLE ORAL DAILY
Status: DISCONTINUED | OUTPATIENT
Start: 2022-09-28 | End: 2022-09-28 | Stop reason: HOSPADM

## 2022-09-28 RX ORDER — DIGOXIN 125 MCG
125 TABLET ORAL DAILY
Status: DISCONTINUED | OUTPATIENT
Start: 2022-09-28 | End: 2022-09-28 | Stop reason: HOSPADM

## 2022-09-28 RX ORDER — MIDODRINE HYDROCHLORIDE 10 MG/1
10 TABLET ORAL
Status: DISCONTINUED | OUTPATIENT
Start: 2022-09-28 | End: 2022-09-28 | Stop reason: HOSPADM

## 2022-09-28 RX ORDER — DIGOXIN 125 MCG
125 TABLET ORAL DAILY
Status: DISCONTINUED | OUTPATIENT
Start: 2022-09-28 | End: 2022-10-01 | Stop reason: HOSPADM

## 2022-09-28 RX ORDER — ONDANSETRON 2 MG/ML
4 INJECTION INTRAMUSCULAR; INTRAVENOUS EVERY 6 HOURS PRN
Status: DISCONTINUED | OUTPATIENT
Start: 2022-09-28 | End: 2022-10-01 | Stop reason: HOSPADM

## 2022-09-28 RX ORDER — SODIUM CHLORIDE 0.9 % (FLUSH) 0.9 %
10 SYRINGE (ML) INJECTION PRN
Status: DISCONTINUED | OUTPATIENT
Start: 2022-09-28 | End: 2022-10-01 | Stop reason: HOSPADM

## 2022-09-28 RX ORDER — ATORVASTATIN CALCIUM 40 MG/1
40 TABLET, FILM COATED ORAL NIGHTLY
Status: DISCONTINUED | OUTPATIENT
Start: 2022-09-28 | End: 2022-09-28 | Stop reason: HOSPADM

## 2022-09-28 RX ORDER — NITROGLYCERIN 0.4 MG/1
0.4 TABLET SUBLINGUAL EVERY 5 MIN PRN
Status: DISCONTINUED | OUTPATIENT
Start: 2022-09-28 | End: 2022-09-28 | Stop reason: HOSPADM

## 2022-09-28 RX ORDER — ATORVASTATIN CALCIUM 40 MG/1
40 TABLET, FILM COATED ORAL NIGHTLY
Status: DISCONTINUED | OUTPATIENT
Start: 2022-09-28 | End: 2022-10-01 | Stop reason: HOSPADM

## 2022-09-28 RX ORDER — SPIRONOLACTONE 25 MG/1
25 TABLET ORAL DAILY
Status: DISCONTINUED | OUTPATIENT
Start: 2022-09-28 | End: 2022-09-28 | Stop reason: HOSPADM

## 2022-09-28 RX ORDER — INSULIN LISPRO 100 [IU]/ML
0-4 INJECTION, SOLUTION INTRAVENOUS; SUBCUTANEOUS NIGHTLY
Status: DISCONTINUED | OUTPATIENT
Start: 2022-09-28 | End: 2022-09-28

## 2022-09-28 RX ORDER — POTASSIUM CHLORIDE 20 MEQ/1
10 TABLET, EXTENDED RELEASE ORAL DAILY
Status: DISCONTINUED | OUTPATIENT
Start: 2022-09-28 | End: 2022-09-28 | Stop reason: HOSPADM

## 2022-09-28 RX ORDER — PANTOPRAZOLE SODIUM 40 MG/1
40 TABLET, DELAYED RELEASE ORAL
Refills: 2 | Status: DISCONTINUED | OUTPATIENT
Start: 2022-09-28 | End: 2022-09-28 | Stop reason: HOSPADM

## 2022-09-28 RX ORDER — INSULIN LISPRO 100 [IU]/ML
0-4 INJECTION, SOLUTION INTRAVENOUS; SUBCUTANEOUS
Status: DISCONTINUED | OUTPATIENT
Start: 2022-09-28 | End: 2022-09-28

## 2022-09-28 RX ORDER — FLUOXETINE HYDROCHLORIDE 20 MG/1
40 CAPSULE ORAL DAILY
Status: DISCONTINUED | OUTPATIENT
Start: 2022-09-28 | End: 2022-09-28 | Stop reason: HOSPADM

## 2022-09-28 RX ORDER — LETROZOLE 2.5 MG/1
2.5 TABLET, FILM COATED ORAL DAILY
Status: DISCONTINUED | OUTPATIENT
Start: 2022-09-28 | End: 2022-09-28

## 2022-09-28 RX ORDER — POLYETHYLENE GLYCOL 3350 17 G/17G
17 POWDER, FOR SOLUTION ORAL DAILY PRN
Status: DISCONTINUED | OUTPATIENT
Start: 2022-09-28 | End: 2022-10-01 | Stop reason: HOSPADM

## 2022-09-28 RX ORDER — FLUOXETINE HYDROCHLORIDE 20 MG/1
40 CAPSULE ORAL DAILY
Status: DISCONTINUED | OUTPATIENT
Start: 2022-09-29 | End: 2022-10-01 | Stop reason: HOSPADM

## 2022-09-28 RX ORDER — DEXTROSE MONOHYDRATE 100 MG/ML
INJECTION, SOLUTION INTRAVENOUS CONTINUOUS PRN
Status: DISCONTINUED | OUTPATIENT
Start: 2022-09-28 | End: 2022-10-01 | Stop reason: HOSPADM

## 2022-09-28 RX ORDER — ACETAMINOPHEN 325 MG/1
650 TABLET ORAL EVERY 6 HOURS PRN
Status: DISCONTINUED | OUTPATIENT
Start: 2022-09-28 | End: 2022-10-01 | Stop reason: HOSPADM

## 2022-09-28 RX ORDER — BUPROPION HYDROCHLORIDE 150 MG/1
150 TABLET ORAL EVERY MORNING
Status: DISCONTINUED | OUTPATIENT
Start: 2022-09-29 | End: 2022-10-01 | Stop reason: HOSPADM

## 2022-09-28 RX ORDER — ASPIRIN 81 MG/1
81 TABLET, CHEWABLE ORAL DAILY
Status: DISCONTINUED | OUTPATIENT
Start: 2022-09-29 | End: 2022-10-01 | Stop reason: HOSPADM

## 2022-09-28 RX ORDER — METOPROLOL SUCCINATE 25 MG/1
12.5 TABLET, EXTENDED RELEASE ORAL NIGHTLY
Status: DISCONTINUED | OUTPATIENT
Start: 2022-09-28 | End: 2022-10-01 | Stop reason: HOSPADM

## 2022-09-28 RX ORDER — ONDANSETRON 4 MG/1
4 TABLET, ORALLY DISINTEGRATING ORAL EVERY 8 HOURS PRN
Status: DISCONTINUED | OUTPATIENT
Start: 2022-09-28 | End: 2022-10-01 | Stop reason: HOSPADM

## 2022-09-28 RX ORDER — PANTOPRAZOLE SODIUM 40 MG/1
40 TABLET, DELAYED RELEASE ORAL
Refills: 2 | Status: DISCONTINUED | OUTPATIENT
Start: 2022-09-28 | End: 2022-10-01 | Stop reason: HOSPADM

## 2022-09-28 RX ORDER — ENOXAPARIN SODIUM 100 MG/ML
40 INJECTION SUBCUTANEOUS EVERY 24 HOURS
Status: DISCONTINUED | OUTPATIENT
Start: 2022-09-28 | End: 2022-10-01 | Stop reason: HOSPADM

## 2022-09-28 RX ORDER — LANOLIN ALCOHOL/MO/W.PET/CERES
20 CREAM (GRAM) TOPICAL NIGHTLY
Status: DISCONTINUED | OUTPATIENT
Start: 2022-09-28 | End: 2022-10-01 | Stop reason: HOSPADM

## 2022-09-28 RX ADMIN — Medication 19.5 MG: at 20:45

## 2022-09-28 RX ADMIN — ATORVASTATIN CALCIUM 40 MG: 40 TABLET, FILM COATED ORAL at 20:45

## 2022-09-28 RX ADMIN — BUPROPION HYDROCHLORIDE 150 MG: 150 TABLET, FILM COATED, EXTENDED RELEASE ORAL at 10:43

## 2022-09-28 RX ADMIN — ENOXAPARIN SODIUM 40 MG: 100 INJECTION SUBCUTANEOUS at 17:20

## 2022-09-28 RX ADMIN — METOPROLOL SUCCINATE 12.5 MG: 25 TABLET, EXTENDED RELEASE ORAL at 20:45

## 2022-09-28 RX ADMIN — ACETAMINOPHEN 650 MG: 325 TABLET ORAL at 16:34

## 2022-09-28 RX ADMIN — LIDOCAINE HYDROCHLORIDE: 20 SOLUTION ORAL; TOPICAL at 17:20

## 2022-09-28 RX ADMIN — HYDROXYZINE HYDROCHLORIDE 50 MG: 25 TABLET, FILM COATED ORAL at 10:48

## 2022-09-28 RX ADMIN — FLUOXETINE 40 MG: 20 CAPSULE ORAL at 10:43

## 2022-09-28 RX ADMIN — PANTOPRAZOLE SODIUM 40 MG: 40 TABLET, DELAYED RELEASE ORAL at 09:36

## 2022-09-28 RX ADMIN — LIDOCAINE HYDROCHLORIDE: 20 SOLUTION ORAL; TOPICAL at 20:46

## 2022-09-28 RX ADMIN — IOPAMIDOL 80 ML: 755 INJECTION, SOLUTION INTRAVENOUS at 23:21

## 2022-09-28 RX ADMIN — PANTOPRAZOLE SODIUM 40 MG: 40 TABLET, DELAYED RELEASE ORAL at 17:20

## 2022-09-28 RX ADMIN — ASPIRIN 81 MG: 81 TABLET, CHEWABLE ORAL at 09:35

## 2022-09-28 RX ADMIN — LEVOTHYROXINE SODIUM 175 MCG: 0.15 TABLET ORAL at 09:34

## 2022-09-28 RX ADMIN — EMPAGLIFLOZIN 10 MG: 10 TABLET, FILM COATED ORAL at 10:43

## 2022-09-28 RX ADMIN — POTASSIUM CHLORIDE 10 MEQ: 1500 TABLET, EXTENDED RELEASE ORAL at 10:43

## 2022-09-28 RX ADMIN — DIGOXIN 125 MCG: 125 TABLET ORAL at 18:51

## 2022-09-28 ASSESSMENT — PAIN SCALES - GENERAL
PAINLEVEL_OUTOF10: 4
PAINLEVEL_OUTOF10: 0
PAINLEVEL_OUTOF10: 3
PAINLEVEL_OUTOF10: 10
PAINLEVEL_OUTOF10: 6
PAINLEVEL_OUTOF10: 0
PAINLEVEL_OUTOF10: 3
PAINLEVEL_OUTOF10: 10
PAINLEVEL_OUTOF10: 4
PAINLEVEL_OUTOF10: 10
PAINLEVEL_OUTOF10: 0

## 2022-09-28 ASSESSMENT — ENCOUNTER SYMPTOMS
ABDOMINAL DISTENTION: 0
SHORTNESS OF BREATH: 1
COLOR CHANGE: 0
SORE THROAT: 0
COUGH: 0
DIARRHEA: 0
RHINORRHEA: 0
VOMITING: 0
ABDOMINAL PAIN: 0
CHEST TIGHTNESS: 0
CONSTIPATION: 0
NAUSEA: 0

## 2022-09-28 ASSESSMENT — PAIN DESCRIPTION - LOCATION
LOCATION: THROAT
LOCATION: THROAT
LOCATION: CHEST
LOCATION: CHEST;ABDOMEN
LOCATION: CHEST

## 2022-09-28 ASSESSMENT — PAIN DESCRIPTION - DESCRIPTORS
DESCRIPTORS: BURNING;SHARP;STABBING
DESCRIPTORS: BURNING;SHARP;STABBING
DESCRIPTORS: ACHING;DISCOMFORT
DESCRIPTORS: ACHING;PRESSURE
DESCRIPTORS: ACHING;DISCOMFORT

## 2022-09-28 ASSESSMENT — PAIN DESCRIPTION - FREQUENCY: FREQUENCY: CONTINUOUS

## 2022-09-28 ASSESSMENT — PAIN - FUNCTIONAL ASSESSMENT
PAIN_FUNCTIONAL_ASSESSMENT: ACTIVITIES ARE NOT PREVENTED

## 2022-09-28 ASSESSMENT — PAIN DESCRIPTION - ONSET: ONSET: ON-GOING

## 2022-09-28 ASSESSMENT — PAIN DESCRIPTION - ORIENTATION
ORIENTATION: MID
ORIENTATION: MID

## 2022-09-28 ASSESSMENT — PAIN DESCRIPTION - PAIN TYPE: TYPE: ACUTE PAIN

## 2022-09-28 NOTE — PROGRESS NOTES
[facility]  Initial Interdisciplinary Treatment Plan NOTE    Review Date & Time: 09/28/22  1130    Patient was in treatment team.  See Multidisciplinary Treatment Team sheet for participants. Admission Type:   Admission Type: Involuntary    Reason for admission:  Reason for Admission: \"because I need help to fix my thinking. \"      Estimated Length of Stay Update:  09/29/22  Estimated Discharge Date Update: 3-5 days    Patient Strengths/Barriers  Strengths (Must Choose Two): Stable housing, Support from family  Barriers: Other (comment) (fixation on ex- and his new girlfriend)  Addictive Behavior:Addictive Behavior  In the Past 3 Months, Have You Felt or Has Someone Told You That You Have a Problem With  : None  Medical Problems:  Past Medical History:   Diagnosis Date    Abnormal heart rhythm     Anxiety     Cancer ([address]     breast  2016     CHF (congestive heart failure) (HCC)     Congenital heart disease     DJD (degenerative joint disease)     Enlarged lymph nodes     Hypertension     Hypothyroidism     ICD (implantable cardioverter-defibrillator) in place 02/11/2019    Dr. Valentin Holt    Kidney stones     PONV (postoperative nausea and vomiting)     Prediabetes 10/7/2019    Thyroid disease        EDUCATION:   Learner Progress Toward Treatment Goals: Reviewed results and recommendations of this team, Reviewed group plan and strategies, Reviewed signs, symptoms and risk of self harm and violent behavior, and Reviewed goals and plan of care    Method: Individual    Outcome: Verbalized understanding and Demonstrated Understanding    PATIENT GOALS: See below    PLAN/TREATMENT RECOMMENDATIONS UPDATE:   What is the most important thing we can help you with while you are here? Coping with the grief of pt's divorce  Who is your support system? Children  Do you have follow-up providers? Does not have current providers. Had been going to [facility]. Do you have the ability to pay for your medications? [employer]  Where will you be residing when you leave the hospital? With her son and daughter in law in [facility] [place]  Will need a return to work slip or FMLA paper completion?  No.      GOALS UPDATE:   Time frame for Short-Term Goals: Daily    SHAQ Johnson

## 2022-09-28 NOTE — BH NOTE
Report was given to [facility] at 1513   Patient belongings were handed to [facility].

## 2022-09-28 NOTE — PROGRESS NOTES
Pt sitting in day room, shared feeling \"my heart racing\" Pt shared thinking about \"past unhealthy relationship\" and how she wants to Prove to him she is worth more than what he has been showing. Pt was encouraged to redirect her energy to focus on herself,her family and her recovery. Pt tearful in sharing her appreciation for support.  Discussed having vital signs taken and explore medication to assist with decreasing anxiety

## 2022-09-28 NOTE — PROGRESS NOTES
Pt admitted to 6K25 in a wheelchair from  after rapid called. Report received. Complaints: Chest pain / discomfort. Vital signs obtained. Assessment and data collection initiated. Two nurse skin assessment performed by [facility] MAGGIE and Dionne MAE. Oriented to room. Policies and procedures for 6K explained. Prerna SERRANO/Dionne RN discussed hourly rounding with patient addressing 5 P's. Fall prevention and safety brochure discussed with patient. Bed alarm on. Explained patients right to have family, representative or physician notified of their admission. All questions answered with no further questions at this time. Leyda Fried, initiated for Suicidal precautions. Physician notified.

## 2022-09-28 NOTE — CONSULTS
Hospitalist Consult Note      Patient:  Cheryle Quezada    Unit/Bed:-56/056-A  YOB: 1960  MRN: [id number]   Acct: [de-identified]   PCP: ANGELIA Botello CNP  Code Status: Full Code  Date of Admission: 9/27/2022  Date of Service: Pt seen/examined in consultation on 9/28/2022      Chief Complaint:  chest pain; suicidal ideation  Reason for consult:  hypotension    Assessment and Plan:    Hypotension: chronic; asymptomatic. Patient is on midodrine at home, resumed. Addendum: Pharmacy confirmed med red. Midodrine was recently dc'ed, Entresto dose was decreased. BP currently stable. Will hold midodrine for now, continue parameters on medications. If BP does not tolerate, will consider resuming midodrine. Monitor closely. Chronic HFrEF: Chemo related cardiomyopathy, s/p ICD. No CAD. EF 20-25%, G2DD on Echo 08/22/2022. No overt signs of decompensation. Resume home metoprolol, Entresto, lasix, spironolactone (holding parameters added). Follows with Dr. Josse Lutz. Hypothyroidism: resume synthroid. TSH low, normal FT4. Hx breast cancer: previously followed with Dr. All Metcalf. Pt reports remission and not currently following with oncology. Suicidal ideations: Primary to manage. Note, I did not resume any of her antidepressants on her home meds; defer to primary. GERD: PPI    NIDDMII: Resume home dapaglifozin. Last A1c 6.1 on 6/1/22. Obesity: BMI 35.12      History Of Present Illness:    Cheryle Quezada 65 y.o. female who we are asked to see/evaluate by Coretta Chavez MD for medical management of hypotension. Patient admitted to  for suicidal ideations. We were consulted for BP 89/60 this morning; manual check 92/62. She states that she is feeling down and fatigued but otherwise well. No lightheadedness, chest pain, sob, abd pain, nvd, f/c. Per Dr. Ayah Portillo note 8/24/22- H/o chemo related CMP s/p ICD. Previous heart cath 2020, no CAD.  BP did not tolerate GDMT, midodrine was added to tolerate medical therapy. Pt to follow up with tertiary care for transplant/VAD. Home medications were resumed with parameters. Hospitalist will continue to follow. Review of systems:   Pertinent positives as noted in the HPI. All other systems reviewed and negative. Past Medical History:        Diagnosis Date    Abnormal heart rhythm     Anxiety     Cancer ([address]     breast  2016     CHF (congestive heart failure) (HCC)     Congenital heart disease     DJD (degenerative joint disease)     Enlarged lymph nodes     Hypertension     Hypothyroidism     ICD (implantable cardioverter-defibrillator) in place 02/11/2019    Dr. Saad Javier    Kidney stones     PONV (postoperative nausea and vomiting)     Prediabetes 10/7/2019    Thyroid disease        Past Surgical History:        Procedure Laterality Date    APPENDECTOMY      CARPAL TUNNEL RELEASE  2019    COLONOSCOPY      COLONOSCOPY N/A 07/27/2020    COLONOSCOPY POLYPECTOMY SNARE/COLD BIOPSY performed by Shasha Olivas MD at [facility]    COLONOSCOPY  07/27/2020    COLONOSCOPY POLYPECTOMY HOT BIOPSY performed by Shasha Olivas MD at [facility]    COLONOSCOPY  2021    EGD      EYE SURGERY Bilateral 10/2021    eyelid lift     FINGER TRIGGER RELEASE Right 06/25/2020    DEQUERVAINS RELEASE AND RIGHT RING FINGER TRIGGER RELEASE performed by Jorge Mcginnis DO at [address] Left 02/11/2019    [facility] HAS A MRI CONDITIONAL SYSTEM    PTCA      TONSILLECTOMY         Home Medications:   No current facility-administered medications on file prior to encounter.      Current Outpatient Medications on File Prior to Encounter   Medication Sig Dispense Refill    Melatonin 10 MG TABS Take 20 mg by mouth nightly      mirtazapine (REMERON) 15 MG tablet Take 15 mg by mouth nightly      spironolactone (ALDACTONE) 25 MG tablet Take 25 mg by mouth daily      atorvastatin (LIPITOR) 40 MG tablet Take 1 tablet by mouth nightly 90 tablet 3    omeprazole (PRILOSEC) 40 MG delayed release capsule Take 1 capsule by mouth 2 times daily (before meals) 60 capsule 2    levothyroxine (EUTHYROX) 175 MCG tablet Take 1 tablet by mouth Daily 30 tablet 1    furosemide (LASIX) 40 MG tablet Take 1 tablet by mouth in the morning and 1 tablet before bedtime. 60 tablet 5    Misc. Devices (CANE) MISC Dispense 1 straight cane (Patient not taking: Reported on 9/27/2022) 1 each 0    Misc. Devices MISC Shower Chair 1 each 0    potassium chloride (KLOR-CON M) 10 MEQ extended release tablet Take 1 tablet by mouth in the morning.  90 tablet 3    sacubitril-valsartan (ENTRESTO) 24-26 MG per tablet Take 1 tablet by mouth 2 times daily (Patient taking differently: Take 1 tablet by mouth 2 times daily Takes half of dose in morning and half dose at bedtime) 60 tablet 3    buPROPion (WELLBUTRIN XL) 150 MG extended release tablet Take 1 tablet by mouth every morning 90 tablet 0    traZODone (DESYREL) 50 MG tablet Take 1 tablet by mouth nightly as needed for Sleep 90 tablet 0    FLUoxetine (PROZAC) 40 MG capsule Take 1 capsule by mouth daily 90 capsule 0    dapagliflozin (FARXIGA) 10 MG tablet Take 1 tablet by mouth every morning 30 tablet 5    midodrine (PROAMATINE) 10 MG tablet Take 1 tablet by mouth 3 times daily (with meals) 90 tablet 1    metoprolol succinate (TOPROL XL) 25 MG extended release tablet Take 0.5 tablets by mouth daily Hold if SBP less than 95 mmHg or HR less than 50 bpm (Patient taking differently: Take 25 mg by mouth daily Hold if SBP less than 95 mmHg or HR less than 50 bpm) 30 tablet 3    digoxin (LANOXIN) 125 MCG tablet Take 1 tablet by mouth daily (Patient taking differently: Take 125 mcg by mouth daily Takes at 1400) 90 tablet 0    CPAP Machine MISC by Does not apply route Please change CPAP pressure to auto 11-15 cm H20. (Patient not taking: Reported on 9/27/2022) 1 each 0    letrozole (FEMARA) 2.5 MG tablet Take 1 tablet by mouth daily 90 tablet 3 aspirin 81 MG chewable tablet Take 1 tablet by mouth daily 30 tablet 3    acetaminophen (TYLENOL) 500 MG tablet Take 500 mg by mouth every 6 hours as needed for Pain         Allergies:    Adhesive tape    Social History:    reports that she quit smoking about 2 years ago. Her smoking use included cigarettes. She started smoking about 40 years ago. She has a 9.25 pack-year smoking history. She has never used smokeless tobacco. She reports that she does not drink alcohol and does not use drugs. Family History:       Problem Relation Age of Onset    High Blood Pressure Mother     Dementia Mother     Cancer Father     Colon Cancer Father     Mental Retardation Brother     Colon Polyps Brother     Cancer Maternal Grandfather     Esophageal Cancer Neg Hx        Diet:  ADULT DIET; Regular      PHYSICAL EXAM:  BP 92/62   Pulse 85   Temp 97.5 °F (36.4 °C) (Oral)   Resp 16   Ht 5' 2\" (1.575 m)   Wt 192 lb (87.1 kg)   SpO2 99%   BMI 35.12 kg/m²   General appearance: No apparent distress, appears stated age and cooperative. HEENT: Normal cephalic, atraumatic without obvious deformity. Pupils equal, round, and reactive to light. Extra ocular muscles intact. Conjunctivae/corneas clear. Neck: Supple, with full range of motion. No jugular venous distention. Trachea midline. Respiratory:  Normal respiratory effort. Clear to auscultation, bilaterally without Rales/Wheezes/Rhonchi. Cardiovascular: Regular rate and rhythm with normal S1/S2 without murmurs, rubs or gallops. Abdomen: Soft, non-tender, non-distended with normal bowel sounds. Musculoskeletal:  No clubbing, cyanosis or edema bilaterally. Skin: Skin color, texture, turgor normal.  No rashes or lesions. Neurologic:  Neurovascularly intact without any focal sensory/motor deficits.  Cranial nerves: II-XII intact, grossly non-focal.  Psychiatric: Alert and oriented, thought content appropriate, normal insight  Capillary Refill: Brisk,< 3 seconds   Peripheral Pulses: +2 palpable, equal bilaterally     Labs:   Recent Labs     09/27/22  0545   WBC 5.7   HGB 14.2   HCT 46.6        Recent Labs     09/27/22  0545      K 4.4      CO2 28   BUN 21   CREATININE 0.8   CALCIUM 9.6     Recent Labs     09/27/22  0545   AST 26   ALT 19   BILITOT 0.4   ALKPHOS 143*     No results for input(s): INR in the last 72 hours. No results for input(s): [place] [place] in the last 72 hours. Urinalysis:    Lab Results   Component Value Date/Time    NITRU POSITIVE 06/10/2022 12:30 PM    WBCUA 25-50 06/10/2022 12:30 PM    BACTERIA NONE SEEN 06/10/2022 12:30 PM    RBCUA 3-5 06/10/2022 12:30 PM    BLOODU NEGATIVE 06/10/2022 12:30 PM    SPECGRAV 1.015 08/18/2020 10:05 AM    GLUCOSEU NEGATIVE 06/10/2022 12:30 PM       Radiology:   XR CHEST PORTABLE   Final Result   1. No acute findings or significant interval change. This document has been electronically signed by: Yelena Alexander MD on    09/27/2022 06:15 AM        XR CHEST PORTABLE    Result Date: 9/27/2022  1 view chest x-ray Comparison: CR/SR - XR CHEST PORTABLE - 06/10/2022 10:27 AM EDT Findings: There is cardiomegaly. AICD present. No airspace disease or gross pleural fluid. No pneumothorax. 1. No acute findings or significant interval change. This document has been electronically signed by: Yelena Alexander MD on 09/27/2022 06:15 AM        EKG:  NSR, LVH, T wave abnormality in lateral leads. Prolonged QTc. No changes from EKG 6/10/2022. Thank you for allowing us to participate in this patient's care. Please do not hesitate to call us directly with further questions.      Electronically signed by Isaiah Chsae PA-C on 9/28/2022 at 6:54 AM

## 2022-09-28 NOTE — DISCHARGE INSTR - DIET

## 2022-09-28 NOTE — BH NOTE
Dr. Robbie Meyers notified of patients low blood pressure. Orders received. Patient denies any symptoms at present.

## 2022-09-28 NOTE — H&P
[address] [place] [address]                              HISTORY AND PHYSICAL    PATIENT NAME: Yonny Samano                     :        1960  MED REC NO:   [id number]                           ROOM:       [id number]  ACCOUNT NO:   [de-identified]                           ADMIT DATE: 2022  PROVIDER:     Royal Fer M.D. IDENTIFYING INFORMATION:  The patient is a 72-year-old    female. She is the mother of three sons. She lives with her younger  son and her daughter-in-law. She is unemployed. CHIEF COMPLAINT:  \"I need help to change my mind. \"    HISTORY OF PRESENT ILLNESS:  The patient presented to [address] [place] [address] due to chest pain, but while in the emergency room, she was  having suicidal and homicidal thoughts towards her ex- and his  fiancee who live in [facility]. The patient was admitted in our unit on  2022 with the same complaint. She was supposed to follow up as  outpatient at [facility], but she did not do it due  to transportation. She believes, she is still taking her psychotropics  through her primary care physician, but she is not sure. Anyhow, she  reports that she has been feeling very depressed. She has been having  suicidal thoughts for the past month. She is also having homicidal  thoughts towards her ex- and his fiancee, but they live in  [facility] and she has no plan to hurt them and she also has no means to  do so. She reports significant depressive symptoms like feeling  hopeless and worthless, poor energy, decreased interest in pleasurable  activities, and her appetite is up and down. She has no history of  psychotic symptoms. She admits that she cannot get over her ex-  leaving her. She has a history of anxiety attack symptoms. PAST PSYCHIATRIC, FAMILY, AND SOCIAL HISTORY:  See H and P on  2022.     MEDICAL AND SURGICAL HISTORY:  Hypertension, diabetes mellitus,  dyslipidemia, CHF, sleep apnea, GERD, history of breast cancer,  appendectomy, carpal tunnel release, colonoscopy, eye surgery,  tonsillectomy. MEDICATIONS:  Spironolactone, Lipitor, Prilosec, levothyroxine, Lasix,  Klor-Con, Entresto, bupropion  mg daily, trazodone 50 mg at  bedtime as needed, fluoxetine 40 mg daily, Farxiga, midodrine,  Toprol-XL, digoxin, baby aspirin, Tylenol, etc.    ALLERGIES:  ADHESIVE TAPE. PHYSICAL EXAMINATION:  Per consultant. MENTAL STATUS EXAMINATION:  The patient appears stated age, dressed in a  hospital gown. She has good eye contact. Good grooming and hygiene. She is cooperative with the interview. Speech is clear, coherent, and  spontaneous. Mood depressed and anxious. Affect restricted. She has  fleeting suicidal and homicidal ideation. No psychosis. No mood  swings. No flight of ideas and no racing thoughts. Thought process is  goal oriented. She is alert and oriented x3. She has fair attention  and concentration. Memory appears to be intact as tested within the  context of the interview. Intelligence appears average. Judgment and  insight limited. DIAGNOSES:  1.  Major depressive disorder, recurrent, severe without psychotic  features. 2.  Panic disorder without agoraphobia. 3.  Diabetes mellitus, hypertension, hypothyroidism, obstructive sleep  apnea, history of breast cancer. 4.  Chronic psychiatric and medical issues, relationship issues with her  ex-. RECOMMENDATIONS:  1. Admit to the unit. 2.  Routine labs ordered. 3.  Resume home medication including psychotropics she was discharged on  at her last psychiatric admission. 4.  Risks and benefits of psychotropics discussed as well as alternative  treatments. 5.  Hospitalist consult for medical management. 6.  Group and milieu therapy to develop insight to psychiatric illness  and better coping mechanism.   7.  Upon discharge, she will be referred for outpatient management. PATIENT'S STRENGTH:  None identified. Korina Sung M.D.    D: 09/28/2022 9:39:39       T: 09/28/2022 12:47:22     [email or web address]  Job#: [id number]     Doc#: [id number]    CC:    Patient was transferred to the medical floor per Hospitalist recommendation. I continued to monitor patient's psychiatric issues while on the medical floor. See clinical summary on October 3, 2022.

## 2022-09-28 NOTE — PLAN OF CARE
Problem: Self Harm/Suicidality  Goal: Will have no self-injury during hospital stay  Description: INTERVENTIONS:  1. Q 30 MINUTES: Routine safety checks  2. Q SHIFT & PRN: Assess risk to determine if routine checks are adequate to maintain patient safety  Outcome: Progressing  Note: Patient denies any suicidal thoughts at present but can not guarantee what she would do at home. Problem: Chronic Conditions and Co-morbidities  Goal: Patient's chronic conditions and co-morbidity symptoms are monitored and maintained or improved  Outcome: Progressing  Flowsheets  Taken 9/27/2022 2234  Care Plan - Patient's Chronic Conditions and Co-Morbidity Symptoms are Monitored and Maintained or Improved: Monitor and assess patient's chronic conditions and comorbid symptoms for stability, deterioration, or improvement  Taken 9/27/2022 2224  Care Plan - Patient's Chronic Conditions and Co-Morbidity Symptoms are Monitored and Maintained or Improved: Monitor and assess patient's chronic conditions and comorbid symptoms for stability, deterioration, or improvement  Note: Ongoing. Problem: Coping  Goal: Pt/Family able to verbalize concerns and demonstrate effective coping strategies  Description: INTERVENTIONS:  1. Assist patient/family to identify coping skills, available support systems and cultural and spiritual values  2. Provide emotional support, including active listening and acknowledgement of concerns of patient and caregivers  3. Reduce environmental stimuli, as able  4. Instruct patient/family in relaxation techniques, as appropriate  5.  Assess for spiritual pain/suffering and initiate Spiritual Care, Psychosocial Clinical Specialist consults as needed  Outcome: Progressing  Flowsheets  Taken 9/27/2022 2234  Patient/family able to verbalize anxieties, fears, and concerns, and demonstrate effective coping: Assist patient/family to identify coping skills, available support systems and cultural and spiritual values  Taken 9/27/2022 2224  Patient/family able to verbalize anxieties, fears, and concerns, and demonstrate effective coping: Assist patient/family to identify coping skills, available support systems and cultural and spiritual values  Note: Patient is able to verbalize needs and concerns appropriately. Problem: Depression  Goal: Will be euthymic at discharge  Description: INTERVENTIONS:  1. Administer medication as ordered  2. Provide emotional support via 1:1 interaction with staff  3. Encourage involvement in milieu/groups/activities  4. Monitor for social isolation  Outcome: Progressing  Note: Patient states she continues to feel very depressed. Patient noted with a blunt affect and is tearful at times. Problem: Anxiety  Goal: Will report anxiety at manageable levels  Description: INTERVENTIONS:  1. Administer medication as ordered  2. Teach and rehearse alternative coping skills  3. Provide emotional support with 1:1 interaction with staff  Outcome: Progressing  Flowsheets (Taken 9/27/2022 2234)  Will report anxiety at manageable levels: Administer medication as ordered  Note: Patient states she continues to feel moderately anxious. Problem: Sleep Disturbance  Goal: Will exhibit normal sleeping pattern  Description: INTERVENTIONS:  1. Administer medication as ordered  2. Decrease environmental stimuli, including noise, as appropriate  3. Discourage social isolation and naps during the day  Outcome: Progressing  Note: Patient states she has been sleeping poorly at home. Problem: Involuntary Admit  Goal: Will cooperate with staff recommendations and doctor's orders and will demonstrate appropriate behavior  Description: INTERVENTIONS:  1. Treat underlying conditions and offer medication as ordered  2. Educate regarding involuntary admission procedures and rules  3.  Contain excessive/inappropriate behavior per unit and hospital policies  Outcome: Progressing  Flowsheets (Taken 9/27/2022 2234)  Will cooperate with staff recommendations and doctor's orders and will demonstrate appropriate behavior: Treat underlying conditions and offer medication as ordered  Note: Patient was cooperative with all aspects of her care. Problem: Discharge Planning  Goal: Discharge to home or other facility with appropriate resources  Outcome: Progressing  Flowsheets  Taken 9/27/2022 2234  Discharge to home or other facility with appropriate resources: Identify barriers to discharge with patient and caregiver  Taken 9/27/2022 2224  Discharge to home or other facility with appropriate resources: Identify barriers to discharge with patient and caregiver  Note: Patient states she will go home with her family at discharge. Problem: Pain  Goal: Verbalizes/displays adequate comfort level or baseline comfort level  Outcome: Progressing  Flowsheets (Taken 9/27/2022 2234)  Verbalizes/displays adequate comfort level or baseline comfort level: Assess pain using appropriate pain scale  Note: Patient states aching from the waist up of a 5. Care plan reviewed with patient.   Patient does verbalize understanding of the plan of care and does not contribute to goal setting

## 2022-09-28 NOTE — PROGRESS NOTES
Pt reports \"this feels different\" support provided pt tearful.  States \"my chest hurts\" rapid response called

## 2022-09-28 NOTE — H&P
Hospitalist - History & Physical      Patient: Dee Patches    Unit/Bed:[facility]25/025-A  YOB: 1960  MRN: [id number]   Acct: [de-identified]   PCP: ANGELIA Rogers - CNP    Date of Service: Pt seen/examined on 09/28/22  and Admitted to Inpatient with expected LOS greater than two midnights due to medical therapy. Chief Complaint:  Chest pain    Assessment and Plan:  Acute chest pain:   Exact etiology of chest pain unclear at this time, however chest pain improved after GI cocktail x2. Pt endorsing significant pleuritic nature of pain. Heart rate consistently 100 to 110 bpm.  EKG confirms NSR with nonspecific T wave changes in V5 V6 noted. Initial labs unremarkable, troponin negative x2, BNP elevated, however noted to be chronically elevated. CXR without acute findings. ICD interrogation ordered. Trend troponin x 1 more occurrence. Limited ECHO, CTA chest ordered as D-dimer elevated and PE cannot be ruled out. Repeat EKG in AM.   Repeat CBC, BMP in the AM. Telemetry. Continue home Toprol XL with holding parameters. HFrEF, with nonischemic cardiomyopathy:    Last echo 8/22/2022 reveals EF 20 to 25% with grade 2 diastolic dysfunction. Per chart review, patient is being seen and worked up at [address] [place] [address] for transplant with Dr. Jeffrey Shankar and Dr Ly Duran. Patient appears euvolemic today. BNP chronically elevated, troponin WNL. Continue to monitor daily weights, I's and O's. Telemetry. Essential hypertension:   Noted per chart review. Pt has had persistent normal-soft Bps. Patient denies taking Entresto. Continue metoprolol XL nightly with holding parameters. Hold Lasix and spironolactone due to BPs. Continue to monitor closely. CKD, stage II:   Cr 0.8, appears at baseline. Continue to monitor with daily BMP, I&Os. T2DM:    Glucose 108. Med dose SSI ordered. Hypoglycemia protocol in place, continue to monitor with daily BMP, POCT glucose checks. Carb limited diet.     Suicidal ideation:   Patient transferred from  E to medical floor today after rapid response. Admitted for suicidal ideation yesterday. Continue suicide precautions, sitter in room, psychiatry consulted. Hx of breast CA:   Noted per chart review. Per MAR, patient noncompliant with [employer]. History Of Present Illness:    Vidya Hernández is a 55-year-old  female with a past medical history of CHF, breast cancer, hypothyroidism, hypertension who presents to [facility] as a transfer from  after a rapid response was called due to the patient having severe chest pain. Patient states that she was in her room when she suddenly felt severe 10 out of 10 chest pain starting in the center of her chest and left side of her chest.  She denies radiation of the pain to her back, to her arm, or to her neck. She reports associated shortness of breath, and difficulty breathing. She states her chest pain is worse with deep breaths. She denies lightheadedness or dizziness. She characterizes the pain as waxing and waning. She denies improvement of pain after SL nitro x1. She reports mild improvement of pain after GI cocktail. She denies fever, chills, abdominal pain, lightheadedness or dizziness. She denies ever experiencing pain like this before. She denies her ICD discharging. Patient was presented to [facility] ED yesterday for chest pain, however initial work-up in ED was negative. Patient was also having depressive thoughts and suicidal ideation, thus her admission to .       Past Medical H  istory:        Diagnosis Date    Abnormal heart rhythm     Anxiety     Cancer [facility])     breast  2016     CHF (congestive heart failure) (HCC)     Congenital heart disease     DJD (degenerative joint disease)     Enlarged lymph nodes     Hypertension     Hypothyroidism     ICD (implantable cardioverter-defibrillator) in place 02/11/2019    Dr. Madison Ibrahim    Kidney stones     PONV (postoperative nausea and vomiting)     Prediabetes 10/7/2019 Thyroid disease        Past Surgical History:        Procedure Laterality Date    APPENDECTOMY      CARPAL TUNNEL RELEASE  2019    COLONOSCOPY      COLONOSCOPY N/A 07/27/2020    COLONOSCOPY POLYPECTOMY SNARE/COLD BIOPSY performed by Yessenia Julio MD at [facility]    COLONOSCOPY  07/27/2020    COLONOSCOPY POLYPECTOMY HOT BIOPSY performed by Yessenia Julio MD at [facility]    COLONOSCOPY  2021    EGD      EYE SURGERY Bilateral 10/2021    eyelid lift     FINGER TRIGGER RELEASE Right 06/25/2020    DEQUERVAINS RELEASE AND RIGHT RING FINGER TRIGGER RELEASE performed by Lea Murray DO at [address] Left 02/11/2019    [facility] [facility] HAS A MRI CONDITIONAL SYSTEM    PTCA      TONSILLECTOMY         Home Medications:   No current facility-administered medications on file prior to encounter. Current Outpatient Medications on File Prior to Encounter   Medication Sig Dispense Refill    Melatonin 10 MG TABS Take 20 mg by mouth nightly      mirtazapine (REMERON) 15 MG tablet Take 15 mg by mouth nightly      spironolactone (ALDACTONE) 25 MG tablet Take 25 mg by mouth daily      atorvastatin (LIPITOR) 40 MG tablet Take 1 tablet by mouth nightly 90 tablet 3    omeprazole (PRILOSEC) 40 MG delayed release capsule Take 1 capsule by mouth 2 times daily (before meals) 60 capsule 2    levothyroxine (EUTHYROX) 175 MCG tablet Take 1 tablet by mouth Daily 30 tablet 1    furosemide (LASIX) 40 MG tablet Take 1 tablet by mouth in the morning and 1 tablet before bedtime. 60 tablet 5    Misc. Devices (CANE) MISC Dispense 1 straight cane (Patient not taking: No sig reported) 1 each 0    Misc. Devices MISC Shower Chair (Patient not taking: Reported on 9/28/2022) 1 each 0    potassium chloride (KLOR-CON M) 10 MEQ extended release tablet Take 1 tablet by mouth in the morning.  90 tablet 3    sacubitril-valsartan (ENTRESTO) 24-26 MG per tablet Take 1 tablet by mouth 2 times daily (Patient not taking: Reported on 9/28/2022) 60 tablet 3    buPROPion (WELLBUTRIN XL) 150 MG extended release tablet Take 1 tablet by mouth every morning 90 tablet 0    traZODone (DESYREL) 50 MG tablet Take 1 tablet by mouth nightly as needed for Sleep 90 tablet 0    FLUoxetine (PROZAC) 40 MG capsule Take 1 capsule by mouth daily 90 capsule 0    dapagliflozin (FARXIGA) 10 MG tablet Take 1 tablet by mouth every morning 30 tablet 5    midodrine (PROAMATINE) 10 MG tablet Take 1 tablet by mouth 3 times daily (with meals) 90 tablet 1    metoprolol succinate (TOPROL XL) 25 MG extended release tablet Take 0.5 tablets by mouth daily Hold if SBP less than 95 mmHg or HR less than 50 bpm (Patient taking differently: Take 25 mg by mouth daily Hold if SBP less than 95 mmHg or HR less than 50 bpm) 30 tablet 3    digoxin (LANOXIN) 125 MCG tablet Take 1 tablet by mouth daily (Patient taking differently: Take 125 mcg by mouth daily Takes at 1400) 90 tablet 0    CPAP Machine MISC by Does not apply route Please change CPAP pressure to auto 11-15 cm H20. (Patient not taking: No sig reported) 1 each 0    letrozole (FEMARA) 2.5 MG tablet Take 1 tablet by mouth daily (Patient not taking: Reported on 9/28/2022) 90 tablet 3    aspirin 81 MG chewable tablet Take 1 tablet by mouth daily 30 tablet 3    acetaminophen (TYLENOL) 500 MG tablet Take 500 mg by mouth every 6 hours as needed for Pain (Patient not taking: Reported on 9/28/2022)         Allergies:    Adhesive tape    Social History:    reports that she quit smoking about 2 years ago. Her smoking use included cigarettes. She started smoking about 40 years ago. She has a 9.25 pack-year smoking history. She has never used smokeless tobacco. She reports that she does not drink alcohol and does not use drugs.     Family History:       Problem Relation Age of Onset    High Blood Pressure Mother     Dementia Mother     Cancer Father     Colon Cancer Father     Mental Retardation Brother Colon Polyps Brother     Cancer Maternal Grandfather     Esophageal Cancer Neg Hx        Diet:  ADULT DIET; Regular; Low Sodium (2 gm); 2000 ml; Safety Tray; Safety Tray (Disposables)    Review of systems:     Review of Systems   Constitutional:  Positive for activity change. Negative for appetite change, chills, diaphoresis, fatigue and fever. HENT:  Negative for congestion, rhinorrhea and sore throat. Eyes:  Negative for visual disturbance. Respiratory:  Positive for shortness of breath. Negative for cough and chest tightness. Cardiovascular:  Positive for chest pain. Negative for palpitations and leg swelling. Gastrointestinal:  Negative for abdominal distention, abdominal pain, constipation, diarrhea, nausea and vomiting. Genitourinary:  Negative for difficulty urinating, frequency and urgency. Musculoskeletal:  Positive for neck pain (she reports as chronic). Negative for arthralgias. Skin:  Negative for color change and rash. Neurological:  Negative for dizziness and light-headedness. PHYSICAL EXAM:  /85   Pulse (!) 111   Temp 98.6 °F (37 °C) (Oral)   Resp 17   Ht 5' 2\" (1.575 m)   Wt 180 lb (81.6 kg)   SpO2 99%   BMI 32.92 kg/m²   General appearance: In mild distress due to pain. Appears stated age and cooperative. Skin: Skin color, texture, turgor normal.  No rashes or lesions. HEENT: Normal cephalic, atraumatic without obvious deformity. Pupils equal, round, and reactive to light. Extra-ocular muscles intact. Conjunctivae/corneas clear. Neck: Trachea midline. Supple, with full range of motion. Cardiovascular: Mild tachycardia. Regular rhythm with normal S1/S2. No murmurs, rubs or gallops. Respiratory:  Normal respiratory effort. Clear to auscultation, bilaterally without rales, wheezes, or rhonchi. Abdomen: Soft, non-tender, non-distended. Normal bowel sounds. Musculoskeletal:  Scant edema noted in LE bilaterally. No weakness or instability noted.   No edema, erythema, or gross deformity noted. Vascular: Pulses +2 palpable, equal bilaterally. Neurologic:  Neurovascularly intact without any focal sensory/motor deficits. Cranial nerves: II-XII grossly intact. Psychiatric: Alert and oriented, thought content appropriate, normal insight. Tearful at times due to pain and anxiety. Labs:   Recent Labs     09/27/22  0545 09/28/22  1649   WBC 5.7 7.7   HGB 14.2 12.8   HCT 46.6 43.3    162     Recent Labs     09/27/22  0545 09/28/22  1649    137   K 4.4 4.5    100   CO2 28 26   BUN 21 17   CREATININE 0.8 0.8   CALCIUM 9.6 9.0     Recent Labs     09/27/22  0545 09/28/22  1649   AST 26 27   ALT 19 20   BILITOT 0.4 0.6   ALKPHOS 143* 136*     No results for input(s): INR in the last 72 hours. No results for input(s): Cleatrice Etienne in the last 72 hours. Urinalysis:    Lab Results   Component Value Date/Time    NITRU POSITIVE 06/10/2022 12:30 PM    WBCUA 25-50 06/10/2022 12:30 PM    BACTERIA NONE SEEN 06/10/2022 12:30 PM    RBCUA 3-5 06/10/2022 12:30 PM    BLOODU NEGATIVE 06/10/2022 12:30 PM    SPECGRAV 1.015 08/18/2020 10:05 AM    GLUCOSEU NEGATIVE 06/10/2022 12:30 PM       Radiology:   No orders to display     XR CHEST PORTABLE    Result Date: 9/28/2022  PROCEDURE: XR CHEST PORTABLE CLINICAL INFORMATION: Chest pain COMPARISON: Chest radiograph 9/27/2022 TECHNIQUE: AP portable chest radiograph performed. FINDINGS: Cardiac conduction device is seen with single lead. The lead is intact. No focal pulmonary consolidation. Cardiac silhouette is moderately enlarged. No pleural effusion. No pneumothorax. No acute bony abnormality. 1. No acute cardiopulmonary finding. **This report has been created using voice recognition software. It may contain minor errors which are inherent in voice recognition technology. ** Final report electronically signed by Dr Nasir Ruby on 9/28/2022 3:30 PM        EKG:  NSR with left axis deviation     Electronically signed by Brooklynn Mccoy PA-C on 9/28/2022 at 7:56 PM
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
## 2022-09-28 NOTE — DISCHARGE INSTRUCTIONS
[employer] Hotline:  [phone]    Crisis phone numbers:  [employer], and [facility] GUARD [facility] [phone]. Adelaa José Miguelo, and [facility] [address] [facility] [phone]. [place] [facility] [phone]. [address] Bjorn Santo, and [place] [phone]. [facility]  [address] [facility], [address]  [address]  [address] [facility]  [facility] [address]  [address]  [facility], [address]  [place], [address]  [place] [address]    [facility] M[facility]ART[facility]  [facility] [address]  [address] [facility] 2210 [facility], [address]  [address] Tommy Velasquez [facility]  Recovery and HOUGH [facility]  [address] , [facility]  [phone]    [facility]  [address]  [address] [facility] [address], [address]  [address] [facility] [address]  [place], [address]  [facility]  Timothy [facility] Hospital[facility] [address]  [address], [address] [facility]  Recovery and HOUGH MEDICAL [facility]  [address], [address] [place]  [phone]    [facility]  [address] Patricia. [address], [address]  [address]  [address]ina[address]   [address] [place] [address]  [address] [facility]  [address]  Aaron, [address]  [address]orge [address]  [address]  [facility] 163   [place], [address] [facility] [address]  [phone]

## 2022-09-28 NOTE — FLOWSHEET NOTE
09/28/22 [address]   Treatment Team Notification   Reason for Communication Abnormal vitals   Team Member Name JORGE AT [facility]   Treatment Team Role Attending Provider   Method of Communication Secure Message   Response See orders   Updated provider on patients continued chest discomfort and prolonged QT interval. Requested mag level be checked.  See new orders

## 2022-09-29 ENCOUNTER — TELEPHONE (OUTPATIENT)
Dept: PSYCHIATRY | Age: 62
End: 2022-09-29

## 2022-09-29 LAB
ALBUMIN SERPL-MCNC: 3.2 G/DL (ref 3.5–5.1)
ALP BLD-CCNC: 122 U/L (ref 38–126)
ALT SERPL-CCNC: 22 U/L (ref 11–66)
ANION GAP SERPL CALCULATED.3IONS-SCNC: 11 MEQ/L (ref 8–16)
ANISOCYTOSIS: PRESENT
AST SERPL-CCNC: 23 U/L (ref 5–40)
BASOPHILS # BLD: 0.5 %
BASOPHILS ABSOLUTE: 0 THOU/MM3 (ref 0–0.1)
BILIRUB SERPL-MCNC: 1.1 MG/DL (ref 0.3–1.2)
BUN BLDV-MCNC: 15 MG/DL (ref 7–22)
CALCIUM SERPL-MCNC: 9 MG/DL (ref 8.5–10.5)
CHLORIDE BLD-SCNC: 101 MEQ/L (ref 98–111)
CO2: 23 MEQ/L (ref 23–33)
CREAT SERPL-MCNC: 0.6 MG/DL (ref 0.4–1.2)
EKG ATRIAL RATE: 93 BPM
EKG P AXIS: 30 DEGREES
EKG P-R INTERVAL: 192 MS
EKG Q-T INTERVAL: 388 MS
EKG QRS DURATION: 106 MS
EKG QTC CALCULATION (BAZETT): 482 MS
EKG R AXIS: -26 DEGREES
EKG T AXIS: 32 DEGREES
EKG VENTRICULAR RATE: 93 BPM
EOSINOPHIL # BLD: 1.3 %
EOSINOPHILS ABSOLUTE: 0.1 THOU/MM3 (ref 0–0.4)
ERYTHROCYTE [DISTWIDTH] IN BLOOD BY AUTOMATED COUNT: 25.5 % (ref 11.5–14.5)
ERYTHROCYTE [DISTWIDTH] IN BLOOD BY AUTOMATED COUNT: 72.9 FL (ref 35–45)
GFR SERPL CREATININE-BSD FRML MDRD: > 90 ML/MIN/1.73M2
GLUCOSE BLD-MCNC: 103 MG/DL (ref 70–108)
GLUCOSE BLD-MCNC: 108 MG/DL (ref 70–108)
GLUCOSE BLD-MCNC: 111 MG/DL (ref 70–108)
HCT VFR BLD CALC: 40.6 % (ref 37–47)
HEMOGLOBIN: 12.2 GM/DL (ref 12–16)
IMMATURE GRANS (ABS): 0.03 THOU/MM3 (ref 0–0.07)
IMMATURE GRANULOCYTES: 0.5 %
LYMPHOCYTES # BLD: 24.4 %
LYMPHOCYTES ABSOLUTE: 1.5 THOU/MM3 (ref 1–4.8)
MCH RBC QN AUTO: 24.5 PG (ref 26–33)
MCHC RBC AUTO-ENTMCNC: 30 GM/DL (ref 32.2–35.5)
MCV RBC AUTO: 81.7 FL (ref 81–99)
MONOCYTES # BLD: 9.3 %
MONOCYTES ABSOLUTE: 0.6 THOU/MM3 (ref 0.4–1.3)
NUCLEATED RED BLOOD CELLS: 0 /100 WBC
PLATELET # BLD: 155 THOU/MM3 (ref 130–400)
PMV BLD AUTO: ABNORMAL FL (ref 9.4–12.4)
POTASSIUM REFLEX MAGNESIUM: 4.1 MEQ/L (ref 3.5–5.2)
RBC # BLD: 4.97 MILL/MM3 (ref 4.2–5.4)
SCAN OF BLOOD SMEAR: NORMAL
SEG NEUTROPHILS: 64 %
SEGMENTED NEUTROPHILS ABSOLUTE COUNT: 4 THOU/MM3 (ref 1.8–7.7)
SODIUM BLD-SCNC: 135 MEQ/L (ref 135–145)
TOTAL PROTEIN: 6.1 G/DL (ref 6.1–8)
WBC # BLD: 6.2 THOU/MM3 (ref 4.8–10.8)

## 2022-09-29 PROCEDURE — 99232 SBSQ HOSP IP/OBS MODERATE 35: CPT | Performed by: STUDENT IN AN ORGANIZED HEALTH CARE EDUCATION/TRAINING PROGRAM

## 2022-09-29 PROCEDURE — 93005 ELECTROCARDIOGRAM TRACING: CPT

## 2022-09-29 PROCEDURE — 93010 ELECTROCARDIOGRAM REPORT: CPT | Performed by: NUCLEAR MEDICINE

## 2022-09-29 PROCEDURE — 2580000003 HC RX 258

## 2022-09-29 PROCEDURE — 6370000000 HC RX 637 (ALT 250 FOR IP)

## 2022-09-29 PROCEDURE — 82948 REAGENT STRIP/BLOOD GLUCOSE: CPT

## 2022-09-29 PROCEDURE — 80053 COMPREHEN METABOLIC PANEL: CPT

## 2022-09-29 PROCEDURE — 1200000003 HC TELEMETRY R&B

## 2022-09-29 PROCEDURE — 85025 COMPLETE CBC W/AUTO DIFF WBC: CPT

## 2022-09-29 PROCEDURE — 36415 COLL VENOUS BLD VENIPUNCTURE: CPT

## 2022-09-29 PROCEDURE — 93307 TTE W/O DOPPLER COMPLETE: CPT

## 2022-09-29 RX ORDER — OMEPRAZOLE 40 MG/1
40 CAPSULE, DELAYED RELEASE ORAL DAILY
COMMUNITY

## 2022-09-29 RX ORDER — SACUBITRIL AND VALSARTAN 24; 26 MG/1; MG/1
0.5 TABLET, FILM COATED ORAL 2 TIMES DAILY
COMMUNITY

## 2022-09-29 RX ORDER — FUROSEMIDE 40 MG/1
40 TABLET ORAL DAILY
COMMUNITY

## 2022-09-29 RX ADMIN — Medication 19.5 MG: at 20:49

## 2022-09-29 RX ADMIN — ATORVASTATIN CALCIUM 40 MG: 40 TABLET, FILM COATED ORAL at 20:40

## 2022-09-29 RX ADMIN — DIGOXIN 125 MCG: 125 TABLET ORAL at 09:40

## 2022-09-29 RX ADMIN — ASPIRIN 81 MG 81 MG: 81 TABLET ORAL at 09:39

## 2022-09-29 RX ADMIN — SODIUM CHLORIDE, PRESERVATIVE FREE 10 ML: 5 INJECTION INTRAVENOUS at 09:42

## 2022-09-29 RX ADMIN — PANTOPRAZOLE SODIUM 40 MG: 40 TABLET, DELAYED RELEASE ORAL at 05:10

## 2022-09-29 RX ADMIN — SODIUM CHLORIDE, PRESERVATIVE FREE 10 ML: 5 INJECTION INTRAVENOUS at 20:40

## 2022-09-29 RX ADMIN — METOPROLOL SUCCINATE 12.5 MG: 25 TABLET, EXTENDED RELEASE ORAL at 20:40

## 2022-09-29 RX ADMIN — PANTOPRAZOLE SODIUM 40 MG: 40 TABLET, DELAYED RELEASE ORAL at 18:13

## 2022-09-29 RX ADMIN — ACETAMINOPHEN 650 MG: 325 TABLET ORAL at 20:49

## 2022-09-29 RX ADMIN — LEVOTHYROXINE SODIUM 175 MCG: 0.15 TABLET ORAL at 05:10

## 2022-09-29 RX ADMIN — BUPROPION HYDROCHLORIDE 150 MG: 150 TABLET, FILM COATED, EXTENDED RELEASE ORAL at 09:39

## 2022-09-29 RX ADMIN — FLUOXETINE 40 MG: 20 CAPSULE ORAL at 09:39

## 2022-09-29 ASSESSMENT — PAIN DESCRIPTION - LOCATION
LOCATION: CHEST
LOCATION: THROAT
LOCATION: CHEST;HEAD

## 2022-09-29 ASSESSMENT — PAIN SCALES - GENERAL
PAINLEVEL_OUTOF10: 4
PAINLEVEL_OUTOF10: 5
PAINLEVEL_OUTOF10: 2

## 2022-09-29 ASSESSMENT — PAIN DESCRIPTION - DESCRIPTORS
DESCRIPTORS: DULL
DESCRIPTORS: ACHING;DISCOMFORT
DESCRIPTORS: ACHING

## 2022-09-29 NOTE — PROGRESS NOTES
Physician Progress Note      Jamel Sky  SAM #:                  [id number]  :                       1960  ADMIT DATE:       2022 3:28 PM  [address] DATE:  RESPONDING  PROVIDER #:        Kayode Jang DO          QUERY TEXT:    Pt admitted with chest pain  and has CHF documented. If possible, please   document in progress notes and discharge summary further specificity regarding   the type and acuity of CHF:    The medical record reflects the following:  Risk Factors: hx of  CHF  Clinical Indicators: documented-HFrEF, with nonischemic cardiomyopathy: Last   echo 2022 reveals EF 20 to 25% with grade 2 diastolic dysfunction  Treatment: daily weights, I & O's, lab monitoring    Thank You! Cristina Will RN, CRCR  RN Clinical Documentation Integrity  (D) [phone] (I) [phone]  Options provided:  -- Acute on Chronic Systolic CHF/HFrEF  -- Acute on Chronic Systolic and Diastolic CHF  -- Chronic Systolic CHF/HFrEF  -- Chronic Systolic and Diastolic CHF  -- Other - I will add my own diagnosis  -- Disagree - Not applicable / Not valid  -- Disagree - Clinically unable to determine / Unknown  -- Refer to Clinical Documentation Reviewer    PROVIDER RESPONSE TEXT:    This patient has chronic systolic CHF/HFrEF.     Query created by: Jayla Marina on 2022 6:57 AM      Electronically signed by:  Kayode Jang DO 2022 1:48 PM
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
## 2022-09-29 NOTE — PROGRESS NOTES
Pharmacy Medication History Note      List of current medications patient is taking is complete. Source of information: Patient and pharmacy dispense report      Changes made to medication list:  Medications removed (include reason, ex. therapy complete or physician discontinued):  Furosemide 40 mg PO BID - patient follows new script with different instructions  Omeprazole 40 mg DR PO BID- patient follows new script with different instructions  Sacubitril-valsartan (Entresto) 24-26 mg PO BID- patient follows new script with different instructions  Midodrine 10 mg TID - physician discontinued     Medications added/doses adjusted:  Furosemide 40 mg PO once daily. Patient states that on occasion she will take an extra one if she eats a really salty meal when she goes out to eat  Omeprazole 40 mg DR PO once daily  Sacubitril-valsartan (Entresto) 24-26 mg take 0.5 tablet PO BID  Metoprolol succinate 25 mg 0.5 tablet PO once daily changed to 1 tablet PO once daily; patient reports that she was instructed by her physician to take a whole one     Other notes (ex. Recent course of antibiotics, Coumadin dosing):  Patient reports not taking letrozole because she ran out of refills >4 months ago and has not been able to get a new script from her doctor. Last fill was 02/2022 for 90 day supply  Denies use of other OTC or herbal medications.       Allergies reviewed- patient reports that she gets nauseated when she is given morphine      Electronically signed by Ngozi Otero on 9/29/2022 at 10:00 AM

## 2022-09-29 NOTE — PROGRESS NOTES
Hospitalist Progress Note      Patient:  Krys Lezama    Unit/Bed:6K-25/025-A  YOB: 1960  MRN: [id number]   Acct: [de-identified]   PCP: ANGELIA Bolanos CNP  Date of Admission: 9/28/2022    Assessment/Plan:    Chest pain, suspected GI in origin  Improved following GI cocktail. Continue BID protonix. Has not had a recurrence since admission. EKG unchanged from prior with TWI in the lateral leads. Limited echo pending. Troponin negative x3  Maintain on telemetry to assess for underlying arrhythmias. Chronic HFrEF, non-ischemic cardiomyopathy  Limited echo pending. Last echo 8/2022: EF 20-25% with Grade 2 Ddfx. Mild to moderate MR and TR noted. Continue Toprol and digoxin. Not on ARB or diuretic therapy. Appears euvolemic  [facility] 5/2022: no significant coronary disease. Hx of HTN  Patient with mild hypotension. Ok to continue BB at low dose, for now but may need to consider DC if persistently hypotensive. Suicidal ideation  Previously admitted to  inpatient psych until rapid response for chest pain  Psychiatry following. Anticipate DC back to  tomorrow once medically cleared. DMT2  On Ssi. BG well controlled  Hypoglycemic protocol. Hx of breast cancer. Noted    Disposition: Anticipate DC back to  inpatient psych tomorrow if no cardiac events overnight. Chief Complaint: Chest pain. Hospital Course:    Per H&P - Anusha Sorto is a 80-year-old  female with a past medical history of CHF, breast cancer, hypothyroidism, hypertension who presents to [facility] as a transfer from  after a rapid response was called due to the patient having severe chest pain. Patient states that she was in her room when she suddenly felt severe 10 out of 10 chest pain starting in the center of her chest and left side of her chest.  She denies radiation of the pain to her back, to her arm, or to her neck.   She reports associated shortness of breath, and difficulty breathing. She states her chest pain is worse with deep breaths. She denies lightheadedness or dizziness. She characterizes the pain as waxing and waning. She denies improvement of pain after SL nitro x1. She reports mild improvement of pain after GI cocktail. She denies fever, chills, abdominal pain, lightheadedness or dizziness. She denies ever experiencing pain like this before. She denies her ICD discharging. Patient was presented to [facility] ED yesterday for chest pain, however initial work-up in ED was negative. Patient was also having depressive thoughts and suicidal ideation, thus her admission to . Subjective (past 24 hours):   Patient is doing well this morning. She denies having any recurrence of chest pain. States her pain resolved shortly after receiving a GI cocktail. She does have some intermittent chronic chest pain described as a pressure sensation, but yesterday's event was a sharp sensation radiating up into her neck and posterior cervical spine. She denies any symptoms with exertion. She is asking if she will be able to go home today. Past medical history, family history, social history and allergies reviewed again and is unchanged since admission. ROS (12 point review of systems completed. Pertinent positives noted.  Otherwise ROS is negative)     Medications:  Reviewed    Infusion Medications    sodium chloride      dextrose       Scheduled Medications    aspirin  81 mg Oral Daily    atorvastatin  40 mg Oral Nightly    buPROPion  150 mg Oral QAM    digoxin  125 mcg Oral Daily    FLUoxetine  40 mg Oral Daily    levothyroxine  175 mcg Oral Daily    melatonin  19.5 mg Oral Nightly    pantoprazole  40 mg Oral BID AC    sodium chloride flush  10 mL IntraVENous 2 times per day    enoxaparin  40 mg SubCUTAneous Q24H    insulin lispro  0-8 Units SubCUTAneous 4x Daily PC & HS    metoprolol succinate  12.5 mg Oral Nightly     PRN Meds: traZODone, sodium chloride flush, sodium chloride, ondansetron **OR** ondansetron, polyethylene glycol, acetaminophen **OR** acetaminophen, potassium chloride **OR** potassium alternative oral replacement **OR** potassium chloride, magnesium sulfate, glucose, dextrose bolus **OR** dextrose bolus, glucagon (rDNA), dextrose      Intake/Output Summary (Last 24 hours) at 9/29/2022 1348  Last data filed at 9/28/2022 2030  Gross per 24 hour   Intake 280 ml   Output 0 ml   Net 280 ml       Diet:  ADULT DIET; Regular; 3 carb choices (45 gm/meal); Low Sodium (2 gm); 2000 ml; Safety Tray; Safety Tray (Disposables)    Exam:  BP 88/62   Pulse 87   Temp 98.6 °F (37 °C) (Oral)   Resp 16   Ht 5' 2\" (1.575 m)   Wt 180 lb (81.6 kg)   SpO2 94%   BMI 32.92 kg/m²   General appearance: No apparent distress, appears stated age and cooperative. HEENT: Pupils equal, round, and reactive to light. Conjunctivae/corneas clear. Neck: Supple, with full range of motion. No jugular venous distention. Trachea midline. Respiratory:  Normal respiratory effort. Clear to auscultation, bilaterally without Rales/Wheezes/Rhonchi. Cardiovascular: Regular rate and rhythm with normal S1/S2 without murmurs, rubs or gallops. Abdomen: Soft, non-tender, non-distended with normal bowel sounds. Musculoskeletal: passive and active ROM x 4 extremities. Skin: Skin color, texture, turgor normal.  No rashes or lesions. Neurologic:  Neurovascularly intact without any focal sensory/motor deficits.  Cranial nerves: II-XII intact, grossly non-focal.  Psychiatric: Alert and oriented, thought content appropriate, normal insight  Capillary Refill: Brisk,< 3 seconds   Peripheral Pulses: +2 palpable, equal bilaterally     Labs:   Recent Labs     09/27/22  0545 09/28/22  1649 09/29/22  0601   WBC 5.7 7.7 6.2   HGB 14.2 12.8 12.2   HCT 46.6 43.3 40.6    162 155     Recent Labs     09/27/22  0545 09/28/22  1649 09/29/22  0601    137 135   K 4.4 4.5 4.1    100 101 CO2 28 26 23   BUN 21 17 15   CREATININE 0.8 0.8 0.6   CALCIUM 9.6 9.0 9.0     Recent Labs     09/27/22  0545 09/28/22  1649 09/29/22  0601   AST 26 27 23   ALT 19 20 22   BILITOT 0.4 0.6 1.1   ALKPHOS 143* 136* 122     No results for input(s): INR in the last 72 hours. No results for input(s): Shiv Reagan in the last 72 hours. Microbiology:    Blood culture #1: No results found for: [facility]    Blood culture #2:No results found for: BLOODCULT2    Organism:  Lab Results   Component Value Date/Time    ORG Mixed Growth 06/10/2022 12:30 PM         Lab Results   Component Value Date/Time    LABGRAM  10/09/2019 08:35 AM     Rare segmented neutrophils observed. Few epithelial cells observed. Many gram negative bacilli. Moderate gram positive cocci in pairs and chains. Few small gram positive bacilli. Prepubescent and postmenopausal female samples (<15and >47ears of age) are not typically suitable for bacterialvaginosis screening. MRSA culture only:No results found for: [facility]    Urine culture:   Lab Results   Component Value Date/Time    LABURIN  10/18/2021 08:40 AM     Growth of Contaminants. The mixture of organisms present are not a common cause of urinary tract infections and probably represent skin austin or distal urethral austin. Respiratory culture: No results found for: CULTRESP    Aerobic and Anaerobic :  No results found for: LABAERO  No results found for: LABANAE    Urinalysis:      Lab Results   Component Value Date/Time    NITRU POSITIVE 06/10/2022 12:30 PM    WBCUA 25-50 06/10/2022 12:30 PM    BACTERIA NONE SEEN 06/10/2022 12:30 PM    RBCUA 3-5 06/10/2022 12:30 PM    BLOODU NEGATIVE 06/10/2022 12:30 PM    SPECGRAV 1.015 08/18/2020 10:05 AM    GLUCOSEU NEGATIVE 06/10/2022 12:30 PM       Radiology:  CTA CHEST W WO CONTRAST   Final Result   Impression:   1. Negative for acute pulmonary embolism. 2. Small right pleural effusion.       This document has been electronically signed by: Melissa Diop Crissy York MD on    09/29/2022 01:37 AM      All CTs at this facility use dose modulation techniques and iterative    reconstructions, and/or weight-based dosing   when appropriate to reduce radiation to a low as reasonably achievable. 3D Post-processing was performed on this study. CTA CHEST W WO CONTRAST    Result Date: 9/29/2022  CTA Chest with contrast: PE Protocol. Indication: Pleuritic chest pain. Technique: CTA chest with intravenous contrast. Coronal and sagittal reformations. MIP images. Comparison: CT/SR - CTA CHEST W WO CONTRAST - 05/12/2022 11:42 AM EDT Findings: The intravenous contrast bolus adequately opacifies pulmonary arterial vasculature. No intraluminal filling defect is identified in the pulmonary arterial vasculature. Small right pleural effusion. Mild respiratory motion. Mild atelectasis. No bulky mediastinal, hilar, or axillary lymphadenopathy. Stable cardiomegaly. . No significant pericardial effusion. Normal caliber thoracic aorta. Left AICD with lead in the right ventricle. Partially imaged upper abdomen: No upper abdominal ascites. Bones: No suspicious osseous lesions. Impression: 1. Negative for acute pulmonary embolism. 2. Small right pleural effusion. This document has been electronically signed by: Nuvia Rome MD on 09/29/2022 01:37 AM All CTs at this facility use dose modulation techniques and iterative reconstructions, and/or weight-based dosing when appropriate to reduce radiation to a low as reasonably achievable. 3D Post-processing was performed on this study. XR CHEST PORTABLE    Result Date: 9/28/2022  PROCEDURE: XR CHEST PORTABLE CLINICAL INFORMATION: Chest pain COMPARISON: Chest radiograph 9/27/2022 TECHNIQUE: AP portable chest radiograph performed. FINDINGS: Cardiac conduction device is seen with single lead. The lead is intact. No focal pulmonary consolidation. Cardiac silhouette is moderately enlarged. No pleural effusion. No pneumothorax.  No acute bony abnormality. 1. No acute cardiopulmonary finding. **This report has been created using voice recognition software. It may contain minor errors which are inherent in voice recognition technology. ** Final report electronically signed by Dr Roselyn Valdez on 9/28/2022 3:30 PM      Electronically signed by Maximino Pickard DO on 9/29/2022 at 1:48 PM

## 2022-09-29 NOTE — TELEPHONE ENCOUNTER
[address] [facility] Discharge Call Back Program. Called patient. Patient did not answer. Patient was a medical transfer and may not be available.

## 2022-09-29 NOTE — CARE COORDINATION
Case Management Assessment  Initial Evaluation    Date/Time of Evaluation: 9/29/2022 11:08 AM  Assessment Completed by: Theresa Walsh RN    If patient is discharged prior to next notation, then this note serves as note for discharge by case management. Patient Name: Alessandro Forrest                   YOB: 1960  Diagnosis: Chest pain [R07.9]                   Date / Time: 9/28/2022  3:28 PM    Patient Admission Status: Inpatient     Current PCP: ANGELIA Corona CNP  PCP verified by CM? Yes    Chart Reviewed: Yes      History Provided by: Patient  Patient Orientation: Alert and Oriented    Patient Cognition: Alert    Hospitalization in the last 30 days (Readmission):  Yes    If yes, Readmission Assessment in  Navigator will be completed. Advance Directives:     Code Status: Full Code     Primary Decision Maker: Noman Carreno Daughter-in-Law - [phone]    Discharge Planning  Patient lives with: Children Type of Home: House  Primary Care Giver: Self  Patient Support Systems include: Children   Current Financial resources:    Current community resources:    Current services prior to admission: None   Type of Home Care services:  None    ADLS  Prior functional level: Independent in ADLs/IADLs  Current functional level: Independent in ADLs/IADLs    PT AM-PAC:   /24  OT AM-PAC:   /24    Family can provide assistance at DC: Yes  Would you like Case Management to discuss the discharge plan with any other family members/significant others, and if so, who?  No  Plans to Return to Present Housing: Yes  Other Identified Issues/Barriers to RETURNING to current housing: none  Potential Assistance needed at discharge: N/A  Patient expects to discharge to: [address] [place] [address] for transportation at discharge:      Financial  Payor: [address] [place] [address]  [address] / Plan: [address] [place] [address]  [address] / Product Type: *No Product type* /     Does insurance require precert for SNF: Yes    Potential assistance Purchasing Medications:    Meds-to-Beds request: Yes      [address] [facility] [address] [phone]  [address] [facility] [address]  Phone: [phone] Fax: [phone] [facility], [address] [place] [address] [address] [facility]  [address] [facility] [address] [facility] [address]  Phone: [phone] Fax: Gayle [address] [facility] [phone] Gina Earnest [phone]  [address] [place] [address],[address]  Phone: [phone] Fax: [phone]      Factors facilitating achievement of predicted outcomes: Family support, Cooperative, and Pleasant    Barriers to discharge: Depression    Additional Case Management Notes: Rapid response on 4E for chest pain. EF 20 to 25%, follows at [facility], being worked up for a heart transport. Sitter at bedside. Suicide precautions. Hospitalist following. Psych consult. (-) troponin. + BNP. ASA. Lipitor. Digoxin. Metoprolol. Protonix. GI cocktail given yesterday. Echo ordered    The Plan for Transition of Care is related to the following treatment goals of Chest pain [K18.2]    IF APPLICABLE: The Patient and/or patient representative Barbara Lancaster and her family were provided with a choice of provider and agrees with the discharge plan. Freedom of choice list with basic dialogue that supports the patient's individualized plan of care/goals and shares the quality data associated with the providers was provided to:     Patient Representative Name:       The Patient and/or Patient Representative Agree with the Discharge Plan? Spoke with Barbara Lancaster, plans to return home with sons and daughter-in-law. She is independent and does not use dme or O2/cpap. She does not anticipate home needs. Declined completing ACPS.      Rinku Nicole RN  Case Management Department

## 2022-09-29 NOTE — CARE COORDINATION
09/29/22 1107   Readmission Assessment   Number of Days since last admission?  1-7 days  (NO recent acute hospital admission, trasnferred from 4E)

## 2022-09-29 NOTE — FLOWSHEET NOTE
09/29/22 1103   Safe Environment   Safety Measures   (Virtual RN rounds complete)   PT did not respond to audio, camera turned on in the interest of pt safety. Resting comfortably in bed, no needs identified.

## 2022-09-29 NOTE — PLAN OF CARE
Problem: ABCDS Injury Assessment  Goal: Absence of physical injury  Outcome: Progressing     Problem: Safety - Adult  Goal: Free from fall injury  Outcome: Progressing  Flowsheets (Taken 9/28/2022 2212)  Free From Fall Injury: Instruct family/caregiver on patient safety     Problem: Pain  Goal: Verbalizes/displays adequate comfort level or baseline comfort level  Outcome: Progressing  Flowsheets (Taken 9/28/2022 2212)  Verbalizes/displays adequate comfort level or baseline comfort level:   Encourage patient to monitor pain and request assistance   Assess pain using appropriate pain scale

## 2022-09-30 LAB
ALBUMIN SERPL-MCNC: 3.2 G/DL (ref 3.5–5.1)
ALP BLD-CCNC: 155 U/L (ref 38–126)
ALT SERPL-CCNC: 27 U/L (ref 11–66)
ANION GAP SERPL CALCULATED.3IONS-SCNC: 12 MEQ/L (ref 8–16)
ANISOCYTOSIS: PRESENT
AST SERPL-CCNC: 28 U/L (ref 5–40)
BASOPHILS # BLD: 0.5 %
BASOPHILS ABSOLUTE: 0 THOU/MM3 (ref 0–0.1)
BILIRUB SERPL-MCNC: 0.8 MG/DL (ref 0.3–1.2)
BUN BLDV-MCNC: 16 MG/DL (ref 7–22)
CALCIUM SERPL-MCNC: 9.4 MG/DL (ref 8.5–10.5)
CHLORIDE BLD-SCNC: 103 MEQ/L (ref 98–111)
CO2: 26 MEQ/L (ref 23–33)
CREAT SERPL-MCNC: 0.8 MG/DL (ref 0.4–1.2)
EOSINOPHIL # BLD: 2.7 %
EOSINOPHILS ABSOLUTE: 0.2 THOU/MM3 (ref 0–0.4)
ERYTHROCYTE [DISTWIDTH] IN BLOOD BY AUTOMATED COUNT: 25.3 % (ref 11.5–14.5)
ERYTHROCYTE [DISTWIDTH] IN BLOOD BY AUTOMATED COUNT: 73.4 FL (ref 35–45)
GFR SERPL CREATININE-BSD FRML MDRD: 73 ML/MIN/1.73M2
GLUCOSE BLD-MCNC: 113 MG/DL (ref 70–108)
GLUCOSE BLD-MCNC: 155 MG/DL (ref 70–108)
GLUCOSE BLD-MCNC: 71 MG/DL (ref 70–108)
GLUCOSE BLD-MCNC: 93 MG/DL (ref 70–108)
HCT VFR BLD CALC: 41.2 % (ref 37–47)
HEMOGLOBIN: 12.2 GM/DL (ref 12–16)
IMMATURE GRANS (ABS): 0.03 THOU/MM3 (ref 0–0.07)
IMMATURE GRANULOCYTES: 0.5 %
LYMPHOCYTES # BLD: 26.2 %
LYMPHOCYTES ABSOLUTE: 1.5 THOU/MM3 (ref 1–4.8)
MCH RBC QN AUTO: 24.5 PG (ref 26–33)
MCHC RBC AUTO-ENTMCNC: 29.6 GM/DL (ref 32.2–35.5)
MCV RBC AUTO: 82.9 FL (ref 81–99)
MONOCYTES # BLD: 9.9 %
MONOCYTES ABSOLUTE: 0.6 THOU/MM3 (ref 0.4–1.3)
NUCLEATED RED BLOOD CELLS: 0 /100 WBC
PLATELET # BLD: 163 THOU/MM3 (ref 130–400)
PMV BLD AUTO: 11.6 FL (ref 9.4–12.4)
POTASSIUM REFLEX MAGNESIUM: 4.5 MEQ/L (ref 3.5–5.2)
RBC # BLD: 4.97 MILL/MM3 (ref 4.2–5.4)
SCAN OF BLOOD SMEAR: NORMAL
SEG NEUTROPHILS: 60.2 %
SEGMENTED NEUTROPHILS ABSOLUTE COUNT: 3.4 THOU/MM3 (ref 1.8–7.7)
SODIUM BLD-SCNC: 141 MEQ/L (ref 135–145)
TOTAL PROTEIN: 5.7 G/DL (ref 6.1–8)
WBC # BLD: 5.6 THOU/MM3 (ref 4.8–10.8)

## 2022-09-30 PROCEDURE — 82948 REAGENT STRIP/BLOOD GLUCOSE: CPT

## 2022-09-30 PROCEDURE — 99232 SBSQ HOSP IP/OBS MODERATE 35: CPT | Performed by: INTERNAL MEDICINE

## 2022-09-30 PROCEDURE — 6360000002 HC RX W HCPCS

## 2022-09-30 PROCEDURE — 1200000003 HC TELEMETRY R&B

## 2022-09-30 PROCEDURE — 36415 COLL VENOUS BLD VENIPUNCTURE: CPT

## 2022-09-30 PROCEDURE — 2580000003 HC RX 258

## 2022-09-30 PROCEDURE — 80053 COMPREHEN METABOLIC PANEL: CPT

## 2022-09-30 PROCEDURE — 6370000000 HC RX 637 (ALT 250 FOR IP)

## 2022-09-30 PROCEDURE — 85025 COMPLETE CBC W/AUTO DIFF WBC: CPT

## 2022-09-30 RX ORDER — POLYETHYLENE GLYCOL 3350 17 G/17G
17 POWDER, FOR SOLUTION ORAL DAILY PRN
Status: CANCELLED | OUTPATIENT
Start: 2022-09-30

## 2022-09-30 RX ORDER — DIGOXIN 125 MCG
125 TABLET ORAL DAILY
Status: CANCELLED | OUTPATIENT
Start: 2022-10-01

## 2022-09-30 RX ORDER — CHOLECALCIFEROL (VITAMIN D3) 125 MCG
20 CAPSULE ORAL NIGHTLY
Status: CANCELLED | OUTPATIENT
Start: 2022-09-30

## 2022-09-30 RX ORDER — BUPROPION HYDROCHLORIDE 150 MG/1
150 TABLET ORAL EVERY MORNING
Status: CANCELLED | OUTPATIENT
Start: 2022-10-01

## 2022-09-30 RX ORDER — PANTOPRAZOLE SODIUM 40 MG/1
40 TABLET, DELAYED RELEASE ORAL
Status: CANCELLED | OUTPATIENT
Start: 2022-09-30

## 2022-09-30 RX ORDER — FLUOXETINE HYDROCHLORIDE 20 MG/1
40 CAPSULE ORAL DAILY
Status: CANCELLED | OUTPATIENT
Start: 2022-10-01

## 2022-09-30 RX ORDER — METOPROLOL SUCCINATE 25 MG/1
12.5 TABLET, EXTENDED RELEASE ORAL NIGHTLY
Status: CANCELLED | OUTPATIENT
Start: 2022-09-30

## 2022-09-30 RX ORDER — TRAZODONE HYDROCHLORIDE 50 MG/1
50 TABLET ORAL NIGHTLY PRN
Status: CANCELLED | OUTPATIENT
Start: 2022-09-30

## 2022-09-30 RX ORDER — ASPIRIN 81 MG/1
81 TABLET, CHEWABLE ORAL DAILY
Status: CANCELLED | OUTPATIENT
Start: 2022-10-01

## 2022-09-30 RX ORDER — ACETAMINOPHEN 325 MG/1
650 TABLET ORAL EVERY 6 HOURS PRN
Status: CANCELLED | OUTPATIENT
Start: 2022-09-30

## 2022-09-30 RX ORDER — ATORVASTATIN CALCIUM 40 MG/1
40 TABLET, FILM COATED ORAL NIGHTLY
Status: CANCELLED | OUTPATIENT
Start: 2022-09-30

## 2022-09-30 RX ADMIN — BUPROPION HYDROCHLORIDE 150 MG: 150 TABLET, FILM COATED, EXTENDED RELEASE ORAL at 08:32

## 2022-09-30 RX ADMIN — ENOXAPARIN SODIUM 40 MG: 100 INJECTION SUBCUTANEOUS at 16:50

## 2022-09-30 RX ADMIN — FLUOXETINE 40 MG: 20 CAPSULE ORAL at 08:33

## 2022-09-30 RX ADMIN — ACETAMINOPHEN 650 MG: 325 TABLET ORAL at 22:38

## 2022-09-30 RX ADMIN — LEVOTHYROXINE SODIUM 175 MCG: 0.15 TABLET ORAL at 06:30

## 2022-09-30 RX ADMIN — DIGOXIN 125 MCG: 125 TABLET ORAL at 08:33

## 2022-09-30 RX ADMIN — SODIUM CHLORIDE, PRESERVATIVE FREE 10 ML: 5 INJECTION INTRAVENOUS at 08:33

## 2022-09-30 RX ADMIN — Medication 19.5 MG: at 22:30

## 2022-09-30 RX ADMIN — ATORVASTATIN CALCIUM 40 MG: 40 TABLET, FILM COATED ORAL at 22:31

## 2022-09-30 RX ADMIN — PANTOPRAZOLE SODIUM 40 MG: 40 TABLET, DELAYED RELEASE ORAL at 16:50

## 2022-09-30 RX ADMIN — ASPIRIN 81 MG 81 MG: 81 TABLET ORAL at 08:33

## 2022-09-30 RX ADMIN — PANTOPRAZOLE SODIUM 40 MG: 40 TABLET, DELAYED RELEASE ORAL at 06:30

## 2022-09-30 ASSESSMENT — PAIN SCALES - GENERAL
PAINLEVEL_OUTOF10: 0
PAINLEVEL_OUTOF10: 2

## 2022-09-30 ASSESSMENT — PAIN DESCRIPTION - LOCATION: LOCATION: HEAD

## 2022-09-30 ASSESSMENT — PAIN DESCRIPTION - ORIENTATION: ORIENTATION: MID

## 2022-09-30 ASSESSMENT — PAIN DESCRIPTION - DESCRIPTORS: DESCRIPTORS: ACHING

## 2022-09-30 ASSESSMENT — PAIN DESCRIPTION - ONSET: ONSET: ON-GOING

## 2022-09-30 ASSESSMENT — PAIN DESCRIPTION - PAIN TYPE: TYPE: ACUTE PAIN

## 2022-09-30 ASSESSMENT — PAIN DESCRIPTION - FREQUENCY: FREQUENCY: INTERMITTENT

## 2022-09-30 NOTE — PROGRESS NOTES
Daily Progress Note  Leslie Urena MD  9/30/2022    Reviewed patient's current plan of care and vital signs with nursing staff. Patient seen with sitter and medical student. She denies suicidal or homicidal ideation. She is asking to go home. She may be minimizing her depressive symptoms. She says this is the first time since her transfer she is not having chest pain. SUBJECTIVE:    Patient is feeling better. denies suicidal ideation. ROS: Patient has new complaints:  No  Sleeping adequately:  Yes      Mental Status Examination:  Patient is cooperative. Speech: Normal rate and tone. No abnormal movements, tics or mannerisms. Mood dysthymic; affect normal affect. Suicidal ideation Absent. Homicidal ideations Absent. Hallucinations Absent. Delusions Absent. Thought Content: normal. Thought Processes: Goal-Directed. Alert and oriented X 3. Attention and concentration Fair. MEMORY intact. Insight and Judgement limited.       Medications  Current Facility-Administered Medications: aspirin chewable tablet 81 mg, 81 mg, Oral, Daily  atorvastatin (LIPITOR) tablet 40 mg, 40 mg, Oral, Nightly  buPROPion (WELLBUTRIN XL) extended release tablet 150 mg, 150 mg, Oral, QAM  digoxin (LANOXIN) tablet 125 mcg, 125 mcg, Oral, Daily  FLUoxetine (PROZAC) capsule 40 mg, 40 mg, Oral, Daily  levothyroxine (SYNTHROID) tablet 175 mcg, 175 mcg, Oral, Daily  melatonin tablet 19.5 mg, 19.5 mg, Oral, Nightly  pantoprazole (PROTONIX) tablet 40 mg, 40 mg, Oral, BID AC  traZODone (DESYREL) tablet 50 mg, 50 mg, Oral, Nightly PRN  sodium chloride flush 0.9 % injection 10 mL, 10 mL, IntraVENous, 2 times per day  sodium chloride flush 0.9 % injection 10 mL, 10 mL, IntraVENous, PRN  0.9 % sodium chloride infusion, , IntraVENous, PRN  enoxaparin (LOVENOX) injection 40 mg, 40 mg, SubCUTAneous, Q24H  ondansetron (ZOFRAN-ODT) disintegrating tablet 4 mg, 4 mg, Oral, Q8H PRN **OR** ondansetron (ZOFRAN) injection 4 mg, 4 mg, IntraVENous, Q6H PRN  polyethylene glycol (GLYCOLAX) packet 17 g, 17 g, Oral, Daily PRN  acetaminophen (TYLENOL) tablet 650 mg, 650 mg, Oral, Q6H PRN **OR** acetaminophen (TYLENOL) suppository 650 mg, 650 mg, Rectal, Q6H PRN  potassium chloride (KLOR-CON M) extended release tablet 40 mEq, 40 mEq, Oral, PRN **OR** potassium bicarb-citric acid (EFFER-K) effervescent tablet 40 mEq, 40 mEq, Oral, PRN **OR** potassium chloride 10 mEq/100 mL IVPB (Peripheral Line), 10 mEq, IntraVENous, PRN  magnesium sulfate 2000 mg in 50 mL IVPB premix, 2,000 mg, IntraVENous, PRN  insulin lispro (HUMALOG) injection vial 0-8 Units, 0-8 Units, SubCUTAneous, 4x Daily PC & HS  glucose chewable tablet 16 g, 4 tablet, Oral, PRN  dextrose bolus 10% 125 mL, 125 mL, IntraVENous, PRN **OR** dextrose bolus 10% 250 mL, 250 mL, IntraVENous, PRN  glucagon (rDNA) injection 1 mg, 1 mg, SubCUTAneous, PRN  dextrose 10 % infusion, , IntraVENous, Continuous PRN  metoprolol succinate (TOPROL XL) extended release tablet 12.5 mg, 12.5 mg, Oral, Nightly    ASSESSMENT AND PLAN  Patient's symptoms are improving some. Continue with current psychotropics. Attempt to develop insight  Psycho-education conducted. Supportive Therapy conducted. Transfer to  when medically stable.

## 2022-09-30 NOTE — FLOWSHEET NOTE
09/30/22 1026   Safe Environment   Safety Measures Other (comment)  (virtual safety round complete)   Virtual Nurse rounds, sitter at bedside. No safety concerns or needs at this time.

## 2022-09-30 NOTE — FLOWSHEET NOTE
09/30/22 1411   Safe Environment   Safety Measures Other (comment)  (virtual safety round complete)   Virtual nurse rounds, pt sitting in chair, call light within reach. Staff at bedside.

## 2022-09-30 NOTE — CARE COORDINATION
Potential discharge later today or over the weekend. 9/30/22, 11:39 AM EDT    Patient goals/plan/ treatment preferences discussed by  and . Patient goals/plan/ treatment preferences reviewed with patient/ family. Patient/ family verbalize understanding of discharge plan and are in agreement with goal/plan/treatment preferences. Understanding was demonstrated using the teach back method. AVS provided by RN at time of discharge, which includes all necessary medical information pertaining to the patients current course of illness, treatment, post-discharge goals of care, and treatment preferences.      Syd 82 Discharge: [address] [facility]

## 2022-09-30 NOTE — PLAN OF CARE
Problem: Pain  Goal: Verbalizes/displays adequate comfort level or baseline comfort level  Outcome: Progressing  Flowsheets (Taken 9/30/2022 1854)  Verbalizes/displays adequate comfort level or baseline comfort level:   Encourage patient to monitor pain and request assistance   Assess pain using appropriate pain scale     Problem: Chronic Conditions and Co-morbidities  Goal: Patient's chronic conditions and co-morbidity symptoms are monitored and maintained or improved  Outcome: Progressing  Flowsheets (Taken 9/30/2022 1854)  Care Plan - Patient's Chronic Conditions and Co-Morbidity Symptoms are Monitored and Maintained or Improved:   Monitor and assess patient's chronic conditions and comorbid symptoms for stability, deterioration, or improvement   Collaborate with multidisciplinary team to address chronic and comorbid conditions and prevent exacerbation or deterioration   Update acute care plan with appropriate goals if chronic or comorbid symptoms are exacerbated and prevent overall improvement and discharge

## 2022-09-30 NOTE — PROGRESS NOTES
Hospitalist Progress Note    Patient:  Jori Arenas    YOB: 1960  Unit/Bed:6K-25/025-A  MRN: [id number]    Acct: [de-identified]   PCP: ANGELIA Mosqueda CNP    Date of Admission: 9/28/2022      Assessment/Plan:    Chest pain, suspected GI in origin vs MSK.  ACS has been ruled out with serial negative troponins and unchanged EKG from previous. Improved following GI cocktail and tylenol. Continue BID protonix. Has not had a recurrence since admission. EKG unchanged from prior with TWI in the lateral leads. Limited echo shows a slight improvement from prior EF, now 30 to 35%. Troponin negative x3  Maintain on telemetry to assess for underlying arrhythmias. Chronic HFrEF, non-ischemic cardiomyopathy  Limited echo pending. Last echo 8/2022: EF 20-25% with Grade 2 Ddfx, has now improved to 30 to 35% EF. Holding Entresto and Aldactone due to hypotension  Resume Suzzanne Sicks if able to  Continue Toprol and digoxin. Not on ARB or diuretic therapy. Appears euvolemic  C 5/2022: no significant coronary disease. Hx of HTN  Patient with mild hypotension. Ok to continue BB at low dose, for now but may need to consider DC if persistently hypotensive. Holding patient's Entresto and Aldactone  Suicidal ideation  Previously admitted to [facility]E inpatient psych until rapid response for chest pain  Psychiatry following. Anticipate DC back to  tomorrow once medically cleared. DMT2  On Ssi. BG well controlled  Hypoglycemic protocol. Hx of breast cancer. Noted      Expected discharge date: Any day    Disposition: Discharged today, 9/30, however unable to take back on psych at this time.   [] Home  [] TCU  [] Rehab  [] Psych  [] SNF  [] [facility]  [] Other-    ===================================================================      Chief Complaint: Transfer from psych for chest pain    Hospital Course: See above    Subjective (past 24 hours): Patient seen at bedside, she reports she is no longer having any discomfort and that her sharp chest pain improved after Tylenol yesterday. She denies any nausea vomiting abdominal discomfort. No shortness of breath or coughing reported. Her mood is okay at this time. ROS: reviewed complete ROS unchanged unless otherwise stated in hospital course/subjective portion. Medications:  Reviewed    Infusion Medications    sodium chloride      dextrose       Scheduled Medications    aspirin  81 mg Oral Daily    atorvastatin  40 mg Oral Nightly    buPROPion  150 mg Oral QAM    digoxin  125 mcg Oral Daily    FLUoxetine  40 mg Oral Daily    levothyroxine  175 mcg Oral Daily    melatonin  19.5 mg Oral Nightly    pantoprazole  40 mg Oral BID AC    sodium chloride flush  10 mL IntraVENous 2 times per day    enoxaparin  40 mg SubCUTAneous Q24H    insulin lispro  0-8 Units SubCUTAneous 4x Daily PC & HS    metoprolol succinate  12.5 mg Oral Nightly     PRN Meds: traZODone, sodium chloride flush, sodium chloride, ondansetron **OR** ondansetron, polyethylene glycol, acetaminophen **OR** acetaminophen, potassium chloride **OR** potassium alternative oral replacement **OR** potassium chloride, magnesium sulfate, glucose, dextrose bolus **OR** dextrose bolus, glucagon (rDNA), dextrose        Intake/Output Summary (Last 24 hours) at 9/30/2022 1900  Last data filed at 9/30/2022 1207  Gross per 24 hour   Intake 270 ml   Output --   Net 270 ml       Exam:  BP 94/72   Pulse 87   Temp 97.9 °F (36.6 °C) (Oral)   Resp 18   Ht 5' 2\" (1.575 m)   Wt 192 lb 1.6 oz (87.1 kg)   SpO2 98%   BMI 35.14 kg/m²     General: No distress, appears stated age. Eyes:  PERRL. Conjunctivae/corneas clear. HENT: Head normal appearing. Nares normal. Oral mucosa moist.  Hearing intact. Neck: Supple, with full range of motion. Trachea midline. No gross JVD appreciated. Respiratory:  Normal effort. Clear to auscultation, without rales or wheezes or rhonchi.   Cardiovascular: Normal rate, regular rhythm with normal S1/S2 without murmurs. No lower extremity edema. Abdomen: Soft, non-tender, non-distended with normal bowel sounds. Musculoskeletal: No joint swelling or tenderness. Normal tone. No abnormal movements. Skin: Warm and dry. No rashes or lesions. Neurologic:  No focal sensory/motor deficits in the upper or lower extremities. Cranial nerves:  grossly non-focal 2-12. Psychiatric: Alert and oriented, normal insight and thought content. Capillary Refill: Brisk,< 3 seconds. Peripheral Pulses: +2 palpable, equal bilaterally. Labs:   Recent Labs     09/28/22 1649 09/29/22  0601 09/30/22  0545   WBC 7.7 6.2 5.6   HGB 12.8 12.2 12.2   HCT 43.3 40.6 41.2    155 163     Recent Labs     09/28/22 1649 09/29/22  0601 09/30/22  0545    135 141   K 4.5 4.1 4.5    101 103   CO2 26 23 26   BUN 17 15 16   CREATININE 0.8 0.6 0.8   CALCIUM 9.0 9.0 9.4     Recent Labs     09/28/22 1649 09/29/22  0601 09/30/22  0545   AST 27 23 28   ALT 20 22 27   BILITOT 0.6 1.1 0.8   ALKPHOS 136* 122 155*     No results for input(s): INR in the last 72 hours. No results for input(s): Louann Geoffrey in the last 72 hours. No results for input(s): PROCAL in the last 72 hours. Lab Results   Component Value Date/Time    NITRU POSITIVE 06/10/2022 12:30 PM    WBCUA 25-50 06/10/2022 12:30 PM    BACTERIA NONE SEEN 06/10/2022 12:30 PM    RBCUA 3-5 06/10/2022 12:30 PM    BLOODU NEGATIVE 06/10/2022 12:30 PM    SPECGRAV 1.015 08/18/2020 10:05 AM    GLUCOSEU NEGATIVE 06/10/2022 12:30 PM       Radiology (48 hours):  CTA CHEST W WO CONTRAST    Result Date: 9/29/2022  Impression: 1. Negative for acute pulmonary embolism. 2. Small right pleural effusion.  This document has been electronically signed by: Keshav Mooney MD on 09/29/2022 01:37 AM All CTs at this facility use dose modulation techniques and iterative reconstructions, and/or weight-based dosing when appropriate to reduce radiation to a low as reasonably achievable. 3D Post-processing was performed on this study. DVT prophylaxis:    [x] Lovenox  [] SCDs  [] SQ Heparin  [] Encourage ambulation   [] Already on Anticoagulation       Diet: ADULT DIET; Regular; 3 carb choices (45 gm/meal);  Low Sodium (2 gm); 2000 ml; Safety Tray; Safety Tray (Disposables)  Code Status: Full Code  PT/OT: na  Tele: yes  IVF: na    Electronically signed by Isa Haji DO on 9/30/2022 at 7:00 PM

## 2022-10-01 ENCOUNTER — HOSPITAL ENCOUNTER (INPATIENT)
Age: 62
LOS: 2 days | Discharge: HOME | DRG: 751 | End: 2022-10-03
Attending: PSYCHIATRY & NEUROLOGY | Admitting: PSYCHIATRY & NEUROLOGY
Payer: COMMERCIAL

## 2022-10-01 VITALS
WEIGHT: 192.1 LBS | BODY MASS INDEX: 35.35 KG/M2 | TEMPERATURE: 98.4 F | DIASTOLIC BLOOD PRESSURE: 73 MMHG | RESPIRATION RATE: 16 BRPM | HEIGHT: 62 IN | HEART RATE: 102 BPM | SYSTOLIC BLOOD PRESSURE: 113 MMHG | OXYGEN SATURATION: 96 %

## 2022-10-01 DIAGNOSIS — F33.2 MDD (MAJOR DEPRESSIVE DISORDER), RECURRENT SEVERE, WITHOUT PSYCHOSIS (HCC): ICD-10-CM

## 2022-10-01 LAB
GLUCOSE BLD-MCNC: 105 MG/DL (ref 70–108)
GLUCOSE BLD-MCNC: 84 MG/DL (ref 70–108)
GLUCOSE BLD-MCNC: 88 MG/DL (ref 70–108)
GLUCOSE BLD-MCNC: 90 MG/DL (ref 70–108)

## 2022-10-01 PROCEDURE — 6360000002 HC RX W HCPCS

## 2022-10-01 PROCEDURE — 1240000000 HC EMOTIONAL WELLNESS R&B

## 2022-10-01 PROCEDURE — 6370000000 HC RX 637 (ALT 250 FOR IP)

## 2022-10-01 PROCEDURE — 82948 REAGENT STRIP/BLOOD GLUCOSE: CPT

## 2022-10-01 PROCEDURE — 99238 HOSP IP/OBS DSCHRG MGMT 30/<: CPT | Performed by: INTERNAL MEDICINE

## 2022-10-01 RX ORDER — ATORVASTATIN CALCIUM 40 MG/1
40 TABLET, FILM COATED ORAL NIGHTLY
Status: DISCONTINUED | OUTPATIENT
Start: 2022-10-02 | End: 2022-10-03 | Stop reason: HOSPADM

## 2022-10-01 RX ORDER — METOPROLOL SUCCINATE 25 MG/1
12.5 TABLET, EXTENDED RELEASE ORAL NIGHTLY
Status: DISCONTINUED | OUTPATIENT
Start: 2022-10-02 | End: 2022-10-03 | Stop reason: HOSPADM

## 2022-10-01 RX ORDER — BUPROPION HYDROCHLORIDE 150 MG/1
150 TABLET ORAL EVERY MORNING
Status: DISCONTINUED | OUTPATIENT
Start: 2022-10-02 | End: 2022-10-03 | Stop reason: HOSPADM

## 2022-10-01 RX ORDER — ASPIRIN 81 MG/1
81 TABLET, CHEWABLE ORAL DAILY
Status: DISCONTINUED | OUTPATIENT
Start: 2022-10-02 | End: 2022-10-03 | Stop reason: HOSPADM

## 2022-10-01 RX ORDER — LANOLIN ALCOHOL/MO/W.PET/CERES
19.5 CREAM (GRAM) TOPICAL NIGHTLY
Status: DISCONTINUED | OUTPATIENT
Start: 2022-10-02 | End: 2022-10-03 | Stop reason: HOSPADM

## 2022-10-01 RX ORDER — TRAZODONE HYDROCHLORIDE 50 MG/1
50 TABLET ORAL NIGHTLY PRN
Status: DISCONTINUED | OUTPATIENT
Start: 2022-10-01 | End: 2022-10-03 | Stop reason: HOSPADM

## 2022-10-01 RX ORDER — PANTOPRAZOLE SODIUM 40 MG/1
40 TABLET, DELAYED RELEASE ORAL
Status: DISCONTINUED | OUTPATIENT
Start: 2022-10-02 | End: 2022-10-03 | Stop reason: HOSPADM

## 2022-10-01 RX ORDER — DIGOXIN 125 MCG
125 TABLET ORAL DAILY
Status: DISCONTINUED | OUTPATIENT
Start: 2022-10-02 | End: 2022-10-03 | Stop reason: HOSPADM

## 2022-10-01 RX ORDER — POLYETHYLENE GLYCOL 3350 17 G/17G
17 POWDER, FOR SOLUTION ORAL DAILY PRN
Status: DISCONTINUED | OUTPATIENT
Start: 2022-10-01 | End: 2022-10-03 | Stop reason: HOSPADM

## 2022-10-01 RX ORDER — ACETAMINOPHEN 325 MG/1
650 TABLET ORAL EVERY 6 HOURS PRN
Status: DISCONTINUED | OUTPATIENT
Start: 2022-10-01 | End: 2022-10-03 | Stop reason: HOSPADM

## 2022-10-01 RX ORDER — FLUOXETINE HYDROCHLORIDE 20 MG/1
40 CAPSULE ORAL DAILY
Status: DISCONTINUED | OUTPATIENT
Start: 2022-10-02 | End: 2022-10-03 | Stop reason: HOSPADM

## 2022-10-01 RX ADMIN — LEVOTHYROXINE SODIUM 175 MCG: 0.15 TABLET ORAL at 10:03

## 2022-10-01 RX ADMIN — PANTOPRAZOLE SODIUM 40 MG: 40 TABLET, DELAYED RELEASE ORAL at 15:00

## 2022-10-01 RX ADMIN — TRAZODONE HYDROCHLORIDE 50 MG: 50 TABLET ORAL at 01:05

## 2022-10-01 RX ADMIN — ATORVASTATIN CALCIUM 40 MG: 40 TABLET, FILM COATED ORAL at 22:16

## 2022-10-01 RX ADMIN — PANTOPRAZOLE SODIUM 40 MG: 40 TABLET, DELAYED RELEASE ORAL at 10:02

## 2022-10-01 RX ADMIN — BUPROPION HYDROCHLORIDE 150 MG: 150 TABLET, FILM COATED, EXTENDED RELEASE ORAL at 10:43

## 2022-10-01 RX ADMIN — ENOXAPARIN SODIUM 40 MG: 100 INJECTION SUBCUTANEOUS at 17:56

## 2022-10-01 RX ADMIN — FLUOXETINE 40 MG: 20 CAPSULE ORAL at 10:43

## 2022-10-01 RX ADMIN — DIGOXIN 125 MCG: 125 TABLET ORAL at 10:43

## 2022-10-01 RX ADMIN — ASPIRIN 81 MG 81 MG: 81 TABLET ORAL at 10:01

## 2022-10-01 RX ADMIN — METOPROLOL SUCCINATE 12.5 MG: 25 TABLET, EXTENDED RELEASE ORAL at 22:16

## 2022-10-01 ASSESSMENT — PAIN SCALES - GENERAL
PAINLEVEL_OUTOF10: 0

## 2022-10-01 NOTE — PROGRESS NOTES
Called Psych to see if a bed was available and they stated to call back at 3 pm.  Charge nurse made aware. Sitter at the bedside.

## 2022-10-01 NOTE — PROGRESS NOTES
Daily Progress Note  Melanie Sullivan MD  10/1/2022    Reviewed patient's current plan of care and vital signs with nursing staff. Patient seen with sitter. Mood 8 on a scale of 1 to 10 with 10 is feeling normal  She denies suicidal or homicidal ideation. She may be minimizing her depressive symptoms since she wants to go home. SUBJECTIVE:    Patient is feeling better. denies suicidal ideation. ROS: Patient has new complaints:  No  Sleeping adequately:  Yes      Mental Status Examination:  Patient is cooperative. Speech: Normal rate and tone. No abnormal movements, tics or mannerisms. Mood dysthymic; affect normal affect. Suicidal ideation Absent. Homicidal ideations Absent. Hallucinations Absent. Delusions Absent. Thought Content: normal. Thought Processes: Goal-Directed. Alert and oriented X 3. Attention and concentration Fair. MEMORY intact. Insight and Judgement limited.       Medications  Current Facility-Administered Medications: aspirin chewable tablet 81 mg, 81 mg, Oral, Daily  atorvastatin (LIPITOR) tablet 40 mg, 40 mg, Oral, Nightly  buPROPion (WELLBUTRIN XL) extended release tablet 150 mg, 150 mg, Oral, QAM  digoxin (LANOXIN) tablet 125 mcg, 125 mcg, Oral, Daily  FLUoxetine (PROZAC) capsule 40 mg, 40 mg, Oral, Daily  levothyroxine (SYNTHROID) tablet 175 mcg, 175 mcg, Oral, Daily  melatonin tablet 19.5 mg, 19.5 mg, Oral, Nightly  pantoprazole (PROTONIX) tablet 40 mg, 40 mg, Oral, BID AC  traZODone (DESYREL) tablet 50 mg, 50 mg, Oral, Nightly PRN  sodium chloride flush 0.9 % injection 10 mL, 10 mL, IntraVENous, 2 times per day  sodium chloride flush 0.9 % injection 10 mL, 10 mL, IntraVENous, PRN  0.9 % sodium chloride infusion, , IntraVENous, PRN  enoxaparin (LOVENOX) injection 40 mg, 40 mg, SubCUTAneous, Q24H  ondansetron (ZOFRAN-ODT) disintegrating tablet 4 mg, 4 mg, Oral, Q8H PRN **OR** ondansetron (ZOFRAN) injection 4 mg, 4 mg, IntraVENous, Q6H PRN  polyethylene glycol (GLYCOLAX) packet 17 g, 17 g, Oral, Daily PRN  acetaminophen (TYLENOL) tablet 650 mg, 650 mg, Oral, Q6H PRN **OR** acetaminophen (TYLENOL) suppository 650 mg, 650 mg, Rectal, Q6H PRN  potassium chloride (KLOR-CON M) extended release tablet 40 mEq, 40 mEq, Oral, PRN **OR** potassium bicarb-citric acid (EFFER-K) effervescent tablet 40 mEq, 40 mEq, Oral, PRN **OR** potassium chloride 10 mEq/100 mL IVPB (Peripheral Line), 10 mEq, IntraVENous, PRN  magnesium sulfate 2000 mg in 50 mL IVPB premix, 2,000 mg, IntraVENous, PRN  insulin lispro (HUMALOG) injection vial 0-8 Units, 0-8 Units, SubCUTAneous, 4x Daily PC & HS  glucose chewable tablet 16 g, 4 tablet, Oral, PRN  dextrose bolus 10% 125 mL, 125 mL, IntraVENous, PRN **OR** dextrose bolus 10% 250 mL, 250 mL, IntraVENous, PRN  glucagon (rDNA) injection 1 mg, 1 mg, SubCUTAneous, PRN  dextrose 10 % infusion, , IntraVENous, Continuous PRN  metoprolol succinate (TOPROL XL) extended release tablet 12.5 mg, 12.5 mg, Oral, Nightly    ASSESSMENT AND PLAN  Patient's symptoms are improving some. Continue with current psychotropics. Attempt to develop insight  Psycho-education conducted. Supportive Therapy conducted. Transfer to  when bed is available.

## 2022-10-01 NOTE — PLAN OF CARE
Problem: Discharge Planning  Goal: Discharge to home or other facility with appropriate resources  Outcome: Progressing  Flowsheets (Taken 9/30/2022 2100)  Discharge to home or other facility with appropriate resources:   Identify barriers to discharge with patient and caregiver   Arrange for needed discharge resources and transportation as appropriate   Identify discharge learning needs (meds, wound care, etc)   Arrange for interpreters to assist at discharge as needed   Refer to discharge planning if patient needs post-hospital services based on physician order or complex needs related to functional status, cognitive ability or social support system     Problem: ABCDS Injury Assessment  Goal: Absence of physical injury  Outcome: Progressing     Problem: Safety - Adult  Goal: Free from fall injury  Outcome: Progressing     Problem: Pain  Goal: Verbalizes/displays adequate comfort level or baseline comfort level  10/1/2022 0237 by Leroy Byrnes RN  Outcome: Progressing  9/30/2022 1854 by Eden Tai RN  Outcome: Progressing  Flowsheets (Taken 9/30/2022 1854)  Verbalizes/displays adequate comfort level or baseline comfort level:   Encourage patient to monitor pain and request assistance   Assess pain using appropriate pain scale     Problem: Chronic Conditions and Co-morbidities  Goal: Patient's chronic conditions and co-morbidity symptoms are monitored and maintained or improved  10/1/2022 0237 by Leroy Byrnes RN  Outcome: Progressing  Flowsheets (Taken 9/30/2022 2100)  Care Plan - Patient's Chronic Conditions and Co-Morbidity Symptoms are Monitored and Maintained or Improved:   Monitor and assess patient's chronic conditions and comorbid symptoms for stability, deterioration, or improvement   Collaborate with multidisciplinary team to address chronic and comorbid conditions and prevent exacerbation or deterioration   Update acute care plan with appropriate goals if chronic or comorbid symptoms are exacerbated and prevent overall improvement and discharge  9/30/2022 1854 by Eden Tai RN  Outcome: Progressing  Flowsheets (Taken 9/30/2022 1854)  Care Plan - Patient's Chronic Conditions and Co-Morbidity Symptoms are Monitored and Maintained or Improved:   Monitor and assess patient's chronic conditions and comorbid symptoms for stability, deterioration, or improvement   Collaborate with multidisciplinary team to address chronic and comorbid conditions and prevent exacerbation or deterioration   Update acute care plan with appropriate goals if chronic or comorbid symptoms are exacerbated and prevent overall improvement and discharge

## 2022-10-01 NOTE — DISCHARGE INSTR - DIET

## 2022-10-01 NOTE — FLOWSHEET NOTE
10/01/22 1304   Safe Environment   Safety Measures   (Virtual RN rounds complete)         PT did not respond to audio, camera turned on in the interest of pt safety. Resting comfortably in bed, no needs identified.

## 2022-10-01 NOTE — PLAN OF CARE
Problem: Discharge Planning  Goal: Discharge to home or other facility with appropriate resources  10/1/2022 1228 by Bibiana Bernstein RN  Outcome: Progressing  Flowsheets (Taken 10/1/2022 0845)  Discharge to home or other facility with appropriate resources: Identify barriers to discharge with patient and caregiver  10/1/2022 0237 by Aleksandr Walton RN  Outcome: Progressing  Flowsheets (Taken 9/30/2022 2100)  Discharge to home or other facility with appropriate resources:   Identify barriers to discharge with patient and caregiver   Arrange for needed discharge resources and transportation as appropriate   Identify discharge learning needs (meds, wound care, etc)   Arrange for interpreters to assist at discharge as needed   Refer to discharge planning if patient needs post-hospital services based on physician order or complex needs related to functional status, cognitive ability or social support system     Problem: ABCDS Injury Assessment  Goal: Absence of physical injury  10/1/2022 1228 by Bibiana Bernstein RN  Outcome: Progressing  Flowsheets (Taken 10/1/2022 1228)  Absence of Physical Injury: Implement safety measures based on patient assessment  10/1/2022 0237 by Aleksandr Walton RN  Outcome: Progressing     Problem: Safety - Adult  Goal: Free from fall injury  10/1/2022 1228 by Bibiana Bernstein RN  Outcome: Progressing  [address] (Taken 9/28/2022 2212 by Jazlyn Arboleda RN)  Free From Fall Injury: Instruct family/caregiver on patient safety  10/1/2022 0237 by Aleksandr Walton RN  Outcome: Progressing     Problem: Pain  Goal: Verbalizes/displays adequate comfort level or baseline comfort level  10/1/2022 1228 by Bibiana Bernstein RN  Outcome: Progressing  Flowsheets (Taken 9/30/2022 1854 by Beth Medellin RN)  Verbalizes/displays adequate comfort level or baseline comfort level:   Encourage patient to monitor pain and request assistance   Assess pain using appropriate pain scale  10/1/2022 0237 by Amina Espinal Allyssa Helm RN  Outcome: Progressing     Problem: Chronic Conditions and Co-morbidities  Goal: Patient's chronic conditions and co-morbidity symptoms are monitored and maintained or improved  10/1/2022 1228 by Kenan Chapa RN  Outcome: Progressing  Flowsheets (Taken 10/1/2022 0845)  Care Plan - Patient's Chronic Conditions and Co-Morbidity Symptoms are Monitored and Maintained or Improved: Monitor and assess patient's chronic conditions and comorbid symptoms for stability, deterioration, or improvement  10/1/2022 0237 by Shantelle Koenig RN  Outcome: Progressing  Flowsheets (Taken 9/30/2022 2100)  Care Plan - Patient's Chronic Conditions and Co-Morbidity Symptoms are Monitored and Maintained or Improved:   Monitor and assess patient's chronic conditions and comorbid symptoms for stability, deterioration, or improvement   Collaborate with multidisciplinary team to address chronic and comorbid conditions and prevent exacerbation or deterioration   Update acute care plan with appropriate goals if chronic or comorbid symptoms are exacerbated and prevent overall improvement and discharge

## 2022-10-02 LAB — GLUCOSE BLD-MCNC: 99 MG/DL (ref 70–108)

## 2022-10-02 PROCEDURE — 6370000000 HC RX 637 (ALT 250 FOR IP): Performed by: INTERNAL MEDICINE

## 2022-10-02 PROCEDURE — 82948 REAGENT STRIP/BLOOD GLUCOSE: CPT

## 2022-10-02 PROCEDURE — 1240000000 HC EMOTIONAL WELLNESS R&B

## 2022-10-02 RX ADMIN — PANTOPRAZOLE SODIUM 40 MG: 40 TABLET, DELAYED RELEASE ORAL at 06:25

## 2022-10-02 RX ADMIN — FLUOXETINE HYDROCHLORIDE 40 MG: 20 CAPSULE ORAL at 08:47

## 2022-10-02 RX ADMIN — DIGOXIN 125 MCG: 125 TABLET ORAL at 08:47

## 2022-10-02 RX ADMIN — TRAZODONE HYDROCHLORIDE 50 MG: 50 TABLET ORAL at 00:27

## 2022-10-02 RX ADMIN — ASPIRIN 81 MG: 81 TABLET, CHEWABLE ORAL at 08:47

## 2022-10-02 RX ADMIN — ATORVASTATIN CALCIUM 40 MG: 40 TABLET, FILM COATED ORAL at 21:56

## 2022-10-02 RX ADMIN — TRAZODONE HYDROCHLORIDE 50 MG: 50 TABLET ORAL at 21:56

## 2022-10-02 RX ADMIN — LEVOTHYROXINE SODIUM 175 MCG: 0.15 TABLET ORAL at 06:25

## 2022-10-02 RX ADMIN — EMPAGLIFLOZIN 10 MG: 10 TABLET, FILM COATED ORAL at 08:47

## 2022-10-02 RX ADMIN — PANTOPRAZOLE SODIUM 40 MG: 40 TABLET, DELAYED RELEASE ORAL at 16:59

## 2022-10-02 RX ADMIN — METOPROLOL SUCCINATE 12.5 MG: 25 TABLET, EXTENDED RELEASE ORAL at 21:56

## 2022-10-02 RX ADMIN — Medication 19.5 MG: at 21:55

## 2022-10-02 RX ADMIN — BUPROPION HYDROCHLORIDE 150 MG: 150 TABLET, FILM COATED, EXTENDED RELEASE ORAL at 08:47

## 2022-10-02 ASSESSMENT — PAIN - FUNCTIONAL ASSESSMENT: PAIN_FUNCTIONAL_ASSESSMENT: ACTIVITIES ARE NOT PREVENTED

## 2022-10-02 ASSESSMENT — SLEEP AND FATIGUE QUESTIONNAIRES
AVERAGE NUMBER OF SLEEP HOURS: 7
DO YOU HAVE DIFFICULTY SLEEPING: YES
DO YOU USE A SLEEP AID: YES
SLEEP PATTERN: DIFFICULTY FALLING ASLEEP;DISTURBED/INTERRUPTED SLEEP;RESTLESSNESS

## 2022-10-02 ASSESSMENT — LIFESTYLE VARIABLES
HOW OFTEN DO YOU HAVE A DRINK CONTAINING ALCOHOL: NEVER
HOW MANY STANDARD DRINKS CONTAINING ALCOHOL DO YOU HAVE ON A TYPICAL DAY: PATIENT DOES NOT DRINK

## 2022-10-02 ASSESSMENT — PAIN SCALES - GENERAL: PAINLEVEL_OUTOF10: 0

## 2022-10-02 NOTE — DISCHARGE SUMMARY
Hospital Medicine Discharge Summary      Patient Identification:   Yun Bello   : 1960  MRN: [id number]   Account: [de-identified]      Patient's PCP: ANGELIA Chavez CNP    Admit Date: 2022     Discharge Date: 10/1/2022      Admitting Physician: Cindy Holliday MD     Discharge Physician: Lakisha Tsang DO       [facility] Course:   Yun Bello is a 65 y.o. female with PMHx nonischemic cardiomyopathy/HFrEF, history of ICD, type 2 diabetes, history of breast cancer, history of hypertension admitted to [address] [place] [address] on 2022 for chest pain. Per [facility], [facility]. as a transfer from  after a rapid response was called due to the patient having severe chest pain. Patient states that she was in her room when she suddenly felt severe 10 out of 10 chest pain starting in the center of her chest and left side of her chest.  She denies radiation of the pain to her back, to her arm, or to her neck. She reports associated shortness of breath, and difficulty breathing. She states her chest pain is worse with deep breaths. She denies lightheadedness or dizziness. She characterizes the pain as waxing and waning. \"      Initial EKG similar to previous with T wave inversions laterally, troponin was trended negative x3. CTA was also obtained and showed no pulmonary embolism or dissection or other acute pathology other than incidental notation of small right pleural effusion. She was given a GI cocktail and Tylenol which improved her pain. She described the pain as being worse with deep breathing and any movement. There was no recurrence of the pain and the patient was started on twice daily Protonix and as needed Tylenol. She was deemed stable to be discharged back to inpatient psych however on  no beds were available, so patient was discharged on 10/1.       Of note, the patient's blood pressure was on the low side, thus her Entresto and Aldactone were held-recommend continuing to hold this on discharge and can be followed by cardiology on an outpatient basis. The rest of her goal-directed medical therapy was continued, including Toprol, digoxin, Rikki Nicole. Repeat echo showed improvement in her prior EF of now 30 to 35%, previously 20 to 25%. There was no issues or concerns during her stay here as far as SI and a bedside sitter remained present for safety purposes. She was transferred back to  in stable condition medically. Discharge Diagnoses:  Chest pain, suspected GI in origin vs MSK.  ACS has been ruled out with serial negative troponins and unchanged EKG unchanged from prior with TWI in the lateral leads. Troponin negative x3. CXR unremarkable. Improved following GI cocktail and tylenol. Continue BID protonix. Has not had a recurrence since admission. Limited echo shows a slight improvement from prior EF, now 30 to 35%. Chronic HFrEF, non-ischemic cardiomyopathy, history of ICD  Limited echo pending. Last echo 8/2022: EF 20-25% with Grade 2 Ddfx, has now improved to 30 to 35% EF. Holding Entresto and Aldactone due to hypotension  Resume Rikki Nicole if able to  Continue Toprol and digoxin. Not on diuretic therapy. Appears euvolemic  [facility] 5/2022: no significant coronary disease. Hx of HTN  Patient with mild hypotension. Ok to continue BB at low dose, for now but may need to consider DC if persistently hypotensive. Holding patient's Entresto and Aldactone  Suicidal ideation  Previously admitted to  inpatient psych until rapid response for chest pain  Psychiatry following. Sitter at bedside. DMT2  On Ssi. BG well controlled  Hypoglycemic protocol. Hx of breast cancer. Noted    The patient was seen and examined on day of discharge and this discharge summary is in conjunction with any daily progress note from day of discharge. The patient is discharged in stable condition.      Exam:     Vitals:  Vitals:    10/01/22 0230 10/01/22 0845 10/01/22 1849 10/01/22 2108   BP: 110/70 101/76 109/83 113/73   Pulse: 88 88 (!) 102 (!) 102   Resp: 18 18 19 16   Temp: 99.2 °F (37.3 °C) 97.8 °F (36.6 °C) 98.3 °F (36.8 °C) 98.4 °F (36.9 °C)   TempSrc: Oral Oral Oral Oral   SpO2: 99%  98% 96%   Weight:       Height:         Weight: Weight: 192 lb 1.6 oz (87.1 kg)     24 hour intake/output:  Intake/Output Summary (Last 24 hours) at 10/2/2022 1736  Last data filed at 10/1/2022 1837  Gross per 24 hour   Intake 480 ml   Output --   Net 480 ml       General appearance: No apparent distress, well developed, appears stated age. Eyes:  Pupils equal, round, and reactive to light. Conjunctivae/corneas clear. HENT: Head normal in appearance. External nares normal.  Oral mucosa moist without lesions. Hearing grossly intact. Neck: Supple, with full range of motion. Trachea midline. No gross JVD appreciated. Respiratory:  Normal respiratory effort. Clear to auscultation, bilaterally without rales or wheezes or rhonchi. Cardiovascular: Normal rate, regular rhythm with normal S1/S2 without murmurs. No lower extremity edema. Abdomen: Soft, non-tender, non-distended with normal bowel sounds. Musculoskeletal: There is no joint swelling or tenderness. Normal tone. No abnormal movements. Skin: Warm and dry. No rashes or lesions. Neurologic:  No focal sensory/motor deficits in the upper and lower extremities. Cranial nerves:  grossly non-focal 2-12. Psychiatric: Alert and oriented, normal insight and thought content. Capillary Refill: Brisk,< 3 seconds. Peripheral Pulses: +2 palpable, equal bilaterally. Labs:  For convenience and continuity at follow-up the following most recent labs are provided:    CBC:    Lab Results   Component Value Date/Time    WBC 5.6 09/30/2022 05:45 AM    HGB 12.2 09/30/2022 05:45 AM    HCT 41.2 09/30/2022 05:45 AM     09/30/2022 05:45 AM       Renal:    Lab Results   Component Value Date/Time     09/30/2022 05:45 AM    K 4.5 09/30/2022 05:45 AM     09/30/2022 05:45 AM    CO2 26 09/30/2022 05:45 AM    BUN 16 09/30/2022 05:45 AM    CREATININE 0.8 09/30/2022 05:45 AM    CALCIUM 9.4 09/30/2022 05:45 AM    PHOS 4.0 06/10/2022 10:00 AM         Significant Diagnostic Studies    Radiology:   CTA CHEST W WO CONTRAST   Final Result   Impression:   1. Negative for acute pulmonary embolism. 2. Small right pleural effusion. This document has been electronically signed by: Eve Farris MD on    09/29/2022 01:37 AM      All CTs at this facility use dose modulation techniques and iterative    reconstructions, and/or weight-based dosing   when appropriate to reduce radiation to a low as reasonably achievable. 3D Post-processing was performed on this study. Consults:     IP CONSULT TO PSYCHIATRY    Disposition: Psych  Condition at Discharge: Stable    Code Status:  Full Code     Patient Instructions:    Discharge lab work: n/a  Activity: activity as tolerated  Diet: No diet orders on file      Follow-up visits:   No follow-up provider specified. Discharge Medications:        Medication List        CONTINUE taking these medications      CPAP Machine Misc  by Does not apply route Please change CPAP pressure to auto 11-15 cm H20.     * Misc. Devices Misc  Shower Chair     * Cane Misc  Dispense 1 straight cane           * This list has 2 medication(s) that are the same as other medications prescribed for you. Read the directions carefully, and ask your doctor or other care provider to review them with you. ASK your doctor about these medications      acetaminophen 500 MG tablet  Commonly known as: TYLENOL     aspirin 81 MG chewable tablet  Take 1 tablet by mouth daily     atorvastatin 40 MG tablet  Commonly known as: LIPITOR  Take 1 tablet by mouth nightly     buPROPion 150 MG extended release tablet  Commonly known as:  Wellbutrin XL  Take 1 tablet by mouth every morning     dapagliflozin 10 MG tablet  Commonly known as: Farxiga  Take 1 tablet by mouth every morning     digoxin 125 MCG tablet  Commonly known as: LANOXIN  Take 1 tablet by mouth daily     Entresto 24-26 MG per tablet  Generic drug: sacubitril-valsartan  Ask about: Which instructions should I use? FLUoxetine 40 MG capsule  Commonly known as: PROzac  Take 1 capsule by mouth daily     furosemide 40 MG tablet  Commonly known as: LASIX  Ask about: Which instructions should I use?     letrozole 2.5 MG tablet  Commonly known as: Femara  Take 1 tablet by mouth daily     levothyroxine 175 MCG tablet  Commonly known as: Euthyrox  Take 1 tablet by mouth Daily     Melatonin 10 MG Tabs     metoprolol succinate 25 MG extended release tablet  Commonly known as: TOPROL XL  Take 0.5 tablets by mouth daily Hold if SBP less than 95 mmHg or HR less than 50 bpm     mirtazapine 15 MG tablet  Commonly known as: REMERON     omeprazole 40 MG delayed release capsule  Commonly known as: PRILOSEC  Ask about: Which instructions should I use? potassium chloride 10 MEQ extended release tablet  Commonly known as: KLOR-CON M  Take 1 tablet by mouth in the morning. spironolactone 25 MG tablet  Commonly known as: ALDACTONE     traZODone 50 MG tablet  Commonly known as: DESYREL  Take 1 tablet by mouth nightly as needed for Sleep                 Time Spent on discharge is 25 minutes in the examination, evaluation, counseling and review of medications and discharge plan. Thank you Schuyler Walsh APRN - CNP for the opportunity to be involved in this patient's care.       Signed:    Electronically signed by Jamaica Bravo DO on 10/2/22 at 5:36 PM EDT

## 2022-10-02 NOTE — PROGRESS NOTES
Security at bedside to take pt to 4E. Sitter to accompany per nursing supervisor's direction. Pt left in stable condition, with all belongings in wheelchair.

## 2022-10-02 NOTE — PROGRESS NOTES
4E charge called and inquired about status of bed. She stated that she had no idea about pt being readmitted, and said the bed in question had already been assigned to a new pt being admitted. She will check with admitting doctor. House supervisor updated.

## 2022-10-02 NOTE — PLAN OF CARE
Problem: Discharge Planning  Goal: Discharge to home or other facility with appropriate resources  10/1/2022 2200 by Sarah Loera RN  Outcome: Adequate for Discharge  10/1/2022 1820 by Lona Oseguera RN  Outcome: Adequate for Discharge  10/1/2022 1228 by Lona Oseguera RN  Outcome: Progressing  Flowsheets (Taken 10/1/2022 0845)  Discharge to home or other facility with appropriate resources: Identify barriers to discharge with patient and caregiver     Problem: Chronic Conditions and Co-morbidities  Goal: Patient's chronic conditions and co-morbidity symptoms are monitored and maintained or improved  10/1/2022 2200 by Sarah Loera RN  Outcome: Adequate for Discharge  10/1/2022 1820 by Lona Oseguera RN  Outcome: Adequate for Discharge  10/1/2022 1228 by Lona Oseguera RN  Outcome: Progressing  Flowsheets (Taken 10/1/2022 0845)  Care Plan - Patient's Chronic Conditions and Co-Morbidity Symptoms are Monitored and Maintained or Improved: Monitor and assess patient's chronic conditions and comorbid symptoms for stability, deterioration, or improvement

## 2022-10-02 NOTE — PROGRESS NOTES
Daily Progress Note  Kimberly Linder MD  10/2/2022    Reviewed patient's current plan of care and vital signs with nursing staff. Sleep:  4.5 hours last night  Attending groups: No: Too late  She was transferred last night from . She has been on fluoxetine and bupropion XL since her initial admission on this unit last week. No reported Suicidal thought; Limited interaction with peers & staff; Mood 8 on a scale of 1 to 10 with 10 is feeling normal.  She would like to be discharged tomorrow; she has an appointment with endocrinology. She is willing to follow-up outpatient at [facility] and she reports better support with her family especially her son. SUBJECTIVE:    Patient is feeling better. SUICIDAL IDEATION denies suicidal ideation. Patient does not have medication side effects. ROS: Patient has new complaints:  No  Sleeping adequately:  No  Visitors: No    Mental Status Examination:  Patient is cooperative. Speech: Normal rate and tone. No abnormal movements, tics or mannerisms. Mood euthymic; affect normal affect. Suicidal ideation Absent. Homicidal ideations Absent. Hallucinations Absent. Delusions Absent. Thought Content: normal. Thought Processes: Goal-Directed. Alert and oriented X 3. Attention and concentration Fair. MEMORY intact. Insight and Judgement Fair. Data   height is 5' 2\" (1.575 m) and weight is 196 lb (88.9 kg). Her oral temperature is 97 °F (36.1 °C). Her blood pressure is 104/69 and her pulse is 92. Her respiration is 16 and oxygen saturation is 95%.    Labs:   Admission on 10/01/2022   Component Date Value Ref Range Status    POC Glucose 10/02/2022 99  70 - 108 mg/dl Final    Performed at [address] [place] [address], [address]   Admission on 09/28/2022, Discharged on 10/01/2022   Component Date Value Ref Range Status    Glucose 09/28/2022 108  70 - 108 mg/dL Final    Creatinine 09/28/2022 0.8  0.4 - 1.2 mg/dL Final    BUN 09/28/2022 17  7 - 22 mg/dL Final Sodium 09/28/2022 137  135 - 145 meq/L Final    Potassium reflex Magnesium 09/28/2022 4.5  3.5 - 5.2 meq/L Final    Chloride 09/28/2022 100  98 - 111 meq/L Final    CO2 09/28/2022 26  23 - 33 meq/L Final    Calcium 09/28/2022 9.0  8.5 - 10.5 mg/dL Final    AST 09/28/2022 27  5 - 40 U/L Final    Alkaline Phosphatase 09/28/2022 136 (A)  38 - 126 U/L Final    Total Protein 09/28/2022 6.5  6.1 - 8.0 g/dL Final    Albumin 09/28/2022 3.6  3.5 - 5.1 g/dL Final    Total Bilirubin 09/28/2022 0.6  0.3 - 1.2 mg/dL Final    ALT 09/28/2022 20  11 - 66 U/L Final    Performed at [address] [place] [address], [address]    WBC 09/28/2022 7.7  4.8 - 10.8 thou/mm3 Final    RBC 09/28/2022 5.23  4.20 - 5.40 mill/mm3 Final    Hemoglobin 09/28/2022 12.8  12.0 - 16.0 gm/dl Final    Hematocrit 09/28/2022 43.3  37.0 - 47.0 % Final    MCV 09/28/2022 82.8  81.0 - 99.0 fL Final    MCH 09/28/2022 24.5 (A)  26.0 - 33.0 pg Final    MCHC 09/28/2022 29.6 (A)  32.2 - 35.5 gm/dl Final    RDW-CV 09/28/2022 25.4 (A)  11.5 - 14.5 % Final    RDW-SD 09/28/2022 73.0 (A)  35.0 - 45.0 fL Final    Platelets 88/73/4051 162  130 - 400 thou/mm3 Final    MPV 09/28/2022 ----  9.4 - 12.4 fL Final    Seg Neutrophils 09/28/2022 74.4  % Final    Lymphocytes 09/28/2022 15.6  % Final    Monocytes 09/28/2022 7.6  % Final    Eosinophils 09/28/2022 1.6  % Final    Basophils 09/28/2022 0.3  % Final    Immature Granulocytes 09/28/2022 0.5  % Final    Platelet Estimate 20/48/3757 ADEQUATE  Adequate Final    Segs Absolute 09/28/2022 5.7  1.8 - 7.7 thou/mm3 Final    Lymphocytes Absolute 09/28/2022 1.2  1.0 - 4.8 thou/mm3 Final    Monocytes Absolute 09/28/2022 0.6  0.4 - 1.3 thou/mm3 Final    Eosinophils Absolute 09/28/2022 0.1  0.0 - 0.4 thou/mm3 Final    Basophils Absolute 09/28/2022 0.0  0.0 - 0.1 thou/mm3 Final    Immature Grans (Abs) 09/28/2022 0.04  0.00 - 0.07 thou/mm3 Final    nRBC 09/28/2022 0  /100 wbc Final    Anisocytosis 09/28/2022 PRESENT  Absent Final    Performed at [facility], [address]    Troponin T 09/28/2022 < 0.010  ng/ml Final    Comment: <0.010 ng/ml        Normal  > or = 0.010 ng/ml  Elevated   (99%)                      Consistent with myocardial damage    Cardiac troponin values can be elevated by many disease states in addition  to acute ischemia. These include, but are not limited to: chronic renal  failure, CHF, CVA, pulmonary embolus, COPD, myocardial trauma/surgery,  myocarditis, pericarditis, tachycardia, aortic dissection, amyloidosis,  sepsis and strenuous exercise. Serial measurement of troponin is strongly  recommended as a first step in determining whether a low level elevation  represents an acute or chronic condition. Performed at [facility], [address]      Troponin T 09/28/2022 < 0.010  ng/ml Final    Comment: <0.010 ng/ml        Normal  > or = 0.010 ng/ml  Elevated   (99%)                      Consistent with myocardial damage    Cardiac troponin values can be elevated by many disease states in addition  to acute ischemia. These include, but are not limited to: chronic renal  failure, CHF, CVA, pulmonary embolus, COPD, myocardial trauma/surgery,  myocarditis, pericarditis, tachycardia, aortic dissection, amyloidosis,  sepsis and strenuous exercise. Serial measurement of troponin is strongly  recommended as a first step in determining whether a low level elevation  represents an acute or chronic condition. Performed at [address] [facility], [address]      Troponin T 09/28/2022 < 0.010  ng/ml Final    Comment: <0.010 ng/ml        Normal  > or = 0.010 ng/ml  Elevated   (99%)                      Consistent with myocardial damage    Cardiac troponin values can be elevated by many disease states in addition  to acute ischemia.  These include, but are not limited to: chronic renal  failure, CHF, CVA, pulmonary embolus, COPD, myocardial trauma/surgery,  myocarditis, pericarditis, tachycardia, aortic dissection, amyloidosis,  sepsis and strenuous exercise. Serial measurement of troponin is strongly  recommended as a first step in determining whether a low level elevation  represents an acute or chronic condition. Performed at [address] [facility], [address]      [address] Glucose 09/28/2022 144 (A)  70 - 108 mg/dl Final    Performed at [address] [facility], [address]    POC Glucose 09/29/2022 108  70 - 108 mg/dl Final    Performed at [address] [place] [address], [address]    Anion Gap 09/28/2022 11.0  8.0 - 16.0 meq/L Final    Comment: ANION GAP = Sodium -(Chloride + CO2)  Performed at [address] [facility], [facility]      Est, Sharyle Clore Filt Rate 09/28/2022 73 (A)  ml/min/1.73m2 Final    Comment: Stage Description                    GFR, ml/min/1.73 m2   -   At increased risk               > or = 60 (with chronic                                       kidney disease risk factors)   1   Normal or increased GFR         > or = 90   2   Mildly or decreased GFR         60 - 89   3   Moderately decreased GFR        30 - 59   4   Severely decreased GFR          15 - 29   5   Kidney failure                  <15 (or dialysis)  Estimated GFR calculated using abbreviated MDRD formula as  recommended by [employer]. Calculation based  upon serum creatinine and adjusted for age, gender & race. Hoda. Internal Med., Vol. 139 (2) pg 137-147.   Performed at [address] [facility], [address]      Glucose 09/29/2022 103  70 - 108 mg/dL Final    Creatinine 09/29/2022 0.6  0.4 - 1.2 mg/dL Final    BUN 09/29/2022 15  7 - 22 mg/dL Final    Sodium 09/29/2022 135  135 - 145 meq/L Final    Potassium reflex Magnesium 09/29/2022 4.1  3.5 - 5.2 meq/L Final    Chloride 09/29/2022 101  98 - 111 meq/L Final    CO2 09/29/2022 23  23 - 33 meq/L Final    Calcium 09/29/2022 9.0  8.5 - 10.5 mg/dL Final    AST 09/29/2022 23  5 - 40 U/L Final    Alkaline Phosphatase 09/29/2022 122  38 - 126 U/L Final    Total Protein 09/29/2022 6.1  6.1 - 8.0 g/dL Final    Albumin 09/29/2022 3.2 (A)  3.5 - 5.1 g/dL Final    Total Bilirubin 09/29/2022 1.1  0.3 - 1.2 mg/dL Final    ALT 09/29/2022 22  11 - 66 U/L Final    Performed at [address] [place] [address], [address]    WBC 09/29/2022 6.2  4.8 - 10.8 thou/mm3 Final    RBC 09/29/2022 4.97  4.20 - 5.40 mill/mm3 Final    Hemoglobin 09/29/2022 12.2  12.0 - 16.0 gm/dl Final    Hematocrit 09/29/2022 40.6  37.0 - 47.0 % Final    MCV 09/29/2022 81.7  81.0 - 99.0 fL Final    MCH 09/29/2022 24.5 (A)  26.0 - 33.0 pg Final    MCHC 09/29/2022 30.0 (A)  32.2 - 35.5 gm/dl Final    RDW-CV 09/29/2022 25.5 (A)  11.5 - 14.5 % Final    RDW-SD 09/29/2022 72.9 (A)  35.0 - 45.0 fL Final    Platelets 21/15/6951 155  130 - 400 thou/mm3 Final    MPV 09/29/2022 ----  9.4 - 12.4 fL Final    Seg Neutrophils 09/29/2022 64.0  % Final    Lymphocytes 09/29/2022 24.4  % Final    Monocytes 09/29/2022 9.3  % Final    Eosinophils 09/29/2022 1.3  % Final    Basophils 09/29/2022 0.5  % Final    Immature Granulocytes 09/29/2022 0.5  % Final    Segs Absolute 09/29/2022 4.0  1.8 - 7.7 thou/mm3 Final    Lymphocytes Absolute 09/29/2022 1.5  1.0 - 4.8 thou/mm3 Final    Monocytes Absolute 09/29/2022 0.6  0.4 - 1.3 thou/mm3 Final    Eosinophils Absolute 09/29/2022 0.1  0.0 - 0.4 thou/mm3 Final    Basophils Absolute 09/29/2022 0.0  0.0 - 0.1 thou/mm3 Final    Immature Grans (Abs) 09/29/2022 0.03  0.00 - 0.07 thou/mm3 Final    nRBC 09/29/2022 0  /100 wbc Final    Anisocytosis 09/29/2022 PRESENT  Absent Final    Performed at [facility], [address]    Magnesium 09/28/2022 2.2  1.6 - 2.4 mg/dL Final    Performed at [address] [facility], [address]    Pro-BNP 09/28/2022 4557.0 (A)  0.0 - 900.0 pg/mL Final    Comment:   Values < 300 pg/ml \"rule out\", or make the probability of heart failure   highly unlikely. Values which \"rule in\" heart failue are as follows: AGE                  RANGE         <50 years            >450 pg/ml         50-75 years          >900 pg/ml         >75 years            >1800 pg/ml  Performed at [address] [place] [address], [address]      POC Glucose 09/29/2022 111 (A)  70 - 108 mg/dl Final    Performed at [address] [place] [address], [address]    [address] [facility] BLOOD SMEAR 09/28/2022 see below   Final    Comment: Criteria Exceeded; Scan of Differential Slide Performed  Performed at [address] [place] [address], [address]      D-Dimer, Wade Bronwyn 09/28/2022 862.00 (A)  0.00 - 500.00 ng/ml FEU Final    Comment: NEGATIVE REFERENCE RANGE:  0-500 ng/mL(FEU)    NEGATIVE:  With a low to medium pretest probability  (Wells  score), a normal result rules out a pulmonary embolism and  venous thrombosis. POSITIVE REFERENCE RANGE:  >500 ng/mL(FEU)    POSITIVE:  It is recommended that when an abnormal D-Dimer  concentration (>500 ng/mL) is obtained along with clinically  significant risk factors, symptoms or signs of DVT or PE, that  further diagnostic testing be performed.   Performed at [address] [place] [address], [address]      Lipase 09/28/2022 34.5  5.6 - 51.3 U/L Final    Performed at [address] [place] [address], [address]    Ventricular Rate 09/29/2022 93  BPM Final    Atrial Rate 09/29/2022 93  BPM Final    P-R Interval 09/29/2022 192  ms Final    QRS Duration 09/29/2022 106  ms Final    Q-T Interval 09/29/2022 388  ms Final    QTc Calculation (Bazett) 09/29/2022 482  ms Final    P Axis 09/29/2022 30  degrees Final    R Axis 09/29/2022 -26  degrees Final    T Axis 09/29/2022 32  degrees Final    POC Glucose 09/30/2022 155 (A)  70 - 108 mg/dl Final    Performed at [address] [place] [address], [address]    Anion Gap 09/29/2022 11.0 8.0 - 16.0 meq/L Final    Comment: ANION GAP = Sodium -(Chloride + CO2)  Performed at [address], [address]      Marylen Cart Rate 09/29/2022 >90  ml/min/1.73m2 Final    Comment: Stage Description                    GFR, ml/min/1.73 m2   -   At increased risk               > or = 60 (with chronic                                       kidney disease risk factors)   1   Normal or increased GFR         > or = 90   2   Mildly or decreased GFR         60 - 89   3   Moderately decreased GFR        30 - 59   4   Severely decreased GFR          15 - 29   5   Kidney failure                  <15 (or dialysis)  Estimated GFR calculated using abbreviated MDRD formula as  recommended by [employer]. Calculation based  upon serum creatinine and adjusted for age, gender & race. Hoda. Internal Med., Vol. 139 (2) pg 137-147.   Performed at [address], [address]      [address] 09/29/2022 see below   Final    Comment: Criteria Exceeded; Scan of Differential Slide Performed  Performed at [address], [address]      POC Glucose 09/30/2022 71  70 - 108 mg/dl Final    Performed at [address] [place] [id number]    Glucose 09/30/2022 93  70 - 108 mg/dL Final    Creatinine 09/30/2022 0.8  0.4 - 1.2 mg/dL Final    BUN 09/30/2022 16  7 - 22 mg/dL Final    Sodium 09/30/2022 141  135 - 145 meq/L Final    Potassium reflex Magnesium 09/30/2022 4.5  3.5 - 5.2 meq/L Final    Chloride 09/30/2022 103  98 - 111 meq/L Final    CO2 09/30/2022 26  23 - 33 meq/L Final    Calcium 09/30/2022 9.4  8.5 - 10.5 mg/dL Final    AST 09/30/2022 28  5 - 40 U/L Final    Alkaline Phosphatase 09/30/2022 155 (A)  38 - 126 U/L Final    Total Protein 09/30/2022 5.7 (A)  6.1 - 8.0 g/dL Final    Albumin 09/30/2022 3.2 (A)  3.5 - 5.1 g/dL Final    Total Bilirubin 09/30/2022 0.8  0.3 - 1.2 mg/dL Final    ALT 09/30/2022 27  11 - 66 U/L Final    Performed at [address] [place] [address], [address]    WBC 09/30/2022 5.6  4.8 - 10.8 thou/mm3 Final    RBC 09/30/2022 4.97  4.20 - 5.40 mill/mm3 Final    Hemoglobin 09/30/2022 12.2  12.0 - 16.0 gm/dl Final    Hematocrit 09/30/2022 41.2  37.0 - 47.0 % Final    MCV 09/30/2022 82.9  81.0 - 99.0 fL Final    MCH 09/30/2022 24.5 (A)  26.0 - 33.0 pg Final    MCHC 09/30/2022 29.6 (A)  32.2 - 35.5 gm/dl Final    RDW-CV 09/30/2022 25.3 (A)  11.5 - 14.5 % Final    RDW-SD 09/30/2022 73.4 (A)  35.0 - 45.0 fL Final    Platelets 17/90/6495 163  130 - 400 thou/mm3 Final    MPV 09/30/2022 11.6  9.4 - 12.4 fL Final    Seg Neutrophils 09/30/2022 60.2  % Final    Lymphocytes 09/30/2022 26.2  % Final    Monocytes 09/30/2022 9.9  % Final    Eosinophils 09/30/2022 2.7  % Final    Basophils 09/30/2022 0.5  % Final    Immature Granulocytes 09/30/2022 0.5  % Final    Segs Absolute 09/30/2022 3.4  1.8 - 7.7 thou/mm3 Final    Lymphocytes Absolute 09/30/2022 1.5  1.0 - 4.8 thou/mm3 Final    Monocytes Absolute 09/30/2022 0.6  0.4 - 1.3 thou/mm3 Final    Eosinophils Absolute 09/30/2022 0.2  0.0 - 0.4 thou/mm3 Final    Basophils Absolute 09/30/2022 0.0  0.0 - 0.1 thou/mm3 Final    Immature Grans (Abs) 09/30/2022 0.03  0.00 - 0.07 thou/mm3 Final    nRBC 09/30/2022 0  /100 wbc Final    Anisocytosis 09/30/2022 PRESENT  Absent Final    Performed at [address] [place] [address], [address]    POC Glucose 09/30/2022 113 (A)  70 - 108 mg/dl Final    Performed at [address] [place] [address], [address]    [address] Glucose 10/01/2022 105  70 - 108 mg/dl Final    Performed at [address] [place] [address], [address]    POC Glucose 10/01/2022 90  70 - 108 mg/dl Final    Performed at [address] [place] [id number]    Anion Gap 09/30/2022 12.0  8.0 - 16.0 meq/L Final    Comment: ANION GAP = Sodium -(Chloride + CO2)  Performed at [facility] [address] [facility], [address]      Est, Jorden Filt Rate 09/30/2022 73 (A)  ml/min/1.73m2 Final    Comment: Stage Description                    GFR, ml/min/1.73 m2   -   At increased risk               > or = 60 (with chronic                                       kidney disease risk factors)   1   Normal or increased GFR         > or = 90   2   Mildly or decreased GFR         60 - 89   3   Moderately decreased GFR        30 - 59   4   Severely decreased GFR          15 - 29   5   Kidney failure                  <15 (or dialysis)  Estimated GFR calculated using abbreviated MDRD formula as  recommended by [employer]. Calculation based  upon serum creatinine and adjusted for age, gender & race. Hoda. Internal Med., Vol. 139 (2) pg 137-147.   [facility], [address]      [address] 09/30/2022 see below   Final    Comment: Criteria Exceeded; Scan of Differential Slide Performed  Performed at [address] [place] [address], [address]      [address] Glucose 10/01/2022 84  70 - 108 mg/dl Final    Performed at [address], [address]    POC Glucose 10/01/2022 88  70 - 108 mg/dl Final    Performed at [address] [place] [id number]            Medications  Current Facility-Administered Medications: aspirin chewable tablet 81 mg, 81 mg, Oral, Daily  atorvastatin (LIPITOR) tablet 40 mg, 40 mg, Oral, Nightly  buPROPion (WELLBUTRIN XL) extended release tablet 150 mg, 150 mg, Oral, QAM  acetaminophen (TYLENOL) tablet 650 mg, 650 mg, Oral, Q6H PRN **OR** acetaminophen (TYLENOL) suppository 650 mg, 650 mg, Rectal, Q6H PRN  digoxin (LANOXIN) tablet 125 mcg, 125 mcg, Oral, Daily  FLUoxetine (PROZAC) capsule 40 mg, 40 mg, Oral, Daily  traZODone (DESYREL) tablet 50 mg, 50 mg, Oral, Nightly PRN  polyethylene glycol (GLYCOLAX) packet 17 g, 17 g, Oral, Daily PRN  pantoprazole (PROTONIX) tablet 40 mg, 40 mg, Oral, BID AC  metoprolol succinate (TOPROL XL) extended release tablet 12.5 mg, 12.5 mg, Oral, Nightly  melatonin tablet 19.5 mg, 19.5 mg, Oral, Nightly  levothyroxine (SYNTHROID) tablet 175 mcg, 175 mcg, Oral, Daily  empagliflozin (JARDIANCE) tablet 10 mg, 10 mg, Oral, Daily    ASSESSMENT  MDD (major depressive disorder), recurrent severe, without psychosis ([facility] [address]     PLAN  Patient's symptoms are improving  Continue with current medications. Attempt to develop insight  Psycho-education conducted. Supportive Therapy conducted.   Probable discharge is tomorrow  Follow-up @ [address]        Patient Location:  [address]     Provider Location (City/State):   [place]

## 2022-10-02 NOTE — GROUP NOTE
Group Therapy Note    Date: 10/2/2022    Group Start Time: 9776  Group End Time: 0560  Group Topic: Healthy Living/Wellness    [facility] 4[facility]    Author MAGGIE Duke        Group Therapy Note    Attendees: 7       Notes:  attended    Status After Intervention:  Improved    Participation Level:  Active Listener and Interactive    Participation Quality: Appropriate, Attentive, and Sharing      Speech:  normal      Thought Process/Content: Logical      Affective Functioning: Flat      Level of consciousness:  Alert, Oriented x4, and Attentive      Response to Learning: Able to verbalize current knowledge/experience, Able to verbalize/acknowledge new learning, and Able to retain information      Endings: None Reported    Modes of Intervention: Education and Socialization      Discipline Responsible: Licensed Practical Nurse      Signature:  Author Srikanth LPN

## 2022-10-02 NOTE — GROUP NOTE
Group Therapy Note    Date: 10/2/2022    Group Start Time: 1000  Group End Time: 1030  Group Topic: [employer]    [facility] 4[facility]    Herminia Tran MA, CTRS        Group Therapy Note    Attendees: 10       Patient's Goal:  \"Keep working through my sadness and depression. \"    Notes:  pt progress is ongoing    Status After Intervention:  Improved    Participation Level:  Active Listener and Interactive    Participation Quality: Appropriate, Attentive, and Sharing      Speech:  normal      Thought Process/Content: Logical      Affective Functioning: Flat      Mood: euthymic      Level of consciousness:  Alert, Oriented x4, and Attentive      Response to Learning: Able to verbalize current knowledge/experience, Able to verbalize/acknowledge new learning, Able to retain information, Capable of insight, Able to change behavior, and Progressing to goal      Endings: None Reported    Modes of Intervention: Education, Support, Socialization, Exploration, Clarifying, Activity, Limit-setting, and Reality-testing      Discipline Responsible: Psychoeducational Specialist      Signature:  Herminia Tran MA, CTRS

## 2022-10-02 NOTE — BH NOTE
Behavioral Health   Admission Note   Admission Type: Voluntary    Reason for Admission: mdd    Patient Strengths/Barriers  Strengths (Must Choose Two): Spirituality, Stable housing, Support from family  Barriers: Other (comment) (sick and broken heargt)    Addictive Behavior  In the Past 3 Months, Have You Felt or Has Someone Told You That You Have a Problem With  : None    Medical Problems:   Past Medical History:   Diagnosis Date    Abnormal heart rhythm     Anxiety     Cancer ([address]     breast  2016     CHF (congestive heart failure) (HCC)     Congenital heart disease     DJD (degenerative joint disease)     Enlarged lymph nodes     Hypertension     Hypothyroidism     ICD (implantable cardioverter-defibrillator) in place 02/11/2019    Dr. Georgie Gatica    Kidney stones     PONV (postoperative nausea and vomiting)     Prediabetes 10/7/2019    Thyroid disease        Status EXAM:  Mental Status and Behavioral Exam  Normal: No  Level of Assistance: Independent/Self  Facial Expression: Flat  Affect: Blunt  Level of Consciousness: Alert  Frequency of Checks: 4 times per hour, close  Mood:Normal: No  Mood: Anxious  Motor Activity:Normal: Yes  Eye Contact: Fair  Observed Behavior: Cooperative, Friendly  Sexual Misconduct History: Current - no  Preception: [place] to person, [place] to time, [place] to place, [place] to situation  Attention:Normal: Yes  Thought Processes: Circumstantial  Thought Content:Normal: Yes  Depression Symptoms: Sleep disturbance, Feelings of helplessness  Anxiety Symptoms: Generalized  Erum Symptoms: No problems reported or observed. Hallucinations: None  Delusions: No  Memory:Normal: Yes  Insight and Judgment: No  Insight and Judgment: Poor judgment, Poor insight    Pt admitted with followings belongings:  Dental Appliances: Uppers  Vision - Corrective Lenses: None  Hearing Aid: None  Jewelry: Ring  Body Piercings Removed: N/A  Clothing: Footwear, Pants, Shirt, Sweater  Other Valuables:  Other (Comment) (none)     Admission order obtained Yes   Patient oriented to surroundings and program expectations and copy of patient rights given. Received admission packet:  Yes  Consents reviewed, signed Yes. Outcomes Questionnaire completed Yes. \"An Important Message from [employer] About Your Rights\" form reviewed, signed: N/A . Patient verbalize understanding: Yes. Patient education on precautions: YES/NO/NA: yes          Patient screened positive for suicide risk on CSSR-S (\"yes\" to question #4, 5, OR 6)  denies. Physician notified of risk score. Orders received yes . 2 person skin assessment completed upon admission declined. Explained patients right to have family, representative or physician notified of their admission. Patient has Declined for physician to be notified. Patient has Declined for family/representative to be notified. Provided pt with [employer] handout entitled \"Quitting Smoking. \"  Reviewed handout with pt addressing dangers of smoking, developing coping skills, and providing basic information about quitting. Pt response to counseling:  na    Admission summary: Patient arrived from [facility] via wheelchair under voluntary admission with campus police escort. Patient is alert and oriented and calm and cooperative. Patient denies all. Patient reports she had originally came to the ER due to chest pain and had answered a question that she does have depression at times and does think of ending her life at times but states she had no plans. Patient states that sometimes at night when she is home she begins to think of the old days when she was younger and in better health and also she thinks of how her  left her for another woman and this will at times makes her have thoughts but patient denies thoughts now and states that she has positive will to live and has coping skills and plans to help her.  She denies any suicidal thoughts while in the hospital.           Justin Yu RN

## 2022-10-02 NOTE — BH NOTE
INPATIENT RECREATIONAL THERAPY  ADULT BEHAVIORAL SERVICES  EVALUATION    REFERRING PHYSICIAN:  Dr. Ajit Calle  DIAGNOSIS:   Major Depressive Disorder  PRECAUTIONS:  Standard Precautions    HISTORY OF PRESENT ILLNESS/INJURY: Patient was admitted to the unit due to suicidal ideation and depression. Patient reported having thoughts of taking an overdose of sleeping pills with alcohol prior to admission. Patient stated that she has relationship stress with her ex- (who lives in [place]). Patient reported having homicidal thoughts toward him and his ex-'s girlfriend. Patient also stated that she has heart issues and was recently taken off of the heart transplant since she was doing so well. Patient continues to worry about her heart and originally came into the ED for chest pain. Pt was a discharge readmit to  for chest pain. PMH:  Please see medical chart for prior medical history, allergies, and medications. HISTORY OF PSYCHIATRIC TREATMENT: Pt had been admitted to the unit on 6/1/2022. Pt was supposed to follow-up with [facility] after her last admission but did not go as she did not have transportation. YOB: 1960  GENDER:  Female    MARITAL STATUS:   with 3 sons  EMPLOYMENT STATUS:  Pt is unemployed. LIVING SITUATION/SUPPORT:  Pt lives with her son and daughter-in-law  EDUCATIONAL LEVEL: Pt reports being a  graduate  MEDICATION/DRUG USE: Pt believes she is still taking her psychotropics through her primary care physician, but she is not sure.     LEISURE INTERESTS:  watching TV/ Movies, listening to music, spiritual activities, activities with her family, going out to eat, cleaning, cooking  ACTIVITY PREFERENCE: small group  ACTIVITY TYPES:  passive, active, indoor, outdoor  COGNITION: A&Ox4    COPING: poor  ATTENTION: fair  RELAXATION: pt reports anxiety, panic attacks, and poor sleep  SELF-ESTEEM: poor  MOTIVATION:  poor    SOCIAL SKILLS:  fair  FRUSTRATION TOLERANCE: No hx of violence noted at this time  ATTENTION SEEKING: Pt has a hx of self-harm. COOPERATION: pt is cooperative  AFFECT: pt displays a blunted affect  APPEARANCE: pt displays fair grooming and hygiene    HEARING:  No impairments noted at this time  VISION: No impairments noted at this time  VERBAL COMMUNICATION:  Pt speaks Monegasque and Youlanda Eisenmenger  WRITTEN COMMUNICATION:  No impairments noted at this time    COORDINATION: No impairments noted at this time  MOBILITY: Pt ambulates independently  GOALS: Pt will improve socialization and knowledge of coping skills through participation of at least two group therapy sessions, daily, following encouragement from staff.

## 2022-10-02 NOTE — PLAN OF CARE
Problem: Chronic Conditions and Co-morbidities  Goal: Patient's chronic conditions and co-morbidity symptoms are monitored and maintained or improved  Outcome: Progressing     Problem: Anxiety  Goal: Will report anxiety at manageable levels  Description: INTERVENTIONS:  1. Administer medication as ordered  2. Teach and rehearse alternative coping skills  3. Provide emotional support with 1:1 interaction with staff  Outcome: Progressing  Note: Verbalizes feeling \"a little anxious\" Denies need for medication. Problem: Coping  Goal: Pt/Family able to verbalize concerns and demonstrate effective coping strategies  Description: INTERVENTIONS:  1. Assist patient/family to identify coping skills, available support systems and cultural and spiritual values  2. Provide emotional support, including active listening and acknowledgement of concerns of patient and caregivers  3. Reduce environmental stimuli, as able  4. Instruct patient/family in relaxation techniques, as appropriate  5. Assess for spiritual pain/suffering and initiate Spiritual Care, Psychosocial Clinical Specialist consults as needed  Outcome: Progressing  Note: Patient reports some coping skills. Patient is  attending therapeutic groups to gain insight on mental illness and learn positive coping skills. some     Problem: Behavior  Goal: Pt/Family maintain appropriate behavior and adhere to behavioral management agreement, if implemented  Description: INTERVENTIONS:  1. Assess patient/family's coping skills and  non-compliant behavior (including use of illegal substances)  2. Notify security of behavior or suspected illegal substances which indicate the need for search of the family and/or belongings  3. Encourage verbalization of thoughts and concerns in a socially appropriate manner  4. Utilize positive, consistent limit setting strategies supporting safety of patient, staff and others  5.  Encourage participation in the decision making process about the behavioral management agreement  6. If a visitor's behavior poses a threat to safety call refer to organization policy. 7. Initiate consult with , Psychosocial CNS, Spiritual Care as appropriate  Outcome: Progressing     Problem: Depression/Self Harm  Goal: Effect of psychiatric condition will be minimized and patient will be protected from self harm  Description: INTERVENTIONS:  1. Assess impact of patient's symptoms on level of functioning, self care needs and offer support as indicated  2. Assess patient/family knowledge of depression, impact on illness and need for teaching  3. Provide emotional support, presence and reassurance  4. Assess for possible suicidal thoughts or ideation. If patient expresses suicidal thoughts or statements do not leave alone, initiate Suicide Precautions, move to a room close to the nursing station and obtain sitter  5. Initiate consults as appropriate with Mental Health Professional, Spiritual Care, Psychosocial CNS, and consider a recommendation to the LIP for a Psychiatric Consultation  Outcome: Progressing  Note: No self harm behaviors were observed or reported so far this shift. Remains on every 15 minutes precautions for safety. Problem: Behavior  Goal: Pt/Family maintain appropriate behavior and adhere to behavioral management agreement, if implemented  Description: INTERVENTIONS:  1. Assess patient/family's coping skills and  non-compliant behavior (including use of illegal substances)  2. Notify security of behavior or suspected illegal substances which indicate the need for search of the family and/or belongings  3. Encourage verbalization of thoughts and concerns in a socially appropriate manner  4. Utilize positive, consistent limit setting strategies supporting safety of patient, staff and others  5. Encourage participation in the decision making process about the behavioral management agreement  6.  If a visitor's behavior poses a threat to safety call refer to organization policy. 7. Initiate consult with , Psychosocial CNS, Spiritual Care as appropriate  Outcome: Progressing     Problem: Depression/Self Harm  Goal: Effect of psychiatric condition will be minimized and patient will be protected from self harm  Description: INTERVENTIONS:  1. Assess impact of patient's symptoms on level of functioning, self care needs and offer support as indicated  2. Assess patient/family knowledge of depression, impact on illness and need for teaching  3. Provide emotional support, presence and reassurance  4. Assess for possible suicidal thoughts or ideation. If patient expresses suicidal thoughts or statements do not leave alone, initiate Suicide Precautions, move to a room close to the nursing station and obtain sitter  5. Initiate consults as appropriate with Mental Health Professional, Spiritual Care, Psychosocial CNS, and consider a recommendation to the LIP for a Psychiatric Consultation  Outcome: Progressing  Note: No self harm behaviors were observed or reported so far this shift. Remains on every 15 minutes precautions for safety. Problem: Spiritual Care  Goal: Pt/Family able to move forward in process of forgiving self, others, and/or higher power  Description: INTERVENTIONS:  1. Assist patient/family to overcome blocks to healing by use of spiritual practices (prayer, meditation, guided imagery, reiki, breath work, etc). 2. De-myth guilt and help patient/family identify possible irrational spiritual/cultural beliefs and values. 3. Explore possibilities of making amends & reconciliation with self, others, and/or a greater power. 4. Guide patient/family in identifying painful feelings of guilt. 5. Help patient/famiy explore and identify spiritual beliefs, cultural understandings or values that may help or hinder letting go of issue. 6. Help patient/family explore feelings of anger, bitterness, resentment.   7. Help patient/family identify and examine the situation in which these feelings are experienced. 8. Help patient/family identify destructive displacement of feelings onto other individuals. 9. Invite use of sacraments/rituals/ceremonies as appropriate (e.g. - confession, anointing, smudging). 10. Refer patient/family to formal counseling and/or to nghia community for further support work. Outcome: Progressing     Problem: Pain  Goal: Verbalizes/displays adequate comfort level or baseline comfort level  Outcome: Progressing     Problem: ABCDS Injury Assessment  Goal: Absence of physical injury  Outcome: Progressing   Care plan reviewed with patient. Patient verbalize understanding of the plan of care and contribute to goal setting.

## 2022-10-03 VITALS
RESPIRATION RATE: 16 BRPM | HEART RATE: 96 BPM | OXYGEN SATURATION: 98 % | DIASTOLIC BLOOD PRESSURE: 78 MMHG | HEIGHT: 62 IN | TEMPERATURE: 98.9 F | WEIGHT: 196 LBS | SYSTOLIC BLOOD PRESSURE: 97 MMHG | BODY MASS INDEX: 36.07 KG/M2

## 2022-10-03 LAB — GLUCOSE BLD-MCNC: 121 MG/DL (ref 70–108)

## 2022-10-03 PROCEDURE — 82948 REAGENT STRIP/BLOOD GLUCOSE: CPT

## 2022-10-03 PROCEDURE — 6370000000 HC RX 637 (ALT 250 FOR IP): Performed by: INTERNAL MEDICINE

## 2022-10-03 RX ORDER — BUPROPION HYDROCHLORIDE 150 MG/1
150 TABLET ORAL EVERY MORNING
Qty: 30 TABLET | Refills: 0 | Status: SHIPPED | OUTPATIENT
Start: 2022-10-04

## 2022-10-03 RX ORDER — HYDROXYZINE 50 MG/1
50 TABLET, FILM COATED ORAL EVERY 8 HOURS PRN
Qty: 90 TABLET | Refills: 0 | Status: SHIPPED | OUTPATIENT
Start: 2022-10-03 | End: 2022-11-02

## 2022-10-03 RX ORDER — TRAZODONE HYDROCHLORIDE 50 MG/1
50 TABLET ORAL NIGHTLY PRN
Qty: 30 TABLET | Refills: 0 | Status: SHIPPED | OUTPATIENT
Start: 2022-10-03

## 2022-10-03 RX ORDER — FLUOXETINE HYDROCHLORIDE 40 MG/1
40 CAPSULE ORAL DAILY
Qty: 30 CAPSULE | Refills: 0 | Status: SHIPPED | OUTPATIENT
Start: 2022-10-03

## 2022-10-03 RX ADMIN — DIGOXIN 125 MCG: 125 TABLET ORAL at 08:50

## 2022-10-03 RX ADMIN — FLUOXETINE HYDROCHLORIDE 40 MG: 20 CAPSULE ORAL at 08:50

## 2022-10-03 RX ADMIN — ASPIRIN 81 MG: 81 TABLET, CHEWABLE ORAL at 08:50

## 2022-10-03 RX ADMIN — BUPROPION HYDROCHLORIDE 150 MG: 150 TABLET, FILM COATED, EXTENDED RELEASE ORAL at 08:50

## 2022-10-03 RX ADMIN — PANTOPRAZOLE SODIUM 40 MG: 40 TABLET, DELAYED RELEASE ORAL at 08:50

## 2022-10-03 RX ADMIN — EMPAGLIFLOZIN 10 MG: 10 TABLET, FILM COATED ORAL at 08:50

## 2022-10-03 RX ADMIN — LEVOTHYROXINE SODIUM 175 MCG: 0.15 TABLET ORAL at 08:50

## 2022-10-03 ASSESSMENT — PAIN SCALES - GENERAL: PAINLEVEL_OUTOF10: 0

## 2022-10-03 NOTE — BH NOTE
Group Therapy Note    Date: 10/3/2022  Start Time:  1000  End Time:   0066  Number of Participants: 8    Type of Group: Recreational/Social    Patient's Goal:  Increase socialization and self-expression. Notes:  Patient observed others participating in [employer] but did not actively participate. Patient also left the group early. Status After Intervention:  Unchanged    Participation Level:  Active Listener    Participation Quality: Resistant      Speech:  mute      Thought Process/Content:   guarded      Affective Functioning: Congruent      Mood: anxious      Level of consciousness:  Preoccupied      Response to Learning: Resistant      Endings: None Reported    Modes of Intervention: Support, Socialization, Exploration, and Activity      Discipline Responsible: Psychoeducational Specialist      Signature:  Kevin Ramos

## 2022-10-03 NOTE — DISCHARGE INSTR - DIET
Good nutrition is important when healing from an illness, injury, or surgery. Follow any nutrition recommendations given to you during your hospital stay. If you were given an oral nutrition supplement while in the hospital, continue to take this supplement at home. You can take it with meals, in-between meals, and/or before bedtime. These supplements can be purchased at most local grocery stores, pharmacies, and chain "[employer]-stores.    If you have any questions about your diet or nutrition, call the hospital and ask for the dietitian  Resume regular diet

## 2022-10-03 NOTE — DISCHARGE INSTRUCTIONS
[employer] Hotline:  [phone]    Crisis phone numbers:  Rogelio Meyers, and [facility] GUARD BASE [facility] [phone]. Bailey Allen, and [facility] [address] [facility] [phone]. [facility] [phone]. [address] [place] S[place], and [place] [phone]. [facility]  [address] [facility], [address] [facility] [address]  [address]  [address] [facility]  [facility] [address]  [address]  [facility], [address]  [place], [address]  [address]    [facility] MEAGHAN MCCARTY  Timothy [facility] [address]  [address] [facility] [address] [facility], [address] [facility]  [address] [facility]  Recovery and HOUGH [facility]  [address] , [facility]  [phone]    [facility]  [address] [facility],[address], [address]  [address] [facility] [address]  [place], [address] [facility] [address]  [facility]  Timothy Tong[facility] Island Hospital[facility] [address]  [address] [facility]  Recovery and HOUGH MEDICAL [facility]  [address], [address]  [phone]    [facility] PSYCHIATRIC [place]  [address], [address]  [address]  [address]   [address] [place] [address]  [address] [facility]  [address]  [address], [address]  [address]  [address]  [facility] 163   [place], [address] [facility] [address]  [phone]

## 2022-10-03 NOTE — DISCHARGE SUMMARY
Physician Discharge Summary     Patient ID:  Gracy Thompson  [id number]  2 y.o.  1960    Admit date: 10/1/2022    Discharge date and time: 10/3/2022  9:51 AM     Admitting Physician: River Harrison MD     Discharge Physician: Jean-Paul Guerrero MD      Admission Diagnoses: No admission diagnoses are documented for this encounter. IDENTIFYING INFORMATION: The patient is a 59-year-old    female. She is the mother of three sons. She lives with her younger  son and her daughter-in-law. She is unemployed. HISTORY OF PRESENT ILLNESS: The patient presented to [address] [place] [address] due to chest pain, but while in the emergency room, she was  having suicidal and homicidal thoughts towards her ex- and his  fiancee who live in [place]. The patient was admitted in our unit on  06/01/2022 with the same complaint. She was supposed to follow up as  outpatient at [facility], but she did not do it due  to transportation. She believes, she is still taking her psychotropics  through her primary care physician, but she is not sure. Anyhow, she  reports that she has been feeling very depressed. She has been having  suicidal thoughts for the past month. She is also having homicidal  thoughts towards her ex- and his fiancee, but they live in  [place] and she has no plan to hurt them and she also has no means to  do so. She reports significant depressive symptoms like feeling  hopeless and worthless, poor energy, decreased interest in pleasurable  activities, and her appetite is up and down. She has no history of  psychotic symptoms. She admits that she cannot get over her ex-  leaving her. She has a history of anxiety attack symptoms. MENTAL STATUS EXAMINATION AT ADMISSION: See H and P.     Discharge Diagnoses:   MDD (major depressive disorder), recurrent severe, without psychosis ([place])     Past Medical History:   Diagnosis Date    Abnormal heart rhythm Anxiety     Cancer ([facility]     breast  2016     CHF (congestive heart failure) (HCC)     Congenital heart disease     DJD (degenerative joint disease)     Enlarged lymph nodes     Hypertension     Hypothyroidism     ICD (implantable cardioverter-defibrillator) in place 02/11/2019    Dr. Enrique Blackburn    Kidney stones     PONV (postoperative nausea and vomiting)     Prediabetes 10/7/2019    Thyroid disease         Admission Condition: poor    Discharged Condition: stable    Indication for Admission: threat to self    Significant Diagnostic Studies:   See Results Review tab in EHR      TREATMENT AND CLINICAL COURSE:   Patient was admitted on the unit. Routine lab was ordered. Physical examination was performed by hospitalist. At admission, I resumed psychotropics she was discharged at her last psychiatric admission. Unfortunately, patient was transferred to the medical floor less than 24 hours after admission. The same thing happened when she was admitted on this unit last June. I decided last June to discharge her from the medical floor. This time I decided to bring her back on this unit to make sure that her mood is stable and we also have proper follow-up outpatient. Patient did not have side effect from medications. Patient was involved in group and milieu therapy. Although patient was suicidal upon admission, patient did not have suicidal thought during this hospital stay; she was no longer having homicidal thoughts toward her ex- and his fiancée who lives in [facility]. Toward the end of the hospital stay, patient become more hopeful. Overall, hospital stay was uncomplicated, and patient was discharged in stable condition. Consults: Hospitalist    Treatments: Psychotropic medications, therapy with group, milieu, and 1:1 with nurses, social workers and Attending physician.       Discharge Medications:  Current Discharge Medication List        START taking these medications    Details   hydrOXYzine HCl (ATARAX) 50 MG tablet Take 1 tablet by mouth every 8 hours as needed for Anxiety  Qty: 90 tablet, Refills: 0           CONTINUE these medications which have CHANGED    Details   buPROPion (WELLBUTRIN XL) 150 MG extended release tablet Take 1 tablet by mouth every morning  Qty: 30 tablet, Refills: 0      FLUoxetine (PROZAC) 40 MG capsule Take 1 capsule by mouth daily  Qty: 30 capsule, Refills: 0    Associated Diagnoses: MDD (major depressive disorder), recurrent severe, without psychosis ([facility])      traZODone (DESYREL) 50 MG tablet Take 1 tablet by mouth nightly as needed for Sleep  Qty: 30 tablet, Refills: 0    Associated Diagnoses: MDD (major depressive disorder), recurrent severe, without psychosis ([facility] [address]           CONTINUE these medications which have NOT CHANGED    Details   furosemide (LASIX) 40 MG tablet Take 40 mg by mouth daily      omeprazole (PRILOSEC) 40 MG delayed release capsule Take 40 mg by mouth daily      sacubitril-valsartan (ENTRESTO) 24-26 MG per tablet Take 0.5 tablets by mouth 2 times daily      Melatonin 10 MG TABS Take 20 mg by mouth nightly      spironolactone (ALDACTONE) 25 MG tablet Take 25 mg by mouth daily      atorvastatin (LIPITOR) 40 MG tablet Take 1 tablet by mouth nightly  Qty: 90 tablet, Refills: 3    Associated Diagnoses: Mixed hyperlipidemia      levothyroxine (EUTHYROX) 175 MCG tablet Take 1 tablet by mouth Daily  Qty: 30 tablet, Refills: 1    Associated Diagnoses: Hypothyroidism, unspecified type      !! Misc. Devices (CANE) MISC Dispense 1 straight cane  Qty: 1 each, Refills: 0    Associated Diagnoses: Physical deconditioning; At high risk for falls      !! Misc. Devices MISC Shower Chair  Qty: 1 each, Refills: 0    Associated Diagnoses: Physical deconditioning; Osteoarthritis, unspecified osteoarthritis type, unspecified site      potassium chloride (KLOR-CON M) 10 MEQ extended release tablet Take 1 tablet by mouth in the morning.   Qty: 90 tablet, Refills: 3      dapagliflozin (FARXIGA) 10 MG tablet Take 1 tablet by mouth every morning  Qty: 30 tablet, Refills: 5    Associated Diagnoses: Chronic systolic (congestive) heart failure ([address] Type 2 diabetes mellitus without complication, without long-term current use of insulin ([facility])      metoprolol succinate (TOPROL XL) 25 MG extended release tablet Take 0.5 tablets by mouth daily Hold if SBP less than 95 mmHg or HR less than 50 bpm  Qty: 30 tablet, Refills: 3      digoxin (LANOXIN) 125 MCG tablet Take 1 tablet by mouth daily  Qty: 90 tablet, Refills: 0      CPAP Machine MISC by Does not apply route Please change CPAP pressure to auto 11-15 cm H20. Qty: 1 each, Refills: 0      aspirin 81 MG chewable tablet Take 1 tablet by mouth daily  Qty: 30 tablet, Refills: 3      acetaminophen (TYLENOL) 500 MG tablet Take 500 mg by mouth every 6 hours as needed for Pain Indications: nightly       !! - Potential duplicate medications found. Please discuss with provider. STOP taking these medications       mirtazapine (REMERON) 15 MG tablet Comments:   Reason for Stopping:         letrozole ([id number] [facility]) 2.5 MG tablet Comments:   Reason for Stopping:                  MENTAL STATUS EXAMINATION AT DISCHARGE: Patient is cooperative. Speech: Normal rate and tone. No abnormal movements, tics or mannerisms. Mood dysthymic; affect normal affect. Suicidal ideation Absent. Homicidal ideations Absent. Hallucinations Absent. Delusions Absent. Thought Content: normal. Thought Processes: Goal-Directed. Alert and oriented X 3. Attention and concentration Fair. MEMORY intact. Insight and Judgement limited. Disposition: Home    Patient Instructions: Activity: As tolerated  Diet: regular diet    Follow-up as scheduled with [address]      Time Spent on discharge in examination, evaluation, counseling and review of medications and discharge plan: More than 30 minutes.     Engagement: Patient displayed a good level of engagement with the treatments offered during this admission.        Signed:  Electronically signed by Mari Allen MD on 10/3/2022 at 9:51 AM
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
## 2022-10-03 NOTE — PLAN OF CARE
Problem: Chronic Conditions and Co-morbidities  Goal: Patient's chronic conditions and co-morbidity symptoms are monitored and maintained or improved  10/2/2022 2305 by Marissa Jean RN  Outcome: Progressing  Flowsheets (Taken 10/2/2022 2305)  Care Plan - Patient's Chronic Conditions and Co-Morbidity Symptoms are Monitored and Maintained or Improved: Monitor and assess patient's chronic conditions and comorbid symptoms for stability, deterioration, or improvement  10/2/2022 1102 by Jessica Morton RN  Outcome: Progressing     Problem: Anxiety  Goal: Will report anxiety at manageable levels  Description: INTERVENTIONS:  1. Administer medication as ordered  2. Teach and rehearse alternative coping skills  3. Provide emotional support with 1:1 interaction with staff  10/2/2022 2305 by Marissa Jean RN  Outcome: Progressing  Note: Patient states mood is 9/10 low anxiety and depression  10/2/2022 1102 by Jessica Morton RN  Outcome: Progressing  Note: Verbalizes feeling \"a little anxious\" Denies need for medication. Problem: Coping  Goal: Pt/Family able to verbalize concerns and demonstrate effective coping strategies  Description: INTERVENTIONS:  1. Assist patient/family to identify coping skills, available support systems and cultural and spiritual values  2. Provide emotional support, including active listening and acknowledgement of concerns of patient and caregivers  3. Reduce environmental stimuli, as able  4. Instruct patient/family in relaxation techniques, as appropriate  5. Assess for spiritual pain/suffering and initiate Spiritual Care, Psychosocial Clinical Specialist consults as needed  10/2/2022 2305 by Marissa Jean RN  Outcome: Progressing  Note: Patient reports praying, word searches, reading and going to groups are positive coping skills  10/2/2022 1550 by Estela Miranda  Outcome: Progressing  Note: Pt has attended 3/3 groups that have been offered on the unit so far this shift.   Pt has been seen out on the unit interacting with peers. Pt is demonstrating effective coping strategies. 10/2/2022 1102 by Maurine Scheuermann, RN  Outcome: Progressing  Note: Patient reports some coping skills. Patient is  attending therapeutic groups to gain insight on mental illness and learn positive coping skills. some     Problem: Behavior  Goal: Pt/Family maintain appropriate behavior and adhere to behavioral management agreement, if implemented  Description: INTERVENTIONS:  1. Assess patient/family's coping skills and  non-compliant behavior (including use of illegal substances)  2. Notify security of behavior or suspected illegal substances which indicate the need for search of the family and/or belongings  3. Encourage verbalization of thoughts and concerns in a socially appropriate manner  4. Utilize positive, consistent limit setting strategies supporting safety of patient, staff and others  5. Encourage participation in the decision making process about the behavioral management agreement  6. If a visitor's behavior poses a threat to safety call refer to organization policy. 7. Initiate consult with , Psychosocial CNS, Spiritual Care as appropriate  10/2/2022 2305 by Myleneryordy Holguin RN  Outcome: Progressing  Note: Pt is taking medications, attending and participating in groups and care planning. Pt has good interaction with staff and peers   10/2/2022 1102 by Maurine Scheuermann, RN  Outcome: Progressing     Problem: Depression/Self Harm  Goal: Effect of psychiatric condition will be minimized and patient will be protected from self harm  Description: INTERVENTIONS:  1. Assess impact of patient's symptoms on level of functioning, self care needs and offer support as indicated  2. Assess patient/family knowledge of depression, impact on illness and need for teaching  3. Provide emotional support, presence and reassurance  4. Assess for possible suicidal thoughts or ideation.  If patient expresses suicidal thoughts or statements do not leave alone, initiate Suicide Precautions, move to a room close to the nursing station and obtain sitter  5. Initiate consults as appropriate with Mental Health Professional, Spiritual Care, Psychosocial CNS, and consider a recommendation to the LIP for a Psychiatric Consultation  10/2/2022 2305 by Poli Rosado RN  Outcome: Progressing  Note: Patient reports her depression is low and remains free of self harm  10/2/2022 1102 by Crystal Albrecht RN  Outcome: Progressing  Note: No self harm behaviors were observed or reported so far this shift. Remains on every 15 minutes precautions for safety. Problem: Spiritual Care  Goal: Pt/Family able to move forward in process of forgiving self, others, and/or higher power  Description: INTERVENTIONS:  1. Assist patient/family to overcome blocks to healing by use of spiritual practices (prayer, meditation, guided imagery, reiki, breath work, etc). 2. De-myth guilt and help patient/family identify possible irrational spiritual/cultural beliefs and values. 3. Explore possibilities of making amends & reconciliation with self, others, and/or a greater power. 4. Guide patient/family in identifying painful feelings of guilt. 5. Help patient/famiy explore and identify spiritual beliefs, cultural understandings or values that may help or hinder letting go of issue. 6. Help patient/family explore feelings of anger, bitterness, resentment. 7. Help patient/family identify and examine the situation in which these feelings are experienced. 8. Help patient/family identify destructive displacement of feelings onto other individuals. 9. Invite use of sacraments/rituals/ceremonies as appropriate (e.g. - confession, anointing, smudging). 10. Refer patient/family to formal counseling and/or to nghia community for further support work.   10/2/2022 2305 by Poli Rosado RN  Outcome: Progressing  Flowsheets (Taken 10/2/2022 2305)  Patient/family able to move forward in process of forgiving self, others, and/or higher power: Assist patient to overcome blocks to healing by use of spiritual practices (prayer, meditation, guided imagery, reiki, breath work, etc)  10/2/2022 1102 by Elizabeth Tatum RN  Outcome: Progressing     Problem: Pain  Goal: Verbalizes/displays adequate comfort level or baseline comfort level  10/2/2022 2305 by West Garcia RN  Outcome: Progressing  Note: Patient denies pain or discomfort  10/2/2022 1102 by Elizabeth Tatum RN  Outcome: Progressing     Problem: ABCDS Injury Assessment  Goal: Absence of physical injury  10/2/2022 2305 by West Garcia RN  Outcome: Progressing  Note: Patient remains safe and free of injury  10/2/2022 1102 by Elizabeth Tatum RN  Outcome: Progressing   Care plan reviewed with patient and verbalize understanding of the plan of care and contribute to goal setting.

## 2022-10-03 NOTE — BH NOTE
Behavioral Health   Discharge Note    Pt discharged with followings belongings:   Dental Appliances: Uppers  Vision - Corrective Lenses: None  Hearing Aid: None  Jewelry: Ring  Body Piercings Removed: N/A  Clothing: Footwear, Pants, Shirt, Sweater  Other Valuables: Other (Comment) (none)   Valuables retrieved from safe, security envelope number:  n/a and returned to patient. Patient left department with staff via private vehicle. Discharged to home. \"An Important Message from Medicare About Your Rights\" (IMM) form photocopy original from admission and provided to pt at least 4 hours prior to discharge N/A. If pt left within 4 hours of receiving 2nd delivery of IMM, this is because pt was agreeable with hospital discharge. Patient/guardian education on aftercare instructions: Yes  Bridge appointment completed:  yes. Reviewed Discharge Instructions with patient/family/nursing facility. Patient/family verbalizes understanding and agreement with the discharge plan using the teachback method. Information faxed to n/a by n/a Patient/family verbalize understanding of AVS:Yes    Status EXAM upon discharge:  Mental Status and Behavioral Exam  Normal: No  Level of Assistance: Independent/Self  Facial Expression: Flat, Brightened  Affect: Appropriate  Level of Consciousness: Alert  Frequency of Checks: 4 times per hour, close  Mood:Normal: No  Mood: Anxious  Motor Activity:Normal: Yes  Eye Contact: Fair  Observed Behavior: Cooperative, Friendly  Sexual Misconduct History: Current - no  Preception: [place] to person, [place] to time, [place] to place, [place] to situation  Attention:Normal: Yes  Thought Processes: Circumstantial  Thought Content:Normal: Yes  Depression Symptoms: No problems reported or observed. Anxiety Symptoms: No problems reported or observed. Erum Symptoms: No problems reported or observed.   Hallucinations: None  Delusions: No  Memory:Normal: Yes  Insight and Judgment: No  Insight and Judgment: Poor judgment, Poor insight    Keyonna Duron LPN

## 2022-10-04 ENCOUNTER — TELEPHONE (OUTPATIENT)
Dept: PSYCHIATRY | Age: 62
End: 2022-10-04

## 2022-10-04 ENCOUNTER — TELEPHONE (OUTPATIENT)
Dept: CARDIOLOGY CLINIC | Age: 62
End: 2022-10-04

## 2022-10-04 NOTE — PLAN OF CARE
Hospital Facility-Based Program  [facility] Int[facility]iac Re[facility]/Traditional Cardiac Rehab  PHYSICIAN ORDER  Class I Level B based on research  Medical Director:  Dr. Liz Michele MD     Patient Name: Hue Mar : 1960  Referring Physician: Ceci Durbin CNP  Date: 10/4/2022  Allergies: Allergies as of 10/18/2022 - Fully Reviewed 10/02/2022   Allergen Reaction Noted    Adhesive tape Itching 10/15/2020    Morphine Nausea Only 2022        Diagnosis:  CHF, 20-25% EF, NYHA class3-4    [x] [facility] Intensive Cardiac Rehab with telemetry monitoring, resting and exercise        BPs & HRs with each session. Hospital setting for patient safety. [x] 72 sessions: 36 exercise sessions, 36 education sessions   [] 36 sessions: 18 exercise sessions, 18 education sessions  [] Traditional Cardiac Rehab with telemetry monitoring, resting and exercise BPs &       HRs with each session. Hospital setting for patient safety. [] 36 sessions:  32 exercise sessions, 4 education sessions     Per Patient symptoms, proceed with:   [x]Nitroglycerine 0.4mg SL every 5 minutes prn, maximum of 3, for chest pain   [x]12-lead EKG for symptoms of chest pain or noted change in heart rhythm   [x]Administer O2 per nasal cannula for symptoms of chest pain or acute dyspnea    Physician Prescribed Exercise:  Plan of Care:  Patient to attend exercise sessions with aerobic endurance and strength training for a total of 31-60 min/day, 3 days/week with supplemented 30+ minutes of aerobic exercise at home on days not participating in Cardiac Rehab. Aerobic Endurance Training  Aerobic Endurance mode (TM, AD, NS) starting at 5-8 minutes progressing by 2-3 minutes each week to a total of 15-30 minutes 2-3x/week. Arms only 5 min  Stair step increasing to 2 min  Resistance/strength training:  Hand weights starting at 1-5 lbs increasing in weight by 1-2 lbs and/or per patient tolerance weekly.   Start with 8 repetitions and increase the repetitions each exercise session per patient tolerance for a total of 15 repetitions. Measurable Endurance Goal:  Aerobic endurance goal to be measured in minutes. Start endurance training per patient tolerance at 1-8 minutes per exercise type, progressing to a total of 31-60 minutes using various modes of training (see Exercise Prescription). Progression:    [x] Weekly 5-10% intensity progression, as tolerated, during cardiac rehab sessions. [x] 13-17 Rate of Perceived Exertion on the Rasheeda Scale (6-20). Measurable Muscular Strength Goal:  Starting at 1-5 lbs x 8 reps, progressing to 5-25 lbs x 15 reps per patient tolerance.       Electronically signed by David Pineda PT on 10/4/2022 at 1:25 PM    Exercise Physiologist/Date/Time

## 2022-10-12 ENCOUNTER — OFFICE VISIT (OUTPATIENT)
Dept: FAMILY MEDICINE CLINIC | Age: 62
End: 2022-10-12
Payer: COMMERCIAL

## 2022-10-12 VITALS
TEMPERATURE: 98.2 F | RESPIRATION RATE: 14 BRPM | WEIGHT: 192.4 LBS | BODY MASS INDEX: 35.41 KG/M2 | HEART RATE: 86 BPM | HEIGHT: 62 IN | OXYGEN SATURATION: 98 % | DIASTOLIC BLOOD PRESSURE: 64 MMHG | SYSTOLIC BLOOD PRESSURE: 100 MMHG

## 2022-10-12 DIAGNOSIS — R05.9 COUGH IN ADULT: Primary | ICD-10-CM

## 2022-10-12 DIAGNOSIS — R52 BODY ACHES: ICD-10-CM

## 2022-10-12 DIAGNOSIS — R05.1 ACUTE COUGH: ICD-10-CM

## 2022-10-12 DIAGNOSIS — R06.2 WHEEZING: ICD-10-CM

## 2022-10-12 DIAGNOSIS — J02.9 SORE THROAT: ICD-10-CM

## 2022-10-12 LAB
Lab: NORMAL
QC PASS/FAIL: NORMAL
SARS-COV-2 RDRP RESP QL NAA+PROBE: NEGATIVE

## 2022-10-12 PROCEDURE — G8417 CALC BMI ABV UP PARAM F/U: HCPCS | Performed by: NURSE PRACTITIONER

## 2022-10-12 PROCEDURE — 1036F TOBACCO NON-USER: CPT | Performed by: NURSE PRACTITIONER

## 2022-10-12 PROCEDURE — 3017F COLORECTAL CA SCREEN DOC REV: CPT | Performed by: NURSE PRACTITIONER

## 2022-10-12 PROCEDURE — 87804 INFLUENZA ASSAY W/OPTIC: CPT | Performed by: NURSE PRACTITIONER

## 2022-10-12 PROCEDURE — G8427 DOCREV CUR MEDS BY ELIG CLIN: HCPCS | Performed by: NURSE PRACTITIONER

## 2022-10-12 PROCEDURE — G8484 FLU IMMUNIZE NO ADMIN: HCPCS | Performed by: NURSE PRACTITIONER

## 2022-10-12 PROCEDURE — 87635 SARS-COV-2 COVID-19 AMP PRB: CPT | Performed by: NURSE PRACTITIONER

## 2022-10-12 PROCEDURE — 99214 OFFICE O/P EST MOD 30 MIN: CPT | Performed by: NURSE PRACTITIONER

## 2022-10-12 PROCEDURE — 1111F DSCHRG MED/CURRENT MED MERGE: CPT | Performed by: NURSE PRACTITIONER

## 2022-10-12 RX ORDER — PREDNISONE 20 MG/1
40 TABLET ORAL DAILY
Qty: 10 TABLET | Refills: 0 | Status: SHIPPED | OUTPATIENT
Start: 2022-10-12 | End: 2022-10-17

## 2022-10-12 RX ORDER — DOXYCYCLINE HYCLATE 100 MG
100 TABLET ORAL 2 TIMES DAILY
Qty: 20 TABLET | Refills: 0 | Status: SHIPPED | OUTPATIENT
Start: 2022-10-12 | End: 2022-10-22

## 2022-10-12 RX ORDER — ALBUTEROL SULFATE 90 UG/1
2 AEROSOL, METERED RESPIRATORY (INHALATION) EVERY 4 HOURS PRN
Qty: 18 G | Refills: 1 | Status: SHIPPED | OUTPATIENT
Start: 2022-10-12

## 2022-10-12 RX ORDER — BENZONATATE 200 MG/1
200 CAPSULE ORAL 3 TIMES DAILY PRN
Qty: 30 CAPSULE | Refills: 0 | Status: SHIPPED | OUTPATIENT
Start: 2022-10-12 | End: 2022-10-22

## 2022-10-12 NOTE — PROGRESS NOTES
SUBJECTIVE:  Dee Snowden is a 65 y.o. y/o female that presents with Cough, Generalized Body Aches, Chills, and Fever  . HPI:      Symptoms have been present for 10 day(s). Symptoms are worse since they initially started. Fever? Unsure about fever but having chills  Runny nose or congestion? Yes  Cough? Yes  Sore throat? Yes  Shortness of breath/Wheezing? Yes, wheezing  Other associated symptoms?  body aches, ear pain, headaches, and diarrhea      Known COVID exposures or RFs? Son was sick  Smoker? No  Preexisting Respiratory conditions?  none      Past Medical History:   Diagnosis Date    Abnormal heart rhythm     Anxiety     Cancer (HCC)     breast  2016     CHF (congestive heart failure) (HCC)     Congenital heart disease     DJD (degenerative joint disease)     Enlarged lymph nodes     Hypertension     Hypothyroidism     ICD (implantable cardioverter-defibrillator) in place 2019    Dr. Leopoldo Candle    Kidney stones     PONV (postoperative nausea and vomiting)     Prediabetes 10/7/2019    Thyroid disease          Tobacco Use      Smoking status: Former        Packs/day: 0.25        Years: 37.00        Pack years: 9.25        Types: Cigarettes        Start date: 1981        Quit date: 2020        Years since quittin.3      Smokeless tobacco: Never            OBJECTIVE:  /64   Pulse 86   Temp 98.2 °F (36.8 °C) (Temporal)   Resp 14   Ht 5' 2\" (1.575 m)   Wt 192 lb 6.4 oz (87.3 kg)   SpO2 98%   BMI 35.19 kg/m²   General appearance: alert, well appearing, and in no distress. ENT exam reveals - bilateral TM fluid noted, pharynx erythematous without exudate, and nasal mucosa congested. CVS exam: normal rate, regular rhythm, normal S1, S2, no murmurs, rubs, clicks or gallops. Chest:scattered I&E wheezes, crackles bilateral bases. Abdominal exam: soft, nontender, nondistended, no masses or organomegaly.   Extremities:  No clubbing, cyanosis or edema  Skin exam - normal coloration and turgor, no rashes, no suspicious skin lesions noted. Psych -  Affect appropriate. Thought process is normal without evidence of depression or psychosis. Good insight and appropriae interaction. Cognition and memory appear to be intact. ASSESSMENT & PLAN  Milo Miller was seen today for cough, generalized body aches, chills and fever. Diagnoses and all orders for this visit:    Cough in adult  -     POCT COVID-19 Rapid, NAAT  -     predniSONE (DELTASONE) 20 MG tablet; Take 2 tablets by mouth daily for 5 days  -     benzonatate (TESSALON) 200 MG capsule; Take 1 capsule by mouth 3 times daily as needed for Cough  -     XR CHEST (2 VW); Future  -     albuterol sulfate HFA (PROVENTIL;VENTOLIN;PROAIR) 108 (90 Base) MCG/ACT inhaler; Inhale 2 puffs into the lungs every 4 hours as needed for Wheezing or Shortness of Breath  -     doxycycline hyclate (VIBRA-TABS) 100 MG tablet; Take 1 tablet by mouth 2 times daily for 10 days  -     POCT Influenza A/B    Body aches  -     POCT COVID-19 Rapid, NAAT  -     predniSONE (DELTASONE) 20 MG tablet; Take 2 tablets by mouth daily for 5 days  -     benzonatate (TESSALON) 200 MG capsule; Take 1 capsule by mouth 3 times daily as needed for Cough  -     XR CHEST (2 VW); Future  -     albuterol sulfate HFA (PROVENTIL;VENTOLIN;PROAIR) 108 (90 Base) MCG/ACT inhaler; Inhale 2 puffs into the lungs every 4 hours as needed for Wheezing or Shortness of Breath  -     doxycycline hyclate (VIBRA-TABS) 100 MG tablet; Take 1 tablet by mouth 2 times daily for 10 days  -     POCT Influenza A/B    Sore throat  -     POCT COVID-19 Rapid, NAAT  -     predniSONE (DELTASONE) 20 MG tablet; Take 2 tablets by mouth daily for 5 days  -     benzonatate (TESSALON) 200 MG capsule; Take 1 capsule by mouth 3 times daily as needed for Cough  -     XR CHEST (2 VW); Future  -     albuterol sulfate HFA (PROVENTIL;VENTOLIN;PROAIR) 108 (90 Base) MCG/ACT inhaler;  Inhale 2 puffs into the lungs every 4 hours as needed for Wheezing or Shortness of Breath  -     doxycycline hyclate (VIBRA-TABS) 100 MG tablet; Take 1 tablet by mouth 2 times daily for 10 days  -     POCT Influenza A/B    Wheezing  -     predniSONE (DELTASONE) 20 MG tablet; Take 2 tablets by mouth daily for 5 days  -     benzonatate (TESSALON) 200 MG capsule; Take 1 capsule by mouth 3 times daily as needed for Cough  -     XR CHEST (2 VW); Future  -     albuterol sulfate HFA (PROVENTIL;VENTOLIN;PROAIR) 108 (90 Base) MCG/ACT inhaler; Inhale 2 puffs into the lungs every 4 hours as needed for Wheezing or Shortness of Breath  -     doxycycline hyclate (VIBRA-TABS) 100 MG tablet; Take 1 tablet by mouth 2 times daily for 10 days  -     POCT Influenza A/B    Acute cough  -     predniSONE (DELTASONE) 20 MG tablet; Take 2 tablets by mouth daily for 5 days  -     benzonatate (TESSALON) 200 MG capsule; Take 1 capsule by mouth 3 times daily as needed for Cough  -     XR CHEST (2 VW); Future  -     albuterol sulfate HFA (PROVENTIL;VENTOLIN;PROAIR) 108 (90 Base) MCG/ACT inhaler; Inhale 2 puffs into the lungs every 4 hours as needed for Wheezing or Shortness of Breath  -     doxycycline hyclate (VIBRA-TABS) 100 MG tablet; Take 1 tablet by mouth 2 times daily for 10 days  -     POCT Influenza A/B    COVID and influenza testing is negative    No follow-ups on file.     -Start above treatments  -Patient advised to contact our office immediately if symptoms worsen or persist  -Patient counseled on conservative care including fluids, rest and OTC meds      I have reviewed this patient's history, habits, and medication list and have updated the chart where appropriate.

## 2022-10-13 ENCOUNTER — HOSPITAL ENCOUNTER (OUTPATIENT)
Dept: GENERAL RADIOLOGY | Age: 62
Discharge: HOME | End: 2022-10-13
Payer: COMMERCIAL

## 2022-10-13 ENCOUNTER — HOSPITAL ENCOUNTER (OUTPATIENT)
Age: 62
Discharge: HOME | End: 2022-10-13
Payer: COMMERCIAL

## 2022-10-13 DIAGNOSIS — R05.1 ACUTE COUGH: ICD-10-CM

## 2022-10-13 DIAGNOSIS — R05.9 COUGH IN ADULT: ICD-10-CM

## 2022-10-13 DIAGNOSIS — J02.9 SORE THROAT: ICD-10-CM

## 2022-10-13 DIAGNOSIS — R06.2 WHEEZING: ICD-10-CM

## 2022-10-13 DIAGNOSIS — R52 BODY ACHES: ICD-10-CM

## 2022-10-13 PROCEDURE — 71046 X-RAY EXAM CHEST 2 VIEWS: CPT

## 2022-10-17 ENCOUNTER — TELEPHONE (OUTPATIENT)
Dept: FAMILY MEDICINE CLINIC | Age: 62
End: 2022-10-17

## 2022-10-17 NOTE — TELEPHONE ENCOUNTER
----- Message from ANGELIA Pathak CNP sent at 10/14/2022  4:22 PM EDT -----  No acute findings on chest xray

## 2022-10-18 ENCOUNTER — HOSPITAL ENCOUNTER (OUTPATIENT)
Dept: CARDIAC REHAB | Age: 62
Setting detail: THERAPIES SERIES
Discharge: HOME | End: 2022-10-18
Payer: COMMERCIAL

## 2022-10-18 PROCEDURE — 93798 PHYS/QHP OP CAR RHAB W/ECG: CPT

## 2022-10-18 NOTE — PLAN OF CARE
[facility]  Individualized Cardiac Treatment Plan    Patient Name:  Mason Goodson  :  1960  Age:  65 y.o. MRN: [id number]  Diagnosis: CHF  Date of Event: --   Physician:  Cely Keys CNP  Next Office Visit:  22  Date Entered Program: 10/18/22  Risk Stratifications: [] Low [] Intermediate [x] High  Allergies: Allergies   Allergen Reactions    Adhesive Tape Itching    Morphine Nausea Only       COVID -19 Screen  Do you have any of the following symptoms:  [] Fever [] Cough [] SOB [] Muscle/Body Ache [] Loss of taste/smell [x] None    Have you traveled outside of the [place]? [] Yes      [x] No    Have you been around anyone who has tested Positive for COVID 19?  [] Yes      [x] No    The following Education has been completed with patient. [x] Wait in the lobby until we call you back to rehab. [x] You must wear a mask while in the medical center, and in cardiac rehab. Please bring your own. [x] Bring your own bottle of water with you.       Individual Cardiac Treatment Plan -EXERCISE  INITIAL 30 DAY 61 DAY 90  DAY FINAL DAY   EXERCISE  ASSESSMENT/PLAN EXERCISE  REASSESSMENT EXERCISE   REASSESSMENT EXERCISE   REASSESSMENT EXERCISE   REASSESSMENT EXERCISE  DISCHARGE/FOLLOW-UP   Date: 10/18/22 Date: Date: Date: Date: Date:   Session #1  First Exercise Session:  10/24/22 Session # Session # Session # Session # Session #  Last Exercise Session: **   Stages of Change Stages of Change Stages of Change Stages of Change Stages of Change Stages of Change   [] Pre Contemplation  [x] Contemplation  [] Preparation  [] Action  [] Maintenance  [] Relapse [] Pre Contemplation  [] Contemplation  [] Preparation  [] Action  [] Maintenance  [] Relapse [] Pre Contemplation  [] Contemplation  [] Preparation  [] Action  [] Maintenance  [] Relapse [] Pre Contemplation  [] Contemplation  [] Preparation  [] Action  [] Maintenance  [] Relapse [] Pre Contemplation  [] Contemplation  [] Preparation  [] Action  [] Maintenance  [] Relapse [] Pre Contemplation  [] Contemplation  [] Preparation  [] Action  [] Maintenance  [] Relapse   EXERCISE ASSESSMENT EXERCISE ASSESSMENT EXERCISE ASSESSMENT EXERCISE ASSESSMENT EXERCISE ASSESSMENT EXERCISE ASSESSMENT   6 Min Walk Test  Distance walked:   0.15 miles  792 ft  2.1 METs  Max HR:111 BPM      RPE:  11    THR:  124-137  Rhythm:  NSR     6 Min Walk Test  Distance walked:   ** miles  ** ft  ** METs  Max HR:** BPM      RPE:  **    %Changeft = **  Rhythm:  **   DASI: 4.9 METs DASI: ** METs DASI: ** METs DASI: ** METs DASI: ** METs DASI: ** METs   Return to Work  [employer] on returning to work? [] Yes              [x] No   [] Disabled     [x] Retired     Return to work:  Has Freida Patten returned to work? [] Yes    [] No    Return to work date set? [] Yes, **    [] No    Freida Patten is doing ** at work. Return to work:  Has Freida Patten returned to work? [] Yes    [] No    Return to work date set? [] Yes, **    [] No    Freida Patten is doing ** at work. Return to work:  Has Freida Patten returned to work? [] Yes    [] No    Return to work date set? [] Yes, **    [] No    Freida Patten is doing ** at work. Return to work:  Has Freida Patten returned to work? [] Yes    [] No    Return to work date set? [] Yes, **    [] No    Freida Patten is doing ** at work. Return to work:  Has Freida Patten returned to work? [] Yes    [] No    Return to work date set? [] Yes, **    [] No    *Required MET Level achieved for job duties?    [] Yes    [] No   Orthopedic Limitations/  [x] Yes    [] No  If yes please list:  lower back pain      Orthopedic Limitations  *If patient has orthopedic issue:   Actions/  accomodations needed to make Freida Patten successful : Orthopedic Limitations   Orthopedic Limitations   Orthopedic Limitations Orthopedic Limitations     Fall Risk    []Assessed as a fall risk  [x] Not a fall risk      [] Balance Issues       [] Sudarshan Sciara        [] Cane       [] Wheelchair  Actions needed:  na    [x] Safety issues reviewed       Fall Risk  *If patient is a fall risk, action needed to accommodate:  Fall Risk Fall Risk Fall Risk Fall Risk   Home Exercise  [] Yes    [x] No   - has a stationary bike at home that she plans to start using soon   Home Exercise  [] Yes    [] No  Type: **  Frequency:**  Duration: ** Home Exercise  [] Yes    [] No  Type: **  Frequency: **  Duration: ** Home Exercise  [] Yes    [] No  Type: **  Frequency: **  Duration: ** Home Exercise  [] Yes    [] No  Type: **  Frequency: **  Duration: ** Home Exercise  [] Yes    [] No  Type: **  Frequency: **  Duration: **   Angina with Activity? [] Yes    [x] No   Angina with Activity? [] Yes    [] No  Angina Management: ** Angina with Activity? [] Yes    [] No  Angina Management: ** Angina with Activity? [] Yes    [] No  Angina Management: ** Angina with Activity? [] Yes    [] No  Angina Management: ** Angina with Activity?   [] Yes    [] No  Angina Management: **   EXERCISE PLAN EXERCISE PLAN EXERCISE PLAN EXERCISE PLAN EXERCISE PLAN EXERCISE PLAN   *Interventions* *Interventions* *Interventions* *Interventions* *Interventions* *Interventions*   Exercise Prescription  (per physician & CR staff) Exercise Prescription  (per physician & CR staff) Exercise Prescription  (per physician & CR staff) Exercise Prescription  (per physician & CR staff) Exercise Prescription  (per physician & CR staff) Exercise Prescription  (per physician & CR staff)   Cardiovascular Cardiovascular Cardiovascular Cardiovascular Cardiovascular Cardiovascular   Mode:    [x] Treadmill (TM)  [x] Schwinn Airdyne (AD)  [x] Arms Ergometer (AE)  [x] NuStep   MODE:    [] Treadmill (TM)  [] Schwinn Airdyne (AD)  [] Arms Ergometer (AE)  [] NuStep  [] Elliptical (E) MODE:    [] Treadmill (TM)  [] Schwinn Airdyne (AD)  [] Arms Ergometer (AE)  [] NuStep  [] Elliptical (E) MODE:    [] Treadmill (TM)  [] Schwinn Airdyne (AD)  [] Arms Ergometer (AE)  [] NuStep  [] Elliptical (E) MODE:    [] Treadmill (TM)  [] Schwinn Airdyne (AD)  [] Arms Ergometer (AE)  [] NuStep  [] Elliptical (E) MODE:    [] Treadmill (TM)  [] Schwinn Airdyne (AD)  [] Arms Ergometer (AE)  [] NuStep  [] Elliptical (E)   Initial Workloads  TM: [email or web address] 2.5 METs  AD: 0.7 level = 2.1 METs  NS: 36  Fernandez= 2.1 METs  AE: 0.4 level = 1.6 METs Current Workloads  TM: @ %=  METs  AD:  level =  METs  NS:  Fernandez=  METs  AE:  level =  METs Current Workloads  TM: @ %=  METs  AD:  level =  METs  NS:  Fernandez=  METs  AE:  level =  METs Current Workloads  TM: @ %=  METs  AD:  level =  METs  NS:  Fernandez=  METs  AE:  level =  METs Current Workloads  TM: @ %=  METs  AD:  level =  METs  NS:  Fernandez=  METs  AE:  level =  METs Current Workloads  TM: @ %=  METs  AD:  level =  METs  NS:  Fernandez=  METs  AE:  level =  METs   Frequency:    ICR: 3x/week  Home: 2-3x/wk Frequency:   ICR: 3x/week  Home: 3x/wk Frequency:  ICR: 3x/week  Home: 3-4x/wk Frequency:  ICR: 3x/week  Home: 3-4x/wk Frequency:  ICR: 3x/week  Home: 3-4x/wk Frequency:  Micaela Stain will continue exercise at  5-7 days/week   Duration:   Total aerobic exercise = 21-25 min    6-8min/mode Duration:  Total aerobic exercises = ** min     **min/mode Duration:  Total aerobic exercises = ** min     **min/mode Duration:  Total aerobic exercises = ** min     **min/mode Duration:  Total aerobic exercises = ** min     **min/mode Duration:  Total erobic exercise =  60-90 min   Intensity:   MET Level = 2.5  RPE = 12-15 Intensity:  Max MET Level = **  RPE = 12-15 Intensity:  Max MET Level = **  RPE = 12-15 Intensity:  Max MET Level = **  RPE = 12-15 Intensity:  Max MET Level = **  RPE = 12-15 Intensity:  Max MET Level = ** RPE = 12-15   Progression: increase aerobic activity up to 15 min over next 4 weeks by increasing time 1-2 min/week.  Progression:   Progression:   Progression: Progression: Progression:  Increase time/intensity when RPE <13, and HR is in THRR     Resistance Training Resistance Training Resistance Training Resistance Training Resistance Training Resistance Training   [x] Yes      [] No  Upper and Lower body strength training 2x/wk    Wt: 2#       Reps:  8-15    *Increase wt. after completing 15 reps with an RPE of <12/13. [] Yes      [] No  Upper and Lower body strength training 2x/wk    Wt: **#       Reps:  8-15    *Increase wt. after completing 15 reps with an RPE of <12/13. [] Yes      [] No  Upper and Lower body strength training 2x/wk    Wt: **#       Reps:  8-15    *Increase wt. after completing 15 reps with an RPE of <12/13. [] Yes      [] No  Upper and Lower body strength training 2x/wk    Wt: **#       Reps:  8-15    *Increase wt. after completing 15 reps with an RPE of <12/13. [] Yes      [] No  Upper and Lower body strength training 2x/wk    Wt: **#       Reps:  8-15    *Increase wt. after completing 15 reps with an RPE of <12/13. Continue Strength Training at home   [] Exercise Log & Strength training handout given     Wt: **#       Reps:  8-15    *Increase wt. after completing 15 reps with an RPE of <12/13. Flexibility Flexibility Flexibility Flexibility Flexibility Flexibility   [x] Yes      [] No  25 min session of Core Strength & Flexibility 1x/per week  Attends Core Strength & Flexibility   [] Yes      [] No Attends Core Strength & Flexibility   [] Yes      [] No Attends Core Strength & Flexibility   [] Yes      [] No Attends Core Strength & Flexibility   [] Yes      [] No Continue Core Strength & Flexibility at home   [address] verbalizes planning to start using her stationary bike at home  [address] [facility] verbalizes planning to ** ** days/week for ** min on days not in rehab. Home Exercise  *Indu verbalizes planning to ** ** days/week for ** min on days not in rehab. Home Exercise  *Indu verbalizes planning to ** ** days/week for ** min on days not in rehab.  Home Exercise  *Indu verbalizes planning to ** ** days/week for ** min on days not in rehab. [address] [place] [address] his/her plan to ** ** days/week for ** min @ **   *Education* *Education* *Education* *Education* *Education* *Education*   RPE Scale  [x] Yes      [] No  Exercise Safety  [x] Yes      [] No  Equipment Orientation  [x] Yes      [] No  S/S to Report  [x] Yes      [] No  Warm Up/Cool Down  [x] Yes      [] No  Home Exercise  [x] Yes      [] No     All Exercise Education Completed  [] Yes      [] No   *Goals* *Goals* *Goals* *Goals* *Goals* *Goals*   Indu plans to:  [x] Attend exercise sessions 3x/wk  [x] initiate home exercise 2-3x/wk for 10-20 min  [x] Increase 6 min walk distance by 10%   Indu plans to:  [] Attend exercise sessions 3x/wk  [] continue home exercise 2-3x/wk for 20-30 min  [] ** Indu's plans to:  [x] Attend exercise sessions 3x/wk  [x] continue home exercise 2-3x/wk for 30-45 min  [x] Determine plan of exercise following rehab  [] ** Indu plans to:  [] Attend exercise sessions 3x/wk  [] continue home exercise 2-3x/wk for 20-30 min  [] ** Indu plans to:  [] Attend exercise sessions 3x/wk  [] continue home exercise 2-3x/wk for 20-30 min  [] ** Indu achieved exercise goals?    Yes    [] No  If no, why?  **  [] Increased 6 min walk distance by 10%  [] Currently exercising 30-60 min/day, 5-7days/wk   [] Plans to continue exercise on own  [] Plans to join a local fitness center to continue exercise  [] Does not plan to continue to exercise after rehab   Mutually agreed upon goals:  Patricia Brunner would like to be able to walk 3-4 blocks without stopping again Progress towards mutually agreed upon goals:  Patricia Brunner  ** Progress towards mutually agreed upon goals:  Patricia Brunner  ** Progress towards mutually agreed upon goals:  Patricia Brunner  ** Progress towards mutually agreed upon goals:  Patricia Brunner  ** Progress towards mutually agreed upon goals:  Patricia Brunner  **    *MET level required for above goal:  3.4 METs MET level Achieved:  **METs MET level Achieved:  **METs MET level Achieved:  **METs MET level Achieved:  **METs MET level Achieved:  **METs     Individual Cardiac Treatment Plan - Nutrition  NUTRITION  ASSESSMENT/PLAN NUTRITION  REASSESSMENT NUTRITION   REASSESSMENT NUTRITION   REASSESSMENT NUTRITION   REASSESSMENT NUTRITION  DISCHARGE/FOLLOW-UP   Stages of Change Stages of Change Stages of Change Stages of Change Stages of Change Stages of Change   [] Pre Contemplation  [x] Contemplation  [] Preparation  [] Action  [] Maintenance  [] Relapse [] Pre Contemplation  [] Contemplation  [] Preparation  [] Action  [] Maintenance  [] Relapse [] Pre Contemplation  [] Contemplation  [] Preparation  [] Action  [] Maintenance  [] Relapse [] Pre Contemplation  [] Contemplation  [] Preparation  [] Action  [] Maintenance  [] Relapse [] Pre Contemplation  [] Contemplation  [] Preparation  [] Action  [] Maintenance  [] Relapse [] Pre Contemplation  [] Contemplation  [] Preparation  [] Action  [] Maintenance  [] Relapse   NUTRITION ASSESSMENT NUTRITION ASSESSMENT NUTRITION ASSESSMENT NUTRITION ASSESSMENT NUTRITION ASSESSMENT NUTRITION ASSESSMENT   Weight Management  Weight: 192        Height: 5'2\"   BMI: 35.19  Weight Management  Weight: **                  Weight Management  Weight: **                  Weight Management  Weight: ** Weight Management  Weight: ** Weight Management  Weight: **                  BMI: **   Eating Plan  Current eating habits: watches sodium and fluid intake. She lives with her son and daughter in law and they do all the cooking. Eating Plan  Changes: Eating Plan  Changes: Eating Plan  Changes: Eating Plan  Changes: Eating Plan Improvements:   Alcohol Use  [x] none          [] daily  [] weekly      [] special           Diet Assessment Tool:  RATE YOUR PLATE  *Given to patient to complete and return.  Diet Assessment Tool:    Score: **/72       Diet Assessment Tool: RATE YOUR PLATE  Score: **/71   NUTRITION PLAN NUTRITION PLAN NUTRITION PLAN NUTRITION PLAN NUTRITION PLAN NUTRITION PLAN   *Interventions* *Interventions* *Interventions* *Interventions* *Interventions* *Interventions*   Initial Survey given Goal Setting Discussion:   [] Yes      [] No        Follow Up Survey Reviewed & Goals Updated:     Professional Referral  Please check if needed. []Dietician Consult   []Wt. Management Referral  []Other:  Professional Referral  Please check if needed. []Dietician Consult   []Wt. Management Referral  []Other:  Professional Referral  Please check if needed. []Dietician Consult   []Wt. Management Referral  []Other:  Professional Referral  Please check if needed. []Dietician Consult   []Wt. Management Referral  []Other: Professional Referral  Please check if needed. []Dietician Consult   []Wt. Management Referral  []Other: Professional Referral  Please check if needed. []Dietician Consult   []Wt. Management Referral  []Other:    *Education* *Education* *Education* *Education* *Education* *Education*   Nutritional Education Recommended    [x] Overview of The Pritikin Eating Plan video Nutritional Education Attended/Date    See Education Videos under Risk Factors Nutritional Education Attended/Date    See Education Videos under Risk Factors Nutritional Education Attended/Date    See Education Videos under Risk Factors Nutritional Education Attended/Date    See Education Videos under Risk Factors Video Completed? [] Yes  [] No   *Goals* *Goals* *Goals* *Goals* *Goals* *Goals*   Indu's nutritional goals are as follows:  Complete and return diet survey [] Completed Video  [] Complete and return diet survey [] Completed Video  [] Complete and return diet survey [] Completed Video  [] Complete and return diet survey [] Completed Video  [] Complete and return diet survey Indu achieved nutritional goals   [] Yes    [] No  If no, why?      Individual Cardiac Treatment Plan - Psychosocial  PSYCHOSOCIAL  ASSESSMENT/PLAN PSYCHOSOCIAL  REASSESSMENT PSYCHOSOCIAL   REASSESSMENT PSYCHOSOCIAL   REASSESSMENT PSYCHOSOCIAL   REASSESSMENT PSYCHOSOCIAL  DISCHARGE/FOLLOW-UP   Stages of Change Stages of Change Stages of Change Stages of Change Stages of Change Stages of Change   [] Pre Contemplation  [x] Contemplation  [] Preparation  [] Action  [] Maintenance  [] Relapse [] Pre Contemplation  [] Contemplation  [] Preparation  [] Action  [] Maintenance  [] Relapse [] Pre Contemplation  [] Contemplation  [] Preparation  [] Action  [] Maintenance  [] Relapse [] Pre Contemplation  [] Contemplation  [] Preparation  [] Action  [] Maintenance  [] Relapse [] Pre Contemplation  [] Contemplation  [] Preparation  [] Action  [] Maintenance  [] Relapse [] Pre Contemplation  [] Contemplation  [] Preparation  [] Action  [] Maintenance  [] Relapse   PSYCHOSOCIAL ASSESSMENT PSYCHOSOCIAL ASSESSMENT PSYCHOSOCIAL ASSESSMENT PSYCHOSOCIAL ASSESSMENT PSYCHOSOCIAL ASSESSMENT PSYCHOSOCIAL ASSESSMENT   Behavioral Outcomes Behavioral Outcomes Behavioral Outcomes Behavioral Outcomes Behavioral Outcomes Behavioral Outcomes   PHQ-9 score 13  Depression Severity  []Minimal  []Mild   [x]Moderate  []Moderately Severe  []Severe     PHQ-9 score **  Depression Severity  []Minimal  []Mild   []Moderate  []Moderately Severe   []Severe   Does patient have Family Support? [x] Yes      [] No  No signs of marital/family distress        Within the Past Month:  *Have you wished you were dead or wished you could go to sleep and not wake up? [] Yes      [x] No  *Have you had any thoughts of killing yourself?   [] Yes      [x] No          Using a scale of 0-10, 0=none, 10=very:   Rate your depression: 3  Rate your anxiety:  4  Using a scale of 0-10, 0=none, 10=very:   Rate your depression: **  Rate your anxiety:  ** Using a scale of 0-10, 0=none, 10=very:   Rate your depression: **  Rate your anxiety:  ** Using a scale of 0-10, 0=none, 10=very:   Rate your depression: **  Rate your anxiety:  ** Using a scale of 0-10, 0=none, 10=very:   Rate your depression: **  Rate your anxiety:  ** Using a scale of 0-10, 0=none, 10=very:   Rate your depression: **  Rate your anxiety:  **   Signs and Symptoms of Depression Present? [x] Yes      [] No  If yes, please explain:  depression is being managed, on medication, physician aware. Signs and Symptoms of Depression Present? [] Yes      [] No  If yes, please explain:  ** Signs and Symptoms of Depression Present? [] Yes      [] No  If yes, please explain:  ** Signs and Symptoms of Depression Present? [] Yes      [] No  If yes, please explain:  ** Signs and Symptoms of Depression Present? [] Yes      [] No  If yes, please explain:  ** Signs and Symptoms of Depression Present? [] Yes      [] No  If yes, please explain:  **   Signs and Symptoms of Anxiety Present? [x] Yes      [] No  If yes, please explain:  pt on antianxiety medication. Signs and Symptoms of Anxiety Present? [] Yes      [] No  If yes, please explain:  ** Signs and Symptoms of Anxiety Present? [] Yes      [] No  If yes, please explain:  ** Signs and Symptoms of Anxiety Present? [] Yes      [] No  If yes, please explain:  ** Signs and Symptoms of Anxiety Present? [] Yes      [] No  If yes, please explain:  ** Signs and Symptoms of Anxiety Present? [] Yes      [] No  If yes, please explain:  **   [] Patient has poor eye contact   [] Flat affect present. [] Signs of anxiety, anger or hostility    [] Signs social isolation present.    []  Signs of alcohol or substance abuse        PSYCHOSOCIAL PLAN PSYCHOSOCIAL PLAN PSYCHOSOCIAL PLAN PSYCHOSOCIAL PLAN PSYCHOSOCIAL PLAN PSYCHOSOCIAL PLAN   *Interventions* *Interventions* *Interventions* *Interventions* *Interventions* *Interventions*   *Please check if needed  [] Psych Consult  [] Physician Referral  [x] Stress Management Skills *Please check if needed  [] Psych Consult  [] Physician Referral  [] Stress Management Skills *Please check if needed  [] Psych Consult  [] Physician Referral  [] Stress Management Skills *Please check if needed  [] Psych Consult  [] Physician Referral  [] Stress Management Skills *Please check if needed  [] Psych Consult  [] Physician Referral  [] Stress Management Skills *Please check if needed  [] Psych Consult  [] Physician Referral  [] Stress Management Skills   Is patient currently taking anti-depressant or anti-anxiety medications? [x] Yes      [] No  If yes, please list medications:  ATARAX, [address] in anti-depressant or anti-anxiety medications? [] Yes      [] No  If yes, please list medications:  ** Change in anti-depressant or anti-anxiety medications? [] Yes      [] No  If yes, please list medications:  ** Change in anti-depressant or anti-anxiety medications? [] Yes      [] No  If yes, please list medications:  ** Change in anti-depressant or anti-anxiety medications? [] Yes      [] No  If yes, please list   medications:  ** Change in anti-depressant or anti-anxiety medications? [] Yes      [] No  If yes, please list medications:  **   *Education* *Education* *Education* *Education* *Education* *Education*   Healthy Mind Education Recommended    [x] Healthy Minds, Bodies,Hearts video See Education Videos under Risk Factor See Education Videos under Risk Factor See Education Videos under Risk Factor See Education Videos under Risk Factor Video Completed? [] Yes  [] No   *Goals* *Goals* *Goals* *Goals* *Goals* *Goals*   Indu's psychosocial goals are as follows:  Complete HADS & Mague & Macarena, Quality of Life Index, Cardiac Version IV [] Reduce depression symptom severity by 1 level [] Reduce depression symptom severity by 1 level [] Reduce depression symptom severity by 1 level [] Reduce depression symptom severity by 1 level Indu achieved psychosocial goals?   [] Yes    [] No  If no, why?  **  [] Use methods learned to continue to reduce depression symptom severity by 1 level     Individual Cardiac Treatment Plan - Other:  Risk Factor/Education  RISK FACTOR  ASSESSMENT/PLAN RISK FACTOR  REASSESSMENT  RISK FACTOR  REASSESSMENT RISK FACTOR  REASSESSMENT RISK FACTOR  REASSESSMENT RISK FACTOR   DISCHARGE/FOLLOW-UP   Stages of Change Stages of Change Stages of Change Stages of Change Stages of Change Stages of Change   [] Pre Contemplation  [x] Contemplation  [] Preparation  [] Action  [] Maintenance  [] Relapse [] Pre Contemplation  [] Contemplation  [] Preparation  [] Action  [] Maintenance  [] Relapse [] Pre Contemplation  [] Contemplation  [] Preparation  [] Action  [] Maintenance  [] Relapse [] Pre Contemplation  [] Contemplation  [] Preparation  [] Action  [] Maintenance  [] Relapse [] Pre Contemplation  [] Contemplation  [] Preparation  [] Action  [] Maintenance  [] Relapse [] Pre Contemplation  [] Contemplation  [] Preparation  [] Action  [] Maintenance  [] Relapse   RISK FACTOR/EDUCATION ASSESSMENT RISK FACTOR/EDUCATION ASSESSMENT RISK FACTOR/EDUCATION ASSESSMENT RISK FACTOR/EDUCATION ASSESSMENT RISK FACTOR/EDUCATION ASSESSMENT RISK FACTOR /EDUCATION ASSESSMENT   Hypertension  [] Yes      [x] No    Resting BP: 96/64  Peak Ex BP:100/66  Medication: Toprol   Hypertension  Resting BP: **  Peak Ex BP:**  Medication Changes:  [] Yes      [] No Hypertension  Resting BP: **  Peak Ex BP:**  Medication Changes:  [] Yes      [] No Hypertension  Resting BP: **  Peak Ex BP:**  Medication Changes:  [] Yes      [] No Hypertension  Resting BP: **  Peak Ex BP:**  Medication Changes:  [] Yes      [] No Hypertension  Resting BP: **  Peak Ex BP:**  Medication Changes:  [] Yes      [] No   Lipids  HLD/DLD  [x] Yes      [] No  TOTAL CHOL: 124  HDL:  46  LDL:  56  TRI  Medication: lipitor Lipids  Medication Changes:  [] Yes      [] No     Lipids  Medication Changes:  [] Yes      [] No     Lipids  Medication Changes:  [] Yes      [] No     Lipids  Medication Changes:  [] Yes      [] No Lipids    TOTAL CHOL: **  HDL:  **  LDL:  **  TRIG: **  Medication Changes:  [] Yes      [] No   Diabetes  [x] Yes      [] No  FBS: 154           HbA1C: 7.0        Monitor BS @ home:   [] Yes      [x] No  Frequency: na  Medication: Farxiga Diabetes  Most Recent BS:  BS have been in range  [] Yes      [] No  Medication Changes  [] Yes      [] No   Diabetes  Most Recent BS:  BS have been in range  [] Yes      [] No  Medication Changes  [] Yes      [] No     Diabetes  Most Recent BS:  BS have been in range  [] Yes      [] No  Medication Changes  [] Yes      [] No     Diabetes  Most Recent BS:  BS have been in range  [] Yes      [] No  Medication Changes  [] Yes      [] No Diabetes  Most Recent BS:  BS have been in range  [] Yes      [] No  Medication Changes  [] Yes      [] No       Tobacco Use  [] Current  [x] Former  [] Never    Date Quit: 2020   Tobacco Use  Change in smoking status   [] Yes      [] No    Quit date: **   Tobacco Use  Change in smoking status   [] Yes      [] No    Quit date: **   Tobacco Use  Change in smoking status   [] Yes      [] No    Quit date: ** Tobacco Use  Change in smoking status   [] Yes      [] No    Quit date: ** Tobacco Use  Change in smoking status   [] Yes      [] No    Quit date: **             Learning Barriers  Please select one:  []Speech  []Literacy  []Hearing  []Cognitive  []Vision  [x]Ready to Learn Learning Barriers Addressed:   [] Yes      [] No   Learning Barriers Addressed:   [] Yes      [] No   Learning Barriers Addressed:  [] Yes      [] No Learning Barriers Addressed:  [] Yes      [] No Learning Barriers Addressed:  [] Yes      [] No     RISK FACTOR/EDUCATION PLAN RISK FACTOR/EDUCATION PLAN RISK FACTOR/EDUCATION PLAN RISK FACTOR/EDUCATION PLAN RISK FACTOR/EDUCATION PLAN RISK FACTOR/EDUCATION PLAN   *Interventions* *Interventions* *Interventions* *Interventions* *Interventions* *Interventions*   Recommended Educational Videos    [x] Heart Disease Risk Reduction  [x] Overview of The Pritikin Eating Plan  [x] Move It!   [x] Healthy Minds, Bodies, Hearts       Completed Videos       Completed Videos Completed Videos Completed Videos Recommended Educational Videos Completed    [] Yes      [] No    **If not completed, Why? **           Smoking Cessation/Relaspe Prevention Intervention needed? [] Yes      [x] No   Smoking Cessation/Relapse Prevention Scheduled? [] Yes      [] No  Date:  ** Smoking Cessation/Relapse Prevention Scheduled? [] Yes      [] No  Date:  **     Smoking Counseling attended  [] Yes      [] No  **If not completed, Why? **   Professional Referrals:  *Please check if needed  [] Diabetes Clinic  [] Lipid Clinic   [] Other:      Professional Referrals:  *Please check if needed  [] Diabetes Clinic  [] Lipid Clinic   [] Other:   Preventative Medication Preventative Medication Preventative Medication Preventative Medication Preventative Medication Preventative Medication   Aspirin  [x] Yes    [] No  Blood Thinner: Clopidogrel/Effient/Brillinta  [x] Yes    [] No  Beta Blocker  [x] Yes    [] No  Ace Inhibitor  [] Yes    [x] No  Statin/Lipid Lowering  [x] Yes    [] No Medication Changes? [] Yes    [] No Medication Changes? [] Yes    [] No Medication Changes? [] Yes    [] No Medication Changes? [] Yes    [] No Medication Changes? [] Yes    [] No   *Education* *Education* *Education* *Education* *Education* *Education*   Does Indu require any additional education? [] Yes    [x] No   Does Gene Smith require any additional education? [] Yes    [] No Does Gene Smith require any additional education? [] Yes    [] No Does Gene Simth require any additional education? [] Yes    [] No Does Gene Smith require any additional education? [] Yes    [] No Does Gene Smith require any additional education?   [] Yes    [] No   Recommended Additional Educational Videos    Medical  [] Diseases of Our Time-Part 1  [] Diseases of Our Times-Part 2  [] Metabolic Syndrome & Belly Fat  [] Hypertension & Heart Disease  [] Decoding Lab Results  [] Aging Enhancing Your QoL  [] Sleep Disorders  Exercise  [] Biomechanical Limitations  [] Body Composition  [] Improve Performance  [] Exercise Action Plan  [] Intro to Yoga  Behavioral  [] How Our Thoughts Can Heal Our Hearts  [] Smoking Cessation  Nutrition  [] Becoming A Pritikin   [] Calorie Density  [] Label Reading-Part 1  [] Facts on Fat  [] Biology of Weight Control  [] Nurtition Action Plan  [] Planning Your Eating Strategy  [] Lable Reading- Part 2  [] Dining Out-Part 1  [] Dining Out - Part 2  [] Targeting Your Nutrition Priorities  [] Fueling a Healthy Body  [] Menu Workshop  [employer]   [] Breakfast & Snacks  [] [employer] Salads & Dressing  [] [address] [place] [address]  [] Soups & Desserts Additional Educational Videos Completed           Additional Educational Videos Completed Additional Educational Videos Completed Additional Educational Videos Completed Additional Educational Videos Completed    [] Yes    [] No   *Goals* *Goals* *Goals* *Goals* *Goals* *Goals*   Indu's risk factor/education goals are as follows:    [x] Optimal BP <140/90  [x] Blood Sugar <120  [x] Attend recommended video education sessions  [x] Takes medications as prescribed 100% of the time    Indu's risk factor/education goals are as follows:    [x] Optimal BP <140/90  [] Blood Sugar <120  [x] Attend recommended video education sessions  [x] Takes medications as prescribed 100% of the time   [] ** Indu's risk factor/education goals are as follows:    [x] Optimal BP <140/90  [] Blood Sugar <120  [x] Attend recommended video education sessions  [x] Takes medications as prescribed 100% of the time   [] ** Indu's risk factor/education goals are as follows:    [x] Optimal BP <140/90  [] Blood Sugar <120  [x] Attend recommended video education sessions  [x] Takes medications as prescribed 100% of the time   [] ** Indu's risk factor/education goals are as follows:    [x] Optimal BP <140/90  [] Blood Sugar <120  [x] Attend recommended video education sessions  [x] Takes medications as prescribed 100% of the time   [] ** Indu achieved risk factor goals?   [] Yes    [] No  If no, why?  **       Monitored telemetry has revealed NSR   Monitored telemetry has revealed **  [] documented arrhythmia at increasing workloads  [] associated symptoms ** Monitored telemetry has revealed  [] documented arrhythmia at increasing workloads  [] associated symptoms ** Monitored telemetry has revealed **  [] documented arrhythmia at increasing workloads  [] associated symptoms ** Monitored telemetry has revealed **  [] documented arrhythmia at increasing workloads  [] associated symptoms ** Monitored telemetry has revealed **  [] documented arrhythmia at increasing workloads  [] associated symptoms **   Physician Response    [x] Cardiac rehab is reasonably and medically necessary for continuous cardiac monitoring surveillance  of patient's cardiac activity  [x] Initiate continuous telemetry monitoring and notify me with any concerns  [] Other   Physician Response    [x] Cardiac rehab is reasonably and medically necessary for continuous cardiac monitoring surveillance  of patient's cardiac activity  [x] Continue continuous telemetry monitoring and notify me with any concerns  [] Other     Physician Response      [x] Cardiac rehab is reasonably and medically necessary for continuous cardiac monitoring surveillance  of patient's cardiac activity  [x] Continue continuous telemetry monitoring and notify me with any concerns   [] Other     Physician Response    [x] Cardiac rehab is reasonably and medically necessary for continuous cardiac monitoring surveillance  of patient's cardiac activity  [x] Continue continuous telemetry monitoring and notify me with any concerns   [] Other   Physician Response    [x] Cardiac rehab is reasonably and medically necessary for continuous cardiac monitoring surveillance  of patient's cardiac activity  [x] Continue continuous telemetry monitoring and notify me with any concerns   [] Other

## 2022-10-20 NOTE — PROGRESS NOTES
Nigel       Visit Date: 12/17/2020  Cardiologist:  Dr. Dior Baugh  Primary Care Physician: Dr. Melanie Brooks, APRN - CNP    Leander Pennington is a 62 y.o. female who presents today for:  Chief Complaint   Patient presents with    Congestive Heart Failure       HPI: Obtained from patient and chart    Leander Pennington is a 62 y.o. female who presents to the office for a follow up visit in the heart failure clinic. Relocated from [facility] and [place]. Hx breast cancer chemo and radiation (completed Aug 2018), mastectomy, HTN, hypothyroidism, ICD 2/11/19, NICM EF 25-30% on Entresto. She is also [facility] but will be returning prn. EF has improved to 40-45%. She denies any swelling or bloating. She has gained 20 pounds since her last OV, relates to stress eating. She denies any swelling, orthopnea or PND. She had some episodes of dizziness, is following with Neurology. She would like to get involved with a group of some kind that she can stay active.       Accompanied by: self  Last hospital admission related to Heart Failure:  none  Chest Pain: no  Worsening SOB: no  Worsening Orthopnea/PND: no  Edema: no  Any extra diuretic use: no  Weight gain: yes see hpi  Fatigue: yes  Abdominal bloating: no  Appetite: no  REINALDO: no  Cough: no  Compliant checking home weight: no  Compliant checking blood pressure: no      Past Medical History:   Diagnosis Date    Abnormal heart rhythm     Anxiety     Cancer ([facility] [address]     breast  2016     CHF (congestive heart failure) (HCC)     Congenital heart disease     DJD (degenerative joint disease)     Enlarged lymph nodes     Hypertension     Hypothyroidism     ICD (implantable cardioverter-defibrillator) in place 02/11/2019    Dr. Geno Koehler Kidney stones     PONV (postoperative nausea and vomiting)     Prediabetes 10/7/2019    Thyroid disease      Past Surgical History:   Procedure Laterality Date    APPENDECTOMY      CARPAL TUNNEL RELEASE  2019    COLONOSCOPY      COLONOSCOPY N/A 2020    COLONOSCOPY POLYPECTOMY SNARE/COLD BIOPSY performed by Dominguez Neil MD at [facility]    COLONOSCOPY  2020    COLONOSCOPY POLYPECTOMY HOT BIOPSY performed by Dominguez Neil MD at [address] Right 2020    DEQUERVAINS RELEASE AND RIGHT RING FINGER TRIGGER RELEASE performed by Tai Niño DO at [address], [place]      PACEMAKER INSERTION Left 2019    [employer] [facility]ISIA PT HAS A MRI CONDITIONAL SYSTEM    PTCA      TONSILLECTOMY       Family History   Problem Relation Age of Onset    High Blood Pressure Mother     Dementia Mother     Cancer Father     Colon Cancer Father     Mental Retardation Brother     Colon Polyps Brother     Cancer Maternal Grandfather     Esophageal Cancer Neg Hx      Social History     Tobacco Use    Smoking status: Former Smoker     Packs/day: 0.25     Years: 37.00     Pack years: 9.25     Types: Cigarettes     Start date: 1981     Quit date: 2020     Years since quittin.5    Smokeless tobacco: Never Used   Substance Use Topics    Alcohol use: No     Current Outpatient Medications   Medication Sig Dispense Refill    atorvastatin (LIPITOR) 40 MG tablet Take 1 tablet by mouth nightly 90 tablet 3    EUTHYROX 100 MCG tablet Take 1 tablet by mouth once daily 90 tablet 1    carvedilol (COREG) 3.125 MG tablet Take 1 tablet by mouth twice daily 180 tablet 2    venlafaxine (EFFEXOR XR) 150 MG extended release capsule Take 1 capsule by mouth daily 90 capsule 3    butalbital-acetaminophen-caffeine (FIORICET, ESGIC) -40 MG per tablet Take 1 tablet by mouth every 4 hours as needed for Headaches 180 tablet 1    omeprazole (PRILOSEC) 20 MG delayed release capsule Take 1 capsule by mouth every morning (before breakfast) 90 capsule 2    polyethylene glycol (GLYCOLAX) 17 GM/SCOOP powder MIX 17 GRAMS OF POWDER WITH LIQUID BY MOUTH ONCE DAILY      letrozole (FEMARA) 2.5 MG tablet Take 1 tablet by mouth daily 90 tablet 3    aspirin 81 MG chewable tablet Take 1 tablet by mouth daily 30 tablet 3    ENTRESTO 49-51 MG per tablet Take 1 tablet by mouth twice daily 60 tablet 5    furosemide (LASIX) 20 MG tablet Take 1 tablet by mouth daily as needed (leg swelling, weight gain) 90 tablet 0    acetaminophen (TYLENOL) 500 MG tablet Take 500 mg by mouth every 6 hours as needed for Pain       No current facility-administered medications for this visit. Allergies   Allergen Reactions    Adhesive Tape Itching       SUBJECTIVE:   Review of Systems   Constitutional: Negative for activity change, appetite change, fatigue and fever. HENT: Negative for congestion. Respiratory: Negative for apnea, cough, chest tightness, shortness of breath and wheezing. Cardiovascular: Negative for chest pain, palpitations and leg swelling. Gastrointestinal: Negative for abdominal distention, abdominal pain and nausea. Genitourinary: Negative for difficulty urinating and dysuria. Musculoskeletal: Negative for arthralgias and gait problem. Skin: Negative for color change. Neurological: Negative for dizziness, numbness and headaches. Psychiatric/Behavioral: Negative for agitation, confusion and sleep disturbance. The patient is not nervous/anxious. OBJECTIVE:   Today's Vitals:  /68   Pulse 86   Ht 5' 2\" (1.575 m)   Wt 215 lb 9.6 oz (97.8 kg)   SpO2 95%   Breastfeeding No   BMI 39.43 kg/m²     Physical Exam  Vitals signs reviewed. Constitutional:       Appearance: She is well-developed. HENT:      Head: Normocephalic and atraumatic. Eyes:      Pupils: Pupils are equal, round, and reactive to light. Neck:      Musculoskeletal: Normal range of motion. Vascular: No JVD. Cardiovascular:      Rate and Rhythm: Normal rate and regular rhythm. Heart sounds: Normal heart sounds. No murmur.       Comments: ICD  Pulmonary: Effort: Pulmonary effort is normal. No respiratory distress. Breath sounds: No rales. Abdominal:      General: There is no distension. Palpations: Abdomen is soft. Tenderness: There is no abdominal tenderness. Musculoskeletal:         General: No tenderness. Right lower leg: No edema. Left lower leg: No edema. Skin:     General: Skin is warm and dry. Capillary Refill: Capillary refill takes less than 2 seconds. Neurological:      Mental Status: She is alert and oriented to person, place, and time. Psychiatric:         Mood and Affect: Mood normal.         Behavior: Behavior normal.         Wt Readings from Last 3 Encounters:   12/17/20 215 lb 9.6 oz (97.8 kg)   12/10/20 212 lb (96.2 kg)   10/20/20 203 lb 9.6 oz (92.4 kg)     BP Readings from Last 3 Encounters:   12/17/20 122/68   12/10/20 96/74   10/20/20 109/70     Pulse Readings from Last 3 Encounters:   12/17/20 86   12/10/20 88   10/20/20 80     Body mass index is 39.43 kg/m². ECHO:   10/16/20   Summary   Left ventricle size is normal.   Mild concentric left ventricular hypertrophy. There was moderate global hypokinesis of the left ventricle. Ejection fraction is visually estimated in the range of 40% to 45%. Mild mitral regurgitation. Signature      ----------------------------------------------------------------   Electronically signed by Ramesh Stock MD (Interpreting   physician) on 10/16/2020 at 01:33 PM   ----------------------------------------------------------------      Findings      Mitral Valve   Structurally normal mitral valve. Structurally normal mitral valve. Mild mitral regurgitation. Aortic Valve   The aortic valve was trileaflet with normal thickness and cuspal   separation. DOPPLER: Transaortic velocity was within the normal range with   no evidence of aortic stenosis. There was no evidence of aortic   regurgitation.       Tricuspid Valve   The tricuspid valve structure was normal importance of weighing oneself and recording daily. We also discussed the importance of a low sodium diet, higher sodium foods to avoid and appropriate low sodium food choices. Discussed use, benefit, and side effects of prescribed medications. All patient questions answered. Patient verbalizes understanding of plan of care using teach back method, and is agreeable to the treatment plan. Greater then 40 minutes of time was spent reviewing the chart and educating the patient about HF, medications, diet, exercise, and discussing the plan of care. I personally spent more then 50% of the appt time face to face with the patient counseling/coordinating patient's care.       Electronicallysigned by ANGELIA Christian CNP on 12/17/2020 at 2:42 PM generalized

## 2022-12-27 NOTE — CARE COORDINATION
7/9/20, 7:39 AM EDT  DISCHARGE PLANNING EVALUATION:    Sudha Jacinto       Admitted from: ED 7/8/2020/ [address] day: 0   Location: -09/009-A Reason for admit: Facial swelling [R22.0] Status: IP  Admit order signed?: yes  PMH:  has a past medical history of Abnormal heart rhythm, Anxiety, Cancer ([facility] [address] CHF (congestive heart failure) ([facility] [address] Congenital heart disease, DJD (degenerative joint disease), Enlarged lymph nodes, Hypertension, Hypothyroidism, ICD (implantable cardioverter-defibrillator) in place, Kidney stones, PONV (postoperative nausea and vomiting), Prediabetes, and Thyroid disease. Procedure: none  Pertinent abnormal Imaging:CT Head WO Contrast   Impression:          No acute ischemic infarct, hemorrhage, or mass effect. CXR   No acute cardiopulmonary disease. Mild cardiomegaly. CTA Head Neck W WO Contrast     Medications:  Scheduled Meds:   atorvastatin  40 mg Oral Nightly    digoxin  125 mcg Oral Daily    levothyroxine  100 mcg Oral Daily    venlafaxine  37.5 mg Oral Daily    sodium chloride flush  10 mL Intravenous 2 times per day    enoxaparin  40 mg Subcutaneous Daily    glycerin-hypromellose-  1 drop Left Eye TID    [START ON 7/10/2020] aspirin  81 mg Oral Daily    predniSONE  60 mg Oral Daily     Continuous Infusions:   Pertinent Info/Orders/Treatment Plan:  Telemetry, Neurology consult, PT for vestibular therapy, Speech therapy. Diet: Diet NPO Effective Now   Smoking status:  reports that she has been smoking cigarettes. She started smoking about 38 years ago. She has a 9.25 pack-year smoking history.  She has never used smokeless tobacco.   PCP: ANGELIA Campos CNP  Readmission 30 days or less: no  Readmission Risk Score: 14%    Discharge Planning Evaluation  Current Residence:  Private Residence  Living Arrangements:  Family Members   Support Systems:  Family Members  Current Services PTA:     Potential Assistance Needed:  N/A  Potential Assistance Purchasing Medications:  No  Does patient want to participate in local refill/ meds to beds program?  Yes  Type of Home Care Services:  None  Patient expects to be discharged to:  home  Expected Discharge date:  07/12/20  Follow Up Appointment: Best Day/ Time: Monday AM    Patient Goals/Plan/Treatment Preferences: Me with Dimple Samples. She currently lives at home with her daughter. Plan is to return at discharge. She denies need for DME and declines HH. Transportation/Food Security/Housekeeping Addressed:  No issues identified.     Evaluation: no Detail Level: Detailed Quality 130: Documentation Of Current Medications In The Medical Record: Current Medications Documented

## 2023-01-24 NOTE — GROUP NOTE
Group Therapy Note    Date: 10/2/2022    Group Start Time: 1400  Group End Time: 1430  Group Topic: Recreational    [facility] 4E    Heather Pedraza MA, CTRS        Group Therapy Note    Attendees: 8       Patient's Goal:  pt will improve socialization and knowledge of coping skills through participation of recreation and leisure activities. Notes:  Pt was present during group. Pt participated in group and chose to complete word searches as a leisure activity. Pt was interacting with peers throughout. Status After Intervention:  Improved    Participation Level:  Active Listener and Interactive    Participation Quality: Appropriate, Attentive, and Sharing      Speech:  normal      Thought Process/Content: Logical      Affective Functioning: Congruent      Mood: euthymic      Level of consciousness:  Alert, Oriented x4, and Attentive      Response to Learning: Able to verbalize current knowledge/experience, Able to verbalize/acknowledge new learning, Able to retain information, Capable of insight, Able to change behavior, and Progressing to goal      Endings: None Reported    Modes of Intervention: Education, Support, Socialization, Exploration, Clarifying, Problem-solving, and Activity      Discipline Responsible: Psychoeducational Specialist      Signature:  Heather Pedraza MA, CTRS Detail Level: Detailed Detail Level: Zone

## 2023-04-22 NOTE — TELEPHONE ENCOUNTER
Patient was a no show to appt on 8/10/2022 with Chelsea. Attempted to call patient but no answer and no VM. Sent patient [employer] message. show

## 2025-02-06 PROBLEM — F32.9 MAJOR DEPRESSIVE DISORDER: Status: ACTIVE | Noted: 2025-01-28

## 2025-02-06 PROBLEM — I95.89 OTHER HYPOTENSION: Status: ACTIVE | Noted: 2025-01-28

## 2025-02-06 PROBLEM — Z85.3 PERSONAL HISTORY OF MALIGNANT NEOPLASM OF BREAST: Status: ACTIVE | Noted: 2025-01-28

## 2025-02-06 PROBLEM — E03.9 HYPOTHYROIDISM, UNSPECIFIED: Status: ACTIVE | Noted: 2025-01-28

## 2025-02-06 PROBLEM — G47.33 OBSTRUCTIVE SLEEP APNEA (ADULT) (PEDIATRIC): Status: ACTIVE | Noted: 2025-01-28

## 2025-02-06 PROBLEM — G47.00 INSOMNIA, UNSPECIFIED: Status: ACTIVE | Noted: 2025-01-28

## 2025-02-06 PROBLEM — F43.20 ADJUSTMENT DISORDER, UNSPECIFIED: Status: ACTIVE | Noted: 2025-01-28

## 2025-02-06 PROBLEM — F41.1 GENERALIZED ANXIETY DISORDER: Status: ACTIVE | Noted: 2025-01-28

## 2025-02-06 PROBLEM — Z95.810 PRESENCE OF AUTOMATIC (IMPLANTABLE) CARDIAC DEFIBRILLATOR: Status: ACTIVE | Noted: 2025-01-28

## 2025-02-06 PROBLEM — K21.9 GASTRO-ESOPHAGEAL REFLUX DISEASE WITHOUT ESOPHAGITIS: Status: ACTIVE | Noted: 2025-01-28

## 2025-02-06 PROBLEM — I50.43 ACUTE ON CHRONIC HEART FAILURE WITH REDUCED EJECTION FRACTION AND DIASTOLIC DYSFUNCTION: Status: ACTIVE | Noted: 2025-02-06

## 2025-02-07 PROBLEM — I50.43 ACUTE ON CHRONIC COMBINED SYSTOLIC AND DIASTOLIC HEART FAILURE: Status: ACTIVE | Noted: 2025-02-06

## 2025-03-26 ENCOUNTER — HOSPITAL ENCOUNTER (OUTPATIENT)
Facility: HOSPITAL | Age: 65
Setting detail: SURGERY ADMIT
End: 2025-03-26
Attending: THORACIC SURGERY (CARDIOTHORACIC VASCULAR SURGERY) | Admitting: THORACIC SURGERY (CARDIOTHORACIC VASCULAR SURGERY)
Payer: COMMERCIAL

## 2025-03-26 PROBLEM — I50.9 HEART FAILURE: Status: ACTIVE | Noted: 2025-03-26

## 2025-03-26 PROBLEM — I50.20 ACC/AHA STAGE D SYSTOLIC HEART FAILURE: Status: ACTIVE | Noted: 2025-03-26

## 2025-04-14 ENCOUNTER — APPOINTMENT (OUTPATIENT)
Dept: RADIOLOGY | Facility: HOSPITAL | Age: 65
End: 2025-04-14
Payer: COMMERCIAL

## 2025-04-14 ENCOUNTER — APPOINTMENT (OUTPATIENT)
Dept: CARDIOLOGY | Facility: HOSPITAL | Age: 65
End: 2025-04-14
Payer: COMMERCIAL

## 2025-04-14 PROCEDURE — 71045 X-RAY EXAM CHEST 1 VIEW: CPT

## 2025-04-14 PROCEDURE — 93005 ELECTROCARDIOGRAM TRACING: CPT

## 2025-04-16 ENCOUNTER — APPOINTMENT (OUTPATIENT)
Dept: CARDIOLOGY | Facility: HOSPITAL | Age: 65
End: 2025-04-16
Payer: COMMERCIAL

## 2025-04-16 PROCEDURE — 93005 ELECTROCARDIOGRAM TRACING: CPT

## 2025-04-17 ENCOUNTER — APPOINTMENT (OUTPATIENT)
Dept: CARDIOLOGY | Facility: HOSPITAL | Age: 65
End: 2025-04-17
Payer: COMMERCIAL

## 2025-04-17 ENCOUNTER — APPOINTMENT (OUTPATIENT)
Dept: RADIOLOGY | Facility: HOSPITAL | Age: 65
End: 2025-04-17
Payer: COMMERCIAL

## 2025-04-17 PROCEDURE — 85347 COAGULATION TIME ACTIVATED: CPT

## 2025-04-17 PROCEDURE — 93287 PERI-PX DEVICE EVAL & PRGR: CPT

## 2025-04-17 PROCEDURE — 71045 X-RAY EXAM CHEST 1 VIEW: CPT

## 2025-04-17 PROCEDURE — 93005 ELECTROCARDIOGRAM TRACING: CPT

## 2025-04-18 ENCOUNTER — APPOINTMENT (OUTPATIENT)
Dept: RADIOLOGY | Facility: HOSPITAL | Age: 65
End: 2025-04-18
Payer: COMMERCIAL

## 2025-04-18 PROBLEM — Z95.811 LVAD (LEFT VENTRICULAR ASSIST DEVICE) PRESENT (MULTI): Status: ACTIVE | Noted: 2025-04-18

## 2025-04-18 PROCEDURE — 71045 X-RAY EXAM CHEST 1 VIEW: CPT

## 2025-04-21 ENCOUNTER — APPOINTMENT (OUTPATIENT)
Dept: RADIOLOGY | Facility: HOSPITAL | Age: 65
End: 2025-04-21
Payer: COMMERCIAL

## 2025-04-21 PROCEDURE — 71045 X-RAY EXAM CHEST 1 VIEW: CPT

## 2025-04-22 ENCOUNTER — APPOINTMENT (OUTPATIENT)
Dept: RADIOLOGY | Facility: HOSPITAL | Age: 65
End: 2025-04-22
Payer: COMMERCIAL

## 2025-04-22 PROCEDURE — C1751 CATH, INF, PER/CENT/MIDLINE: HCPCS

## 2025-04-22 PROCEDURE — 82435 ASSAY OF BLOOD CHLORIDE: CPT

## 2025-04-22 PROCEDURE — 2780000003 HC OR 278 NO HCPCS

## 2025-04-22 PROCEDURE — 36410 VNPNXR 3YR/> PHY/QHP DX/THER: CPT

## 2025-04-29 DIAGNOSIS — Z95.811 LVAD (LEFT VENTRICULAR ASSIST DEVICE) PRESENT (MULTI): ICD-10-CM

## 2025-04-30 ENCOUNTER — DOCUMENTATION (OUTPATIENT)
Dept: TRANSPLANT | Facility: HOSPITAL | Age: 65
End: 2025-04-30
Payer: COMMERCIAL

## 2025-04-30 DIAGNOSIS — Z79.01 CHRONIC ANTICOAGULATION: ICD-10-CM

## 2025-04-30 DIAGNOSIS — I50.43 ACUTE ON CHRONIC HEART FAILURE WITH REDUCED EJECTION FRACTION AND DIASTOLIC DYSFUNCTION: ICD-10-CM

## 2025-04-30 DIAGNOSIS — Z95.811 LVAD (LEFT VENTRICULAR ASSIST DEVICE) PRESENT (MULTI): ICD-10-CM

## 2025-04-30 DIAGNOSIS — I50.20 ACC/AHA STAGE D SYSTOLIC HEART FAILURE: ICD-10-CM

## 2025-04-30 NOTE — PROGRESS NOTES
Initial out patient coumadin clinic referral. Patient referred to [facility] in [place], [place]   [address] [place] [address]   (P) [phone]  (F) [phone]

## 2025-05-02 DIAGNOSIS — Z95.811 LVAD (LEFT VENTRICULAR ASSIST DEVICE) PRESENT (MULTI): ICD-10-CM

## 2025-05-05 ENCOUNTER — APPOINTMENT (OUTPATIENT)
Dept: CARDIOLOGY | Facility: HOSPITAL | Age: 65
End: 2025-05-05
Payer: COMMERCIAL

## 2025-05-05 PROBLEM — Z79.01 CHRONIC ANTICOAGULATION: Status: ACTIVE | Noted: 2025-05-05

## 2025-05-05 PROCEDURE — 93005 ELECTROCARDIOGRAM TRACING: CPT

## 2025-05-07 ENCOUNTER — DOCUMENTATION (OUTPATIENT)
Dept: HOME HEALTH SERVICES | Facility: HOME HEALTH | Age: 65
End: 2025-05-07
Payer: COMMERCIAL

## 2025-05-07 NOTE — HH CARE COORDINATION
Home Care received a referral for Physical Therapy and Occupational Therapy. Unfortunately, we are unable to accept and process the referral at this time.    Reason:  Patient's Home is Outside of [facility] Service Area    Patients, please reach out to the referring provider or your PCP to assist in obtaining an alternative home care agency and/or guidance to meet your needs.    Providers, please reach out to  Home Care at [phone] with any questions regarding the declined referral.

## 2025-05-08 ENCOUNTER — PHARMACY VISIT (OUTPATIENT)
Dept: PHARMACY | Facility: CLINIC | Age: 65
End: 2025-05-08
Payer: COMMERCIAL

## 2025-05-08 PROCEDURE — RXMED WILLOW AMBULATORY MEDICATION CHARGE

## 2025-05-09 ENCOUNTER — TELEPHONE (OUTPATIENT)
Dept: RESPIRATORY THERAPY | Facility: HOSPITAL | Age: 65
End: 2025-05-09
Payer: COMMERCIAL

## 2025-05-09 DIAGNOSIS — Z95.811 LVAD (LEFT VENTRICULAR ASSIST DEVICE) PRESENT (MULTI): ICD-10-CM

## 2025-05-09 NOTE — TELEPHONE ENCOUNTER
Spoke to coumadin clinic at [facility] they have requested documents of patient discharge including a medlist, INR dosing and INR readings.

## 2025-05-09 NOTE — TELEPHONE ENCOUNTER
[facility]'s Coumadin Clinic called. Would like a call back from [facility] to discuss patient information. Their office will be open until noon. Call back number: [phone] opt 1

## 2025-05-14 ENCOUNTER — OFFICE VISIT (OUTPATIENT)
Dept: TRANSPLANT | Facility: HOSPITAL | Age: 65
End: 2025-05-14
Payer: COMMERCIAL

## 2025-05-14 VITALS — BODY MASS INDEX: 35.47 KG/M2 | WEIGHT: 194 LBS

## 2025-05-14 DIAGNOSIS — I50.43 ACUTE ON CHRONIC HEART FAILURE WITH REDUCED EJECTION FRACTION AND DIASTOLIC DYSFUNCTION: ICD-10-CM

## 2025-05-14 DIAGNOSIS — G23.8 OTHER SPECIFIED DEGENERATIVE DISEASES OF BASAL GANGLIA: ICD-10-CM

## 2025-05-14 DIAGNOSIS — F33.2 MDD (MAJOR DEPRESSIVE DISORDER), RECURRENT SEVERE, WITHOUT PSYCHOSIS (MULTI): ICD-10-CM

## 2025-05-14 DIAGNOSIS — N18.2 TYPE 2 DIABETES MELLITUS WITH STAGE 2 CHRONIC KIDNEY DISEASE, WITHOUT LONG-TERM CURRENT USE OF INSULIN (MULTI): ICD-10-CM

## 2025-05-14 DIAGNOSIS — E11.22 TYPE 2 DIABETES MELLITUS WITH STAGE 2 CHRONIC KIDNEY DISEASE, WITHOUT LONG-TERM CURRENT USE OF INSULIN (MULTI): ICD-10-CM

## 2025-05-14 DIAGNOSIS — E21.3 HYPERPARATHYROIDISM, UNSPECIFIED (MULTI): Primary | ICD-10-CM

## 2025-05-14 DIAGNOSIS — I50.20 ACC/AHA STAGE D SYSTOLIC HEART FAILURE: ICD-10-CM

## 2025-05-14 DIAGNOSIS — Z95.811 LVAD (LEFT VENTRICULAR ASSIST DEVICE) PRESENT (MULTI): ICD-10-CM

## 2025-05-14 PROCEDURE — 3044F HG A1C LEVEL LT 7.0%: CPT | Performed by: INTERNAL MEDICINE

## 2025-05-14 PROCEDURE — 99215 OFFICE O/P EST HI 40 MIN: CPT | Mod: 25 | Performed by: INTERNAL MEDICINE

## 2025-05-14 PROCEDURE — 93750 INTERROGATION VAD IN PERSON: CPT | Performed by: INTERNAL MEDICINE

## 2025-05-14 PROCEDURE — 99215 OFFICE O/P EST HI 40 MIN: CPT | Performed by: INTERNAL MEDICINE

## 2025-05-14 PROCEDURE — 3048F LDL-C <100 MG/DL: CPT | Performed by: INTERNAL MEDICINE

## 2025-05-14 NOTE — PATIENT INSTRUCTIONS
Patient Instructions:  -Please bring a list of your medications to every visit.  -If you have any questions or concerns, please contact the [facility] office at [phone], option 3 or the direct line at [phone]. Please state that you are an [facility] patient. If it is after hours and it is an emergency, please call the emergency [facility] line at [phone]  - Continue current medications with the exception of:   --   - Labwork: CBC, CMP, LDH, BNP, TSH, DIG  - Imaging/Procedures: Echo  - Referrals:   - Followup:  1 month with Carolynn

## 2025-05-14 NOTE — PROGRESS NOTES
VAD Cardiologist:     VAD Coordinator: Sanaz Hightower   Primary Care Physician: Warner Campos, JAKY-CNP    Patient's Location: [facility] [address]    Date of Visit: 05/14/2025  4:30 PM EDT  Location of visit: [facility]   Type of Visit: LVAD followup    Type of VAD:    Implant Date:   Reason for VAD:   Intent: Long-Term (reason: ) / Short-Term (UNOS Status)     HPI / Summary:   Delphine Zavala is a very pleasant 65 y.o. 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              She had a recent hospitalization (02/06/25 - 02/20/25) in the HFICU for swan guided optimization. She was discussed at [facility] and the decision was to pursue an outpatient CPET for further evaluation. Of note her HLAs noted a PRA of 98%. She was approved for LVAD and on 4/17/2025 underwent LVAD HM3 implant and PFO closure w/ Dr. Berger course complicated by slow milrinone wean and a-flutter requiring DCCV, discharged home on 5/8    Final echocardiogram at 5100 RPM  on 4/29/25 shows moderate RV dysfunction, AV opening every beat, septum midline, trace MR and mild TR w/ RVSP 19  - No LVAD adjustments made with above echo due to stable flow 4.5-5.0L. From interrogation she appears optimized w/ PI 4, no power spikes, or speed drops. Very few PI events.       Today:  Patient denies CP, palpitations, dizziness, orthopnea, PND, edema, LVAD alarms, N/V/D, hematochezia, hematuria, epistaxis.  Most recent INR is 5/8/2025:  4:43 AM   Patient is taking torsemide daily.     Patient dropped controller on Saturday and states she had some bleeding around the drive line site. Patient is able to ambulate at home and is independent with ADLS. Patient  MANLEY when ambulating long distances. Patient is not able to ambulate from hospital entrance to clinic visit due to this.     Driveline is clean with no drainage. Dressing was last changed on discharge. Dressing change performed in clinic today.   VAD equipment interrogated in clinic with all batteries and clips functioning properly. Patient denies sleeping on batteries at home.     Most recent six alarms interrogated from patient controller.     Patient has not completed Cardiac Rehab.       NYHA class at implant - IV.   NYHA class today -  III (still using buggy at store and not walking outside the house) .      ROS: Full 10 pt review of symptoms of negative unless discussed above.     Objective   Medical History:   She has a past medical history of Acute on chronic heart failure with reduced ejection fraction (HFrEF, <= 40%), Adjustment disorder, BMI 35.0-35.9,adult, Generalized anxiety disorder, GERD (gastroesophageal reflux disease), Hypotension, Hypothyroid, Insomnia, Major depression, Obstructive sleep apnea, Paroxysmal A-fib (Multi), Personal history of malignant neoplasm of breast, and Presence of automatic (implantable) cardiac defibrillator.  Surgical Hx:   She has a past surgical history that includes Other surgical history (03/26/2019); Other surgical history (03/26/2019); Other surgical history (03/26/2019); Other surgical history (03/26/2019); Mitral valve replacement (11/2024); Tricuspid valve replacement (11/2024); Mitral valve repair (01/2025); and Cardiac catheterization (N/A, 2/7/2025).   Social Hx:   She reports that she has quit smoking. Her smoking use included cigarettes. She started smoking about 20 years ago. She has a 5.1 pack-year smoking history. She has never used smokeless tobacco. She reports that she does not currently use alcohol. She reports that she does not currently use drugs.  Family Hx:   Her family history includes Colon cancer in her father.   Exam:   Vitals:       5/8/2025     12:13 PM 5/8/2025    11:59 AM 5/8/2025    11:00 AM 5/8/2025     8:22 AM 5/8/2025     7:45 AM 5/8/2025     4:54 AM   Vitals   BP Location Right arm  Right arm Right arm  Right arm   Heart Rate  89 92  82    Temp  36.2 °C (97.2 °F) 36.3 °C (97.3 °F) 36.5 °C (97.7 °F)     Resp  19 19  20      Wt Readings from Last 5 Encounters:   05/08/25 85.6 kg (188 lb 11.4 oz)   03/14/25 85 kg (187 lb 6.3 oz)   03/14/25 85 kg (187 lb 6.3 oz)   02/20/25 79.8 kg (176 lb)   02/06/25 88.1 kg (194 lb 3.2 oz)     GEN: Pleasant, well-appearing, no acute distress.  HEENT: JVP not elevated, no icterus.   CHEST: Clear to auscultation. Sternotomy well healed.  CV: LVAD hum  ABD: Soft, ND, NT.  Driveline: No drainage. Dressing c/d/I. Secured.  EXT: Warm, well perfused, No LE edema.   NEURO: Pleasant, LEE, Oriented to plan     Labs:   CMP:  Recent Labs     05/08/25  0443 05/07/25  0535 05/06/25  0554 05/05/25  0542 05/04/25  0534 04/30/25  0515 04/29/25  0438 04/28/25  0407    140 142 141 139   < > 131* 132*   K 4.2 3.7 4.1 3.9 4.1   < > 3.9 4.3    99 101 105 102   < > 92* 92*   CO2 30 32 29 28 29   < > 31 31   ANIONGAP 15 13 16 12 12   < > 12 13   BUN 10 13 13 13 13   < > 14 13   CREATININE 0.73 0.82 0.82 0.71 0.72   < > 0.64 0.56   EGFR >90 80 80 >90 >90   < > >90 >90   MG  --  2.00  --  2.03 2.02  --  2.39 2.37    < > = values in this interval not displayed.     Recent Labs     05/08/25  0443 05/07/25  0535 05/06/25  0554 05/05/25  0542 05/04/25  0534 04/30/25  0515 04/29/25  0438 04/27/25  0909 04/27/25  0453 04/26/25  0823 04/25/25  0621 04/24/25  0515   ALBUMIN 3.5 3.3* 3.2* 3.2* 3.0*   < > 3.3*   < > 3.1* 3.4 3.2* 3.2*  3.2*   ALKPHOS  --   --   --   --   --   --  245*  --  263* 268* 303* 322*   ALT  --   --   --   --   --   --  10  --  9 10 11 12   AST  --   --   --   --   --   --  17  --  18 18 21 28   BILITOT  --   --   --   --   --   --  1.1  --  1.2 1.5* 1.7* 2.0*    < > = values in this interval not displayed.  "    CBC:  Recent Labs     05/08/25  0443 05/07/25  0535 05/05/25  0542 05/04/25  1016 05/03/25  0549   WBC 4.5 3.9* 4.9 5.8 5.2   HGB 8.9* 8.3* 8.3* 8.4* 8.4*   HCT 30.5* 28.7* 28.8* 28.4* 28.1*    300 337 396 424   MCV 97 95 97 95 93     COAG:   Recent Labs     05/08/25  0443 05/07/25  1032 05/07/25  0535 05/06/25  1941 05/06/25  0554 04/21/25  1536 04/21/25  0215 04/19/25  0120 04/18/25  0234   INR 2.4*  --  2.1*  --  1.6*   < > 1.1 1.5* 1.3*   HAUF 0.3 0.3 0.3   < >  --    < >  --   --   --    FIBRINOGEN  --   --   --   --   --   --  529* 479* 374    < > = values in this interval not displayed.     ABO:   Recent Labs     04/20/25  1406   ABO B     HEME/ENDO:  Recent Labs     04/28/25  0407 04/14/25  1454 02/10/25  1401 02/06/25  2144 11/08/24  1215 09/02/22  0027   FERRITIN 557* 189*  --  100  --   --    IRONSAT 12* 19*  --  7*  --   --    TSH  --  0.11* 1.25 0.36*  --   --    HGBA1C  --  6.6*  6.6*  --  5.6 5.7 7.0*      CARDIAC:   Recent Labs     05/08/25  0443 05/07/25  0535 05/06/25  0554 05/05/25  0542 05/04/25  0534 04/26/25  0823 04/25/25  0938 04/17/25  1414 04/14/25  1454 02/10/25  1401 02/06/25  2144    198 203 180 180   < >  --    < >  --    < >  --    TROPHS  --   --   --   --   --   --   --   --  9  --  12   BNP  --   --   --  491*  --   --  213*  --  503*  --  494*    < > = values in this interval not displayed.     Recent Labs     04/14/25  1454 02/06/25  2144   CHOL 146 118   LDLCALC 53 58   HDL 40.8 41.5   TRIG 262* 92     MICRO: No results for input(s): \"ESR\", \"CRP\", \"PROCAL\" in the last 90727 hours.  No results found for the last 90 days.      Notable Studies:   EKG:   Recent Labs     05/05/25  1615 04/17/25  1442 04/16/25  1742   VENTRATE 73 113 116   ATRRATE 37 0 21   QTCFRED 472 555 541   QRSDUR 122 106 96   QTCCALCB 486 617 603     Encounter Date: 04/14/25   Electrocardiogram 12-lead PRN for arrhythmia   Result Value    Ventricular Rate 73    Atrial Rate 37    QRS Duration " 122    QT Interval 442    QTC Calculation(Bazett) 486    R Axis -86    T Axis 131    QRS Count 12    Q Onset 203    P Onset 116    P Offset 136    T Offset 424    QTC Fredericia 472    Narrative    Wide QRS rhythm  Left axis deviation  Nonspecific intraventricular conduction delay  Confirmed by Abiel Swartz ([id number]) on 5/7/2025 4:55:41 PM     Echocardiogram:   Recent Labs     04/29/25  1047 04/17/25  0628 02/07/25  1633   EF  --  23 18   LVIDD 3.70  --  5.90   RV 19.6  --  37.3   Transthoracic Echo (TTE) Complete 02/07/2025  CONCLUSIONS:  1. Left ventricular ejection fraction is severely decreased, by visual estimate at 15-20%.  2. There is global hypokinesis of the left ventricle with minor regional variations.  3. Left ventricular cavity size is severely dilated.  4. Abnormal septal motion consistent with post-operative status and right ventricular volume overload.  5. There is severely increased eccentric left ventricular hypertrophy.  6. There is reduced right ventricular systolic function.  7. Moderately enlarged right ventricle.  8. The left atrial size is severely dilated.  9. The right atrium is mild to moderately dilated.  10. Mild to moderate mitral valve regurgitation.  11. S/p POLO. Difficult to accurately assess residual MR. Mean gradient is 5 mmHG at a heart rate of 106 bpm.  12. Status post tricuspid valve repair.  13. Moderate to severe tricuspid regurgitation visualized.  14. Mildly elevated right ventricular systolic pressure.  15. Mild aortic valve regurgitation.    TTE 4/29/25   1. Poorly visualized anatomical structures due to suboptimal image quality.   2. The left ventricle was not well visualized. The left ventricular ejection fraction could not be measured.   3. Abnormal septal motion consistent with post-operative status.   4. S/p Heartmate 3 LVAD with AV opening every beat. Inflow and outflow cannulae not well seen.   5. There is reduced right ventricular systolic function.   6. S/p mitral  POLO ( 2 mitraclips) with possibly trace MR. MV gradients not assessed.   7. Right ventricular systolic pressure is within normal limits.   8. Compared with the prior exam from 4/18/2025 both studies are technically very difficult. The AV did not appear to open on the prior exam but seems to open today with every cardiac cycle. Unable to estimate LVEF in either exam. There was already mild AI on the prior study, similar today.    RHC 02/07/2025    1. HFrEF. NICM  2. S/p uncomlicated RHC with leave in swan in LIJ, sutured at 47cm. RIJ access was abandoed due to thrombus noted on US.  3. Elevated right and left sided pressures with preserved cardiac output. (RA 20, RV 49/5 RVEDP 17, PA 52/24 mPAP 38, PCWP 25, co/ci 5.5/2.99 PA sat 62.9%).  4. Follow recommendations per F team/Dr Calderon    Cardiopulmonary (Metabolic) Stress Test 03/14/2025  1. Peak oxygen consumption 8.7 ml/kg/min (44% predicted) or 13.3 corrected for lean body mass.  2. Test was submaximal based on RER and V-slope with a very short exercise time making interpretation challenging.  3. Blood pressure response, VO2/work rate and EOUS could not be assessed due to insufficient exercise time, but VE/Vco2 slope was elevated (38).  4. Based on available data and predicted peak VO2, there does not appear to be a severe cardiac limitation but primary reason for exercise cessation could not be determined.        Current Outpatient Medications   Medication Instructions    amiodarone (PACERONE) 200 mg, oral, Daily    atorvastatin (LIPITOR) 80 mg, oral, Nightly    calcium carbonate (TUMS) 1,000 mg, oral, 2 times daily    cholecalciferol (VITAMIN D-3) 50 mcg, oral, Daily    digoxin (LANOXIN) 125 mcg, oral, Daily    Farxiga 10 mg, oral, Daily    FLUoxetine (PROZAC) 40 mg, oral, Daily    levothyroxine (SYNTHROID, LEVOXYL) 150 mcg, oral, Daily (0630), Take on an empty stomach at the same time each day, either 30 to 60 minutes prior to breakfast    magnesium oxide  (MAG-OX) 400 mg, oral, 2 times daily    mirtazapine (REMERON) 15 mg, oral, Nightly    omega-3 acid ethyl esters (LOVAZA) 2 g, oral, 2 times daily    pantoprazole (PROTONIX) 40 mg, oral, Daily before breakfast, Do not crush, chew, or split.    sildenafil (REVATIO) 20 mg, oral, 3 times daily    spironolactone (ALDACTONE) 25 mg, oral, Daily    torsemide (DEMADEX) 20 mg, oral, Daily PRN    warfarin (Coumadin) 1 mg tablet Take one tablet in addition to 2 mg tablet (total 3mg) on Mondays, Wednesdays and Fridays.    warfarin (Coumadin) 2 mg tablet Take one 2mg tablet (total 2mg) on Saturdays, Sundays, Tuesdays and Thursdays. Take one 2mg tablet and one 1mg tablet (total 3mg) on Mondays, Wednesdays and Fridays.          Heart Mate III interrogation (personally interrogated)         Procedure note for VAD Interrogation:  Procedure: I interrogated the VAD.      IMPRESSION:  Results are c/w adequate VAD function.    Problems:     CARDIAC  Stage D acute on chronic systolic HF/NICM (suspected anthracycline-induced)   S/p Mitral and Tricuspid valve replacements in November 2024 ([facility]) - following by mTEER in January, 2025 2/2 regurgitation   S/p LVAD HM3 implant and PFO closure w/ Dr. Berger on 4/17/25     - TTE on 4/29/25 shows moderate RV dysfunction, AV opening every beat, septum midline, trace MR and mild TR w/ RVSP 19     Will repeat TTE next visit, likely speed increase at that time      LVAD Care:   Drive Line assessment: Clean and Non-tender.  Dressing changes: Weekly  Anticoagulation: Coumadin 3mg M/W/F and 2mg Sat/Sun/Tues/Thurs for goal INR 2-3; stop heparin infusion today  Prophylaxis: continue PPI daily & vascepa BID     Still getting HH not ready for cardiac rehab referral yet     GDMT & RV support:   - ACE/ARB/ARNI: Holding off given MAP <70 and intolerance during last admission  - Beta-blocker: metoprolol 25mg ER stopped 2/2 moderate RV and worsening edema/dyspnea; plan to rechallenge with stable volume  status  - SGL2 inhibitor: Farxiga 10mg daily  - MRA: Spironolactone 25mg daily   - Digoxin: 125mcg daily (most recent level 0.64 on 4/25)   - Phosphodiesterase inhibitors: Sildenafil 20mg TID  - Diuretics: Continue torsemide 20mg daily for now, follow up labwork before making changes to appears slightly hypervolemic but improving     Heart Transplant candidacy   Barriers to transplant: Elevated PRA  ABO: B     Post-op Aflutter   Intermittent NSVT  - Cardioverted into NSR on 4/22   - Continue amiodarone 200mg daily     Hyperlipidemia  Continue atorvastatin     RESPIRATORY   SHERRY   - Does not use home CPAP  - PFT's completed in lab (2/12) - FVC: 72% / FEV1: 66% / FEV1/ FVC: 91%   - O2 saturation adequate on RA      GI:  GERD  Hx. Colon polyps   Family history (father) Colon CA (patient had yearly colonoscopy until 2023)  - PPI: Pantoprazole 40 mg PO Daily      ENDO:  Hypothyroidism  -C/w Levothyroxine 175 mcg daily (close attention given amiodarone use)  - hgbA1c (4/14): 6.6%        HEMATOLOGY:  #Acute blood loss anemia & iron deficiency   not discharged on iron but H/H were improving at the time of discharge, monitor and consider adding repletion      Patient Instructions:  -Please bring a list of your medications to every visit.  -If you have any questions or concerns, please contact the LVAD office at [phone], option 3 or the direct line at [phone]. Please state that you are an LVAD patient. If it is after hours and it is an emergency, please page the LVAD pager by calling [phone] #[phone]  - Continue current medications with the exception of:   --   Follow up 4 weeks with TTE at that time, labs tomorrow before med adjustemtn    I spent 45 minutes reviewing echo/labs/notes, discussing with patient and other providers    Orders:  No orders of the defined types were placed in this encounter.     Followup Appts:  Future Appointments   Date Time Provider Department Center   5/14/2025  4:30 PM Yoshi KRUSE  MD Vikas [id number] Academic         ____________________________________________________________  Yoshi Bean MD   Section of Advanced Heart Failure and Cardiac Transplantation  Division of Cardiovascular Medicine  [facility]

## 2025-05-15 ENCOUNTER — TELEPHONE (OUTPATIENT)
Dept: TRANSPLANT | Facility: HOSPITAL | Age: 65
End: 2025-05-15
Payer: COMMERCIAL

## 2025-05-15 PROBLEM — F33.2 MDD (MAJOR DEPRESSIVE DISORDER), RECURRENT SEVERE, WITHOUT PSYCHOSIS (MULTI): Status: ACTIVE | Noted: 2025-05-15

## 2025-05-15 PROBLEM — G23.8 OTHER SPECIFIED DEGENERATIVE DISEASES OF BASAL GANGLIA: Status: ACTIVE | Noted: 2025-05-15

## 2025-05-15 PROBLEM — E21.3 HYPERPARATHYROIDISM, UNSPECIFIED (MULTI): Status: ACTIVE | Noted: 2025-05-15

## 2025-05-15 PROBLEM — E11.22 TYPE 2 DIABETES MELLITUS WITH STAGE 2 CHRONIC KIDNEY DISEASE, WITHOUT LONG-TERM CURRENT USE OF INSULIN (MULTI): Status: ACTIVE | Noted: 2025-05-15

## 2025-05-15 PROBLEM — N18.2 TYPE 2 DIABETES MELLITUS WITH STAGE 2 CHRONIC KIDNEY DISEASE, WITHOUT LONG-TERM CURRENT USE OF INSULIN (MULTI): Status: ACTIVE | Noted: 2025-05-15

## 2025-05-16 DIAGNOSIS — Z95.811 LVAD (LEFT VENTRICULAR ASSIST DEVICE) PRESENT (MULTI): ICD-10-CM

## 2025-05-23 DIAGNOSIS — Z95.811 LVAD (LEFT VENTRICULAR ASSIST DEVICE) PRESENT (MULTI): ICD-10-CM

## 2025-05-24 ENCOUNTER — APPOINTMENT (OUTPATIENT)
Dept: CARDIOLOGY | Facility: HOSPITAL | Age: 65
End: 2025-05-24
Payer: COMMERCIAL

## 2025-05-24 ENCOUNTER — APPOINTMENT (OUTPATIENT)
Dept: RADIOLOGY | Facility: HOSPITAL | Age: 65
End: 2025-05-24
Payer: COMMERCIAL

## 2025-05-24 ENCOUNTER — HOSPITAL ENCOUNTER (INPATIENT)
Facility: HOSPITAL | Age: 65
End: 2025-05-24
Attending: STUDENT IN AN ORGANIZED HEALTH CARE EDUCATION/TRAINING PROGRAM | Admitting: STUDENT IN AN ORGANIZED HEALTH CARE EDUCATION/TRAINING PROGRAM
Payer: COMMERCIAL

## 2025-05-24 DIAGNOSIS — R00.2 PALPITATIONS: Primary | ICD-10-CM

## 2025-05-24 DIAGNOSIS — Z95.811 LVAD (LEFT VENTRICULAR ASSIST DEVICE) PRESENT (MULTI): ICD-10-CM

## 2025-05-24 DIAGNOSIS — I50.43 ACUTE ON CHRONIC HEART FAILURE WITH REDUCED EJECTION FRACTION AND DIASTOLIC DYSFUNCTION: ICD-10-CM

## 2025-05-24 LAB
ALBUMIN SERPL BCP-MCNC: 4 G/DL (ref 3.4–5)
ANION GAP SERPL CALC-SCNC: 16 MMOL/L (ref 10–20)
BNP SERPL-MCNC: 198 PG/ML (ref 0–99)
BUN SERPL-MCNC: 10 MG/DL (ref 6–23)
CALCIUM SERPL-MCNC: 9.1 MG/DL (ref 8.6–10.6)
CARDIAC TROPONIN I PNL SERPL HS: 22 NG/L (ref 0–34)
CHLORIDE SERPL-SCNC: 103 MMOL/L (ref 98–107)
CO2 SERPL-SCNC: 26 MMOL/L (ref 21–32)
CREAT SERPL-MCNC: 0.73 MG/DL (ref 0.5–1.05)
DIGOXIN SERPL-MCNC: 0.74 NG/ML (ref 0.8–?)
EGFRCR SERPLBLD CKD-EPI 2021: >90 ML/MIN/1.73M*2
ERYTHROCYTE [DISTWIDTH] IN BLOOD BY AUTOMATED COUNT: 18.4 % (ref 11.5–14.5)
GLUCOSE SERPL-MCNC: 105 MG/DL (ref 74–99)
HCT VFR BLD AUTO: 35.2 % (ref 36–46)
HGB BLD-MCNC: 10.7 G/DL (ref 12–16)
INR PPP: 3.9 (ref 0.9–1.1)
LDH SERPL L TO P-CCNC: 214 U/L (ref 84–246)
LDH SERPL L TO P-CCNC: 218 U/L (ref 84–246)
MAGNESIUM SERPL-MCNC: 1.84 MG/DL (ref 1.6–2.4)
MCH RBC QN AUTO: 29.1 PG (ref 26–34)
MCHC RBC AUTO-ENTMCNC: 30.4 G/DL (ref 32–36)
MCV RBC AUTO: 96 FL (ref 80–100)
NRBC BLD-RTO: 0 /100 WBCS (ref 0–0)
PHOSPHATE SERPL-MCNC: 3.5 MG/DL (ref 2.5–4.9)
PLATELET # BLD AUTO: 265 X10*3/UL (ref 150–450)
POTASSIUM SERPL-SCNC: 4 MMOL/L (ref 3.5–5.3)
PROTHROMBIN TIME: 43.5 SECONDS (ref 9.8–12.4)
RBC # BLD AUTO: 3.68 X10*6/UL (ref 4–5.2)
SODIUM SERPL-SCNC: 141 MMOL/L (ref 136–145)
WBC # BLD AUTO: 6.5 X10*3/UL (ref 4.4–11.3)

## 2025-05-24 PROCEDURE — 93005 ELECTROCARDIOGRAM TRACING: CPT

## 2025-05-24 PROCEDURE — 83880 ASSAY OF NATRIURETIC PEPTIDE: CPT | Performed by: PHYSICIAN ASSISTANT

## 2025-05-24 PROCEDURE — 83615 LACTATE (LD) (LDH) ENZYME: CPT | Performed by: STUDENT IN AN ORGANIZED HEALTH CARE EDUCATION/TRAINING PROGRAM

## 2025-05-24 PROCEDURE — 2500000001 HC RX 250 WO HCPCS SELF ADMINISTERED DRUGS (ALT 637 FOR MEDICARE OP): Mod: SE | Performed by: NURSE PRACTITIONER

## 2025-05-24 PROCEDURE — 85027 COMPLETE CBC AUTOMATED: CPT | Performed by: PHYSICIAN ASSISTANT

## 2025-05-24 PROCEDURE — 93010 ELECTROCARDIOGRAM REPORT: CPT | Performed by: INTERNAL MEDICINE

## 2025-05-24 PROCEDURE — 71046 X-RAY EXAM CHEST 2 VIEWS: CPT

## 2025-05-24 PROCEDURE — 1200000002 HC GENERAL ROOM WITH TELEMETRY DAILY

## 2025-05-24 PROCEDURE — 83735 ASSAY OF MAGNESIUM: CPT | Performed by: STUDENT IN AN ORGANIZED HEALTH CARE EDUCATION/TRAINING PROGRAM

## 2025-05-24 PROCEDURE — 80162 ASSAY OF DIGOXIN TOTAL: CPT | Performed by: PHYSICIAN ASSISTANT

## 2025-05-24 PROCEDURE — 83615 LACTATE (LD) (LDH) ENZYME: CPT | Performed by: PHYSICIAN ASSISTANT

## 2025-05-24 PROCEDURE — 80069 RENAL FUNCTION PANEL: CPT | Performed by: PHYSICIAN ASSISTANT

## 2025-05-24 PROCEDURE — 85610 PROTHROMBIN TIME: CPT | Performed by: PHYSICIAN ASSISTANT

## 2025-05-24 PROCEDURE — 71046 X-RAY EXAM CHEST 2 VIEWS: CPT | Performed by: STUDENT IN AN ORGANIZED HEALTH CARE EDUCATION/TRAINING PROGRAM

## 2025-05-24 PROCEDURE — 84484 ASSAY OF TROPONIN QUANT: CPT | Performed by: PHYSICIAN ASSISTANT

## 2025-05-24 PROCEDURE — 2500000001 HC RX 250 WO HCPCS SELF ADMINISTERED DRUGS (ALT 637 FOR MEDICARE OP): Mod: SE | Performed by: STUDENT IN AN ORGANIZED HEALTH CARE EDUCATION/TRAINING PROGRAM

## 2025-05-24 PROCEDURE — 99223 1ST HOSP IP/OBS HIGH 75: CPT | Performed by: STUDENT IN AN ORGANIZED HEALTH CARE EDUCATION/TRAINING PROGRAM

## 2025-05-24 PROCEDURE — 2500000002 HC RX 250 W HCPCS SELF ADMINISTERED DRUGS (ALT 637 FOR MEDICARE OP, ALT 636 FOR OP/ED): Mod: SE | Performed by: NURSE PRACTITIONER

## 2025-05-24 RX ORDER — TORSEMIDE 20 MG/1
20 TABLET ORAL DAILY
Status: DISCONTINUED | OUTPATIENT
Start: 2025-05-24 | End: 2025-05-25

## 2025-05-24 RX ORDER — PANTOPRAZOLE SODIUM 40 MG/1
40 TABLET, DELAYED RELEASE ORAL
Status: DISCONTINUED | OUTPATIENT
Start: 2025-05-25 | End: 2025-05-28 | Stop reason: HOSPADM

## 2025-05-24 RX ORDER — WARFARIN 2 MG/1
2 TABLET ORAL
Status: DISCONTINUED | OUTPATIENT
Start: 2025-05-25 | End: 2025-05-26

## 2025-05-24 RX ORDER — SILDENAFIL CITRATE 20 MG/1
20 TABLET ORAL 3 TIMES DAILY
Status: DISCONTINUED | OUTPATIENT
Start: 2025-05-24 | End: 2025-05-28 | Stop reason: HOSPADM

## 2025-05-24 RX ORDER — CALCIUM CARBONATE 200(500)MG
2 TABLET,CHEWABLE ORAL 2 TIMES DAILY
Status: DISCONTINUED | OUTPATIENT
Start: 2025-05-24 | End: 2025-05-28 | Stop reason: HOSPADM

## 2025-05-24 RX ORDER — TORSEMIDE 20 MG/1
20 TABLET ORAL DAILY PRN
Status: DISCONTINUED | OUTPATIENT
Start: 2025-05-24 | End: 2025-05-24

## 2025-05-24 RX ORDER — DIGOXIN 125 MCG
125 TABLET ORAL DAILY
Status: DISCONTINUED | OUTPATIENT
Start: 2025-05-24 | End: 2025-05-28 | Stop reason: HOSPADM

## 2025-05-24 RX ORDER — SPIRONOLACTONE 25 MG/1
25 TABLET ORAL DAILY
Status: DISCONTINUED | OUTPATIENT
Start: 2025-05-24 | End: 2025-05-27

## 2025-05-24 RX ORDER — ATORVASTATIN CALCIUM 80 MG/1
80 TABLET, FILM COATED ORAL NIGHTLY
Status: DISCONTINUED | OUTPATIENT
Start: 2025-05-24 | End: 2025-05-28 | Stop reason: HOSPADM

## 2025-05-24 RX ORDER — DAPAGLIFLOZIN 10 MG/1
10 TABLET, FILM COATED ORAL DAILY
Status: DISCONTINUED | OUTPATIENT
Start: 2025-05-24 | End: 2025-05-28 | Stop reason: HOSPADM

## 2025-05-24 RX ORDER — LEVOTHYROXINE SODIUM 75 UG/1
150 TABLET ORAL
Status: DISCONTINUED | OUTPATIENT
Start: 2025-05-25 | End: 2025-05-28 | Stop reason: HOSPADM

## 2025-05-24 RX ORDER — FLUOXETINE HYDROCHLORIDE 40 MG/1
40 CAPSULE ORAL DAILY
Status: DISCONTINUED | OUTPATIENT
Start: 2025-05-24 | End: 2025-05-28 | Stop reason: HOSPADM

## 2025-05-24 RX ORDER — MIRTAZAPINE 15 MG/1
15 TABLET, FILM COATED ORAL NIGHTLY
Status: DISCONTINUED | OUTPATIENT
Start: 2025-05-24 | End: 2025-05-28 | Stop reason: HOSPADM

## 2025-05-24 RX ORDER — CHOLECALCIFEROL (VITAMIN D3) 25 MCG
50 TABLET ORAL DAILY
Status: DISCONTINUED | OUTPATIENT
Start: 2025-05-24 | End: 2025-05-28 | Stop reason: HOSPADM

## 2025-05-24 RX ORDER — AMIODARONE HYDROCHLORIDE 200 MG/1
200 TABLET ORAL DAILY
Status: DISCONTINUED | OUTPATIENT
Start: 2025-05-24 | End: 2025-05-28 | Stop reason: HOSPADM

## 2025-05-24 RX ORDER — ICOSAPENT ETHYL 1 G/1
2 CAPSULE ORAL
Status: DISCONTINUED | OUTPATIENT
Start: 2025-05-25 | End: 2025-05-28 | Stop reason: HOSPADM

## 2025-05-24 RX ORDER — LANOLIN ALCOHOL/MO/W.PET/CERES
400 CREAM (GRAM) TOPICAL 2 TIMES DAILY
Status: DISCONTINUED | OUTPATIENT
Start: 2025-05-24 | End: 2025-05-28 | Stop reason: HOSPADM

## 2025-05-24 RX ADMIN — ATORVASTATIN CALCIUM 80 MG: 80 TABLET, FILM COATED ORAL at 20:20

## 2025-05-24 RX ADMIN — MIRTAZAPINE 15 MG: 15 TABLET, FILM COATED ORAL at 20:20

## 2025-05-24 RX ADMIN — TORSEMIDE 20 MG: 20 TABLET ORAL at 22:42

## 2025-05-24 RX ADMIN — SILDENAFIL 20 MG: 20 TABLET, FILM COATED ORAL at 20:20

## 2025-05-24 RX ADMIN — CALCIUM CARBONATE (ANTACID) CHEW TAB 500 MG 2 TABLET: 500 CHEW TAB at 20:20

## 2025-05-24 SDOH — SOCIAL STABILITY: SOCIAL INSECURITY: WITHIN THE LAST YEAR, HAVE YOU BEEN HUMILIATED OR EMOTIONALLY ABUSED IN OTHER WAYS BY YOUR PARTNER OR EX-PARTNER?: NO

## 2025-05-24 SDOH — SOCIAL STABILITY: SOCIAL INSECURITY: ABUSE: ADULT

## 2025-05-24 SDOH — ECONOMIC STABILITY: HOUSING INSECURITY: AT ANY TIME IN THE PAST 12 MONTHS, WERE YOU HOMELESS OR LIVING IN A SHELTER (INCLUDING NOW)?: NO

## 2025-05-24 SDOH — SOCIAL STABILITY: SOCIAL INSECURITY: HAVE YOU HAD ANY THOUGHTS OF HARMING ANYONE ELSE?: NO

## 2025-05-24 SDOH — HEALTH STABILITY: MENTAL HEALTH: HOW MANY DRINKS CONTAINING ALCOHOL DO YOU HAVE ON A TYPICAL DAY WHEN YOU ARE DRINKING?: PATIENT DOES NOT DRINK

## 2025-05-24 SDOH — SOCIAL STABILITY: SOCIAL INSECURITY: DO YOU FEEL ANYONE HAS EXPLOITED OR TAKEN ADVANTAGE OF YOU FINANCIALLY OR OF YOUR PERSONAL PROPERTY?: NO

## 2025-05-24 SDOH — SOCIAL STABILITY: SOCIAL INSECURITY: HAS ANYONE EVER THREATENED TO HURT YOUR FAMILY OR YOUR PETS?: NO

## 2025-05-24 SDOH — ECONOMIC STABILITY: HOUSING INSECURITY: IN THE LAST 12 MONTHS, WAS THERE A TIME WHEN YOU WERE NOT ABLE TO PAY THE MORTGAGE OR RENT ON TIME?: NO

## 2025-05-24 SDOH — SOCIAL STABILITY: SOCIAL INSECURITY: ARE YOU OR HAVE YOU BEEN THREATENED OR ABUSED PHYSICALLY, EMOTIONALLY, OR SEXUALLY BY ANYONE?: NO

## 2025-05-24 SDOH — SOCIAL STABILITY: SOCIAL INSECURITY: ARE THERE ANY APPARENT SIGNS OF INJURIES/BEHAVIORS THAT COULD BE RELATED TO ABUSE/NEGLECT?: NO

## 2025-05-24 SDOH — SOCIAL STABILITY: SOCIAL INSECURITY
WITHIN THE LAST YEAR, HAVE YOU BEEN KICKED, HIT, SLAPPED, OR OTHERWISE PHYSICALLY HURT BY YOUR PARTNER OR EX-PARTNER?: NO

## 2025-05-24 SDOH — SOCIAL STABILITY: SOCIAL INSECURITY
WITHIN THE LAST YEAR, HAVE YOU BEEN RAPED OR FORCED TO HAVE ANY KIND OF SEXUAL ACTIVITY BY YOUR PARTNER OR EX-PARTNER?: NO

## 2025-05-24 SDOH — HEALTH STABILITY: MENTAL HEALTH: HOW OFTEN DO YOU HAVE SIX OR MORE DRINKS ON ONE OCCASION?: NEVER

## 2025-05-24 SDOH — SOCIAL STABILITY: SOCIAL NETWORK
DO YOU BELONG TO ANY CLUBS OR ORGANIZATIONS SUCH AS CHURCH GROUPS, UNIONS, FRATERNAL OR ATHLETIC GROUPS, OR SCHOOL GROUPS?: YES

## 2025-05-24 SDOH — HEALTH STABILITY: MENTAL HEALTH
DO YOU FEEL STRESS - TENSE, RESTLESS, NERVOUS, OR ANXIOUS, OR UNABLE TO SLEEP AT NIGHT BECAUSE YOUR MIND IS TROUBLED ALL THE TIME - THESE DAYS?: NOT AT ALL

## 2025-05-24 SDOH — SOCIAL STABILITY: SOCIAL INSECURITY: WITHIN THE LAST YEAR, HAVE YOU BEEN AFRAID OF YOUR PARTNER OR EX-PARTNER?: NO

## 2025-05-24 SDOH — SOCIAL STABILITY: SOCIAL INSECURITY: DO YOU FEEL UNSAFE GOING BACK TO THE PLACE WHERE YOU ARE LIVING?: NO

## 2025-05-24 SDOH — SOCIAL STABILITY: SOCIAL INSECURITY: DOES ANYONE TRY TO KEEP YOU FROM HAVING/CONTACTING OTHER FRIENDS OR DOING THINGS OUTSIDE YOUR HOME?: NO

## 2025-05-24 SDOH — SOCIAL STABILITY: SOCIAL INSECURITY: WERE YOU ABLE TO COMPLETE ALL THE BEHAVIORAL HEALTH SCREENINGS?: YES

## 2025-05-24 SDOH — SOCIAL STABILITY: SOCIAL NETWORK: HOW OFTEN DO YOU ATTEND MEETINGS OF THE CLUBS OR ORGANIZATIONS YOU BELONG TO?: MORE THAN 4 TIMES PER YEAR

## 2025-05-24 SDOH — SOCIAL STABILITY: SOCIAL INSECURITY: HAVE YOU HAD THOUGHTS OF HARMING ANYONE ELSE?: NO

## 2025-05-24 SDOH — SOCIAL STABILITY: SOCIAL NETWORK: HOW OFTEN DO YOU GET TOGETHER WITH FRIENDS OR RELATIVES?: MORE THAN THREE TIMES A WEEK

## 2025-05-24 SDOH — SOCIAL STABILITY: SOCIAL NETWORK: HOW OFTEN DO YOU ATTEND CHURCH OR RELIGIOUS SERVICES?: MORE THAN 4 TIMES PER YEAR

## 2025-05-24 SDOH — ECONOMIC STABILITY: FOOD INSECURITY: WITHIN THE PAST 12 MONTHS, YOU WORRIED THAT YOUR FOOD WOULD RUN OUT BEFORE YOU GOT THE MONEY TO BUY MORE.: NEVER TRUE

## 2025-05-24 SDOH — ECONOMIC STABILITY: FOOD INSECURITY: HOW HARD IS IT FOR YOU TO PAY FOR THE VERY BASICS LIKE FOOD, HOUSING, MEDICAL CARE, AND HEATING?: NOT HARD AT ALL

## 2025-05-24 SDOH — ECONOMIC STABILITY: HOUSING INSECURITY: IN THE PAST 12 MONTHS, HOW MANY TIMES HAVE YOU MOVED WHERE YOU WERE LIVING?: 0

## 2025-05-24 SDOH — ECONOMIC STABILITY: FOOD INSECURITY: WITHIN THE PAST 12 MONTHS, THE FOOD YOU BOUGHT JUST DIDN'T LAST AND YOU DIDN'T HAVE MONEY TO GET MORE.: NEVER TRUE

## 2025-05-24 SDOH — HEALTH STABILITY: MENTAL HEALTH: HOW OFTEN DO YOU HAVE A DRINK CONTAINING ALCOHOL?: NEVER

## 2025-05-24 SDOH — ECONOMIC STABILITY: INCOME INSECURITY: IN THE PAST 12 MONTHS HAS THE ELECTRIC, GAS, OIL, OR WATER COMPANY THREATENED TO SHUT OFF SERVICES IN YOUR HOME?: NO

## 2025-05-24 SDOH — SOCIAL STABILITY: SOCIAL INSECURITY: ARE YOU MARRIED, WIDOWED, DIVORCED, SEPARATED, NEVER MARRIED, OR LIVING WITH A PARTNER?: DIVORCED

## 2025-05-24 SDOH — ECONOMIC STABILITY: TRANSPORTATION INSECURITY: IN THE PAST 12 MONTHS, HAS LACK OF TRANSPORTATION KEPT YOU FROM MEDICAL APPOINTMENTS OR FROM GETTING MEDICATIONS?: NO

## 2025-05-24 ASSESSMENT — ACTIVITIES OF DAILY LIVING (ADL)
BATHING: DEPENDENT
DRESSING YOURSELF: DEPENDENT
LACK_OF_TRANSPORTATION: NO
LACK_OF_TRANSPORTATION: NO
HEARING - LEFT EAR: FUNCTIONAL
TOILETING: DEPENDENT
PATIENT'S MEMORY ADEQUATE TO SAFELY COMPLETE DAILY ACTIVITIES?: YES
FEEDING YOURSELF: DEPENDENT
JUDGMENT_ADEQUATE_SAFELY_COMPLETE_DAILY_ACTIVITIES: YES
WALKS IN HOME: DEPENDENT
ADEQUATE_TO_COMPLETE_ADL: YES
HEARING - RIGHT EAR: FUNCTIONAL
GROOMING: DEPENDENT

## 2025-05-24 ASSESSMENT — COGNITIVE AND FUNCTIONAL STATUS - GENERAL
CLIMB 3 TO 5 STEPS WITH RAILING: A LITTLE
DAILY ACTIVITIY SCORE: 24
PATIENT BASELINE BEDBOUND: NO
MOBILITY SCORE: 23
WALKING IN HOSPITAL ROOM: A LITTLE
DAILY ACTIVITIY SCORE: 24
MOBILITY SCORE: 21
STANDING UP FROM CHAIR USING ARMS: A LITTLE
CLIMB 3 TO 5 STEPS WITH RAILING: A LITTLE

## 2025-05-24 ASSESSMENT — COLUMBIA-SUICIDE SEVERITY RATING SCALE - C-SSRS
2. HAVE YOU ACTUALLY HAD ANY THOUGHTS OF KILLING YOURSELF?: NO
6. HAVE YOU EVER DONE ANYTHING, STARTED TO DO ANYTHING, OR PREPARED TO DO ANYTHING TO END YOUR LIFE?: NO
1. IN THE PAST MONTH, HAVE YOU WISHED YOU WERE DEAD OR WISHED YOU COULD GO TO SLEEP AND NOT WAKE UP?: NO

## 2025-05-24 ASSESSMENT — PATIENT HEALTH QUESTIONNAIRE - PHQ9
1. LITTLE INTEREST OR PLEASURE IN DOING THINGS: NOT AT ALL
SUM OF ALL RESPONSES TO PHQ9 QUESTIONS 1 & 2: 0
2. FEELING DOWN, DEPRESSED OR HOPELESS: NOT AT ALL

## 2025-05-24 ASSESSMENT — PAIN - FUNCTIONAL ASSESSMENT
PAIN_FUNCTIONAL_ASSESSMENT: 0-10
PAIN_FUNCTIONAL_ASSESSMENT: 0-10

## 2025-05-24 ASSESSMENT — LIFESTYLE VARIABLES
HOW OFTEN DO YOU HAVE A DRINK CONTAINING ALCOHOL: NEVER
AUDIT-C TOTAL SCORE: 0
AUDIT-C TOTAL SCORE: 0
HOW MANY STANDARD DRINKS CONTAINING ALCOHOL DO YOU HAVE ON A TYPICAL DAY: PATIENT DOES NOT DRINK
HOW OFTEN DO YOU HAVE 6 OR MORE DRINKS ON ONE OCCASION: NEVER
SKIP TO QUESTIONS 9-10: 1
SKIP TO QUESTIONS 9-10: 1
PRESCIPTION_ABUSE_PAST_12_MONTHS: NO
SUBSTANCE_ABUSE_PAST_12_MONTHS: NO
AUDIT-C TOTAL SCORE: 0

## 2025-05-24 ASSESSMENT — PAIN SCALES - GENERAL
PAINLEVEL_OUTOF10: 0 - NO PAIN
PAINLEVEL_OUTOF10: 0 - NO PAIN

## 2025-05-24 NOTE — NURSING NOTE
patient is a recent Lvad patient from April 2025. She is a transfer from [facility] for [facility]. Patient had her main Controller, back up controller,4 batteries and 2 clips with go bag.

## 2025-05-24 NOTE — NURSING NOTE
patient is a recent Lvad patient from April 2025. She is a transfer from [facility] for [facility]. Patient had her main Controller, back up controller,2 batteries and 2 clips with go bag.

## 2025-05-24 NOTE — CARE PLAN
The patient's goals for the shift include Relieve pain and palpitation    The clinical goals for the shift include Patient remain HDS this shift

## 2025-05-24 NOTE — H&P
History Of Present Illness:    Delphine Zavala is a very pleasant 65 y.o. 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 Hernandez) 4/17 (course complicated by slow milrinone wean and atrial flutter requiring DCCV) with dc on 5/8. Presented to [facility] in [place] ED with palpitation and shortness of breath.  Transferred to [facility] [place] for further management.    Patient reports feeling a vibrating in her chest / palpitations with all exertion. She also reports exertional SOB with minimal ambulation that has been progressive over the past several days. She reportedly sleeps in 1-pillow for the most part and 2-pillows sometimes for comfort. She doesn't think that there has been a significant increase in her weight. Denies fevers chills or drive line site drainage/ redness.  Patient denies alarm from LVAD.    During her last discharge on 5/8/2025, patient's weight was 188 IBS and she presents today with a reported weight of 203 IBS.     OSH findings:  CTA Chest 5/25/25: no emboli. Small bilateral pleural effusion and mild pulmonary edema.  Hs troponin 113  INR 3.75H  proBNP 2421    Last Recorded Vitals:  Vitals:    05/24/25 1759   Pulse: 96   Temp: 34.9 °C (94.8 °F)   TempSrc: Temporal   SpO2: 95%   Weight: 91.2 kg (201 lb 1 oz)       Last Labs:  CBC - 5/8/2025:  4:43 AM  4.5 8.9 337    30.5      CMP - 5/8/2025:  4:43 AM  8.9 6.6 17 --- 1.1   3.9 3.5 10 245      PTT - 4/21/2025:  2:15 AM  2.4   26.9 26     Troponin I, High Sensitivity (CMC)   Date/Time Value Ref Range Status   04/14/2025 02:54 PM 9 0 - 34 ng/L Final   02/06/2025 09:44 PM 12 0 - 34 ng/L Final     BNP   Date/Time Value Ref Range Status    05/05/2025 05:42  (H) 0 - 99 pg/mL Final   04/25/2025 09:38  (H) 0 - 99 pg/mL Final     Hemoglobin A1C   Date/Time Value Ref Range Status   04/14/2025 02:54 PM 6.6 (H) See comment % Final   04/14/2025 02:54 PM 6.6 (H) See comment % Final     LDL Calculated   Date/Time Value Ref Range Status   04/14/2025 02:54 PM 53 <=99 mg/dL Final     Comment:                                 Near   Borderline      AGE      Desirable  Optimal    High     High     Very High     0-19 Y     0 - 109     ---    110-129   >/= 130     ----    20-24 Y     0 - 119     ---    120-159   >/= 160     ----      >24 Y     0 -  99   100-129  130-159   160-189     >/=190     02/06/2025 09:44 PM 58 <=99 mg/dL Final     Comment:                                 Near   Borderline      AGE      Desirable  Optimal    High     High     Very High     0-19 Y     0 - 109     ---    110-129   >/= 130     ----    20-24 Y     0 - 119     ---    120-159   >/= 160     ----      >24 Y     0 -  99   100-129  130-159   160-189     >/=190       VLDL   Date/Time Value Ref Range Status   04/14/2025 02:54 PM 52 (H) 0 - 40 mg/dL Final   02/06/2025 09:44 PM 18 0 - 40 mg/dL Final      Last I/O:  No intake/output data recorded.        Past Medical History:  She has a past medical history of Acute on chronic heart failure with reduced ejection fraction (HFrEF, <= 40%), Adjustment disorder, BMI 35.0-35.9,adult, Generalized anxiety disorder, GERD (gastroesophageal reflux disease), Hypotension, Hypothyroid, Insomnia, Major depression, Obstructive sleep apnea, Paroxysmal A-fib (Multi), Personal history of malignant neoplasm of breast, and Presence of automatic (implantable) cardiac defibrillator.    Past Surgical History:  She has a past surgical history that includes Other surgical history (03/26/2019); Other surgical history (03/26/2019); Other surgical history (03/26/2019); Other surgical history (03/26/2019); Mitral valve replacement (11/2024); Tricuspid valve  replacement (11/2024); Mitral valve repair (01/2025); and Cardiac catheterization (N/A, 2/7/2025).      Social History:  She reports that she has quit smoking. Her smoking use included cigarettes. She started smoking about 20 years ago. She has a 5.1 pack-year smoking history. She has never used smokeless tobacco. She reports that she does not currently use alcohol. She reports that she does not currently use drugs.    Family History:  Family History[1]     Allergies:  Patient has no known allergies.    Inpatient Medications:  Scheduled Medications[2]  PRN Medications[3]  Continuous Medications[4]  Outpatient Medications:  Current Outpatient Medications   Medication Instructions    amiodarone (PACERONE) 200 mg, oral, Daily    atorvastatin (LIPITOR) 80 mg, oral, Nightly    calcium carbonate (TUMS) 1,000 mg, oral, 2 times daily    cholecalciferol (VITAMIN D-3) 50 mcg, oral, Daily    digoxin (LANOXIN) 125 mcg, oral, Daily    Farxiga 10 mg, oral, Daily    FLUoxetine (PROZAC) 40 mg, oral, Daily    levothyroxine (SYNTHROID, LEVOXYL) 150 mcg, oral, Daily (0630), Take on an empty stomach at the same time each day, either 30 to 60 minutes prior to breakfast    magnesium oxide (MAG-OX) 400 mg, oral, 2 times daily    mirtazapine (REMERON) 15 mg, oral, Nightly    omega-3 acid ethyl esters (LOVAZA) 2 g, oral, 2 times daily    pantoprazole (PROTONIX) 40 mg, oral, Daily before breakfast, Do not crush, chew, or split.    sildenafil (REVATIO) 20 mg, oral, 3 times daily    spironolactone (ALDACTONE) 25 mg, oral, Daily    torsemide (DEMADEX) 20 mg, oral, Daily PRN    warfarin (Coumadin) 1 mg tablet Take one tablet in addition to 2 mg tablet (total 3mg) on Mondays, Wednesdays and Fridays.    warfarin (Coumadin) 2 mg tablet Take one 2mg tablet (total 2mg) on Saturdays, Sundays, Tuesdays and Thursdays. Take one 2mg tablet and one 1mg tablet (total 3mg) on Mondays, Wednesdays and Fridays.     Review of Systems  As reported above.  Otherwise negative.     Physical Exam:  Gen: awake and alert  HEENT: normocephalic. Trachea midline  CV: LVAD hum appreciated  Pulm: clear to auscultation bilaterally. No coarse lung sounds  Abd: soft, non-distended. Normal active bowel sounds  Ext: warm and well-perfused  Psych: appropriate affect  Neuro: CN II-XII grossly intact     Assessment/Plan   Delphine Zavala is a very pleasant 65 y.o. 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 Hernandez) 4/17 (course complicated by slow milrinone wean and atrial flutter requiring DCCV) with dc on 5/8. Presented to [facility] in [place] ED with palpitation and shortness of breath.  Transferred to [facility] [place] for further management.    CARDIAC  Stage D systolic NICM   s/p LVAD with PFO closure 4/17/25  S/p single chamber ICD 2019  MR/TR S/p mTEER 1/2025  > Antracycline induced  > Admitted with new palpitations and exertional SOB  > Assessment of LVAD on presentation: speed 5100 RPM, Pump Flow 3.7-3.8; PI 6.8-7.4; Pump Power 3.6  > 4/29/25: HM 3 LVAD, reduced RV systolic function (pressure WNL), 2mitra clips  > Weight on last discharge 188 IBS and currently at 203 IBS. Mild pulmonary edema on CXR and otherwise trace to 1+ bilateral LE edema   > BNP pending (proBNP at OSH 2421) however BNP here at 198 (was 491 during last hospitalization)   > The constellation of findings suggests very mild volume overload and patient may likely benefit from maintaining euvolemia and gentle diuresis. Will likely favor PO diuretics at this time.     LVAD Care:   Drive Line assessment: Clean and Non-tender.  Dressing changes: Weekly  Anticoagulation: holding Coumadin due to  supratherapeutic INR. Home regimen is Coumadin 3mg M/W/F and 2mg Sat/Sun/Tues/Thurs for goal INR 2-3  Antiplts: no primary reasons  Prophylaxis: continue PPI daily & vascepa BID   Daily LDH     GDMT & RV support:   - ACE/ARB/ARNI: Did not prrviously tolerate half dose entresto due to hypotension; will defer to outpatient If needed   - Beta-blocker: metoprolol 25mg ER previously stopped 2/2 moderate RV and worsening edema/dyspnea; defer to outpatient if needed    - SGL2 inhibitor: Farxiga 10mg daily  - MRA: Spironolactone 25mg daily   - Digoxin: 125mcg daily (most recent level 0.64 on 4/25)   - Phosphodiesterase inhibitors: Sildenafil 20mg TID  - Diuretics: consider gentle diuresis with PO diuretic to maintain euvolemia     Heart Transplant candidacy   Barriers to transplant: Elevated PRA  ABO: B     Atrial fibrillation/flutter s/p prior DCCV  - EKG 5/24: Sinus tachycardia  - Serum digoxin level: 0.71  - Continue home digoxin, amiodarone  - Interrogate ICD abd closely monitor on tele  - Holding home Warfarin in the context of supratherapetutic INR. Dose will likely need to be adjusted  - Daily coags     NEUROLOGICAL  Anxiety & Depression  Adjustment disorder   Insomnia  - C/w home fluoxetine 40 mg daily  - C/w Mirtazapine 15mg nightly  - Continue melatonin 10mg @ bedtime      RESPIRATORY   SHERRY   - Does not use home CPAP  - PFT's completed in lab (2/12) - FVC: 72% / FEV1: 66% / FEV1/ FVC: 91%   - O2 saturation adequate on RA      GI:  GERD  Hx. Colon polyps   Family history (father) Colon CA (patient had yearly colonoscopy until 2023)  - Abdominal exam benign  - Mildly elevated alk phos stable  - Bowel regimen: Miralax PRN  - PPI: Pantoprazole 40 mg PO Daily      RENAL: None     ENDO:  Hypothyroidism  -C/w Levothyroxine 175 mcg daily   - hgbA1c (4/14): 6.6%        HEMATOLOGY:  #Acute blood loss anemia & iron deficiency   - s/p IV iron sucrose daily x 5 days (4/29/25 - 5/3/25) for reduced iron levels per labs on  4/28/25   - Continue to monitor     INFECTIOUS DISEASE: No active issues        SKIN & SOFT TISSUES:  - Driveline site appears to be clean and intact with no definite drainage   - Dressing changes      DVT ppx: warfarin on hold given supratherapeutic INR  DISPO: pending further work up  Code Status: Full Code      I spent 60 minutes in the professional and overall care of this patient. Patient to be seen and discussed with attending physician in AM.               [1]   Family History  Problem Relation Name Age of Onset    Colon cancer Father     [2] [3] [4]

## 2025-05-25 VITALS
TEMPERATURE: 97.5 F | BODY MASS INDEX: 36.33 KG/M2 | HEART RATE: 95 BPM | OXYGEN SATURATION: 93 % | RESPIRATION RATE: 18 BRPM | HEIGHT: 62 IN | WEIGHT: 197.4 LBS

## 2025-05-25 LAB
ALBUMIN SERPL BCP-MCNC: 4 G/DL (ref 3.4–5)
ANION GAP SERPL CALC-SCNC: 15 MMOL/L (ref 10–20)
BUN SERPL-MCNC: 9 MG/DL (ref 6–23)
CALCIUM SERPL-MCNC: 8.9 MG/DL (ref 8.6–10.6)
CHLORIDE SERPL-SCNC: 101 MMOL/L (ref 98–107)
CO2 SERPL-SCNC: 28 MMOL/L (ref 21–32)
CREAT SERPL-MCNC: 0.85 MG/DL (ref 0.5–1.05)
EGFRCR SERPLBLD CKD-EPI 2021: 77 ML/MIN/1.73M*2
ERYTHROCYTE [DISTWIDTH] IN BLOOD BY AUTOMATED COUNT: 18.5 % (ref 11.5–14.5)
GLUCOSE BLD MANUAL STRIP-MCNC: 117 MG/DL (ref 74–99)
GLUCOSE BLD MANUAL STRIP-MCNC: 157 MG/DL (ref 74–99)
GLUCOSE SERPL-MCNC: 110 MG/DL (ref 74–99)
HCT VFR BLD AUTO: 36.1 % (ref 36–46)
HGB BLD-MCNC: 10.8 G/DL (ref 12–16)
LDH SERPL L TO P-CCNC: 214 U/L (ref 84–246)
MAGNESIUM SERPL-MCNC: 1.87 MG/DL (ref 1.6–2.4)
MCH RBC QN AUTO: 29 PG (ref 26–34)
MCHC RBC AUTO-ENTMCNC: 29.9 G/DL (ref 32–36)
MCV RBC AUTO: 97 FL (ref 80–100)
NRBC BLD-RTO: 0 /100 WBCS (ref 0–0)
PHOSPHATE SERPL-MCNC: 3.5 MG/DL (ref 2.5–4.9)
PLATELET # BLD AUTO: 264 X10*3/UL (ref 150–450)
POTASSIUM SERPL-SCNC: 3.4 MMOL/L (ref 3.5–5.3)
RBC # BLD AUTO: 3.73 X10*6/UL (ref 4–5.2)
SODIUM SERPL-SCNC: 141 MMOL/L (ref 136–145)
WBC # BLD AUTO: 4.8 X10*3/UL (ref 4.4–11.3)

## 2025-05-25 PROCEDURE — 2500000001 HC RX 250 WO HCPCS SELF ADMINISTERED DRUGS (ALT 637 FOR MEDICARE OP): Mod: SE | Performed by: STUDENT IN AN ORGANIZED HEALTH CARE EDUCATION/TRAINING PROGRAM

## 2025-05-25 PROCEDURE — 2500000004 HC RX 250 GENERAL PHARMACY W/ HCPCS (ALT 636 FOR OP/ED): Mod: SE | Performed by: STUDENT IN AN ORGANIZED HEALTH CARE EDUCATION/TRAINING PROGRAM

## 2025-05-25 PROCEDURE — 80069 RENAL FUNCTION PANEL: CPT | Performed by: PHYSICIAN ASSISTANT

## 2025-05-25 PROCEDURE — 2500000002 HC RX 250 W HCPCS SELF ADMINISTERED DRUGS (ALT 637 FOR MEDICARE OP, ALT 636 FOR OP/ED): Mod: SE | Performed by: NURSE PRACTITIONER

## 2025-05-25 PROCEDURE — 2500000001 HC RX 250 WO HCPCS SELF ADMINISTERED DRUGS (ALT 637 FOR MEDICARE OP): Mod: SE | Performed by: NURSE PRACTITIONER

## 2025-05-25 PROCEDURE — 82947 ASSAY GLUCOSE BLOOD QUANT: CPT | Mod: 59

## 2025-05-25 PROCEDURE — 1200000002 HC GENERAL ROOM WITH TELEMETRY DAILY

## 2025-05-25 PROCEDURE — 96365 THER/PROPH/DIAG IV INF INIT: CPT

## 2025-05-25 PROCEDURE — 96366 THER/PROPH/DIAG IV INF ADDON: CPT

## 2025-05-25 PROCEDURE — 83615 LACTATE (LD) (LDH) ENZYME: CPT | Performed by: STUDENT IN AN ORGANIZED HEALTH CARE EDUCATION/TRAINING PROGRAM

## 2025-05-25 PROCEDURE — 85027 COMPLETE CBC AUTOMATED: CPT | Performed by: PHYSICIAN ASSISTANT

## 2025-05-25 PROCEDURE — 2500000002 HC RX 250 W HCPCS SELF ADMINISTERED DRUGS (ALT 637 FOR MEDICARE OP, ALT 636 FOR OP/ED): Mod: SE | Performed by: STUDENT IN AN ORGANIZED HEALTH CARE EDUCATION/TRAINING PROGRAM

## 2025-05-25 PROCEDURE — 2500000004 HC RX 250 GENERAL PHARMACY W/ HCPCS (ALT 636 FOR OP/ED): Mod: SE | Performed by: NURSE PRACTITIONER

## 2025-05-25 RX ORDER — TORSEMIDE 20 MG/1
20 TABLET ORAL ONCE
Status: COMPLETED | OUTPATIENT
Start: 2025-05-25 | End: 2025-05-25

## 2025-05-25 RX ORDER — MAGNESIUM SULFATE HEPTAHYDRATE 40 MG/ML
2 INJECTION, SOLUTION INTRAVENOUS ONCE
Status: COMPLETED | OUTPATIENT
Start: 2025-05-25 | End: 2025-05-25

## 2025-05-25 RX ORDER — POTASSIUM CHLORIDE 20 MEQ/1
40 TABLET, EXTENDED RELEASE ORAL
Status: COMPLETED | OUTPATIENT
Start: 2025-05-25 | End: 2025-05-25

## 2025-05-25 RX ADMIN — SILDENAFIL 20 MG: 20 TABLET, FILM COATED ORAL at 15:29

## 2025-05-25 RX ADMIN — MAGNESIUM SULFATE HEPTAHYDRATE 2 G: 40 INJECTION, SOLUTION INTRAVENOUS at 13:41

## 2025-05-25 RX ADMIN — PANTOPRAZOLE SODIUM 40 MG: 40 TABLET, DELAYED RELEASE ORAL at 06:28

## 2025-05-25 RX ADMIN — SILDENAFIL 20 MG: 20 TABLET, FILM COATED ORAL at 09:02

## 2025-05-25 RX ADMIN — SPIRONOLACTONE 25 MG: 25 TABLET, FILM COATED ORAL at 09:02

## 2025-05-25 RX ADMIN — ATORVASTATIN CALCIUM 80 MG: 80 TABLET, FILM COATED ORAL at 20:45

## 2025-05-25 RX ADMIN — CALCIUM CARBONATE (ANTACID) CHEW TAB 500 MG 2 TABLET: 500 CHEW TAB at 09:02

## 2025-05-25 RX ADMIN — MAGNESIUM OXIDE TAB 400 MG (241.3 MG ELEMENTAL MG) 1 TABLET: 400 (241.3 MG) TAB at 09:02

## 2025-05-25 RX ADMIN — DAPAGLIFLOZIN 10 MG: 10 TABLET, FILM COATED ORAL at 09:02

## 2025-05-25 RX ADMIN — TORSEMIDE 20 MG: 20 TABLET ORAL at 10:20

## 2025-05-25 RX ADMIN — ICOSAPENT ETHYL 2 G: 1 CAPSULE ORAL at 17:57

## 2025-05-25 RX ADMIN — FLUOXETINE HYDROCHLORIDE 40 MG: 40 CAPSULE ORAL at 09:02

## 2025-05-25 RX ADMIN — Medication 50 MCG: at 09:02

## 2025-05-25 RX ADMIN — DIGOXIN 125 MCG: 125 TABLET ORAL at 09:02

## 2025-05-25 RX ADMIN — CALCIUM CARBONATE (ANTACID) CHEW TAB 500 MG 2 TABLET: 500 CHEW TAB at 20:44

## 2025-05-25 RX ADMIN — SILDENAFIL 20 MG: 20 TABLET, FILM COATED ORAL at 20:45

## 2025-05-25 RX ADMIN — MIRTAZAPINE 15 MG: 15 TABLET, FILM COATED ORAL at 20:45

## 2025-05-25 RX ADMIN — POTASSIUM CHLORIDE 40 MEQ: 1500 TABLET, EXTENDED RELEASE ORAL at 11:58

## 2025-05-25 RX ADMIN — LEVOTHYROXINE SODIUM 150 MCG: 0.07 TABLET ORAL at 06:28

## 2025-05-25 RX ADMIN — AMIODARONE HYDROCHLORIDE 200 MG: 200 TABLET ORAL at 09:02

## 2025-05-25 RX ADMIN — ICOSAPENT ETHYL 2 G: 1 CAPSULE ORAL at 09:01

## 2025-05-25 RX ADMIN — POTASSIUM CHLORIDE 40 MEQ: 1500 TABLET, EXTENDED RELEASE ORAL at 12:04

## 2025-05-25 ASSESSMENT — COGNITIVE AND FUNCTIONAL STATUS - GENERAL
MOBILITY SCORE: 23
DAILY ACTIVITIY SCORE: 23
CLIMB 3 TO 5 STEPS WITH RAILING: A LITTLE
TOILETING: A LITTLE

## 2025-05-25 ASSESSMENT — PAIN SCALES - GENERAL
PAINLEVEL_OUTOF10: 0 - NO PAIN
PAINLEVEL_OUTOF10: 0 - NO PAIN

## 2025-05-25 ASSESSMENT — PAIN - FUNCTIONAL ASSESSMENT: PAIN_FUNCTIONAL_ASSESSMENT: 0-10

## 2025-05-25 NOTE — CARE PLAN
The patient's goals for the shift include Relieve pain and palpitation    The clinical goals for the shift include Patient will remain HDS, MAPS 60-70 this shift    Pt admitted to [facility] last night from another hospital for further management, Coumadin on hold due to supra therapeutic INR. Pt c/o SOB, has +1 edema BLE, Torsemide given. 900ml output overnight.      Problem: Ventricular Assisted Device (VAD)  Goal: Involve in VAD awareness, dressing change  Outcome: Progressing     Problem: Ventricular Assisted Device (VAD)  Goal: Walk  Outcome: Progressing     Problem: Ventricular Assisted Device (VAD)  Goal: Sitting  Outcome: Progressing

## 2025-05-25 NOTE — CARE PLAN
Pt remained HDS and free of injury this shift. Pt had an uneventful day. Pt given 20 mg PO Torsemide this shift.    Problem: Pain - Adult  Goal: Verbalizes/displays adequate comfort level or baseline comfort level  Outcome: Progressing     Problem: Safety - Adult  Goal: Free from fall injury  Outcome: Progressing     Problem: Discharge Planning  Goal: Discharge to home or other facility with appropriate resources  Outcome: Progressing     Problem: Chronic Conditions and Co-morbidities  Goal: Patient's chronic conditions and co-morbidity symptoms are monitored and maintained or improved  Outcome: Progressing

## 2025-05-25 NOTE — PROGRESS NOTES
Subjective Data:  Admitted OVN. Reports she woke up yesterday with palpitations and SOB which was new for her. She endorses occasional palpitations with exertion prior to this, but never at rest. This prompted visit to ED and transfer to [facility] [place].   Her dyspnea has improved this AM after receiving po torsemide 20mg yesterday (1.8L UOP). Denies any current palpitations. Denies any presyncopal symptoms. Denies any worsening LE edema. Does report some weight gain when she was at home, though reports her AM scale weight is now at baseline. No chest discomfort. No fevers/chills/drive line site drainage. No other acute complaints.      Objective Data:  Last Recorded Vitals:  Vitals:    05/24/25 2123 05/24/25 2302 05/25/25 0510 05/25/25 0739   Pulse: 100 102 101 97   Resp: 18 18 18 18   Temp: 36.2 °C (97.2 °F) 36.4 °C (97.5 °F) 36.5 °C (97.7 °F) 36.4 °C (97.5 °F)   TempSrc: Temporal Temporal Temporal Temporal   SpO2: 93% 94% 93% 92%   Weight:   89.5 kg (197 lb 6.4 oz)    Height:           Last Labs:  CBC - 5/25/2025:  5:17 AM  4.8 10.8 264    36.1      CMP - 5/25/2025:  5:17 AM  8.9 6.6 17 --- 1.1   3.5 4.0 10 245      PTT - 4/21/2025:  2:15 AM  3.9   43.5 26     TROPHS   Date/Time Value Ref Range Status   05/24/2025 07:24 PM 22 0 - 34 ng/L Final   04/14/2025 02:54 PM 9 0 - 34 ng/L Final   02/06/2025 09:44 PM 12 0 - 34 ng/L Final     BNP   Date/Time Value Ref Range Status   05/24/2025 07:24  0 - 99 pg/mL Final   05/05/2025 05:42  0 - 99 pg/mL Final     HGBA1C   Date/Time Value Ref Range Status   04/14/2025 02:54 PM 6.6 See comment % Final   04/14/2025 02:54 PM 6.6 See comment % Final     LDLCALC   Date/Time Value Ref Range Status   04/14/2025 02:54 PM 53 <=99 mg/dL Final     Comment:                                 Near   Borderline      AGE      Desirable  Optimal    High     High     Very High     0-19 Y     0 - 109     ---    110-129   >/= 130     ----    20-24 Y     0 - 119     ---    120-159   >/= 160      ----      >24 Y     0 -  99   100-129  130-159   160-189     >/=190     02/06/2025 09:44 PM 58 <=99 mg/dL Final     Comment:                                 Near   Borderline      AGE      Desirable  Optimal    High     High     Very High     0-19 Y     0 - 109     ---    110-129   >/= 130     ----    20-24 Y     0 - 119     ---    120-159   >/= 160     ----      >24 Y     0 -  99   100-129  130-159   160-189     >/=190       VLDL   Date/Time Value Ref Range Status   04/14/2025 02:54 PM 52 0 - 40 mg/dL Final   02/06/2025 09:44 PM 18 0 - 40 mg/dL Final      Last I/O:  I/O last 3 completed shifts:  In: 240 (2.7 mL/kg) [P.O.:240]  Out: 1700 (19 mL/kg) [Urine:1700 (0.5 mL/kg/hr)]  Weight: 89.5 kg     Past Cardiology Tests (Last 3 Years):  EKG:  Electrocardiogram 12-lead PRN for arrhythmia 05/05/2025    Echo:  Transthoracic Echo (TTE) Limited 04/29/2025  CONCLUSIONS:   1. Poorly visualized anatomical structures due to suboptimal image quality.   2. The left ventricle was not well visualized. The left ventricular ejection fraction could not be measured.   3. Abnormal septal motion consistent with post-operative status.   4. S/p Heartmate 3 LVAD with AV opening every beat. Inflow and outflow cannulae not well seen.   5. There is reduced right ventricular systolic function.   6. S/p mitral POLO ( 2 mitraclips) with possibly trace MR. MV gradients not assessed.   7. Right ventricular systolic pressure is within normal limits.   8. Compared with the prior exam from 4/18/2025 both studies are technically very difficult. The AV did not appear to open on the prior exam but seems to open today with every cardiac cycle. Unable to estimate LVEF in either exam. There was already mild AI on the prior study, similar today.    Ejection Fractions:  EF   Date/Time Value Ref Range Status   04/17/2025 06:28 AM 23 %    02/07/2025 04:33 PM 18 %      Cath:  Cardiac Catheterization Procedure 02/07/2025    Stress Test:  Cardiopulmonary  (Metabolic) Stress Test 03/14/2025    Cardiac Imaging:  No results found for this or any previous visit from the past 1095 days.      Inpatient Medications:  Scheduled Medications[1]  PRN Medications[2]  Continuous Medications[3]    Physical Exam:  Gen: awake and alert  HEENT: normocephalic. Moist mucous membranes  CV: LVAD hum appreciated. Elevated JVD.  Pulm: clear to auscultation bilaterally. No coarse lung sounds. Breathing comfortable on RA.  Abd: soft, non-distended. Normal active bowel sounds. Driveline with dressing C/D/I.   Ext: warm and well-perfused  Psych: appropriate affect  Neuro: grossly nonfocal.      Assessment/Plan   Delphine Zavala is a very pleasant 65 y.o. 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 Hernandez) 4/17 (course complicated by slow milrinone wean and atrial flutter requiring DCCV) with dc on 5/8. Presented to [facility] in [place] ED with palpitation and shortness of breath.  [place] [facility] [place]     NEUROLOGICAL  Anxiety & Depression  Adjustment disorder   Insomnia  - C/w home fluoxetine 40 mg daily  - C/w Mirtazapine 15mg nightly    CARDIAC  Stage D systolic NICM   s/p LVAD with PFO closure 4/17/25  S/p single chamber ICD 2019  MR/TR S/p mTEER 1/2025  > Antracycline induced  > Admitted with new palpitations and exertional SOB  > Assessment of LVAD on presentation: speed 5100 RPM, Pump Flow 3.7-3.8; PI 6.8-7.4; Pump Power 3.6  > Weight on last discharge 188 IBS and currently at 203 IBS. Mild pulmonary edema on CXR.  > BNP: (proBNP at OSH 2421) however BNP here at 198 (was 491 during last hospitalization)   > The constellation of  findings suggests very mild volume overload.     -TTE ordered. Consideration for RAMP study on Tuesday.     LVAD Care:   Drive Line assessment: Clean and Non-tender.  Dressing changes: Weekly  Anticoagulation: holding Coumadin due to supratherapeutic INR. Home regimen is Coumadin 3mg M/W/F and 2mg Sat/Sun/Tues/Thurs for goal INR 2-3  Antiplts: no primary reasons  Prophylaxis: continue PPI daily & vascepa BID   Daily LDH      GDMT & RV support:   - ACE/ARB/ARNI: Did not previously tolerate half dose entresto due to hypotension; will defer to outpatient If needed   - Beta-blocker: metoprolol 25mg ER previously stopped 2/2 moderate RV and worsening edema/dyspnea; defer to outpatient if needed    - SGL2 inhibitor: Farxiga 10mg daily  - MRA: Spironolactone 25mg daily   - Digoxin: 125mcg daily (most recent level 0.74 on 5/24)   - Phosphodiesterase inhibitors: Sildenafil 20mg TID  - Diuretics: s/p torsemide 20mg yesterday, will redose today     Heart Transplant candidacy   Barriers to transplant: Elevated PRA  ABO: B      Atrial fibrillation/flutter s/p prior DCCV  - EKG 5/24: Sinus tachycardia  - Serum digoxin level: 0.71  - Interrogate ICD. EP fellow with interrogation at bedside today without any significant events. Order placed for official interrogation Tuesday.   - Monitor on tele.   - Continue home digoxin, amiodarone  - Warfarin as above.      RESPIRATORY   SHERRY   - Does not use home CPAP  - PFT's completed in lab (2/12) - FVC: 72% / FEV1: 66% / FEV1/ FVC: 91%   - O2 saturation adequate on RA      GI:  GERD  Hx. Colon polyps   Family history (father) Colon CA (patient had yearly colonoscopy until 2023)  - Abdominal exam benign  - Mildly elevated alk phos stable  - Bowel regimen: Miralax PRN  - PPI: Pantoprazole 40 mg PO Daily      RENAL: None  -K goal >4  -Mg goal >2    ENDO:  Hypothyroidism  -C/w Levothyroxine 175 mcg daily     - hgbA1c (4/14): 6.6%     HEMATOLOGY:  Chronic anemia & iron deficiency   - s/p IV iron  sucrose daily x 5 days (4/29/25 - 5/3/25) for reduced iron levels per labs on 4/28/25   - Continue to monitor CBC     INFECTIOUS DISEASE: No active issues     SKIN & SOFT TISSUES:  - Driveline site appears to be clean and intact with no definite drainage   - Dressing changes weekly     Full Code  DVT ppx: INR supratherapeutic, coumadin on hold  Regular diet  DISPO: Possible discharge early next week    Patient seen and discussed with Dr. Lopez.    Mary Iniguez DO         [1]   Scheduled medications   Medication Dose Route Frequency    amiodarone  200 mg oral Daily    atorvastatin  80 mg oral Nightly    calcium carbonate  2 tablet oral BID    cholecalciferol  50 mcg oral Daily    dapagliflozin propanediol  10 mg oral Daily    digoxin  125 mcg oral Daily    FLUoxetine  40 mg oral Daily    icosapent ethyL  2 g oral BID    levothyroxine  150 mcg oral Daily    magnesium oxide  400 mg of magnesium oxide oral BID    magnesium sulfate  2 g intravenous Once    mirtazapine  15 mg oral Nightly    pantoprazole  40 mg oral Daily before breakfast    potassium chloride CR  40 mEq oral q2h    sildenafil  20 mg oral TID    spironolactone  25 mg oral Daily    [Held by provider] warfarin  2 mg oral Once per day on Sunday Tuesday Thursday Saturday    [Held by provider] warfarin  3 mg oral Once per day on Monday Wednesday Friday   [2]   PRN medications   Medication   [3]   Continuous Medications   Medication Dose Last Rate

## 2025-05-25 NOTE — NURSING NOTE
VAD Note   Name:  Delphine Larson  Admission Date:  2025  5:53 PM  MRN: [id number]  Attending Provider: Denia Lopez MD P*  Room/Bed:  5007/5007-A             Sex: female  : 1960       Age: 65 y.o.    VAD Readmission  Readmission Checklist  Readmission Checklist: Yes  Code Status Reviewed: Yes  Code Status Ordered: Yes  RPM Speed Set Point: 5100  Low Speed Limit: 4600  Anticoagulation Goals Entered: Yes  Event Monitor Checked: Yes  Driveline Secure: Yes  Driveline Site Assessed and Photographed: Yes  Dressing Change Jocy: Weekly    HeartMate Serial Numbers  HeartMate Equipment Tracking/Serial Numbers  Battery Clip 1: [id number]  Battery Clip 2: [id number]  Battery Clip 3: [id number]  Battery Clip 4: [id number]  Rechargeable Battery 1: [id number]  Rechargeable Battery 2: [id number]  Rechargeable Battery 3: [id number]  Rechargeable Battery 4: [id number]  Go Gear Consolidated Bag: yes  Go Gear Vest:  (pt had a bag)  Programmed Backup : [id number]  Primary Controller: [id number]  Mobile Power Unit: [id number]  Black Emergency Red Tag Bag: yes     HeartMate Tracking  HeartMate Equipment Transfer  Transfer From: Admission  Transfer To: [place] [id number]  Battery Charger: No  Battery Clip 1: Yes  Battery Clip 2: Yes  Battery Clip 3: Yes  Battery Clip 4: Yes  Rechargeable Battery 1: Yes  Rechargeable Battery 2: Yes  Rechargeable Battery 3: Yes  Rechargeable Battery 4: Yes  Rechargeable Battery 5: No  Rechargeable Battery 6: No  Rechargeable Battery 7: No  Rechargeable Battery 8: No  Backup Battery 2: No  Go Gear Consolidated Bag: Yes  Go Gear Accessory Kit: No  Go Gear Vest: No  Programmed Backup : Yes  Primary Controller: Yes  Mobile Power Unit: Yes  Black Emergency Red Tag Bag: Yes  Shower Bag: No  Apil Cuff: No  Outflow graft: No  Modular cable: No     HeartWare Serial Numbers        HeartWare Tracking        VAD Discharge       Educations  Education Documentation  Food diary, taught  by Emory Perez RN at 5/24/2025  9:13 PM.  Learner: Patient  Readiness: Acceptance  Method: Explanation  Response: Verbalizes Understanding    High protein high calorie diet, taught by Emory Perez RN at 5/24/2025  9:13 PM.  Learner: Patient  Readiness: Acceptance  Method: Explanation  Response: Verbalizes Understanding    Fluid restriction, taught by Emory Perez RN at 5/24/2025  9:13 PM.  Learner: Patient  Readiness: Acceptance  Method: Explanation  Response: Verbalizes Understanding    Low sodium diet, taught by Emory Perez RN at 5/24/2025  9:13 PM.  Learner: Patient  Readiness: Acceptance  Method: Explanation  Response: Verbalizes Understanding    Driveline/Cannula care, taught by Emory Perez RN at 5/24/2025  9:13 PM.  Learner: Patient  Readiness: Acceptance  Method: Explanation  Response: Verbalizes Understanding    Activity precautions, taught by Emory Perez RN at 5/24/2025  9:13 PM.  Learner: Patient  Readiness: Acceptance  Method: Explanation  Response: Verbalizes Understanding    VAD Emergency Management Procedures, taught by Emory Perez RN at 5/24/2025  9:13 PM.  Learner: Patient  Readiness: Acceptance  Method: Explanation  Response: Verbalizes Understanding    VAD Sleep Precautions, taught by Emory Perez RN at 5/24/2025  9:13 PM.  Learner: Patient  Readiness: Acceptance  Method: Explanation  Response: Verbalizes Understanding    VAD Water Precautions, taught by Emory Perez RN at 5/24/2025  9:13 PM.  Learner: Patient  Readiness: Acceptance  Method: Explanation  Response: Verbalizes Understanding    VAD Driving Precautions, taught by Emory Perez RN at 5/24/2025  9:13 PM.  Learner: Patient  Readiness: Acceptance  Method: Explanation  Response: Verbalizes Understanding    Blood management, taught by Emory Perez RN at 5/24/2025  9:13 PM.  Learner: Patient  Readiness: Acceptance  Method: Explanation  Response: Verbalizes  Understanding    Diet, taught by Emory Perez RN at 5/24/2025  9:13 PM.  Learner: Patient  Readiness: Acceptance  Method: Explanation  Response: Verbalizes Understanding    Exercise, taught by Emory Perez RN at 5/24/2025  9:13 PM.  Learner: Patient  Readiness: Acceptance  Method: Explanation  Response: Verbalizes Understanding    Your Heart: Overview, taught by Emory Perez RN at 5/24/2025  9:13 PM.  Learner: Patient  Readiness: Acceptance  Method: Explanation  Response: Verbalizes Understanding    VAD (Left, Living With: Caregiver) : When To Call, taught by Emory Perez RN at 5/24/2025  9:13 PM.  Learner: Patient  Readiness: Acceptance  Method: Explanation  Response: Verbalizes Understanding    VAD (Left, Living With: Caregiver) : What To Expect Right Away, taught by Emory Perez RN at 5/24/2025  9:13 PM.  Learner: Patient  Readiness: Acceptance  Method: Explanation  Response: Verbalizes Understanding    VAD (Left, Living With: Caregiver) : What Is An Lvad?, taught by Emory Perez RN at 5/24/2025  9:13 PM.  Learner: Patient  Readiness: Acceptance  Method: Explanation  Response: Verbalizes Understanding    VAD (Left, Living With: Caregiver) : Things To Think About, taught by Emory Perez RN at 5/24/2025  9:13 PM.  Learner: Patient  Readiness: Acceptance  Method: Explanation  Response: Verbalizes Understanding    VAD (Left, Living With: Caregiver) : Living With An Lvad, taught by Emory Perez RN at 5/24/2025  9:13 PM.  Learner: Patient  Readiness: Acceptance  Method: Explanation  Response: Verbalizes Understanding    VAD (Left, Living With: Caregiver) : Getting Set Up, taught by Emory Perez RN at 5/24/2025  9:13 PM.  Learner: Patient  Readiness: Acceptance  Method: Explanation  Response: Verbalizes Understanding    VAD (Left, Living With: Caregiver) : Daily Tasks, taught by Emory Perez RN at 5/24/2025  9:13 PM.  Learner: Patient  Readiness:  Acceptance  Method: Explanation  Response: Verbalizes Understanding    VAD (Left, Living With) : When To Call, taught by Emory Perez RN at 5/24/2025  9:13 PM.  Learner: Patient  Readiness: Acceptance  Method: Explanation  Response: Verbalizes Understanding    VAD (Left, Living With) : What To Expect Right Away, taught by Emory Perez RN at 5/24/2025  9:13 PM.  Learner: Patient  Readiness: Acceptance  Method: Explanation  Response: Verbalizes Understanding    VAD (Left, Living With) : What Is An LVAD?, taught by Emory Perez RN at 5/24/2025  9:13 PM.  Learner: Patient  Readiness: Acceptance  Method: Explanation  Response: Verbalizes Understanding    VAD (Left, Living With) : Planning For An Lvad, taught by Emory Perze RN at 5/24/2025  9:13 PM.  Learner: Patient  Readiness: Acceptance  Method: Explanation  Response: Verbalizes Understanding    VAD (Left, Living With) : Living With An Lvad, taught by Emory Perez RN at 5/24/2025  9:13 PM.  Learner: Patient  Readiness: Acceptance  Method: Explanation  Response: Verbalizes Understanding    VAD (Left, Living With) : Getting Set Up, taught by Emory Perez RN at 5/24/2025  9:13 PM.  Learner: Patient  Readiness: Acceptance  Method: Explanation  Response: Verbalizes Understanding    VAD (Left, Living With) : Daily Tasks, taught by Emory Perez RN at 5/24/2025  9:13 PM.  Learner: Patient  Readiness: Acceptance  Method: Explanation  Response: Verbalizes Understanding    Anticoagulant Therapy Diet, taught by Emory Perez RN at 5/24/2025  9:13 PM.  Learner: Patient  Readiness: Acceptance  Method: Explanation  Response: Verbalizes Understanding    Anticoagulant Therapy, taught by Emory Perez RN at 5/24/2025  9:13 PM.  Learner: Patient  Readiness: Acceptance  Method: Explanation  Response: Verbalizes Understanding          Emory Perez RN  Date: 5/24/2025  Time: 10:29 PM

## 2025-05-25 NOTE — SIGNIFICANT EVENT
Device interrogated at bedside to look for tachyarrhythmia epsidoes. No sustained VT noted on device check, had some brief <1s episodes of NSVT over the threshold in 4/21/2025, nothing detected since then.  - Recommend formal device check on Tuesday

## 2025-05-26 LAB
ALBUMIN SERPL BCP-MCNC: 3.8 G/DL (ref 3.4–5)
ANION GAP SERPL CALC-SCNC: 12 MMOL/L (ref 10–20)
BUN SERPL-MCNC: 12 MG/DL (ref 6–23)
CALCIUM SERPL-MCNC: 9.1 MG/DL (ref 8.6–10.6)
CHLORIDE SERPL-SCNC: 102 MMOL/L (ref 98–107)
CO2 SERPL-SCNC: 31 MMOL/L (ref 21–32)
CREAT SERPL-MCNC: 0.92 MG/DL (ref 0.5–1.05)
EGFRCR SERPLBLD CKD-EPI 2021: 70 ML/MIN/1.73M*2
ERYTHROCYTE [DISTWIDTH] IN BLOOD BY AUTOMATED COUNT: 18.3 % (ref 11.5–14.5)
GLUCOSE SERPL-MCNC: 111 MG/DL (ref 74–99)
HCT VFR BLD AUTO: 34.5 % (ref 36–46)
HGB BLD-MCNC: 10.4 G/DL (ref 12–16)
INR PPP: 2.4 (ref 0.9–1.1)
LDH SERPL L TO P-CCNC: 200 U/L (ref 84–246)
MAGNESIUM SERPL-MCNC: 2.39 MG/DL (ref 1.6–2.4)
MCH RBC QN AUTO: 29.1 PG (ref 26–34)
MCHC RBC AUTO-ENTMCNC: 30.1 G/DL (ref 32–36)
MCV RBC AUTO: 96 FL (ref 80–100)
NRBC BLD-RTO: 0 /100 WBCS (ref 0–0)
PHOSPHATE SERPL-MCNC: 3.7 MG/DL (ref 2.5–4.9)
PLATELET # BLD AUTO: 255 X10*3/UL (ref 150–450)
POTASSIUM SERPL-SCNC: 4.1 MMOL/L (ref 3.5–5.3)
PROTHROMBIN TIME: 26.5 SECONDS (ref 9.8–12.4)
RBC # BLD AUTO: 3.58 X10*6/UL (ref 4–5.2)
SODIUM SERPL-SCNC: 141 MMOL/L (ref 136–145)
WBC # BLD AUTO: 5 X10*3/UL (ref 4.4–11.3)

## 2025-05-26 PROCEDURE — 2500000001 HC RX 250 WO HCPCS SELF ADMINISTERED DRUGS (ALT 637 FOR MEDICARE OP): Mod: SE | Performed by: NURSE PRACTITIONER

## 2025-05-26 PROCEDURE — 83615 LACTATE (LD) (LDH) ENZYME: CPT | Performed by: STUDENT IN AN ORGANIZED HEALTH CARE EDUCATION/TRAINING PROGRAM

## 2025-05-26 PROCEDURE — 85027 COMPLETE CBC AUTOMATED: CPT | Performed by: PHYSICIAN ASSISTANT

## 2025-05-26 PROCEDURE — 99233 SBSQ HOSP IP/OBS HIGH 50: CPT | Performed by: STUDENT IN AN ORGANIZED HEALTH CARE EDUCATION/TRAINING PROGRAM

## 2025-05-26 PROCEDURE — 2500000002 HC RX 250 W HCPCS SELF ADMINISTERED DRUGS (ALT 637 FOR MEDICARE OP, ALT 636 FOR OP/ED): Mod: SE | Performed by: NURSE PRACTITIONER

## 2025-05-26 PROCEDURE — 2500000001 HC RX 250 WO HCPCS SELF ADMINISTERED DRUGS (ALT 637 FOR MEDICARE OP): Mod: SE | Performed by: STUDENT IN AN ORGANIZED HEALTH CARE EDUCATION/TRAINING PROGRAM

## 2025-05-26 PROCEDURE — 2500000004 HC RX 250 GENERAL PHARMACY W/ HCPCS (ALT 636 FOR OP/ED): Mod: SE | Performed by: NURSE PRACTITIONER

## 2025-05-26 PROCEDURE — 85610 PROTHROMBIN TIME: CPT | Performed by: STUDENT IN AN ORGANIZED HEALTH CARE EDUCATION/TRAINING PROGRAM

## 2025-05-26 PROCEDURE — 1200000002 HC GENERAL ROOM WITH TELEMETRY DAILY

## 2025-05-26 PROCEDURE — 83735 ASSAY OF MAGNESIUM: CPT | Performed by: STUDENT IN AN ORGANIZED HEALTH CARE EDUCATION/TRAINING PROGRAM

## 2025-05-26 PROCEDURE — 80069 RENAL FUNCTION PANEL: CPT | Performed by: PHYSICIAN ASSISTANT

## 2025-05-26 RX ORDER — WARFARIN 2 MG/1
2 TABLET ORAL DAILY
Status: DISCONTINUED | OUTPATIENT
Start: 2025-05-26 | End: 2025-05-28 | Stop reason: HOSPADM

## 2025-05-26 RX ORDER — TORSEMIDE 20 MG/1
20 TABLET ORAL ONCE
Status: COMPLETED | OUTPATIENT
Start: 2025-05-26 | End: 2025-05-26

## 2025-05-26 RX ORDER — AMOXICILLIN AND CLAVULANATE POTASSIUM 875; 125 MG/1; MG/1
1 TABLET, FILM COATED ORAL EVERY 12 HOURS SCHEDULED
Status: DISCONTINUED | OUTPATIENT
Start: 2025-05-26 | End: 2025-05-28 | Stop reason: HOSPADM

## 2025-05-26 RX ADMIN — WARFARIN SODIUM 2 MG: 2 TABLET ORAL at 17:54

## 2025-05-26 RX ADMIN — DIGOXIN 125 MCG: 125 TABLET ORAL at 08:12

## 2025-05-26 RX ADMIN — MAGNESIUM OXIDE TAB 400 MG (241.3 MG ELEMENTAL MG) 1 TABLET: 400 (241.3 MG) TAB at 08:12

## 2025-05-26 RX ADMIN — Medication 50 MCG: at 08:12

## 2025-05-26 RX ADMIN — SILDENAFIL 20 MG: 20 TABLET, FILM COATED ORAL at 08:12

## 2025-05-26 RX ADMIN — CALCIUM CARBONATE (ANTACID) CHEW TAB 500 MG 2 TABLET: 500 CHEW TAB at 08:12

## 2025-05-26 RX ADMIN — AMIODARONE HYDROCHLORIDE 200 MG: 200 TABLET ORAL at 08:12

## 2025-05-26 RX ADMIN — AMOXICILLIN AND CLAVULANATE POTASSIUM 1 TABLET: 875; 125 TABLET, FILM COATED ORAL at 20:27

## 2025-05-26 RX ADMIN — SILDENAFIL 20 MG: 20 TABLET, FILM COATED ORAL at 20:28

## 2025-05-26 RX ADMIN — SILDENAFIL 20 MG: 20 TABLET, FILM COATED ORAL at 15:22

## 2025-05-26 RX ADMIN — DAPAGLIFLOZIN 10 MG: 10 TABLET, FILM COATED ORAL at 08:12

## 2025-05-26 RX ADMIN — LEVOTHYROXINE SODIUM 150 MCG: 0.07 TABLET ORAL at 06:26

## 2025-05-26 RX ADMIN — MIRTAZAPINE 15 MG: 15 TABLET, FILM COATED ORAL at 20:29

## 2025-05-26 RX ADMIN — FLUOXETINE HYDROCHLORIDE 40 MG: 40 CAPSULE ORAL at 08:12

## 2025-05-26 RX ADMIN — ICOSAPENT ETHYL 2 G: 1 CAPSULE ORAL at 08:12

## 2025-05-26 RX ADMIN — ICOSAPENT ETHYL 2 G: 1 CAPSULE ORAL at 17:54

## 2025-05-26 RX ADMIN — PANTOPRAZOLE SODIUM 40 MG: 40 TABLET, DELAYED RELEASE ORAL at 06:26

## 2025-05-26 RX ADMIN — SPIRONOLACTONE 25 MG: 25 TABLET, FILM COATED ORAL at 08:12

## 2025-05-26 RX ADMIN — TORSEMIDE 20 MG: 20 TABLET ORAL at 09:53

## 2025-05-26 RX ADMIN — ATORVASTATIN CALCIUM 80 MG: 80 TABLET, FILM COATED ORAL at 20:27

## 2025-05-26 RX ADMIN — CALCIUM CARBONATE (ANTACID) CHEW TAB 500 MG 2 TABLET: 500 CHEW TAB at 20:27

## 2025-05-26 ASSESSMENT — COGNITIVE AND FUNCTIONAL STATUS - GENERAL
CLIMB 3 TO 5 STEPS WITH RAILING: A LITTLE
TOILETING: A LITTLE
MOBILITY SCORE: 23
DAILY ACTIVITIY SCORE: 23
DAILY ACTIVITIY SCORE: 24
MOBILITY SCORE: 23
CLIMB 3 TO 5 STEPS WITH RAILING: A LITTLE

## 2025-05-26 ASSESSMENT — PAIN - FUNCTIONAL ASSESSMENT
PAIN_FUNCTIONAL_ASSESSMENT: 0-10
PAIN_FUNCTIONAL_ASSESSMENT: 0-10

## 2025-05-26 ASSESSMENT — PAIN SCALES - GENERAL
PAINLEVEL_OUTOF10: 0 - NO PAIN
PAINLEVEL_OUTOF10: 0 - NO PAIN

## 2025-05-26 NOTE — CARE PLAN
The patient's goals for the shift include Relieve pain and palpitation    The clinical goals for the shift include Patient's MAP will remain between 60-70s this shift.    Pt free of SOB this shift.    Problem: Pain - Adult  Goal: Verbalizes/displays adequate comfort level or baseline comfort level  Outcome: Progressing     Problem: Safety - Adult  Goal: Free from fall injury  Outcome: Progressing     Problem: Discharge Planning  Goal: Discharge to home or other facility with appropriate resources  Outcome: Progressing     Problem: Chronic Conditions and Co-morbidities  Goal: Patient's chronic conditions and co-morbidity symptoms are monitored and maintained or improved  Outcome: Progressing     Problem: Nutrition  Goal: Nutrient intake appropriate for maintaining nutritional needs  Outcome: Progressing     Problem: Fall/Injury  Goal: Not fall by end of shift  Outcome: Progressing  Goal: Be free from injury by end of the shift  Outcome: Progressing  Goal: Verbalize understanding of personal risk factors for fall in the hospital  Outcome: Progressing  Goal: Verbalize understanding of risk factor reduction measures to prevent injury from fall in the home  Outcome: Progressing  Goal: Use assistive devices by end of the shift  Outcome: Progressing  Goal: Pace activities to prevent fatigue by end of the shift  Outcome: Progressing     Problem: Skin  Goal: Decreased wound size/increased tissue granulation at next dressing change  Outcome: Progressing  Goal: Participates in plan/prevention/treatment measures  Outcome: Progressing  Goal: Prevent/manage excess moisture  Outcome: Progressing  Goal: Prevent/minimize sheer/friction injuries  Outcome: Progressing  Goal: Promote/optimize nutrition  Outcome: Progressing  Goal: Promote skin healing  Outcome: Progressing     Problem: Ventricular Assisted Device (VAD)  Goal: Hemodynamic stability, correction of coagulopathy, lab value stability  Outcome: Progressing  Goal: Wean  vasopressors/nitric  Outcome: Progressing  Goal: Wean ventilator  Outcome: Progressing  Goal: Extubation  Outcome: Progressing  Goal: Stable metal status  Outcome: Progressing  Goal: Pulmonary toileting, incentive spirometry  Outcome: Progressing  Goal: Nutrition  Outcome: Progressing  Goal: Mobility/OT/PT  Outcome: Progressing  Goal: Rubber ball  Outcome: Progressing  Goal: ROM  Outcome: Progressing  Goal: Sitting  Outcome: Progressing  Goal: Walk  Outcome: Progressing  Goal: Involve in VAD awareness, dressing change  Outcome: Progressing  Goal: AICD On/Off  Outcome: Progressing

## 2025-05-26 NOTE — CARE PLAN
Pt remained HDS and free of injury this shift. Pt diuresed with 20 mg PO Torsemide. Augmentin resumed this evening d/t UTI. Pt has no complaints. Pt updated on plan of care. Plan for TTE tomorrow.    Problem: Pain - Adult  Goal: Verbalizes/displays adequate comfort level or baseline comfort level  Outcome: Progressing     Problem: Safety - Adult  Goal: Free from fall injury  Outcome: Progressing     Problem: Discharge Planning  Goal: Discharge to home or other facility with appropriate resources  Outcome: Progressing     Problem: Chronic Conditions and Co-morbidities  Goal: Patient's chronic conditions and co-morbidity symptoms are monitored and maintained or improved  Outcome: Progressing

## 2025-05-26 NOTE — PROGRESS NOTES
Subjective Data:  Feels well this AM. Some palpitations, though improved from prior. Does state her breathing is much improved. No other acute complaints.      Objective Data:  Last Recorded Vitals:  Vitals:    05/25/25 1957 05/25/25 2352 05/26/25 0408 05/26/25 0746   Pulse: 95 95 95 72   Resp: 18 18 18 16   Temp: 37 °C (98.6 °F) 36.4 °C (97.5 °F) 36.5 °C (97.7 °F) 36.2 °C (97.2 °F)   TempSrc: Temporal Temporal Temporal Temporal   SpO2: 95% 93% 93% 96%   Weight:   89.7 kg (197 lb 12 oz)    Height:           Last Labs:  CBC - 5/26/2025:  6:40 AM  5.0 10.4 255    34.5      CMP - 5/26/2025:  6:40 AM  9.1 6.6 17 --- 1.1   3.7 3.8 10 245      PTT - 4/21/2025:  2:15 AM  2.4   26.5 26     TROPHS   Date/Time Value Ref Range Status   05/24/2025 07:24 PM 22 0 - 34 ng/L Final   04/14/2025 02:54 PM 9 0 - 34 ng/L Final   02/06/2025 09:44 PM 12 0 - 34 ng/L Final     BNP   Date/Time Value Ref Range Status   05/24/2025 07:24  0 - 99 pg/mL Final   05/05/2025 05:42  0 - 99 pg/mL Final     HGBA1C   Date/Time Value Ref Range Status   04/14/2025 02:54 PM 6.6 See comment % Final   04/14/2025 02:54 PM 6.6 See comment % Final     LDLCALC   Date/Time Value Ref Range Status   04/14/2025 02:54 PM 53 <=99 mg/dL Final     Comment:                                 Near   Borderline      AGE      Desirable  Optimal    High     High     Very High     0-19 Y     0 - 109     ---    110-129   >/= 130     ----    20-24 Y     0 - 119     ---    120-159   >/= 160     ----      >24 Y     0 -  99   100-129  130-159   160-189     >/=190     02/06/2025 09:44 PM 58 <=99 mg/dL Final     Comment:                                 Near   Borderline      AGE      Desirable  Optimal    High     High     Very High     0-19 Y     0 - 109     ---    110-129   >/= 130     ----    20-24 Y     0 - 119     ---    120-159   >/= 160     ----      >24 Y     0 -  99   100-129  130-159   160-189     >/=190       VLDL   Date/Time Value Ref Range Status   04/14/2025  02:54 PM 52 0 - 40 mg/dL Final   02/06/2025 09:44 PM 18 0 - 40 mg/dL Final      Last I/O:  I/O last 3 completed shifts:  In: 1250 (13.9 mL/kg) [P.O.:1200; I.V.:50 (0.6 mL/kg)]  Out: 2975 (33.2 mL/kg) [Urine:2975 (0.9 mL/kg/hr)]  Weight: 89.7 kg     Past Cardiology Tests (Last 3 Years):  EKG:  Electrocardiogram 12-lead PRN for arrhythmia 05/05/2025    Echo:  Transthoracic Echo (TTE) Limited 04/29/2025  CONCLUSIONS:   1. Poorly visualized anatomical structures due to suboptimal image quality.   2. The left ventricle was not well visualized. The left ventricular ejection fraction could not be measured.   3. Abnormal septal motion consistent with post-operative status.   4. S/p Heartmate 3 LVAD with AV opening every beat. Inflow and outflow cannulae not well seen.   5. There is reduced right ventricular systolic function.   6. S/p mitral POLO ( 2 mitraclips) with possibly trace MR. MV gradients not assessed.   7. Right ventricular systolic pressure is within normal limits.   8. Compared with the prior exam from 4/18/2025 both studies are technically very difficult. The AV did not appear to open on the prior exam but seems to open today with every cardiac cycle. Unable to estimate LVEF in either exam. There was already mild AI on the prior study, similar today.    Ejection Fractions:  EF   Date/Time Value Ref Range Status   04/17/2025 06:28 AM 23 %    02/07/2025 04:33 PM 18 %      Cath:  Cardiac Catheterization Procedure 02/07/2025    Stress Test:  Cardiopulmonary (Metabolic) Stress Test 03/14/2025    Cardiac Imaging:  No results found for this or any previous visit from the past 1095 days.      Inpatient Medications:  Scheduled Medications[1]  PRN Medications[2]  Continuous Medications[3]    Physical Exam:  Gen: awake and alert  HEENT: normocephalic. Moist mucous membranes  CV: LVAD hum appreciated.   Pulm: clear to auscultation bilaterally. No coarse lung sounds. Breathing comfortable on RA.  Abd: soft, non-distended.  Normal active bowel sounds. Driveline with dressing C/D/I.   Ext: warm and well-perfused  Psych: appropriate affect  Neuro: grossly nonfocal.      Assessment/Plan   Delphine Zavala is a very pleasant 65 y.o. 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 Hernandez) 4/17 (course complicated by slow milrinone wean and atrial flutter requiring DCCV) with dc on 5/8. Presented to [facility] in [place] [facility] with palpitation and shortness of breath.  Transferred to [facility] for further management.     NEUROLOGICAL  Anxiety & Depression  Adjustment disorder   Insomnia  - C/w home fluoxetine 40 mg daily  - C/w Mirtazapine 15mg nightly    CARDIAC  Stage D systolic NICM   s/p LVAD with PFO closure 4/17/25  S/p single chamber ICD 2019  MR/TR S/p mTEER 1/2025  > Antracycline induced  > Admitted with new palpitations and exertional SOB  > Assessment of LVAD on presentation: speed 5100 RPM, Pump Flow 3.7-3.8; PI 6.8-7.4; Pump Power 3.6  > Weight on last discharge 188 IBS and currently at 203 IBS. Mild pulmonary edema on CXR.  > BNP: (proBNP at OSH 2421) however BNP here at 198 (was 491 during last hospitalization)   > The constellation of findings suggests very mild volume overload.     -TTE ordered. Consideration for speed adjustment/RAMP pending this.     LVAD Care:   Drive Line assessment: Clean and Non-tender.  Dressing changes: Weekly  Anticoagulation: start Coumadin 2mg daily - presented with supratherapeutic INR on arrival and held. Home regimen is Coumadin 3mg M/W/F and 2mg Sat/Sun/Tues/Thurs for goal INR 2-3.  Antiplts: no primary reasons  Prophylaxis: continue PPI daily & vascepa BID   Daily LDH       GDMT & RV support:   - ACE/ARB/ARNI: Did not previously tolerate half dose entresto due to hypotension; will defer to outpatient If needed   - Beta-blocker: metoprolol 25mg ER previously stopped 2/2 moderate RV and worsening edema/dyspnea; defer to outpatient if needed    - SGL2 inhibitor: Farxiga 10mg daily  - MRA: Spironolactone 25mg daily   - Digoxin: 125mcg daily (most recent level 0.74 on 5/24)   - Phosphodiesterase inhibitors: Sildenafil 20mg TID  - Diuretics: s/p torsemide 20mg x2 yesterday, will redose today.      Heart Transplant candidacy   Barriers to transplant: Elevated PRA  ABO: B      Atrial fibrillation/flutter s/p prior DCCV  - EKG 5/24: Sinus tachycardia  - Serum digoxin level: 0.71  - Interrogate ICD. EP fellow with interrogation at bedside without any significant events. Order placed for official interrogation Tuesday.   - Monitor on tele.   - Continue home digoxin, amiodarone  - Warfarin as above.      RESPIRATORY   SHERRY   - Does not use home CPAP  - PFT's completed in lab (2/12) - FVC: 72% / FEV1: 66% / FEV1/ FVC: 91%   - O2 saturation adequate on RA      GI:  GERD  Hx. Colon polyps   Family history (father) Colon CA (patient had yearly colonoscopy until 2023)  - Abdominal exam benign  - Mildly elevated alk phos stable  - Bowel regimen: Miralax PRN  - PPI: Pantoprazole 40 mg PO Daily      RENAL: None  -K goal >4  -Mg goal >2    ENDO:  Hypothyroidism  -C/w Levothyroxine 175 mcg daily     - hgbA1c (4/14): 6.6%     HEMATOLOGY:  Chronic anemia & iron deficiency   - s/p IV iron sucrose daily x 5 days (4/29/25 - 5/3/25) for reduced iron levels per labs on 4/28/25   - Continue to monitor CBC     INFECTIOUS DISEASE: No active issues     SKIN & SOFT TISSUES:  - Driveline site appears to be clean and intact with no definite drainage   - Dressing changes weekly     Full Code  DVT ppx: coumadin  Regular diet  DISPO: Possible discharge tomorrow/Wednesday pending ECHO/device interrogation.     Patient seen  and discussed with Dr. Lopez.    Mary Iniguez DO         [1]   Scheduled medications   Medication Dose Route Frequency    amiodarone  200 mg oral Daily    atorvastatin  80 mg oral Nightly    calcium carbonate  2 tablet oral BID    cholecalciferol  50 mcg oral Daily    dapagliflozin propanediol  10 mg oral Daily    digoxin  125 mcg oral Daily    FLUoxetine  40 mg oral Daily    icosapent ethyL  2 g oral BID    levothyroxine  150 mcg oral Daily    magnesium oxide  400 mg of magnesium oxide oral BID    mirtazapine  15 mg oral Nightly    pantoprazole  40 mg oral Daily before breakfast    sildenafil  20 mg oral TID    spironolactone  25 mg oral Daily    torsemide  20 mg oral Once    warfarin  2 mg oral Daily   [2]   PRN medications   Medication   [3]   Continuous Medications   Medication Dose Last Rate

## 2025-05-27 ENCOUNTER — APPOINTMENT (OUTPATIENT)
Dept: RADIOLOGY | Facility: HOSPITAL | Age: 65
End: 2025-05-27
Payer: COMMERCIAL

## 2025-05-27 ENCOUNTER — APPOINTMENT (OUTPATIENT)
Dept: CARDIOLOGY | Facility: HOSPITAL | Age: 65
End: 2025-05-27
Payer: COMMERCIAL

## 2025-05-27 LAB
ALBUMIN SERPL BCP-MCNC: 3.9 G/DL (ref 3.4–5)
ANION GAP SERPL CALC-SCNC: 14 MMOL/L (ref 10–20)
BUN SERPL-MCNC: 14 MG/DL (ref 6–23)
CALCIUM SERPL-MCNC: 9.1 MG/DL (ref 8.6–10.6)
CHLORIDE SERPL-SCNC: 98 MMOL/L (ref 98–107)
CO2 SERPL-SCNC: 32 MMOL/L (ref 21–32)
CREAT SERPL-MCNC: 0.97 MG/DL (ref 0.5–1.05)
EGFRCR SERPLBLD CKD-EPI 2021: 65 ML/MIN/1.73M*2
EJECTION FRACTION: 18 %
ERYTHROCYTE [DISTWIDTH] IN BLOOD BY AUTOMATED COUNT: 18.1 % (ref 11.5–14.5)
GLUCOSE SERPL-MCNC: 106 MG/DL (ref 74–99)
HCT VFR BLD AUTO: 35.5 % (ref 36–46)
HGB BLD-MCNC: 10.8 G/DL (ref 12–16)
INR PPP: 2.5 (ref 0.9–1.1)
LDH SERPL L TO P-CCNC: 208 U/L (ref 84–246)
MAGNESIUM SERPL-MCNC: 2.17 MG/DL (ref 1.6–2.4)
MCH RBC QN AUTO: 29.1 PG (ref 26–34)
MCHC RBC AUTO-ENTMCNC: 30.4 G/DL (ref 32–36)
MCV RBC AUTO: 96 FL (ref 80–100)
NRBC BLD-RTO: 0 /100 WBCS (ref 0–0)
PHOSPHATE SERPL-MCNC: 4 MG/DL (ref 2.5–4.9)
PLATELET # BLD AUTO: 263 X10*3/UL (ref 150–450)
POTASSIUM SERPL-SCNC: 3.8 MMOL/L (ref 3.5–5.3)
PROTHROMBIN TIME: 27.5 SECONDS (ref 9.8–12.4)
RBC # BLD AUTO: 3.71 X10*6/UL (ref 4–5.2)
RIGHT VENTRICLE PEAK SYSTOLIC PRESSURE: 33.9 MMHG
SODIUM SERPL-SCNC: 140 MMOL/L (ref 136–145)
WBC # BLD AUTO: 5.1 X10*3/UL (ref 4.4–11.3)

## 2025-05-27 PROCEDURE — 93750 INTERROGATION VAD IN PERSON: CPT | Performed by: REGISTERED NURSE

## 2025-05-27 PROCEDURE — 2500000002 HC RX 250 W HCPCS SELF ADMINISTERED DRUGS (ALT 637 FOR MEDICARE OP, ALT 636 FOR OP/ED): Mod: SE | Performed by: NURSE PRACTITIONER

## 2025-05-27 PROCEDURE — 36415 COLL VENOUS BLD VENIPUNCTURE: CPT | Performed by: STUDENT IN AN ORGANIZED HEALTH CARE EDUCATION/TRAINING PROGRAM

## 2025-05-27 PROCEDURE — 2500000004 HC RX 250 GENERAL PHARMACY W/ HCPCS (ALT 636 FOR OP/ED): Mod: SE | Performed by: NURSE PRACTITIONER

## 2025-05-27 PROCEDURE — 93321 DOPPLER ECHO F-UP/LMTD STD: CPT | Performed by: INTERNAL MEDICINE

## 2025-05-27 PROCEDURE — 99233 SBSQ HOSP IP/OBS HIGH 50: CPT | Performed by: REGISTERED NURSE

## 2025-05-27 PROCEDURE — 2500000002 HC RX 250 W HCPCS SELF ADMINISTERED DRUGS (ALT 637 FOR MEDICARE OP, ALT 636 FOR OP/ED): Mod: SE | Performed by: REGISTERED NURSE

## 2025-05-27 PROCEDURE — 93282 PRGRMG EVAL IMPLANTABLE DFB: CPT | Performed by: STUDENT IN AN ORGANIZED HEALTH CARE EDUCATION/TRAINING PROGRAM

## 2025-05-27 PROCEDURE — 84100 ASSAY OF PHOSPHORUS: CPT | Performed by: PHYSICIAN ASSISTANT

## 2025-05-27 PROCEDURE — 85610 PROTHROMBIN TIME: CPT | Performed by: STUDENT IN AN ORGANIZED HEALTH CARE EDUCATION/TRAINING PROGRAM

## 2025-05-27 PROCEDURE — 83735 ASSAY OF MAGNESIUM: CPT | Performed by: STUDENT IN AN ORGANIZED HEALTH CARE EDUCATION/TRAINING PROGRAM

## 2025-05-27 PROCEDURE — 71045 X-RAY EXAM CHEST 1 VIEW: CPT

## 2025-05-27 PROCEDURE — 2500000001 HC RX 250 WO HCPCS SELF ADMINISTERED DRUGS (ALT 637 FOR MEDICARE OP): Mod: SE | Performed by: REGISTERED NURSE

## 2025-05-27 PROCEDURE — 85027 COMPLETE CBC AUTOMATED: CPT | Performed by: PHYSICIAN ASSISTANT

## 2025-05-27 PROCEDURE — 83615 LACTATE (LD) (LDH) ENZYME: CPT | Performed by: STUDENT IN AN ORGANIZED HEALTH CARE EDUCATION/TRAINING PROGRAM

## 2025-05-27 PROCEDURE — G0378 HOSPITAL OBSERVATION PER HR: HCPCS

## 2025-05-27 PROCEDURE — 93308 TTE F-UP OR LMTD: CPT | Performed by: INTERNAL MEDICINE

## 2025-05-27 PROCEDURE — 93325 DOPPLER ECHO COLOR FLOW MAPG: CPT

## 2025-05-27 PROCEDURE — 2500000001 HC RX 250 WO HCPCS SELF ADMINISTERED DRUGS (ALT 637 FOR MEDICARE OP): Mod: SE | Performed by: STUDENT IN AN ORGANIZED HEALTH CARE EDUCATION/TRAINING PROGRAM

## 2025-05-27 PROCEDURE — 93282 PRGRMG EVAL IMPLANTABLE DFB: CPT

## 2025-05-27 PROCEDURE — 2500000001 HC RX 250 WO HCPCS SELF ADMINISTERED DRUGS (ALT 637 FOR MEDICARE OP): Mod: SE | Performed by: NURSE PRACTITIONER

## 2025-05-27 PROCEDURE — 93325 DOPPLER ECHO COLOR FLOW MAPG: CPT | Performed by: INTERNAL MEDICINE

## 2025-05-27 RX ORDER — SPIRONOLACTONE 25 MG/1
50 TABLET ORAL DAILY
Status: DISCONTINUED | OUTPATIENT
Start: 2025-05-28 | End: 2025-05-28 | Stop reason: HOSPADM

## 2025-05-27 RX ORDER — LOSARTAN POTASSIUM 25 MG/1
25 TABLET ORAL DAILY
Status: DISCONTINUED | OUTPATIENT
Start: 2025-05-27 | End: 2025-05-28 | Stop reason: HOSPADM

## 2025-05-27 RX ORDER — SPIRONOLACTONE 25 MG/1
25 TABLET ORAL ONCE
Status: COMPLETED | OUTPATIENT
Start: 2025-05-27 | End: 2025-05-27

## 2025-05-27 RX ORDER — TORSEMIDE 20 MG/1
20 TABLET ORAL ONCE
Status: COMPLETED | OUTPATIENT
Start: 2025-05-27 | End: 2025-05-27

## 2025-05-27 RX ADMIN — AMIODARONE HYDROCHLORIDE 200 MG: 200 TABLET ORAL at 08:15

## 2025-05-27 RX ADMIN — DAPAGLIFLOZIN 10 MG: 10 TABLET, FILM COATED ORAL at 08:15

## 2025-05-27 RX ADMIN — LEVOTHYROXINE SODIUM 150 MCG: 0.07 TABLET ORAL at 06:06

## 2025-05-27 RX ADMIN — CALCIUM CARBONATE (ANTACID) CHEW TAB 500 MG 2 TABLET: 500 CHEW TAB at 20:24

## 2025-05-27 RX ADMIN — SILDENAFIL 20 MG: 20 TABLET, FILM COATED ORAL at 20:24

## 2025-05-27 RX ADMIN — CALCIUM CARBONATE (ANTACID) CHEW TAB 500 MG 2 TABLET: 500 CHEW TAB at 08:15

## 2025-05-27 RX ADMIN — ICOSAPENT ETHYL 2 G: 1 CAPSULE ORAL at 08:15

## 2025-05-27 RX ADMIN — SILDENAFIL 20 MG: 20 TABLET, FILM COATED ORAL at 15:23

## 2025-05-27 RX ADMIN — PANTOPRAZOLE SODIUM 40 MG: 40 TABLET, DELAYED RELEASE ORAL at 06:06

## 2025-05-27 RX ADMIN — DIGOXIN 125 MCG: 125 TABLET ORAL at 08:15

## 2025-05-27 RX ADMIN — Medication 50 MCG: at 08:15

## 2025-05-27 RX ADMIN — ATORVASTATIN CALCIUM 80 MG: 80 TABLET, FILM COATED ORAL at 20:24

## 2025-05-27 RX ADMIN — SILDENAFIL 20 MG: 20 TABLET, FILM COATED ORAL at 08:15

## 2025-05-27 RX ADMIN — AMOXICILLIN AND CLAVULANATE POTASSIUM 1 TABLET: 875; 125 TABLET, FILM COATED ORAL at 20:24

## 2025-05-27 RX ADMIN — LOSARTAN POTASSIUM 25 MG: 25 TABLET, FILM COATED ORAL at 16:40

## 2025-05-27 RX ADMIN — AMOXICILLIN AND CLAVULANATE POTASSIUM 1 TABLET: 875; 125 TABLET, FILM COATED ORAL at 08:15

## 2025-05-27 RX ADMIN — SPIRONOLACTONE 25 MG: 25 TABLET, FILM COATED ORAL at 15:23

## 2025-05-27 RX ADMIN — MAGNESIUM OXIDE TAB 400 MG (241.3 MG ELEMENTAL MG) 1 TABLET: 400 (241.3 MG) TAB at 08:15

## 2025-05-27 RX ADMIN — TORSEMIDE 20 MG: 20 TABLET ORAL at 15:23

## 2025-05-27 RX ADMIN — MIRTAZAPINE 15 MG: 15 TABLET, FILM COATED ORAL at 20:24

## 2025-05-27 RX ADMIN — WARFARIN SODIUM 2 MG: 2 TABLET ORAL at 18:17

## 2025-05-27 RX ADMIN — SPIRONOLACTONE 25 MG: 25 TABLET, FILM COATED ORAL at 08:15

## 2025-05-27 RX ADMIN — ICOSAPENT ETHYL 2 G: 1 CAPSULE ORAL at 18:17

## 2025-05-27 RX ADMIN — FLUOXETINE HYDROCHLORIDE 40 MG: 40 CAPSULE ORAL at 08:15

## 2025-05-27 ASSESSMENT — PAIN SCALES - GENERAL
PAINLEVEL_OUTOF10: 0 - NO PAIN
PAINLEVEL_OUTOF10: 0 - NO PAIN

## 2025-05-27 ASSESSMENT — COGNITIVE AND FUNCTIONAL STATUS - GENERAL
DAILY ACTIVITIY SCORE: 24
CLIMB 3 TO 5 STEPS WITH RAILING: A LITTLE
CLIMB 3 TO 5 STEPS WITH RAILING: A LITTLE
DAILY ACTIVITIY SCORE: 24
MOBILITY SCORE: 23
MOBILITY SCORE: 23

## 2025-05-27 ASSESSMENT — ACTIVITIES OF DAILY LIVING (ADL): LACK_OF_TRANSPORTATION: NO

## 2025-05-27 ASSESSMENT — PAIN - FUNCTIONAL ASSESSMENT: PAIN_FUNCTIONAL_ASSESSMENT: 0-10

## 2025-05-27 NOTE — SIGNIFICANT EVENT
VAD Monitoring at ECHO RAMP    Heart Mate III interrogation @ 1417   Most Recent   Flow 4.2   Speed 5100   Power 3.6   PI 4.1   MAP (mmHg): 80    RPM increased to 5300 at 1419 (AV opening every beat at 5100).    Heart Mate III interrogation @ 1422   Most Recent   Flow 4.4   Speed 5300   Power 3.8   PI 3.9   MAP (mmHg): 78    Study guided by Dr. DEMETRIS العلي. Pt tolerated all adjustments.

## 2025-05-27 NOTE — CARE PLAN
The patient's goals for the shift include Relieve pain and palpitation    The clinical goals for the shift include Pt will remain free of injury or fall during this shift    Over the shift, the patient has remained alert and oriented x4, on room air, denied any pain/discomfort throughout the shift. MAPs ranged between 68-78. Patient involved with and aware of plan of care, has remained free of falls, call bell within reach, bed locked in the lowest position with nonskid socks on. Purposeful rounding performed. Patient calls appropriately for assistance. See chart for further details.

## 2025-05-27 NOTE — PROGRESS NOTES
05/27/25 0752   Discharge Planning   Living Arrangements Children   Support Systems Children   Assistance Needed None   Type of Residence Private residence   Number of Stairs to Enter Residence 3   Number of Stairs Within Residence 0   Do you have animals or pets at home? No   Type of Animals or Pets 3 dogs   Who is requesting discharge planning? Provider   Home or Post Acute Services In home services   Type of Home Care Services Home PT;Home OT   Expected Discharge Disposition Home Heal   Does the patient need discharge transport arranged? No   Financial Resource Strain   How hard is it for you to pay for the very basics like food, housing, medical care, and heating? Not very   Housing Stability   In the last 12 months, was there a time when you were not able to pay the mortgage or rent on time? N   In the past 12 months, how many times have you moved where you were living? 1   At any time in the past 12 months, were you homeless or living in a shelter (including now)? N   Transportation Needs   In the past 12 months, has lack of transportation kept you from medical appointments or from getting medications? no   In the past 12 months, has lack of transportation kept you from meetings, work, or from getting things needed for daily living? No   Patient Choice   Provider Choice list and CMS website (https://medicare.gov/care-compare#search) for post-acute Quality and Resource Measure Data were provided and reviewed with: Patient   Patient / Family choosing to utilize agency / facility established prior to hospitalization No   Stroke Family Assessment   Stroke Family Assessment Needed No     Transitional Care Coordination Progress Note:  Patient discussed during interdisciplinary rounds.   Team members present: RN TCC MD   Plan per Medical/Surgical team: ECHO/device interrogation.   Payor: [facility]   Discharge disposition: Home   Potential Barriers: None   ADOD: 5-      Previous Home  Care:   DME: [facility]   Pharmacy: [facility]  PCP:  LINA LING  Dialysis: None

## 2025-05-27 NOTE — CARE PLAN
Pt remained HDS and free of injury this shift. Pt received TTE in this shift. LVAD RPM changed this shift from 5100 to 5300. Pt complaining of SOB - CXR ordered. Pt's Ej increased from 25 mg to 50 mg. Pt diuresed with 20 PO Torsemide.    Problem: Pain - Adult  Goal: Verbalizes/displays adequate comfort level or baseline comfort level  Outcome: Progressing     Problem: Safety - Adult  Goal: Free from fall injury  Outcome: Progressing     Problem: Discharge Planning  Goal: Discharge to home or other facility with appropriate resources  Outcome: Progressing     Problem: Chronic Conditions and Co-morbidities  Goal: Patient's chronic conditions and co-morbidity symptoms are monitored and maintained or improved  Outcome: Progressing     Problem: Nutrition  Goal: Nutrient intake appropriate for maintaining nutritional needs  Outcome: Progressing

## 2025-05-27 NOTE — PROGRESS NOTES
Subjective Data:  NAOE. Pt states she ambulated this morning and was slightly dysnpeic. This afternoon, RPM was increased to 5300.     Objective Data:  Last Recorded Vitals:  Vitals:    05/26/25 2347 05/27/25 0436 05/27/25 0736 05/27/25 1127   Pulse: 99 94 97 95   Resp: 18 18 17 17   Temp: 36.2 °C (97.2 °F) 36 °C (96.8 °F) 36.2 °C (97.2 °F) 36.2 °C (97.2 °F)   TempSrc: Temporal Temporal Temporal Temporal   SpO2: 94% 93% 92% 96%   Weight:  89.8 kg (197 lb 15.6 oz)     Height:           Last Labs:  CBC - 5/27/2025:  6:44 AM  5.1 10.8 263    35.5      CMP - 5/27/2025:  6:44 AM  9.1 6.6 17 --- 1.1   4.0 3.9 10 245      PTT - 4/21/2025:  2:15 AM  2.5   27.5 26     TROPHS   Date/Time Value Ref Range Status   05/24/2025 07:24 PM 22 0 - 34 ng/L Final   04/14/2025 02:54 PM 9 0 - 34 ng/L Final   02/06/2025 09:44 PM 12 0 - 34 ng/L Final     BNP   Date/Time Value Ref Range Status   05/24/2025 07:24  0 - 99 pg/mL Final   05/05/2025 05:42  0 - 99 pg/mL Final     HGBA1C   Date/Time Value Ref Range Status   04/14/2025 02:54 PM 6.6 See comment % Final   04/14/2025 02:54 PM 6.6 See comment % Final     LDLCALC   Date/Time Value Ref Range Status   04/14/2025 02:54 PM 53 <=99 mg/dL Final     Comment:                                 Near   Borderline      AGE      Desirable  Optimal    High     High     Very High     0-19 Y     0 - 109     ---    110-129   >/= 130     ----    20-24 Y     0 - 119     ---    120-159   >/= 160     ----      >24 Y     0 -  99   100-129  130-159   160-189     >/=190     02/06/2025 09:44 PM 58 <=99 mg/dL Final     Comment:                                 Near   Borderline      AGE      Desirable  Optimal    High     High     Very High     0-19 Y     0 - 109     ---    110-129   >/= 130     ----    20-24 Y     0 - 119     ---    120-159   >/= 160     ----      >24 Y     0 -  99   100-129  130-159   160-189     >/=190       VLDL   Date/Time Value Ref Range Status   04/14/2025 02:54 PM 52 0 - 40 mg/dL  Final   02/06/2025 09:44 PM 18 0 - 40 mg/dL Final      Last I/O:  I/O last 3 completed shifts:  In: 720 (8 mL/kg) [P.O.:720]  Out: 1150 (12.8 mL/kg) [Urine:1150 (0.4 mL/kg/hr)]  Weight: 89.8 kg     Past Cardiology Tests (Last 3 Years):  EKG:  Electrocardiogram 12-lead PRN for arrhythmia 05/05/2025    Echo:  Transthoracic Echo (TTE) Limited 04/29/2025  CONCLUSIONS:   1. Poorly visualized anatomical structures due to suboptimal image quality.   2. The left ventricle was not well visualized. The left ventricular ejection fraction could not be measured.   3. Abnormal septal motion consistent with post-operative status.   4. S/p Heartmate 3 LVAD with AV opening every beat. Inflow and outflow cannulae not well seen.   5. There is reduced right ventricular systolic function.   6. S/p mitral POLO ( 2 mitraclips) with possibly trace MR. MV gradients not assessed.   7. Right ventricular systolic pressure is within normal limits.   8. Compared with the prior exam from 4/18/2025 both studies are technically very difficult. The AV did not appear to open on the prior exam but seems to open today with every cardiac cycle. Unable to estimate LVEF in either exam. There was already mild AI on the prior study, similar today.    Ejection Fractions:  EF   Date/Time Value Ref Range Status   04/17/2025 06:28 AM 23 %    02/07/2025 04:33 PM 18 %      Cath:  Cardiac Catheterization Procedure 02/07/2025    Stress Test:  Cardiopulmonary (Metabolic) Stress Test 03/14/2025    Cardiac Imaging:  No results found for this or any previous visit from the past 1095 days.      Inpatient Medications:  Scheduled Medications[1]  PRN Medications[2]  Continuous Medications[3]    Physical Exam:  Gen: awake and alert  HEENT: normocephalic. Moist mucous membranes  CV: LVAD hum appreciated.   Pulm: clear to auscultation bilaterally. No coarse lung sounds. Breathing comfortable on RA.  Abd: soft, non-distended. Normal active bowel sounds. Driveline with dressing  C/D/I.   Ext: warm and well-perfused  Psych: appropriate affect  Neuro: grossly nonfocal.      Assessment/Plan   Delphine Zavala is a very pleasant 65 y.o. 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 Hernandez) 4/17 (course complicated by slow milrinone wean and atrial flutter requiring DCCV) with dc on 5/8. Presented to [facility] in [place] ED with palpitation and shortness of breath.  Transferred to [facility] for further management.     NEUROLOGICAL  Anxiety & Depression  Adjustment disorder   Insomnia  - C/w home fluoxetine 40 mg daily  - C/w Mirtazapine 15mg nightly    CARDIAC  Stage D systolic NICM   s/p LVAD with PFO closure 4/17/25  S/p single chamber ICD 2019  MR/TR S/p mTEER 1/2025  > Antracycline induced  > Admitted with new palpitations and exertional SOB  > Weight on last discharge 188 IBS and currently at 203 IBS. Mild pulmonary edema on CXR.  > BNP: (proBNP at OSH 2421) however BNP here at 198 (was 491 during last hospitalization)   > The constellation of findings suggests very mild volume overload.     -TTE ordered. Consideration for speed adjustment/RAMP pending this.     LVAD Care:   Drive Line assessment: Clean and Non-tender.  Dressing changes: Weekly  Anticoagulation: c/w Coumadin 2mg daily - presented with supratherapeutic INR on arrival and held. Home regimen is Coumadin 3mg M/W/F and 2mg Sat/Sun/Tues/Thurs for goal INR 2-3.  Antiplts: no primary reasons  Prophylaxis: continue PPI daily & vascepa BID   Daily LDH      GDMT & RV support:   - ACE/ARB/ARNI: Did not previously tolerate half dose entresto due to hypotension; can trial low dose losartan   - Beta-blocker:  metoprolol 25mg ER previously stopped 2/2 moderate RV and worsening edema/dyspnea; defer to outpatient if needed    - SGL2 inhibitor: Farxiga 10mg daily  - MRA: Spironolactone 25mg daily -> increased to 50mg today   - Digoxin: 125mcg daily Last digoxin level: 0.74 5/24/2025:  7:24 PM  - Phosphodiesterase inhibitors: Sildenafil 20mg TID  - Diuretics: will administer torsemide 20mg today      Heart Transplant candidacy   Barriers to transplant: Elevated PRA  ABO: B      Atrial fibrillation/flutter s/p prior DCCV  - EKG 5/24: Sinus tachycardia  - Serum digoxin level: 0.71  - Device interrogation not showing any arrhthymias since 4/21   - Continue home digoxin, amiodarone  - Warfarin as above.      RESPIRATORY   SHERRY   - Does not use home CPAP  - PFT's completed in lab (2/12) - FVC: 72% / FEV1: 66% / FEV1/ FVC: 91%   - O2 saturation adequate on RA      GI:  GERD  Hx. Colon polyps   Family history (father) Colon CA (patient had yearly colonoscopy until 2023)  - Abdominal exam benign  - Mildly elevated alk phos stable  - Bowel regimen: Miralax PRN  - PPI: Pantoprazole 40 mg PO Daily      RENAL: None  -K goal >4  -Mg goal >2    ENDO:  Hypothyroidism  -C/w Levothyroxine 175 mcg daily     - hgbA1c (4/14): 6.6%     HEMATOLOGY:  Chronic anemia & iron deficiency   - s/p IV iron sucrose daily x 5 days (4/29/25 - 5/3/25) for reduced iron levels per labs on 4/28/25   - Continue to monitor CBC     INFECTIOUS DISEASE: No active issues     SKIN & SOFT TISSUES:  - Driveline site appears to be clean and intact with no definite drainage   - Dressing changes weekly     Full Code  DVT ppx: coumadin  Regular diet  DISPO: Possible discharge tomorrow    Patient seen and discussed with Dr. DEMETRIS Mayes, MSN, AGAP-BC  Advanced Heart Failure LVAD Nurse Practitioner   For floor patients please page HHVI team after 5pm- [phone]  LVAD 24/7 emergency pager [phone]  LVAD 24/7 emergency phone [phone]           [1]   Scheduled  medications   Medication Dose Route Frequency    amiodarone  200 mg oral Daily    amoxicillin-clavulanate  1 tablet oral q12h XIANG    atorvastatin  80 mg oral Nightly    calcium carbonate  2 tablet oral BID    cholecalciferol  50 mcg oral Daily    dapagliflozin propanediol  10 mg oral Daily    digoxin  125 mcg oral Daily    FLUoxetine  40 mg oral Daily    icosapent ethyL  2 g oral BID    levothyroxine  150 mcg oral Daily    magnesium oxide  400 mg of magnesium oxide oral BID    mirtazapine  15 mg oral Nightly    pantoprazole  40 mg oral Daily before breakfast    sildenafil  20 mg oral TID    spironolactone  25 mg oral Once    [START ON 5/28/2025] spironolactone  50 mg oral Daily    warfarin  2 mg oral Daily   [2]   PRN medications   Medication   [3]   Continuous Medications   Medication Dose Last Rate

## 2025-05-28 ENCOUNTER — PHARMACY VISIT (OUTPATIENT)
Dept: PHARMACY | Facility: CLINIC | Age: 65
End: 2025-05-28
Payer: MEDICAID

## 2025-05-28 ENCOUNTER — DOCUMENTATION (OUTPATIENT)
Dept: HOME HEALTH SERVICES | Facility: HOME HEALTH | Age: 65
End: 2025-05-28
Payer: COMMERCIAL

## 2025-05-28 VITALS
WEIGHT: 197.31 LBS | OXYGEN SATURATION: 91 % | BODY MASS INDEX: 36.31 KG/M2 | HEIGHT: 62 IN | TEMPERATURE: 97.3 F | RESPIRATION RATE: 18 BRPM | HEART RATE: 94 BPM

## 2025-05-28 PROBLEM — R00.2 PALPITATIONS: Status: RESOLVED | Noted: 2025-05-24 | Resolved: 2025-05-28

## 2025-05-28 LAB
ALBUMIN SERPL BCP-MCNC: 4 G/DL (ref 3.4–5)
ANION GAP SERPL CALC-SCNC: 16 MMOL/L (ref 10–20)
BUN SERPL-MCNC: 15 MG/DL (ref 6–23)
CALCIUM SERPL-MCNC: 9.1 MG/DL (ref 8.6–10.6)
CHLORIDE SERPL-SCNC: 99 MMOL/L (ref 98–107)
CO2 SERPL-SCNC: 30 MMOL/L (ref 21–32)
CREAT SERPL-MCNC: 1.03 MG/DL (ref 0.5–1.05)
EGFRCR SERPLBLD CKD-EPI 2021: 61 ML/MIN/1.73M*2
ERYTHROCYTE [DISTWIDTH] IN BLOOD BY AUTOMATED COUNT: 18.2 % (ref 11.5–14.5)
GLUCOSE SERPL-MCNC: 115 MG/DL (ref 74–99)
HCT VFR BLD AUTO: 37 % (ref 36–46)
HGB BLD-MCNC: 11.1 G/DL (ref 12–16)
INR PPP: 2.3 (ref 0.9–1.1)
LDH SERPL L TO P-CCNC: 196 U/L (ref 84–246)
MAGNESIUM SERPL-MCNC: 2.2 MG/DL (ref 1.6–2.4)
MCH RBC QN AUTO: 28.9 PG (ref 26–34)
MCHC RBC AUTO-ENTMCNC: 30 G/DL (ref 32–36)
MCV RBC AUTO: 96 FL (ref 80–100)
NRBC BLD-RTO: 0 /100 WBCS (ref 0–0)
PHOSPHATE SERPL-MCNC: 4.1 MG/DL (ref 2.5–4.9)
PLATELET # BLD AUTO: 263 X10*3/UL (ref 150–450)
POTASSIUM SERPL-SCNC: 3.9 MMOL/L (ref 3.5–5.3)
PROTHROMBIN TIME: 25.7 SECONDS (ref 9.8–12.4)
RBC # BLD AUTO: 3.84 X10*6/UL (ref 4–5.2)
SODIUM SERPL-SCNC: 141 MMOL/L (ref 136–145)
WBC # BLD AUTO: 5.8 X10*3/UL (ref 4.4–11.3)

## 2025-05-28 PROCEDURE — 80069 RENAL FUNCTION PANEL: CPT | Performed by: PHYSICIAN ASSISTANT

## 2025-05-28 PROCEDURE — 83615 LACTATE (LD) (LDH) ENZYME: CPT | Performed by: STUDENT IN AN ORGANIZED HEALTH CARE EDUCATION/TRAINING PROGRAM

## 2025-05-28 PROCEDURE — 2500000002 HC RX 250 W HCPCS SELF ADMINISTERED DRUGS (ALT 637 FOR MEDICARE OP, ALT 636 FOR OP/ED): Mod: SE | Performed by: REGISTERED NURSE

## 2025-05-28 PROCEDURE — 83735 ASSAY OF MAGNESIUM: CPT | Performed by: STUDENT IN AN ORGANIZED HEALTH CARE EDUCATION/TRAINING PROGRAM

## 2025-05-28 PROCEDURE — 99238 HOSP IP/OBS DSCHRG MGMT 30/<: CPT | Performed by: REGISTERED NURSE

## 2025-05-28 PROCEDURE — RXMED WILLOW AMBULATORY MEDICATION CHARGE

## 2025-05-28 PROCEDURE — 2500000004 HC RX 250 GENERAL PHARMACY W/ HCPCS (ALT 636 FOR OP/ED): Mod: SE | Performed by: NURSE PRACTITIONER

## 2025-05-28 PROCEDURE — 85027 COMPLETE CBC AUTOMATED: CPT | Performed by: PHYSICIAN ASSISTANT

## 2025-05-28 PROCEDURE — 36416 COLLJ CAPILLARY BLOOD SPEC: CPT | Performed by: STUDENT IN AN ORGANIZED HEALTH CARE EDUCATION/TRAINING PROGRAM

## 2025-05-28 PROCEDURE — 2500000001 HC RX 250 WO HCPCS SELF ADMINISTERED DRUGS (ALT 637 FOR MEDICARE OP): Mod: SE | Performed by: STUDENT IN AN ORGANIZED HEALTH CARE EDUCATION/TRAINING PROGRAM

## 2025-05-28 PROCEDURE — G0378 HOSPITAL OBSERVATION PER HR: HCPCS

## 2025-05-28 PROCEDURE — 2500000001 HC RX 250 WO HCPCS SELF ADMINISTERED DRUGS (ALT 637 FOR MEDICARE OP): Mod: SE | Performed by: REGISTERED NURSE

## 2025-05-28 PROCEDURE — 93750 INTERROGATION VAD IN PERSON: CPT | Performed by: REGISTERED NURSE

## 2025-05-28 PROCEDURE — 85610 PROTHROMBIN TIME: CPT | Performed by: STUDENT IN AN ORGANIZED HEALTH CARE EDUCATION/TRAINING PROGRAM

## 2025-05-28 PROCEDURE — 2500000002 HC RX 250 W HCPCS SELF ADMINISTERED DRUGS (ALT 637 FOR MEDICARE OP, ALT 636 FOR OP/ED): Mod: SE | Performed by: NURSE PRACTITIONER

## 2025-05-28 PROCEDURE — 2500000001 HC RX 250 WO HCPCS SELF ADMINISTERED DRUGS (ALT 637 FOR MEDICARE OP): Mod: SE | Performed by: NURSE PRACTITIONER

## 2025-05-28 RX ORDER — TORSEMIDE 20 MG/1
20 TABLET ORAL ONCE
Status: COMPLETED | OUTPATIENT
Start: 2025-05-28 | End: 2025-05-28

## 2025-05-28 RX ORDER — LOSARTAN POTASSIUM 25 MG/1
25 TABLET ORAL DAILY
Qty: 30 TABLET | Refills: 11 | Status: SHIPPED | OUTPATIENT
Start: 2025-05-29 | End: 2026-05-29

## 2025-05-28 RX ORDER — SPIRONOLACTONE 50 MG/1
50 TABLET, FILM COATED ORAL DAILY
Qty: 30 TABLET | Refills: 11 | Status: SHIPPED | OUTPATIENT
Start: 2025-05-29 | End: 2026-05-29

## 2025-05-28 RX ORDER — TORSEMIDE 20 MG/1
40 TABLET ORAL DAILY PRN
Qty: 30 TABLET | Refills: 11 | Status: SHIPPED | OUTPATIENT
Start: 2025-05-28

## 2025-05-28 RX ORDER — TORSEMIDE 20 MG/1
20 TABLET ORAL DAILY
Status: DISCONTINUED | OUTPATIENT
Start: 2025-05-28 | End: 2025-05-28 | Stop reason: HOSPADM

## 2025-05-28 RX ORDER — WARFARIN 2 MG/1
TABLET ORAL
Qty: 30 TABLET | Refills: 3 | Status: SHIPPED | OUTPATIENT
Start: 2025-05-28 | End: 2026-05-28

## 2025-05-28 RX ADMIN — CALCIUM CARBONATE (ANTACID) CHEW TAB 500 MG 2 TABLET: 500 CHEW TAB at 08:57

## 2025-05-28 RX ADMIN — AMIODARONE HYDROCHLORIDE 200 MG: 200 TABLET ORAL at 09:41

## 2025-05-28 RX ADMIN — MAGNESIUM OXIDE TAB 400 MG (241.3 MG ELEMENTAL MG) 1 TABLET: 400 (241.3 MG) TAB at 08:57

## 2025-05-28 RX ADMIN — PANTOPRAZOLE SODIUM 40 MG: 40 TABLET, DELAYED RELEASE ORAL at 06:06

## 2025-05-28 RX ADMIN — SILDENAFIL 20 MG: 20 TABLET, FILM COATED ORAL at 09:41

## 2025-05-28 RX ADMIN — TORSEMIDE 20 MG: 20 TABLET ORAL at 13:43

## 2025-05-28 RX ADMIN — FLUOXETINE HYDROCHLORIDE 40 MG: 40 CAPSULE ORAL at 08:57

## 2025-05-28 RX ADMIN — TORSEMIDE 20 MG: 20 TABLET ORAL at 09:41

## 2025-05-28 RX ADMIN — LEVOTHYROXINE SODIUM 150 MCG: 0.07 TABLET ORAL at 06:06

## 2025-05-28 RX ADMIN — SILDENAFIL 20 MG: 20 TABLET, FILM COATED ORAL at 15:49

## 2025-05-28 RX ADMIN — AMOXICILLIN AND CLAVULANATE POTASSIUM 1 TABLET: 875; 125 TABLET, FILM COATED ORAL at 08:57

## 2025-05-28 RX ADMIN — Medication 50 MCG: at 08:56

## 2025-05-28 RX ADMIN — DIGOXIN 125 MCG: 125 TABLET ORAL at 08:57

## 2025-05-28 RX ADMIN — DAPAGLIFLOZIN 10 MG: 10 TABLET, FILM COATED ORAL at 08:56

## 2025-05-28 RX ADMIN — ICOSAPENT ETHYL 2 G: 1 CAPSULE ORAL at 08:57

## 2025-05-28 RX ADMIN — SPIRONOLACTONE 50 MG: 25 TABLET, FILM COATED ORAL at 08:57

## 2025-05-28 RX ADMIN — LOSARTAN POTASSIUM 25 MG: 25 TABLET, FILM COATED ORAL at 09:41

## 2025-05-28 ASSESSMENT — COGNITIVE AND FUNCTIONAL STATUS - GENERAL
CLIMB 3 TO 5 STEPS WITH RAILING: A LITTLE
MOBILITY SCORE: 23
DAILY ACTIVITIY SCORE: 24

## 2025-05-28 ASSESSMENT — PAIN SCALES - GENERAL: PAINLEVEL_OUTOF10: 0 - NO PAIN

## 2025-05-28 ASSESSMENT — PAIN - FUNCTIONAL ASSESSMENT: PAIN_FUNCTIONAL_ASSESSMENT: 0-10

## 2025-05-28 NOTE — NURSING NOTE
VAD Note   Name:  Delphine Larson  Admission Date:  2025  5:53 PM  MRN: [id number]  Attending Provider: Yoshi Bean MD  Room/Bed:  5007/500-A             Sex: female  : 1960       Age: 65 y.o.    VAD Readmission  Readmission Checklist  Readmission Checklist: Yes  Code Status Reviewed: Yes  Code Status Ordered: Yes  RPM Speed Set Point: 5100  Low Speed Limit: 4600  Anticoagulation Goals Entered: Yes  Event Monitor Checked: Yes  Driveline Secure: Yes  Driveline Site Assessed and Photographed: Yes  Dressing Change Jocy: Weekly    HeartMate Serial Numbers  HeartMate Equipment Tracking/Serial Numbers  Battery Clip 1: [id number]  Battery Clip 2: [id number]  Battery Clip 3: [id number]  Battery Clip 4: [id number]  Rechargeable Battery 1: [id number]  Rechargeable Battery 2: [id number]  Rechargeable Battery 3: [id number]  Rechargeable Battery 4: [id number]  Go Gear Consolidated Bag: yes  Go Gear Vest:  (pt had a bag)  Programmed Backup : [facility]-[id number]  Primary Controller: [facility]-[id number]  Mobile Power Unit: [id number]  Black Emergency Red Tag Bag: yes     HeartMate Tracking  HeartMate Equipment Transfer  Transfer From: [place] [id number]  Transfer To: Discharge home  Battery Charger: No  Battery Clip 1: Yes  Battery Clip 2: Yes  Battery Clip 3: Yes  Battery Clip 4: Yes  Rechargeable Battery 1: Yes  Rechargeable Battery 2: Yes  Rechargeable Battery 3: Yes  Rechargeable Battery 4: Yes  Rechargeable Battery 5: No  Rechargeable Battery 6: No  Rechargeable Battery 7: No  Rechargeable Battery 8: No  Backup Battery 2: No  Go Gear Consolidated Bag: Yes  Go Gear Accessory Kit: No  Go Gear Vest: No  Programmed Backup : Yes  Primary Controller: Yes  Mobile Power Unit: Yes  Black Emergency Red Tag Bag: Yes  Shower Bag: No  Apil Cuff: No  Outflow graft: No  Modular cable: No     HeartWare Serial Numbers        HeartWare Tracking        VAD Discharge  Discharge Documentation  HF Post Discharge Appointment Made:  Yes  INR Referral and Management Arranged: Yes  INR Range: 2-3  AICD On: No  Equipment Checklist/Log Numbers Recorded: Yes  LDH Range Met for Dishcarge: yes  *CORRECTION; AICD On: YES    Educations  Education Documentation  No documentation found.    Patient high limit speed changed this admission to 5300 RPM.    Daljit Rudd RN  Date: 5/28/2025  Time: 5:07 PM

## 2025-05-28 NOTE — DISCHARGE SUMMARY
Discharge Diagnosis  Palpitations    Issues Requiring Follow-Up  Local coumadin clinic follow up   Home PT   VAD clinic follow up     Hospital Course   Delphine Zavala is a very pleasant 65 y.o. 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 Hernandez) 4/17 (course complicated by slow milrinone wean and atrial flutter requiring DCCV) with dc on 5/8. Presented to [facility] in [place] ED with palpitation and shortness of breath. [place] [facility] [place]. Workup benign. Patient underwent RAMP study under ECHO guidance with speed increase from 5100 to 5300. GDMT was optimized (Losartan started and Spironolactone increased). Coumadin decreased d/t supra therapeutic on arrival, pt will follow with AC clinic locally.     Heart Mate III interrogation   Most Recent   Flow 4.8   Speed 5300   Power 3.8   PI 3.5   MAP (mmHg): 78    I personally interrogated the VAD. Interrogation consistent w/ adequate VAD function. No low flows or power spikes.     Medically Ready for Discharge:Ready Now    Pertinent Physical Exam At Time of Discharge  Physical Exam  Constitutional:       General: She is not in acute distress.     Appearance: Normal appearance. She is not ill-appearing, toxic-appearing or diaphoretic.   HENT:      Head: Normocephalic and atraumatic.      Mouth/Throat:      Mouth: Mucous membranes are moist.      Pharynx: Oropharynx is clear.   Eyes:      Extraocular Movements: Extraocular movements intact.      Pupils: Pupils are equal, round, and reactive to light.   Cardiovascular:      Comments: LVAD hum heard on auscultation  No BLE edema   No JVP   Euvolemic on exam      Pulmonary:      Effort: Pulmonary effort is normal. No respiratory distress.      Breath sounds: Normal breath sounds. No stridor. No wheezing or rhonchi.   Abdominal:      General: Abdomen is flat. Bowel sounds are normal. There is no distension.      Palpations: Abdomen is soft. There is no mass.      Tenderness: There is no abdominal tenderness. There is no guarding or rebound.      Hernia: No hernia is present.   Musculoskeletal:         General: Normal range of motion.      Right lower leg: No edema.      Left lower leg: No edema.   Skin:     General: Skin is warm and dry.      Coloration: Skin is not jaundiced.      Findings: No bruising, lesion or rash.   Neurological:      General: No focal deficit present.      Mental Status: She is alert and oriented to person, place, and time. Mental status is at baseline.      Cranial Nerves: No cranial nerve deficit.      Sensory: No sensory deficit.      Motor: No weakness.   Psychiatric:         Mood and Affect: Mood normal.         Behavior: Behavior normal.         Home Medications     Medication List      START taking these medications     losartan 25 mg tablet; Commonly known as: Cozaar; Take 1 tablet (25 mg)   by mouth once daily.; Start taking on: May 29, 2025     CHANGE how you take these medications     spironolactone 50 mg tablet; Commonly known as: Aldactone; Take 1 tablet   (50 mg) by mouth once daily.; Start taking on: May 29, 2025; What changed:   medication strength, how much to take   torsemide 20 mg tablet; Commonly known as: Demadex; Take 2 tablets (40   mg) by mouth once daily as needed (For difficulty breathing or weight gain   > 5 pounds in 24 hours or increased lower extremity swelling.).; What   changed: how much to take   * warfarin 2 mg tablet; Commonly known as: Coumadin; Take one 2mg tablet   (total 2mg) on Saturdays, Sundays, Tuesdays and Thursdays. Take one 2mg   tablet and one 1mg tablet (total 3mg) on Mondays, Wednesdays and Fridays.;    What changed: Another medication with the same name was added. Make sure   you understand how and when to take each.   * warfarin 1 mg tablet; Commonly known as: Coumadin; Take one tablet in   addition to 2 mg tablet (total 3mg) on Mondays, Wednesdays and Fridays. Do   not fill before May 9, 2025.; What changed: Another medication with the   same name was added. Make sure you understand how and when to take each.   * warfarin 2 mg tablet; Commonly known as: Coumadin; Please take 2mg   daily until instructed differently by Coumadin clinic; What changed: You   were already taking a medication with the same name, and this prescription   was added. Make sure you understand how and when to take each.  * This list has 3 medication(s) that are the same as other medications   prescribed for you. Read the directions carefully, and ask your doctor or   other care provider to review them with you.     CONTINUE taking these medications     amiodarone 200 mg tablet; Commonly known as: Pacerone; Take 1 tablet   (200 mg) by mouth once daily.   atorvastatin 80 mg tablet; Commonly known as: Lipitor; Take 1 tablet (80   mg) by mouth once daily at bedtime.   calcium carbonate 500 mg (200 mg elemental) chewable tablet; Commonly   known as: Tums; Chew 2 tablets (1,000 mg) 2 times a day.   cholecalciferol 50 mcg (2,000 units) tablet; Commonly known as: Vitamin   D-3; Take 1 tablet (50 mcg) by mouth once daily.   digoxin 125 MCG tablet; Commonly known as: Lanoxin; Take 1 tablet (125   mcg) by mouth once daily.   Farxiga 10 mg tablet; Generic drug: dapagliflozin propanediol; Take 1   tablet (10 mg) by mouth once daily.   FLUoxetine 40 mg capsule; Commonly known as: PROzac; Take 1 capsule (40   mg) by mouth once daily.   levothyroxine 150 mcg tablet; Commonly known as: Synthroid, Levoxyl;   Take 1 tablet (150 mcg) by mouth early in the morning.. Take on an empty   stomach at the same time each day, either 30 to 60 minutes prior to    breakfast   magnesium oxide 400 mg (241.3 mg elemental) tablet; Commonly known as:   Mag-Ox; Take 1 tablet (400 mg) by mouth 2 times a day.   mirtazapine 15 mg tablet; Commonly known as: Remeron; Take 1 tablet (15   mg) by mouth once daily at bedtime.   omega-3 acid ethyl esters 1 gram capsule; Commonly known as: Lovaza;   Take 2 capsules (2 g) by mouth 2 times a day.   pantoprazole 40 mg EC tablet; Commonly known as: ProtoNix; Take 1 tablet   (40 mg) by mouth once daily in the morning. Take before meals. Do not   crush, chew, or split.   sildenafil 20 mg tablet; Commonly known as: Revatio; Take 1 tablet (20   mg) by mouth 3 times a day.       Outpatient Follow-Up  Future Appointments   Date Time Provider Department Center   6/17/2025 11:30 AM [facility] ECHO 1 [id number] [facility]   6/17/2025  2:00 PM Carolynn Mayes, APRN-CNP [id number] Academic       Carolynn Mayes, JAKY-CNP

## 2025-05-28 NOTE — CARE PLAN
The patient's goals for the shift include Relieve pain and palpitation    The clinical goals for the shift include Patient will have no complaints of shortness of breath this shift.    Pt c/o SOB in the beginning of the shift which subsided. Pt was HDS. MAPS 70-80 this shift.

## 2025-05-28 NOTE — HH CARE COORDINATION
Home Care received a referral for Physical Therapy and Occupational Therapy. Unfortunately, we are unable to accept and process the referral at this time.    Reason:  Patient's Home is Outside of [facility] Service Area    Patients, please reach out to the referring provider or your PCP to assist in obtaining an alternative home care agency and/or guidance to meet your needs.    Providers, please reach out to  Home Care at [phone] with any questions regarding the declined referral.

## 2025-05-30 DIAGNOSIS — Z95.811 LVAD (LEFT VENTRICULAR ASSIST DEVICE) PRESENT (MULTI): ICD-10-CM

## 2025-05-30 LAB
ATRIAL RATE: 102 BPM
P AXIS: 102 DEGREES
P OFFSET: 145 MS
P ONSET: 125 MS
Q ONSET: 203 MS
QRS COUNT: 17 BEATS
QRS DURATION: 124 MS
QT INTERVAL: 418 MS
QTC CALCULATION(BAZETT): 536 MS
QTC FREDERICIA: 493 MS
R AXIS: -71 DEGREES
T AXIS: 106 DEGREES
T OFFSET: 412 MS
VENTRICULAR RATE: 99 BPM

## 2025-06-06 DIAGNOSIS — Z95.811 LVAD (LEFT VENTRICULAR ASSIST DEVICE) PRESENT (MULTI): ICD-10-CM

## 2025-06-13 DIAGNOSIS — Z95.811 LVAD (LEFT VENTRICULAR ASSIST DEVICE) PRESENT (MULTI): ICD-10-CM

## 2025-06-17 ENCOUNTER — APPOINTMENT (OUTPATIENT)
Dept: TRANSPLANT | Facility: HOSPITAL | Age: 65
End: 2025-06-17
Payer: COMMERCIAL

## 2025-06-17 ENCOUNTER — TELEPHONE (OUTPATIENT)
Dept: TRANSPLANT | Facility: HOSPITAL | Age: 65
End: 2025-06-17
Payer: COMMERCIAL

## 2025-06-17 ENCOUNTER — APPOINTMENT (OUTPATIENT)
Dept: CARDIOLOGY | Facility: HOSPITAL | Age: 65
End: 2025-06-17
Payer: COMMERCIAL

## 2025-06-17 NOTE — TELEPHONE ENCOUNTER
Called pt to reschedule cancelled apts. Pt stated she did not have a ride for today. Rescheduled a few weeks out- pt agreeable to new date.     Pt asked if she could get more dressing kits as she will be out before her apt.

## 2025-07-11 NOTE — PROGRESS NOTES
VAD Cardiologist:     VAD Coordinator: Krysten Romero, RN and Kim Watson RN   Primary Care Physician: JAKY Temple-CNP    Patient's Location: [place] [id number]    Date of Visit: 07/14/2025  3:00 PM EDT  Location of visit: [facility]   Type of Visit: LVAD followup    Type of VAD:  HM3   Implant Date: 4/17/25  Reason for VAD: NICM/HFrEF   Intent: Long-Term (elevated PRAs)      HPI / Summary:   Delphine Zavala is a very pleasant 65 y.o. 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              She had a recent hospitalization (02/06/25 - 02/20/25) in the HFICU for swan guided optimization. She was discussed at [facility] and the decision was to pursue an outpatient CPET for further evaluation. Of note her HLAs noted a PRA of 98%. She was approved for LVAD and on 4/17/2025 underwent LVAD HM3 implant and PFO closure w/ Dr. Berger course complicated by slow milrinone wean and a-flutter requiring DCCV, discharged home on 5/8    Final echocardiogram at 5100 RPM  on 4/29/25 shows moderate RV dysfunction, AV opening every beat, septum midline, trace MR and mild TR w/ RVSP 19. No LVAD adjustments made with above echo due to stable flow 4.5-5.0L. From interrogation she appears optimized w/ PI 4, no power spikes, or speed drops. Very few PI events.     She was admitted 5/28- Presented to [facility] in [place] ED with palpitation and shortness of breath. Transferred to [facility] for further management. Workup benign. Patient underwent RAMP study under ECHO guidance with speed increase from 5100 to 5300. GDMT was optimized (Losartan started and Spironolactone increased). Now presents today for post hospital follow up (she cancelled  and rescheduled her visit scheduled for two weeks post hospitalization)    Today:  Patient denies CP, palpitations, dizziness, orthopnea, PND, edema, LVAD alarms, N/V/D, hematochezia, hematuria, epistaxis. INR: 2.8 7/10.     Driveline has visible drainage with pain at site, small amount pale yellow drainage. Dressing was last changed 7/10. Dressing change performed in clinic today and site was swabbed.   VAD equipment interrogated in clinic with all batteries and clips functioning properly. Patient denies sleeping on batteries at home.     Most recent six alarms interrogated from patient controller.     Patient has not completed Cardiac Rehab. She was referred today and wishes to start once her grandson is back in school this fall.     NYHA class at implant - IV.   NYHA class today -  I      ROS: Full 10 pt review of symptoms of negative unless discussed above.     Objective   Medical History:   She has a past medical history of Acute on chronic heart failure with reduced ejection fraction (HFrEF, <= 40%), Adjustment disorder, BMI 35.0-35.9,adult, Generalized anxiety disorder, GERD (gastroesophageal reflux disease), Hypotension, Hypothyroid, Insomnia, Major depression, Obstructive sleep apnea, Paroxysmal A-fib (Multi), Personal history of malignant neoplasm of breast, and Presence of automatic (implantable) cardiac defibrillator.  Surgical Hx:   She has a past surgical history that includes Other surgical history (03/26/2019); Other surgical history (03/26/2019); Other surgical history (03/26/2019); Other surgical history (03/26/2019); Mitral valve replacement (11/2024); Tricuspid valve replacement (11/2024); Mitral valve repair (01/2025); and Cardiac catheterization (N/A, 2/7/2025).   Social Hx:   She reports that she has quit smoking. Her smoking use included cigarettes. She started smoking about 20 years ago. She has a 5.1 pack-year smoking history. She has never used smokeless tobacco. She reports that she does  not currently use alcohol. She reports that she does not currently use drugs.  Family Hx:   Her family history includes Colon cancer in her father.   Exam:   Vitals:       7/14/2025     3:21 PM 5/28/2025    11:22 AM 5/28/2025     7:48 AM 5/28/2025     6:26 AM 5/28/2025     4:00 AM 5/28/2025     3:34 AM   Vitals   BP Location     Right arm    Heart Rate 57 94 94   96   Temp 36.3 °C (97.3 °F) 36.3 °C (97.3 °F) 36.3 °C (97.3 °F)   36.8 °C (98.2 °F)   Resp  18 18   18   Weight (lb) 203   197.31     BMI 37.12 kg/m2   36.08 kg/m2     BSA (m2) 2.01 m2   1.98 m2     Visit Report Report          Wt Readings from Last 5 Encounters:   07/14/25 92.1 kg (203 lb)   05/28/25 89.5 kg (197 lb 5 oz)   05/14/25 88 kg (194 lb)   05/08/25 85.6 kg (188 lb 11.4 oz)   03/14/25 85 kg (187 lb 6.3 oz)     GEN: Pleasant, well-appearing, no acute distress.  HEENT: JVP not elevated, no icterus.   CHEST: Clear to auscultation. Sternotomy well healed.  CV: LVAD hum  ABD: Soft, ND, NT.  Driveline: No drainage. Dressing c/d/I. Secured.  EXT: Warm, well perfused, No LE edema.   NEURO: Pleasant, LEE, Oriented to plan     Labs:   CMP:  Recent Labs     05/28/25  0628 05/27/25  0644 05/26/25  0640 05/25/25  0517 05/24/25  1924 05/08/25  0443 05/07/25  0535    140 141 141 141   < > 140   K 3.9 3.8 4.1 3.4* 4.0   < > 3.7   CL 99 98 102 101 103   < > 99   CO2 30 32 31 28 26   < > 32   ANIONGAP 16 14 12 15 16   < > 13   BUN 15 14 12 9 10   < > 13   CREATININE 1.03 0.97 0.92 0.85 0.73   < > 0.82   EGFR 61 65 70 77 >90   < > 80   MG 2.20 2.17 2.39  --  1.87  1.84  --  2.00    < > = values in this interval not displayed.     Recent Labs     05/28/25  0628 05/27/25  0644 05/26/25  0640 05/25/25  0517 05/24/25  1924 04/30/25  0515 04/29/25  0438 04/27/25  0909 04/27/25  0453 04/26/25  0823 04/25/25  0621 04/24/25  0515   ALBUMIN 4.0 3.9 3.8 4.0 4.0   < > 3.3*   < > 3.1* 3.4 3.2* 3.2*  3.2*   ALKPHOS  --   --   --   --   --   --  245*  --  263* 268* 303*  "322*   ALT  --   --   --   --   --   --  10  --  9 10 11 12   AST  --   --   --   --   --   --  17  --  18 18 21 28   BILITOT  --   --   --   --   --   --  1.1  --  1.2 1.5* 1.7* 2.0*    < > = values in this interval not displayed.     CBC:  Recent Labs     07/14/25  0121 05/28/25  0628 05/27/25  0644 05/26/25  0640 05/25/25  0517   WBC 5.3 5.8 5.1 5.0 4.8   HGB 13.6 11.1* 10.8* 10.4* 10.8*   HCT 43.2 37.0 35.5* 34.5* 36.1    263 263 255 264   MCV 96 96 96 96 97     COAG:   Recent Labs     07/14/25 0121 05/28/25  0628 05/27/25  0644 05/24/25  1924 05/08/25  0443 05/07/25  1032 05/07/25  0535 04/21/25  1536 04/21/25  0215 04/19/25  0120 04/18/25  0234   INR 3.2* 2.3* 2.5*   < > 2.4*  --  2.1*   < > 1.1 1.5* 1.3*   HAUF  --   --   --   --  0.3 0.3 0.3   < >  --   --   --    FIBRINOGEN  --   --   --   --   --   --   --   --  529* 479* 374    < > = values in this interval not displayed.     ABO:   Recent Labs     04/20/25  1406   ABO B     HEME/ENDO:  Recent Labs     07/14/25  0121 04/28/25  0407 04/14/25  1454 02/10/25  1401 02/06/25  2144 11/08/24  1215 09/02/22  0027   FERRITIN  --  557* 189*  --  100  --   --    IRONSAT  --  12* 19*  --  7*  --   --    TSH 3.02  --  0.11* 1.25 0.36*  --   --    HGBA1C  --   --  6.6*  6.6*  --  5.6 5.7 7.0*      CARDIAC:   Recent Labs     07/14/25  0121 05/28/25  0628 05/27/25  0644 05/26/25  0640 05/25/25  0517 05/24/25  1924 05/06/25  0554 05/05/25  0542 04/26/25  0823 04/25/25  0938 04/17/25  1414 04/14/25  1454 02/10/25  1401 02/06/25  2144    196 208 200 214 218  214   < > 180   < >  --    < >  --    < >  --    TROPHS  --   --   --   --   --  22  --   --   --   --   --  9  --  12   BNP  --   --   --   --   --  198*  --  491*  --  213*  --  503*  --  494*    < > = values in this interval not displayed.     Recent Labs     04/14/25  1454 02/06/25  2144   CHOL 146 118   LDLCALC 53 58   HDL 40.8 41.5   TRIG 262* 92     MICRO: No results for input(s): \"ESR\", \"CRP\", " "\"PROCAL\" in the last 83597 hours.  No results found for the last 90 days.      Notable Studies:   EKG:   Recent Labs     05/24/25  1958 05/05/25  1615 04/17/25  1442   VENTRATE 99 73 113   ATRRATE 102 37 0   QTCFRED 493 472 555   QRSDUR 124 122 106   QTCCALCB 536 486 617     Encounter Date: 05/24/25   ECG 12 lead   Result Value    Ventricular Rate 99    Atrial Rate 102    QRS Duration 124    QT Interval 418    QTC Calculation(Bazett) 536    P Axis 102    R Axis -71    T Axis 106    QRS Count 17    Q Onset 203    P Onset 125    P Offset 145    T Offset 412    QTC Fredericia 493    Narrative    Sinus tachycardia with AV dissociation and Wide QRS rhythm  Left axis deviation  Anterolateral infarct , age undetermined  Abnormal ECG  When compared with ECG of 10-APR-2019 14:14,  Wide QRS rhythm has replaced Sinus rhythm  Confirmed by Piero Jefferson ([id number]) on 5/30/2025 10:09:04 AM     Echocardiogram:   Recent Labs     05/27/25  1435 04/29/25  1047 04/17/25  0628 02/07/25  1633   EF 18  --  23 18   LVIDD  --  3.70  --  5.90   RV 33.9 19.6  --  37.3   Transthoracic Echo (TTE) Complete 02/07/2025  CONCLUSIONS:  1. Left ventricular ejection fraction is severely decreased, by visual estimate at 15-20%.  2. There is global hypokinesis of the left ventricle with minor regional variations.  3. Left ventricular cavity size is severely dilated.  4. Abnormal septal motion consistent with post-operative status and right ventricular volume overload.  5. There is severely increased eccentric left ventricular hypertrophy.  6. There is reduced right ventricular systolic function.  7. Moderately enlarged right ventricle.  8. The left atrial size is severely dilated.  9. The right atrium is mild to moderately dilated.  10. Mild to moderate mitral valve regurgitation.  11. S/p POLO. Difficult to accurately assess residual MR. Mean gradient is 5 mmHG at a heart rate of 106 bpm.  12. Status post tricuspid valve repair.  13. Moderate to severe " tricuspid regurgitation visualized.  14. Mildly elevated right ventricular systolic pressure.  15. Mild aortic valve regurgitation.    TTE 4/29/25   1. Poorly visualized anatomical structures due to suboptimal image quality.   2. The left ventricle was not well visualized. The left ventricular ejection fraction could not be measured.   3. Abnormal septal motion consistent with post-operative status.   4. S/p Heartmate 3 LVAD with AV opening every beat. Inflow and outflow cannulae not well seen.   5. There is reduced right ventricular systolic function.   6. S/p mitral POLO ( 2 mitraclips) with possibly trace MR. MV gradients not assessed.   7. Right ventricular systolic pressure is within normal limits.   8. Compared with the prior exam from 4/18/2025 both studies are technically very difficult. The AV did not appear to open on the prior exam but seems to open today with every cardiac cycle. Unable to estimate LVEF in either exam. There was already mild AI on the prior study, similar today.    RHC 02/07/2025    1. HFrEF. NICM  2. S/p uncomlicated RHC with leave in swan in [facility], sutured at 47cm. RIJ access was abandoed due to thrombus noted on US.  3. Elevated right and left sided pressures with preserved cardiac output. (RA 20, RV 49/5 RVEDP 17, PA 52/24 mPAP 38, PCWP 25, co/ci 5.5/2.99 PA sat 62.9%).  4. Follow recommendations per AHF team/Dr Calderon    Cardiopulmonary (Metabolic) Stress Test 03/14/2025  1. Peak oxygen consumption 8.7 ml/kg/min (44% predicted) or 13.3 corrected for lean body mass.  2. Test was submaximal based on RER and V-slope with a very short exercise time making interpretation challenging.  3. Blood pressure response, VO2/work rate and EOUS could not be assessed due to insufficient exercise time, but VE/Vco2 slope was elevated (38).  4. Based on available data and predicted peak VO2, there does not appear to be a severe cardiac limitation but primary reason for exercise cessation could not be  determined.        Current Outpatient Medications   Medication Instructions    amiodarone (PACERONE) 200 mg, oral, Daily    atorvastatin (LIPITOR) 80 mg, oral, Nightly    calcium carbonate (TUMS) 1,000 mg, oral, 2 times daily    cholecalciferol (VITAMIN D-3) 50 mcg, oral, Daily    digoxin (LANOXIN) 125 mcg, oral, Daily    Farxiga 10 mg, oral, Daily    FLUoxetine (PROZAC) 40 mg, oral, Daily    levothyroxine (SYNTHROID, LEVOXYL) 150 mcg, oral, Daily (0630), Take on an empty stomach at the same time each day, either 30 to 60 minutes prior to breakfast    losartan (COZAAR) 25 mg, oral, Daily    magnesium oxide (MAG-OX) 400 mg, oral, 2 times daily    mirtazapine (REMERON) 15 mg, oral, Nightly    omega-3 acid ethyl esters (LOVAZA) 2 g, oral, 2 times daily    pantoprazole (PROTONIX) 40 mg, oral, Daily before breakfast, Do not crush, chew, or split.    sildenafil (REVATIO) 20 mg, oral, 3 times daily    spironolactone (ALDACTONE) 50 mg, oral, Daily    torsemide (DEMADEX) 40 mg, oral, Daily    warfarin (Coumadin) 2 mg tablet Please take 2mg daily until instructed differently by Coumadin clinic          Heart Mate III interrogation (personally interrogated)  LVAD  LVAD Flow (LPM): 4.7 LPM  LVAD Speed: 5300 BPM  LVAD PI: 3.7  LVAD POWER: 3.9  Arterial Extended Pressure Measurements  Arterial Extended Mean Pressure: 86 mmHg      Procedure note for VAD Interrogation:  Procedure: I interrogated the VAD.      IMPRESSION:  Results are c/w adequate VAD function.    Problems:     CARDIAC  Stage D acute on chronic systolic HF/NICM (suspected anthracycline-induced)   S/p Mitral and Tricuspid valve replacements in November 2024 ([facility]) - following by mTEER in January, 2025 2/2 regurgitation   S/p LVAD HM3 implant and PFO closure w/ Dr. Berger on 4/17/25     - TTE on 5/28: Dialated LV, AV opening every beat, mod RV. RPM increased from 5100 to 5300.     GDMT/VAD Care:    BB: metoprolol 25mg ER stopped 2/2 moderate RV and worsening  edema/dyspnea;  would not reintroduce   ARNI/ACEi/ARB: Losartan 25mg (did not tolerate Entresto 2/2 dizziness)   MRA: Spironolactone 50mg daily   SGLT2i: Farxiga 10mg daily   RV support: Sildenafil 20mg TID; Digoxin 125mcg daily Last digoxin level: 1.07 7/14/2025:  1:21 AM  Diuretics: Torsemide daily   AC: Coumadin w/ target INR 2-3  RPM: 5300 Last speed adjustment: 5/28  Barriers to transplant: Elevated PRAs   Driveline/dressing change frequency: Weekly   ABO: B  Relevant labs: LDH: 228 7/14/2025:  1:21 AM INR: 3.2 7/14/2025:  1:21 AM BNP: 198 5/24/2025:  7:24 PM      Post-op Aflutter   Intermittent NSVT  - Cardioverted into NSR on 4/22   - Continue amiodarone 200mg daily     Hyperlipidemia  Continue atorvastatin     SHERRY   - Does not use home CPAP  - PFT's completed in lab (2/12) - FVC: 72% / FEV1: 66% / FEV1/ FVC: 91%   - O2 saturation adequate on RA      GERD  Hx. Colon polyps   Family history (father) Colon CA (patient had yearly colonoscopy until 2023)  - PPI: Pantoprazole 40 mg PO Daily      Hypothyroidism  -C/w Levothyroxine 175 mcg daily (close attention given amiodarone use)  - hgbA1c (4/14): 6.6%     Acute blood loss anemia & iron deficiency   not discharged on iron but H/H were improving at the time of discharge, monitor and consider adding repletion    Drainage at driveline site   - Pale yellow drainage, typically not a concern for infection and can occur if patient is being diuresed   - She presented to urgent care this week who started her on PO doxycycline for 10 days, instructed pt she may finish course   - Site swabbed in clinic today     Follow up: 3 months w/ physician      Orders:  Orders Placed This Encounter   Procedures    Tissue/Wound Culture/Smear    CBC    TSH with reflex to Free T4 if abnormal    Digoxin    Lactate Dehydrogenase    Protime-INR    Referral to Cardiac Rehab      Followup Appts:  Future Appointments   Date Time Provider Department Center   10/8/2025  2:00 PM Bi [facility]  DO [id number]           ____________________________________________________________  DEISY Desir   Section of Advanced Heart Failure and Cardiac Transplantation  Division of Cardiovascular Medicine  [facility]

## 2025-07-14 ENCOUNTER — OFFICE VISIT (OUTPATIENT)
Dept: TRANSPLANT | Facility: HOSPITAL | Age: 65
End: 2025-07-14
Payer: COMMERCIAL

## 2025-07-14 ENCOUNTER — SOCIAL WORK (OUTPATIENT)
Dept: TRANSPLANT | Facility: HOSPITAL | Age: 65
End: 2025-07-14
Payer: COMMERCIAL

## 2025-07-14 ENCOUNTER — LAB (OUTPATIENT)
Dept: LAB | Facility: HOSPITAL | Age: 65
End: 2025-07-14
Payer: COMMERCIAL

## 2025-07-14 ENCOUNTER — APPOINTMENT (OUTPATIENT)
Dept: CARDIOLOGY | Facility: HOSPITAL | Age: 65
End: 2025-07-14
Payer: COMMERCIAL

## 2025-07-14 VITALS — WEIGHT: 203 LBS | OXYGEN SATURATION: 97 % | HEART RATE: 57 BPM | BODY MASS INDEX: 37.12 KG/M2 | TEMPERATURE: 97.3 F

## 2025-07-14 DIAGNOSIS — E03.9 HYPOTHYROIDISM, UNSPECIFIED TYPE: ICD-10-CM

## 2025-07-14 DIAGNOSIS — E03.9 HYPOTHYROIDISM, UNSPECIFIED: Primary | ICD-10-CM

## 2025-07-14 DIAGNOSIS — Z95.811 LVAD (LEFT VENTRICULAR ASSIST DEVICE) PRESENT (MULTI): ICD-10-CM

## 2025-07-14 DIAGNOSIS — K21.9 GASTRO-ESOPHAGEAL REFLUX DISEASE WITHOUT ESOPHAGITIS: ICD-10-CM

## 2025-07-14 DIAGNOSIS — B99.9 INFECTION: ICD-10-CM

## 2025-07-14 DIAGNOSIS — I50.43 ACUTE ON CHRONIC HEART FAILURE WITH REDUCED EJECTION FRACTION AND DIASTOLIC DYSFUNCTION: Primary | ICD-10-CM

## 2025-07-14 DIAGNOSIS — F33.0 MILD EPISODE OF RECURRENT MAJOR DEPRESSIVE DISORDER: ICD-10-CM

## 2025-07-14 DIAGNOSIS — Z79.01 CHRONIC ANTICOAGULATION: ICD-10-CM

## 2025-07-14 DIAGNOSIS — I50.20 ACC/AHA STAGE D SYSTOLIC HEART FAILURE: ICD-10-CM

## 2025-07-14 DIAGNOSIS — G47.00 INSOMNIA, UNSPECIFIED TYPE: ICD-10-CM

## 2025-07-14 DIAGNOSIS — F41.1 GENERALIZED ANXIETY DISORDER: ICD-10-CM

## 2025-07-14 LAB
DIGOXIN SERPL-MCNC: 1.07 NG/ML (ref 0.8–?)
ERYTHROCYTE [DISTWIDTH] IN BLOOD BY AUTOMATED COUNT: 15.5 % (ref 11.5–14.5)
HCT VFR BLD AUTO: 43.2 % (ref 36–46)
HGB BLD-MCNC: 13.6 G/DL (ref 12–16)
INR PPP: 3.2 (ref 0.9–1.1)
LDH SERPL L TO P-CCNC: 228 U/L (ref 84–246)
MCH RBC QN AUTO: 30.1 PG (ref 26–34)
MCHC RBC AUTO-ENTMCNC: 31.5 G/DL (ref 32–36)
MCV RBC AUTO: 96 FL (ref 80–100)
NRBC BLD-RTO: 0 /100 WBCS (ref 0–0)
PLATELET # BLD AUTO: 235 X10*3/UL (ref 150–450)
PROTHROMBIN TIME: 35.1 SECONDS (ref 9.8–12.4)
RBC # BLD AUTO: 4.52 X10*6/UL (ref 4–5.2)
TSH SERPL-ACNC: 3.02 MIU/L (ref 0.44–3.98)
WBC # BLD AUTO: 5.3 X10*3/UL (ref 4.4–11.3)

## 2025-07-14 PROCEDURE — 99215 OFFICE O/P EST HI 40 MIN: CPT | Mod: 25 | Performed by: REGISTERED NURSE

## 2025-07-14 PROCEDURE — 85610 PROTHROMBIN TIME: CPT

## 2025-07-14 PROCEDURE — 80162 ASSAY OF DIGOXIN TOTAL: CPT

## 2025-07-14 PROCEDURE — 4010F ACE/ARB THERAPY RXD/TAKEN: CPT | Performed by: REGISTERED NURSE

## 2025-07-14 PROCEDURE — 85027 COMPLETE CBC AUTOMATED: CPT

## 2025-07-14 PROCEDURE — 93750 INTERROGATION VAD IN PERSON: CPT | Performed by: REGISTERED NURSE

## 2025-07-14 PROCEDURE — 36415 COLL VENOUS BLD VENIPUNCTURE: CPT

## 2025-07-14 PROCEDURE — 99215 OFFICE O/P EST HI 40 MIN: CPT | Performed by: REGISTERED NURSE

## 2025-07-14 PROCEDURE — 83615 LACTATE (LD) (LDH) ENZYME: CPT

## 2025-07-14 PROCEDURE — 87205 SMEAR GRAM STAIN: CPT | Performed by: REGISTERED NURSE

## 2025-07-14 PROCEDURE — 84443 ASSAY THYROID STIM HORMONE: CPT

## 2025-07-14 RX ORDER — TORSEMIDE 20 MG/1
40 TABLET ORAL DAILY
Qty: 30 TABLET | Refills: 11 | Status: SHIPPED | OUTPATIENT
Start: 2025-07-14

## 2025-07-14 RX ORDER — PANTOPRAZOLE SODIUM 40 MG/1
40 TABLET, DELAYED RELEASE ORAL
Qty: 30 TABLET | Refills: 3 | Status: SHIPPED | OUTPATIENT
Start: 2025-07-14

## 2025-07-14 RX ORDER — MIRTAZAPINE 15 MG/1
15 TABLET, FILM COATED ORAL NIGHTLY
Qty: 30 TABLET | Refills: 3 | Status: SHIPPED | OUTPATIENT
Start: 2025-07-14

## 2025-07-14 RX ORDER — SPIRONOLACTONE 50 MG/1
50 TABLET, FILM COATED ORAL DAILY
Qty: 30 TABLET | Refills: 11 | Status: SHIPPED | OUTPATIENT
Start: 2025-07-14 | End: 2026-07-14

## 2025-07-14 RX ORDER — SILDENAFIL CITRATE 20 MG/1
20 TABLET ORAL 3 TIMES DAILY
Qty: 90 TABLET | Refills: 3 | Status: SHIPPED | OUTPATIENT
Start: 2025-07-14

## 2025-07-14 RX ORDER — FLUOXETINE HYDROCHLORIDE 40 MG/1
40 CAPSULE ORAL DAILY
Qty: 30 CAPSULE | Refills: 3 | Status: SHIPPED | OUTPATIENT
Start: 2025-07-14

## 2025-07-14 ASSESSMENT — ANXIETY QUESTIONNAIRES
2. NOT BEING ABLE TO STOP OR CONTROL WORRYING: NOT AT ALL
3. WORRYING TOO MUCH ABOUT DIFFERENT THINGS: NOT AT ALL
GAD7 TOTAL SCORE: 0
6. BECOMING EASILY ANNOYED OR IRRITABLE: NOT AT ALL
5. BEING SO RESTLESS THAT IT IS HARD TO SIT STILL: NOT AT ALL
4. TROUBLE RELAXING: NOT AT ALL
7. FEELING AFRAID AS IF SOMETHING AWFUL MIGHT HAPPEN: NOT AT ALL
1. FEELING NERVOUS, ANXIOUS, OR ON EDGE: NOT AT ALL

## 2025-07-14 ASSESSMENT — KANSAS CITY CARDIOMYOPATHY QUESTIONNAIRE (KCCQ12)
1C. OVER THE PAST 2 WEEKS, HOW MUCH WERE YOU LIMITED BY HEART FAILURE SYMPTOMS (SHORTNESS OF BREATH OR FATIGUE) WHEN HURRYING OR JOGGING (AS IF TO CATCH A BUS): LIMITED FOR OTHER REASONS OF DID NOT DO ACTIVITY
8B. OVER THE PAST 2 WEEKS, ON AVERAGE, HOW HAS HEART FAILURE LIMITED YOU ABILITY TO WORK OR DO HOUSEHOLD CHORES: MODERATELY LIMITED
DID THE PATIENT COMPLETE A KCCQ FORM: YES
8C. OVER THE PAST 2 WEEKS, ON AVERAGE, HOW HAS HEART FAILURE LIMITED YOU ABILITY TO VISIT FAMILY AND FRIENDS OUR OF YOUR HOME: MODERATELY LIMITED
4. OVER THE PAST 2 WEEKS, ON AVERAGE, HOW MANY TIMES HAS SHORTNESS OF BREATH LIMITED YOUR ABILITY TO DO WHAT YOU WANTED: LESS THAN ONCE A WEEK
3. OVER THE PAST 2 WEEKS, ON AVERAGE, HOW MANY TIMES HAS FATIGUE LIMITED YOUR ABILITY TO DO WHAT YOU WANTED: 1-2 TIMES PER WEEK
1B. OVER THE PAST 2 WEEKS, HOW MUCH WERE YOU LIMITED BY HEART FAILURE SYMPTOMS (SHORTNESS OF BREATH OR FATIGUE) WHEN WALKING 1 BLOCK ON LEVEL GROUND: NOT AT ALL LIMITED
5. OVER THE PAST 2 WEEKS, ON AVERAGE, HOW MANY TIMES HAVE YOU BEEN FORCED TO SLEEP SITTING UP IN A CHAIR OR WITH AT LEAST 3 PILLOWS TO PROP YOU UP BECAUSE OF SHORTNESS OF BREATH?: NEVER OVER THE PAST 2 WEEKS
2. OVER THE PAST 2 WEEKS, HOW MANY TIMES DID YOU HAVE SWELLING IN YOUR FEET, ANKLES OR LEGS WHEN YOU WOKE UP IN THE MORNING: NEVER OVER THE PAST 2 WEEKS
1A. OVER THE PAST 2 WEEKS, HOW MUCH WERE YOU LIMITED BY HEART FAILURE SYMPTOMS (SHORTNESS OF BREATH OR FATIGUE) WHEN SHOWERING OR BATHING: NOT AT ALL LIMITED
8A. OVER THE PAST 2 WEEKS, ON AVERAGE, HOW HAS HEART FAILURE LIMITED YOU ABILITY TO DO HOBBIES OR RECREATIONAL ACTIVITIES: MODERATELY LIMITED

## 2025-07-14 ASSESSMENT — PATIENT HEALTH QUESTIONNAIRE - PHQ9
4. FEELING TIRED OR HAVING LITTLE ENERGY: NOT AT ALL
3. TROUBLE FALLING OR STAYING ASLEEP OR SLEEPING TOO MUCH: SEVERAL DAYS
8. MOVING OR SPEAKING SO SLOWLY THAT OTHER PEOPLE COULD HAVE NOTICED. OR THE OPPOSITE, BEING SO FIGETY OR RESTLESS THAT YOU HAVE BEEN MOVING AROUND A LOT MORE THAN USUAL: NOT AT ALL
SUM OF ALL RESPONSES TO PHQ QUESTIONS 1-9: 5
7. TROUBLE CONCENTRATING ON THINGS, SUCH AS READING THE NEWSPAPER OR WATCHING TELEVISION: NOT AT ALL
SUM OF ALL RESPONSES TO PHQ9 QUESTIONS 1 & 2: 2
2. FEELING DOWN, DEPRESSED OR HOPELESS: SEVERAL DAYS
6. FEELING BAD ABOUT YOURSELF - OR THAT YOU ARE A FAILURE OR HAVE LET YOURSELF OR YOUR FAMILY DOWN: SEVERAL DAYS
1. LITTLE INTEREST OR PLEASURE IN DOING THINGS: SEVERAL DAYS
9. THOUGHTS THAT YOU WOULD BE BETTER OFF DEAD, OR OF HURTING YOURSELF: NOT AT ALL
5. POOR APPETITE OR OVEREATING: SEVERAL DAYS

## 2025-07-14 ASSESSMENT — LIFESTYLE VARIABLES
CURRENT_SMOKER_ECIG: NO
CURRENT_SMOKER: NO

## 2025-07-14 ASSESSMENT — PAIN SCALES - GENERAL: PAINLEVEL_OUTOF10: 0-NO PAIN

## 2025-07-14 NOTE — PROGRESS NOTES
SW met with pt in [place] [address] for a follow up appt and to complete Euro[employer]ol and KCCQ-12 questions. Pt was active and engaged during visit. Pt confirmed her information remains the same including address, phone and insurance. Pt shared she has been doing okay at home, continuing to adjust to her LVAD. Pt shared she has tried a few different ways to carry her equipment and feels she has a way that is best for her for now. Pt stated she is wanting to start cardiac rehab and they provide assistance with transportation. Pt reflected briefly on how going up and down stairs she gets out of breath quickly. Pt stated she has been living with her son Josue who is very wonderful and helpful to her. Pt shared she is working on applying for disability and is hiring a  to help with the process. SW provided encouragement and support to pt. Pt was very excited to receive her shower bag today from the vad coordinator. Pt shared she is doing well with her medications, no concerns with her pharmacy at this time. Pt shared she is interested in meeting with others. SW reflected briefly on support group dandre is working towards bringing back for lvad pts. Pt shared she would be interested in attending. SW confirmed with pt she had no additional questions or concerns. SW to follow up with pt routinely.     Bronwyn TAYLOR

## 2025-07-15 ENCOUNTER — TELEPHONE (OUTPATIENT)
Dept: TRANSPLANT | Facility: HOSPITAL | Age: 65
End: 2025-07-15
Payer: COMMERCIAL

## 2025-07-17 ENCOUNTER — TELEPHONE (OUTPATIENT)
Dept: TRANSPLANT | Facility: HOSPITAL | Age: 65
End: 2025-07-17
Payer: COMMERCIAL

## 2025-07-17 LAB
BACTERIA SPEC CULT: NORMAL
GRAM STN SPEC: NORMAL
GRAM STN SPEC: NORMAL

## 2025-07-17 NOTE — TELEPHONE ENCOUNTER
Spoke with Arlene at [facility]'s coumadin clinic. Advised Arlene that patient should be seen at least every two weeks until the patient has been within her INR goal of 2-3 for 3 months then can be seen less frequently. Arlene has place a note in her chart to reflect this.

## 2025-07-17 NOTE — TELEPHONE ENCOUNTER
Received call from pt coumadin clinic requesting call regarding regularity and duration of pt apts. Requested call back from coord.

## 2025-07-30 ENCOUNTER — TELEPHONE (OUTPATIENT)
Dept: CARDIOLOGY | Facility: HOSPITAL | Age: 65
End: 2025-07-30

## 2025-07-30 DIAGNOSIS — Z95.811 LVAD (LEFT VENTRICULAR ASSIST DEVICE) PRESENT (MULTI): ICD-10-CM

## 2025-07-30 DIAGNOSIS — I50.43 ACUTE ON CHRONIC HEART FAILURE WITH REDUCED EJECTION FRACTION AND DIASTOLIC DYSFUNCTION: ICD-10-CM

## 2025-07-30 RX ORDER — LOSARTAN POTASSIUM 25 MG/1
25 TABLET ORAL DAILY
Qty: 30 TABLET | Refills: 11 | Status: SHIPPED | OUTPATIENT
Start: 2025-07-30 | End: 2026-07-30

## 2025-08-22 ENCOUNTER — TELEPHONE (OUTPATIENT)
Dept: TRANSPLANT | Facility: HOSPITAL | Age: 65
End: 2025-08-22
Payer: COMMERCIAL

## 2025-08-24 ENCOUNTER — APPOINTMENT (OUTPATIENT)
Dept: RADIOLOGY | Facility: HOSPITAL | Age: 65
End: 2025-08-24
Payer: COMMERCIAL

## 2025-08-24 ENCOUNTER — HOSPITAL ENCOUNTER (INPATIENT)
Facility: HOSPITAL | Age: 65
End: 2025-08-24
Attending: INTERNAL MEDICINE
Payer: COMMERCIAL

## 2025-08-24 DIAGNOSIS — E87.70 HYPERVOLEMIA: Primary | ICD-10-CM

## 2025-08-24 DIAGNOSIS — Z95.811 LVAD (LEFT VENTRICULAR ASSIST DEVICE) PRESENT (MULTI): ICD-10-CM

## 2025-08-24 LAB
ALBUMIN SERPL BCP-MCNC: 4 G/DL (ref 3.4–5)
ALP SERPL-CCNC: 83 U/L (ref 33–136)
ALT SERPL W P-5'-P-CCNC: 12 U/L (ref 7–45)
ANION GAP SERPL CALC-SCNC: 13 MMOL/L (ref 10–20)
APTT PPP: 38 SECONDS (ref 26–36)
AST SERPL W P-5'-P-CCNC: 12 U/L (ref 9–39)
BASOPHILS # BLD AUTO: 0.03 X10*3/UL (ref 0–0.1)
BASOPHILS NFR BLD AUTO: 0.6 %
BILIRUB SERPL-MCNC: 0.9 MG/DL (ref 0–1.2)
BNP SERPL-MCNC: 152 PG/ML (ref 0–99)
BUN SERPL-MCNC: 14 MG/DL (ref 6–23)
CALCIUM SERPL-MCNC: 9.1 MG/DL (ref 8.6–10.6)
CARDIAC TROPONIN I PNL SERPL HS: 9 NG/L (ref 0–34)
CHLORIDE SERPL-SCNC: 99 MMOL/L (ref 98–107)
CHOLEST SERPL-MCNC: 158 MG/DL (ref 0–199)
CHOLESTEROL/HDL RATIO: 3.1
CO2 SERPL-SCNC: 32 MMOL/L (ref 21–32)
CREAT SERPL-MCNC: 0.93 MG/DL (ref 0.5–1.05)
EGFRCR SERPLBLD CKD-EPI 2021: 69 ML/MIN/1.73M*2
EOSINOPHIL # BLD AUTO: 0.18 X10*3/UL (ref 0–0.7)
EOSINOPHIL NFR BLD AUTO: 3.8 %
ERYTHROCYTE [DISTWIDTH] IN BLOOD BY AUTOMATED COUNT: 16.3 % (ref 11.5–14.5)
EST. AVERAGE GLUCOSE BLD GHB EST-MCNC: 137 MG/DL
FERRITIN SERPL-MCNC: 224 NG/ML (ref 8–150)
FOLATE SERPL-MCNC: 8.1 NG/ML
GLUCOSE SERPL-MCNC: 89 MG/DL (ref 74–99)
HBA1C MFR BLD: 6.4 % (ref ?–5.7)
HCT VFR BLD AUTO: 36 % (ref 36–46)
HDLC SERPL-MCNC: 50.8 MG/DL
HGB BLD-MCNC: 11.3 G/DL (ref 12–16)
IMM GRANULOCYTES # BLD AUTO: 0.04 X10*3/UL (ref 0–0.7)
IMM GRANULOCYTES NFR BLD AUTO: 0.9 % (ref 0–0.9)
INR PPP: 2.1 (ref 0.9–1.1)
IRON SATN MFR SERPL: 22 % (ref 25–45)
IRON SERPL-MCNC: 70 UG/DL (ref 35–150)
LDH SERPL L TO P-CCNC: 230 U/L (ref 84–246)
LDLC SERPL CALC-MCNC: 79 MG/DL
LYMPHOCYTES # BLD AUTO: 0.91 X10*3/UL (ref 1.2–4.8)
LYMPHOCYTES NFR BLD AUTO: 19.4 %
MAGNESIUM SERPL-MCNC: 2.44 MG/DL (ref 1.6–2.4)
MCH RBC QN AUTO: 29.5 PG (ref 26–34)
MCHC RBC AUTO-ENTMCNC: 31.4 G/DL (ref 32–36)
MCV RBC AUTO: 94 FL (ref 80–100)
MONOCYTES # BLD AUTO: 0.36 X10*3/UL (ref 0.1–1)
MONOCYTES NFR BLD AUTO: 7.7 %
NEUTROPHILS # BLD AUTO: 3.17 X10*3/UL (ref 1.2–7.7)
NEUTROPHILS NFR BLD AUTO: 67.6 %
NON HDL CHOLESTEROL: 107 MG/DL (ref 0–149)
NRBC BLD-RTO: 0 /100 WBCS (ref 0–0)
PLATELET # BLD AUTO: 231 X10*3/UL (ref 150–450)
POTASSIUM SERPL-SCNC: 3.5 MMOL/L (ref 3.5–5.3)
PROT SERPL-MCNC: 6.9 G/DL (ref 6.4–8.2)
PROTHROMBIN TIME: 23.2 SECONDS (ref 9.8–12.4)
RBC # BLD AUTO: 3.83 X10*6/UL (ref 4–5.2)
SODIUM SERPL-SCNC: 140 MMOL/L (ref 136–145)
TIBC SERPL-MCNC: 315 UG/DL (ref 240–445)
TRIGL SERPL-MCNC: 140 MG/DL (ref 0–149)
TSH SERPL-ACNC: 1.35 MIU/L (ref 0.44–3.98)
UIBC SERPL-MCNC: 245 UG/DL (ref 110–370)
VIT B12 SERPL-MCNC: 268 PG/ML (ref 211–911)
VLDL: 28 MG/DL (ref 0–40)
WBC # BLD AUTO: 4.7 X10*3/UL (ref 4.4–11.3)

## 2025-08-24 PROCEDURE — 83735 ASSAY OF MAGNESIUM: CPT

## 2025-08-24 PROCEDURE — 83718 ASSAY OF LIPOPROTEIN: CPT

## 2025-08-24 PROCEDURE — 2500000001 HC RX 250 WO HCPCS SELF ADMINISTERED DRUGS (ALT 637 FOR MEDICARE OP): Mod: SE

## 2025-08-24 PROCEDURE — 85610 PROTHROMBIN TIME: CPT

## 2025-08-24 PROCEDURE — 84075 ASSAY ALKALINE PHOSPHATASE: CPT

## 2025-08-24 PROCEDURE — 83540 ASSAY OF IRON: CPT

## 2025-08-24 PROCEDURE — 85025 COMPLETE CBC W/AUTO DIFF WBC: CPT

## 2025-08-24 PROCEDURE — 82728 ASSAY OF FERRITIN: CPT

## 2025-08-24 PROCEDURE — 76937 US GUIDE VASCULAR ACCESS: CPT

## 2025-08-24 PROCEDURE — 84484 ASSAY OF TROPONIN QUANT: CPT

## 2025-08-24 PROCEDURE — 83880 ASSAY OF NATRIURETIC PEPTIDE: CPT

## 2025-08-24 PROCEDURE — 84443 ASSAY THYROID STIM HORMONE: CPT

## 2025-08-24 PROCEDURE — 2500000002 HC RX 250 W HCPCS SELF ADMINISTERED DRUGS (ALT 637 FOR MEDICARE OP, ALT 636 FOR OP/ED): Mod: SE

## 2025-08-24 PROCEDURE — 83615 LACTATE (LD) (LDH) ENZYME: CPT

## 2025-08-24 PROCEDURE — 82607 VITAMIN B-12: CPT

## 2025-08-24 PROCEDURE — 2020000001 HC ICU ROOM DAILY

## 2025-08-24 PROCEDURE — 99291 CRITICAL CARE FIRST HOUR: CPT

## 2025-08-24 PROCEDURE — 82746 ASSAY OF FOLIC ACID SERUM: CPT

## 2025-08-24 PROCEDURE — 83036 HEMOGLOBIN GLYCOSYLATED A1C: CPT

## 2025-08-24 PROCEDURE — 37799 UNLISTED PX VASCULAR SURGERY: CPT

## 2025-08-24 PROCEDURE — 71045 X-RAY EXAM CHEST 1 VIEW: CPT | Performed by: RADIOLOGY

## 2025-08-24 PROCEDURE — 71045 X-RAY EXAM CHEST 1 VIEW: CPT

## 2025-08-24 RX ORDER — PANTOPRAZOLE SODIUM 40 MG/1
40 TABLET, DELAYED RELEASE ORAL
Status: DISCONTINUED | OUTPATIENT
Start: 2025-08-25 | End: 2025-08-26 | Stop reason: HOSPADM

## 2025-08-24 RX ORDER — POLYETHYLENE GLYCOL 3350 17 G/17G
17 POWDER, FOR SOLUTION ORAL DAILY PRN
Status: DISCONTINUED | OUTPATIENT
Start: 2025-08-24 | End: 2025-08-26 | Stop reason: HOSPADM

## 2025-08-24 RX ORDER — POTASSIUM CHLORIDE 20 MEQ/1
40 TABLET, EXTENDED RELEASE ORAL ONCE
Status: COMPLETED | OUTPATIENT
Start: 2025-08-24 | End: 2025-08-24

## 2025-08-24 RX ORDER — FLUOXETINE HYDROCHLORIDE 40 MG/1
40 CAPSULE ORAL DAILY
Status: DISCONTINUED | OUTPATIENT
Start: 2025-08-25 | End: 2025-08-26 | Stop reason: HOSPADM

## 2025-08-24 RX ORDER — ICOSAPENT ETHYL 1 G/1
2 CAPSULE ORAL
Status: DISCONTINUED | OUTPATIENT
Start: 2025-08-24 | End: 2025-08-24

## 2025-08-24 RX ORDER — SILDENAFIL CITRATE 20 MG/1
20 TABLET ORAL 3 TIMES DAILY
Status: DISCONTINUED | OUTPATIENT
Start: 2025-08-24 | End: 2025-08-26 | Stop reason: HOSPADM

## 2025-08-24 RX ORDER — SPIRONOLACTONE 25 MG/1
50 TABLET ORAL DAILY
Status: DISCONTINUED | OUTPATIENT
Start: 2025-08-24 | End: 2025-08-26 | Stop reason: HOSPADM

## 2025-08-24 RX ORDER — AMIODARONE HYDROCHLORIDE 200 MG/1
200 TABLET ORAL DAILY
Status: DISCONTINUED | OUTPATIENT
Start: 2025-08-24 | End: 2025-08-24

## 2025-08-24 RX ORDER — MIRTAZAPINE 15 MG/1
15 TABLET, FILM COATED ORAL NIGHTLY
Status: DISCONTINUED | OUTPATIENT
Start: 2025-08-24 | End: 2025-08-26 | Stop reason: HOSPADM

## 2025-08-24 RX ORDER — AMOXICILLIN 250 MG
2 CAPSULE ORAL NIGHTLY
Status: DISCONTINUED | OUTPATIENT
Start: 2025-08-24 | End: 2025-08-26 | Stop reason: HOSPADM

## 2025-08-24 RX ORDER — DIGOXIN 125 MCG
125 TABLET ORAL DAILY
Status: DISCONTINUED | OUTPATIENT
Start: 2025-08-24 | End: 2025-08-24

## 2025-08-24 RX ORDER — PANTOPRAZOLE SODIUM 40 MG/10ML
40 INJECTION, POWDER, LYOPHILIZED, FOR SOLUTION INTRAVENOUS
Status: DISCONTINUED | OUTPATIENT
Start: 2025-08-25 | End: 2025-08-26 | Stop reason: HOSPADM

## 2025-08-24 RX ORDER — ATORVASTATIN CALCIUM 80 MG/1
80 TABLET, FILM COATED ORAL NIGHTLY
Status: DISCONTINUED | OUTPATIENT
Start: 2025-08-24 | End: 2025-08-26 | Stop reason: HOSPADM

## 2025-08-24 RX ORDER — LOSARTAN POTASSIUM 25 MG/1
25 TABLET ORAL DAILY
Status: DISCONTINUED | OUTPATIENT
Start: 2025-08-24 | End: 2025-08-24

## 2025-08-24 RX ORDER — ESOMEPRAZOLE MAGNESIUM 40 MG/1
40 GRANULE, DELAYED RELEASE ORAL
Status: DISCONTINUED | OUTPATIENT
Start: 2025-08-25 | End: 2025-08-26 | Stop reason: HOSPADM

## 2025-08-24 RX ORDER — LEVOTHYROXINE SODIUM 75 UG/1
150 TABLET ORAL
Status: DISCONTINUED | OUTPATIENT
Start: 2025-08-25 | End: 2025-08-26 | Stop reason: HOSPADM

## 2025-08-24 RX ORDER — TORSEMIDE 20 MG/1
40 TABLET ORAL DAILY
Status: DISCONTINUED | OUTPATIENT
Start: 2025-08-24 | End: 2025-08-24

## 2025-08-24 RX ORDER — ACETAMINOPHEN 325 MG/1
650 TABLET ORAL EVERY 6 HOURS PRN
Status: DISCONTINUED | OUTPATIENT
Start: 2025-08-24 | End: 2025-08-26 | Stop reason: HOSPADM

## 2025-08-24 RX ORDER — WARFARIN 2 MG/1
2 TABLET ORAL DAILY
Status: DISCONTINUED | OUTPATIENT
Start: 2025-08-24 | End: 2025-08-26

## 2025-08-24 RX ORDER — CALCIUM CARBONATE 200(500)MG
2 TABLET,CHEWABLE ORAL 2 TIMES DAILY
Status: DISCONTINUED | OUTPATIENT
Start: 2025-08-24 | End: 2025-08-26 | Stop reason: HOSPADM

## 2025-08-24 RX ORDER — TORSEMIDE 20 MG/1
40 TABLET ORAL DAILY
Status: DISCONTINUED | OUTPATIENT
Start: 2025-08-24 | End: 2025-08-26 | Stop reason: HOSPADM

## 2025-08-24 RX ORDER — FLUOXETINE HYDROCHLORIDE 40 MG/1
40 CAPSULE ORAL DAILY
Status: DISCONTINUED | OUTPATIENT
Start: 2025-08-24 | End: 2025-08-24

## 2025-08-24 RX ORDER — DAPAGLIFLOZIN 10 MG/1
10 TABLET, FILM COATED ORAL DAILY
Status: DISCONTINUED | OUTPATIENT
Start: 2025-08-24 | End: 2025-08-24

## 2025-08-24 RX ORDER — DAPAGLIFLOZIN 10 MG/1
10 TABLET, FILM COATED ORAL DAILY
Status: DISCONTINUED | OUTPATIENT
Start: 2025-08-25 | End: 2025-08-26 | Stop reason: HOSPADM

## 2025-08-24 RX ORDER — LOSARTAN POTASSIUM 25 MG/1
25 TABLET ORAL DAILY
Status: DISCONTINUED | OUTPATIENT
Start: 2025-08-25 | End: 2025-08-26 | Stop reason: HOSPADM

## 2025-08-24 RX ORDER — ICOSAPENT ETHYL 1 G/1
2 CAPSULE ORAL
Status: DISCONTINUED | OUTPATIENT
Start: 2025-08-24 | End: 2025-08-26 | Stop reason: HOSPADM

## 2025-08-24 RX ORDER — LEVOTHYROXINE SODIUM 75 UG/1
150 TABLET ORAL
Status: DISCONTINUED | OUTPATIENT
Start: 2025-08-24 | End: 2025-08-24

## 2025-08-24 RX ORDER — DIGOXIN 125 MCG
125 TABLET ORAL DAILY
Status: DISCONTINUED | OUTPATIENT
Start: 2025-08-25 | End: 2025-08-26 | Stop reason: HOSPADM

## 2025-08-24 RX ORDER — AMIODARONE HYDROCHLORIDE 200 MG/1
200 TABLET ORAL DAILY
Status: DISCONTINUED | OUTPATIENT
Start: 2025-08-25 | End: 2025-08-26 | Stop reason: HOSPADM

## 2025-08-24 RX ADMIN — ICOSAPENT ETHYL 2 G: 1 CAPSULE ORAL at 17:29

## 2025-08-24 RX ADMIN — SILDENAFIL 20 MG: 20 TABLET, FILM COATED ORAL at 15:33

## 2025-08-24 RX ADMIN — ATORVASTATIN CALCIUM 80 MG: 80 TABLET, FILM COATED ORAL at 20:17

## 2025-08-24 RX ADMIN — MIRTAZAPINE 15 MG: 15 TABLET, FILM COATED ORAL at 20:16

## 2025-08-24 RX ADMIN — CALCIUM CARBONATE 2 TABLET: 500 TABLET, CHEWABLE ORAL at 20:16

## 2025-08-24 RX ADMIN — SILDENAFIL 20 MG: 20 TABLET, FILM COATED ORAL at 20:16

## 2025-08-24 RX ADMIN — WARFARIN SODIUM 2 MG: 2 TABLET ORAL at 17:28

## 2025-08-24 RX ADMIN — POTASSIUM CHLORIDE 40 MEQ: 1500 TABLET, EXTENDED RELEASE ORAL at 17:28

## 2025-08-24 RX ADMIN — TORSEMIDE 40 MG: 20 TABLET ORAL at 17:28

## 2025-08-24 SDOH — SOCIAL STABILITY: SOCIAL INSECURITY: WITHIN THE LAST YEAR, HAVE YOU BEEN AFRAID OF YOUR PARTNER OR EX-PARTNER?: PATIENT DECLINED

## 2025-08-24 SDOH — SOCIAL STABILITY: SOCIAL INSECURITY: DO YOU FEEL ANYONE HAS EXPLOITED OR TAKEN ADVANTAGE OF YOU FINANCIALLY OR OF YOUR PERSONAL PROPERTY?: NO

## 2025-08-24 SDOH — ECONOMIC STABILITY: FOOD INSECURITY: HOW HARD IS IT FOR YOU TO PAY FOR THE VERY BASICS LIKE FOOD, HOUSING, MEDICAL CARE, AND HEATING?: PATIENT DECLINED

## 2025-08-24 SDOH — ECONOMIC STABILITY: HOUSING INSECURITY: AT ANY TIME IN THE PAST 12 MONTHS, WERE YOU HOMELESS OR LIVING IN A SHELTER (INCLUDING NOW)?: NO

## 2025-08-24 SDOH — HEALTH STABILITY: PHYSICAL HEALTH: ON AVERAGE, HOW MANY DAYS PER WEEK DO YOU ENGAGE IN MODERATE TO STRENUOUS EXERCISE (LIKE A BRISK WALK)?: 0 DAYS

## 2025-08-24 SDOH — ECONOMIC STABILITY: INCOME INSECURITY
IN THE PAST 12 MONTHS HAS THE ELECTRIC, GAS, OIL, OR WATER COMPANY THREATENED TO SHUT OFF SERVICES IN YOUR HOME?: PATIENT DECLINED

## 2025-08-24 SDOH — ECONOMIC STABILITY: HOUSING INSECURITY: IN THE LAST 12 MONTHS, WAS THERE A TIME WHEN YOU WERE NOT ABLE TO PAY THE MORTGAGE OR RENT ON TIME?: NO

## 2025-08-24 SDOH — SOCIAL STABILITY: SOCIAL INSECURITY: DO YOU FEEL UNSAFE GOING BACK TO THE PLACE WHERE YOU ARE LIVING?: NO

## 2025-08-24 SDOH — SOCIAL STABILITY: SOCIAL INSECURITY
WITHIN THE LAST YEAR, HAVE YOU BEEN HUMILIATED OR EMOTIONALLY ABUSED IN OTHER WAYS BY YOUR PARTNER OR EX-PARTNER?: PATIENT DECLINED

## 2025-08-24 SDOH — ECONOMIC STABILITY: FOOD INSECURITY
WITHIN THE PAST 12 MONTHS, YOU WORRIED THAT YOUR FOOD WOULD RUN OUT BEFORE YOU GOT THE MONEY TO BUY MORE.: PATIENT DECLINED

## 2025-08-24 SDOH — ECONOMIC STABILITY: FOOD INSECURITY: WITHIN THE PAST 12 MONTHS, THE FOOD YOU BOUGHT JUST DIDN'T LAST AND YOU DIDN'T HAVE MONEY TO GET MORE.: PATIENT DECLINED

## 2025-08-24 SDOH — SOCIAL STABILITY: SOCIAL INSECURITY: HAVE YOU HAD THOUGHTS OF HARMING ANYONE ELSE?: NO

## 2025-08-24 SDOH — SOCIAL STABILITY: SOCIAL INSECURITY
WITHIN THE LAST YEAR, HAVE YOU BEEN KICKED, HIT, SLAPPED, OR OTHERWISE PHYSICALLY HURT BY YOUR PARTNER OR EX-PARTNER?: PATIENT DECLINED

## 2025-08-24 SDOH — SOCIAL STABILITY: SOCIAL INSECURITY: ABUSE: ADULT

## 2025-08-24 SDOH — SOCIAL STABILITY: SOCIAL INSECURITY
WITHIN THE LAST YEAR, HAVE YOU BEEN RAPED OR FORCED TO HAVE ANY KIND OF SEXUAL ACTIVITY BY YOUR PARTNER OR EX-PARTNER?: PATIENT DECLINED

## 2025-08-24 SDOH — HEALTH STABILITY: PHYSICAL HEALTH: ON AVERAGE, HOW MANY MINUTES DO YOU ENGAGE IN EXERCISE AT THIS LEVEL?: 0 MIN

## 2025-08-24 SDOH — SOCIAL STABILITY: SOCIAL INSECURITY: HAVE YOU HAD ANY THOUGHTS OF HARMING ANYONE ELSE?: NO

## 2025-08-24 SDOH — SOCIAL STABILITY: SOCIAL INSECURITY: WERE YOU ABLE TO COMPLETE ALL THE BEHAVIORAL HEALTH SCREENINGS?: YES

## 2025-08-24 SDOH — SOCIAL STABILITY: SOCIAL INSECURITY: ARE YOU OR HAVE YOU BEEN THREATENED OR ABUSED PHYSICALLY, EMOTIONALLY, OR SEXUALLY BY ANYONE?: NO

## 2025-08-24 SDOH — SOCIAL STABILITY: SOCIAL INSECURITY: DOES ANYONE TRY TO KEEP YOU FROM HAVING/CONTACTING OTHER FRIENDS OR DOING THINGS OUTSIDE YOUR HOME?: NO

## 2025-08-24 SDOH — ECONOMIC STABILITY: TRANSPORTATION INSECURITY: IN THE PAST 12 MONTHS, HAS LACK OF TRANSPORTATION KEPT YOU FROM MEDICAL APPOINTMENTS OR FROM GETTING MEDICATIONS?: NO

## 2025-08-24 SDOH — SOCIAL STABILITY: SOCIAL INSECURITY: HAS ANYONE EVER THREATENED TO HURT YOUR FAMILY OR YOUR PETS?: NO

## 2025-08-24 SDOH — SOCIAL STABILITY: SOCIAL INSECURITY: ARE THERE ANY APPARENT SIGNS OF INJURIES/BEHAVIORS THAT COULD BE RELATED TO ABUSE/NEGLECT?: NO

## 2025-08-24 ASSESSMENT — PAIN SCALES - GENERAL
PAINLEVEL_OUTOF10: 0 - NO PAIN

## 2025-08-24 ASSESSMENT — COGNITIVE AND FUNCTIONAL STATUS - GENERAL
PATIENT BASELINE BEDBOUND: NO
DAILY ACTIVITIY SCORE: 24
MOBILITY SCORE: 24

## 2025-08-24 ASSESSMENT — ACTIVITIES OF DAILY LIVING (ADL)
FEEDING YOURSELF: INDEPENDENT
HEARING - LEFT EAR: FUNCTIONAL
LACK_OF_TRANSPORTATION: NO
HEARING - RIGHT EAR: FUNCTIONAL
DRESSING YOURSELF: INDEPENDENT
JUDGMENT_ADEQUATE_SAFELY_COMPLETE_DAILY_ACTIVITIES: YES
ADEQUATE_TO_COMPLETE_ADL: YES
WALKS IN HOME: INDEPENDENT
LACK_OF_TRANSPORTATION: NO
TOILETING: INDEPENDENT
GROOMING: INDEPENDENT
ASSISTIVE_DEVICE: DENTURES LOWER;DENTURES UPPER
BATHING: INDEPENDENT
PATIENT'S MEMORY ADEQUATE TO SAFELY COMPLETE DAILY ACTIVITIES?: YES

## 2025-08-24 ASSESSMENT — PAIN - FUNCTIONAL ASSESSMENT
PAIN_FUNCTIONAL_ASSESSMENT: 0-10

## 2025-08-24 ASSESSMENT — LIFESTYLE VARIABLES
PRESCIPTION_ABUSE_PAST_12_MONTHS: NO
AUDIT-C TOTAL SCORE: 0
AUDIT-C TOTAL SCORE: 0
HOW OFTEN DO YOU HAVE 6 OR MORE DRINKS ON ONE OCCASION: NEVER
HOW OFTEN DO YOU HAVE A DRINK CONTAINING ALCOHOL: NEVER
SUBSTANCE_ABUSE_PAST_12_MONTHS: NO
HOW MANY STANDARD DRINKS CONTAINING ALCOHOL DO YOU HAVE ON A TYPICAL DAY: PATIENT DOES NOT DRINK
SKIP TO QUESTIONS 9-10: 1

## 2025-08-24 ASSESSMENT — PATIENT HEALTH QUESTIONNAIRE - PHQ9
SUM OF ALL RESPONSES TO PHQ9 QUESTIONS 1 & 2: 0
2. FEELING DOWN, DEPRESSED OR HOPELESS: NOT AT ALL
1. LITTLE INTEREST OR PLEASURE IN DOING THINGS: NOT AT ALL

## 2025-08-24 ASSESSMENT — ENCOUNTER SYMPTOMS
CONSTITUTIONAL NEGATIVE: 1
RESPIRATORY NEGATIVE: 1
EYES NEGATIVE: 1

## 2025-08-25 ENCOUNTER — APPOINTMENT (OUTPATIENT)
Dept: CARDIOLOGY | Facility: HOSPITAL | Age: 65
End: 2025-08-25
Payer: COMMERCIAL

## 2025-08-25 VITALS
HEART RATE: 84 BPM | BODY MASS INDEX: 38.14 KG/M2 | TEMPERATURE: 98.1 F | OXYGEN SATURATION: 90 % | WEIGHT: 207.23 LBS | RESPIRATION RATE: 19 BRPM | DIASTOLIC BLOOD PRESSURE: 86 MMHG | SYSTOLIC BLOOD PRESSURE: 86 MMHG | HEIGHT: 62 IN

## 2025-08-25 LAB
ALBUMIN SERPL BCP-MCNC: 4.2 G/DL (ref 3.4–5)
ANION GAP SERPL CALC-SCNC: 14 MMOL/L (ref 10–20)
APPEARANCE UR: CLEAR
APTT PPP: 39 SECONDS (ref 26–36)
BILIRUB UR STRIP.AUTO-MCNC: NEGATIVE MG/DL
BUN SERPL-MCNC: 13 MG/DL (ref 6–23)
CALCIUM SERPL-MCNC: 8.9 MG/DL (ref 8.6–10.6)
CHLORIDE SERPL-SCNC: 99 MMOL/L (ref 98–107)
CO2 SERPL-SCNC: 32 MMOL/L (ref 21–32)
COLOR UR: COLORLESS
CREAT SERPL-MCNC: 0.97 MG/DL (ref 0.5–1.05)
EGFRCR SERPLBLD CKD-EPI 2021: 65 ML/MIN/1.73M*2
EJECTION FRACTION: 23 %
ERYTHROCYTE [DISTWIDTH] IN BLOOD BY AUTOMATED COUNT: 16.8 % (ref 11.5–14.5)
GLUCOSE SERPL-MCNC: 122 MG/DL (ref 74–99)
GLUCOSE UR STRIP.AUTO-MCNC: ABNORMAL MG/DL
HCT VFR BLD AUTO: 35.3 % (ref 36–46)
HGB BLD-MCNC: 11.6 G/DL (ref 12–16)
INR PPP: 2.2 (ref 0.9–1.1)
KETONES UR STRIP.AUTO-MCNC: NEGATIVE MG/DL
LDH SERPL L TO P-CCNC: 240 U/L (ref 84–246)
LEFT VENTRICLE INTERNAL DIMENSION DIASTOLE: 5.2 CM (ref 3.5–6)
LEFT VENTRICULAR OUTFLOW TRACT DIAMETER: 2 CM
LEUKOCYTE ESTERASE UR QL STRIP.AUTO: NEGATIVE
MAGNESIUM SERPL-MCNC: 2.44 MG/DL (ref 1.6–2.4)
MCH RBC QN AUTO: 29.7 PG (ref 26–34)
MCHC RBC AUTO-ENTMCNC: 32.9 G/DL (ref 32–36)
MCV RBC AUTO: 90 FL (ref 80–100)
NITRITE UR QL STRIP.AUTO: NEGATIVE
NRBC BLD-RTO: 0 /100 WBCS (ref 0–0)
PH UR STRIP.AUTO: 7.5 [PH]
PHOSPHATE SERPL-MCNC: 3.1 MG/DL (ref 2.5–4.9)
PLATELET # BLD AUTO: 226 X10*3/UL (ref 150–450)
POTASSIUM SERPL-SCNC: 3.8 MMOL/L (ref 3.5–5.3)
PROT UR STRIP.AUTO-MCNC: NEGATIVE MG/DL
PROTHROMBIN TIME: 24.4 SECONDS (ref 9.8–12.4)
RBC # BLD AUTO: 3.91 X10*6/UL (ref 4–5.2)
RBC # UR STRIP.AUTO: NEGATIVE MG/DL
RIGHT VENTRICLE FREE WALL PEAK S': 6.53 CM/S
RIGHT VENTRICLE PEAK SYSTOLIC PRESSURE: 27 MMHG
SODIUM SERPL-SCNC: 141 MMOL/L (ref 136–145)
SP GR UR STRIP.AUTO: 1.01
TRICUSPID ANNULAR PLANE SYSTOLIC EXCURSION: 1.3 CM
UROBILINOGEN UR STRIP.AUTO-MCNC: NORMAL MG/DL
WBC # BLD AUTO: 5.6 X10*3/UL (ref 4.4–11.3)

## 2025-08-25 PROCEDURE — 83735 ASSAY OF MAGNESIUM: CPT

## 2025-08-25 PROCEDURE — 1100000001 HC PRIVATE ROOM DAILY

## 2025-08-25 PROCEDURE — 80069 RENAL FUNCTION PANEL: CPT

## 2025-08-25 PROCEDURE — 83615 LACTATE (LD) (LDH) ENZYME: CPT

## 2025-08-25 PROCEDURE — 2500000004 HC RX 250 GENERAL PHARMACY W/ HCPCS (ALT 636 FOR OP/ED): Mod: SE

## 2025-08-25 PROCEDURE — 99291 CRITICAL CARE FIRST HOUR: CPT | Performed by: INTERNAL MEDICINE

## 2025-08-25 PROCEDURE — 4B02XTZ MEASUREMENT OF CARDIAC DEFIBRILLATOR, EXTERNAL APPROACH: ICD-10-PCS | Performed by: INTERNAL MEDICINE

## 2025-08-25 PROCEDURE — 37799 UNLISTED PX VASCULAR SURGERY: CPT

## 2025-08-25 PROCEDURE — 81003 URINALYSIS AUTO W/O SCOPE: CPT

## 2025-08-25 PROCEDURE — 2500000002 HC RX 250 W HCPCS SELF ADMINISTERED DRUGS (ALT 637 FOR MEDICARE OP, ALT 636 FOR OP/ED): Mod: SE

## 2025-08-25 PROCEDURE — 85027 COMPLETE CBC AUTOMATED: CPT

## 2025-08-25 PROCEDURE — 99291 CRITICAL CARE FIRST HOUR: CPT

## 2025-08-25 PROCEDURE — C8929 TTE W OR WO FOL WCON,DOPPLER: HCPCS

## 2025-08-25 PROCEDURE — 93750 INTERROGATION VAD IN PERSON: CPT | Performed by: INTERNAL MEDICINE

## 2025-08-25 PROCEDURE — 85610 PROTHROMBIN TIME: CPT

## 2025-08-25 PROCEDURE — 93306 TTE W/DOPPLER COMPLETE: CPT | Performed by: INTERNAL MEDICINE

## 2025-08-25 PROCEDURE — 2500000001 HC RX 250 WO HCPCS SELF ADMINISTERED DRUGS (ALT 637 FOR MEDICARE OP): Mod: SE

## 2025-08-25 RX ORDER — POTASSIUM CHLORIDE 20 MEQ/1
20 TABLET, EXTENDED RELEASE ORAL ONCE
Status: COMPLETED | OUTPATIENT
Start: 2025-08-25 | End: 2025-08-25

## 2025-08-25 RX ORDER — POTASSIUM CHLORIDE 20 MEQ/1
10 TABLET, EXTENDED RELEASE ORAL DAILY
COMMUNITY
End: 2025-08-26 | Stop reason: HOSPADM

## 2025-08-25 RX ORDER — METOLAZONE 2.5 MG/1
2.5 TABLET ORAL
COMMUNITY
End: 2025-08-26 | Stop reason: HOSPADM

## 2025-08-25 RX ORDER — ACETAMINOPHEN 500 MG
500 TABLET ORAL EVERY 6 HOURS PRN
COMMUNITY
End: 2025-08-26 | Stop reason: HOSPADM

## 2025-08-25 RX ADMIN — SILDENAFIL 20 MG: 20 TABLET, FILM COATED ORAL at 14:00

## 2025-08-25 RX ADMIN — ICOSAPENT ETHYL 2 G: 1 CAPSULE ORAL at 17:27

## 2025-08-25 RX ADMIN — SILDENAFIL 20 MG: 20 TABLET, FILM COATED ORAL at 08:25

## 2025-08-25 RX ADMIN — SILDENAFIL 20 MG: 20 TABLET, FILM COATED ORAL at 20:05

## 2025-08-25 RX ADMIN — DAPAGLIFLOZIN 10 MG: 10 TABLET, FILM COATED ORAL at 08:25

## 2025-08-25 RX ADMIN — ICOSAPENT ETHYL 2 G: 1 CAPSULE ORAL at 08:26

## 2025-08-25 RX ADMIN — TORSEMIDE 40 MG: 20 TABLET ORAL at 08:26

## 2025-08-25 RX ADMIN — CALCIUM CARBONATE 2 TABLET: 500 TABLET, CHEWABLE ORAL at 08:25

## 2025-08-25 RX ADMIN — CALCIUM CARBONATE 2 TABLET: 500 TABLET, CHEWABLE ORAL at 20:05

## 2025-08-25 RX ADMIN — FLUOXETINE HYDROCHLORIDE 40 MG: 40 CAPSULE ORAL at 08:25

## 2025-08-25 RX ADMIN — PANTOPRAZOLE SODIUM 40 MG: 40 TABLET, DELAYED RELEASE ORAL at 08:24

## 2025-08-25 RX ADMIN — WARFARIN SODIUM 2 MG: 2 TABLET ORAL at 17:27

## 2025-08-25 RX ADMIN — DIGOXIN 125 MCG: 125 TABLET ORAL at 08:25

## 2025-08-25 RX ADMIN — ATORVASTATIN CALCIUM 80 MG: 80 TABLET, FILM COATED ORAL at 20:05

## 2025-08-25 RX ADMIN — HUMAN ALBUMIN MICROSPHERES AND PERFLUTREN 0.5 ML: 10; .22 INJECTION, SOLUTION INTRAVENOUS at 14:40

## 2025-08-25 RX ADMIN — SPIRONOLACTONE 50 MG: 25 TABLET, FILM COATED ORAL at 08:25

## 2025-08-25 RX ADMIN — POTASSIUM CHLORIDE 20 MEQ: 1500 TABLET, EXTENDED RELEASE ORAL at 08:24

## 2025-08-25 RX ADMIN — AMIODARONE HYDROCHLORIDE 200 MG: 200 TABLET ORAL at 08:25

## 2025-08-25 RX ADMIN — LOSARTAN POTASSIUM 25 MG: 25 TABLET, FILM COATED ORAL at 08:26

## 2025-08-25 RX ADMIN — LEVOTHYROXINE SODIUM 150 MCG: 0.07 TABLET ORAL at 05:42

## 2025-08-25 RX ADMIN — MIRTAZAPINE 15 MG: 15 TABLET, FILM COATED ORAL at 20:05

## 2025-08-25 ASSESSMENT — COGNITIVE AND FUNCTIONAL STATUS - GENERAL
DAILY ACTIVITIY SCORE: 24
MOBILITY SCORE: 24

## 2025-08-25 ASSESSMENT — PAIN SCALES - GENERAL
PAINLEVEL_OUTOF10: 0 - NO PAIN

## 2025-08-25 ASSESSMENT — PAIN - FUNCTIONAL ASSESSMENT
PAIN_FUNCTIONAL_ASSESSMENT: 0-10

## 2025-08-26 ENCOUNTER — APPOINTMENT (OUTPATIENT)
Dept: CARDIOLOGY | Facility: HOSPITAL | Age: 65
End: 2025-08-26
Payer: COMMERCIAL

## 2025-08-26 VITALS
RESPIRATION RATE: 18 BRPM | TEMPERATURE: 97.2 F | WEIGHT: 208.34 LBS | SYSTOLIC BLOOD PRESSURE: 94 MMHG | BODY MASS INDEX: 38.34 KG/M2 | DIASTOLIC BLOOD PRESSURE: 66 MMHG | OXYGEN SATURATION: 95 % | HEIGHT: 62 IN | HEART RATE: 85 BPM

## 2025-08-26 PROBLEM — E87.70 HYPERVOLEMIA: Status: RESOLVED | Noted: 2025-08-24 | Resolved: 2025-08-26

## 2025-08-26 LAB
ALBUMIN SERPL BCP-MCNC: 4 G/DL (ref 3.4–5)
ANION GAP SERPL CALC-SCNC: 14 MMOL/L (ref 10–20)
APTT PPP: 44 SECONDS (ref 26–36)
BUN SERPL-MCNC: 17 MG/DL (ref 6–23)
CALCIUM SERPL-MCNC: 9.1 MG/DL (ref 8.6–10.6)
CHLORIDE SERPL-SCNC: 97 MMOL/L (ref 98–107)
CO2 SERPL-SCNC: 33 MMOL/L (ref 21–32)
CREAT SERPL-MCNC: 1.2 MG/DL (ref 0.5–1.05)
DIGOXIN SERPL-MCNC: 0.98 NG/ML (ref 0.8–?)
EGFRCR SERPLBLD CKD-EPI 2021: 51 ML/MIN/1.73M*2
ERYTHROCYTE [DISTWIDTH] IN BLOOD BY AUTOMATED COUNT: 17.1 % (ref 11.5–14.5)
GLUCOSE SERPL-MCNC: 117 MG/DL (ref 74–99)
HCT VFR BLD AUTO: 36.3 % (ref 36–46)
HGB BLD-MCNC: 11.3 G/DL (ref 12–16)
HOLD SPECIMEN: NORMAL
INR PPP: 2.8 (ref 0.9–1.1)
LDH SERPL L TO P-CCNC: 231 U/L (ref 84–246)
MAGNESIUM SERPL-MCNC: 2.36 MG/DL (ref 1.6–2.4)
MCH RBC QN AUTO: 29.5 PG (ref 26–34)
MCHC RBC AUTO-ENTMCNC: 31.1 G/DL (ref 32–36)
MCV RBC AUTO: 95 FL (ref 80–100)
NRBC BLD-RTO: 0 /100 WBCS (ref 0–0)
PHOSPHATE SERPL-MCNC: 4.3 MG/DL (ref 2.5–4.9)
PLATELET # BLD AUTO: 229 X10*3/UL (ref 150–450)
POTASSIUM SERPL-SCNC: 4 MMOL/L (ref 3.5–5.3)
PROTHROMBIN TIME: 30.7 SECONDS (ref 9.8–12.4)
RBC # BLD AUTO: 3.83 X10*6/UL (ref 4–5.2)
SODIUM SERPL-SCNC: 140 MMOL/L (ref 136–145)
WBC # BLD AUTO: 4.9 X10*3/UL (ref 4.4–11.3)

## 2025-08-26 PROCEDURE — 83735 ASSAY OF MAGNESIUM: CPT | Performed by: STUDENT IN AN ORGANIZED HEALTH CARE EDUCATION/TRAINING PROGRAM

## 2025-08-26 PROCEDURE — 93010 ELECTROCARDIOGRAM REPORT: CPT | Performed by: STUDENT IN AN ORGANIZED HEALTH CARE EDUCATION/TRAINING PROGRAM

## 2025-08-26 PROCEDURE — 80069 RENAL FUNCTION PANEL: CPT | Performed by: STUDENT IN AN ORGANIZED HEALTH CARE EDUCATION/TRAINING PROGRAM

## 2025-08-26 PROCEDURE — 2500000001 HC RX 250 WO HCPCS SELF ADMINISTERED DRUGS (ALT 637 FOR MEDICARE OP): Mod: SE | Performed by: STUDENT IN AN ORGANIZED HEALTH CARE EDUCATION/TRAINING PROGRAM

## 2025-08-26 PROCEDURE — 2500000002 HC RX 250 W HCPCS SELF ADMINISTERED DRUGS (ALT 637 FOR MEDICARE OP, ALT 636 FOR OP/ED): Mod: SE | Performed by: STUDENT IN AN ORGANIZED HEALTH CARE EDUCATION/TRAINING PROGRAM

## 2025-08-26 PROCEDURE — 85610 PROTHROMBIN TIME: CPT | Performed by: STUDENT IN AN ORGANIZED HEALTH CARE EDUCATION/TRAINING PROGRAM

## 2025-08-26 PROCEDURE — 97165 OT EVAL LOW COMPLEX 30 MIN: CPT | Mod: GO

## 2025-08-26 PROCEDURE — 99238 HOSP IP/OBS DSCHRG MGMT 30/<: CPT | Performed by: REGISTERED NURSE

## 2025-08-26 PROCEDURE — 80162 ASSAY OF DIGOXIN TOTAL: CPT | Performed by: STUDENT IN AN ORGANIZED HEALTH CARE EDUCATION/TRAINING PROGRAM

## 2025-08-26 PROCEDURE — 2500000002 HC RX 250 W HCPCS SELF ADMINISTERED DRUGS (ALT 637 FOR MEDICARE OP, ALT 636 FOR OP/ED): Mod: SE | Performed by: REGISTERED NURSE

## 2025-08-26 PROCEDURE — 85027 COMPLETE CBC AUTOMATED: CPT | Performed by: STUDENT IN AN ORGANIZED HEALTH CARE EDUCATION/TRAINING PROGRAM

## 2025-08-26 PROCEDURE — 93005 ELECTROCARDIOGRAM TRACING: CPT

## 2025-08-26 PROCEDURE — 83615 LACTATE (LD) (LDH) ENZYME: CPT | Performed by: STUDENT IN AN ORGANIZED HEALTH CARE EDUCATION/TRAINING PROGRAM

## 2025-08-26 PROCEDURE — 97161 PT EVAL LOW COMPLEX 20 MIN: CPT | Mod: GP

## 2025-08-26 PROCEDURE — 93750 INTERROGATION VAD IN PERSON: CPT | Performed by: REGISTERED NURSE

## 2025-08-26 RX ORDER — WARFARIN 1 MG/1
1 TABLET ORAL DAILY
Status: DISCONTINUED | OUTPATIENT
Start: 2025-08-26 | End: 2025-08-26 | Stop reason: HOSPADM

## 2025-08-26 RX ADMIN — ICOSAPENT ETHYL 2 G: 1 CAPSULE ORAL at 08:57

## 2025-08-26 RX ADMIN — ICOSAPENT ETHYL 2 G: 1 CAPSULE ORAL at 15:44

## 2025-08-26 RX ADMIN — DIGOXIN 125 MCG: 125 TABLET ORAL at 08:07

## 2025-08-26 RX ADMIN — SILDENAFIL 20 MG: 20 TABLET, FILM COATED ORAL at 15:43

## 2025-08-26 RX ADMIN — FLUOXETINE HYDROCHLORIDE 40 MG: 40 CAPSULE ORAL at 08:57

## 2025-08-26 RX ADMIN — DAPAGLIFLOZIN 10 MG: 10 TABLET, FILM COATED ORAL at 08:07

## 2025-08-26 RX ADMIN — PANTOPRAZOLE SODIUM 40 MG: 40 TABLET, DELAYED RELEASE ORAL at 06:02

## 2025-08-26 RX ADMIN — WARFARIN SODIUM 1 MG: 1 TABLET ORAL at 16:57

## 2025-08-26 RX ADMIN — LEVOTHYROXINE SODIUM 150 MCG: 0.07 TABLET ORAL at 06:02

## 2025-08-26 RX ADMIN — CALCIUM CARBONATE 2 TABLET: 500 TABLET, CHEWABLE ORAL at 08:08

## 2025-08-26 RX ADMIN — SILDENAFIL 20 MG: 20 TABLET, FILM COATED ORAL at 08:07

## 2025-08-26 RX ADMIN — SPIRONOLACTONE 50 MG: 25 TABLET, FILM COATED ORAL at 08:08

## 2025-08-26 RX ADMIN — TORSEMIDE 40 MG: 20 TABLET ORAL at 08:08

## 2025-08-26 RX ADMIN — AMIODARONE HYDROCHLORIDE 200 MG: 200 TABLET ORAL at 08:07

## 2025-08-26 SDOH — ECONOMIC STABILITY: HOUSING INSECURITY: IN THE PAST 12 MONTHS, HOW MANY TIMES HAVE YOU MOVED WHERE YOU WERE LIVING?: 1

## 2025-08-26 SDOH — SOCIAL STABILITY: SOCIAL INSECURITY: WITHIN THE LAST YEAR, HAVE YOU BEEN AFRAID OF YOUR PARTNER OR EX-PARTNER?: PATIENT DECLINED

## 2025-08-26 SDOH — ECONOMIC STABILITY: FOOD INSECURITY: HOW HARD IS IT FOR YOU TO PAY FOR THE VERY BASICS LIKE FOOD, HOUSING, MEDICAL CARE, AND HEATING?: PATIENT DECLINED

## 2025-08-26 SDOH — ECONOMIC STABILITY: TRANSPORTATION INSECURITY: IN THE PAST 12 MONTHS, HAS LACK OF TRANSPORTATION KEPT YOU FROM MEDICAL APPOINTMENTS OR FROM GETTING MEDICATIONS?: NO

## 2025-08-26 SDOH — ECONOMIC STABILITY: HOUSING INSECURITY: IN THE LAST 12 MONTHS, WAS THERE A TIME WHEN YOU WERE NOT ABLE TO PAY THE MORTGAGE OR RENT ON TIME?: NO

## 2025-08-26 SDOH — ECONOMIC STABILITY: HOUSING INSECURITY: AT ANY TIME IN THE PAST 12 MONTHS, WERE YOU HOMELESS OR LIVING IN A SHELTER (INCLUDING NOW)?: PATIENT DECLINED

## 2025-08-26 SDOH — ECONOMIC STABILITY: FOOD INSECURITY: WITHIN THE PAST 12 MONTHS, THE FOOD YOU BOUGHT JUST DIDN'T LAST AND YOU DIDN'T HAVE MONEY TO GET MORE.: PATIENT DECLINED

## 2025-08-26 ASSESSMENT — PAIN - FUNCTIONAL ASSESSMENT
PAIN_FUNCTIONAL_ASSESSMENT: 0-10

## 2025-08-26 ASSESSMENT — ACTIVITIES OF DAILY LIVING (ADL)
ADL_ASSISTANCE: INDEPENDENT
LACK_OF_TRANSPORTATION: NO
BATHING_ASSISTANCE: MODIFIED INDEPENDENT (DEVICE)
ADL_ASSISTANCE: INDEPENDENT

## 2025-08-26 ASSESSMENT — COGNITIVE AND FUNCTIONAL STATUS - GENERAL
HELP NEEDED FOR BATHING: A LITTLE
TURNING FROM BACK TO SIDE WHILE IN FLAT BAD: A LITTLE
STANDING UP FROM CHAIR USING ARMS: A LITTLE
MOVING TO AND FROM BED TO CHAIR: A LITTLE
DAILY ACTIVITIY SCORE: 21
MOBILITY SCORE: 19
DRESSING REGULAR LOWER BODY CLOTHING: A LITTLE
CLIMB 3 TO 5 STEPS WITH RAILING: A LITTLE
DAILY ACTIVITIY SCORE: 24
WALKING IN HOSPITAL ROOM: A LITTLE
MOBILITY SCORE: 24
TOILETING: A LITTLE

## 2025-08-26 ASSESSMENT — PAIN SCALES - GENERAL
PAINLEVEL_OUTOF10: 0 - NO PAIN

## 2025-08-27 ENCOUNTER — TELEPHONE (OUTPATIENT)
Dept: TRANSPLANT | Facility: HOSPITAL | Age: 65
End: 2025-08-27
Payer: COMMERCIAL

## 2025-08-29 LAB
P OFFSET: 135 MS
P ONSET: 115 MS
Q ONSET: 185 MS
QRS COUNT: 14 BEATS
QRS DURATION: 154 MS
QT INTERVAL: 500 MS
QTC CALCULATION(BAZETT): 587 MS
QTC FREDERICIA: 557 MS
R AXIS: 229 DEGREES
T AXIS: 124 DEGREES
T OFFSET: 435 MS
VENTRICULAR RATE: 83 BPM

## (undated) DEVICE — SOLUTION IV IRRIG WATER 1000ML POUR BRL 2F7114

## (undated) DEVICE — SOLUTION IV 1000ML 0.45% SOD CHL PH 5 INJ USP VIAFLX PLAS

## (undated) DEVICE — BANDAGE,GAUZE,CONFORMING,3"X75",STRL,LF: Brand: MEDLINE

## (undated) DEVICE — POSITIONER HD W8XH4XL8.5IN RASPBERRY FOAM SLT

## (undated) DEVICE — LINER SUCT CANSTR 1500CC SEMI RIG W/ POR HYDROPHOBIC SHUT

## (undated) DEVICE — 4-PORT MANIFOLD: Brand: NEPTUNE 2

## (undated) DEVICE — APPLICATOR MEDICATED 26 CC SOLUTION HI LT ORNG CHLORAPREP

## (undated) DEVICE — GLOVE ORANGE PI 7 1/2   MSG9075

## (undated) DEVICE — IV START KIT: Brand: MEDLINE INDUSTRIES, INC.

## (undated) DEVICE — 1010 S-DRAPE TOWEL DRAPE 10/BX: Brand: STERI-DRAPE™

## (undated) DEVICE — SET ADMIN 25ML L117IN PMP MOD CK VLV RLER CLMP 2 SMRTSITE

## (undated) DEVICE — DRAPE C ARM W36XL30IN RECTANG BND BG AND TAPE

## (undated) DEVICE — BANDAGE COMPR W2XL5YD E SWIFT WRP VELC HK AND LOOP CLSR

## (undated) DEVICE — SET LNR RED GRN W/ BASE CLEANASCOPE

## (undated) DEVICE — GARMENT,MEDLINE,DVT,INT,CALF,MED, GEN2: Brand: MEDLINE

## (undated) DEVICE — PADDING,UNDERCAST,COTTON, 4"X4YD STERILE: Brand: MEDLINE

## (undated) DEVICE — SUTURE VCRL SZ 2-0 L27IN ABSRB UD L26MM SH 1/2 CIR J417H

## (undated) DEVICE — GLOVE ORANGE PI 7   MSG9070

## (undated) DEVICE — TUBING IV STOPCOCK 48 CM 3 W

## (undated) DEVICE — KIT INF CTRL 2OZ LUB TBNG L12FT DBL END BRSH SYR OP4

## (undated) DEVICE — YANKAUER,BULB TIP,W/O VENT,RIGID,STERILE: Brand: MEDLINE

## (undated) DEVICE — PAD,ABDOMINAL,5"X9",ST,LF,25/BX: Brand: MEDLINE INDUSTRIES, INC.

## (undated) DEVICE — TUBING, SUCTION, 1/4" X 20', STRAIGHT: Brand: MEDLINE INDUSTRIES, INC.

## (undated) DEVICE — CORD ES L12FT BPLR FRCP

## (undated) DEVICE — BLADE OPHTH ORNG GRINDLESS SMALLER ALTERNATIVE TO NO15 GEN

## (undated) DEVICE — GOWN,SIRUS,NON REINFRCD,LARGE,SET IN SL: Brand: MEDLINE

## (undated) DEVICE — GLOVE SURG SZ 65 THK91MIL LTX FREE SYN POLYISOPRENE

## (undated) DEVICE — BANDAGE COMPR M W4INXL10YD WHT BGE VELC E MTRX HK AND LOOP

## (undated) DEVICE — BIOGUARD A/W CLEANING ADAPTER

## (undated) DEVICE — TAPE ADH W2INXL10YD PLAS TRNSPAR H2O RESIST HYPOALRG CURAD

## (undated) DEVICE — SUTURE VCRL SZ 3-0 L27IN ABSRB UD FS-2 L19MM 1/2 CIR J423H

## (undated) DEVICE — BANDAGE GZ W2XL75IN ST RAYON POLY CNFRM STRTCH LTWT

## (undated) DEVICE — SUTURE MCRYL SZ 4-0 L27IN ABSRB UD L19MM PS-2 1/2 CIR PRIM Y426H

## (undated) DEVICE — CATHETER ETER IV 22GA L1IN POLYUR STR RADPQ INTROCAN SFTY

## (undated) DEVICE — GOWN,SIRUS,NONRNF,SETINSLV,XL,20/CS: Brand: MEDLINE

## (undated) DEVICE — GOWN,AURORA,NON-REINFORCED,2XL: Brand: MEDLINE